# Patient Record
Sex: MALE | Race: WHITE | NOT HISPANIC OR LATINO | Employment: OTHER | URBAN - NONMETROPOLITAN AREA
[De-identification: names, ages, dates, MRNs, and addresses within clinical notes are randomized per-mention and may not be internally consistent; named-entity substitution may affect disease eponyms.]

---

## 2023-02-02 PROBLEM — E78.5 HYPERLIPEMIA: Status: ACTIVE | Noted: 2023-02-02

## 2023-02-02 PROBLEM — R42 DIZZINESS: Status: ACTIVE | Noted: 2023-02-02

## 2023-02-02 PROBLEM — E87.6 HYPOKALEMIA: Status: ACTIVE | Noted: 2023-02-02

## 2023-02-02 PROBLEM — R06.09 DYSPNEA ON EXERTION: Status: ACTIVE | Noted: 2023-02-02

## 2023-02-02 PROBLEM — M17.11 ARTHRITIS OF KNEE, RIGHT: Status: ACTIVE | Noted: 2023-02-02

## 2023-02-02 PROBLEM — M25.562 BILATERAL KNEE PAIN: Status: ACTIVE | Noted: 2023-02-02

## 2023-02-02 PROBLEM — Z95.810 CARDIAC DEFIBRILLATOR IN PLACE: Status: ACTIVE | Noted: 2023-02-02

## 2023-02-02 PROBLEM — R97.20 ELEVATED PSA: Status: ACTIVE | Noted: 2023-02-02

## 2023-02-02 PROBLEM — R91.8 GROUND GLASS OPACITY PRESENT ON IMAGING OF LUNG: Status: ACTIVE | Noted: 2023-02-02

## 2023-02-02 PROBLEM — T82.867A CORONARY STENT THROMBOSIS: Status: ACTIVE | Noted: 2023-02-02

## 2023-02-02 PROBLEM — G89.29 CHRONIC BACK PAIN: Status: ACTIVE | Noted: 2023-02-02

## 2023-02-02 PROBLEM — M54.9 CHRONIC BACK PAIN: Status: ACTIVE | Noted: 2023-02-02

## 2023-02-02 PROBLEM — D64.9 NORMOCHROMIC NORMOCYTIC ANEMIA: Status: ACTIVE | Noted: 2023-02-02

## 2023-02-02 PROBLEM — R78.81 BACTEREMIA DUE TO ESCHERICHIA COLI: Status: ACTIVE | Noted: 2023-02-02

## 2023-02-02 PROBLEM — K21.9 ESOPHAGEAL REFLUX: Status: ACTIVE | Noted: 2023-02-02

## 2023-02-02 PROBLEM — M25.50 ARTHRALGIA: Status: ACTIVE | Noted: 2023-02-02

## 2023-02-02 PROBLEM — I48.0 AF (PAROXYSMAL ATRIAL FIBRILLATION) (MULTI): Status: ACTIVE | Noted: 2023-02-02

## 2023-02-02 PROBLEM — M25.561 BILATERAL KNEE PAIN: Status: ACTIVE | Noted: 2023-02-02

## 2023-02-02 PROBLEM — M17.12 ARTHRITIS OF KNEE, LEFT: Status: ACTIVE | Noted: 2023-02-02

## 2023-02-02 PROBLEM — I73.1 THROMBOANGIITIS OBLITERANS (BUERGER'S DISEASE) (CMS-HCC): Status: ACTIVE | Noted: 2023-02-02

## 2023-02-02 PROBLEM — H61.23 IMPACTED CERUMEN OF BOTH EARS: Status: ACTIVE | Noted: 2023-02-02

## 2023-02-02 PROBLEM — I10 HYPERTENSION: Status: ACTIVE | Noted: 2023-02-02

## 2023-02-02 PROBLEM — R10.84 GENERALIZED ABDOMINAL PAIN: Status: ACTIVE | Noted: 2023-02-02

## 2023-02-02 PROBLEM — E11.51 DIABETES MELLITUS WITH PERIPHERAL VASCULAR DISEASE (MULTI): Status: ACTIVE | Noted: 2023-02-02

## 2023-02-02 PROBLEM — E11.9 DIABETES (MULTI): Status: ACTIVE | Noted: 2023-02-02

## 2023-02-02 PROBLEM — E55.9 VITAMIN D DEFICIENCY: Status: ACTIVE | Noted: 2023-02-02

## 2023-02-02 PROBLEM — N39.0 ACUTE UTI: Status: ACTIVE | Noted: 2023-02-02

## 2023-02-02 PROBLEM — M79.18 MYOFASCIAL PAIN SYNDROME: Status: ACTIVE | Noted: 2023-02-02

## 2023-02-02 PROBLEM — M76.30 IT BAND SYNDROME: Status: ACTIVE | Noted: 2023-02-02

## 2023-02-02 PROBLEM — M81.0 OSTEOPOROSIS: Status: ACTIVE | Noted: 2023-02-02

## 2023-02-02 PROBLEM — I95.1 ORTHOSTATIC HYPOTENSION: Status: ACTIVE | Noted: 2023-02-02

## 2023-02-02 PROBLEM — R25.2 MUSCLE CRAMPS: Status: ACTIVE | Noted: 2023-02-02

## 2023-02-02 PROBLEM — G25.81 RESTLESS LEGS SYNDROME: Status: ACTIVE | Noted: 2023-02-02

## 2023-02-02 PROBLEM — M47.816 LUMBAR SPONDYLOSIS: Status: ACTIVE | Noted: 2023-02-02

## 2023-02-02 PROBLEM — J44.9 COPD (CHRONIC OBSTRUCTIVE PULMONARY DISEASE) (MULTI): Status: ACTIVE | Noted: 2023-02-02

## 2023-02-02 PROBLEM — R93.2 ABNORMAL LIVER CT: Status: ACTIVE | Noted: 2023-02-02

## 2023-02-02 PROBLEM — M70.61 GREATER TROCHANTERIC BURSITIS OF BOTH HIPS: Status: ACTIVE | Noted: 2023-02-02

## 2023-02-02 PROBLEM — E87.1 HYPONATREMIA: Status: ACTIVE | Noted: 2023-02-02

## 2023-02-02 PROBLEM — M54.10 RADICULOPATHY: Status: ACTIVE | Noted: 2023-02-02

## 2023-02-02 PROBLEM — I26.99 PULMONARY EMBOLISM (MULTI): Status: ACTIVE | Noted: 2023-02-02

## 2023-02-02 PROBLEM — R94.31 ABNORMAL ELECTROCARDIOGRAM: Status: ACTIVE | Noted: 2023-02-02

## 2023-02-02 PROBLEM — F17.210 CIGARETTE NICOTINE DEPENDENCE: Status: ACTIVE | Noted: 2023-02-02

## 2023-02-02 PROBLEM — R53.83 FATIGUE: Status: ACTIVE | Noted: 2023-02-02

## 2023-02-02 PROBLEM — N18.30 DIABETES MELLITUS WITH STAGE 3 CHRONIC KIDNEY DISEASE (MULTI): Status: ACTIVE | Noted: 2023-02-02

## 2023-02-02 PROBLEM — R53.1 WEAKNESS: Status: ACTIVE | Noted: 2023-02-02

## 2023-02-02 PROBLEM — I42.9 CARDIOMYOPATHY (MULTI): Status: ACTIVE | Noted: 2023-02-02

## 2023-02-02 PROBLEM — Z98.61 S/P PTCA (PERCUTANEOUS TRANSLUMINAL CORONARY ANGIOPLASTY): Status: ACTIVE | Noted: 2023-02-02

## 2023-02-02 PROBLEM — R30.0 BURNING WITH URINATION: Status: ACTIVE | Noted: 2023-02-02

## 2023-02-02 PROBLEM — M25.512 PAIN IN JOINT OF LEFT SHOULDER: Status: ACTIVE | Noted: 2023-02-02

## 2023-02-02 PROBLEM — K57.32 DIVERTICULITIS OF COLON: Status: ACTIVE | Noted: 2023-02-02

## 2023-02-02 PROBLEM — H93.A3 SUBJECTIVE PULSATILE TINNITUS OF BOTH EARS: Status: ACTIVE | Noted: 2023-02-02

## 2023-02-02 PROBLEM — E11.22 DIABETES MELLITUS WITH STAGE 3 CHRONIC KIDNEY DISEASE (MULTI): Status: ACTIVE | Noted: 2023-02-02

## 2023-02-02 PROBLEM — G89.29 CHRONIC PAIN OF BOTH HIPS: Status: ACTIVE | Noted: 2023-02-02

## 2023-02-02 PROBLEM — I73.9 PVD (PERIPHERAL VASCULAR DISEASE) (CMS-HCC): Status: ACTIVE | Noted: 2023-02-02

## 2023-02-02 PROBLEM — G62.9 PERIPHERAL NEUROPATHY: Status: ACTIVE | Noted: 2023-02-02

## 2023-02-02 PROBLEM — B96.20 BACTEREMIA DUE TO ESCHERICHIA COLI: Status: ACTIVE | Noted: 2023-02-02

## 2023-02-02 PROBLEM — I25.10 CAD (CORONARY ARTERY DISEASE): Status: ACTIVE | Noted: 2023-02-02

## 2023-02-02 PROBLEM — M25.552 CHRONIC PAIN OF BOTH HIPS: Status: ACTIVE | Noted: 2023-02-02

## 2023-02-02 PROBLEM — R93.5 ABNORMAL COMPUTERIZED AXIAL TOMOGRAPHY OF ABDOMEN: Status: ACTIVE | Noted: 2023-02-02

## 2023-02-02 PROBLEM — M19.90 DJD (DEGENERATIVE JOINT DISEASE): Status: ACTIVE | Noted: 2023-02-02

## 2023-02-02 PROBLEM — G47.36 SLEEP RELATED HYPOVENTILATION IN CONDITIONS CLASSIFIED ELSEWHERE: Status: ACTIVE | Noted: 2023-02-02

## 2023-02-02 PROBLEM — F32.5 DEPRESSION, MAJOR, IN REMISSION (CMS-HCC): Status: ACTIVE | Noted: 2023-02-02

## 2023-02-02 PROBLEM — M25.551 CHRONIC PAIN OF BOTH HIPS: Status: ACTIVE | Noted: 2023-02-02

## 2023-02-02 PROBLEM — N40.0 BPH (BENIGN PROSTATIC HYPERPLASIA): Status: ACTIVE | Noted: 2023-02-02

## 2023-02-02 PROBLEM — K40.90 LEFT INGUINAL HERNIA: Status: ACTIVE | Noted: 2023-02-02

## 2023-02-02 PROBLEM — M70.62 GREATER TROCHANTERIC BURSITIS OF BOTH HIPS: Status: ACTIVE | Noted: 2023-02-02

## 2023-02-02 PROBLEM — I50.22 CHRONIC SYSTOLIC HEART FAILURE (MULTI): Status: ACTIVE | Noted: 2023-02-02

## 2023-02-02 RX ORDER — TAMSULOSIN HYDROCHLORIDE 0.4 MG/1
2 CAPSULE ORAL NIGHTLY
COMMUNITY
Start: 2021-08-19 | End: 2023-05-17 | Stop reason: SDUPTHER

## 2023-02-02 RX ORDER — ESCITALOPRAM OXALATE 10 MG/1
1 TABLET ORAL DAILY
COMMUNITY
Start: 2020-11-11 | End: 2023-05-22 | Stop reason: SDUPTHER

## 2023-02-02 RX ORDER — LEVALBUTEROL TARTRATE 45 UG/1
AEROSOL, METERED ORAL
COMMUNITY
Start: 2020-07-13 | End: 2023-12-05 | Stop reason: SDUPTHER

## 2023-02-02 RX ORDER — ROPINIROLE 1 MG/1
1 TABLET, FILM COATED ORAL NIGHTLY
COMMUNITY
Start: 2012-07-05 | End: 2023-05-09 | Stop reason: SDUPTHER

## 2023-02-02 RX ORDER — CHOLECALCIFEROL (VITAMIN D3) 125 MCG
1 CAPSULE ORAL DAILY
COMMUNITY
Start: 2022-06-27 | End: 2023-06-12 | Stop reason: SDUPTHER

## 2023-02-02 RX ORDER — FUROSEMIDE 40 MG/1
TABLET ORAL
COMMUNITY
End: 2023-09-22 | Stop reason: WASHOUT

## 2023-02-02 RX ORDER — POTASSIUM CHLORIDE 1500 MG/1
1 TABLET, EXTENDED RELEASE ORAL DAILY
COMMUNITY
Start: 2021-07-26

## 2023-02-02 RX ORDER — TIOTROPIUM BROMIDE AND OLODATEROL 3.124; 2.736 UG/1; UG/1
2 SPRAY, METERED RESPIRATORY (INHALATION) DAILY
COMMUNITY
Start: 2022-07-27 | End: 2023-12-08 | Stop reason: ALTCHOICE

## 2023-02-02 RX ORDER — ATORVASTATIN CALCIUM 80 MG/1
1 TABLET, FILM COATED ORAL DAILY
COMMUNITY
Start: 2016-07-13 | End: 2023-05-05 | Stop reason: SDUPTHER

## 2023-02-02 RX ORDER — AMIODARONE HYDROCHLORIDE 100 MG/1
2 TABLET ORAL DAILY
COMMUNITY
Start: 2021-08-15 | End: 2023-09-22 | Stop reason: ALTCHOICE

## 2023-02-02 RX ORDER — CALCIUM CARBONATE/VITAMIN D3 600MG-5MCG
1 TABLET ORAL DAILY
COMMUNITY
Start: 2016-01-19 | End: 2023-09-22 | Stop reason: WASHOUT

## 2023-02-02 RX ORDER — LANOLIN ALCOHOL/MO/W.PET/CERES
1 CREAM (GRAM) TOPICAL DAILY
COMMUNITY
Start: 2021-07-08 | End: 2023-05-22 | Stop reason: SDUPTHER

## 2023-03-15 ENCOUNTER — OFFICE VISIT (OUTPATIENT)
Dept: PRIMARY CARE | Facility: CLINIC | Age: 78
End: 2023-03-15
Payer: MEDICARE

## 2023-03-15 VITALS
SYSTOLIC BLOOD PRESSURE: 106 MMHG | DIASTOLIC BLOOD PRESSURE: 68 MMHG | HEART RATE: 66 BPM | WEIGHT: 143.7 LBS | BODY MASS INDEX: 20.57 KG/M2 | TEMPERATURE: 97.4 F | HEIGHT: 70 IN | OXYGEN SATURATION: 100 %

## 2023-03-15 DIAGNOSIS — E11.22 DIABETES MELLITUS WITH STAGE 3 CHRONIC KIDNEY DISEASE (MULTI): ICD-10-CM

## 2023-03-15 DIAGNOSIS — I26.99 PULMONARY EMBOLISM, UNSPECIFIED CHRONICITY, UNSPECIFIED PULMONARY EMBOLISM TYPE, UNSPECIFIED WHETHER ACUTE COR PULMONALE PRESENT (MULTI): ICD-10-CM

## 2023-03-15 DIAGNOSIS — Z00.00 ROUTINE GENERAL MEDICAL EXAMINATION AT HEALTH CARE FACILITY: ICD-10-CM

## 2023-03-15 DIAGNOSIS — E78.2 MIXED HYPERLIPIDEMIA: ICD-10-CM

## 2023-03-15 DIAGNOSIS — F17.210 CIGARETTE NICOTINE DEPENDENCE WITHOUT COMPLICATION: ICD-10-CM

## 2023-03-15 DIAGNOSIS — G25.81 RESTLESS LEGS SYNDROME: ICD-10-CM

## 2023-03-15 DIAGNOSIS — I73.9 PVD (PERIPHERAL VASCULAR DISEASE) (CMS-HCC): ICD-10-CM

## 2023-03-15 DIAGNOSIS — F32.5 DEPRESSION, MAJOR, IN REMISSION (CMS-HCC): ICD-10-CM

## 2023-03-15 DIAGNOSIS — J42 CHRONIC BRONCHITIS, UNSPECIFIED CHRONIC BRONCHITIS TYPE (MULTI): ICD-10-CM

## 2023-03-15 DIAGNOSIS — I42.9 CARDIOMYOPATHY, UNSPECIFIED TYPE (MULTI): ICD-10-CM

## 2023-03-15 DIAGNOSIS — I48.0 AF (PAROXYSMAL ATRIAL FIBRILLATION) (MULTI): ICD-10-CM

## 2023-03-15 DIAGNOSIS — N18.30 DIABETES MELLITUS WITH STAGE 3 CHRONIC KIDNEY DISEASE (MULTI): ICD-10-CM

## 2023-03-15 DIAGNOSIS — Z13.31 DEPRESSION SCREENING: ICD-10-CM

## 2023-03-15 DIAGNOSIS — E55.9 VITAMIN D DEFICIENCY: Primary | ICD-10-CM

## 2023-03-15 DIAGNOSIS — E11.51 DIABETES MELLITUS WITH PERIPHERAL VASCULAR DISEASE (MULTI): ICD-10-CM

## 2023-03-15 DIAGNOSIS — I50.22 CHRONIC SYSTOLIC HEART FAILURE (MULTI): ICD-10-CM

## 2023-03-15 PROCEDURE — 1170F FXNL STATUS ASSESSED: CPT | Performed by: NURSE PRACTITIONER

## 2023-03-15 PROCEDURE — 1159F MED LIST DOCD IN RCRD: CPT | Performed by: NURSE PRACTITIONER

## 2023-03-15 PROCEDURE — 4004F PT TOBACCO SCREEN RCVD TLK: CPT | Performed by: NURSE PRACTITIONER

## 2023-03-15 PROCEDURE — 99214 OFFICE O/P EST MOD 30 MIN: CPT | Performed by: NURSE PRACTITIONER

## 2023-03-15 PROCEDURE — 1160F RVW MEDS BY RX/DR IN RCRD: CPT | Performed by: NURSE PRACTITIONER

## 2023-03-15 PROCEDURE — 3074F SYST BP LT 130 MM HG: CPT | Performed by: NURSE PRACTITIONER

## 2023-03-15 PROCEDURE — 3078F DIAST BP <80 MM HG: CPT | Performed by: NURSE PRACTITIONER

## 2023-03-15 PROCEDURE — G0444 DEPRESSION SCREEN ANNUAL: HCPCS | Performed by: NURSE PRACTITIONER

## 2023-03-15 PROCEDURE — 99406 BEHAV CHNG SMOKING 3-10 MIN: CPT | Performed by: NURSE PRACTITIONER

## 2023-03-15 PROCEDURE — G0439 PPPS, SUBSEQ VISIT: HCPCS | Performed by: NURSE PRACTITIONER

## 2023-03-15 PROCEDURE — 1157F ADVNC CARE PLAN IN RCRD: CPT | Performed by: NURSE PRACTITIONER

## 2023-03-15 RX ORDER — ROPINIROLE 0.25 MG/1
0.25 TABLET, FILM COATED ORAL DAILY PRN
Status: DISCONTINUED | OUTPATIENT
Start: 2023-03-15 | End: 2023-03-20

## 2023-03-15 ASSESSMENT — ENCOUNTER SYMPTOMS
FATIGUE: 1
HEADACHES: 0
NERVOUS/ANXIOUS: 1
SORE THROAT: 0
PALPITATIONS: 0
DIZZINESS: 0
OCCASIONAL FEELINGS OF UNSTEADINESS: 1
NUMBNESS: 0
CHILLS: 0
DEPRESSION: 1
SHORTNESS OF BREATH: 1
BACK PAIN: 1
VOMITING: 0
EYES NEGATIVE: 1
CONSTIPATION: 0
ABDOMINAL PAIN: 0
DIARRHEA: 0
ENDOCRINE NEGATIVE: 1
UNEXPECTED WEIGHT CHANGE: 0
WHEEZING: 0
ALLERGIC/IMMUNOLOGIC NEGATIVE: 1
GASTROINTESTINAL NEGATIVE: 1
ACTIVITY CHANGE: 0
HEMATOLOGIC/LYMPHATIC NEGATIVE: 1
COUGH: 0
NAUSEA: 0
ARTHRALGIAS: 1
SPEECH DIFFICULTY: 0

## 2023-03-15 ASSESSMENT — ACTIVITIES OF DAILY LIVING (ADL)
DOING_HOUSEWORK: NEEDS ASSISTANCE
TAKING_MEDICATION: INDEPENDENT
DRESSING: INDEPENDENT
GROCERY_SHOPPING: INDEPENDENT
MANAGING_FINANCES: INDEPENDENT
BATHING: INDEPENDENT

## 2023-03-15 ASSESSMENT — PATIENT HEALTH QUESTIONNAIRE - PHQ9
SUM OF ALL RESPONSES TO PHQ9 QUESTIONS 1 AND 2: 0
1. LITTLE INTEREST OR PLEASURE IN DOING THINGS: NOT AT ALL
2. FEELING DOWN, DEPRESSED OR HOPELESS: NOT AT ALL

## 2023-03-15 NOTE — ASSESSMENT & PLAN NOTE
Controlled. Continue current medications.  -Low flat/cholesterol, low sodium, carbohydrate controlled diet  -Exercise as tolerated 150 minutes/week, gradually increase activity  -Maintain healthy weight  -Regular follow-up with ophthalmology and podiatry

## 2023-03-15 NOTE — PROGRESS NOTES
Subjective   Reason for Visit: Andrez Damon is an 78 y.o. male here for a Medicare Wellness visit.          Reviewed all medications by prescribing practitioner or clinical pharmacist (such as prescriptions, OTCs, herbal therapies and supplements) and documented in the medical record.    Orthostatic hypotension. Educated about getting up slowly, wearing compression stockings, he does not wear them. When BP drops he drinks a glass of water or OJ and lays down, feels better.  Hx PE, Taking Eliquis. Denies chest pain, breathing at baseline. States he is still smoking 1 pack/week. Strongly encouraged to quit smoking completely.  Chronic kidney disease, monitor  Urinary retention. Taking tamsulosin 0.4 mg 2 capsules at bedtime. Denies difficulty urinating. Follow up with urology.  COPD, follows with pulmonology. Using Stiolto and Yupelri. Using O2 2 L at bedtime. Feels his breathing is slowly worsening over time. Strongly encouraged to stop smoking completely.  Diabetes, controlled, A1c 5.6%.   Anemia, following with hematology, getting B12 every month, IV iron as needed  Complains of chronic back, leg pain. Uses cane. Recommend return to pain management. He declines. Declines PT. RLS, not controlled, taking ropinirole at night, will add 0.25 mg daily to help with symptoms.  Depression, controlled, monitor  PVD, thromboangiitis obliterans, strongly encouraged patient to stop smoking. Wear good supportive shoes and protect feet, inspect feet daily.  GERD, controlled, taking pantoprazole 40 mg daily.   CAD, anterior wall MI, cardiac stents, ejection fraction 20-25%, follows with cardiology. Did not tolerate Entresto. Taking Brilinta.  I spent 15 minutes obtaining and discussing depression screening using PHQ-2 questions with results documented in the chart. The screening indicated potential depression and PHQ-9 was obtained with treatment and referral plan discussed.   I spent more than 3 minutes (but less than 10  minutes) counseling patient about need for smoking/tobacco cessation and how I can support efforts when patient is ready to quit. Discussed nicotine replacement therapy, varenicline, bupropion, hypnosis, support groups, and acupuncture as potential options. Patient currently has no signs or symptoms of tobacco related disease.               Patient Self Assessment of Health Status  Patient Self Assessment: Fair    Nutrition and Exercise  Current Diet: Heart Healthy Diet, Diabetic Diet  Adequate Fluid Intake: Yes  Caffeine: Yes  Exercise Frequency: Infrequently    Functional Ability/Level of Safety  Cognitive Impairment Observed: No cognitive impairment observed  Cognitive Impairment Reported: No cognitive impairment reported by patient or family    Home Safety Risk Factors: None    Patient Care Team:  KENDRICK Parmar DNP as PCP - General  KENDRICK Parmar DNP as PCP - [employer] PCP     Review of Systems   Constitutional:  Positive for fatigue. Negative for activity change, chills and unexpected weight change.   HENT:  Positive for hearing loss and tinnitus. Negative for congestion, ear pain and sore throat.    Eyes: Negative.    Respiratory:  Positive for shortness of breath. Negative for cough and wheezing.    Cardiovascular:  Negative for chest pain, palpitations and leg swelling.        Cool toes   Gastrointestinal: Negative.  Negative for abdominal pain, constipation, diarrhea, nausea and vomiting.   Endocrine: Negative.    Genitourinary: Negative.         Nocturia   Musculoskeletal:  Positive for arthralgias, back pain and gait problem.   Skin: Negative.    Allergic/Immunologic: Negative.    Neurological:  Negative for dizziness, speech difficulty, numbness and headaches.   Hematological: Negative.    Psychiatric/Behavioral:  The patient is nervous/anxious.    All other systems reviewed and are negative.      Objective   Vitals:  /68   Pulse 66   Temp 36.3 °C  "(97.4 °F)   Ht 1.778 m (5' 10\")   Wt 65.2 kg (143 lb 11.2 oz)   SpO2 100%   BMI 20.62 kg/m²       Physical Exam  Vitals and nursing note reviewed.   Constitutional:       General: He is not in acute distress.     Appearance: Normal appearance. He is normal weight. He is not ill-appearing.   HENT:      Head: Normocephalic and atraumatic.      Right Ear: Tympanic membrane, ear canal and external ear normal.      Left Ear: Tympanic membrane, ear canal and external ear normal.      Nose: Nose normal.      Mouth/Throat:      Mouth: Mucous membranes are moist.      Pharynx: Oropharynx is clear.   Eyes:      Extraocular Movements: Extraocular movements intact.      Conjunctiva/sclera: Conjunctivae normal.      Pupils: Pupils are equal, round, and reactive to light.   Cardiovascular:      Rate and Rhythm: Normal rate and regular rhythm.      Pulses: Normal pulses.      Heart sounds: Normal heart sounds. No murmur heard.  Pulmonary:      Effort: Pulmonary effort is normal. No respiratory distress.      Breath sounds: Normal breath sounds. No wheezing.   Abdominal:      General: Bowel sounds are normal. There is no distension.      Palpations: Abdomen is soft.      Tenderness: There is no abdominal tenderness.   Musculoskeletal:         General: No tenderness. Normal range of motion.      Cervical back: Normal range of motion and neck supple.      Right lower leg: No edema.      Left lower leg: No edema.   Skin:     General: Skin is warm and dry.      Capillary Refill: Capillary refill takes less than 2 seconds.   Neurological:      General: No focal deficit present.      Mental Status: He is alert and oriented to person, place, and time.   Psychiatric:         Mood and Affect: Mood normal.         Behavior: Behavior normal.         Thought Content: Thought content normal.         Judgment: Judgment normal.         Assessment/Plan   Problem List Items Addressed This Visit          Nervous    Restless legs syndrome    " Relevant Medications    rOPINIRole (Requip) tablet 0.25 mg    Other Relevant Orders    Follow Up In Primary Care       Respiratory    COPD (chronic obstructive pulmonary disease) (CMS/HCC)    Current Assessment & Plan     Controlled. Continue current medications.  Follow with pulmonology            Circulatory    AF (paroxysmal atrial fibrillation) (CMS/HCC)    Current Assessment & Plan     Controlled. Continue current medications.  Follow with cardiology         Cardiomyopathy (CMS/HCC)    Current Assessment & Plan     Controlled. Continue current medications.  Follow with cardiology         Chronic systolic heart failure (CMS/HCC)    Current Assessment & Plan     Controlled. Continue current medications.  Follow with cardiology  -2 GM sodium diet  -Weigh yourself every morning before breakfast  -Call the office if you have a weight gain of 3 or more pounds in one day or 5 or more pounds in one week          Diabetes mellitus with peripheral vascular disease (CMS/HCC)    Current Assessment & Plan     Controlled. Continue current medications.  -Low flat/cholesterol, low sodium, carbohydrate controlled diet  -Exercise as tolerated 150 minutes/week, gradually increase activity  -Maintain healthy weight  -Regular follow-up with ophthalmology and podiatry           Relevant Orders    Albumin , Urine Random    CBC and Auto Differential    Comprehensive Metabolic Panel    Follow Up In Primary Care    Pulmonary embolism (CMS/HCC)    Current Assessment & Plan     Controlled. Continue current medications.         PVD (peripheral vascular disease) (CMS/HCC)    Current Assessment & Plan     Controlled. Continue current medications.  Strongly encouraged to stop smoking.              Endocrine/Metabolic    Diabetes mellitus with stage 3 chronic kidney disease (CMS/HCC)    Current Assessment & Plan     Controlled. Continue current medications.  -Low flat/cholesterol, low sodium, carbohydrate controlled diet  -Exercise as  tolerated 150 minutes/week, gradually increase activity  -Maintain healthy weight  -Regular follow-up with ophthalmology and podiatry         Relevant Orders    Albumin , Urine Random    CBC and Auto Differential    Comprehensive Metabolic Panel    Vitamin D deficiency - Primary    Relevant Orders    Vitamin D 1,25 Dihydroxy       Other    Cigarette nicotine dependence    Current Assessment & Plan     Strongly encouraged to stop smoking.           Depression, major, in remission (CMS/HCC)    Current Assessment & Plan     Controlled. Continue current medications.           Hyperlipemia    Relevant Orders    Lipid Panel    TSH with reflex to Free T4 if abnormal     Other Visit Diagnoses       Routine general medical examination at health care facility

## 2023-03-15 NOTE — ASSESSMENT & PLAN NOTE
Controlled. Continue current medications.  Follow with cardiology  -2 GM sodium diet  -Weigh yourself every morning before breakfast  -Call the office if you have a weight gain of 3 or more pounds in one day or 5 or more pounds in one week

## 2023-03-20 DIAGNOSIS — G25.81 RESTLESS LEGS SYNDROME: Primary | ICD-10-CM

## 2023-03-20 RX ORDER — ROPINIROLE 0.25 MG/1
TABLET, FILM COATED ORAL
Qty: 90 TABLET | Refills: 0 | Status: SHIPPED | OUTPATIENT
Start: 2023-03-20 | End: 2023-05-09 | Stop reason: ALTCHOICE

## 2023-05-05 DIAGNOSIS — E78.5 HYPERLIPIDEMIA, UNSPECIFIED HYPERLIPIDEMIA TYPE: ICD-10-CM

## 2023-05-05 RX ORDER — ATORVASTATIN CALCIUM 80 MG/1
80 TABLET, FILM COATED ORAL DAILY
Qty: 90 TABLET | Refills: 0 | Status: SHIPPED | OUTPATIENT
Start: 2023-05-05 | End: 2023-08-07

## 2023-05-09 DIAGNOSIS — G25.81 RESTLESS LEGS SYNDROME: ICD-10-CM

## 2023-05-09 RX ORDER — ROPINIROLE 1 MG/1
1 TABLET, FILM COATED ORAL NIGHTLY
Qty: 90 TABLET | Refills: 0 | Status: SHIPPED | OUTPATIENT
Start: 2023-05-09 | End: 2023-07-27

## 2023-05-17 DIAGNOSIS — N40.0 BENIGN PROSTATIC HYPERPLASIA, UNSPECIFIED WHETHER LOWER URINARY TRACT SYMPTOMS PRESENT: ICD-10-CM

## 2023-05-18 RX ORDER — TAMSULOSIN HYDROCHLORIDE 0.4 MG/1
0.8 CAPSULE ORAL NIGHTLY
Qty: 180 CAPSULE | Refills: 0 | Status: SHIPPED | OUTPATIENT
Start: 2023-05-18 | End: 2024-01-26 | Stop reason: SDUPTHER

## 2023-05-22 ENCOUNTER — LAB (OUTPATIENT)
Dept: LAB | Facility: LAB | Age: 78
End: 2023-05-22
Payer: MEDICARE

## 2023-05-22 DIAGNOSIS — G62.89 OTHER POLYNEUROPATHY: Primary | ICD-10-CM

## 2023-05-22 DIAGNOSIS — Z00.00 HEALTHCARE MAINTENANCE: ICD-10-CM

## 2023-05-22 DIAGNOSIS — F32.5 DEPRESSION, MAJOR, IN REMISSION (CMS-HCC): ICD-10-CM

## 2023-05-22 DIAGNOSIS — N18.30 DIABETES MELLITUS WITH STAGE 3 CHRONIC KIDNEY DISEASE (MULTI): ICD-10-CM

## 2023-05-22 DIAGNOSIS — E11.51 DIABETES MELLITUS WITH PERIPHERAL VASCULAR DISEASE (MULTI): ICD-10-CM

## 2023-05-22 DIAGNOSIS — E55.9 VITAMIN D DEFICIENCY: ICD-10-CM

## 2023-05-22 DIAGNOSIS — E11.22 DIABETES MELLITUS WITH STAGE 3 CHRONIC KIDNEY DISEASE (MULTI): ICD-10-CM

## 2023-05-22 DIAGNOSIS — E78.2 MIXED HYPERLIPIDEMIA: ICD-10-CM

## 2023-05-22 LAB
ALANINE AMINOTRANSFERASE (SGPT) (U/L) IN SER/PLAS: 35 U/L (ref 10–52)
ALBUMIN (G/DL) IN SER/PLAS: 3.7 G/DL (ref 3.4–5)
ALKALINE PHOSPHATASE (U/L) IN SER/PLAS: 61 U/L (ref 33–136)
ANION GAP IN SER/PLAS: 14 MMOL/L (ref 10–20)
ASPARTATE AMINOTRANSFERASE (SGOT) (U/L) IN SER/PLAS: 49 U/L (ref 9–39)
BASOPHILS (10*3/UL) IN BLOOD BY AUTOMATED COUNT: 0.06 X10E9/L (ref 0–0.1)
BASOPHILS/100 LEUKOCYTES IN BLOOD BY AUTOMATED COUNT: 1.1 % (ref 0–2)
BILIRUBIN TOTAL (MG/DL) IN SER/PLAS: 0.6 MG/DL (ref 0–1.2)
CALCIUM (MG/DL) IN SER/PLAS: 9.4 MG/DL (ref 8.6–10.3)
CARBON DIOXIDE, TOTAL (MMOL/L) IN SER/PLAS: 26 MMOL/L (ref 21–32)
CHLORIDE (MMOL/L) IN SER/PLAS: 105 MMOL/L (ref 98–107)
CHOLESTEROL (MG/DL) IN SER/PLAS: 148 MG/DL (ref 0–199)
CHOLESTEROL IN HDL (MG/DL) IN SER/PLAS: 68.1 MG/DL
CHOLESTEROL/HDL RATIO: 2.2
CREATININE (MG/DL) IN SER/PLAS: 1.74 MG/DL (ref 0.5–1.3)
EOSINOPHILS (10*3/UL) IN BLOOD BY AUTOMATED COUNT: 0.06 X10E9/L (ref 0–0.4)
EOSINOPHILS/100 LEUKOCYTES IN BLOOD BY AUTOMATED COUNT: 1.1 % (ref 0–6)
ERYTHROCYTE DISTRIBUTION WIDTH (RATIO) BY AUTOMATED COUNT: 14.5 % (ref 11.5–14.5)
ERYTHROCYTE MEAN CORPUSCULAR HEMOGLOBIN CONCENTRATION (G/DL) BY AUTOMATED: 31.4 G/DL (ref 32–36)
ERYTHROCYTE MEAN CORPUSCULAR VOLUME (FL) BY AUTOMATED COUNT: 97 FL (ref 80–100)
ERYTHROCYTES (10*6/UL) IN BLOOD BY AUTOMATED COUNT: 4.16 X10E12/L (ref 4.5–5.9)
FERRITIN (UG/LL) IN SER/PLAS: 38 UG/L (ref 20–300)
GFR MALE: 40 ML/MIN/1.73M2
GLUCOSE (MG/DL) IN SER/PLAS: 152 MG/DL (ref 74–99)
HEMATOCRIT (%) IN BLOOD BY AUTOMATED COUNT: 40.5 % (ref 41–52)
HEMOGLOBIN (G/DL) IN BLOOD: 12.7 G/DL (ref 13.5–17.5)
IMMATURE GRANULOCYTES/100 LEUKOCYTES IN BLOOD BY AUTOMATED COUNT: 0.4 % (ref 0–0.9)
IRON (UG/DL) IN SER/PLAS: 45 UG/DL (ref 35–150)
IRON BINDING CAPACITY (UG/DL) IN SER/PLAS: 398 UG/DL (ref 240–445)
IRON SATURATION (%) IN SER/PLAS: 11 % (ref 25–45)
LDL: 70 MG/DL (ref 0–99)
LEUKOCYTES (10*3/UL) IN BLOOD BY AUTOMATED COUNT: 5.6 X10E9/L (ref 4.4–11.3)
LYMPHOCYTES (10*3/UL) IN BLOOD BY AUTOMATED COUNT: 0.77 X10E9/L (ref 0.8–3)
LYMPHOCYTES/100 LEUKOCYTES IN BLOOD BY AUTOMATED COUNT: 13.8 % (ref 13–44)
MONOCYTES (10*3/UL) IN BLOOD BY AUTOMATED COUNT: 0.43 X10E9/L (ref 0.05–0.8)
MONOCYTES/100 LEUKOCYTES IN BLOOD BY AUTOMATED COUNT: 7.7 % (ref 2–10)
NEUTROPHILS (10*3/UL) IN BLOOD BY AUTOMATED COUNT: 4.26 X10E9/L (ref 1.6–5.5)
NEUTROPHILS/100 LEUKOCYTES IN BLOOD BY AUTOMATED COUNT: 75.9 % (ref 40–80)
PLATELETS (10*3/UL) IN BLOOD AUTOMATED COUNT: 208 X10E9/L (ref 150–450)
POTASSIUM (MMOL/L) IN SER/PLAS: 4.3 MMOL/L (ref 3.5–5.3)
PROTEIN TOTAL: 5.7 G/DL (ref 6.4–8.2)
SODIUM (MMOL/L) IN SER/PLAS: 141 MMOL/L (ref 136–145)
THYROTROPIN (MIU/L) IN SER/PLAS BY DETECTION LIMIT <= 0.05 MIU/L: 1.84 MIU/L (ref 0.44–3.98)
TRIGLYCERIDE (MG/DL) IN SER/PLAS: 48 MG/DL (ref 0–149)
UREA NITROGEN (MG/DL) IN SER/PLAS: 32 MG/DL (ref 6–23)
VLDL: 10 MG/DL (ref 0–40)

## 2023-05-22 PROCEDURE — 80053 COMPREHEN METABOLIC PANEL: CPT

## 2023-05-22 PROCEDURE — 84443 ASSAY THYROID STIM HORMONE: CPT

## 2023-05-22 PROCEDURE — 36415 COLL VENOUS BLD VENIPUNCTURE: CPT

## 2023-05-22 PROCEDURE — 80061 LIPID PANEL: CPT

## 2023-05-22 PROCEDURE — 82652 VIT D 1 25-DIHYDROXY: CPT

## 2023-05-22 PROCEDURE — 85025 COMPLETE CBC W/AUTO DIFF WBC: CPT

## 2023-05-22 RX ORDER — ESCITALOPRAM OXALATE 10 MG/1
10 TABLET ORAL DAILY
Qty: 90 TABLET | Refills: 0 | Status: SHIPPED | OUTPATIENT
Start: 2023-05-22 | End: 2023-08-21 | Stop reason: SDUPTHER

## 2023-05-22 RX ORDER — LANOLIN ALCOHOL/MO/W.PET/CERES
50 CREAM (GRAM) TOPICAL DAILY
Qty: 90 TABLET | Refills: 0 | Status: SHIPPED | OUTPATIENT
Start: 2023-05-22 | End: 2023-08-18

## 2023-05-24 ENCOUNTER — OFFICE VISIT (OUTPATIENT)
Dept: PRIMARY CARE | Facility: CLINIC | Age: 78
End: 2023-05-24
Payer: MEDICARE

## 2023-05-24 VITALS
BODY MASS INDEX: 20.59 KG/M2 | SYSTOLIC BLOOD PRESSURE: 120 MMHG | HEART RATE: 70 BPM | DIASTOLIC BLOOD PRESSURE: 72 MMHG | TEMPERATURE: 97.5 F | OXYGEN SATURATION: 92 % | WEIGHT: 143.5 LBS

## 2023-05-24 DIAGNOSIS — M79.605 ACUTE PAIN OF LEFT LOWER EXTREMITY: ICD-10-CM

## 2023-05-24 DIAGNOSIS — I48.0 AF (PAROXYSMAL ATRIAL FIBRILLATION) (MULTI): ICD-10-CM

## 2023-05-24 DIAGNOSIS — I50.22 CHRONIC SYSTOLIC HEART FAILURE (MULTI): ICD-10-CM

## 2023-05-24 DIAGNOSIS — M25.552 PAIN OF LEFT HIP: Primary | ICD-10-CM

## 2023-05-24 DIAGNOSIS — I10 PRIMARY HYPERTENSION: ICD-10-CM

## 2023-05-24 PROCEDURE — 4004F PT TOBACCO SCREEN RCVD TLK: CPT | Performed by: NURSE PRACTITIONER

## 2023-05-24 PROCEDURE — 1160F RVW MEDS BY RX/DR IN RCRD: CPT | Performed by: NURSE PRACTITIONER

## 2023-05-24 PROCEDURE — 99214 OFFICE O/P EST MOD 30 MIN: CPT | Performed by: NURSE PRACTITIONER

## 2023-05-24 PROCEDURE — 1159F MED LIST DOCD IN RCRD: CPT | Performed by: NURSE PRACTITIONER

## 2023-05-24 PROCEDURE — 3078F DIAST BP <80 MM HG: CPT | Performed by: NURSE PRACTITIONER

## 2023-05-24 PROCEDURE — 3074F SYST BP LT 130 MM HG: CPT | Performed by: NURSE PRACTITIONER

## 2023-05-24 PROCEDURE — 1157F ADVNC CARE PLAN IN RCRD: CPT | Performed by: NURSE PRACTITIONER

## 2023-05-24 NOTE — PROGRESS NOTES
Subjective   Patient ID: Andrez Damon is a 78 y.o. male who presents for Hip Pain (Left through leg and pelvis, since Mother's Day).    Patient stumbled on step 1-1/2 weeks ago, felt he jambed L leg into pelvis. Hx prior hip fracture. Xrays negative for fracture. Continue rest, elevation. Tylenol 650-1000 mg every 8 hours as needed for pain. Call the office if symptoms worsen or do not improve.  Orthostatic hypotension. Educated about getting up slowly, wearing compression stockings, he does not wear them. When BP drops he drinks a glass of water or OJ and lays down, feels better.  Hx PE, Taking Eliquis. Denies chest pain, breathing at baseline. States he is still smoking 1 pack/week. Strongly encouraged to quit smoking completely.  Chronic kidney disease, monitor  Urinary retention. Taking tamsulosin 0.4 mg 2 capsules at bedtime. Denies difficulty urinating. Follow up with urology.  COPD, follows with pulmonology. Using Stiolto and Yupelri. Using O2 2 L at bedtime. Feels his breathing is slowly worsening over time. Strongly encouraged to stop smoking completely.  Diabetes, controlled, A1c 5.6%.   Anemia, following with hematology, getting B12 every month, IV iron as needed  Complains of chronic back, leg pain. Uses cane. Recommend return to pain management. He declines. Declines PT. RLS, not controlled, taking ropinirole at night, will add 0.25 mg daily to help with symptoms.  Depression, controlled, monitor  PVD, thromboangiitis obliterans, strongly encouraged patient to stop smoking. Wear good supportive shoes and protect feet, inspect feet daily.  GERD, controlled, taking pantoprazole 40 mg daily.   CAD, anterior wall MI, cardiac stents, ejection fraction 20-25%, follows with cardiology. Did not tolerate Entresto. Taking Brilinta.         Review of Systems   Constitutional:  Positive for fatigue. Negative for activity change, chills and unexpected weight change.   HENT:  Positive for hearing loss and  tinnitus. Negative for congestion, ear pain and sore throat.    Eyes: Negative.    Respiratory:  Positive for shortness of breath. Negative for cough and wheezing.    Cardiovascular:  Negative for chest pain, palpitations and leg swelling.        Cool toes   Gastrointestinal: Negative.  Negative for abdominal pain, constipation, diarrhea, nausea and vomiting.   Endocrine: Negative.    Genitourinary: Negative.         Nocturia   Musculoskeletal:  Positive for arthralgias, back pain and gait problem.   Skin: Negative.    Allergic/Immunologic: Negative.    Neurological:  Negative for dizziness, speech difficulty, numbness and headaches.   Hematological: Negative.    Psychiatric/Behavioral:  The patient is nervous/anxious.    All other systems reviewed and are negative.      Objective   /72   Pulse 70   Temp 36.4 °C (97.5 °F)   Wt 65.1 kg (143 lb 8 oz)   SpO2 92%   BMI 20.59 kg/m²     Physical Exam  Vitals and nursing note reviewed.   Constitutional:       General: He is not in acute distress.     Appearance: Normal appearance. He is normal weight. He is not ill-appearing.   HENT:      Head: Normocephalic and atraumatic.      Right Ear: Tympanic membrane, ear canal and external ear normal.      Left Ear: Tympanic membrane, ear canal and external ear normal.      Nose: Nose normal.      Mouth/Throat:      Mouth: Mucous membranes are moist.      Pharynx: Oropharynx is clear.   Eyes:      Extraocular Movements: Extraocular movements intact.      Conjunctiva/sclera: Conjunctivae normal.      Pupils: Pupils are equal, round, and reactive to light.   Cardiovascular:      Rate and Rhythm: Normal rate and regular rhythm.      Pulses: Normal pulses.      Heart sounds: Normal heart sounds. No murmur heard.  Pulmonary:      Effort: Pulmonary effort is normal. No respiratory distress.      Breath sounds: Normal breath sounds. No wheezing.   Abdominal:      General: Bowel sounds are normal. There is no distension.       Palpations: Abdomen is soft.      Tenderness: There is no abdominal tenderness.   Musculoskeletal:         General: No tenderness. Normal range of motion.      Cervical back: Normal range of motion and neck supple.      Right lower leg: No edema.      Left lower leg: No edema.   Skin:     General: Skin is warm and dry.      Capillary Refill: Capillary refill takes less than 2 seconds.   Neurological:      General: No focal deficit present.      Mental Status: He is alert and oriented to person, place, and time.   Psychiatric:         Mood and Affect: Mood normal.         Behavior: Behavior normal.         Thought Content: Thought content normal.         Judgment: Judgment normal.         Assessment/Plan   Problem List Items Addressed This Visit          Circulatory    AF (paroxysmal atrial fibrillation) (CMS/HCC)     Controlled  Continue Eliquis for stroke prevention  Follow-up with cardiology         Chronic systolic heart failure (CMS/HCC)     Euvolemic  Continue furosemide  -2 GM sodium diet  -Weigh yourself every morning before breakfast  -Call the office if you have a weight gain of 3 or more pounds in one day or 5 or more pounds in one week  Follow-up with cardiology         Hypertension     Controlled. Continue current medications.  -Low fat/cholesterol, low sodium, low carbohydrate diet  -Exercise as tolerated 150 minutes/week, increase activity slowly  -Maintain healthy weight  -Strongly encouraged to stop smoking          Other Visit Diagnoses       Pain of left hip    -  Primary    Acute pain of left lower extremity        Relevant Orders    XR femur left 2+ views (Completed)

## 2023-05-25 LAB — VITAMIN D 1,25-DIHYDROXY: 43.2 PG/ML (ref 19.9–79.3)

## 2023-05-26 PROBLEM — N39.0 ACUTE UTI: Status: RESOLVED | Noted: 2023-02-02 | Resolved: 2023-05-26

## 2023-05-26 ASSESSMENT — ENCOUNTER SYMPTOMS
ACTIVITY CHANGE: 0
DIZZINESS: 0
EYES NEGATIVE: 1
SORE THROAT: 0
NAUSEA: 0
PALPITATIONS: 0
CONSTIPATION: 0
NUMBNESS: 0
ENDOCRINE NEGATIVE: 1
UNEXPECTED WEIGHT CHANGE: 0
HEMATOLOGIC/LYMPHATIC NEGATIVE: 1
ARTHRALGIAS: 1
ABDOMINAL PAIN: 0
DIARRHEA: 0
SPEECH DIFFICULTY: 0
NERVOUS/ANXIOUS: 1
GASTROINTESTINAL NEGATIVE: 1
SHORTNESS OF BREATH: 1
VOMITING: 0
HEADACHES: 0
WHEEZING: 0
CHILLS: 0
FATIGUE: 1
BACK PAIN: 1
COUGH: 0
ALLERGIC/IMMUNOLOGIC NEGATIVE: 1

## 2023-05-26 NOTE — ASSESSMENT & PLAN NOTE
Controlled. Continue current medications.  -Low fat/cholesterol, low sodium, low carbohydrate diet  -Exercise as tolerated 150 minutes/week, increase activity slowly  -Maintain healthy weight  -Strongly encouraged to stop smoking

## 2023-05-26 NOTE — ASSESSMENT & PLAN NOTE
Euvolemic  Continue furosemide  -2 GM sodium diet  -Weigh yourself every morning before breakfast  -Call the office if you have a weight gain of 3 or more pounds in one day or 5 or more pounds in one week  Follow-up with cardiology

## 2023-06-12 DIAGNOSIS — E55.9 VITAMIN D DEFICIENCY: ICD-10-CM

## 2023-06-12 RX ORDER — CHOLECALCIFEROL (VITAMIN D3) 125 MCG
125 CAPSULE ORAL DAILY
Qty: 90 CAPSULE | Refills: 0 | Status: SHIPPED | OUTPATIENT
Start: 2023-06-12 | End: 2023-08-21 | Stop reason: SDUPTHER

## 2023-06-15 ENCOUNTER — APPOINTMENT (OUTPATIENT)
Dept: PRIMARY CARE | Facility: CLINIC | Age: 78
End: 2023-06-15
Payer: MEDICARE

## 2023-06-21 ENCOUNTER — OFFICE VISIT (OUTPATIENT)
Dept: PRIMARY CARE | Facility: CLINIC | Age: 78
End: 2023-06-21
Payer: MEDICARE

## 2023-06-21 VITALS
BODY MASS INDEX: 20.45 KG/M2 | SYSTOLIC BLOOD PRESSURE: 88 MMHG | TEMPERATURE: 98 F | OXYGEN SATURATION: 95 % | DIASTOLIC BLOOD PRESSURE: 56 MMHG | WEIGHT: 142.5 LBS | HEART RATE: 87 BPM

## 2023-06-21 DIAGNOSIS — I42.9 CARDIOMYOPATHY, UNSPECIFIED TYPE (MULTI): ICD-10-CM

## 2023-06-21 DIAGNOSIS — I95.1 ORTHOSTATIC HYPOTENSION: ICD-10-CM

## 2023-06-21 DIAGNOSIS — E46 PROTEIN-CALORIE MALNUTRITION, UNSPECIFIED SEVERITY (MULTI): ICD-10-CM

## 2023-06-21 DIAGNOSIS — J42 CHRONIC BRONCHITIS, UNSPECIFIED CHRONIC BRONCHITIS TYPE (MULTI): ICD-10-CM

## 2023-06-21 DIAGNOSIS — I50.22 CHRONIC SYSTOLIC HEART FAILURE (MULTI): ICD-10-CM

## 2023-06-21 DIAGNOSIS — I48.0 AF (PAROXYSMAL ATRIAL FIBRILLATION) (MULTI): ICD-10-CM

## 2023-06-21 DIAGNOSIS — I73.1 THROMBOANGIITIS OBLITERANS (BUERGER'S DISEASE) (CMS-HCC): ICD-10-CM

## 2023-06-21 DIAGNOSIS — Z95.810 CARDIAC DEFIBRILLATOR IN PLACE: ICD-10-CM

## 2023-06-21 DIAGNOSIS — E11.22 DIABETES MELLITUS WITH STAGE 3 CHRONIC KIDNEY DISEASE (MULTI): ICD-10-CM

## 2023-06-21 DIAGNOSIS — N18.30 DIABETES MELLITUS WITH STAGE 3 CHRONIC KIDNEY DISEASE (MULTI): ICD-10-CM

## 2023-06-21 DIAGNOSIS — E78.2 MIXED HYPERLIPIDEMIA: ICD-10-CM

## 2023-06-21 DIAGNOSIS — I26.99 PULMONARY EMBOLISM, UNSPECIFIED CHRONICITY, UNSPECIFIED PULMONARY EMBOLISM TYPE, UNSPECIFIED WHETHER ACUTE COR PULMONALE PRESENT (MULTI): ICD-10-CM

## 2023-06-21 DIAGNOSIS — F32.5 DEPRESSION, MAJOR, IN REMISSION (CMS-HCC): ICD-10-CM

## 2023-06-21 DIAGNOSIS — I73.9 PVD (PERIPHERAL VASCULAR DISEASE) (CMS-HCC): ICD-10-CM

## 2023-06-21 DIAGNOSIS — E11.51 DIABETES MELLITUS WITH PERIPHERAL VASCULAR DISEASE (MULTI): Primary | ICD-10-CM

## 2023-06-21 DIAGNOSIS — G25.81 RESTLESS LEGS SYNDROME: ICD-10-CM

## 2023-06-21 LAB
POC FINGERSTICK BLOOD GLUCOSE: 172 MG/DL (ref 70–100)
POC HEMOGLOBIN A1C: 5.7 % (ref 4.2–6.5)

## 2023-06-21 PROCEDURE — 1159F MED LIST DOCD IN RCRD: CPT | Performed by: NURSE PRACTITIONER

## 2023-06-21 PROCEDURE — 3074F SYST BP LT 130 MM HG: CPT | Performed by: NURSE PRACTITIONER

## 2023-06-21 PROCEDURE — 1160F RVW MEDS BY RX/DR IN RCRD: CPT | Performed by: NURSE PRACTITIONER

## 2023-06-21 PROCEDURE — 99214 OFFICE O/P EST MOD 30 MIN: CPT | Performed by: NURSE PRACTITIONER

## 2023-06-21 PROCEDURE — 83036 HEMOGLOBIN GLYCOSYLATED A1C: CPT | Performed by: NURSE PRACTITIONER

## 2023-06-21 PROCEDURE — 1157F ADVNC CARE PLAN IN RCRD: CPT | Performed by: NURSE PRACTITIONER

## 2023-06-21 PROCEDURE — 3078F DIAST BP <80 MM HG: CPT | Performed by: NURSE PRACTITIONER

## 2023-06-21 PROCEDURE — 4004F PT TOBACCO SCREEN RCVD TLK: CPT | Performed by: NURSE PRACTITIONER

## 2023-06-21 PROCEDURE — 82962 GLUCOSE BLOOD TEST: CPT | Performed by: NURSE PRACTITIONER

## 2023-06-21 NOTE — PROGRESS NOTES
Subjective   Patient ID: Andrez Damon is a 78 y.o. male who presents for Hyperlipidemia, Results (X-rays, BW), Fatigue, and Diabetes (Sugars dropping).    Orthostatic hypotension. Educated about getting up slowly, wearing compression stockings, he does not wear them. When BP drops he drinks a glass of water or OJ and lays down, feels better.  Hx PE, Taking Eliquis. Denies chest pain, breathing at baseline. States he is still smoking 1 pack/week. Strongly encouraged to quit smoking completely.  Chronic kidney disease, monitor  Urinary retention. Taking tamsulosin 0.4 mg 2 capsules at bedtime. Denies difficulty urinating. Follow up with urology.  COPD, follows with pulmonology. Using Stiolto and Yupelri. Using O2 2 L at bedtime. Feels his breathing is slowly worsening over time. Strongly encouraged to stop smoking completely.  Diabetes, controlled, A1c 5.7%. Diet controlled.  Anemia, following with hematology, getting B12 every month, IV iron as needed  Complains of chronic back, leg pain. Uses cane. Recommend return to pain management. He declines. Declines PT. RLS, controlled, taking ropinirole.  Depression, controlled, monitor  PVD, thromboangiitis obliterans, strongly encouraged patient to stop smoking. Wear good supportive shoes and protect feet, inspect feet daily.  GERD, controlled, taking pantoprazole 40 mg daily.   CAD, anterior wall MI, cardiac stents, ejection fraction 30-35%, follows with cardiology. Did not tolerate Entresto. Taking Brilinta.  Hyperlipidemia, controlled, taking atorvastatin 80 mg daily    Office Visit on 06/21/2023  POC Fingerstick Blood Glucose                 Date: 06/21/2023  Value: 172 (A)     Ref range: 70 - 100 mg/dl     Status: Final  POC HEMOGLOBIN A1c                            Date: 06/21/2023  Value: 5.7         Ref range: 4.2 - 6.5 %        Status: Final  ------------  Lab on 05/22/2023  WBC                                           Date: 05/22/2023  Value: 5.6         Ref  range: 4.4 - 11.3 x10E9*  Status: Final  RBC                                           Date: 05/22/2023  Value: 4.16 (L)    Ref range: 4.50 - 5.90 x10E*  Status: Final  Hemoglobin                                    Date: 05/22/2023  Value: 12.7 (L)    Ref range: 13.5 - 17.5 g/dL   Status: Final  Hematocrit                                    Date: 05/22/2023  Value: 40.5 (L)    Ref range: 41.0 - 52.0 %      Status: Final  MCV                                           Date: 05/22/2023  Value: 97          Ref range: 80 - 100 fL        Status: Final  MCHC                                          Date: 05/22/2023  Value: 31.4 (L)    Ref range: 32.0 - 36.0 g/dL   Status: Final  Platelets                                     Date: 05/22/2023  Value: 208         Ref range: 150 - 450 x10E9/L  Status: Final  RDW                                           Date: 05/22/2023  Value: 14.5        Ref range: 11.5 - 14.5 %      Status: Final  Neutrophils %                                 Date: 05/22/2023  Value: 75.9        Ref range: 40.0 - 80.0 %      Status: Final  Immature Granulocytes %, Automated            Date: 05/22/2023  Value: 0.4         Ref range: 0.0 - 0.9 %        Status: Final                Comment:  Immature Granulocyte Count (IG) includes promyelocytes,    myelocytes and metamyelocytes but does not include bands.   Percent differential counts (%) should be interpreted in the   context of the absolute cell counts (cells/L).  Lymphocytes %                                 Date: 05/22/2023  Value: 13.8        Ref range: 13.0 - 44.0 %      Status: Final  Monocytes %                                   Date: 05/22/2023  Value: 7.7         Ref range: 2.0 - 10.0 %       Status: Final  Eosinophils %                                 Date: 05/22/2023  Value: 1.1         Ref range: 0.0 - 6.0 %        Status: Final  Basophils %                                   Date: 05/22/2023  Value: 1.1         Ref range: 0.0 - 2.0 %         Status: Final  Neutrophils Absolute                          Date: 05/22/2023  Value: 4.26        Ref range: 1.60 - 5.50 x10E*  Status: Final  Lymphocytes Absolute                          Date: 05/22/2023  Value: 0.77 (L)    Ref range: 0.80 - 3.00 x10E*  Status: Final  Monocytes Absolute                            Date: 05/22/2023  Value: 0.43        Ref range: 0.05 - 0.80 x10E*  Status: Final  Eosinophils Absolute                          Date: 05/22/2023  Value: 0.06        Ref range: 0.00 - 0.40 x10E*  Status: Final  Basophils Absolute                            Date: 05/22/2023  Value: 0.06        Ref range: 0.00 - 0.10 x10E*  Status: Final  Glucose                                       Date: 05/22/2023  Value: 152 (H)     Ref range: 74 - 99 mg/dL      Status: Final  Sodium                                        Date: 05/22/2023  Value: 141         Ref range: 136 - 145 mmol/L   Status: Final  Potassium                                     Date: 05/22/2023  Value: 4.3         Ref range: 3.5 - 5.3 mmol/L   Status: Final  Chloride                                      Date: 05/22/2023  Value: 105         Ref range: 98 - 107 mmol/L    Status: Final  Bicarbonate                                   Date: 05/22/2023  Value: 26          Ref range: 21 - 32 mmol/L     Status: Final  Anion Gap                                     Date: 05/22/2023  Value: 14          Ref range: 10 - 20 mmol/L     Status: Final  Urea Nitrogen                                 Date: 05/22/2023  Value: 32 (H)      Ref range: 6 - 23 mg/dL       Status: Final  Creatinine                                    Date: 05/22/2023  Value: 1.74 (H)    Ref range: 0.50 - 1.30 mg/dL  Status: Final  GFR MALE                                      Date: 05/22/2023  Value: 40 (A)      Ref range: >90 mL/min/1.73m2  Status: Final                Comment:  CALCULATIONS OF ESTIMATED GFR ARE PERFORMED   USING THE 2021 CKD-EPI STUDY REFIT EQUATION   WITHOUT THE RACE VARIABLE  FOR THE IDMS-TRACEABLE   CREATININE METHODS.    https://jasn.asnjournals.org/content/early/2021/09/22/ASN.5782673456  Calcium                                       Date: 05/22/2023  Value: 9.4         Ref range: 8.6 - 10.3 mg/dL   Status: Final  Albumin                                       Date: 05/22/2023  Value: 3.7         Ref range: 3.4 - 5.0 g/dL     Status: Final  Alkaline Phosphatase                          Date: 05/22/2023  Value: 61          Ref range: 33 - 136 U/L       Status: Final  Total Protein                                 Date: 05/22/2023  Value: 5.7 (L)     Ref range: 6.4 - 8.2 g/dL     Status: Final  AST                                           Date: 05/22/2023  Value: 49 (H)      Ref range: 9 - 39 U/L         Status: Final  Total Bilirubin                               Date: 05/22/2023  Value: 0.6         Ref range: 0.0 - 1.2 mg/dL    Status: Final  ALT (SGPT)                                    Date: 05/22/2023  Value: 35          Ref range: 10 - 52 U/L        Status: Final                Comment:  Patients treated with Sulfasalazine may generate    falsely decreased results for ALT.  Cholesterol                                   Date: 05/22/2023  Value: 148         Ref range: 0 - 199 mg/dL      Status: Final                Comment: .      AGE      DESIRABLE   BORDERLINE HIGH   HIGH     0-19 Y     0 - 169       170 - 199     >/= 200    20-24 Y     0 - 189       190 - 224     >/= 225         >24 Y     0 - 199       200 - 239     >/= 240   **All ranges are based on fasting samples. Specific   therapeutic targets will vary based on patient-specific   cardiac risk.  .   Pediatric guidelines reference:Pediatrics 2011, 128(S5).   Adult guidelines reference: NCEP ATPIII Guidelines,     SUMAYA 2001, 258:2486-97  .   Venipuncture immediately after or during the    administration of Metamizole may lead to falsely   low results. Testing should be performed immediately   prior to Metamizole dosing.  HDL                                            Date: 05/22/2023  Value: 68.1        Ref range: mg/dL              Status: Final                Comment: .      AGE      VERY LOW   LOW     NORMAL    HIGH       0-19 Y       < 35   < 40     40-45     ----    20-24 Y       ----   < 40       >45     ----      >24 Y       ----   < 40     40-60      >60  .  Cholesterol/HDL Ratio                         Date: 05/22/2023  Value: 2.2           Status: Final                Comment: REF VALUES  DESIRABLE  < 3.4  HIGH RISK  > 5.0  LDL                                           Date: 05/22/2023  Value: 70          Ref range: 0 - 99 mg/dL       Status: Final                Comment: .                           NEAR      BORD      AGE      DESIRABLE  OPTIMAL    HIGH     HIGH     VERY HIGH     0-19 Y     0 - 109     ---    110-129   >/= 130     ----    20-24 Y     0 - 119     ---    120-159   >/= 160     ----      >24 Y     0 -  99   100-129  130-159   160-189     >/=190  .  VLDL                                          Date: 05/22/2023  Value: 10          Ref range: 0 - 40 mg/dL       Status: Final  Triglycerides                                 Date: 05/22/2023  Value: 48          Ref range: 0 - 149 mg/dL      Status: Final                Comment: .      AGE      DESIRABLE   BORDERLINE HIGH   HIGH     VERY HIGH   0 D-90 D    19 - 174         ----         ----        ----  91 D- 9 Y     0 -  74        75 -  99     >/= 100      ----    10-19 Y     0 -  89        90 - 129     >/= 130      ----    20-24 Y     0 - 114       115 - 149     >/= 150      ----         >24 Y     0 - 149       150 - 199    200- 499    >/= 500  .   Venipuncture immediately after or during the    administration of Metamizole may lead to falsely   low results. Testing should be performed immediately   prior to Metamizole dosing.  TSH                                           Date: 05/22/2023  Value: 1.84        Ref range: 0.44 - 3.98 mIU/L  Status: Final                 Comment:  TSH testing is performed using different testing    methodology at [facility] than at other    [facility]. Direct result comparisons should    only be made within the same method.  Vit D, 1,25-Dihydroxy                         Date: 05/22/2023  Value: 43.2        Ref range: 19.9 - 79.3 pg/mL  Status: Final                Comment: INTERPRETIVE INFORMATION: Vitamin D, 1,25-Dihydroxy  This test is primarily indicated during patient evaluation for   hypercalcemia and renal failure. A normal result does not rule out   Vitamin D deficiency. The recommended test for diagnosing Vitamin   D deficiency is Vitamin D 25-hydroxy.  Performed By: [facility]  [address] [place] [id number]  : Brett Cid MD, PhD  ------------  Orders Only on 05/22/2023  Iron                                          Date: 05/22/2023  Value: 45          Ref range: 35 - 150 ug/dL     Status: Final  TIBC                                          Date: 05/22/2023  Value: 398         Ref range: 240 - 445 ug/dL    Status: Final  Iron Saturation                               Date: 05/22/2023  Value: 11 (L)      Ref range: 25 - 45 %          Status: Final  ------------  Orders Only on 05/22/2023  Ferritin                                      Date: 05/22/2023  Value: 38          Ref range: 20 - 300 ug/L      Status: Final  -----------         Review of Systems   Constitutional:  Positive for fatigue. Negative for activity change, chills and unexpected weight change.   HENT:  Positive for hearing loss and tinnitus. Negative for congestion, ear pain and sore throat.    Eyes: Negative.    Respiratory:  Positive for shortness of breath. Negative for cough and wheezing.    Cardiovascular:  Negative for chest pain, palpitations and leg swelling.        Cool toes   Gastrointestinal: Negative.  Negative for abdominal pain, constipation, diarrhea, nausea and vomiting.   Endocrine: Negative.     Genitourinary: Negative.         Nocturia   Musculoskeletal:  Positive for arthralgias, back pain and gait problem.   Skin: Negative.    Allergic/Immunologic: Negative.    Neurological:  Negative for dizziness, speech difficulty, numbness and headaches.   Hematological: Negative.    Psychiatric/Behavioral:  The patient is nervous/anxious.    All other systems reviewed and are negative.      Objective   BP 88/56   Pulse 87   Temp 36.7 °C (98 °F)   Wt 64.6 kg (142 lb 8 oz)   SpO2 95%   BMI 20.45 kg/m²     Physical Exam  Vitals and nursing note reviewed.   Constitutional:       General: He is not in acute distress.     Appearance: Normal appearance. He is normal weight. He is not ill-appearing.   HENT:      Head: Normocephalic and atraumatic.      Right Ear: Tympanic membrane, ear canal and external ear normal.      Left Ear: Tympanic membrane, ear canal and external ear normal.      Nose: Nose normal.      Mouth/Throat:      Mouth: Mucous membranes are moist.      Pharynx: Oropharynx is clear.   Eyes:      Extraocular Movements: Extraocular movements intact.      Conjunctiva/sclera: Conjunctivae normal.      Pupils: Pupils are equal, round, and reactive to light.   Cardiovascular:      Rate and Rhythm: Normal rate and regular rhythm.      Pulses: Normal pulses.      Heart sounds: Normal heart sounds. No murmur heard.  Pulmonary:      Effort: Pulmonary effort is normal. No respiratory distress.      Breath sounds: Normal breath sounds. No wheezing.   Abdominal:      General: Bowel sounds are normal. There is no distension.      Palpations: Abdomen is soft.      Tenderness: There is no abdominal tenderness.   Musculoskeletal:         General: No tenderness. Normal range of motion.      Cervical back: Normal range of motion and neck supple.      Right lower leg: No edema.      Left lower leg: No edema.   Skin:     General: Skin is warm and dry.      Capillary Refill: Capillary refill takes less than 2 seconds.  "  Neurological:      General: No focal deficit present.      Mental Status: He is alert and oriented to person, place, and time.   Psychiatric:         Mood and Affect: Mood normal.         Behavior: Behavior normal.         Thought Content: Thought content normal.         Judgment: Judgment normal.         Assessment/Plan     # Hypertension, controlled  -continue current furosemide  -follow with cardiology  -stop smoking  # Diabetes, diet controlled, A1c 5.7%  -Eat frequent small meals, do not go long periods without eating  -check blood sugar daily  -Low fat/cholesterol, low sodium, carbohydrate controlled diet  -Maintain healthy weight  -regular follow up with ophthalmology and podiatry  # CAD  -continue to follow with cardiology, current meds  -stop smoking completely  # Dyslipidemia  -continue atorvastatin 80 mg daily  -Low fat/cholesterol, low sodium diet  -Exercise as tolerated 150 minutes/week, increase activity slowly  -Maintain BMI 20-25  # Chronic back and hip pain  -recommend return to see pain management, he declines  # Bilateral knee pain  -Tylenol 650-1000 mg every 8 hours as needed for pain  # Anemia  -follow with hematology  # CHF, cardiomyopathy, euvolemic  -Follow-up with cardiology  -continued furosemide  -Low sodium diet  -Weigh yourself every morning before breakfast  -Call the office if you have a weight gain of 3 or more pounds in one day or 5 or more pounds in one week  # CKD  -monitor  # Depression, improved  # Nicotine dependency  -Strongly encouraged to stop smoking  # PVD, thromboangiitis obliterans  -protect feet from injury  -inspect feet daily  -Strongly encouraged to stop smoking  # Orthostatic hypotension  -get up and change positions slowly  -compression stockings  # GERD  -Pantoprazole 40 mg daily  -Tums or Pepcid as needed in the evening  -Avoid alcohol, caffeine, and other foods that tend to bother your stomach  -Elevate head of bed 4-6\" on blocks  -Avoid eating 2 hours prior to " bed  -Do not wear constricting clothing around your middle  # RLS  -Continue ropinirole  # COPD, controlled  -continue Stiolto, Yupelri  -Xopenex as needed  -Follow with pulmonology  -Strongly encouraged to stop smoking     Follow-up in 3 months and as needed

## 2023-06-23 PROBLEM — B96.20 BACTEREMIA DUE TO ESCHERICHIA COLI: Status: RESOLVED | Noted: 2023-02-02 | Resolved: 2023-06-23

## 2023-06-23 PROBLEM — R78.81 BACTEREMIA DUE TO ESCHERICHIA COLI: Status: RESOLVED | Noted: 2023-02-02 | Resolved: 2023-06-23

## 2023-06-23 PROBLEM — H61.23 IMPACTED CERUMEN OF BOTH EARS: Status: RESOLVED | Noted: 2023-02-02 | Resolved: 2023-06-23

## 2023-06-23 PROBLEM — R30.0 BURNING WITH URINATION: Status: RESOLVED | Noted: 2023-02-02 | Resolved: 2023-06-23

## 2023-06-23 ASSESSMENT — ENCOUNTER SYMPTOMS
DIARRHEA: 0
HEMATOLOGIC/LYMPHATIC NEGATIVE: 1
NERVOUS/ANXIOUS: 1
DIZZINESS: 0
EYES NEGATIVE: 1
CONSTIPATION: 0
SPEECH DIFFICULTY: 0
PALPITATIONS: 0
SORE THROAT: 0
GASTROINTESTINAL NEGATIVE: 1
VOMITING: 0
CHILLS: 0
ENDOCRINE NEGATIVE: 1
ACTIVITY CHANGE: 0
SHORTNESS OF BREATH: 1
ARTHRALGIAS: 1
ABDOMINAL PAIN: 0
BACK PAIN: 1
UNEXPECTED WEIGHT CHANGE: 0
COUGH: 0
HEADACHES: 0
ALLERGIC/IMMUNOLOGIC NEGATIVE: 1
NUMBNESS: 0
WHEEZING: 0
NAUSEA: 0
FATIGUE: 1

## 2023-07-17 ENCOUNTER — DOCUMENTATION (OUTPATIENT)
Dept: PRIMARY CARE | Facility: CLINIC | Age: 78
End: 2023-07-17
Payer: MEDICARE

## 2023-07-17 NOTE — PROGRESS NOTES
Discharge Facility: [facility]  Discharge Diagnosis:  CHF / Urinary retention due to benign prostatic hyperplasia  Admission Date: 7-  Discharge Date: 7-    PCP Appointment Date: 7-  Specialist Appointment Date:   Urology follow up  Hospital Encounter and Summary: Linked   2 day follow up appointment - no required outreach

## 2023-07-18 ENCOUNTER — OFFICE VISIT (OUTPATIENT)
Dept: PRIMARY CARE | Facility: CLINIC | Age: 78
End: 2023-07-18
Payer: MEDICARE

## 2023-07-18 VITALS
TEMPERATURE: 96.9 F | DIASTOLIC BLOOD PRESSURE: 64 MMHG | BODY MASS INDEX: 19.43 KG/M2 | HEART RATE: 67 BPM | OXYGEN SATURATION: 100 % | SYSTOLIC BLOOD PRESSURE: 100 MMHG | WEIGHT: 135.4 LBS

## 2023-07-18 DIAGNOSIS — I50.22 CHRONIC SYSTOLIC HEART FAILURE (MULTI): ICD-10-CM

## 2023-07-18 DIAGNOSIS — R33.9 URINARY RETENTION: Primary | ICD-10-CM

## 2023-07-18 DIAGNOSIS — Z97.8 FOLEY CATHETER IN PLACE: ICD-10-CM

## 2023-07-18 PROCEDURE — 3078F DIAST BP <80 MM HG: CPT | Performed by: NURSE PRACTITIONER

## 2023-07-18 PROCEDURE — 1157F ADVNC CARE PLAN IN RCRD: CPT | Performed by: NURSE PRACTITIONER

## 2023-07-18 PROCEDURE — 1159F MED LIST DOCD IN RCRD: CPT | Performed by: NURSE PRACTITIONER

## 2023-07-18 PROCEDURE — 4004F PT TOBACCO SCREEN RCVD TLK: CPT | Performed by: NURSE PRACTITIONER

## 2023-07-18 PROCEDURE — 99496 TRANSJ CARE MGMT HIGH F2F 7D: CPT | Performed by: NURSE PRACTITIONER

## 2023-07-18 PROCEDURE — 3074F SYST BP LT 130 MM HG: CPT | Performed by: NURSE PRACTITIONER

## 2023-07-18 PROCEDURE — 1160F RVW MEDS BY RX/DR IN RCRD: CPT | Performed by: NURSE PRACTITIONER

## 2023-07-20 ENCOUNTER — LAB (OUTPATIENT)
Dept: LAB | Facility: LAB | Age: 78
End: 2023-07-20
Payer: MEDICARE

## 2023-07-20 ENCOUNTER — OFFICE VISIT (OUTPATIENT)
Dept: PRIMARY CARE | Facility: CLINIC | Age: 78
End: 2023-07-20
Payer: MEDICARE

## 2023-07-20 VITALS
SYSTOLIC BLOOD PRESSURE: 98 MMHG | HEART RATE: 73 BPM | OXYGEN SATURATION: 96 % | WEIGHT: 135 LBS | BODY MASS INDEX: 19.37 KG/M2 | TEMPERATURE: 98 F | DIASTOLIC BLOOD PRESSURE: 54 MMHG

## 2023-07-20 DIAGNOSIS — R39.9 URINARY SYMPTOM OR SIGN: ICD-10-CM

## 2023-07-20 DIAGNOSIS — E11.51 DIABETES MELLITUS WITH PERIPHERAL VASCULAR DISEASE (MULTI): ICD-10-CM

## 2023-07-20 DIAGNOSIS — N30.00 ACUTE CYSTITIS WITHOUT HEMATURIA: ICD-10-CM

## 2023-07-20 DIAGNOSIS — N18.30 DIABETES MELLITUS WITH STAGE 3 CHRONIC KIDNEY DISEASE (MULTI): ICD-10-CM

## 2023-07-20 DIAGNOSIS — E11.22 DIABETES MELLITUS WITH STAGE 3 CHRONIC KIDNEY DISEASE (MULTI): ICD-10-CM

## 2023-07-20 DIAGNOSIS — R39.9 URINARY SYMPTOM OR SIGN: Primary | ICD-10-CM

## 2023-07-20 DIAGNOSIS — R33.9 URINARY RETENTION: ICD-10-CM

## 2023-07-20 DIAGNOSIS — Z97.8 FOLEY CATHETER IN PLACE: ICD-10-CM

## 2023-07-20 LAB
APPEARANCE, URINE: ABNORMAL
BACTERIA, URINE: ABNORMAL /HPF
BILIRUBIN, URINE: NEGATIVE
BLOOD, URINE: ABNORMAL
COLOR, URINE: ABNORMAL
GLUCOSE, URINE: NEGATIVE MG/DL
HYALINE CASTS, URINE: ABNORMAL /LPF
KETONES, URINE: NEGATIVE MG/DL
LEUKOCYTE ESTERASE, URINE: NEGATIVE
MUCUS, URINE: ABNORMAL /LPF
NITRITE, URINE: POSITIVE
PH, URINE: 5 (ref 5–8)
PROTEIN, URINE: ABNORMAL MG/DL
RBC, URINE: >182 /HPF (ref 0–5)
SPECIFIC GRAVITY, URINE: 1.02 (ref 1–1.03)
UROBILINOGEN, URINE: 4 MG/DL (ref 0–1.9)
WBC, URINE: 124 /HPF (ref 0–5)

## 2023-07-20 PROCEDURE — 4004F PT TOBACCO SCREEN RCVD TLK: CPT | Performed by: NURSE PRACTITIONER

## 2023-07-20 PROCEDURE — 3074F SYST BP LT 130 MM HG: CPT | Performed by: NURSE PRACTITIONER

## 2023-07-20 PROCEDURE — 99214 OFFICE O/P EST MOD 30 MIN: CPT | Performed by: NURSE PRACTITIONER

## 2023-07-20 PROCEDURE — 87077 CULTURE AEROBIC IDENTIFY: CPT

## 2023-07-20 PROCEDURE — 82570 ASSAY OF URINE CREATININE: CPT

## 2023-07-20 PROCEDURE — 82043 UR ALBUMIN QUANTITATIVE: CPT

## 2023-07-20 PROCEDURE — 3078F DIAST BP <80 MM HG: CPT | Performed by: NURSE PRACTITIONER

## 2023-07-20 PROCEDURE — 87086 URINE CULTURE/COLONY COUNT: CPT

## 2023-07-20 PROCEDURE — 1160F RVW MEDS BY RX/DR IN RCRD: CPT | Performed by: NURSE PRACTITIONER

## 2023-07-20 PROCEDURE — 1157F ADVNC CARE PLAN IN RCRD: CPT | Performed by: NURSE PRACTITIONER

## 2023-07-20 PROCEDURE — 1159F MED LIST DOCD IN RCRD: CPT | Performed by: NURSE PRACTITIONER

## 2023-07-20 PROCEDURE — 81001 URINALYSIS AUTO W/SCOPE: CPT

## 2023-07-20 PROCEDURE — 87186 SC STD MICRODIL/AGAR DIL: CPT

## 2023-07-20 RX ORDER — NITROFURANTOIN 25; 75 MG/1; MG/1
100 CAPSULE ORAL 2 TIMES DAILY
Qty: 10 CAPSULE | Refills: 0 | Status: SHIPPED | OUTPATIENT
Start: 2023-07-20 | End: 2023-07-25

## 2023-07-20 NOTE — PROGRESS NOTES
Subjective   Patient ID: Andrez Damon is a 78 y.o. male who presents for Urinary Problem (White sticky discharge on outside of catheter, burning).    C/o 1 day purulent drainage from around catheter, burning sensation. Patient has urology follow-up scheduled for beginning of August. He is trying to get in sooner. Kee is in place for urinary retention. UA positive for UTI. Start Macrobid 100 mg twice a day for 5 days. Instructed to call the office if symptoms worsen or do not improve.    Lab on 07/20/2023  ALBUMIN (MG/L) IN URINE                       Date: 07/20/2023  Value: 812.8       Ref range: Not Established *  Status: Final  Albumin/Creatine Ratio                        Date: 07/20/2023  Value: 464.5 (H)   Ref range: 0.0 - 30.0 ug/mg*  Status: Final  Creatinine, Urine                             Date: 07/20/2023  Value: 175.0       Ref range: 20.0 - 370.0 mg/*  Status: Final  Color, Urine                                  Date: 07/20/2023  Value: JOHNNY       Ref range: STRAW,YELLOW       Status: Final  Appearance, Urine                             Date: 07/20/2023  Value: HAZY        Ref range: CLEAR              Status: Final  Specific Gravity, Urine                       Date: 07/20/2023  Value: 1.019       Ref range: 1.005 - 1.035      Status: Final  pH, Urine                                     Date: 07/20/2023  Value: 5.0         Ref range: 5.0 - 8.0          Status: Final  Protein, Urine                                Date: 07/20/2023  Value: 100 (2+) (A) Ref range: NEGATIVE mg/dL     Status: Final  Glucose, Urine                                Date: 07/20/2023  Value: NEGATIVE    Ref range: NEGATIVE mg/dL     Status: Final  Blood, Urine                                  Date: 07/20/2023  Value: LARGE (3+) (A) Ref range: NEGATIVE           Status: Final  Ketones, Urine                                Date: 07/20/2023  Value: NEGATIVE    Ref range: NEGATIVE mg/dL     Status: Final  Bilirubin, Urine                               Date: 07/20/2023  Value: NEGATIVE    Ref range: NEGATIVE           Status: Final  Urobilinogen, Urine                           Date: 07/20/2023  Value: 4.0 (H)     Ref range: 0.0 - 1.9 mg/dL    Status: Final                Comment: SOME PIGMENTS AND MEDICATIONS MAY CAUSE A  FALSE POSITIVE UROBILINOGEN  Nitrite, Urine                                Date: 07/20/2023  Value: POSITIVE (A) Ref range: NEGATIVE           Status: Final  Leukocyte Esterase, Urine                     Date: 07/20/2023  Value: NEGATIVE    Ref range: NEGATIVE           Status: Final  WBC, Urine                                    Date: 07/20/2023  Value: 124 (A)     Ref range: 0 - 5 /HPF         Status: Final  RBC, Urine                                    Date: 07/20/2023  Value: >182 (A)    Ref range: 0 - 5 /HPF         Status: Final  Bacteria, Urine                               Date: 07/20/2023  Value: 1+ (A)      Ref range: /HPF               Status: Final  Mucus, Urine                                  Date: 07/20/2023  Value: FEW         Ref range: /LPF               Status: Final  Hyaline Casts, Urine                          Date: 07/20/2023  Value: 1+ (A)      Ref range: /LPF               Status: Final  Urine Culture                                 Date: 07/20/2023  Value: Klebsiella pneumoniae                       Status: Final                Comment: >100,000 CFU/ML  Urine Culture                                 Date: 07/20/2023  Value: Coagulase negative staphylococcus                       Status: Final                Comment: >100,000 CFU/ML  ------------           Review of Systems   Constitutional:  Positive for fatigue. Negative for activity change, chills and unexpected weight change.   HENT:  Positive for hearing loss and tinnitus. Negative for congestion, ear pain and sore throat.    Eyes: Negative.    Respiratory:  Positive for shortness of breath. Negative for cough and wheezing.    Cardiovascular:   Negative for chest pain, palpitations and leg swelling.        Cool toes   Gastrointestinal: Negative.  Negative for abdominal pain, constipation, diarrhea, nausea and vomiting.   Endocrine: Negative.    Genitourinary: Negative.         Retention, Kee in place  Burning around catheter  Purulent drainage from around catheter   Musculoskeletal:  Positive for arthralgias, back pain and gait problem.   Skin: Negative.    Allergic/Immunologic: Negative.    Neurological:  Negative for dizziness, speech difficulty, numbness and headaches.   Hematological: Negative.    Psychiatric/Behavioral:  The patient is nervous/anxious.    All other systems reviewed and are negative.      Objective   BP 98/54   Pulse 73   Temp 36.7 °C (98 °F)   Wt 61.2 kg (135 lb)   SpO2 96%   BMI 19.37 kg/m²     Physical Exam  Vitals and nursing note reviewed.   Constitutional:       General: He is not in acute distress.     Appearance: Normal appearance. He is normal weight. He is not ill-appearing.   HENT:      Head: Normocephalic and atraumatic.      Right Ear: Tympanic membrane, ear canal and external ear normal.      Left Ear: Tympanic membrane, ear canal and external ear normal.      Nose: Nose normal.      Mouth/Throat:      Mouth: Mucous membranes are moist.      Pharynx: Oropharynx is clear.   Eyes:      Extraocular Movements: Extraocular movements intact.      Conjunctiva/sclera: Conjunctivae normal.      Pupils: Pupils are equal, round, and reactive to light.   Cardiovascular:      Rate and Rhythm: Normal rate and regular rhythm.      Pulses: Normal pulses.      Heart sounds: Normal heart sounds. No murmur heard.  Pulmonary:      Effort: Pulmonary effort is normal. No respiratory distress.      Breath sounds: Normal breath sounds. No wheezing.   Abdominal:      General: Bowel sounds are normal. There is no distension.      Palpations: Abdomen is soft.      Tenderness: There is no abdominal tenderness.   Genitourinary:     Comments:  Kee in place  Musculoskeletal:         General: No tenderness. Normal range of motion.      Cervical back: Normal range of motion and neck supple.      Right lower leg: No edema.      Left lower leg: No edema.   Skin:     General: Skin is warm and dry.      Capillary Refill: Capillary refill takes less than 2 seconds.   Neurological:      General: No focal deficit present.      Mental Status: He is alert and oriented to person, place, and time.   Psychiatric:         Mood and Affect: Mood normal.         Behavior: Behavior normal.         Thought Content: Thought content normal.         Judgment: Judgment normal.         Assessment/Plan     #UTI  -start Macrobid 100 mg twice a day for 5 days  -Follow-up culture for further recommendations  -Follow-up with urology  -Call the office if symptoms worsen or do not improve  #Urinary retention  -Kee catheter in place  -Continue Flomax 0.8 mg daily  -Follow-up with urology    Follow-up as scheduled in August and as needed

## 2023-07-21 LAB
ALBUMIN (MG/L) IN URINE: 812.8 MG/L
ALBUMIN/CREATININE (UG/MG) IN URINE: 464.5 UG/MG CRT (ref 0–30)
CREATININE (MG/DL) IN URINE: 175 MG/DL (ref 20–370)

## 2023-07-22 PROBLEM — Z97.8 FOLEY CATHETER IN PLACE: Status: ACTIVE | Noted: 2023-07-22

## 2023-07-22 PROBLEM — R33.9 URINARY RETENTION: Status: ACTIVE | Noted: 2023-07-22

## 2023-07-22 ASSESSMENT — ENCOUNTER SYMPTOMS
NUMBNESS: 0
DIZZINESS: 0
HEMATOLOGIC/LYMPHATIC NEGATIVE: 1
VOMITING: 0
SPEECH DIFFICULTY: 0
WHEEZING: 0
EYES NEGATIVE: 1
SHORTNESS OF BREATH: 1
ALLERGIC/IMMUNOLOGIC NEGATIVE: 1
UNEXPECTED WEIGHT CHANGE: 0
DIARRHEA: 0
BACK PAIN: 1
ARTHRALGIAS: 1
CONSTIPATION: 0
ACTIVITY CHANGE: 0
GASTROINTESTINAL NEGATIVE: 1
PALPITATIONS: 0
CHILLS: 0
ABDOMINAL PAIN: 0
NERVOUS/ANXIOUS: 1
ENDOCRINE NEGATIVE: 1
SORE THROAT: 0
FATIGUE: 1
COUGH: 0
NAUSEA: 0
HEADACHES: 0

## 2023-07-23 LAB
URINE CULTURE: ABNORMAL
URINE CULTURE: ABNORMAL

## 2023-07-23 ASSESSMENT — ENCOUNTER SYMPTOMS
DIZZINESS: 0
EYES NEGATIVE: 1
HEMATOLOGIC/LYMPHATIC NEGATIVE: 1
ENDOCRINE NEGATIVE: 1
PALPITATIONS: 0
ABDOMINAL PAIN: 0
CONSTIPATION: 0
NUMBNESS: 0
DIARRHEA: 0
GASTROINTESTINAL NEGATIVE: 1
NERVOUS/ANXIOUS: 1
ACTIVITY CHANGE: 0
FATIGUE: 1
SORE THROAT: 0
ALLERGIC/IMMUNOLOGIC NEGATIVE: 1
ARTHRALGIAS: 1
SHORTNESS OF BREATH: 1
COUGH: 0
CHILLS: 0
SPEECH DIFFICULTY: 0
BACK PAIN: 1
WHEEZING: 0
HEADACHES: 0
UNEXPECTED WEIGHT CHANGE: 0
VOMITING: 0
NAUSEA: 0

## 2023-07-25 ENCOUNTER — PATIENT OUTREACH (OUTPATIENT)
Dept: PRIMARY CARE | Facility: CLINIC | Age: 78
End: 2023-07-25
Payer: MEDICARE

## 2023-07-25 NOTE — PROGRESS NOTES
Call regarding appt. with PCP on  7- after hospitalization.  At time of outreach call the patient feels as if their condition has improved. He states he has finished his ATB and is feeling much better. Urine is clear, denies fever, chills, hematuria or painful urination.   They  deny any questions or concerns regarding  the recovery period  or follow up appointment,  Agreeable to continued outreach. They have my direct phone # and were encouraged to please reach out should they need any assistance prior to my next outreach.   Urology fu 8-9-2023 in [place]

## 2023-07-27 DIAGNOSIS — G25.81 RESTLESS LEGS SYNDROME: ICD-10-CM

## 2023-07-27 RX ORDER — ROPINIROLE 1 MG/1
1 TABLET, FILM COATED ORAL NIGHTLY
Qty: 90 TABLET | Refills: 0 | Status: SHIPPED | OUTPATIENT
Start: 2023-07-27 | End: 2023-12-04

## 2023-08-05 DIAGNOSIS — E78.5 HYPERLIPIDEMIA, UNSPECIFIED HYPERLIPIDEMIA TYPE: ICD-10-CM

## 2023-08-07 RX ORDER — ATORVASTATIN CALCIUM 80 MG/1
80 TABLET, FILM COATED ORAL DAILY
Qty: 90 TABLET | Refills: 0 | Status: SHIPPED | OUTPATIENT
Start: 2023-08-07 | End: 2023-11-06

## 2023-08-18 DIAGNOSIS — G62.89 OTHER POLYNEUROPATHY: ICD-10-CM

## 2023-08-18 RX ORDER — LANOLIN ALCOHOL/MO/W.PET/CERES
50 CREAM (GRAM) TOPICAL DAILY
Qty: 90 TABLET | Refills: 0 | Status: ON HOLD | OUTPATIENT
Start: 2023-08-18 | End: 2023-11-15

## 2023-08-21 ENCOUNTER — OFFICE VISIT (OUTPATIENT)
Dept: PRIMARY CARE | Facility: CLINIC | Age: 78
End: 2023-08-21
Payer: MEDICARE

## 2023-08-21 ENCOUNTER — LAB (OUTPATIENT)
Dept: LAB | Facility: LAB | Age: 78
End: 2023-08-21
Payer: MEDICARE

## 2023-08-21 VITALS
BODY MASS INDEX: 19.08 KG/M2 | SYSTOLIC BLOOD PRESSURE: 120 MMHG | OXYGEN SATURATION: 99 % | TEMPERATURE: 96.7 F | WEIGHT: 133 LBS | DIASTOLIC BLOOD PRESSURE: 78 MMHG | HEART RATE: 68 BPM

## 2023-08-21 DIAGNOSIS — I50.22 CHRONIC SYSTOLIC HEART FAILURE (MULTI): Primary | ICD-10-CM

## 2023-08-21 DIAGNOSIS — R39.9 URINARY SYMPTOM OR SIGN: ICD-10-CM

## 2023-08-21 DIAGNOSIS — F32.5 DEPRESSION, MAJOR, IN REMISSION (CMS-HCC): ICD-10-CM

## 2023-08-21 DIAGNOSIS — E11.22 DIABETES MELLITUS WITH STAGE 3 CHRONIC KIDNEY DISEASE (MULTI): ICD-10-CM

## 2023-08-21 DIAGNOSIS — R33.9 URINARY RETENTION: ICD-10-CM

## 2023-08-21 DIAGNOSIS — E55.9 VITAMIN D DEFICIENCY: ICD-10-CM

## 2023-08-21 DIAGNOSIS — I25.10 CORONARY ARTERY DISEASE INVOLVING NATIVE CORONARY ARTERY OF NATIVE HEART, UNSPECIFIED WHETHER ANGINA PRESENT: ICD-10-CM

## 2023-08-21 DIAGNOSIS — I48.0 AF (PAROXYSMAL ATRIAL FIBRILLATION) (MULTI): ICD-10-CM

## 2023-08-21 DIAGNOSIS — N18.30 DIABETES MELLITUS WITH STAGE 3 CHRONIC KIDNEY DISEASE (MULTI): ICD-10-CM

## 2023-08-21 DIAGNOSIS — J42 CHRONIC BRONCHITIS, UNSPECIFIED CHRONIC BRONCHITIS TYPE (MULTI): ICD-10-CM

## 2023-08-21 DIAGNOSIS — I42.9 CARDIOMYOPATHY, UNSPECIFIED TYPE (MULTI): ICD-10-CM

## 2023-08-21 LAB
ALBUMIN (G/DL) IN SER/PLAS: 3.2 G/DL (ref 3.4–5)
ANION GAP IN SER/PLAS: 13 MMOL/L (ref 10–20)
APPEARANCE, URINE: ABNORMAL
BACTERIA, URINE: ABNORMAL /HPF
BILIRUBIN, URINE: NEGATIVE
BLOOD, URINE: ABNORMAL
CALCIUM (MG/DL) IN SER/PLAS: 9 MG/DL (ref 8.6–10.3)
CARBON DIOXIDE, TOTAL (MMOL/L) IN SER/PLAS: 27 MMOL/L (ref 21–32)
CHLORIDE (MMOL/L) IN SER/PLAS: 103 MMOL/L (ref 98–107)
COLOR, URINE: ABNORMAL
CREATININE (MG/DL) IN SER/PLAS: 1.64 MG/DL (ref 0.5–1.3)
GFR MALE: 42 ML/MIN/1.73M2
GLUCOSE (MG/DL) IN SER/PLAS: 106 MG/DL (ref 74–99)
GLUCOSE, URINE: NEGATIVE MG/DL
KETONES, URINE: NEGATIVE MG/DL
LEUKOCYTE ESTERASE, URINE: ABNORMAL
NITRITE, URINE: NEGATIVE
PH, URINE: 5 (ref 5–8)
PHOSPHATE (MG/DL) IN SER/PLAS: 2.8 MG/DL (ref 2.5–4.9)
POTASSIUM (MMOL/L) IN SER/PLAS: 3.9 MMOL/L (ref 3.5–5.3)
PROTEIN, URINE: ABNORMAL MG/DL
RBC, URINE: ABNORMAL /HPF (ref 0–5)
SODIUM (MMOL/L) IN SER/PLAS: 139 MMOL/L (ref 136–145)
SPECIFIC GRAVITY, URINE: 1.01 (ref 1–1.03)
UREA NITROGEN (MG/DL) IN SER/PLAS: 35 MG/DL (ref 6–23)
UROBILINOGEN, URINE: <2 MG/DL (ref 0–1.9)
WBC, URINE: >100 /HPF (ref 0–5)

## 2023-08-21 PROCEDURE — 87077 CULTURE AEROBIC IDENTIFY: CPT

## 2023-08-21 PROCEDURE — 1159F MED LIST DOCD IN RCRD: CPT | Performed by: NURSE PRACTITIONER

## 2023-08-21 PROCEDURE — 99214 OFFICE O/P EST MOD 30 MIN: CPT | Performed by: NURSE PRACTITIONER

## 2023-08-21 PROCEDURE — 87186 SC STD MICRODIL/AGAR DIL: CPT

## 2023-08-21 PROCEDURE — 1157F ADVNC CARE PLAN IN RCRD: CPT | Performed by: NURSE PRACTITIONER

## 2023-08-21 PROCEDURE — 80069 RENAL FUNCTION PANEL: CPT

## 2023-08-21 PROCEDURE — 36415 COLL VENOUS BLD VENIPUNCTURE: CPT

## 2023-08-21 PROCEDURE — 3074F SYST BP LT 130 MM HG: CPT | Performed by: NURSE PRACTITIONER

## 2023-08-21 PROCEDURE — 3078F DIAST BP <80 MM HG: CPT | Performed by: NURSE PRACTITIONER

## 2023-08-21 PROCEDURE — 1160F RVW MEDS BY RX/DR IN RCRD: CPT | Performed by: NURSE PRACTITIONER

## 2023-08-21 PROCEDURE — 87086 URINE CULTURE/COLONY COUNT: CPT

## 2023-08-21 PROCEDURE — 4004F PT TOBACCO SCREEN RCVD TLK: CPT | Performed by: NURSE PRACTITIONER

## 2023-08-21 PROCEDURE — 81001 URINALYSIS AUTO W/SCOPE: CPT

## 2023-08-21 RX ORDER — ESCITALOPRAM OXALATE 10 MG/1
10 TABLET ORAL DAILY
Qty: 90 TABLET | Refills: 0 | Status: SHIPPED | OUTPATIENT
Start: 2023-08-21 | End: 2023-10-20

## 2023-08-21 RX ORDER — CHOLECALCIFEROL (VITAMIN D3) 125 MCG
125 CAPSULE ORAL DAILY
Qty: 90 CAPSULE | Refills: 0 | Status: SHIPPED | OUTPATIENT
Start: 2023-08-21 | End: 2023-11-17

## 2023-08-21 ASSESSMENT — ENCOUNTER SYMPTOMS
WHEEZING: 0
UNEXPECTED WEIGHT CHANGE: 0
HEADACHES: 0
CONSTIPATION: 0
DIARRHEA: 0
VOMITING: 0
SPEECH DIFFICULTY: 0
NERVOUS/ANXIOUS: 1
ALLERGIC/IMMUNOLOGIC NEGATIVE: 1
PALPITATIONS: 0
GASTROINTESTINAL NEGATIVE: 1
HEMATOLOGIC/LYMPHATIC NEGATIVE: 1
ARTHRALGIAS: 1
NUMBNESS: 0
FATIGUE: 1
ABDOMINAL PAIN: 0
EYES NEGATIVE: 1
FREQUENCY: 1
BACK PAIN: 1
SORE THROAT: 0
SHORTNESS OF BREATH: 1
CHILLS: 0
NAUSEA: 0
DIZZINESS: 0
COUGH: 0
ACTIVITY CHANGE: 0
ENDOCRINE NEGATIVE: 1

## 2023-08-21 NOTE — PROGRESS NOTES
Subjective   Patient ID: Andrez Damon is a 78 y.o. male who presents for Fatigue, UTI (Recheck, still having frequency at night and burning on urination), and Med Refill.    BPH, Taking tamsulosin 0.4 mg 2 capsules at bedtime. Denies difficulty urinating. Kee out now. Follow up with urology.  Orthostatic hypotension. Educated about getting up slowly, wearing compression stockings, he does not wear them. When BP drops he drinks a glass of water or OJ and lays down, feels better.  Hx PE, Taking Eliquis. Denies chest pain, breathing at baseline. States he is still smoking 1 pack/week. Strongly encouraged to quit smoking completely.  Chronic kidney disease, monitor, renal function today  COPD, follows with pulmonology. Using Stiolto and Yupelri. Using O2 2 L at bedtime. Feels his breathing is slowly worsening over time. Strongly encouraged to stop smoking completely.  Diabetes, controlled, A1c 5.7%. Diet controlled.  Anemia, following with hematology, getting B12 every month, IV iron as needed  Complains of chronic back, leg pain. Uses cane. Recommend return to pain management. He declines. Declines PT. RLS, controlled, taking ropinirole. Uses OTC Biofreeze for pain.  Depression, controlled, monitor. Has a lot of family stress.  PVD, thromboangiitis obliterans, strongly encouraged patient to stop smoking. Wear good supportive shoes and protect feet, inspect feet daily.  GERD, controlled, taking pantoprazole 40 mg daily.   CAD, anterior wall MI, cardiac stents, ejection fraction 30-35%, follows with cardiology. Did not tolerate Entresto. Taking Brilinta.  Hyperlipidemia, controlled, taking atorvastatin 80 mg daily    Preventive:  CRC screen:  PSA: 11/2018--1.05  CT cardiac scoring:  Stress test: 12/2019  LDCT lung cancer screening: CT chest 7/2023  Ophthalmology:  Podiatry:  Urine albumin: 7/2023         Review of Systems   Constitutional:  Positive for fatigue. Negative for activity change, chills and unexpected  weight change.   HENT:  Positive for hearing loss and tinnitus. Negative for congestion, ear pain and sore throat.    Eyes: Negative.    Respiratory:  Positive for shortness of breath. Negative for cough and wheezing.    Cardiovascular:  Negative for chest pain, palpitations and leg swelling.        Cool toes   Gastrointestinal: Negative.  Negative for abdominal pain, constipation, diarrhea, nausea and vomiting.   Endocrine: Negative.    Genitourinary:  Positive for frequency and urgency.   Musculoskeletal:  Positive for arthralgias, back pain and gait problem.   Skin: Negative.    Allergic/Immunologic: Negative.    Neurological:  Negative for dizziness, speech difficulty, numbness and headaches.   Hematological: Negative.    Psychiatric/Behavioral:  The patient is nervous/anxious.    All other systems reviewed and are negative.      Objective   /78   Pulse 68   Temp 35.9 °C (96.7 °F)   Wt 60.3 kg (133 lb)   SpO2 99%   BMI 19.08 kg/m²     Physical Exam  Vitals and nursing note reviewed.   Constitutional:       General: He is not in acute distress.     Appearance: Normal appearance. He is underweight. He is ill-appearing.   HENT:      Head: Normocephalic and atraumatic.      Right Ear: Tympanic membrane, ear canal and external ear normal.      Left Ear: Tympanic membrane, ear canal and external ear normal.      Nose: Nose normal.      Mouth/Throat:      Mouth: Mucous membranes are moist.      Pharynx: Oropharynx is clear.   Eyes:      Extraocular Movements: Extraocular movements intact.      Conjunctiva/sclera: Conjunctivae normal.      Pupils: Pupils are equal, round, and reactive to light.   Cardiovascular:      Rate and Rhythm: Normal rate and regular rhythm.      Pulses: Normal pulses.      Heart sounds: Normal heart sounds. No murmur heard.  Pulmonary:      Effort: Pulmonary effort is normal. No respiratory distress.      Breath sounds: Normal breath sounds. No wheezing.   Abdominal:      General:  Bowel sounds are normal. There is no distension.      Palpations: Abdomen is soft.      Tenderness: There is no abdominal tenderness.   Musculoskeletal:         General: No tenderness. Normal range of motion.      Cervical back: Normal range of motion and neck supple.      Right lower leg: No edema.      Left lower leg: No edema.      Comments: Uses cane   Skin:     General: Skin is warm and dry.      Capillary Refill: Capillary refill takes less than 2 seconds.   Neurological:      General: No focal deficit present.      Mental Status: He is alert and oriented to person, place, and time.   Psychiatric:         Mood and Affect: Mood normal.         Behavior: Behavior normal.         Thought Content: Thought content normal.         Judgment: Judgment normal.         Assessment/Plan     #BPH  -Follow with urology  -Continue Flomax 0.8 mg daily  #Hypertension, controlled  -continue current carvedilol, spironolactone, furosemide and losartan  -follow with cardiology  -stop smoking  #Diabetes, diet controlled, A1c 5.7%  -Eat frequent small meals, do not go long periods without eating  -check blood sugar daily  -Low fat/cholesterol, low sodium, carbohydrate controlled diet  -Maintain healthy weight  -regular follow up with ophthalmology and podiatry  #CAD  -continue to follow with cardiology, current meds  -stop smoking completely  #Dyslipidemia  -continue atorvastatin 80 mg daily  -Low fat/cholesterol, low sodium diet  -Exercise as tolerated 150 minutes/week, increase activity slowly  -Maintain BMI 20-25  #Chronic back and hip pain  -recommend return to see pain management, he declines  -Tylenol 650-1000 mg every 8 hours as needed for pain  #Bilateral knee pain  -Tylenol 650-1000 mg every 8 hours as needed for pain  -OTC topical pain reliever  #Anemia  -follow with hematology  #CHF, cardiomyopathy  -Follow-up with cardiology  -continued furosemide 20 mg daily, spironolactone 25 mg daily, carvedilol 6.25 mg daily  -Low  "sodium diet  -Weigh yourself every morning before breakfast  -Call the office if you have a weight gain of 3 or more pounds in one day or 5 or more pounds in one week  #CKD  -monitor  #Depression, improved  #Nicotine dependency  -Strongly encouraged to stop smoking  #PVD, thromboangiitis obliterans  -protect feet from injury  -inspect feet daily  -Strongly encouraged to stop smoking  #Orthostatic hypotension  -get up and change positions slowly  -compression stockings  #GERD  -Pantoprazole 40 mg daily  -Tums or Pepcid as needed in the evening  -Avoid alcohol, caffeine, and other foods that tend to bother your stomach  -Elevate head of bed 4-6\" on blocks  -Avoid eating 2 hours prior to bed  -Do not wear constricting clothing around your middle  #RLS  -Continue ropinirole 1 mg at bedtime  #COPD, controlled  -continue Stiolto, Yupelri  -Xopenex as needed  -Follow with pulmonology  -Strongly encouraged to stop smoking     Follow-up 3 months and as needed  "

## 2023-08-22 DIAGNOSIS — N30.00 ACUTE CYSTITIS WITHOUT HEMATURIA: Primary | ICD-10-CM

## 2023-08-22 RX ORDER — SULFAMETHOXAZOLE AND TRIMETHOPRIM 800; 160 MG/1; MG/1
1 TABLET ORAL 2 TIMES DAILY
Qty: 10 TABLET | Refills: 0 | Status: SHIPPED | OUTPATIENT
Start: 2023-08-22 | End: 2023-08-27

## 2023-08-24 LAB — URINE CULTURE: ABNORMAL

## 2023-09-12 ENCOUNTER — PATIENT OUTREACH (OUTPATIENT)
Dept: PRIMARY CARE | Facility: CLINIC | Age: 78
End: 2023-09-12
Payer: MEDICARE

## 2023-09-12 NOTE — PROGRESS NOTES
Call placed regarding one month post discharge follow up call.  At time of outreach call the patient feels as if their condition has improved.  They  deny any questions or concerns regarding  the recovery period  or follow up appointment,  They are  agreeable to continued outreach by this TCM provider.

## 2023-09-20 ENCOUNTER — PATIENT OUTREACH (OUTPATIENT)
Dept: PRIMARY CARE | Facility: CLINIC | Age: 78
End: 2023-09-20
Payer: MEDICARE

## 2023-09-20 RX ORDER — BUMETANIDE 1 MG/1
1 TABLET ORAL DAILY
COMMUNITY
End: 2023-10-16 | Stop reason: SDUPTHER

## 2023-09-20 RX ORDER — METOPROLOL SUCCINATE 25 MG/1
12.5 TABLET, EXTENDED RELEASE ORAL DAILY
COMMUNITY
End: 2024-01-04 | Stop reason: SDUPTHER

## 2023-09-20 NOTE — PROGRESS NOTES
Discharge Facility: [facility]  Discharge Diagnosis: Urinary retention due to benign prostatic hyperplasia  A new diagnosis of Severe malnutrition related to chronic disease   Admission Date: 9-  Discharge Date: 9-    PCP Appointment Date: 9-  Specialist Appointment Date:   [id number] with urology due to likely chronic caruso  Cardiology -to establish care.  Hospital Encounter and Summary: Linked  See discharge assessment below for further details  Engagement  Call Start Time: 1133 (9/20/2023 11:30 AM)    Medications  Medications reviewed with patient/caregiver?: Yes (9/20/2023 11:30 AM)  Is the patient having any side effects they believe may be caused by any medication additions or changes?: No (9/20/2023 11:30 AM)  Does the patient have all medications ordered at discharge?: Yes (9/20/2023 11:30 AM)  Prescription Comments: Please take your home medications as instructed prior to hospitalization.   - 3 changes to your home medications have been made during your hospital stay. Stop Lasix, you will take Bumex 1 mg once daily in its place. Start metoprolol succinate 12.5 mg once daily     Patient states he is independent with his medication administration. He states he just needs to get back to his normal medication schedule.  I offered support of pharmacy but he declined at this time. (9/20/2023 11:30 AM)  Is the patient taking all medications as directed (includes completed medication regime)?: Yes (9/20/2023 11:30 AM)  Care Management Interventions: Provided patient education (9/20/2023 11:30 AM)  Medication Comments: No issues reported in regards to accessibility affordability adherence  Denies any  questions or concerns about their medications once they are home. Verbalizes an understanding regarding how to take their medications and which medications they should be taking.  Denies the need for any medication refills at this time. (9/20/2023 11:30 AM)    Appointments  Does the patient  "have a primary care provider?: Yes (2023 11:30 AM)  Care Management Interventions: Verified appointment date/time/provider (2023 11:30 AM)  Care Management Interventions: Educated on importance of keeping appointment; Advised to schedule with specialist (Urology Dr. Piedra  / Cardiology fu) (2023 11:30 AM)    Self Management  What is the home health agency?: NA (2023 11:30 AM)  Has home health visited the patient within 72 hours of discharge?: Not applicable (2023 11:30 AM)  What Durable Medical Equipment (DME) was ordered?: NA  has 02 at home  Uses 2l NC  states he has all necessary supplied (2023 11:30 AM)    Patient Teaching  Does the patient have access to their discharge instructions?: No (patient is outside.  Reviewed instructions with patient He verbalizes understanding) (2023 11:30 AM)  What is the patient's perception of their health status since discharge?: Improving (2023 11:30 AM)  Is the patient/caregiver able to teach back the hierarchy of who to call/visit for symptoms/problems? PCP, Specialist, Home Health nurse, Urgent Care, ED, 911: Yes (You need to call your doctor,  return to the closest ED if you develop any new  or worsening symptoms:  concerning symptoms  call 911 for emergency situations  Call your doctor for questions / concerrns.) (2023 11:30 AM)    Wrap Up  Wrap Up Additional Comments: Spoke w/ pt  Pt identified by name and   He is outside  states he is feeling well.  Caruso  intact to leg bag  clear urine. States he is familiar with caruso from previous experience  denies any questions. Urine \"pretty clear and draining\"  States his swelling is better than it has been in a long time. Intermittent cough phlegm decreasing  denies sob/ chest pain/ fevers or chills. Reviewed discharge instructions, medications, and follow up.  Patient denies any further discharge questions/needs at this time.  Confirms they will attend the scheduled follow up " appointment  and will schedule follow up with his urologist and cardiologist. Emphasized the importance of this follow up.  Discussed referral to nutrition due to his severe malnutrition he declines at this time. Offered support of pharmacy outreach and CCM through [facility] to assess for supportive needs. Patient declines at this time. Will continue to address on follow up. (9/20/2023 11:30 AM)  Call End Time: 1153 (9/20/2023 11:30 AM)

## 2023-09-22 ENCOUNTER — LAB (OUTPATIENT)
Dept: LAB | Facility: LAB | Age: 78
End: 2023-09-22
Payer: MEDICARE

## 2023-09-22 ENCOUNTER — OFFICE VISIT (OUTPATIENT)
Dept: PRIMARY CARE | Facility: CLINIC | Age: 78
End: 2023-09-22
Payer: MEDICARE

## 2023-09-22 VITALS
SYSTOLIC BLOOD PRESSURE: 96 MMHG | HEART RATE: 61 BPM | TEMPERATURE: 96 F | WEIGHT: 133.5 LBS | OXYGEN SATURATION: 99 % | DIASTOLIC BLOOD PRESSURE: 54 MMHG | BODY MASS INDEX: 19.07 KG/M2

## 2023-09-22 DIAGNOSIS — I48.0 AF (PAROXYSMAL ATRIAL FIBRILLATION) (MULTI): ICD-10-CM

## 2023-09-22 DIAGNOSIS — I50.22 CHRONIC SYSTOLIC HEART FAILURE (MULTI): ICD-10-CM

## 2023-09-22 DIAGNOSIS — I42.9 CARDIOMYOPATHY, UNSPECIFIED TYPE (MULTI): ICD-10-CM

## 2023-09-22 DIAGNOSIS — I50.22 CHRONIC SYSTOLIC HEART FAILURE (MULTI): Primary | ICD-10-CM

## 2023-09-22 DIAGNOSIS — R33.8 BENIGN PROSTATIC HYPERPLASIA WITH URINARY RETENTION: ICD-10-CM

## 2023-09-22 DIAGNOSIS — Z97.8 FOLEY CATHETER IN PLACE: ICD-10-CM

## 2023-09-22 DIAGNOSIS — Z09 HOSPITAL DISCHARGE FOLLOW-UP: ICD-10-CM

## 2023-09-22 DIAGNOSIS — N40.1 BENIGN PROSTATIC HYPERPLASIA WITH URINARY RETENTION: ICD-10-CM

## 2023-09-22 PROBLEM — N39.0 UTI (URINARY TRACT INFECTION): Status: RESOLVED | Noted: 2023-09-22 | Resolved: 2023-09-22

## 2023-09-22 PROBLEM — A41.9 SEPSIS (MULTI): Status: RESOLVED | Noted: 2023-09-22 | Resolved: 2023-09-22

## 2023-09-22 PROBLEM — A41.9 SEPSIS (MULTI): Status: ACTIVE | Noted: 2023-09-22

## 2023-09-22 PROBLEM — J96.20 ACUTE ON CHRONIC RESPIRATORY FAILURE (MULTI): Status: ACTIVE | Noted: 2023-09-22

## 2023-09-22 PROBLEM — I50.9 ACC/AHA STAGE C CONGESTIVE HEART FAILURE (MULTI): Status: ACTIVE | Noted: 2023-09-22

## 2023-09-22 PROBLEM — I48.91 ATRIAL FIBRILLATION WITH RVR (MULTI): Status: ACTIVE | Noted: 2023-09-22

## 2023-09-22 PROBLEM — N39.0 UTI (URINARY TRACT INFECTION): Status: ACTIVE | Noted: 2023-09-22

## 2023-09-22 LAB
ANION GAP IN SER/PLAS: 13 MMOL/L (ref 10–20)
CALCIUM (MG/DL) IN SER/PLAS: 8.9 MG/DL (ref 8.6–10.3)
CARBON DIOXIDE, TOTAL (MMOL/L) IN SER/PLAS: 32 MMOL/L (ref 21–32)
CHLORIDE (MMOL/L) IN SER/PLAS: 100 MMOL/L (ref 98–107)
CREATININE (MG/DL) IN SER/PLAS: 1.79 MG/DL (ref 0.5–1.3)
GFR MALE: 38 ML/MIN/1.73M2
GLUCOSE (MG/DL) IN SER/PLAS: 135 MG/DL (ref 74–99)
POTASSIUM (MMOL/L) IN SER/PLAS: 4 MMOL/L (ref 3.5–5.3)
SODIUM (MMOL/L) IN SER/PLAS: 141 MMOL/L (ref 136–145)
UREA NITROGEN (MG/DL) IN SER/PLAS: 38 MG/DL (ref 6–23)

## 2023-09-22 PROCEDURE — 3078F DIAST BP <80 MM HG: CPT | Performed by: NURSE PRACTITIONER

## 2023-09-22 PROCEDURE — 80048 BASIC METABOLIC PNL TOTAL CA: CPT

## 2023-09-22 PROCEDURE — 1159F MED LIST DOCD IN RCRD: CPT | Performed by: NURSE PRACTITIONER

## 2023-09-22 PROCEDURE — 36415 COLL VENOUS BLD VENIPUNCTURE: CPT

## 2023-09-22 PROCEDURE — 1160F RVW MEDS BY RX/DR IN RCRD: CPT | Performed by: NURSE PRACTITIONER

## 2023-09-22 PROCEDURE — 99496 TRANSJ CARE MGMT HIGH F2F 7D: CPT | Performed by: NURSE PRACTITIONER

## 2023-09-22 PROCEDURE — 3074F SYST BP LT 130 MM HG: CPT | Performed by: NURSE PRACTITIONER

## 2023-09-22 PROCEDURE — 4004F PT TOBACCO SCREEN RCVD TLK: CPT | Performed by: NURSE PRACTITIONER

## 2023-09-22 RX ORDER — FINASTERIDE 5 MG/1
1 TABLET, FILM COATED ORAL DAILY
COMMUNITY
Start: 2023-09-19 | End: 2024-01-04 | Stop reason: SDUPTHER

## 2023-09-22 RX ORDER — AMIODARONE HYDROCHLORIDE 200 MG/1
200 TABLET ORAL DAILY
COMMUNITY
Start: 2023-09-19

## 2023-09-22 ASSESSMENT — ENCOUNTER SYMPTOMS
ARTHRALGIAS: 1
EYES NEGATIVE: 1
HEADACHES: 0
PALPITATIONS: 0
NERVOUS/ANXIOUS: 1
COUGH: 0
CHILLS: 0
WHEEZING: 0
FREQUENCY: 1
CONSTIPATION: 0
SORE THROAT: 0
NAUSEA: 0
DIZZINESS: 0
SPEECH DIFFICULTY: 0
VOMITING: 0
UNEXPECTED WEIGHT CHANGE: 0
FATIGUE: 1
ALLERGIC/IMMUNOLOGIC NEGATIVE: 1
BACK PAIN: 1
HEMATOLOGIC/LYMPHATIC NEGATIVE: 1
GASTROINTESTINAL NEGATIVE: 1
SHORTNESS OF BREATH: 1
ABDOMINAL PAIN: 0
DIARRHEA: 0
ENDOCRINE NEGATIVE: 1
NUMBNESS: 0
ACTIVITY CHANGE: 0

## 2023-09-22 NOTE — PROGRESS NOTES
"Patient: Andrez Damon  : 1945  PCP: Karley Sullivan, APRN-CNP  MRN: [id number]  Program: No linked episodes     Andrez Damon is a 78 y.o. male presenting today for follow-up after being discharged from the hospital 3 days ago. The main problem requiring admission was urinary retention due to BPH. The discharge summary and/or Transitional Care Management documentation was reviewed. Medication reconciliation was performed as indicated via the \"Onur as Reviewed\" timestamp.     Andrez Damon was contacted by Transitional Care Management services two days after his discharge. This encounter and supporting documentation was reviewed.    The complexity of medical decision making for this patient's transitional care is high.    Hospitalized [facility]  -  for urinary retention due to BPH, CHF.  Patient treated with antibiotics which were stopped after 7 days.  Patient discharged with Kee catheter to follow-up with Dr. Piedra urology.  CHF, patient was switched from furosemide to Bumex.  Atrial fib with RVR, amiodarone was increased to 200 mg daily and he was started on metoprolol.  Hypokalemia, taking KCl 20 mEq daily, needs potassium check today.  Patient is feeling better, his cough has improved.  Upcoming appointment with cardiology 10/16.    Physical Exam  Vitals and nursing note reviewed.   Constitutional:       General: He is not in acute distress.     Appearance: Normal appearance. He is underweight. He is ill-appearing.   HENT:      Head: Normocephalic and atraumatic.      Right Ear: Tympanic membrane, ear canal and external ear normal.      Left Ear: Tympanic membrane, ear canal and external ear normal.      Nose: Nose normal.      Mouth/Throat:      Mouth: Mucous membranes are moist.      Pharynx: Oropharynx is clear.   Eyes:      Extraocular Movements: Extraocular movements intact.      Conjunctiva/sclera: Conjunctivae normal.      Pupils: Pupils are equal, round, and reactive to " light.   Cardiovascular:      Rate and Rhythm: Normal rate and regular rhythm.      Pulses: Normal pulses.      Heart sounds: Normal heart sounds. No murmur heard.  Pulmonary:      Effort: Pulmonary effort is normal. No respiratory distress.      Breath sounds: Normal breath sounds. No wheezing.   Abdominal:      General: Bowel sounds are normal. There is no distension.      Palpations: Abdomen is soft.      Tenderness: There is no abdominal tenderness.   Musculoskeletal:         General: No tenderness. Normal range of motion.      Cervical back: Normal range of motion and neck supple.      Right lower leg: No edema.      Left lower leg: No edema.      Comments: Uses cane   Skin:     General: Skin is warm and dry.      Capillary Refill: Capillary refill takes less than 2 seconds.   Neurological:      General: No focal deficit present.      Mental Status: He is alert and oriented to person, place, and time.   Psychiatric:         Mood and Affect: Mood normal.         Behavior: Behavior normal.         Thought Content: Thought content normal.         Judgment: Judgment normal.       Assessment/Plan     #BPH  -Follow with urology as scheduled  -Continue Flomax 0.8 mg daily and finasteride 5 mg daily  #Hypertension, controlled  -continue metoprolol and Bumex  -follow with cardiology  -stop smoking  #Diabetes, diet controlled, A1c 5.7%  -Eat frequent small meals, do not go long periods without eating  -check blood sugar daily  -Low fat/cholesterol, low sodium, carbohydrate controlled diet  -Maintain healthy weight  -regular follow up with ophthalmology and podiatry  #CAD  -continue to follow with cardiology, current meds  -stop smoking completely  #Dyslipidemia  -continue atorvastatin 80 mg daily  -Low fat/cholesterol, low sodium diet  -Exercise as tolerated 150 minutes/week, increase activity slowly  -Maintain BMI 20-25  #Chronic back and hip pain  -recommend return to see pain management, he declines  -Tylenol 650-1000  "mg every 8 hours as needed for pain  #Bilateral knee pain  -Tylenol 650-1000 mg every 8 hours as needed for pain  -OTC topical pain reliever  #Anemia  -follow with hematology  #CHF, cardiomyopathy, euvolemic  -Follow-up with cardiology  -continued metoprolol 12.5 mg daily, Bumex 1 mg daily  -Low sodium diet  -Weigh yourself every morning before breakfast  -Call the office if you have a weight gain of 3 or more pounds in one day or 5 or more pounds in one week  #CKD  -monitor  #Depression, improved  #Nicotine dependency  -Strongly encouraged to stop smoking  #PVD, thromboangiitis obliterans  -protect feet from injury  -inspect feet daily  -Strongly encouraged to stop smoking  #Orthostatic hypotension  -get up and change positions slowly  -compression stockings  #GERD  -Pantoprazole 40 mg daily  -Tums or Pepcid as needed in the evening  -Avoid alcohol, caffeine, and other foods that tend to bother your stomach  -Elevate head of bed 4-6\" on blocks  -Avoid eating 2 hours prior to bed  -Do not wear constricting clothing around your middle  #RLS  -Continue ropinirole 1 mg at bedtime  #COPD, controlled  -continue Stiolto, Yupelri  -Xopenex as needed  -Follow with pulmonology  -Strongly encouraged to stop smoking  #Hypokalemia  -Taking KCl 20 mEq daily  -Check potassium today     Follow-up 3 months and as needed    Review of Systems   Constitutional:  Positive for fatigue. Negative for activity change, chills and unexpected weight change.   HENT:  Positive for hearing loss and tinnitus. Negative for congestion, ear pain and sore throat.    Eyes: Negative.    Respiratory:  Positive for shortness of breath. Negative for cough and wheezing.    Cardiovascular:  Negative for chest pain, palpitations and leg swelling.        Cool toes   Gastrointestinal: Negative.  Negative for abdominal pain, constipation, diarrhea, nausea and vomiting.   Endocrine: Negative.    Genitourinary:  Positive for frequency and urgency. "   Musculoskeletal:  Positive for arthralgias, back pain and gait problem.   Skin: Negative.    Allergic/Immunologic: Negative.    Neurological:  Negative for dizziness, speech difficulty, numbness and headaches.   Hematological: Negative.    Psychiatric/Behavioral:  The patient is nervous/anxious.    All other systems reviewed and are negative.      Family History   Problem Relation Name Age of Onset    Other (cardiac disorder) Mother      Other (malignant neoplasm) Mother      Other (cardiac disorder) Father         Engagement  Call Start Time: 1133 (9/20/2023 11:30 AM)    Medications  Medications reviewed with patient/caregiver?: Yes (9/20/2023 11:30 AM)  Is the patient having any side effects they believe may be caused by any medication additions or changes?: No (9/20/2023 11:30 AM)  Does the patient have all medications ordered at discharge?: Yes (9/20/2023 11:30 AM)  Prescription Comments: Please take your home medications as instructed prior to hospitalization.   - 3 changes to your home medications have been made during your hospital stay. Stop Lasix, you will take Bumex 1 mg once daily in its place. Start metoprolol succinate 12.5 mg once daily     Patient states he is independent with his medication administration. He states he just needs to get back to his normal medication schedule.  I offered support of pharmacy but he declined at this time. (9/20/2023 11:30 AM)  Is the patient taking all medications as directed (includes completed medication regime)?: Yes (9/20/2023 11:30 AM)  Care Management Interventions: Provided patient education (9/20/2023 11:30 AM)  Medication Comments: No issues reported in regards to accessibility affordability adherence  Denies any  questions or concerns about their medications once they are home. Verbalizes an understanding regarding how to take their medications and which medications they should be taking.  Denies the need for any medication refills at this time. (9/20/2023  "11:30 AM)    Appointments  Does the patient have a primary care provider?: Yes (2023 11:30 AM)  Care Management Interventions: Verified appointment date/time/provider (2023 11:30 AM)  Care Management Interventions: Educated on importance of keeping appointment; Advised to schedule with specialist (Urology Dr. Piedra  / Cardiology fu) (2023 11:30 AM)    Self Management  What is the home health agency?: NA (2023 11:30 AM)  Has home health visited the patient within 72 hours of discharge?: Not applicable (2023 11:30 AM)  What Durable Medical Equipment (DME) was ordered?: NA  has 02 at home  Uses 2l NC  states he has all necessary supplied (2023 11:30 AM)    Patient Teaching  Does the patient have access to their discharge instructions?: No (patient is outside.  Reviewed instructions with patient He verbalizes understanding) (2023 11:30 AM)  What is the patient's perception of their health status since discharge?: Improving (2023 11:30 AM)  Is the patient/caregiver able to teach back the hierarchy of who to call/visit for symptoms/problems? PCP, Specialist, Home Health nurse, Urgent Care, ED, 911: Yes (You need to call your doctor,  return to the closest ED if you develop any new  or worsening symptoms:  concerning symptoms  call 911 for emergency situations  Call your doctor for questions / concerrns.) (2023 11:30 AM)    Wrap Up  Wrap Up Additional Comments: Spoke w/ pt  Pt identified by name and   He is outside  states he is feeling well.  Caruso  intact to leg bag  clear urine. States he is familiar with crauso from previous experience  denies any questions. Urine \"pretty clear and draining\"  States his swelling is better than it has been in a long time. Intermittent cough phlegm decreasing  denies sob/ chest pain/ fevers or chills. Reviewed discharge instructions, medications, and follow up.  Patient denies any further discharge questions/needs at this time.  Confirms " they will attend the scheduled follow up appointment  and will schedule follow up with his urologist and cardiologist. Emphasized the importance of this follow up.  Discussed referral to nutrition due to his severe malnutrition he declines at this time. Offered support of pharmacy outreach and CCM through [facility] to assess for supportive needs. Patient declines at this time. Will continue to address on follow up. (9/20/2023 11:30 AM)  Call End Time: 1153 (9/20/2023 11:30 AM)        No follow-ups on file.

## 2023-09-28 DIAGNOSIS — E53.8 B12 DEFICIENCY: Primary | ICD-10-CM

## 2023-09-28 RX ORDER — METHYLPREDNISOLONE SODIUM SUCCINATE 40 MG/ML
40 INJECTION INTRAMUSCULAR; INTRAVENOUS AS NEEDED
Status: CANCELLED | OUTPATIENT
Start: 2023-10-19

## 2023-09-28 RX ORDER — DIPHENHYDRAMINE HYDROCHLORIDE 50 MG/ML
50 INJECTION INTRAMUSCULAR; INTRAVENOUS AS NEEDED
Status: CANCELLED | OUTPATIENT
Start: 2023-10-19

## 2023-09-28 RX ORDER — EPINEPHRINE 0.3 MG/.3ML
0.3 INJECTION SUBCUTANEOUS EVERY 5 MIN PRN
Status: CANCELLED | OUTPATIENT
Start: 2023-10-19

## 2023-09-28 RX ORDER — FAMOTIDINE 10 MG/ML
20 INJECTION INTRAVENOUS ONCE AS NEEDED
Status: CANCELLED | OUTPATIENT
Start: 2023-10-19

## 2023-09-28 RX ORDER — ALBUTEROL SULFATE 0.83 MG/ML
3 SOLUTION RESPIRATORY (INHALATION) AS NEEDED
Status: CANCELLED | OUTPATIENT
Start: 2023-10-19

## 2023-09-28 RX ORDER — CYANOCOBALAMIN 1000 UG/ML
1000 INJECTION, SOLUTION INTRAMUSCULAR; SUBCUTANEOUS ONCE
Status: CANCELLED | OUTPATIENT
Start: 2023-10-19

## 2023-10-02 ENCOUNTER — HOSPITAL ENCOUNTER (OUTPATIENT)
Dept: CARDIOLOGY | Facility: CLINIC | Age: 78
Discharge: HOME | End: 2023-10-02
Payer: MEDICARE

## 2023-10-02 DIAGNOSIS — I49.5 SSS (SICK SINUS SYNDROME) (MULTI): ICD-10-CM

## 2023-10-02 DIAGNOSIS — I47.29 PAROXYSMAL VT (MULTI): ICD-10-CM

## 2023-10-02 DIAGNOSIS — Z95.810 PRESENCE OF AUTOMATIC (IMPLANTABLE) CARDIAC DEFIBRILLATOR: ICD-10-CM

## 2023-10-02 DIAGNOSIS — I49.01 VENTRICULAR FIBRILLATION (MULTI): ICD-10-CM

## 2023-10-02 PROCEDURE — 93295 DEV INTERROG REMOTE 1/2/MLT: CPT | Performed by: INTERNAL MEDICINE

## 2023-10-02 PROCEDURE — 93296 REM INTERROG EVL PM/IDS: CPT

## 2023-10-06 ENCOUNTER — PATIENT OUTREACH (OUTPATIENT)
Dept: PRIMARY CARE | Facility: CLINIC | Age: 78
End: 2023-10-06
Payer: MEDICARE

## 2023-10-14 ENCOUNTER — HOSPITAL ENCOUNTER (EMERGENCY)
Facility: HOSPITAL | Age: 78
Discharge: HOME | End: 2023-10-15
Attending: EMERGENCY MEDICINE
Payer: MEDICARE

## 2023-10-14 DIAGNOSIS — T83.511A URINARY TRACT INFECTION ASSOCIATED WITH INDWELLING URETHRAL CATHETER, INITIAL ENCOUNTER (CMS-HCC): Primary | ICD-10-CM

## 2023-10-14 DIAGNOSIS — R33.9 URINARY RETENTION: ICD-10-CM

## 2023-10-14 DIAGNOSIS — N39.0 URINARY TRACT INFECTION ASSOCIATED WITH INDWELLING URETHRAL CATHETER, INITIAL ENCOUNTER (CMS-HCC): Primary | ICD-10-CM

## 2023-10-14 DIAGNOSIS — R31.9 HEMATURIA, UNSPECIFIED TYPE: ICD-10-CM

## 2023-10-14 PROCEDURE — 99283 EMERGENCY DEPT VISIT LOW MDM: CPT | Mod: 25

## 2023-10-14 PROCEDURE — 51702 INSERT TEMP BLADDER CATH: CPT

## 2023-10-14 RX ORDER — LIDOCAINE HYDROCHLORIDE 20 MG/ML
1 JELLY TOPICAL ONCE
Status: COMPLETED | OUTPATIENT
Start: 2023-10-14 | End: 2023-10-15

## 2023-10-14 RX ORDER — LIDOCAINE HYDROCHLORIDE 20 MG/ML
JELLY TOPICAL
Status: COMPLETED
Start: 2023-10-14 | End: 2023-10-15

## 2023-10-14 ASSESSMENT — COLUMBIA-SUICIDE SEVERITY RATING SCALE - C-SSRS
6. HAVE YOU EVER DONE ANYTHING, STARTED TO DO ANYTHING, OR PREPARED TO DO ANYTHING TO END YOUR LIFE?: NO
2. HAVE YOU ACTUALLY HAD ANY THOUGHTS OF KILLING YOURSELF?: NO
1. IN THE PAST MONTH, HAVE YOU WISHED YOU WERE DEAD OR WISHED YOU COULD GO TO SLEEP AND NOT WAKE UP?: NO
6. HAVE YOU EVER DONE ANYTHING, STARTED TO DO ANYTHING, OR PREPARED TO DO ANYTHING TO END YOUR LIFE?: NO

## 2023-10-14 ASSESSMENT — PAIN - FUNCTIONAL ASSESSMENT: PAIN_FUNCTIONAL_ASSESSMENT: 0-10

## 2023-10-14 ASSESSMENT — PAIN SCALES - GENERAL: PAINLEVEL_OUTOF10: 10 - WORST POSSIBLE PAIN

## 2023-10-14 ASSESSMENT — PAIN DESCRIPTION - LOCATION: LOCATION: PENIS

## 2023-10-15 VITALS
RESPIRATION RATE: 16 BRPM | DIASTOLIC BLOOD PRESSURE: 70 MMHG | OXYGEN SATURATION: 97 % | TEMPERATURE: 98.2 F | WEIGHT: 144.18 LBS | HEIGHT: 70 IN | BODY MASS INDEX: 20.64 KG/M2 | HEART RATE: 82 BPM | SYSTOLIC BLOOD PRESSURE: 129 MMHG

## 2023-10-15 LAB
APPEARANCE UR: ABNORMAL
BACTERIA #/AREA URNS AUTO: ABNORMAL /HPF
BILIRUB UR STRIP.AUTO-MCNC: NEGATIVE MG/DL
COLOR UR: ABNORMAL
GLUCOSE UR STRIP.AUTO-MCNC: NEGATIVE MG/DL
HOLD SPECIMEN: NORMAL
KETONES UR STRIP.AUTO-MCNC: NEGATIVE MG/DL
LEUKOCYTE ESTERASE UR QL STRIP.AUTO: ABNORMAL
NITRITE UR QL STRIP.AUTO: NEGATIVE
PH UR STRIP.AUTO: 6 [PH]
PROT UR STRIP.AUTO-MCNC: ABNORMAL MG/DL
RBC # UR STRIP.AUTO: ABNORMAL /UL
RBC #/AREA URNS AUTO: >20 /HPF
SP GR UR STRIP.AUTO: 1.02
SQUAMOUS #/AREA URNS AUTO: ABNORMAL /HPF
UROBILINOGEN UR STRIP.AUTO-MCNC: ABNORMAL MG/DL
WBC #/AREA URNS AUTO: ABNORMAL /HPF

## 2023-10-15 PROCEDURE — 87086 URINE CULTURE/COLONY COUNT: CPT | Mod: CMCLAB,CONLAB | Performed by: EMERGENCY MEDICINE

## 2023-10-15 PROCEDURE — 81001 URINALYSIS AUTO W/SCOPE: CPT | Performed by: EMERGENCY MEDICINE

## 2023-10-15 PROCEDURE — 2500000001 HC RX 250 WO HCPCS SELF ADMINISTERED DRUGS (ALT 637 FOR MEDICARE OP)

## 2023-10-15 RX ORDER — CEPHALEXIN 500 MG/1
500 CAPSULE ORAL 4 TIMES DAILY
Qty: 40 CAPSULE | Refills: 0 | Status: SHIPPED | OUTPATIENT
Start: 2023-10-15 | End: 2023-10-26 | Stop reason: HOSPADM

## 2023-10-15 RX ORDER — CEPHALEXIN 250 MG/1
CAPSULE ORAL
Status: COMPLETED
Start: 2023-10-15 | End: 2023-10-15

## 2023-10-15 RX ORDER — CEPHALEXIN 250 MG/1
500 CAPSULE ORAL ONCE
Status: COMPLETED | OUTPATIENT
Start: 2023-10-15 | End: 2023-10-15

## 2023-10-15 RX ADMIN — LIDOCAINE HYDROCHLORIDE 1 APPLICATION: 20 JELLY TOPICAL at 00:04

## 2023-10-15 RX ADMIN — CEPHALEXIN 500 MG: 250 CAPSULE ORAL at 02:10

## 2023-10-15 ASSESSMENT — PAIN SCALES - GENERAL: PAINLEVEL_OUTOF10: 0 - NO PAIN

## 2023-10-15 NOTE — ED PROVIDER NOTES
HPI   Chief Complaint   Patient presents with    Blood in Urine     To ED from home for caruso complications. Pt reports he had caruso placed 9/12. Pt complains of severe pain to penis and bladder and pink tinged urine. No fever.         History provided by:  Significant other and patient   used: No        This patient presents to the emergency department ambulatory via private vehicle for evaluation of Caruso catheter complications.  Patient states that he has had a Caruso catheter for several months.  It was most recently changed on September 12.  Patient has not had any interval change, because he has not been able to get into his urologist until later this month.    Patient states that his been functioning normally until today when he began to have pain in his penis and bladder and noted pink-tinged urine.  He has not had any fever.  No nausea or vomiting.  No back or flank pain denies trauma.                  No data recorded                Patient History   Past Medical History:   Diagnosis Date    Body mass index (BMI) 20.0-20.9, adult 09/21/2021    Body mass index (BMI) of 20.0 to 20.9 in adult    Body mass index (BMI) 21.0-21.9, adult 04/14/2021    Body mass index (BMI) of 21.0 to 21.9 in adult    Major depressive disorder, recurrent, mild (CMS/[facility]) 11/11/2020    Mild episode of recurrent major depressive disorder    Major depressive disorder, recurrent, unspecified (CMS/[facility]) 01/13/2021    Recurrent major depressive disorder, remission status unspecified    Other conditions influencing health status     Swelling Of Hands    Other specified anxiety disorders 05/19/2020    Depression with anxiety    Personal history of nicotine dependence 07/12/2021    History of nicotine dependence    Personal history of other diseases of the circulatory system     History of cardiac disorder    Personal history of other diseases of the musculoskeletal system and connective tissue     History of arthritis     Personal history of other diseases of urinary system 09/21/2021    History of chronic kidney disease    Personal history of other endocrine, nutritional and metabolic disease 08/29/2019    History of hyperglycemia    Pneumonia, unspecified organism 12/09/2019    Atypical pneumonia    Thoracic aortic aneurysm, without rupture, unspecified (CMS/HCC) 05/29/2019    Thoracic aortic aneurysm without rupture     Past Surgical History:   Procedure Laterality Date    BACK SURGERY  01/24/2014    Back Surgery    CORONARY ANGIOPLASTY WITH STENT PLACEMENT  01/24/2014    Cath Stent Placement    CT ABDOMEN PELVIS ANGIOGRAM W AND/OR WO IV CONTRAST  6/6/2019    CT ABDOMEN PELVIS ANGIOGRAM W AND/OR WO IV CONTRAST 6/6/2019 [facility]    CT HEAD ANGIO W AND WO IV CONTRAST  10/15/2020    CT HEAD ANGIO W AND WO IV CONTRAST 10/15/2020 [facility]    HERNIA REPAIR  04/24/2014    Hernia Repair    OTHER SURGICAL HISTORY  04/17/2019    Abdominal surgery    OTHER SURGICAL HISTORY  01/24/2014    Defibrillation    TOTAL HIP ARTHROPLASTY  01/24/2014    Hip Replacement     Family History   Problem Relation Name Age of Onset    Other (cardiac disorder) Mother      Other (malignant neoplasm) Mother      Other (cardiac disorder) Father       Social History     Tobacco Use    Smoking status: Every Day     Packs/day: 0.25     Years: 68.00     Additional pack years: 0.00     Total pack years: 17.00     Types: Cigarettes    Smokeless tobacco: Never   Substance Use Topics    Alcohol use: Never    Drug use: Never       Physical Exam   ED Triage Vitals [10/14/23 2323]   Temp Heart Rate Resp BP   36.8 °C (98.2 °F) 88 16 132/74      SpO2 Temp Source Heart Rate Source Patient Position   94 % Oral -- --      BP Location FiO2 (%)     -- --       Physical Exam  Vitals reviewed.   Constitutional:       Appearance: Normal appearance.   HENT:      Head: Normocephalic and atraumatic.   Cardiovascular:      Rate and Rhythm: Normal rate and regular  rhythm.      Pulses: Normal pulses.      Heart sounds: Normal heart sounds.   Pulmonary:      Effort: Pulmonary effort is normal.      Breath sounds: Normal breath sounds.   Abdominal:      General: Abdomen is flat.   Genitourinary:     Penis: Normal.       Comments:   Kee catheter in place, mild redness lateral side of glans without erosion or open wound.  No urethral leakage or bleeding.  Pinkish urine in catheter tubing  Skin:     General: Skin is warm and dry.      Capillary Refill: Capillary refill takes less than 2 seconds.   Neurological:      General: No focal deficit present.      Mental Status: He is alert and oriented to person, place, and time.   Psychiatric:         Mood and Affect: Mood normal.         Behavior: Behavior normal.           Labs Reviewed   URINALYSIS WITH REFLEX MICROSCOPIC AND CULTURE - Abnormal       Result Value    Color, Urine Red (*)     Appearance, Urine Cloudy (*)     Specific Gravity, Urine 1.025      pH, Urine 6.0      Protein, Urine 100 (2+) (*)     Glucose, Urine NEGATIVE      Blood, Urine 1.0 (3+) (*)     Ketones, Urine NEGATIVE      Bilirubin, Urine NEGATIVE      Urobilinogen, Urine NORM      Nitrite, Urine NEGATIVE      Leukocyte Esterase, Urine 500 Lillian/µL (*)    URINALYSIS MICROSCOPIC ONLY - Abnormal    WBC, Urine 21-50 (*)     RBC, Urine >20 (*)     Squamous Epithelial Cells, Urine NONE      Bacteria, Urine 1+ (*)    URINE CULTURE   URINALYSIS WITH REFLEX MICROSCOPIC AND CULTURE    Narrative:     The following orders were created for panel order Urinalysis with Reflex Microscopic and Culture.  Procedure                               Abnormality         Status                     ---------                               -----------         ------                     Urinalysis with Reflex M...[[id number]]  Abnormal            Final result               Extra Urine Gray Tube[[id number]]                            Final result                 Please view results for these  tests on the individual orders.   EXTRA URINE GRAY TUBE    Extra Tube Hold for add-ons.         ED Medication Administration from 10/14/2023 2314 to 10/15/2023 0201         Date/Time Order Dose Route Action Action by     10/15/2023 0004 EDT lidocaine (Uro-Jet) 2 % gel 1 Application 1 Application urethral Given DAILY Mayer          0058 -- initial caruso not allowing drainage due to small clot in tube  0201  -- mcu better after cath replaced, results reviewed    ED Course & MDM   Diagnoses as of 10/15/23 0208   Urinary tract infection associated with indwelling urethral catheter, initial encounter (CMS/[facility])   Hematuria, unspecified type   Urinary retention       Medical Decision Making   patient presents to the emergency department with the above history and physical.  No signs of sepsis, dehydration, acute abdomen, pyelonephritis.  Patient does have new pain after prolonged placement of chronic urinary catheter without change.  Catheter was initially changed over the same size catheter, but occluded rapidly and leakage occurred around the catheter.  This was changed by nursing staff to a larger catheter and were getting good drainage and good symptom relief.  Urinalysis does show some pyuria and hematuria suspicious for infection; therefore, oral Keflex prescribed with first dose given in the emergency department.  Culture obtained.    Patient is advised to keep his appointment with his urologist to evaluate for need for continued urinary catheterization versus other urinary drainage options.  Patient advised to avoid traction or trauma on the catheter and to use protective barrier cream on the penis to prevent redness and excoriation from moisture.    Results of exam and any testing were discussed with patient/family. To the best of my ability, I answered all questions. At this time, there is no indication for admission/transfer or further diagnostic testing. Patient understands to return for any new or worsening  symptoms, or failure to improve as anticipated. The importance of follow-up was stressed.    Procedure  Procedures     Christie Hu MD  10/15/23 0832

## 2023-10-15 NOTE — DISCHARGE INSTRUCTIONS
Drink Plenty of fluids.    Culture has been obtained today. This may take up to 3 days to be finalized. We can call you, if a change in treatment is needed.    Return to emergency department for abdominal pain, vomiting, no urine output through the catheter.    Keep your appointment with your urologist.

## 2023-10-16 ENCOUNTER — OFFICE VISIT (OUTPATIENT)
Dept: CARDIOLOGY | Facility: CLINIC | Age: 78
End: 2023-10-16
Payer: MEDICARE

## 2023-10-16 VITALS
DIASTOLIC BLOOD PRESSURE: 65 MMHG | OXYGEN SATURATION: 100 % | HEART RATE: 73 BPM | SYSTOLIC BLOOD PRESSURE: 107 MMHG | BODY MASS INDEX: 19.9 KG/M2 | WEIGHT: 138.67 LBS

## 2023-10-16 DIAGNOSIS — Z95.810 CARDIAC DEFIBRILLATOR IN PLACE: ICD-10-CM

## 2023-10-16 DIAGNOSIS — I50.22 CHRONIC SYSTOLIC HEART FAILURE (MULTI): ICD-10-CM

## 2023-10-16 DIAGNOSIS — Z98.61 S/P PTCA (PERCUTANEOUS TRANSLUMINAL CORONARY ANGIOPLASTY): ICD-10-CM

## 2023-10-16 DIAGNOSIS — I48.0 AF (PAROXYSMAL ATRIAL FIBRILLATION) (MULTI): Primary | ICD-10-CM

## 2023-10-16 DIAGNOSIS — I25.10 CORONARY ARTERY DISEASE INVOLVING NATIVE CORONARY ARTERY OF NATIVE HEART WITHOUT ANGINA PECTORIS: ICD-10-CM

## 2023-10-16 DIAGNOSIS — I10 PRIMARY HYPERTENSION: ICD-10-CM

## 2023-10-16 DIAGNOSIS — I25.5 ISCHEMIC CARDIOMYOPATHY: ICD-10-CM

## 2023-10-16 DIAGNOSIS — E78.2 MIXED HYPERLIPIDEMIA: ICD-10-CM

## 2023-10-16 PROCEDURE — 1159F MED LIST DOCD IN RCRD: CPT | Performed by: NURSE PRACTITIONER

## 2023-10-16 PROCEDURE — 1126F AMNT PAIN NOTED NONE PRSNT: CPT | Performed by: NURSE PRACTITIONER

## 2023-10-16 PROCEDURE — 3078F DIAST BP <80 MM HG: CPT | Performed by: NURSE PRACTITIONER

## 2023-10-16 PROCEDURE — 3074F SYST BP LT 130 MM HG: CPT | Performed by: NURSE PRACTITIONER

## 2023-10-16 PROCEDURE — 99214 OFFICE O/P EST MOD 30 MIN: CPT | Performed by: NURSE PRACTITIONER

## 2023-10-16 PROCEDURE — 4004F PT TOBACCO SCREEN RCVD TLK: CPT | Performed by: NURSE PRACTITIONER

## 2023-10-16 PROCEDURE — 93010 ELECTROCARDIOGRAM REPORT: CPT | Performed by: INTERNAL MEDICINE

## 2023-10-16 PROCEDURE — 1160F RVW MEDS BY RX/DR IN RCRD: CPT | Performed by: NURSE PRACTITIONER

## 2023-10-16 RX ORDER — BUMETANIDE 0.5 MG/1
0.5 TABLET ORAL DAILY
Qty: 30 TABLET | Refills: 3 | Status: SHIPPED | OUTPATIENT
Start: 2023-10-16 | End: 2023-11-08

## 2023-10-16 NOTE — PROGRESS NOTES
Primary Care Physician: Karley Sullivan, APRN-CNP, DNP  Primary Cardiologist:       Date of Visit: 10/16/2023 11:20 AM EDT  Location of visit:  [facility]   Type of Visit: Follow up             Chief Complaint   Patient presents with    Hospital Follow-up     Pt reports being very dizzy, lightheaded from sitting to standing, bending over        HPI / Summary:   Andrez Damon is a 78 y.o. male formerly known to Dr. Rogers with CAD. AWMI due to delayed stent thrombosis s/p redo PCI with GEORGIE (11/29/2014) S/P PCI to LAD with BMS 10/2010 (after delayed stent thrombosis from remote PCI) Moderate LV systolic dysfunction. S/p ICD implantation ([facility] in [place] in 2012). Smoking. COPD. HTN. DLP. CKD. DJD. Latest EF 20-25%. AF with inappropriate ICD chock, reverted to NSR on amiodarone, hx pulmonary embolism, on long term OAC with apixaban,   returns for routine follow up       Hospitalized 9/15/2023 with UTI, complicated by AF with RVR and ADHF   C/o dizziness which is somewhat worse than baseline.  He has a chronic indwelling caruso catheter until he can follow up with urology     His ICD is approaching HAILEY   EKG today shows     12 system review is negative except as noted above        Medical History:   Past Medical History:   Diagnosis Date    Body mass index (BMI) 20.0-20.9, adult 09/21/2021    Body mass index (BMI) of 20.0 to 20.9 in adult    Body mass index (BMI) 21.0-21.9, adult 04/14/2021    Body mass index (BMI) of 21.0 to 21.9 in adult    Major depressive disorder, recurrent, mild (CMS/HCC) 11/11/2020    Mild episode of recurrent major depressive disorder    Major depressive disorder, recurrent, unspecified (CMS/HCC) 01/13/2021    Recurrent major depressive disorder, remission status unspecified    Other conditions influencing health status     Swelling Of Hands    Other specified anxiety disorders 05/19/2020    Depression with anxiety    Personal history of nicotine dependence 07/12/2021    History of  nicotine dependence    Personal history of other diseases of the circulatory system     History of cardiac disorder    Personal history of other diseases of the musculoskeletal system and connective tissue     History of arthritis    Personal history of other diseases of urinary system 09/21/2021    History of chronic kidney disease    Personal history of other endocrine, nutritional and metabolic disease 08/29/2019    History of hyperglycemia    Pneumonia, unspecified organism 12/09/2019    Atypical pneumonia    Thoracic aortic aneurysm, without rupture, unspecified (CMS/[facility]) 05/29/2019    Thoracic aortic aneurysm without rupture       Social History:   Tobacco Use: High Risk (10/16/2023)    Patient History     Smoking Tobacco Use: Every Day     Smokeless Tobacco Use: Never     Passive Exposure: Not on file         MEDICATIONS:   Current Outpatient Medications   Medication Instructions    amiodarone (PACERONE) 200 mg, oral, Daily    apixaban (Eliquis) 2.5 mg tablet 1 tablet, oral, 2 times daily    atorvastatin (LIPITOR) 80 mg, oral, Daily    bumetanide (BUMEX) 1 mg, oral, Daily    cephalexin (KEFLEX) 500 mg, oral, 4 times daily    cholecalciferol (VITAMIN D-3) 125 mcg, oral, Daily    escitalopram (LEXAPRO) 10 mg, oral, Daily    finasteride (Proscar) 5 mg tablet 1 tablet, oral, Daily    levalbuterol (Xopenex) 45 mcg/actuation inhaler INHALE 1 TO 2 PUFFS EVERY 4 TO 6 HOURS AS NEEDED AND AS DIRECTED.    metoprolol succinate XL (TOPROL-XL) 12.5 mg, oral, Daily, Do not crush or chew.    potassium chloride CR (K-Tab) 20 mEq ER tablet 1 tablet, oral, Daily    pyridoxine (VITAMIN B-6) 50 mg, oral, Daily    rOPINIRole (REQUIP) 1 mg, oral, Nightly    tamsulosin (FLOMAX) 0.8 mg, oral, Nightly    ticagrelor (Brilinta) 90 mg tablet 1 tablet, oral, 2 times daily, As directed    tiotropium-olodateroL (Stiolto Respimat) 2.5-2.5 mcg/actuation mist inhaler 2 puffs, inhalation, Daily         IMAGING REVIEWED:   Echocardiogram:    Echocardiogram     Narrative  [facility], [address] [address]  Tel [phone] and Fax [phone]    TRANSTHORACIC ECHOCARDIOGRAM REPORT      Patient Name:     ALEJANDRA FRANCIS    Reading Physician:  [id number] Flaco Briggs MD  Study Date:       9/18/2023          Referring           CHAMP VERA  Physician:  MRN/PID:          [id number]           PCP:                Karley Sullivan CNP  Accession/Order#: [id number]          Department          [facility]  Location:  YOB: 1945          Fellow:  Gender:           M                  Nurse:  Admit Date:       9/14/2023          Sonographer:        Karyn Morales RVT,  RDMS, RDCS  Admission Status: Inpatient -        Additional Staff:  Routine  Height:           177.80 cm          CC Report to:       ICU [facility]  Weight:           62.14 kg           Study Type:         Echocardiogram  BSA:              1.78 m2  Blood Pressure: 102 /58 mmHg    Diagnosis/ICD: I48.91-Unspecified atrial fibrillation  Indication:    A Fib  Procedure/CPT: Echo Complete w Full [id number]    Patient History:  Smoker:            Current.  Diabetes:          Yes  Pertinent History: A-Fib, CHF, Cardiomyopathy, Chest Pain, COPD, Sepsis,  Hyperlipidemia, CAD and HTN. Ischemic Dilated Cardiomyopathy,  Elevated Troponin, Abnormal EKG, Bacteremia, Arrhythmia,  Tachycardia, HFrEF, STEMI, Old MI, Angioplasty, Left Heart  Cath.    Study Detail: The following Echo studies were performed: 2D, M-Mode, Doppler and  color flow. Technically challenging study due to poor acoustic  windows. The patient was awake.      PHYSICIAN INTERPRETATION:  Left Ventricle: The left ventricular systolic function is moderately decreased, with an estimated ejection fraction of 35-40%. There is global hypokinesis of the left ventricle with minor regional variations. The left ventricular cavity size is moderately dilated. There is moderate to severe left  ventricular hypertrophy. Spectral Doppler shows an impaired relaxation pattern of left ventricular diastolic filling.  Left Atrium: The left atrium is moderately dilated.  Right Ventricle: The right ventricle is normal in size. There is normal right ventricular global systolic function.  Right Atrium: The right atrium is normal in size.  Aortic Valve: The aortic valve was not well visualized. There is mild aortic valve cusp calcification. There is mild aortic valve regurgitation. The peak instantaneous gradient of the aortic valve is 11.9 mmHg.  Mitral Valve: The mitral valve is normal in structure. There is moderate mitral valve regurgitation.  Tricuspid Valve: The tricuspid valve is structurally normal. There is mild tricuspid regurgitation.  Pulmonic Valve: The pulmonic valve is not well visualized. There is physiologic pulmonic valve regurgitation.  Pericardium: There is no pericardial effusion noted.  Aorta: The aortic root was not well visualized.      CONCLUSIONS:  1. Left ventricular systolic function is moderately decreased with a 35-40% estimated ejection fraction.  2. Left ventricular cavity size is moderately dilated.  3. There is global hypokinesis of the left ventricle with minor regional variations.  4. Spectral Doppler shows an impaired relaxation pattern of left ventricular diastolic filling.  5. There is moderate to severe left ventricular hypertrophy.  6. The left atrium is moderately dilated.  7. Moderate mitral valve regurgitation.  8. Mild aortic valve regurgitation.      [id number] Flacoangelica Briggs MD  Electronically signed on 9/18/2023 at 4:44:00 PM           Stress Testing:   NM CARDIAC STRESS REST (MYOCARDIAL PERFUSION MIBI) 12/02/2019    Narrative  MRN: [id number]  Patient Name: ALEJANDRA FRANCIS    STUDY:  CARDIAC STRESS/REST INJECTION; CARDIAC STRESS/REST (MYOCARDIAL  PERFUSION/MIBI); 12/2/2019 12:56 pm    INDICATION:  chest pain.    COMPARISON:  MUGA study 02/15/2011, SPECT MPI  09/27/2010    ACCESSION NUMBER(S):  [id number]; [id number]    ORDERING CLINICIAN:  ALBERTINA CROSS    TECHNIQUE:  DIVISION OF NUCLEAR MEDICINE  PHARMACOLOGIC STRESS MYOCARDIAL PERFUSION SCAN, ONE DAY PROTOCOL    The patient received an intravenous dose of 10.0 mCi of Tc-99m  Myoview and resting emission tomographic (SPECT) images of the  myocardium were acquired. The patient then received an intravenous  infusion of 0.4mg regadenoson (Lexiscan)  followed by an additional  dose of 31.2 mCi of Tc-99m Myoview. Stress phase SPECT images of the  myocardium were then acquired. These included ECG-gated images to  assess and quantify ventricular function.    FINDINGS:  Both stress and rest studies demonstrate a large territory of severe  perfusion defect involving the distal anterior wall through the apex,  and extending to the inferior apex as well as to the adjoining  septum, without evidence of reversibility at rest. There is otherwise  grossly normal perfusion preserved throughout the remainder of the  left ventricle on rest and stress images.    There is severe LV dilation with an end-diastolic volume calculated  at 288 mL.    Gated images demonstrate distal anterior and apical dyskinesis with  diffuse LV hypokinesis and an LV EF estimated at 23%.    Impression  1. Large territory of completed transmural myocardial infarction  involving the distal anterior wall and apex, extending to the  inferior apex and adjoining septum, without evidence of associated  ischemia. This finding is new when compared to the prior 2010 study.  2. Otherwise grossly normal perfusion throughout the remainder of the  left ventricle without evidence of additional territory of ischemia  or infarction.  3. Severe LV dilation which is new when compared to the prior 2010  study.  4. Distal anterior dyskinesis superimposed upon severe global LV  hypokinesis with an LV EF estimated at 24%. This is compared to a  mildly dilated left  ventricle with an LVEF of 45% on the 2011 MUGA  and a normal left ventricle with an LVEF of 63% on the 2010 study.      I personally reviewed the images/study and I agree with the findings  as stated. This study was interpreted at [facility], [place], [place].    Cardiac Catheterization: No results found for this or any previous visit from the past 1825 days.    Cardiac Scoring: No results found for this or any previous visit from the past 1825 days.    AAA : No results found for this or any previous visit from the past 1825 days.    OTHER: No results found for this or any previous visit from the past 1825 days.          LABS:  CBC with Differential:    Lab Results   Component Value Date    WBC CANCELED 09/20/2023    RBC CANCELED 09/20/2023    HGB CANCELED 09/20/2023    HCT CANCELED 09/20/2023    PLT CANCELED 09/20/2023    MCV CANCELED 09/20/2023    MCHC CANCELED 09/20/2023    RDW CANCELED 09/20/2023    NRBC CANCELED 09/20/2023    LYMPHOPCT 15.9 09/17/2023    MONOPCT 10.7 09/17/2023    EOSPCT 2.2 09/17/2023    BASOPCT 0.7 09/17/2023    MONOSABS 0.93 (H) 09/17/2023    LYMPHSABS 1.38 09/17/2023    EOSABS 0.19 09/17/2023    BASOSABS 0.06 09/17/2023     CMP:    Lab Results   Component Value Date     09/22/2023    K 4.0 09/22/2023     09/22/2023    CO2 32 09/22/2023    BUN 38 (H) 09/22/2023    CREATININE 1.79 (H) 09/22/2023    GLUCOSE 135 (H) 09/22/2023    PROT 5.9 (L) 09/17/2023    CALCIUM 8.9 09/22/2023    BILITOT 0.6 09/17/2023    ALKPHOS 82 09/17/2023    AST 28 09/17/2023    ALT 22 09/17/2023     BMP:    Lab Results   Component Value Date     09/22/2023    K 4.0 09/22/2023     09/22/2023    CO2 32 09/22/2023    BUN 38 (H) 09/22/2023    CREATININE 1.79 (H) 09/22/2023    CALCIUM 8.9 09/22/2023    GLUCOSE 135 (H) 09/22/2023     Magnesium:  Lab Results   Component Value Date    MG CANCELED 09/20/2023     Troponin:    Lab Results   Component Value Date    SANGITA  52 (HH) 09/17/2023    TROPHS 33 (H) 09/16/2023    TROPHS 59 (H) 09/15/2023     BNP:   Lab Results   Component Value Date     (H) 09/18/2023         Lipid Panel:  Lab Results   Component Value Date    HDL 79.1 07/13/2023    CHHDL 2.3 07/13/2023    VLDL 8 07/13/2023    TRIG 38 07/13/2023        Lab work and imaging results independently reviewed by me       ECG dated 10/16/2023  independently reviewed   NSR 65        Constitutional:       Appearance: Not in distress. Frail.   Eyes:      Conjunctiva/sclera: Conjunctivae normal.   HENT:    Mouth/Throat:      Dentition: Dental caries present.   Neck:      Vascular: JVD normal.   Pulmonary:      Breath sounds: Normal breath sounds.   Chest:      Comments: ICD site healed   Cardiovascular:      Normal rate. Regular rhythm. Normal S1. Normal S2.       Murmurs: There is no murmur.      No gallop.  No click. No rub.   Pulses:     Intact distal pulses.   Edema:     Peripheral edema absent.   Abdominal:      General: Abdomen is flat. Bowel sounds are normal.      Palpations: Abdomen is soft.   Musculoskeletal:      Cervical back: Neck supple. Skin:     General: Skin is warm and dry.   Genitourinary:     Comments: +indwelling catheter   Neurological:      Mental Status: Alert and oriented to person, place and time.                     KENDRICK Chinchilla       Orders:  No orders of the defined types were placed in this encounter.        Followup Appts:  Future Appointments   Date Time Provider Department Center   10/16/2023 11:20 AM KENDRICK Chinchilla [id number] [facility]   10/18/2023 11:00 AM KENDRICK Parmar DNP [id number] [facility]   10/19/2023  9:00 AM Shannon Meza MD [id number] [facility]   10/25/2023  2:00 PM [facility] CARDIAC DEVICE CLINIC [email or web address] GEN [facility]   10/27/2023 10:20 AM Stone Piedra MD [id number] [facility]   11/16/2023  9:30 AM INF 03 GENEVA [id number] [facility]   11/21/2023 10:30 AM KENDRICK Parmar DNP [id number] [facility]    11/30/2023  8:30 AM Karley Sullivan APRN-CNP, ALESSANDRO [id number] [facility]   12/11/2023 10:30 AM Karley Sullivan APRMONAECNP, ALESSANDRO [id number] [facility]   12/11/2023  1:40 PM Cindy Meraz APRN-CNP [id number] [facility]   2/20/2024  2:30 PM Tenisha Hurst APRN-CNP [id number] [facility]

## 2023-10-16 NOTE — ASSESSMENT & PLAN NOTE
ECG today is NSR after hospitalized with AF RVR in the setting of UTI   Continue OAC with apxiaban (also with hx of PE)   Amiodarone 200 mg daily   Toprol XL 12.5 mg daily

## 2023-10-17 ENCOUNTER — HOSPITAL ENCOUNTER (OUTPATIENT)
Dept: CARDIOLOGY | Facility: HOSPITAL | Age: 78
Discharge: HOME | End: 2023-10-17
Payer: MEDICARE

## 2023-10-17 PROCEDURE — 93005 ELECTROCARDIOGRAM TRACING: CPT

## 2023-10-18 ENCOUNTER — PATIENT OUTREACH (OUTPATIENT)
Dept: PRIMARY CARE | Facility: CLINIC | Age: 78
End: 2023-10-18

## 2023-10-18 ENCOUNTER — OFFICE VISIT (OUTPATIENT)
Dept: PRIMARY CARE | Facility: CLINIC | Age: 78
End: 2023-10-18
Payer: MEDICARE

## 2023-10-18 VITALS
TEMPERATURE: 97.5 F | HEART RATE: 56 BPM | WEIGHT: 144 LBS | BODY MASS INDEX: 20.66 KG/M2 | SYSTOLIC BLOOD PRESSURE: 112 MMHG | OXYGEN SATURATION: 94 % | DIASTOLIC BLOOD PRESSURE: 68 MMHG

## 2023-10-18 DIAGNOSIS — R25.2 MUSCLE CRAMPS: ICD-10-CM

## 2023-10-18 DIAGNOSIS — N18.30 DIABETES MELLITUS WITH STAGE 3 CHRONIC KIDNEY DISEASE (MULTI): ICD-10-CM

## 2023-10-18 DIAGNOSIS — E11.22 DIABETES MELLITUS WITH STAGE 3 CHRONIC KIDNEY DISEASE (MULTI): ICD-10-CM

## 2023-10-18 DIAGNOSIS — E87.6 HYPOKALEMIA: Primary | ICD-10-CM

## 2023-10-18 DIAGNOSIS — Z23 FLU VACCINE NEED: ICD-10-CM

## 2023-10-18 LAB
BACTERIA UR CULT: ABNORMAL
POC FINGERSTICK BLOOD GLUCOSE: 134 MG/DL (ref 70–100)

## 2023-10-18 PROCEDURE — 4004F PT TOBACCO SCREEN RCVD TLK: CPT | Performed by: NURSE PRACTITIONER

## 2023-10-18 PROCEDURE — 3074F SYST BP LT 130 MM HG: CPT | Performed by: NURSE PRACTITIONER

## 2023-10-18 PROCEDURE — 1159F MED LIST DOCD IN RCRD: CPT | Performed by: NURSE PRACTITIONER

## 2023-10-18 PROCEDURE — 90662 IIV NO PRSV INCREASED AG IM: CPT | Performed by: NURSE PRACTITIONER

## 2023-10-18 PROCEDURE — 1125F AMNT PAIN NOTED PAIN PRSNT: CPT | Performed by: NURSE PRACTITIONER

## 2023-10-18 PROCEDURE — 1160F RVW MEDS BY RX/DR IN RCRD: CPT | Performed by: NURSE PRACTITIONER

## 2023-10-18 PROCEDURE — G0008 ADMIN INFLUENZA VIRUS VAC: HCPCS | Performed by: NURSE PRACTITIONER

## 2023-10-18 PROCEDURE — 82962 GLUCOSE BLOOD TEST: CPT | Performed by: NURSE PRACTITIONER

## 2023-10-18 PROCEDURE — 99214 OFFICE O/P EST MOD 30 MIN: CPT | Performed by: NURSE PRACTITIONER

## 2023-10-18 PROCEDURE — 3078F DIAST BP <80 MM HG: CPT | Performed by: NURSE PRACTITIONER

## 2023-10-18 NOTE — PROGRESS NOTES
"Call placed regarding one month post discharge follow up call.  At time of outreach call the patient is\" not feeling so great. \"  He reports a recent ER visit for hematuria/ UTI  He is now on an ATB and taking as ordered.  Catheter changed in ER has Urology fu around 10-.    Expresses frustration over his \"multiple health conditions and constant fatigue\"  He has a fu with PCP today @ 10:45. Confirms he will attend.     They  deny any questions or concerns regarding  the recovery period  or follow up appointment,  They are  agreeable to continued outreach by this TCM provider.    They have my direct phone # and were encouraged to please reach out should they need any assistance prior to my next outreach.   "

## 2023-10-18 NOTE — PROGRESS NOTES
"Subjective   Patient ID: Andrez Damon is a 78 y.o. male who presents for Follow-up (10/14/23 for uti, hematuria, changed catheter in er but still very painful), Blood Sugar Problem (Was as low as 54, feels \"out of it\" when this happens ), has not had b12 inj at dr huff's office recently, and Constipation.    ER follow up  Went to [facility] ED 10/14 for blood in urine, drainage around catheter.  Found to have UTI.  The catheter was changed.  Started on Keflex, patient still has a few days.  Following with urology. Kee in place.  Hypoglycemia, reviewed patient's diet.  Advised not to go long periods of time without eating.  Monitor blood sugar and bring record to next visit.  Constipation, recommend increase fiber in diet.           Review of Systems   Constitutional:  Positive for fatigue. Negative for activity change, chills and unexpected weight change.   HENT:  Positive for hearing loss and tinnitus. Negative for congestion, ear pain and sore throat.    Eyes: Negative.    Respiratory:  Positive for shortness of breath. Negative for cough and wheezing.    Cardiovascular:  Negative for chest pain, palpitations and leg swelling.        Cool toes   Gastrointestinal: Negative.  Negative for abdominal pain, constipation, diarrhea, nausea and vomiting.   Endocrine: Negative.    Genitourinary:         Kee in place   Musculoskeletal:  Positive for arthralgias, back pain and gait problem.   Skin: Negative.    Allergic/Immunologic: Negative.    Neurological:  Negative for dizziness, speech difficulty, numbness and headaches.   Hematological: Negative.    Psychiatric/Behavioral:  The patient is nervous/anxious.    All other systems reviewed and are negative.      Objective   /68   Pulse 56   Temp 36.4 °C (97.5 °F)   Wt 65.3 kg (144 lb) Comment: unable to get on scale  SpO2 94%   BMI 20.66 kg/m²     Physical Exam  Vitals and nursing note reviewed.   Constitutional:       General: He is not in acute " distress.     Appearance: Normal appearance. He is underweight. He is ill-appearing.   HENT:      Head: Normocephalic and atraumatic.      Right Ear: Tympanic membrane, ear canal and external ear normal.      Left Ear: Tympanic membrane, ear canal and external ear normal.      Nose: Nose normal.      Mouth/Throat:      Mouth: Mucous membranes are moist.      Pharynx: Oropharynx is clear.   Eyes:      Extraocular Movements: Extraocular movements intact.      Conjunctiva/sclera: Conjunctivae normal.      Pupils: Pupils are equal, round, and reactive to light.   Cardiovascular:      Rate and Rhythm: Normal rate and regular rhythm.      Pulses: Normal pulses.      Heart sounds: Normal heart sounds. No murmur heard.  Pulmonary:      Effort: Pulmonary effort is normal. No respiratory distress.      Breath sounds: Normal breath sounds. No wheezing.   Abdominal:      General: Bowel sounds are normal. There is no distension.      Palpations: Abdomen is soft.      Tenderness: There is no abdominal tenderness.   Genitourinary:     Comments: Kee in place  Musculoskeletal:         General: No tenderness. Normal range of motion.      Cervical back: Normal range of motion and neck supple.      Right lower leg: No edema.      Left lower leg: No edema.      Comments: Uses cane   Skin:     General: Skin is warm and dry.      Capillary Refill: Capillary refill takes less than 2 seconds.   Neurological:      General: No focal deficit present.      Mental Status: He is alert and oriented to person, place, and time.   Psychiatric:         Mood and Affect: Mood normal.         Behavior: Behavior normal.         Thought Content: Thought content normal.         Judgment: Judgment normal.         Assessment/Plan     #BPH  -Follow with urology  -Kee in place  #Hypertension, controlled  -continue current carvedilol, spironolactone, furosemide and losartan  -follow with cardiology  -stop smoking  #Diabetes, diet controlled, A1c 5.7%  -Eat  "frequent small meals, do not go long periods without eating  -check blood sugar daily  -Low fat/cholesterol, low sodium, carbohydrate controlled diet  -Maintain healthy weight  -regular follow up with ophthalmology and podiatry  #CAD  -continue to follow with cardiology, current meds  -stop smoking completely  #Dyslipidemia  -continue atorvastatin 80 mg daily  -Low fat/cholesterol, low sodium diet  -Exercise as tolerated 150 minutes/week, increase activity slowly  -Maintain BMI 20-25  #Chronic back and hip pain  -recommend return to see pain management, he declines  -Tylenol 650-1000 mg every 8 hours as needed for pain  #Bilateral knee pain  -Tylenol 650-1000 mg every 8 hours as needed for pain  -OTC topical pain reliever  #Anemia  -follow with hematology  #CHF, cardiomyopathy  -Follow-up with cardiology  -continued furosemide 20 mg daily, spironolactone 25 mg daily, carvedilol 6.25 mg daily  -Low sodium diet  -Weigh yourself every morning before breakfast  -Call the office if you have a weight gain of 3 or more pounds in one day or 5 or more pounds in one week  #CKD  -monitor  #Depression, improved  #Nicotine dependency  -Strongly encouraged to stop smoking  #PVD, thromboangiitis obliterans  -protect feet from injury  -inspect feet daily  -Strongly encouraged to stop smoking  #Orthostatic hypotension  -get up and change positions slowly  -compression stockings  #GERD  -Pantoprazole 40 mg daily  -Tums or Pepcid as needed in the evening  -Avoid alcohol, caffeine, and other foods that tend to bother your stomach  -Elevate head of bed 4-6\" on blocks  -Avoid eating 2 hours prior to bed  -Do not wear constricting clothing around your middle  #RLS  -Continue ropinirole 1 mg at bedtime  #COPD, controlled  -continue Stiolto, Yupelri  -Xopenex as needed  -Follow with pulmonology  -Strongly encouraged to stop smoking  #Constipation  -Increase fiber in diet     Follow-up: December and as needed  "

## 2023-10-19 ENCOUNTER — LAB (OUTPATIENT)
Dept: LAB | Facility: LAB | Age: 78
End: 2023-10-19
Payer: MEDICARE

## 2023-10-19 ENCOUNTER — TELEPHONE (OUTPATIENT)
Dept: PHARMACY | Facility: HOSPITAL | Age: 78
End: 2023-10-19

## 2023-10-19 ENCOUNTER — TREATMENT (OUTPATIENT)
Dept: HEMATOLOGY/ONCOLOGY | Facility: HOSPITAL | Age: 78
End: 2023-10-19
Payer: MEDICARE

## 2023-10-19 ENCOUNTER — TELEPHONE (OUTPATIENT)
Dept: HEMATOLOGY/ONCOLOGY | Facility: HOSPITAL | Age: 78
End: 2023-10-19

## 2023-10-19 VITALS
DIASTOLIC BLOOD PRESSURE: 71 MMHG | BODY MASS INDEX: 20.17 KG/M2 | OXYGEN SATURATION: 99 % | WEIGHT: 140.87 LBS | SYSTOLIC BLOOD PRESSURE: 121 MMHG | HEART RATE: 69 BPM | TEMPERATURE: 97.5 F | RESPIRATION RATE: 20 BRPM | HEIGHT: 70 IN

## 2023-10-19 DIAGNOSIS — E61.1 IRON DEFICIENCY: Primary | ICD-10-CM

## 2023-10-19 DIAGNOSIS — E61.1 IRON DEFICIENCY: ICD-10-CM

## 2023-10-19 DIAGNOSIS — R97.20 ELEVATED PSA: ICD-10-CM

## 2023-10-19 DIAGNOSIS — I48.0 AF (PAROXYSMAL ATRIAL FIBRILLATION) (MULTI): ICD-10-CM

## 2023-10-19 DIAGNOSIS — E53.1 VITAMIN B6 DEFICIENCY: ICD-10-CM

## 2023-10-19 DIAGNOSIS — I26.99 PULMONARY EMBOLISM, UNSPECIFIED CHRONICITY, UNSPECIFIED PULMONARY EMBOLISM TYPE, UNSPECIFIED WHETHER ACUTE COR PULMONALE PRESENT (MULTI): ICD-10-CM

## 2023-10-19 DIAGNOSIS — E53.8 B12 DEFICIENCY: ICD-10-CM

## 2023-10-19 DIAGNOSIS — D72.829 LEUKOCYTOSIS, UNSPECIFIED TYPE: ICD-10-CM

## 2023-10-19 DIAGNOSIS — I10 HYPERTENSION, UNSPECIFIED TYPE: ICD-10-CM

## 2023-10-19 DIAGNOSIS — F32.5 DEPRESSION, MAJOR, IN REMISSION (CMS-HCC): ICD-10-CM

## 2023-10-19 DIAGNOSIS — E87.6 HYPOKALEMIA: ICD-10-CM

## 2023-10-19 DIAGNOSIS — T82.867A THROMBOSIS DUE TO CARDIAC PROSTHETIC DEVICES, IMPLANTS AND GRAFTS, INITIAL ENCOUNTER: ICD-10-CM

## 2023-10-19 DIAGNOSIS — E53.8 B12 DEFICIENCY: Primary | ICD-10-CM

## 2023-10-19 DIAGNOSIS — Z72.0 TOBACCO ABUSE: ICD-10-CM

## 2023-10-19 DIAGNOSIS — R25.2 MUSCLE CRAMPS: ICD-10-CM

## 2023-10-19 LAB
ANION GAP SERPL CALC-SCNC: 13 MMOL/L (ref 10–20)
BASOPHILS # BLD AUTO: 0.06 X10*3/UL (ref 0–0.1)
BASOPHILS NFR BLD AUTO: 1 %
BUN SERPL-MCNC: 43 MG/DL (ref 6–23)
CALCIUM SERPL-MCNC: 9.2 MG/DL (ref 8.6–10.3)
CHLORIDE SERPL-SCNC: 105 MMOL/L (ref 98–107)
CO2 SERPL-SCNC: 28 MMOL/L (ref 21–32)
CREAT SERPL-MCNC: 1.81 MG/DL (ref 0.5–1.3)
EOSINOPHIL # BLD AUTO: 0.19 X10*3/UL (ref 0–0.4)
EOSINOPHIL NFR BLD AUTO: 3.2 %
ERYTHROCYTE [DISTWIDTH] IN BLOOD BY AUTOMATED COUNT: 16.5 % (ref 11.5–14.5)
FERRITIN SERPL-MCNC: 43 NG/ML (ref 20–300)
GFR SERPL CREATININE-BSD FRML MDRD: 38 ML/MIN/1.73M*2
GLUCOSE SERPL-MCNC: 192 MG/DL (ref 74–99)
HCT VFR BLD AUTO: 37.9 % (ref 41–52)
HGB BLD-MCNC: 11.7 G/DL (ref 13.5–17.5)
IMM GRANULOCYTES # BLD AUTO: 0.02 X10*3/UL (ref 0–0.5)
IMM GRANULOCYTES NFR BLD AUTO: 0.3 % (ref 0–0.9)
IRON SATN MFR SERPL: 15 % (ref 25–45)
IRON SERPL-MCNC: 60 UG/DL (ref 35–150)
LYMPHOCYTES # BLD AUTO: 0.94 X10*3/UL (ref 0.8–3)
LYMPHOCYTES NFR BLD AUTO: 15.8 %
MAGNESIUM SERPL-MCNC: 2.2 MG/DL (ref 1.6–2.4)
MCH RBC QN AUTO: 30.1 PG (ref 26–34)
MCHC RBC AUTO-ENTMCNC: 30.9 G/DL (ref 32–36)
MCV RBC AUTO: 97 FL (ref 80–100)
MONOCYTES # BLD AUTO: 0.51 X10*3/UL (ref 0.05–0.8)
MONOCYTES NFR BLD AUTO: 8.6 %
NEUTROPHILS # BLD AUTO: 4.24 X10*3/UL (ref 1.6–5.5)
NEUTROPHILS NFR BLD AUTO: 71.1 %
NRBC BLD-RTO: 0 /100 WBCS (ref 0–0)
PLATELET # BLD AUTO: 191 X10*3/UL (ref 150–450)
PMV BLD AUTO: 10.8 FL (ref 7.5–11.5)
POTASSIUM SERPL-SCNC: 4.4 MMOL/L (ref 3.5–5.3)
RBC # BLD AUTO: 3.89 X10*6/UL (ref 4.5–5.9)
SODIUM SERPL-SCNC: 142 MMOL/L (ref 136–145)
TIBC SERPL-MCNC: 390 UG/DL (ref 240–445)
UIBC SERPL-MCNC: 330 UG/DL (ref 110–370)
VIT B12 SERPL-MCNC: 398 PG/ML (ref 211–911)
WBC # BLD AUTO: 6 X10*3/UL (ref 4.4–11.3)

## 2023-10-19 PROCEDURE — 36415 COLL VENOUS BLD VENIPUNCTURE: CPT

## 2023-10-19 PROCEDURE — 83550 IRON BINDING TEST: CPT

## 2023-10-19 PROCEDURE — 83735 ASSAY OF MAGNESIUM: CPT

## 2023-10-19 PROCEDURE — 85025 COMPLETE CBC W/AUTO DIFF WBC: CPT

## 2023-10-19 PROCEDURE — 82607 VITAMIN B-12: CPT

## 2023-10-19 PROCEDURE — 83540 ASSAY OF IRON: CPT

## 2023-10-19 PROCEDURE — 82728 ASSAY OF FERRITIN: CPT

## 2023-10-19 PROCEDURE — 84207 ASSAY OF VITAMIN B-6: CPT

## 2023-10-19 PROCEDURE — 80048 BASIC METABOLIC PNL TOTAL CA: CPT

## 2023-10-19 RX ORDER — DIPHENHYDRAMINE HYDROCHLORIDE 50 MG/ML
50 INJECTION INTRAMUSCULAR; INTRAVENOUS AS NEEDED
Status: CANCELLED | OUTPATIENT
Start: 2023-10-26

## 2023-10-19 RX ORDER — ALBUTEROL SULFATE 0.83 MG/ML
3 SOLUTION RESPIRATORY (INHALATION) AS NEEDED
Status: CANCELLED | OUTPATIENT
Start: 2023-10-26

## 2023-10-19 RX ORDER — EPINEPHRINE 0.3 MG/.3ML
0.3 INJECTION SUBCUTANEOUS EVERY 5 MIN PRN
Status: CANCELLED | OUTPATIENT
Start: 2023-10-26

## 2023-10-19 RX ORDER — FAMOTIDINE 10 MG/ML
20 INJECTION INTRAVENOUS ONCE AS NEEDED
Status: CANCELLED | OUTPATIENT
Start: 2023-10-26

## 2023-10-19 ASSESSMENT — PAIN SCALES - GENERAL: PAINLEVEL: 10-WORST PAIN EVER

## 2023-10-19 NOTE — TELEPHONE ENCOUNTER
----- Message from Sarika Stahl RN sent at 10/19/2023  1:23 PM EDT -----  Please schedule. Thanks.  ----- Message -----  From: Shannon Meza MD  Sent: 10/19/2023  12:31 PM EDT  To: Sarika Stahl RN    Venofer weekly x3 ordered to start next week.  Please schedule.  Labs not that bad so he can just have them done when he comes back in 3 months.

## 2023-10-19 NOTE — PROGRESS NOTES
EDPD Note: Rapid Result Review    Reviewed Mr. Andrez Damon 's chart regarding a contaminated urine culture/result that was taken during their recent emergency room visit. The patient was not told about these results prior to leaving the emergency department. Therefore, patient was contacted and given proper education. Patient was discharged on Keflex 500mg four times daily for 10 days. Patient reports some improvement in symptoms with the antibiotic, and has a follow-up appointment with urology next week. Patient informed to follow-up for repeat culture with PCP or urology if symptoms do not continue to improve or worsen.     Order: [id number] - Reflex for Order [id number]  Collected 10/15/2023 00:05    Status: Final result     Specimen Information: Indwelling (Kee) Catheter; Urine     Urine Culture Multiple organisms present, probable contamination. Repeat culture if clinically indicated. Abnormal         No further follow up needed from EDPD Team.     If there are any other questions for the ED Post-Discharge Follow Up Team, please contact [phone]. Fax: [phone].    Lainey Chowdhury, PharmD  PGY1 Community Resident  [facility]

## 2023-10-19 NOTE — PROGRESS NOTES
Interval History:    Patient is a 78-year-old white man with a history of anemia secondary to iron and   B12 deficiency as well as mild renal dysfunction, also possible malabsorption given gastric resection for bleeding ulcers in 1965 , treated with IV iron (Venofer 300 mg x 3 doses 8/2017-9/2017, Feraheme 510 mg x 2 doses 5/2019, Venofer x5 doses at 200 mg each in  December 2021 due to insurance preference, Venofer again 3/2022 &9/2022 & 11/2022, Venofer x3 doses June 2023) as well as B12 injections subcutaneous monthly starting 8/2017,  B12 injections monthly most recently 8/7/23 then interrupted due to hospitalizations although patient said he did not get a shot on 8/7/2023 so I asked my office to check on that, also B6 deficiency treated with oral B6 , previously seen by Dr. Mcelroy and Dr. Cabral, seen by me for the first time on 6/1/2021.  Patient did not get any prescriptions from Dr. Mcelroy.  He did not have rheumatoid arthritis in contrary  to other notes.  He does have chronic fatigue not really improved with the IV iron, ankle edema, intermittent abdominal pain treated with an over-the-counter gas medication with improvement (no dysphagia), insomnia, back and leg pain, shortness of breath,  cough, numbness and tingling in his legs, all stable for several years.    7/12/2023-7/15/2023 patient was admitted with urinary retention secondary to BPH with Kee placed, chest pain with imaging negative for PE, underwent diuresis, recommended urology outpatient.  8/9/2023 he saw Dr. Piedra with urology who discontinued the Kee, changed alfuzosin to tamsulosin.  8/21/2023 Klebsiella UTI.  8/29/2023 patient saw Tenisha Hurst for COPD, on 2 L of oxygen with sleep, recommended pulmonary rehab.  9/14/2023-9/19/2023 patient was admitted with dysuria after recent Kee discontinuation recommending outpatient urology, elevated troponin and atrial fibrillation with RVR and CHF possibly with cardiac medications adjusted  (increased amiodarone, added a beta-blocker, change Lasix to Bumex), renal insufficiency, UTI, echocardiogram with LVEF 35-40% with hypokinesis and diastolic dysfunction and moderate-severe LVH and LA dilation and moderate MVR with cardiac medications adjusted, elevated D-dimer with CT chest no PE but cardiomegaly and moderate effusions with COPD recommending pulmonary as an outpatient.  10/14/2023 patient was in the ER with Kee in place with penile pain and pink-tinged urine, catheter change, was given antibiotics for UTI, follow-up with urology 10/27/2023.  10/16/2023 there appears to be a note from cardiology but nothing really of substance.  10/18/2023 patient reported seeing PCP and the note is still pending, says that he told her about muscle cramps with labs pending, ongoing discomfort from the Kee since September 2023, did tell her about having episodes of low glucose and said that he was told to eat before going to bed.        He lives with his wife, dog, and cat.  He can do light chores, uses a cane to get around.  He is by himself today, previously accompanied by his wife.    Patient smokes and is not interested in quitting.  Patient has type 2 diabetes , glucose 100s lately usually but sometimes goes down to 40s still.   Patient has depression controlled on his medications .        8/2021 he was admitted with acute PE (his 1st clot)  treated with  Eliquis with long-term anticoagulation recommended because of new paroxysmal atrial fibrillation.  Patient saw cardiology 10/27/21 noting atrial fibrillation reverted to normal sinus  rhythm with amiodarone, frail, no aggressive procedures planned; also a history of CAD and cardiomyopathy.  Updated cardiac issues as above December 2021 I referred him back to Dr. Rasmussen regarding iron deficiency, but they decided on no endoscopy 1/21/22, use a PPI as needed, unable to stop anticoagulation  due to high risk of a cardiac event.  He has been using nocturnal  oxygen since 8/2021.     He confirmed  that he is DNR [employer] arrest 8/25/22.     He overall wishes to take a conservative management to his health given his comorbidities and frailty.        Patient received a  flu vaccination 10/2023.   2/20/23 he again declined Covid vaccination.  2/20/2023 I recommended ongoing vaccine management and COVID management through PCP .  No other hospitalizations, major illness, or procedures since his last visit on 5/22/2023.  I have reviewed the available laboratory  data, radiographic data, medications, and notes, and have summarized them in this note on 10/19/2023.     Epic transition occurred 10/2/2023 from prior EMR system.  I did not go back in the epic system prior to this point unless patient brought up an issue.  I did review the EMR data in epic from 10/2/2023 going forward, but relied on our old EMR system for data prior to the transition.    No fevers, weight loss now, unusual  headaches, sore throat, acute vision changes, chest pain now, nausea or vomiting, abnormal bowel movements  now,  blood in stool, hematuria now,  pain or neurologic symptoms except as above, rashes or lumps, abnormal bruising or bleeding (prior mild self-limited epistaxis resolved) , thyroid disorder.    Hematology-oncology history (reviewed and updated 10/19/23)   -2009: Normal WBC, hemoglobin 13.2, normal MCV and platelets.  -2010: WBC 8.8-18.6, hemoglobin 9.8-13.2, normal MCV, platelets 158-452  -2011: WBC 4.6-4.9, hemoglobin 10.2-11.0, normal MCV, normal platelets.  B12 was 284.  Iron 8-25%.  Folic acid 9.9.  -3/2011 patient was seen by Dr. Hilario Jauregui noting anemia with hemoglobin 10.2, iron 8%, recently started on B12 injections from PCP, colonoscopy in 1984 with an anal fissure, recommended further endoscopy.  -4/2011 EGD and colonoscopy by Dr. Jauregui showed mild nonerosive antritis, normal colonoscopy; gastric mucosa with mild chronic inflammation with mucosal congestion,   negative H. pylori.  -5/2011 patient was seen by Dr. Cabral regarding anemia, noted he donated 4-5 units of blood [id number], status post partial gastrectomy due to peptic ulcer disease in 1965; noted iron deficiency likely due to blood donation, history of GI bleeding, multiple  surgeries, malabsorption related to partial gastrectomy as well as use of H2 blockers and PPI; oral iron was started.  -2013: Normal WBC, hemoglobin 12.9-13.7, MCV 89, platelet 161-134  -2014: WBC normal except once up to 11.6, hemoglobin 8.7-12s,  MCV 83-91, platelets 105-200s  -2015: WBC 7.1-11.4, hemoglobin 9.9-10.8, MCV 80-83, platelets 200s  -2016: Normal WBC, hemoglobin 11.3-11.8, MCV 81-84, platelet 200s  -2017 he was seen by Dr. Mcelroy with previous Dr. Cabral note 2011, noted diarrhea after taking oral iron.  -9/2017 patient was seen by Dr. Rasmussen noting resolved anemia, heme-negative stool, normal colonoscopy in 2011, no indication for colonoscopy but  considering 2021.  -2017: WBC 6.3-7.0, hemoglobin 10.7-14.5, MCV 79-95, platelet 181-264.  B12 was 213 subsequently elevated.  Methylmalonic acid 478.  Iron 21-29%.  Ferritin 37 subsequently 292.  Folic acid 11.8.  Erythropoietin 9.8.  -2018: WBC 7.6-16.9, hemoglobin 11.6-15.1, MCV 91-94, platelets 95-300s. B12 686 at the lowest.  Testosterone 882.  Negative stool occult.  Normal LDH.  Iron 22-28%.  Ferritin 322-103.  Creatinine 1.1-3.04.  -2019: WBC 6.4-7.5, hemoglobin 14.5-15.0, MCV 93-98, platelets 142-173.  B12 580.  Normal PT.  Iron 24-40%.  Ferritin .  Creatinine 1.4-1.2.  Normal calcium.  LFTs normal or low.  -2020: WBC 7.1-9.0, hemoglobin 15.0-15.5, MCV 96-97, platelet 151-176.  Normal vitamin D.  B12 688-greater than 2000.  Normal TSH.  Magnesium 2.2.  Iron 36-42%.  Ferritin 416-361.  Creatinine 1.3, normal calcium.     -I initially saw the patient on 6/1/2021, previously seen by Dr. Mcelroy and Dr. Cabral, with history of anemia secondary to iron and B12 deficiency as  well as mild renal dysfunction, also possible malabsorption given gastric resection for bleeding ulcers,  treated with IV iron (Venofer 300 mg x 3 doses 8/2017-9/2017, Feraheme 510 mg x 2 doses 5/2019) as well as B12 injections subcutaneous monthly starting 8/2017.  Patient did not know his history  of anemia but did eat red meat, denied any bleeding or pica, had never taken any vitamins except with Dr. Mcelroy or Dr. Cabral, last donated blood in the 1990s, never had a transfusion, last endoscopy 2011, did not take a PPI.  -6/2021 MRI of the liver was ordered but this was unable to be performed given his pacemaker.   -6/2021: WBC 6.3-15, hemoglobin 12.4-15.4, MCV 92-97, platelet , elevated neutrophils.  E. coli bacteremia and UTI.  Normal PT.  Potassium down to 3.2.  Creatinine 1.3-2.1.  B6 was low at  13.3-supplement started.  Normal B1 at 114.  Copper normal 94.  Normal zinc 72.  Iron 22% with ferritin 72.  Folic acid 9.3.  B12 571.  Normal TSH and vitamin D.   -7/2021: WBC 6.5, hemoglobin 12.7, MCV 93, platelet 182.  Creatinine 1.4-1.6.   -8/2021: WBC 5.5-13.9, hemoglobin 11.1-15.2, MCV , platelet 159-201.  PT normal subsequently elevated.  Elevated PTT 54.  Total protein and albumin low otherwise normal LFTs.  Potassium down to 3.4.  Creatinine 1.3-1.9.  Elevated troponin and BNP.   D-dimer elevated 2516.   -8/3/21 CT chest showed no lymphadenopathy, stable nonspecific peripheral opacity right upper lung, small effusions and atelectasis, acute right lower lobe segmental pulmonary emboli, pulmonary emphysematous changes   -8/7/21 lower extremity ultrasound no DVT   -8/2021 echocardiogram LVEF 30-35%, diastolic dysfunction, moderate MVR, moderate TR, mild-moderate AVR, global hypokinesis  -8/2021 renal ultrasound showed suspected enlarged prostate, no hydronephrosis   -8/2021 CT abdomen and pelvis showed stable mild bile duct dilation compared to 2019, stable liver cyst, punctate calcified granulomas,  renal cysts, possible acute diverticulitis with possible microperforation  -10/1/21: WBC 6.9, hemoglobin 12.7, MCV 97, platelet 184.  Normal sodium, potassium 3.6, creatinine 1.5, normal calcium.  -I saw the patient 6/1/21 and then 12/2/21 with multiple hospitalizations in the interim. 6/2021 he was admitted with chest pain, elevated troponins managed conservatively, bacteremia, UTI.  June-July 2020 when he was admitted with tachycardia, UTI, CHF.   7/12/21 he was seen by pulmonary in follow-up for COPD.  8/2021 he was admitted with acute PE treated with IV heparin and transitioned to Eliquis (discontinued aspirin, continued Brilinta) with long-term anticoagulation recommended because of new paroxysmal  atrial fibrillation for which he was started on amiodarone, inappropriate ICD shock, noted hypoxia and started on oxygen, urine retention requiring a Kee, hematuria felt by urology to be secondary to traumatic Kee with tamsulosin increased and cystoscopy  recommended, multifocal pneumonia treated with steroids and antibiotics as well as BiPAP, diarrhea with diverticulitis and possible perforation but surgery recommended no surgical intervention.   -12/2/2021: WBC 7.0, hemoglobin 12.6, MCV 91, platelet 219, normal differential number.  Iron 11%, ferritin 26.  Elevated B12.  Folate 10.  B6 was normal at 102.  Negative hepatitis panel.   -12/2021 patient insurance requested venofer 200 mg x 5 doses given that month.  -12/30/2021: WBC 6.0, hemoglobin 11.9, MCV 93, platelet 201.  Iron 27% with ferritin 373.  -December 2021 I referred him back to Dr. Rasmussen regarding iron deficiency, but they decided on no endoscopy 1/21/22, use a PPI as needed, unable to stop anticoagulation due to high risk of a cardiac  event.   -3/3/2022: WBC 6.4, hemoglobin 13.7, MCV 93, platelet 200, ANC 5.03.  Iron 17% with ferritin 73.  March 2022 he received Venofer 200 mg x 5 doses .   -5/26/2022: WBC 7.7, hemoglobin 13.3, MCV 96, platelets  202, elevated neutrophils.  Iron 23% with ferritin 172.  B6 normal 140   -6/23/2022: Normal sodium, potassium 4.6, creatinine 1.98, normal calcium.  Normal LFTs, lipid panel, and TSH.  Vitamin D was 26.   -8/25/2022: WBC 4.9, hemoglobin 11.7, MCV 95, platelet 180, normal differential number.  Iron 13% with ferritin 38 status post Venofer 300 mg x 3 doses .   -10/25/2022: WBC 5.2, hemoglobin 13.3, MCV 97,  platelet 177.  Iron 17% with ferritin 147.  Venofer 300 mg x 3 doses given.  -11/28/2022: WBC 5.6, hemoglobin 12.3, MCV 98, platelet 176.  Iron 24% with ferritin 408, elevated B12, B6 normal at 81.   -2/20/2023: WBC 6.6, hemoglobin 13.1, MCV 98, platelet 176.  Iron 22% with ferritin normal 157.  -5/22/2023: WBC 5.6, hemoglobin 12.7, MCV 97, platelet 208.  Iron 11% with ferritin 38 with Venofer x3 doses given in June 2023.  Creatinine 1.7.  -July 2023: WBC 6.5-7.6, hemoglobin 11.8-13.6, normal MCV and platelets.  D-dimer elevated.  Creatinine 1.5-1.9.  Normal LFTs except total protein down to 6.1.  Elevated BNP.  Urine culture Klebsiella and coag negative staph.  -September 2023: WBC 8.4-11.6, hemoglobin 11.7 down to 9.4, normal MCV, platelet 148-227.  D-dimer elevated.  Potassium down to 3.2.  Creatinine 1.4-2.0.  Normal TSH.  Elevated BNP.  Total protein and albumin low.  -Issues in May-October 2023 as per HPI.     Past medical history  -Anemia with iron and B12 deficiency as above, also anemia of chronic kidney disease , B6 deficiency as above   - June-July 2021 he was admitted with tachycardia, UTI, CHF.   -6/2021 he was admitted with chest pain, elevated troponins managed conservatively, bacteremia, UTI.  June-July 2021 was admitted with tachycardia, UTI, CHF.  7/12/21 he was seen by pulmonary in follow-up for COPD.  8/2021 he was admitted with acute PE  treated with IV heparin and transitioned to Eliquis (discontinued aspirin, continued Brilinta) with long-term anticoagulation recommended because of new  paroxysmal atrial fibrillation for which he was started on amiodarone, inappropriate ICD shock, noted  hypoxia and started on oxygen, urine retention requiring a Kee, hematuria felt by urology to be secondary to traumatic Kee with tamsulosin increased and cystoscopy recommended (8/25/2022 clarified with patient that he declined cystoscopy), multifocal  pneumonia treated with steroids and antibiotics as well as BiPAP, diarrhea with diverticulitis and possible perforation but surgery recommended no surgical intervention. His hematuria resolved-no longer has a Kee since 2021.   -7/2019 MRI liver showed simple cysts, hepatic iron overload and minimal splenic iron overload, kidney cyst, pancreas cyst  -Chronic kidney disease  -Rheumatoid arthritis mentioned in the  chart but patient denied on 6/1/2021  -CAD, history of myocardial infarction, status post cardiac stents.  MI due to the late stent thrombosis status post redo PCI with GEORGIE 11/2014, status  post PCI to LAD with BMS 10/2010 after the late stent thrombosis from remote PCI in 2003.  V. fib arrest in 2010 related to ST elevation MI status post CPR, lidocaine, amiodarone, and cardioversion.  7/5/22-Kt decreased amiodarone from 200 to 100 mg, Lasix decreased from 40 to 20 mg, Eliquis decreased from 5 down to 2.5 mg, with dose adjustment due to dizziness and hypotension and Eliquis because of creatinine and weight.   -COPD  -CHF.  12/2019 LVEF 20-25%, multiple segmental abnormalities, mild AVR, left ventricle severely dilated, multiple  wall motion abnormalities.  -Diabetes II  -Hypertension,  orthostatic hypotension with dizziness-evaluation by neurology 4/2019 felt secondary to autonomic dysfunction from diabetes as well  as B12 deficiency  -Pneumonia.  12/2019 patient was seen by  for abnormal CT chest with groundglass opacities, elected for observation with deferring bronchoscopy.  11/2019 chest CT showed right upper lobe groundglass  opacity increasing  in density concerning for low-grade adenocarcinoma recommending follow-up, severe emphysema, mild aneurysmal dilation ascending aorta 4.1 cm.  -Dyslipidemia  -GERD.  Status post gastric resection for bleeding ulcers in 1965  -Depression   -BPH, elevated PSA (1.0 in 2015, 4.5 in July 2016, 1.4 in August 2016, 1.11 in October 2016, 0.92 in 2017, 1.06 in 2018).  -Tobacco abuse  -Colitis by CT 1/2018  -E. coli bacteremia with pyelonephritis, hypotension, sepsis, acute kidney injury 1/2018-2/2018  -Hyponatremia  -Hypokalemia  -Hypomagnesemia  -SI joint dysfunction status post injections  2018, arthritis with chronic pain, bursitis, IT band syndrome, lumbar spondylosis, chronic back pain with work-related injury, hip fracture status post surgery 2014  -Status post pacemaker/AICD 2012  -Restless leg syndrome  -TIA  -Peripheral  vascular disease, thromboangiitis obliterans  -Osteoporosis  -Peripheral neuropathy and radiculopathy  -Vitamin D deficiency  -Aortic aneurysm  -Anal fissure in 1970s  -Issues May-October 2023 as per HPI     Past surgical history  Bilateral hip  replacement, cardiac stents, back surgeries, AICD/pacemaker, hemorrhoidectomy, sinus surgery, appendectomy, hernia repair  2/2014, cataracts, tonsillectomy, gastric resection for bleeding ulcers 1965, vasectomy     Social history  Smokes 1.5 packs/week, maximum 3 packs/day, started  at age 10.  No alcohol or drugs.     Family history  Sister with breast and bone, not  lung cancer.  Mother and 2 other sisters with breast cancer.  Another sister  with thyroid cancer.  Brother with prostate cancer.     Allergies   Allergen Reactions    Meloxicam Shortness of breath    Celecoxib Unknown      Current Outpatient Medications on File Prior to Visit   Medication Sig Dispense Refill    amiodarone (Pacerone) 200 mg tablet Take 1 tablet (200 mg) by mouth once daily.      apixaban (Eliquis) 2.5 mg tablet Take 1 tablet (2.5 mg) by mouth 2 times a day.       atorvastatin (Lipitor) 80 mg tablet TAKE 1 TABLET BY MOUTH ONCE DAILY. 90 tablet 0    bumetanide (Bumex) 0.5 mg tablet Take 1 tablet (0.5 mg) by mouth once daily. 30 tablet 3    cephalexin (Keflex) 500 mg capsule Take 1 capsule (500 mg) by mouth 4 times a day for 10 days. 40 capsule 0    cholecalciferol (Vitamin D-3) 125 MCG (5000 UT) capsule Take 1 capsule (125 mcg) by mouth once daily. 90 capsule 0    escitalopram (Lexapro) 10 mg tablet Take 1 tablet (10 mg) by mouth once daily. 90 tablet 0    finasteride (Proscar) 5 mg tablet Take 1 tablet (5 mg) by mouth once daily.      levalbuterol (Xopenex) 45 mcg/actuation inhaler INHALE 1 TO 2 PUFFS EVERY 4 TO 6 HOURS AS NEEDED AND AS DIRECTED.      metoprolol succinate XL (Toprol-XL) 25 mg 24 hr tablet Take 0.5 tablets (12.5 mg) by mouth once daily. Do not crush or chew.      potassium chloride CR (K-Tab) 20 mEq ER tablet Take 1 tablet (20 mEq) by mouth once daily.      pyridoxine (Vitamin B-6) 50 mg tablet TAKE 1 TABLET BY MOUTH EVERY DAY 90 tablet 0    rOPINIRole (Requip) 1 mg tablet TAKE 1 TABLET (1 MG) BY MOUTH ONCE DAILY AT BEDTIME. 90 tablet 0    tamsulosin (Flomax) 0.4 mg 24 hr capsule Take 2 capsules (0.8 mg) by mouth once daily at bedtime. 180 capsule 0    ticagrelor (Brilinta) 90 mg tablet Take 1 tablet (90 mg) by mouth 2 times a day. As directed      tiotropium-olodateroL (Stiolto Respimat) 2.5-2.5 mcg/actuation mist inhaler Inhale 2 Inhalations once daily.      [DISCONTINUED] bumetanide (Bumex) 1 mg tablet Take 1 tablet (1 mg) by mouth once daily.       No current facility-administered medications on file prior to visit.      Vitals:    10/19/23 0858   BP: 121/71   Pulse: 69   Resp: 20   Temp: 36.4 °C (97.5 °F)   SpO2: 99%         Physical Exam:      Constitutional: Performance status 1-2.  63.9 kg.  -General: Chronically ill and elderly white man, frail- exam overall stable. No acute distress.  -Lymphatics: No cervical or supraclavicular  lymphadenopathy.  -Eyes: Wears glasses.  Anicteric.  -HEENT: Poor dentition.  -Cardiovascular: Regular rate and rhythm, actually.    -Respiratory: Clear to auscultation bilaterally. Breathing is nonlabored.  -Abdomen: Soft, nontender, somewhat firm making examination difficult-stable  -Back: No costovertebral angle tenderness. No spine tenderness.  -Extremities: Trace-1+ leg edema-overall stable.  -Neurologic: Awake, alert, and oriented.  Uses a cane to get around. Speech fluent with normal content.   -Skin: Solar purpura-stable.    -Psychiatric: Appropriate, cooperative, and pleasant.         Assessment and Plan:   Assessment:    #History of anemia secondary to  iron and B12 deficiency as well as mild renal dysfunction, possible malabsorption given gastric resection for bleeding ulcers, treated with IV iron (Venofer  2017, Feraheme 5/2019, Venofer 12/2021 and through 2022) as well  as B12 injections subcutaneous monthly starting 8/2017, also B6 deficiency treated with oral B6 starting 6/2021 .  He could also have episodes of anemia secondary to hospitalizations and surgeries.   2018 normal LDH, testosterone, stool occult.  2017 erythropoietin only 9.8.  He had diarrhea with oral  iron.  Ferritin increased after IV iron.  2011 EGD and colonoscopy with no bleeding.  MCV at the lower limit of normal at times consistent with iron deficiency in the past.  Despite numerous hospitalizations June-August 2021, his CBC did fairly well with  intermittent leukocytosis likely due to infections and stress response, hemoglobin lowest 11.1, mild macrocytosis, mild thrombocytopenia.    6/2021 normal B12, folate, iron studies, B1, copper, zinc.  December 2021 I referred him back to Dr. Rasmussen regarding iron deficiency, but they decided no endoscopy 1/21/22, use a PPI as needed, unable to stop anticoagulation due to high risk of a cardiac event.     CBC and iron studies improved after IV iron with labs close to normal February 2023.   May 2023 hemoglobin good at 12.7 but ferritin 38 with iron 11% so Venofer was given again.  July and September he had an abnormal CBC with mild intermittent leukocytosis, hemoglobin down to 9.4 in September, platelet count down to 148 in September, but these were in the setting of hospitalizations July and September 2023.  12/2021 NEGATIVE HEPATITIS PANEL.     #July 2023 admitted with urinary retention secondary to BPH with Kee placed, chest pain with no PE, saw urology with Kee removed 8/9, admitted again September 2023 for multiple issues including cardiac issues and UTI and Kee placed again with outpatient follow-up with urology pending but ongoing Kee catheter discomfort as well as muscle cramps, July and September 2023 creatinine up to 2.0, potassium down to 3.2.  Patient was seen by PCP 10/18/2023 with labs pending.     # 8/2021 he was admitted with acute PE treated with IV heparin and transitioned to Eliquis (discontinued aspirin, continued Brilinta) with long-term anticoagulation recommended because of new  paroxysmal atrial fibrillation.  8/2021 was his first thrombotic event, most likely secondary to recent hospitalizations, atrial fibrillation, chronic illness.  No signs or symptoms to suggest malignancy.  His dose of Eliquis was decreased by cardiology  7/2022 due to his creatinine and weight, reasonable as he was more than a year out from his pulmonary embolism and due to his frailty.     #Elevated PT and PTT 8/2021 likely due to anticoagulation     #History of intermittent leukocytosis and thrombocytopenia likely due to hospitalizations and acute illness.   This can be evaluated further if this recurs or  persists when he is not hospitalized.  Leukocytosis can also be secondary to tobacco abuse.     #7/2019 MRI liver showed simple cysts, hepatic iron overload and minimal splenic iron overload, kidney cyst, pancreas cyst.  6/2021 I ordered an MRI of the liver, but given his pacemaker this was   unable to be performed-he declined liver imaging in [place].     # Chronic kidney disease     #Hypertension with blood pressure actually low in May-July 2022 with medication decreased by cardiology.  Cardiac issues most recently hospitalized September 2023 as above.     # Pneumonia.  12/2019 patient was seen by  for abnormal CT chest with groundglass opacities, elected for observation with deferring bronchoscopy.  11/2019 chest CT showed right upper lobe groundglass opacity increasing in density concerning  for low-grade adenocarcinoma recommending follow-up, severe emphysema, mild aneurysmal dilation ascending aorta 4.1 cm.  Subsequent hospitalizations as above.  Pulmonary 7/2022 ordered a CT but  patient said this was not approved by insurance for 1/2023.  CT chest 9/2023 with no PE, cardiomegaly, moderate effusions, COPD changes.     # BPH, elevated PSA (1.0 in 2015, 4.5 in July 2016, 1.4 in August 2016, 1.11 in October 2016, 0.92 in 2017, 1.06 in 2018).  Urine issues and urology evaluation July-September 2023 as above.     #Tobacco abuse, unwilling to quit     #Family history of various malignancies     #Other chronic medical problems including CAD status post stents, COPD, CHF, diabetes type 2, orthostatic hypotension,  dyslipidemia, GERD, depression, chronic pain, restless leg syndrome, status post pacemaker/AICD, TIA, peripheral vascular disease, osteoporosis, peripheral neuropathy and radiculopathy, vitamin D deficiency, aortic aneurysm    #Chronic fatigue likely not related to anemia as this persisted even with normal hemoglobin    -I advised follow-up with his other providers regarding his multitude of symptoms as well as hypoglycemic episodes.    -Continue B6 with follow-up level.  -I again reminded him to follow-up with urology (he currently has a Kee and is on antibiotics), pulmonary, cardiology, PCP regarding his multiple medical issues and imaging.  -Patient is going to continue on  Eliquis indefinitely given his paroxysmal atrial fibrillation, also for his pulmonary embolism.  I do  not think he needs a hypercoagulable evaluation at this time.   -Today CBC with differential, iron studies, B6, B12.   -Elevated D-dimer follow-up has been deferred as this has been persistently elevated.  -I discussed the MRI of his liver with him again 10/19/2023 and he declined given his comorbidities and did not want to travel to [place] to have this done.  Referral to hepatology was discussed again 10/19/23 but he declined.  - Other evaluation for abnormal CBC will be considered depending on his trend keeping in mind his wishes for a conservative approach.  -IV iron can be given as needed, caution with too much iron given his MRI in the past showing possible iron overload.   Venofer 300 mg  x 3 doses can be given as needed- insurance prefers. Side effects were outlined in my note from 6/2021 -patient agrees to proceed as needed.  -Continue B12 injections monthly indefinitely , subcutaneously.  -I recommended tobacco cessation and lung cancer screening through pulmonary again 10/19/23, advised him to discuss his CT from 9/2023 with his pulmonologist.  -Genetics referral was discussed and patient declined again 10/19/2023.  -Follow-up with Dr. Rasmussen is as needed, depending on his iron requirements.  - Labs are incorporated in my note which is to be sent to PCP. I have recommended routine health care maintenance and screening through PCP. The patient was reminded to followup with the PCP  for other medical problems and ongoing care. The patient was asked to return to clinic to see me, or sooner as needed for new or concerning symptoms, in 3 months given complicated medical issues.  I did advise him that he needs to tell the lab that there are 2 doctors ordering labs on him.    10/19/2023 I explained my upcoming departure from [facility], explained that I was told that [facility] is working on  my replacement, notified patient to make sure appointments/orders are scheduled and kept as directed.  I wished the patient the best of luck.

## 2023-10-19 NOTE — PROGRESS NOTES
"Sent to RN June: \"Lacho weekly x3 ordered to start next week.  Please schedule.  Labs not that bad so he can just have them done when he comes back in 3 months.\"  "

## 2023-10-19 NOTE — TELEPHONE ENCOUNTER
Called patient to schedule Venofer infusions x3, ordered by Dr. SURESH Meza. Email sent to pre-[facility] for approval in [place]. Scheduled patient for 10/26, 11/2 and 11/9. Patient verbalized and agreed to appointment.

## 2023-10-20 DIAGNOSIS — I51.9 CARDIOPATHY: Primary | ICD-10-CM

## 2023-10-20 DIAGNOSIS — I50.22 CHRONIC SYSTOLIC CONGESTIVE HEART FAILURE (MULTI): ICD-10-CM

## 2023-10-20 RX ORDER — ESCITALOPRAM OXALATE 10 MG/1
10 TABLET ORAL DAILY
Qty: 90 TABLET | Refills: 0 | Status: SHIPPED | OUTPATIENT
Start: 2023-10-20 | End: 2023-12-11

## 2023-10-20 RX ORDER — TICAGRELOR 90 MG/1
90 TABLET ORAL 2 TIMES DAILY
Qty: 60 TABLET | Refills: 5 | Status: SHIPPED | OUTPATIENT
Start: 2023-10-20 | End: 2024-05-10 | Stop reason: HOSPADM

## 2023-10-22 LAB — PYRIDOXAL PHOS SERPL-SCNC: 103.6 NMOL/L (ref 20–125)

## 2023-10-22 ASSESSMENT — ENCOUNTER SYMPTOMS
BACK PAIN: 1
SHORTNESS OF BREATH: 1
FATIGUE: 1
EYES NEGATIVE: 1
SPEECH DIFFICULTY: 0
GASTROINTESTINAL NEGATIVE: 1
DIARRHEA: 0
NAUSEA: 0
CHILLS: 0
SORE THROAT: 0
ARTHRALGIAS: 1
CONSTIPATION: 0
VOMITING: 0
NERVOUS/ANXIOUS: 1
HEMATOLOGIC/LYMPHATIC NEGATIVE: 1
HEADACHES: 0
ACTIVITY CHANGE: 0
COUGH: 0
ENDOCRINE NEGATIVE: 1
ALLERGIC/IMMUNOLOGIC NEGATIVE: 1
ABDOMINAL PAIN: 0
NUMBNESS: 0
WHEEZING: 0
DIZZINESS: 0
UNEXPECTED WEIGHT CHANGE: 0
PALPITATIONS: 0

## 2023-10-23 ENCOUNTER — DOCUMENTATION (OUTPATIENT)
Dept: HEMATOLOGY/ONCOLOGY | Facility: HOSPITAL | Age: 78
End: 2023-10-23
Payer: MEDICARE

## 2023-10-23 ENCOUNTER — HOSPITAL ENCOUNTER (OUTPATIENT)
Facility: HOSPITAL | Age: 78
Setting detail: OBSERVATION
Discharge: HOME | End: 2023-10-26
Attending: EMERGENCY MEDICINE | Admitting: STUDENT IN AN ORGANIZED HEALTH CARE EDUCATION/TRAINING PROGRAM
Payer: MEDICARE

## 2023-10-23 ENCOUNTER — APPOINTMENT (OUTPATIENT)
Dept: RADIOLOGY | Facility: HOSPITAL | Age: 78
End: 2023-10-23
Payer: MEDICARE

## 2023-10-23 DIAGNOSIS — R33.9 URINARY RETENTION: ICD-10-CM

## 2023-10-23 DIAGNOSIS — A41.9 SEPSIS WITHOUT ACUTE ORGAN DYSFUNCTION, DUE TO UNSPECIFIED ORGANISM (MULTI): Primary | ICD-10-CM

## 2023-10-23 PROBLEM — Z91.89: Status: ACTIVE | Noted: 2023-10-23

## 2023-10-23 LAB
ALBUMIN SERPL BCP-MCNC: 4 G/DL (ref 3.4–5)
ALP SERPL-CCNC: 66 U/L (ref 33–136)
ALT SERPL W P-5'-P-CCNC: 24 U/L (ref 10–52)
ANION GAP SERPL CALC-SCNC: 11 MMOL/L (ref 10–20)
APPEARANCE UR: ABNORMAL
AST SERPL W P-5'-P-CCNC: 25 U/L (ref 9–39)
ATRIAL RATE: 65 BPM
BACTERIA #/AREA URNS AUTO: ABNORMAL /HPF
BASOPHILS # BLD AUTO: 0.05 X10*3/UL (ref 0–0.1)
BASOPHILS NFR BLD AUTO: 0.3 %
BILIRUB SERPL-MCNC: 0.9 MG/DL (ref 0–1.2)
BILIRUB UR STRIP.AUTO-MCNC: NEGATIVE MG/DL
BUN SERPL-MCNC: 35 MG/DL (ref 6–23)
CALCIUM SERPL-MCNC: 9.6 MG/DL (ref 8.6–10.3)
CHLORIDE SERPL-SCNC: 104 MMOL/L (ref 98–107)
CO2 SERPL-SCNC: 26 MMOL/L (ref 21–32)
COLOR UR: ABNORMAL
CREAT SERPL-MCNC: 1.66 MG/DL (ref 0.5–1.3)
EOSINOPHIL # BLD AUTO: 0.04 X10*3/UL (ref 0–0.4)
EOSINOPHIL NFR BLD AUTO: 0.2 %
ERYTHROCYTE [DISTWIDTH] IN BLOOD BY AUTOMATED COUNT: 16.5 % (ref 11.5–14.5)
GFR SERPL CREATININE-BSD FRML MDRD: 42 ML/MIN/1.73M*2
GLUCOSE BLD MANUAL STRIP-MCNC: 121 MG/DL (ref 74–99)
GLUCOSE SERPL-MCNC: 136 MG/DL (ref 74–99)
GLUCOSE UR STRIP.AUTO-MCNC: NEGATIVE MG/DL
HCT VFR BLD AUTO: 37.4 % (ref 41–52)
HGB BLD-MCNC: 11.6 G/DL (ref 13.5–17.5)
HOLD SPECIMEN: 293
HOLD SPECIMEN: NORMAL
HOLD SPECIMEN: NORMAL
IMM GRANULOCYTES # BLD AUTO: 0.08 X10*3/UL (ref 0–0.5)
IMM GRANULOCYTES NFR BLD AUTO: 0.5 % (ref 0–0.9)
KETONES UR STRIP.AUTO-MCNC: NEGATIVE MG/DL
LACTATE SERPL-SCNC: 1.6 MMOL/L (ref 0.4–2)
LEUKOCYTE ESTERASE UR QL STRIP.AUTO: ABNORMAL
LIPASE SERPL-CCNC: 11 U/L (ref 9–82)
LYMPHOCYTES # BLD AUTO: 0.61 X10*3/UL (ref 0.8–3)
LYMPHOCYTES NFR BLD AUTO: 3.8 %
MAGNESIUM SERPL-MCNC: 2.09 MG/DL (ref 1.6–2.4)
MCH RBC QN AUTO: 29.8 PG (ref 26–34)
MCHC RBC AUTO-ENTMCNC: 31 G/DL (ref 32–36)
MCV RBC AUTO: 96 FL (ref 80–100)
MONOCYTES # BLD AUTO: 1.12 X10*3/UL (ref 0.05–0.8)
MONOCYTES NFR BLD AUTO: 6.9 %
NEUTROPHILS # BLD AUTO: 14.32 X10*3/UL (ref 1.6–5.5)
NEUTROPHILS NFR BLD AUTO: 88.3 %
NITRITE UR QL STRIP.AUTO: POSITIVE
NRBC BLD-RTO: 0 /100 WBCS (ref 0–0)
P AXIS: -16 DEGREES
P OFFSET: 194 MS
P ONSET: 152 MS
PH UR STRIP.AUTO: 6 [PH]
PLATELET # BLD AUTO: 205 X10*3/UL (ref 150–450)
PMV BLD AUTO: 10.3 FL (ref 7.5–11.5)
POTASSIUM SERPL-SCNC: 4.1 MMOL/L (ref 3.5–5.3)
PR INTERVAL: 132 MS
PROT SERPL-MCNC: 6.6 G/DL (ref 6.4–8.2)
PROT UR STRIP.AUTO-MCNC: ABNORMAL MG/DL
Q ONSET: 218 MS
QRS COUNT: 10 BEATS
QRS DURATION: 122 MS
QT INTERVAL: 426 MS
QTC CALCULATION(BAZETT): 443 MS
QTC FREDERICIA: 437 MS
R AXIS: -75 DEGREES
RBC # BLD AUTO: 3.89 X10*6/UL (ref 4.5–5.9)
RBC # UR STRIP.AUTO: ABNORMAL /UL
RBC #/AREA URNS AUTO: >20 /HPF
SODIUM SERPL-SCNC: 137 MMOL/L (ref 136–145)
SP GR UR STRIP.AUTO: 1.02
SQUAMOUS #/AREA URNS AUTO: ABNORMAL /HPF
T AXIS: 101 DEGREES
T OFFSET: 431 MS
UROBILINOGEN UR STRIP.AUTO-MCNC: <2 MG/DL
VENTRICULAR RATE: 65 BPM
WBC # BLD AUTO: 16.2 X10*3/UL (ref 4.4–11.3)
WBC #/AREA URNS AUTO: >50 /HPF

## 2023-10-23 PROCEDURE — 87186 SC STD MICRODIL/AGAR DIL: CPT | Mod: 91 | Performed by: EMERGENCY MEDICINE

## 2023-10-23 PROCEDURE — 2500000001 HC RX 250 WO HCPCS SELF ADMINISTERED DRUGS (ALT 637 FOR MEDICARE OP): Performed by: STUDENT IN AN ORGANIZED HEALTH CARE EDUCATION/TRAINING PROGRAM

## 2023-10-23 PROCEDURE — 87186 SC STD MICRODIL/AGAR DIL: CPT | Mod: CONLAB | Performed by: EMERGENCY MEDICINE

## 2023-10-23 PROCEDURE — 83605 ASSAY OF LACTIC ACID: CPT | Performed by: EMERGENCY MEDICINE

## 2023-10-23 PROCEDURE — 2500000004 HC RX 250 GENERAL PHARMACY W/ HCPCS (ALT 636 FOR OP/ED)

## 2023-10-23 PROCEDURE — 96367 TX/PROPH/DG ADDL SEQ IV INF: CPT

## 2023-10-23 PROCEDURE — 36415 COLL VENOUS BLD VENIPUNCTURE: CPT | Performed by: EMERGENCY MEDICINE

## 2023-10-23 PROCEDURE — 99285 EMERGENCY DEPT VISIT HI MDM: CPT | Performed by: EMERGENCY MEDICINE

## 2023-10-23 PROCEDURE — 83690 ASSAY OF LIPASE: CPT | Performed by: EMERGENCY MEDICINE

## 2023-10-23 PROCEDURE — 2500000004 HC RX 250 GENERAL PHARMACY W/ HCPCS (ALT 636 FOR OP/ED): Performed by: STUDENT IN AN ORGANIZED HEALTH CARE EDUCATION/TRAINING PROGRAM

## 2023-10-23 PROCEDURE — 2500000001 HC RX 250 WO HCPCS SELF ADMINISTERED DRUGS (ALT 637 FOR MEDICARE OP)

## 2023-10-23 PROCEDURE — 84075 ASSAY ALKALINE PHOSPHATASE: CPT | Performed by: EMERGENCY MEDICINE

## 2023-10-23 PROCEDURE — 74176 CT ABD & PELVIS W/O CONTRAST: CPT | Performed by: RADIOLOGY

## 2023-10-23 PROCEDURE — 2500000002 HC RX 250 W HCPCS SELF ADMINISTERED DRUGS (ALT 637 FOR MEDICARE OP, ALT 636 FOR OP/ED): Mod: MUE | Performed by: STUDENT IN AN ORGANIZED HEALTH CARE EDUCATION/TRAINING PROGRAM

## 2023-10-23 PROCEDURE — 85025 COMPLETE CBC W/AUTO DIFF WBC: CPT | Performed by: EMERGENCY MEDICINE

## 2023-10-23 PROCEDURE — 51703 INSERT BLADDER CATH COMPLEX: CPT | Performed by: EMERGENCY MEDICINE

## 2023-10-23 PROCEDURE — 96375 TX/PRO/DX INJ NEW DRUG ADDON: CPT | Mod: 59

## 2023-10-23 PROCEDURE — G0378 HOSPITAL OBSERVATION PER HR: HCPCS

## 2023-10-23 PROCEDURE — 82947 ASSAY GLUCOSE BLOOD QUANT: CPT

## 2023-10-23 PROCEDURE — 87086 URINE CULTURE/COLONY COUNT: CPT | Mod: CONLAB | Performed by: EMERGENCY MEDICINE

## 2023-10-23 PROCEDURE — 96376 TX/PRO/DX INJ SAME DRUG ADON: CPT

## 2023-10-23 PROCEDURE — 82247 BILIRUBIN TOTAL: CPT | Performed by: EMERGENCY MEDICINE

## 2023-10-23 PROCEDURE — 2500000004 HC RX 250 GENERAL PHARMACY W/ HCPCS (ALT 636 FOR OP/ED): Performed by: EMERGENCY MEDICINE

## 2023-10-23 PROCEDURE — 96365 THER/PROPH/DIAG IV INF INIT: CPT | Mod: 59

## 2023-10-23 PROCEDURE — 81001 URINALYSIS AUTO W/SCOPE: CPT | Performed by: EMERGENCY MEDICINE

## 2023-10-23 PROCEDURE — 83735 ASSAY OF MAGNESIUM: CPT | Performed by: EMERGENCY MEDICINE

## 2023-10-23 PROCEDURE — 74176 CT ABD & PELVIS W/O CONTRAST: CPT | Mod: ME

## 2023-10-23 RX ORDER — TAMSULOSIN HYDROCHLORIDE 0.4 MG/1
0.8 CAPSULE ORAL NIGHTLY
Status: DISCONTINUED | OUTPATIENT
Start: 2023-10-23 | End: 2023-10-26 | Stop reason: HOSPADM

## 2023-10-23 RX ORDER — FINASTERIDE 5 MG/1
5 TABLET, FILM COATED ORAL DAILY
Status: DISCONTINUED | OUTPATIENT
Start: 2023-10-23 | End: 2023-10-26 | Stop reason: HOSPADM

## 2023-10-23 RX ORDER — SODIUM CHLORIDE 9 MG/ML
75 INJECTION, SOLUTION INTRAVENOUS CONTINUOUS
Status: DISCONTINUED | OUTPATIENT
Start: 2023-10-23 | End: 2023-10-23

## 2023-10-23 RX ORDER — ACETAMINOPHEN 325 MG/1
650 TABLET ORAL EVERY 4 HOURS PRN
Status: DISCONTINUED | OUTPATIENT
Start: 2023-10-23 | End: 2023-10-26 | Stop reason: HOSPADM

## 2023-10-23 RX ORDER — ROPINIROLE 1 MG/1
1 TABLET, FILM COATED ORAL NIGHTLY
Status: DISCONTINUED | OUTPATIENT
Start: 2023-10-23 | End: 2023-10-26 | Stop reason: HOSPADM

## 2023-10-23 RX ORDER — AMIODARONE HYDROCHLORIDE 200 MG/1
200 TABLET ORAL DAILY
Status: DISCONTINUED | OUTPATIENT
Start: 2023-10-23 | End: 2023-10-26 | Stop reason: HOSPADM

## 2023-10-23 RX ORDER — ONDANSETRON 4 MG/1
4 TABLET, ORALLY DISINTEGRATING ORAL EVERY 8 HOURS PRN
Status: DISCONTINUED | OUTPATIENT
Start: 2023-10-23 | End: 2023-10-26 | Stop reason: HOSPADM

## 2023-10-23 RX ORDER — VANCOMYCIN HYDROCHLORIDE 1 G/200ML
1000 INJECTION, SOLUTION INTRAVENOUS ONCE
Status: COMPLETED | OUTPATIENT
Start: 2023-10-23 | End: 2023-10-23

## 2023-10-23 RX ORDER — ONDANSETRON HYDROCHLORIDE 2 MG/ML
4 INJECTION, SOLUTION INTRAVENOUS ONCE
Status: COMPLETED | OUTPATIENT
Start: 2023-10-23 | End: 2023-10-23

## 2023-10-23 RX ORDER — METOPROLOL SUCCINATE 25 MG/1
12.5 TABLET, EXTENDED RELEASE ORAL DAILY
Status: DISCONTINUED | OUTPATIENT
Start: 2023-10-23 | End: 2023-10-26 | Stop reason: HOSPADM

## 2023-10-23 RX ORDER — ONDANSETRON HYDROCHLORIDE 2 MG/ML
4 INJECTION, SOLUTION INTRAVENOUS EVERY 8 HOURS PRN
Status: DISCONTINUED | OUTPATIENT
Start: 2023-10-23 | End: 2023-10-26 | Stop reason: HOSPADM

## 2023-10-23 RX ORDER — ESCITALOPRAM OXALATE 10 MG/1
10 TABLET ORAL DAILY
Status: DISCONTINUED | OUTPATIENT
Start: 2023-10-23 | End: 2023-10-26 | Stop reason: HOSPADM

## 2023-10-23 RX ORDER — LEVALBUTEROL TARTRATE 45 UG/1
2 AEROSOL, METERED ORAL EVERY 4 HOURS PRN
Status: DISCONTINUED | OUTPATIENT
Start: 2023-10-23 | End: 2023-10-24

## 2023-10-23 RX ORDER — FORMOTEROL FUMARATE DIHYDRATE 20 UG/2ML
20 SOLUTION RESPIRATORY (INHALATION)
Status: DISCONTINUED | OUTPATIENT
Start: 2023-10-23 | End: 2023-10-25

## 2023-10-23 RX ORDER — ATORVASTATIN CALCIUM 40 MG/1
80 TABLET, FILM COATED ORAL DAILY
Status: DISCONTINUED | OUTPATIENT
Start: 2023-10-23 | End: 2023-10-25

## 2023-10-23 RX ORDER — ACETAMINOPHEN 160 MG/5ML
650 SOLUTION ORAL EVERY 4 HOURS PRN
Status: DISCONTINUED | OUTPATIENT
Start: 2023-10-23 | End: 2023-10-26 | Stop reason: HOSPADM

## 2023-10-23 RX ORDER — SODIUM CHLORIDE 9 MG/ML
INJECTION, SOLUTION INTRAVENOUS
Status: COMPLETED
Start: 2023-10-23 | End: 2023-10-23

## 2023-10-23 RX ORDER — SODIUM CHLORIDE 9 MG/ML
75 INJECTION, SOLUTION INTRAVENOUS CONTINUOUS
Status: DISCONTINUED | OUTPATIENT
Start: 2023-10-23 | End: 2023-10-26 | Stop reason: HOSPADM

## 2023-10-23 RX ORDER — CEFTRIAXONE 1 G/50ML
1 INJECTION, SOLUTION INTRAVENOUS EVERY 12 HOURS
Status: DISCONTINUED | OUTPATIENT
Start: 2023-10-23 | End: 2023-10-26 | Stop reason: HOSPADM

## 2023-10-23 RX ORDER — BUMETANIDE 1 MG/1
0.5 TABLET ORAL DAILY
Status: DISCONTINUED | OUTPATIENT
Start: 2023-10-23 | End: 2023-10-26 | Stop reason: HOSPADM

## 2023-10-23 RX ORDER — TALC
3 POWDER (GRAM) TOPICAL DAILY
Status: DISCONTINUED | OUTPATIENT
Start: 2023-10-23 | End: 2023-10-26 | Stop reason: HOSPADM

## 2023-10-23 RX ORDER — LIDOCAINE HYDROCHLORIDE 20 MG/ML
JELLY TOPICAL
Status: COMPLETED
Start: 2023-10-23 | End: 2023-10-23

## 2023-10-23 RX ORDER — LANOLIN ALCOHOL/MO/W.PET/CERES
50 CREAM (GRAM) TOPICAL DAILY
Status: DISCONTINUED | OUTPATIENT
Start: 2023-10-23 | End: 2023-10-26 | Stop reason: HOSPADM

## 2023-10-23 RX ORDER — ACETAMINOPHEN 650 MG/1
650 SUPPOSITORY RECTAL EVERY 4 HOURS PRN
Status: DISCONTINUED | OUTPATIENT
Start: 2023-10-23 | End: 2023-10-26 | Stop reason: HOSPADM

## 2023-10-23 RX ORDER — CHOLECALCIFEROL (VITAMIN D3) 25 MCG
5000 TABLET ORAL DAILY
Status: DISCONTINUED | OUTPATIENT
Start: 2023-10-23 | End: 2023-10-26 | Stop reason: HOSPADM

## 2023-10-23 RX ORDER — POLYETHYLENE GLYCOL 3350 17 G/17G
17 POWDER, FOR SOLUTION ORAL DAILY
Status: DISCONTINUED | OUTPATIENT
Start: 2023-10-23 | End: 2023-10-26 | Stop reason: HOSPADM

## 2023-10-23 RX ADMIN — SODIUM CHLORIDE 75 ML/HR: 9 INJECTION, SOLUTION INTRAVENOUS at 13:12

## 2023-10-23 RX ADMIN — SODIUM CHLORIDE 75 ML/HR: 9 INJECTION, SOLUTION INTRAVENOUS at 21:15

## 2023-10-23 RX ADMIN — HYDROMORPHONE HYDROCHLORIDE 0.4 MG: 1 INJECTION, SOLUTION INTRAMUSCULAR; INTRAVENOUS; SUBCUTANEOUS at 20:53

## 2023-10-23 RX ADMIN — APIXABAN 2.5 MG: 2.5 TABLET, FILM COATED ORAL at 20:54

## 2023-10-23 RX ADMIN — CEFTRIAXONE 1 G: 1 INJECTION, SOLUTION INTRAVENOUS at 16:18

## 2023-10-23 RX ADMIN — ATORVASTATIN CALCIUM 80 MG: 40 TABLET, FILM COATED ORAL at 21:26

## 2023-10-23 RX ADMIN — BUMETANIDE 0.5 MG: 1 TABLET ORAL at 21:27

## 2023-10-23 RX ADMIN — LIDOCAINE HYDROCHLORIDE: 20 JELLY TOPICAL at 16:52

## 2023-10-23 RX ADMIN — FINASTERIDE 5 MG: 5 TABLET, FILM COATED ORAL at 20:54

## 2023-10-23 RX ADMIN — VANCOMYCIN HYDROCHLORIDE 1000 MG: 1 INJECTION, SOLUTION INTRAVENOUS at 16:18

## 2023-10-23 RX ADMIN — TAMSULOSIN HYDROCHLORIDE 0.8 MG: 0.4 CAPSULE ORAL at 20:54

## 2023-10-23 RX ADMIN — AMIODARONE HYDROCHLORIDE 200 MG: 200 TABLET ORAL at 21:07

## 2023-10-23 RX ADMIN — ROPINIROLE HYDROCHLORIDE 1 MG: 1 TABLET, FILM COATED ORAL at 20:54

## 2023-10-23 RX ADMIN — TICAGRELOR 90 MG: 90 TABLET ORAL at 20:54

## 2023-10-23 RX ADMIN — HYDROMORPHONE HYDROCHLORIDE 0.4 MG: 1 INJECTION, SOLUTION INTRAMUSCULAR; INTRAVENOUS; SUBCUTANEOUS at 13:12

## 2023-10-23 RX ADMIN — ONDANSETRON 4 MG: 2 INJECTION INTRAMUSCULAR; INTRAVENOUS at 13:14

## 2023-10-23 SDOH — SOCIAL STABILITY: SOCIAL INSECURITY: DO YOU FEEL UNSAFE GOING BACK TO THE PLACE WHERE YOU ARE LIVING?: NO

## 2023-10-23 SDOH — SOCIAL STABILITY: SOCIAL INSECURITY: ARE YOU OR HAVE YOU BEEN THREATENED OR ABUSED PHYSICALLY, EMOTIONALLY, OR SEXUALLY BY ANYONE?: NO

## 2023-10-23 SDOH — ECONOMIC STABILITY: INCOME INSECURITY: IN THE LAST 12 MONTHS, WAS THERE A TIME WHEN YOU WERE NOT ABLE TO PAY THE MORTGAGE OR RENT ON TIME?: NO

## 2023-10-23 SDOH — SOCIAL STABILITY: SOCIAL INSECURITY: HAS ANYONE EVER THREATENED TO HURT YOUR FAMILY OR YOUR PETS?: NO

## 2023-10-23 SDOH — ECONOMIC STABILITY: TRANSPORTATION INSECURITY
IN THE PAST 12 MONTHS, HAS THE LACK OF TRANSPORTATION KEPT YOU FROM MEDICAL APPOINTMENTS OR FROM GETTING MEDICATIONS?: NO

## 2023-10-23 SDOH — ECONOMIC STABILITY: INCOME INSECURITY: IN THE PAST 12 MONTHS, HAS THE ELECTRIC, GAS, OIL, OR WATER COMPANY THREATENED TO SHUT OFF SERVICE IN YOUR HOME?: NO

## 2023-10-23 SDOH — ECONOMIC STABILITY: HOUSING INSECURITY
IN THE LAST 12 MONTHS, WAS THERE A TIME WHEN YOU DID NOT HAVE A STEADY PLACE TO SLEEP OR SLEPT IN A SHELTER (INCLUDING NOW)?: NO

## 2023-10-23 SDOH — SOCIAL STABILITY: SOCIAL INSECURITY: DO YOU FEEL ANYONE HAS EXPLOITED OR TAKEN ADVANTAGE OF YOU FINANCIALLY OR OF YOUR PERSONAL PROPERTY?: NO

## 2023-10-23 SDOH — ECONOMIC STABILITY: HOUSING INSECURITY: IN THE LAST 12 MONTHS, HOW MANY PLACES HAVE YOU LIVED?: 1

## 2023-10-23 SDOH — SOCIAL STABILITY: SOCIAL INSECURITY: ARE THERE ANY APPARENT SIGNS OF INJURIES/BEHAVIORS THAT COULD BE RELATED TO ABUSE/NEGLECT?: NO

## 2023-10-23 SDOH — ECONOMIC STABILITY: INCOME INSECURITY: HOW HARD IS IT FOR YOU TO PAY FOR THE VERY BASICS LIKE FOOD, HOUSING, MEDICAL CARE, AND HEATING?: NOT HARD AT ALL

## 2023-10-23 SDOH — ECONOMIC STABILITY: TRANSPORTATION INSECURITY
IN THE PAST 12 MONTHS, HAS LACK OF TRANSPORTATION KEPT YOU FROM MEETINGS, WORK, OR FROM GETTING THINGS NEEDED FOR DAILY LIVING?: NO

## 2023-10-23 SDOH — SOCIAL STABILITY: SOCIAL INSECURITY: ABUSE: ADULT

## 2023-10-23 SDOH — SOCIAL STABILITY: SOCIAL INSECURITY: HAVE YOU HAD THOUGHTS OF HARMING ANYONE ELSE?: NO

## 2023-10-23 SDOH — SOCIAL STABILITY: SOCIAL INSECURITY: DOES ANYONE TRY TO KEEP YOU FROM HAVING/CONTACTING OTHER FRIENDS OR DOING THINGS OUTSIDE YOUR HOME?: NO

## 2023-10-23 ASSESSMENT — COGNITIVE AND FUNCTIONAL STATUS - GENERAL
HELP NEEDED FOR BATHING: A LITTLE
MOBILITY SCORE: 23
CLIMB 3 TO 5 STEPS WITH RAILING: A LITTLE
PATIENT BASELINE BEDBOUND: NO
DAILY ACTIVITIY SCORE: 23

## 2023-10-23 ASSESSMENT — PATIENT HEALTH QUESTIONNAIRE - PHQ9
SUM OF ALL RESPONSES TO PHQ9 QUESTIONS 1 & 2: 0
1. LITTLE INTEREST OR PLEASURE IN DOING THINGS: NOT AT ALL
2. FEELING DOWN, DEPRESSED OR HOPELESS: NOT AT ALL

## 2023-10-23 ASSESSMENT — ACTIVITIES OF DAILY LIVING (ADL)
ASSISTIVE_DEVICE: CANE;EYEGLASSES;WALKER
HEARING - RIGHT EAR: FUNCTIONAL
DRESSING YOURSELF: INDEPENDENT
JUDGMENT_ADEQUATE_SAFELY_COMPLETE_DAILY_ACTIVITIES: YES
WALKS IN HOME: INDEPENDENT
HEARING - LEFT EAR: FUNCTIONAL
LACK_OF_TRANSPORTATION: NO
PATIENT'S MEMORY ADEQUATE TO SAFELY COMPLETE DAILY ACTIVITIES?: YES
BATHING: NEEDS ASSISTANCE
FEEDING YOURSELF: INDEPENDENT
ADEQUATE_TO_COMPLETE_ADL: YES
TOILETING: INDEPENDENT
GROOMING: INDEPENDENT

## 2023-10-23 ASSESSMENT — PAIN SCALES - GENERAL
PAINLEVEL_OUTOF10: 10 - WORST POSSIBLE PAIN
PAINLEVEL_OUTOF10: 3
PAINLEVEL_OUTOF10: 2
PAINLEVEL_OUTOF10: 8

## 2023-10-23 ASSESSMENT — PAIN - FUNCTIONAL ASSESSMENT
PAIN_FUNCTIONAL_ASSESSMENT: 0-10
PAIN_FUNCTIONAL_ASSESSMENT: 0-10

## 2023-10-23 ASSESSMENT — COLUMBIA-SUICIDE SEVERITY RATING SCALE - C-SSRS
1. IN THE PAST MONTH, HAVE YOU WISHED YOU WERE DEAD OR WISHED YOU COULD GO TO SLEEP AND NOT WAKE UP?: NO
6. HAVE YOU EVER DONE ANYTHING, STARTED TO DO ANYTHING, OR PREPARED TO DO ANYTHING TO END YOUR LIFE?: NO
6. HAVE YOU EVER DONE ANYTHING, STARTED TO DO ANYTHING, OR PREPARED TO DO ANYTHING TO END YOUR LIFE?: NO
2. HAVE YOU ACTUALLY HAD ANY THOUGHTS OF KILLING YOURSELF?: NO
1. IN THE PAST MONTH, HAVE YOU WISHED YOU WERE DEAD OR WISHED YOU COULD GO TO SLEEP AND NOT WAKE UP?: NO
2. HAVE YOU ACTUALLY HAD ANY THOUGHTS OF KILLING YOURSELF?: NO

## 2023-10-23 ASSESSMENT — LIFESTYLE VARIABLES
HOW OFTEN DO YOU HAVE 6 OR MORE DRINKS ON ONE OCCASION: NEVER
HOW MANY STANDARD DRINKS CONTAINING ALCOHOL DO YOU HAVE ON A TYPICAL DAY: PATIENT DOES NOT DRINK
SKIP TO QUESTIONS 9-10: 1
AUDIT-C TOTAL SCORE: 0
HOW OFTEN DO YOU HAVE A DRINK CONTAINING ALCOHOL: NEVER
AUDIT-C TOTAL SCORE: 0

## 2023-10-23 ASSESSMENT — PAIN DESCRIPTION - FREQUENCY: FREQUENCY: CONSTANT/CONTINUOUS

## 2023-10-23 ASSESSMENT — PAIN DESCRIPTION - DESCRIPTORS: DESCRIPTORS: SHARP;SHOOTING

## 2023-10-23 ASSESSMENT — PAIN DESCRIPTION - LOCATION: LOCATION: PENIS

## 2023-10-23 NOTE — ED PROCEDURE NOTE
Procedure  Bladder Catheterization    Performed by: Ajay Payne MD  Authorized by: Ajay Payne MD    Consent:     Consent obtained:  Verbal    Consent given by:  Patient    Risks discussed:  False passage, infection and urethral injury    Alternatives discussed:  No treatment  Universal protocol:     Procedure explained and questions answered to patient or proxy's satisfaction: yes      Immediately prior to procedure, a time out was called: yes      Patient identity confirmed:  Verbally with patient  Pre-procedure details:     Procedure purpose:  Therapeutic    Preparation: Patient was prepped and draped in usual sterile fashion    Anesthesia:     Anesthesia method: urojet.  Procedure details:     Provider performed due to:  Complicated insertion    Catheter insertion:  Indwelling    Catheter type:  Coude    Catheter size:  20 Fr    Bladder irrigation: no      Number of attempts:  1    Urine characteristics:  Bloody  Post-procedure details:     Procedure completion:  Tolerated               Ajay Payne MD  10/23/23 [id number]

## 2023-10-23 NOTE — PROGRESS NOTES
B12 borderline but he had missed at least 1 B12 injection.  Resumed B12 injections and his labs can be followed up next visit.

## 2023-10-23 NOTE — ED PROVIDER NOTES
Department of Emergency Medicine   ED  Provider Note  Admit Date/RoomTime: 10/23/2023 11:59 AM  ED Room: [facility]  Emergency Department  [facility]                History of Present Illness:   Andrez Damon is a 78 y.o. male presenting to the ED for female discomfort and left lower quadrant pain, beginning Saturday after catheter was replaced..  The complaint has been persistent, moderate in severity, and worsened by nothing.  Patient has a history of prostatic hypertrophy and has had some urinary retention and bladder infection problems.  His catheter was replaced on Saturday here in the ED with some difficulty.  He has had penile pain and some left lower quadrant abdominal pain since.  He denies any fever chills.  He denies any nausea vomiting or diarrhea.      Review of Systems:   Pertinent positives and review of systems as noted above.  Remaining 10 review of systems is negative or noncontributory to today's episode of care.  Review of Systems       --------------------------------------------- PAST HISTORY ---------------------------------------------  Past Medical History:  has a past medical history of Body mass index (BMI) 20.0-20.9, adult (09/21/2021), Body mass index (BMI) 21.0-21.9, adult (04/14/2021), Major depressive disorder, recurrent, mild (CMS/HCC) (11/11/2020), Major depressive disorder, recurrent, unspecified (CMS/HCC) (01/13/2021), Other conditions influencing health status, Other specified anxiety disorders (05/19/2020), Personal history of nicotine dependence (07/12/2021), Personal history of other diseases of the circulatory system, Personal history of other diseases of the musculoskeletal system and connective tissue, Personal history of other diseases of urinary system (09/21/2021), Personal history of other endocrine, nutritional and metabolic disease (08/29/2019), Pneumonia, unspecified organism (12/09/2019), and Thoracic aortic aneurysm, without rupture, unspecified  (CMS/[facility]) (05/29/2019).    Past Surgical History:  has a past surgical history that includes CT angio abdomen pelvis w and or wo IV IV contrast (6/6/2019); CT angio head w and wo IV contrast (10/15/2020); Other surgical history (04/17/2019); Hernia repair (04/24/2014); Back surgery (01/24/2014); Total hip arthroplasty (01/24/2014); Coronary angioplasty with stent (01/24/2014); and Other surgical history (01/24/2014).    Social History:  reports that he has been smoking cigarettes. He has a 17.00 pack-year smoking history. He has never used smokeless tobacco. He reports that he does not drink alcohol and does not use drugs.    Family History: family history includes cardiac disorder in his father and mother; malignant neoplasm in his mother. Unless otherwise noted, family history is non contributory    Patient's Medications   New Prescriptions    No medications on file   Previous Medications    AMIODARONE (PACERONE) 200 MG TABLET    Take 1 tablet (200 mg) by mouth once daily.    APIXABAN (ELIQUIS) 2.5 MG TABLET    Take 1 tablet (2.5 mg) by mouth 2 times a day.    ATORVASTATIN (LIPITOR) 80 MG TABLET    TAKE 1 TABLET BY MOUTH ONCE DAILY.    BRILINTA 90 MG TABLET    TAKE 1 TABLET BY MOUTH TWICE A DAY AS DIRECTED    BUMETANIDE (BUMEX) 0.5 MG TABLET    Take 1 tablet (0.5 mg) by mouth once daily.    CEPHALEXIN (KEFLEX) 500 MG CAPSULE    Take 1 capsule (500 mg) by mouth 4 times a day for 10 days.    CHOLECALCIFEROL (VITAMIN D-3) 125 MCG (5000 UT) CAPSULE    Take 1 capsule (125 mcg) by mouth once daily.    ESCITALOPRAM (LEXAPRO) 10 MG TABLET    TAKE 1 TABLET BY MOUTH EVERY DAY    FINASTERIDE (PROSCAR) 5 MG TABLET    Take 1 tablet (5 mg) by mouth once daily.    LEVALBUTEROL (XOPENEX) 45 MCG/ACTUATION INHALER    INHALE 1 TO 2 PUFFS EVERY 4 TO 6 HOURS AS NEEDED AND AS DIRECTED.    METOPROLOL SUCCINATE XL (TOPROL-XL) 25 MG 24 HR TABLET    Take 0.5 tablets (12.5 mg) by mouth once daily. Do not crush or chew.    POTASSIUM CHLORIDE  CR (K-TAB) 20 MEQ ER TABLET    Take 1 tablet (20 mEq) by mouth once daily.    PYRIDOXINE (VITAMIN B-6) 50 MG TABLET    TAKE 1 TABLET BY MOUTH EVERY DAY    ROPINIROLE (REQUIP) 1 MG TABLET    TAKE 1 TABLET (1 MG) BY MOUTH ONCE DAILY AT BEDTIME.    TAMSULOSIN (FLOMAX) 0.4 MG 24 HR CAPSULE    Take 2 capsules (0.8 mg) by mouth once daily at bedtime.    TIOTROPIUM-OLODATEROL (STIOLTO RESPIMAT) 2.5-2.5 MCG/ACTUATION MIST INHALER    Inhale 2 Inhalations once daily.   Modified Medications    No medications on file   Discontinued Medications    No medications on file      The patient’s home medications have been reviewed.    Allergies: Meloxicam and Celecoxib    -------------------------------------------------- RESULTS -------------------------------------------------  All laboratory and radiology results have been personally reviewed by myself   LABS:  Labs Reviewed   COMPREHENSIVE METABOLIC PANEL - Abnormal       Result Value    Glucose 136 (*)     Sodium 137      Potassium 4.1      Chloride 104      Bicarbonate 26      Anion Gap 11      Urea Nitrogen 35 (*)     Creatinine 1.66 (*)     eGFR 42 (*)     Calcium 9.6      Albumin 4.0      Alkaline Phosphatase 66      Total Protein 6.6      AST 25      Bilirubin, Total 0.9      ALT 24     CBC WITH AUTO DIFFERENTIAL - Abnormal    WBC 16.2 (*)     nRBC 0.0      RBC 3.89 (*)     Hemoglobin 11.6 (*)     Hematocrit 37.4 (*)     MCV 96      MCH 29.8      MCHC 31.0 (*)     RDW 16.5 (*)     Platelets 205      MPV 10.3      Neutrophils % 88.3      Immature Granulocytes %, Automated 0.5      Lymphocytes % 3.8      Monocytes % 6.9      Eosinophils % 0.2      Basophils % 0.3      Neutrophils Absolute 14.32 (*)     Immature Granulocytes Absolute, Automated 0.08      Lymphocytes Absolute 0.61 (*)     Monocytes Absolute 1.12 (*)     Eosinophils Absolute 0.04      Basophils Absolute 0.05     URINALYSIS WITH REFLEX MICROSCOPIC AND CULTURE - Abnormal    Color, Urine Yuliet (*)     Appearance,  Urine Hazy (*)     Specific Gravity, Urine 1.018      pH, Urine 6.0      Protein, Urine 100 (2+) (*)     Glucose, Urine NEGATIVE      Blood, Urine LARGE (3+) (*)     Ketones, Urine NEGATIVE      Bilirubin, Urine NEGATIVE      Urobilinogen, Urine <2.0      Nitrite, Urine POSITIVE (*)     Leukocyte Esterase, Urine MODERATE (2+) (*)    MICROSCOPIC ONLY, URINE - Abnormal    WBC, Urine >50 (*)     RBC, Urine >20 (*)     Squamous Epithelial Cells, Urine 1-9 (SPARSE)      Bacteria, Urine 4+ (*)    LACTATE - Normal    Lactate 1.6      Narrative:     Venipuncture immediately after or during the administration of Metamizole may lead to falsely low results. Testing should be performed immediately  prior to Metamizole dosing.   LIPASE - Normal    Lipase 11      Narrative:     Venipuncture immediately after or during the administration of Metamizole may lead to falsely low results. Testing should be performed immediately prior to Metamizole dosing.   MAGNESIUM - Normal    Magnesium 2.09     URINE CULTURE   URINALYSIS WITH REFLEX MICROSCOPIC AND CULTURE    Narrative:     The following orders were created for panel order Urinalysis with Reflex Microscopic and Culture.  Procedure                               Abnormality         Status                     ---------                               -----------         ------                     Urinalysis with Reflex M...[[id number]]  Abnormal            Final result               Extra Urine Gray Tube[[id number]]                            Final result                 Please view results for these tests on the individual orders.   EXTRA URINE GRAY TUBE    Extra Tube 293           RADIOLOGY:  Interpreted by Radiologist.  CT abdomen pelvis wo IV contrast   Final Result   1. Postoperative changes, as above.   2. No hydronephrosis, hydroureter or renal/ureteral calculus.   3. Hypodensities in the kidneys bilaterally, most consistent with   cysts.        MACRO:   None        Signed by: Donato  "Abelardo 10/23/2023 1:09 PM   Dictation workstation:   [id number]            ------------------------- NURSING NOTES AND VITALS REVIEWED ---------------------------   The nursing notes within the ED encounter and vital signs as below have been reviewed.   /66 (BP Location: Right arm, Patient Position: Sitting)   Pulse 78   Temp 36.3 °C (97.4 °F) (Skin)   Resp 18   Ht 1.778 m (5' 10\")   Wt 65.3 kg (144 lb)   SpO2 97%   BMI 20.66 kg/m²   Oxygen Saturation Interpretation: Normal      ---------------------------------------------------PHYSICAL EXAM--------------------------------------  Physical Exam   Constitutional/General: Alert and oriented x3, well appearing, non toxic in NAD  Head: Normocephalic and atraumatic  Eyes: PERRL, EOMI, conjunctiva normal, sclera non icteric  Mouth: Oropharynx clear, handling secretions, no trismus, no asymmetry of the posterior oropharynx or uvular edema  Neck: Supple, full ROM, non tender to palpation in the midline, no stridor, no crepitus, no meningeal signs  Respiratory: Lungs clear to auscultation bilaterally, no wheezes, rales, or rhonchi. Not in respiratory distress  Cardiovascular:  Regular rate. Regular rhythm. No murmurs, gallops, or rubs. 2+ distal pulses  Chest: No chest wall tenderness  GI:  Abdomen Soft mild suprapubic tenderness, Non distended.  +BS. No organomegaly, no palpable masses,  No rebound, guarding, or rigidity.   Musculoskeletal: Moves all extremities x 4. Warm and well perfused, no clubbing, cyanosis, or edema. Capillary refill <3 seconds  Integument: skin warm and dry. No rashes.   Lymphatic: no lymphadenopathy noted  Neurologic: GCS 15, no focal deficits, symmetric strength 5/5 in the upper and lower extremities bilaterally  Psychiatric: Normal Affect  Genitourinary: Testicles are nontender, circumcised male catheter in place no bleeding around the catheter.  No obvious injury or trauma.  No " hematuria.  Procedures    ------------------------------ ED COURSE/MEDICAL DECISION MAKING----------------------    Medical Decision Making:   Patient is septic and will require admission for further care and treatment.  Diagnoses as of 10/25/23 1922   Sepsis without acute organ dysfunction, due to unspecified organism (CMS/[facility])      Counseling:   The emergency provider has spoken with the patient and discussed today’s results, in addition to providing specific details for the plan of care and counseling regarding the diagnosis and prognosis.  Questions are answered at this time and they are agreeable with the plan.      --------------------------------- IMPRESSION AND DISPOSITION ---------------------------------        IMPRESSION  1. Sepsis without acute organ dysfunction, due to unspecified organism (CMS/[facility])        DISPOSITION  Disposition: Admit to telemetry  Patient condition is fair      Billing Provider Critical Care Time: 0 minutes     Ajay Payne MD  10/25/23 1923

## 2023-10-23 NOTE — ED TRIAGE NOTES
"Pt ambulatory to ED c/o pain in penis 10/10 since last night.  Pt states he had a caruso catheter replaced here 9 days ago, and states that it was a difficult insertion.  Pt further states that his current pain began last night. Pt states his catheter is draining properly but that last night his urine looked \"dark.\"  "

## 2023-10-24 DIAGNOSIS — I50.22 CHRONIC SYSTOLIC CONGESTIVE HEART FAILURE (MULTI): ICD-10-CM

## 2023-10-24 DIAGNOSIS — I51.9 CARDIOPATHY: Primary | ICD-10-CM

## 2023-10-24 DIAGNOSIS — Z95.810 CARDIAC DEFIBRILLATOR IN PLACE: ICD-10-CM

## 2023-10-24 LAB
ALBUMIN SERPL BCP-MCNC: 3 G/DL (ref 3.4–5)
ANION GAP SERPL CALC-SCNC: 7 MMOL/L (ref 10–20)
BUN SERPL-MCNC: 30 MG/DL (ref 6–23)
CALCIUM SERPL-MCNC: 8.4 MG/DL (ref 8.6–10.3)
CHLORIDE SERPL-SCNC: 109 MMOL/L (ref 98–107)
CO2 SERPL-SCNC: 26 MMOL/L (ref 21–32)
CREAT SERPL-MCNC: 1.58 MG/DL (ref 0.5–1.3)
ERYTHROCYTE [DISTWIDTH] IN BLOOD BY AUTOMATED COUNT: 16.7 % (ref 11.5–14.5)
GFR SERPL CREATININE-BSD FRML MDRD: 44 ML/MIN/1.73M*2
GLUCOSE SERPL-MCNC: 104 MG/DL (ref 74–99)
HCT VFR BLD AUTO: 29 % (ref 41–52)
HGB BLD-MCNC: 9.1 G/DL (ref 13.5–17.5)
MAGNESIUM SERPL-MCNC: 2.08 MG/DL (ref 1.6–2.4)
MCH RBC QN AUTO: 30.1 PG (ref 26–34)
MCHC RBC AUTO-ENTMCNC: 31.4 G/DL (ref 32–36)
MCV RBC AUTO: 96 FL (ref 80–100)
NRBC BLD-RTO: 0 /100 WBCS (ref 0–0)
PHOSPHATE SERPL-MCNC: 3.2 MG/DL (ref 2.5–4.9)
PLATELET # BLD AUTO: 156 X10*3/UL (ref 150–450)
PMV BLD AUTO: 10.2 FL (ref 7.5–11.5)
POTASSIUM SERPL-SCNC: 4.1 MMOL/L (ref 3.5–5.3)
RBC # BLD AUTO: 3.02 X10*6/UL (ref 4.5–5.9)
SODIUM SERPL-SCNC: 138 MMOL/L (ref 136–145)
WBC # BLD AUTO: 9.8 X10*3/UL (ref 4.4–11.3)

## 2023-10-24 PROCEDURE — 2500000002 HC RX 250 W HCPCS SELF ADMINISTERED DRUGS (ALT 637 FOR MEDICARE OP, ALT 636 FOR OP/ED): Performed by: STUDENT IN AN ORGANIZED HEALTH CARE EDUCATION/TRAINING PROGRAM

## 2023-10-24 PROCEDURE — 96376 TX/PRO/DX INJ SAME DRUG ADON: CPT

## 2023-10-24 PROCEDURE — G0378 HOSPITAL OBSERVATION PER HR: HCPCS

## 2023-10-24 PROCEDURE — 83735 ASSAY OF MAGNESIUM: CPT | Performed by: STUDENT IN AN ORGANIZED HEALTH CARE EDUCATION/TRAINING PROGRAM

## 2023-10-24 PROCEDURE — 2500000004 HC RX 250 GENERAL PHARMACY W/ HCPCS (ALT 636 FOR OP/ED): Performed by: STUDENT IN AN ORGANIZED HEALTH CARE EDUCATION/TRAINING PROGRAM

## 2023-10-24 PROCEDURE — 94640 AIRWAY INHALATION TREATMENT: CPT

## 2023-10-24 PROCEDURE — 2500000001 HC RX 250 WO HCPCS SELF ADMINISTERED DRUGS (ALT 637 FOR MEDICARE OP): Performed by: STUDENT IN AN ORGANIZED HEALTH CARE EDUCATION/TRAINING PROGRAM

## 2023-10-24 PROCEDURE — 36415 COLL VENOUS BLD VENIPUNCTURE: CPT | Performed by: STUDENT IN AN ORGANIZED HEALTH CARE EDUCATION/TRAINING PROGRAM

## 2023-10-24 PROCEDURE — 80069 RENAL FUNCTION PANEL: CPT | Performed by: STUDENT IN AN ORGANIZED HEALTH CARE EDUCATION/TRAINING PROGRAM

## 2023-10-24 PROCEDURE — 99222 1ST HOSP IP/OBS MODERATE 55: CPT | Performed by: INTERNAL MEDICINE

## 2023-10-24 PROCEDURE — 94664 DEMO&/EVAL PT USE INHALER: CPT

## 2023-10-24 PROCEDURE — 85027 COMPLETE CBC AUTOMATED: CPT | Performed by: STUDENT IN AN ORGANIZED HEALTH CARE EDUCATION/TRAINING PROGRAM

## 2023-10-24 RX ADMIN — APIXABAN 2.5 MG: 2.5 TABLET, FILM COATED ORAL at 20:11

## 2023-10-24 RX ADMIN — CEFTRIAXONE 1 G: 1 INJECTION, SOLUTION INTRAVENOUS at 14:45

## 2023-10-24 RX ADMIN — HYDROMORPHONE HYDROCHLORIDE 0.4 MG: 1 INJECTION, SOLUTION INTRAMUSCULAR; INTRAVENOUS; SUBCUTANEOUS at 18:17

## 2023-10-24 RX ADMIN — CEFTRIAXONE 1 G: 1 INJECTION, SOLUTION INTRAVENOUS at 02:55

## 2023-10-24 RX ADMIN — ATORVASTATIN CALCIUM 80 MG: 40 TABLET, FILM COATED ORAL at 20:12

## 2023-10-24 RX ADMIN — HYDROMORPHONE HYDROCHLORIDE 0.4 MG: 1 INJECTION, SOLUTION INTRAMUSCULAR; INTRAVENOUS; SUBCUTANEOUS at 05:25

## 2023-10-24 RX ADMIN — TICAGRELOR 90 MG: 90 TABLET ORAL at 20:11

## 2023-10-24 RX ADMIN — HYDROMORPHONE HYDROCHLORIDE 0.4 MG: 1 INJECTION, SOLUTION INTRAMUSCULAR; INTRAVENOUS; SUBCUTANEOUS at 09:00

## 2023-10-24 RX ADMIN — FORMOTEROL FUMARATE DIHYDRATE 20 MCG: 20 SOLUTION RESPIRATORY (INHALATION) at 09:31

## 2023-10-24 RX ADMIN — METOPROLOL SUCCINATE 12.5 MG: 25 TABLET, EXTENDED RELEASE ORAL at 09:01

## 2023-10-24 RX ADMIN — FINASTERIDE 5 MG: 5 TABLET, FILM COATED ORAL at 09:02

## 2023-10-24 RX ADMIN — APIXABAN 2.5 MG: 2.5 TABLET, FILM COATED ORAL at 09:03

## 2023-10-24 RX ADMIN — TICAGRELOR 90 MG: 90 TABLET ORAL at 09:02

## 2023-10-24 RX ADMIN — FORMOTEROL FUMARATE DIHYDRATE 20 MCG: 20 SOLUTION RESPIRATORY (INHALATION) at 20:38

## 2023-10-24 RX ADMIN — ROPINIROLE HYDROCHLORIDE 1 MG: 1 TABLET, FILM COATED ORAL at 20:11

## 2023-10-24 RX ADMIN — POLYETHYLENE GLYCOL 3350 17 G: 17 POWDER, FOR SOLUTION ORAL at 09:02

## 2023-10-24 RX ADMIN — Medication 5000 UNITS: at 09:02

## 2023-10-24 RX ADMIN — ESCITALOPRAM OXALATE 10 MG: 10 TABLET, FILM COATED ORAL at 09:03

## 2023-10-24 RX ADMIN — PYRIDOXINE HCL TAB 50 MG 50 MG: 50 TAB at 11:56

## 2023-10-24 RX ADMIN — AMIODARONE HYDROCHLORIDE 200 MG: 200 TABLET ORAL at 09:02

## 2023-10-24 RX ADMIN — HYDROMORPHONE HYDROCHLORIDE 0.4 MG: 1 INJECTION, SOLUTION INTRAMUSCULAR; INTRAVENOUS; SUBCUTANEOUS at 01:25

## 2023-10-24 RX ADMIN — TIOTROPIUM BROMIDE INHALATION SPRAY 2 PUFF: 3.12 SPRAY, METERED RESPIRATORY (INHALATION) at 09:32

## 2023-10-24 RX ADMIN — TAMSULOSIN HYDROCHLORIDE 0.8 MG: 0.4 CAPSULE ORAL at 20:12

## 2023-10-24 ASSESSMENT — PAIN SCALES - GENERAL
PAINLEVEL_OUTOF10: 3
PAINLEVEL_OUTOF10: 5 - MODERATE PAIN
PAINLEVEL_OUTOF10: 3
PAINLEVEL_OUTOF10: 10 - WORST POSSIBLE PAIN
PAINLEVEL_OUTOF10: 5 - MODERATE PAIN
PAINLEVEL_OUTOF10: 8
PAINLEVEL_OUTOF10: 0 - NO PAIN

## 2023-10-24 ASSESSMENT — COGNITIVE AND FUNCTIONAL STATUS - GENERAL
HELP NEEDED FOR BATHING: A LITTLE
CLIMB 3 TO 5 STEPS WITH RAILING: A LITTLE
DAILY ACTIVITIY SCORE: 23
MOBILITY SCORE: 23

## 2023-10-24 ASSESSMENT — PAIN - FUNCTIONAL ASSESSMENT
PAIN_FUNCTIONAL_ASSESSMENT: 0-10

## 2023-10-24 ASSESSMENT — PAIN DESCRIPTION - DESCRIPTORS
DESCRIPTORS: SHARP

## 2023-10-24 NOTE — CARE PLAN
The patient's goals for the shift include      The clinical goals for the shift include to have improved pain      Problem: Fall/Injury  Goal: Not fall by end of shift  Outcome: Progressing  Goal: Be free from injury by end of the shift  Outcome: Progressing  Goal: Verbalize understanding of personal risk factors for fall in the hospital  Outcome: Progressing  Goal: Verbalize understanding of risk factor reduction measures to prevent injury from fall in the home  Outcome: Progressing  Goal: Use assistive devices by end of the shift  Outcome: Progressing  Goal: Pace activities to prevent fatigue by end of the shift  Outcome: Progressing     Problem: Diabetes  Goal: Achieve decreasing blood glucose levels by end of shift  Outcome: Progressing  Goal: Increase stability of blood glucose readings by end of shift  Outcome: Progressing  Goal: Decrease in ketones present in urine by end of shift  Outcome: Progressing  Goal: Maintain electrolyte levels within acceptable range throughout shift  Outcome: Progressing  Goal: Maintain glucose levels >70mg/dl to <250mg/dl throughout shift  Outcome: Progressing  Goal: No changes in neurological exam by end of shift  Outcome: Progressing  Goal: Learn about and adhere to nutrition recommendations by end of shift  Outcome: Progressing  Goal: Vital signs within normal range for age by end of shift  Outcome: Progressing  Goal: Increase self care and/or family involovement by end of shift  Outcome: Progressing  Goal: Receive DSME education by end of shift  Outcome: Progressing

## 2023-10-24 NOTE — NURSING NOTE
Patient medicated overnight with IV dilaudid for c/o pain in his penis and rectum. He states he believes he has a anal fissure due to recent severe constipation. Pain severe 10/10 at times with a sudden onset, then improves. He was placed on 2L NC this am, sats dropping after dilaudid admin. He states he normally wears 2L NC at HS at home. Tolerating IV antibiotics without issue.

## 2023-10-24 NOTE — H&P
History of Present Illness  Andrez Damon is a 78 y.o. male  with PMHx significant as below who presented to  10/23/23  due to penile pain along with david colored urine in the caruso bag that appeared to be blood. His caruso had been changed about 10 days ago as a routine change. He had been having some intermittent pain since then. On Sunday, that pain in his penis became significantly worse and also was hurting in his rectal area. Expressed that he has some abdominal pain as well that is low in his abdomen. Admits to having chills but denies any fever. He has an upcoming urology appointment on Friday as this caruso catheter has been chronic since September of 2023 2/2 the inability to empty his bladder. Also has an appointment for iron infusion on 10/26/23.     12 Point ROS negative unless noted in above HPI    ED Course:  Lab work-up:  -urinalysis  +nitrite, + leuks, +blood, +protein, 4+ bacteria  -WBC 16.2>9.8  Imaging:  -CT abdomen    Intervention:  -ABX therapy of vancomycin dosing 1 GM, Rocephin initiated.   -IVF initiated  -caruso catheter replaced with coude catheter     Past Medical History  Past Medical History:   Diagnosis Date    Arthritis     Body mass index (BMI) 20.0-20.9, adult 09/21/2021    Body mass index (BMI) of 20.0 to 20.9 in adult    Body mass index (BMI) 21.0-21.9, adult 04/14/2021    Body mass index (BMI) of 21.0 to 21.9 in adult    CHF (congestive heart failure) (CMS/Formerly [facility])     COPD (chronic obstructive pulmonary disease) (CMS/[facility]ly [facility])     Coronary artery disease     Diabetes mellitus (CMS/[facility]ly [facility])     Hypertension     Major depressive disorder, recurrent, mild (CMS/[facility]) 11/11/2020    Mild episode of recurrent major depressive disorder    Major depressive disorder, recurrent, unspecified (CMS/[facility]ly [facility]) 01/13/2021    Recurrent major depressive disorder, remission status unspecified    Other conditions influencing health status     Swelling Of Hands    Other specified anxiety disorders 05/19/2020     Depression with anxiety    Oxygen desaturation during sleep     wears 2L at HS    Personal history of nicotine dependence 07/12/2021    History of nicotine dependence    Personal history of other diseases of the circulatory system     History of cardiac disorder    Personal history of other diseases of the musculoskeletal system and connective tissue     History of arthritis    Personal history of other diseases of urinary system 09/21/2021    History of chronic kidney disease    Personal history of other endocrine, nutritional and metabolic disease 08/29/2019    History of hyperglycemia    Pneumonia, unspecified organism 12/09/2019    Atypical pneumonia    Thoracic aortic aneurysm, without rupture, unspecified (CMS/[facility]) 05/29/2019    Thoracic aortic aneurysm without rupture       Surgical History  Past Surgical History:   Procedure Laterality Date    BACK SURGERY  01/24/2014    Back Surgery    CORONARY ANGIOPLASTY WITH STENT PLACEMENT  01/24/2014    Cath Stent Placement    CT ABDOMEN PELVIS ANGIOGRAM W AND/OR WO IV CONTRAST  6/6/2019    CT ABDOMEN PELVIS ANGIOGRAM W AND/OR WO IV CONTRAST 6/6/2019 [facility]    CT HEAD ANGIO W AND WO IV CONTRAST  10/15/2020    CT HEAD ANGIO W AND WO IV CONTRAST 10/15/2020 [facility]    HERNIA REPAIR  04/24/2014    Hernia Repair    OTHER SURGICAL HISTORY  04/17/2019    Abdominal surgery    OTHER SURGICAL HISTORY  01/24/2014    Defibrillation    TOTAL HIP ARTHROPLASTY  01/24/2014    Hip Replacement        Social History  He reports that he has been smoking cigarettes. He has a 17.00 pack-year smoking history. He has never used smokeless tobacco. He reports that he does not drink alcohol and does not use drugs.    Allergies  Meloxicam and Celecoxib     Physical Exam  Constitutional:       Appearance: Normal appearance.   HENT:      Head: Normocephalic.      Nose: Nose normal.      Mouth/Throat:      Mouth: Mucous membranes are moist.   Eyes:      Extraocular  "Movements: Extraocular movements intact.      Pupils: Pupils are equal, round, and reactive to light.   Cardiovascular:      Rate and Rhythm: Normal rate and regular rhythm.   Pulmonary:      Effort: Pulmonary effort is normal.      Comments: Diminished bilateral bases right greater than left.   Abdominal:      General: Bowel sounds are normal.      Palpations: Abdomen is soft.   Genitourinary:     Penis: Normal.    Musculoskeletal:         General: Normal range of motion.      Cervical back: Normal range of motion and neck supple.   Skin:     General: Skin is warm and dry.      Coloration: Skin is pale.   Neurological:      General: No focal deficit present.      Mental Status: He is alert and oriented to person, place, and time.   Psychiatric:         Mood and Affect: Mood normal.         Behavior: Behavior normal.          Last Recorded Vitals  Blood pressure (!) 92/47, pulse 61, temperature 36.6 °C (97.8 °F), temperature source Temporal, resp. rate 18, height 1.778 m (5' 10\"), weight 62 kg (136 lb 11 oz), SpO2 96 %.    Relevant Results  Scheduled medications  amiodarone, 200 mg, oral, Daily  apixaban, 2.5 mg, oral, BID  atorvastatin, 80 mg, oral, Daily  bumetanide, 0.5 mg, oral, Daily  cefTRIAXone, 1 g, intravenous, q12h  cholecalciferol, 5,000 Units, oral, Daily  escitalopram, 10 mg, oral, Daily  finasteride, 5 mg, oral, Daily  tiotropium, 2 Inhalation, inhalation, Daily   And  formoterol, 20 mcg, nebulization, q12h  melatonin, 3 mg, oral, Daily  metoprolol succinate XL, 12.5 mg, oral, Daily  polyethylene glycol, 17 g, oral, Daily  pyridoxine, 50 mg, oral, Daily  rOPINIRole, 1 mg, oral, Nightly  tamsulosin, 0.8 mg, oral, Nightly  ticagrelor, 90 mg, oral, BID      Continuous medications  sodium chloride 0.9%, 75 mL/hr, Last Rate: 75 mL/hr (10/24/23 1004)      PRN medications  PRN medications: acetaminophen **OR** acetaminophen **OR** acetaminophen, HYDROmorphone, ondansetron ODT **OR** ondansetron, oxygen "     Results for orders placed or performed during the hospital encounter of 10/23/23 (from the past 24 hour(s))   Lactate   Result Value Ref Range    Lactate 1.6 0.4 - 2.0 mmol/L   Lipase   Result Value Ref Range    Lipase 11 9 - 82 U/L   Magnesium   Result Value Ref Range    Magnesium 2.09 1.60 - 2.40 mg/dL   Comprehensive Metabolic Panel   Result Value Ref Range    Glucose 136 (H) 74 - 99 mg/dL    Sodium 137 136 - 145 mmol/L    Potassium 4.1 3.5 - 5.3 mmol/L    Chloride 104 98 - 107 mmol/L    Bicarbonate 26 21 - 32 mmol/L    Anion Gap 11 10 - 20 mmol/L    Urea Nitrogen 35 (H) 6 - 23 mg/dL    Creatinine 1.66 (H) 0.50 - 1.30 mg/dL    eGFR 42 (L) >60 mL/min/1.73m*2    Calcium 9.6 8.6 - 10.3 mg/dL    Albumin 4.0 3.4 - 5.0 g/dL    Alkaline Phosphatase 66 33 - 136 U/L    Total Protein 6.6 6.4 - 8.2 g/dL    AST 25 9 - 39 U/L    Bilirubin, Total 0.9 0.0 - 1.2 mg/dL    ALT 24 10 - 52 U/L   CBC and Auto Differential   Result Value Ref Range    WBC 16.2 (H) 4.4 - 11.3 x10*3/uL    nRBC 0.0 0.0 - 0.0 /100 WBCs    RBC 3.89 (L) 4.50 - 5.90 x10*6/uL    Hemoglobin 11.6 (L) 13.5 - 17.5 g/dL    Hematocrit 37.4 (L) 41.0 - 52.0 %    MCV 96 80 - 100 fL    MCH 29.8 26.0 - 34.0 pg    MCHC 31.0 (L) 32.0 - 36.0 g/dL    RDW 16.5 (H) 11.5 - 14.5 %    Platelets 205 150 - 450 x10*3/uL    MPV 10.3 7.5 - 11.5 fL    Neutrophils % 88.3 40.0 - 80.0 %    Immature Granulocytes %, Automated 0.5 0.0 - 0.9 %    Lymphocytes % 3.8 13.0 - 44.0 %    Monocytes % 6.9 2.0 - 10.0 %    Eosinophils % 0.2 0.0 - 6.0 %    Basophils % 0.3 0.0 - 2.0 %    Neutrophils Absolute 14.32 (H) 1.60 - 5.50 x10*3/uL    Immature Granulocytes Absolute, Automated 0.08 0.00 - 0.50 x10*3/uL    Lymphocytes Absolute 0.61 (L) 0.80 - 3.00 x10*3/uL    Monocytes Absolute 1.12 (H) 0.05 - 0.80 x10*3/uL    Eosinophils Absolute 0.04 0.00 - 0.40 x10*3/uL    Basophils Absolute 0.05 0.00 - 0.10 x10*3/uL   Light Blue Top   Result Value Ref Range    Extra Tube Hold for add-ons.    Red Top   Result  Value Ref Range    Extra Tube Hold for add-ons.    Urinalysis with Reflex Microscopic and Culture   Result Value Ref Range    Color, Urine Yuliet (N) Straw, Yellow    Appearance, Urine Hazy (N) Clear    Specific Gravity, Urine 1.018 1.005 - 1.035    pH, Urine 6.0 5.0, 5.5, 6.0, 6.5, 7.0, 7.5, 8.0    Protein, Urine 100 (2+) (N) NEGATIVE mg/dL    Glucose, Urine NEGATIVE NEGATIVE mg/dL    Blood, Urine LARGE (3+) (A) NEGATIVE    Ketones, Urine NEGATIVE NEGATIVE mg/dL    Bilirubin, Urine NEGATIVE NEGATIVE    Urobilinogen, Urine <2.0 <2.0 mg/dL    Nitrite, Urine POSITIVE (A) NEGATIVE    Leukocyte Esterase, Urine MODERATE (2+) (A) NEGATIVE   Microscopic Only, Urine   Result Value Ref Range    WBC, Urine >50 (A) 1-5, NONE /HPF    RBC, Urine >20 (A) NONE, 1-2, 3-5 /HPF    Squamous Epithelial Cells, Urine 1-9 (SPARSE) Reference range not established. /HPF    Bacteria, Urine 4+ (A) NONE SEEN /HPF   Extra Urine Gray Tube   Result Value Ref Range    Extra Tube 293    POCT GLUCOSE   Result Value Ref Range    POCT Glucose 121 (H) 74 - 99 mg/dL   CBC   Result Value Ref Range    WBC 9.8 4.4 - 11.3 x10*3/uL    nRBC 0.0 0.0 - 0.0 /100 WBCs    RBC 3.02 (L) 4.50 - 5.90 x10*6/uL    Hemoglobin 9.1 (L) 13.5 - 17.5 g/dL    Hematocrit 29.0 (L) 41.0 - 52.0 %    MCV 96 80 - 100 fL    MCH 30.1 26.0 - 34.0 pg    MCHC 31.4 (L) 32.0 - 36.0 g/dL    RDW 16.7 (H) 11.5 - 14.5 %    Platelets 156 150 - 450 x10*3/uL    MPV 10.2 7.5 - 11.5 fL   Renal Function Panel   Result Value Ref Range    Glucose 104 (H) 74 - 99 mg/dL    Sodium 138 136 - 145 mmol/L    Potassium 4.1 3.5 - 5.3 mmol/L    Chloride 109 (H) 98 - 107 mmol/L    Bicarbonate 26 21 - 32 mmol/L    Anion Gap 7 (L) 10 - 20 mmol/L    Urea Nitrogen 30 (H) 6 - 23 mg/dL    Creatinine 1.58 (H) 0.50 - 1.30 mg/dL    eGFR 44 (L) >60 mL/min/1.73m*2    Calcium 8.4 (L) 8.6 - 10.3 mg/dL    Phosphorus 3.2 2.5 - 4.9 mg/dL    Albumin 3.0 (L) 3.4 - 5.0 g/dL   Magnesium   Result Value Ref Range    Magnesium 2.08  1.60 - 2.40 mg/dL        Imaging  CT abdomen pelvis wo IV contrast    Result Date: 10/23/2023  Interpreted By:  Donato Zhou, STUDY: CT ABDOMEN PELVIS WO IV CONTRAST; 10/23/2023 12:59 pm   INDICATION: Signs/Symptoms:lower abdominal pain r/o ureteral obstruction.   COMPARISON: CT of the abdomen and pelvis dated 08/10/2021   ACCESSION NUMBER(S): [id number]   ORDERING CLINICIAN: IRMA HECTOR   TECHNIQUE: Contiguous axial images were obtained at 3mm slice thickness through the abdomen and pelvis without intravenous contrast administration. Coronal and sagittal reconstructions at 3 mm slice thickness were performed.   FINDINGS: The study is somewhat limited by the lack of intravenous contrast.   LOWER CHEST: Evaluation of the visualized lung bases demonstrate trace right-sided pleural fluid, significantly decreased from the prior study. The heart is within normal limits for size.   ABDOMEN:   LIVER: There are small rounded hypodensities identified within the posterior right lobe of the liver, measuring at up to 1.7 cm in diameter. These are incompletely evaluated without contrast.   BILE DUCTS: No definite intra or extrahepatic biliary dilatation is identified.   GALLBLADDER: The gallbladder is nondilated. No definite calcified gallstones are seen.   PANCREAS: The pancreas is within normal limits for appearance, without evidence of focal masses.   SPLEEN: The spleen is within normal limits for size. No focal splenic mass is seen.   ADRENAL GLANDS: No definite adrenal nodules or masses are seen bilaterally.   KIDNEYS AND URETERS: There is no hydronephrosis, hydroureter or renal/ ureteral calculus identified bilaterally. Rounded hypodensities are seen from the kidneys bilaterally, greater on the right than on the left, similar to the prior study, most consistent with cysts. No definite new or enlarging renal mass is seen, though evaluation is severely limited by the lack of intravenous contrast..   PELVIS:  Evaluation through the lower pelvis is limited by streak artifact from the patient's hip arthroplasty hardware.   BLADDER: The bladder is decompressed, with a caruso catheter is situ.   REPRODUCTIVE ORGANS: The prostate cannot be evaluated.   BOWEL: The colon and small bowel are within normal limits for course, caliber and appearance, without evidence of wall thickening or obstruction. The appendix is not clearly visualized. No CT evidence of acute diverticulitis or appendicitis is seen.   VESSELS: Moderate to severe atherosclerotic calcifications are seen throughout the infrarenal abdominal aorta and iliac arteries. The abdominal aorta is within normal limits for course, caliber and appearance, without evidence of aneurysm.   PERITONEUM/RETROPERITONEUM/LYMPH NODES: There is no free intraperitoneal air or free fluid identified. No gross mesenteric or retroperitoneal lymphadenopathy is identified.   BONE AND SOFT TISSUE: The patient is status post bilateral total hip arthroplasty. Postoperative changes are seen in the mid to lower lumbar spine. There is no evidence of acute fracture identified. No evidence of abdominal wall mass or hernia is identified.       1. Postoperative changes, as above. 2. No hydronephrosis, hydroureter or renal/ureteral calculus. 3. Hypodensities in the kidneys bilaterally, most consistent with cysts.   MACRO: None   Signed by: Donato Zhou 10/23/2023 1:09 PM Dictation workstation:   [id number]       Assessment/Plan    #Sepsis 2/2 UTI    ~BPH with lower urinary tract symptoms  -urinalysis  +nitrite, + leuks, +blood, +protein, 4+ bacteria  -WBC 16.2>9.8  -ABX therapy of vancomycin dosing 1 GM, Rocephin initiated.   -IVF initiated  -caruso catheter replaced with coude catheter  -transition to oral antibiotics as appropriate before discharge.     ~will have third dosing of rocephin approx 1530 today.     ~monitor for urine culture  ~planned urology consult appointment  10/27/23  #ELIZABETH  ~creatinine 1.81>1.66  ~improved  ~continue IVF to optimize renal function  ~monitor for symptoms of fluid overload 2/2 CHF and on chronic diuretics.   #Chronic respiratory failure with hypoxia   ~2/2 CHF and COPD   ~wears oxygen chronic at HS and intermittent during the day at baseline.    ~continues to smoke cigarettes of 5-6 daily   ~continue oxygen as needed and at HS.    ~monitor for symptoms of fluid overload 2/2 CHF and on chronic diuretics.    ~continue home dosing of inhalers.   NON-HOSPITAL ISSUES  #Arhtritis  #CAD  #HTN  #DM  #Depression/anxiety    F: Replenish PRN  E: replenish PRN  N: Regular diet  ABX: Rocephin  GI: NA  DVT: Apixaban    Disposition: Admitted to medical floor for sepsis UTI with ELIZABETH that is resolving. Plan to transition to oral antibiotics and anticipated DC 10/25/23.        KENDRICK Sotelo  Internal Medicine         Patient was seen in collaboration with the MIRANDA, Kristie MARKS.  I was present for the pertinent components of the history, physical, and medical decision making and independently examined the patient.  We reviewed the medication reconciliation list and ancillary studies, including lab, imaging, and microbiology, as well as discussed the differential diagnoses; which includes, but is not limited to Principal Problem:    Sepsis (CMS/HCC)  Active Problems:    Sepsis without acute organ dysfunction (CMS/HCC)    At risk of catheter-associated urinary tract infection  .      I agree with her plan as documented in the electronic medical record.    Shyam Peck MD

## 2023-10-24 NOTE — CARE PLAN
The patient's goals for the shift include      The clinical goals for the shift include Pt will report decrease pain through 1900    Over the shift, the patient did report a decrease in pain. Patient had an uneventful shift, tolerated IV ATB. Call light in reach.

## 2023-10-25 LAB
ALBUMIN SERPL BCP-MCNC: 2.9 G/DL (ref 3.4–5)
ANION GAP SERPL CALC-SCNC: <7 MMOL/L (ref 10–20)
BUN SERPL-MCNC: 32 MG/DL (ref 6–23)
CALCIUM SERPL-MCNC: 8.6 MG/DL (ref 8.6–10.3)
CHLORIDE SERPL-SCNC: 111 MMOL/L (ref 98–107)
CO2 SERPL-SCNC: 26 MMOL/L (ref 21–32)
CREAT SERPL-MCNC: 1.49 MG/DL (ref 0.5–1.3)
ERYTHROCYTE [DISTWIDTH] IN BLOOD BY AUTOMATED COUNT: 16.3 % (ref 11.5–14.5)
GFR SERPL CREATININE-BSD FRML MDRD: 48 ML/MIN/1.73M*2
GLUCOSE SERPL-MCNC: 108 MG/DL (ref 74–99)
HCT VFR BLD AUTO: 29.3 % (ref 41–52)
HGB BLD-MCNC: 9.2 G/DL (ref 13.5–17.5)
MAGNESIUM SERPL-MCNC: 2.14 MG/DL (ref 1.6–2.4)
MCH RBC QN AUTO: 30.2 PG (ref 26–34)
MCHC RBC AUTO-ENTMCNC: 31.4 G/DL (ref 32–36)
MCV RBC AUTO: 96 FL (ref 80–100)
NRBC BLD-RTO: 0 /100 WBCS (ref 0–0)
PHOSPHATE SERPL-MCNC: 3.4 MG/DL (ref 2.5–4.9)
PLATELET # BLD AUTO: 160 X10*3/UL (ref 150–450)
PMV BLD AUTO: 10.5 FL (ref 7.5–11.5)
POTASSIUM SERPL-SCNC: 4.1 MMOL/L (ref 3.5–5.3)
RBC # BLD AUTO: 3.05 X10*6/UL (ref 4.5–5.9)
SODIUM SERPL-SCNC: 139 MMOL/L (ref 136–145)
WBC # BLD AUTO: 6.5 X10*3/UL (ref 4.4–11.3)

## 2023-10-25 PROCEDURE — 2500000001 HC RX 250 WO HCPCS SELF ADMINISTERED DRUGS (ALT 637 FOR MEDICARE OP): Performed by: INTERNAL MEDICINE

## 2023-10-25 PROCEDURE — 83735 ASSAY OF MAGNESIUM: CPT | Performed by: STUDENT IN AN ORGANIZED HEALTH CARE EDUCATION/TRAINING PROGRAM

## 2023-10-25 PROCEDURE — 85027 COMPLETE CBC AUTOMATED: CPT | Performed by: STUDENT IN AN ORGANIZED HEALTH CARE EDUCATION/TRAINING PROGRAM

## 2023-10-25 PROCEDURE — 94640 AIRWAY INHALATION TREATMENT: CPT

## 2023-10-25 PROCEDURE — 96376 TX/PRO/DX INJ SAME DRUG ADON: CPT

## 2023-10-25 PROCEDURE — 2500000001 HC RX 250 WO HCPCS SELF ADMINISTERED DRUGS (ALT 637 FOR MEDICARE OP): Performed by: STUDENT IN AN ORGANIZED HEALTH CARE EDUCATION/TRAINING PROGRAM

## 2023-10-25 PROCEDURE — 99232 SBSQ HOSP IP/OBS MODERATE 35: CPT | Performed by: INTERNAL MEDICINE

## 2023-10-25 PROCEDURE — 2500000002 HC RX 250 W HCPCS SELF ADMINISTERED DRUGS (ALT 637 FOR MEDICARE OP, ALT 636 FOR OP/ED): Mod: MUE | Performed by: STUDENT IN AN ORGANIZED HEALTH CARE EDUCATION/TRAINING PROGRAM

## 2023-10-25 PROCEDURE — 2500000004 HC RX 250 GENERAL PHARMACY W/ HCPCS (ALT 636 FOR OP/ED): Performed by: STUDENT IN AN ORGANIZED HEALTH CARE EDUCATION/TRAINING PROGRAM

## 2023-10-25 PROCEDURE — 36415 COLL VENOUS BLD VENIPUNCTURE: CPT | Performed by: STUDENT IN AN ORGANIZED HEALTH CARE EDUCATION/TRAINING PROGRAM

## 2023-10-25 PROCEDURE — 80069 RENAL FUNCTION PANEL: CPT | Performed by: STUDENT IN AN ORGANIZED HEALTH CARE EDUCATION/TRAINING PROGRAM

## 2023-10-25 PROCEDURE — G0378 HOSPITAL OBSERVATION PER HR: HCPCS

## 2023-10-25 PROCEDURE — 94760 N-INVAS EAR/PLS OXIMETRY 1: CPT

## 2023-10-25 RX ORDER — ATORVASTATIN CALCIUM 40 MG/1
80 TABLET, FILM COATED ORAL NIGHTLY
Status: DISCONTINUED | OUTPATIENT
Start: 2023-10-25 | End: 2023-10-26 | Stop reason: HOSPADM

## 2023-10-25 RX ORDER — FORMOTEROL FUMARATE DIHYDRATE 20 UG/2ML
20 SOLUTION RESPIRATORY (INHALATION)
Status: DISCONTINUED | OUTPATIENT
Start: 2023-10-26 | End: 2023-10-26 | Stop reason: HOSPADM

## 2023-10-25 RX ADMIN — FINASTERIDE 5 MG: 5 TABLET, FILM COATED ORAL at 10:01

## 2023-10-25 RX ADMIN — HYDROMORPHONE HYDROCHLORIDE 0.4 MG: 1 INJECTION, SOLUTION INTRAMUSCULAR; INTRAVENOUS; SUBCUTANEOUS at 17:43

## 2023-10-25 RX ADMIN — FORMOTEROL FUMARATE DIHYDRATE 20 MCG: 20 SOLUTION RESPIRATORY (INHALATION) at 20:42

## 2023-10-25 RX ADMIN — TAMSULOSIN HYDROCHLORIDE 0.8 MG: 0.4 CAPSULE ORAL at 20:27

## 2023-10-25 RX ADMIN — ESCITALOPRAM OXALATE 10 MG: 10 TABLET, FILM COATED ORAL at 10:00

## 2023-10-25 RX ADMIN — POLYETHYLENE GLYCOL 3350 17 G: 17 POWDER, FOR SOLUTION ORAL at 10:05

## 2023-10-25 RX ADMIN — ROPINIROLE HYDROCHLORIDE 1 MG: 1 TABLET, FILM COATED ORAL at 20:27

## 2023-10-25 RX ADMIN — AMIODARONE HYDROCHLORIDE 200 MG: 200 TABLET ORAL at 10:01

## 2023-10-25 RX ADMIN — ATORVASTATIN CALCIUM 80 MG: 40 TABLET, FILM COATED ORAL at 20:27

## 2023-10-25 RX ADMIN — MELATONIN TAB 3 MG 3 MG: 3 TAB at 20:27

## 2023-10-25 RX ADMIN — BUMETANIDE 0.5 MG: 1 TABLET ORAL at 10:00

## 2023-10-25 RX ADMIN — TIOTROPIUM BROMIDE INHALATION SPRAY 2 PUFF: 3.12 SPRAY, METERED RESPIRATORY (INHALATION) at 10:17

## 2023-10-25 RX ADMIN — Medication 2 L/MIN: at 10:21

## 2023-10-25 RX ADMIN — FORMOTEROL FUMARATE DIHYDRATE 20 MCG: 20 SOLUTION RESPIRATORY (INHALATION) at 10:20

## 2023-10-25 RX ADMIN — CEFTRIAXONE 1 G: 1 INJECTION, SOLUTION INTRAVENOUS at 15:43

## 2023-10-25 RX ADMIN — CEFTRIAXONE 1 G: 1 INJECTION, SOLUTION INTRAVENOUS at 04:21

## 2023-10-25 RX ADMIN — HYDROMORPHONE HYDROCHLORIDE 0.4 MG: 1 INJECTION, SOLUTION INTRAMUSCULAR; INTRAVENOUS; SUBCUTANEOUS at 04:40

## 2023-10-25 RX ADMIN — PYRIDOXINE HCL TAB 50 MG 50 MG: 50 TAB at 10:01

## 2023-10-25 RX ADMIN — TICAGRELOR 90 MG: 90 TABLET ORAL at 20:27

## 2023-10-25 RX ADMIN — APIXABAN 2.5 MG: 2.5 TABLET, FILM COATED ORAL at 20:27

## 2023-10-25 RX ADMIN — Medication 5000 UNITS: at 10:00

## 2023-10-25 RX ADMIN — TICAGRELOR 90 MG: 90 TABLET ORAL at 10:01

## 2023-10-25 RX ADMIN — APIXABAN 2.5 MG: 2.5 TABLET, FILM COATED ORAL at 10:01

## 2023-10-25 ASSESSMENT — PAIN SCALES - GENERAL
PAINLEVEL_OUTOF10: 8
PAINLEVEL_OUTOF10: 9
PAINLEVEL_OUTOF10: 0 - NO PAIN
PAINLEVEL_OUTOF10: 3

## 2023-10-25 ASSESSMENT — COGNITIVE AND FUNCTIONAL STATUS - GENERAL
CLIMB 3 TO 5 STEPS WITH RAILING: A LITTLE
STANDING UP FROM CHAIR USING ARMS: A LITTLE
MOBILITY SCORE: 20
DAILY ACTIVITIY SCORE: 23
CLIMB 3 TO 5 STEPS WITH RAILING: A LOT
MOBILITY SCORE: 22
HELP NEEDED FOR BATHING: A LITTLE
DAILY ACTIVITIY SCORE: 23
WALKING IN HOSPITAL ROOM: A LITTLE
HELP NEEDED FOR BATHING: A LITTLE
WALKING IN HOSPITAL ROOM: A LITTLE

## 2023-10-25 ASSESSMENT — ACTIVITIES OF DAILY LIVING (ADL): LACK_OF_TRANSPORTATION: NO

## 2023-10-25 NOTE — NURSING NOTE
"Patient stated he was having severe bladder discomfort. There was some urine noted on the bed enrique. Catheter appeared to be working with urine in the tube. Bladder scanned for 100ml. Kee flushed with 180ml sterile water. 230ml of true urine returned after flushing. No clots noted in the tubing during or after flushing. Patient stated the \"\" Stated he was going to start pyridium today. Dr. Hensley was updated on what the patient stated and that I did not have an order. MD stated he did not wish to place the order. Patient was updated and he stated he was fine with that decision.   "

## 2023-10-25 NOTE — CARE PLAN
The patient's goals for the shift include  to have improved pain control    The clinical goals for the shift include Pt will report decrease pain through 1900    Patient continues to have   Problem: Fall/Injury  Goal: Not fall by end of shift  Outcome: Progressing  Goal: Be free from injury by end of the shift  Outcome: Progressing  Goal: Verbalize understanding of personal risk factors for fall in the hospital  Outcome: Progressing  Goal: Verbalize understanding of risk factor reduction measures to prevent injury from fall in the home  Outcome: Progressing  Goal: Use assistive devices by end of the shift  Outcome: Progressing  Goal: Pace activities to prevent fatigue by end of the shift  Outcome: Progressing     Problem: Diabetes  Goal: Achieve decreasing blood glucose levels by end of shift  Outcome: Progressing  Goal: Increase stability of blood glucose readings by end of shift  Outcome: Progressing  Goal: Decrease in ketones present in urine by end of shift  Outcome: Progressing  Goal: Maintain electrolyte levels within acceptable range throughout shift  Outcome: Progressing  Goal: Maintain glucose levels >70mg/dl to <250mg/dl throughout shift  Outcome: Progressing  Goal: No changes in neurological exam by end of shift  Outcome: Progressing  Goal: Learn about and adhere to nutrition recommendations by end of shift  Outcome: Progressing  Goal: Vital signs within normal range for age by end of shift  Outcome: Progressing  Goal: Increase self care and/or family involovement by end of shift  Outcome: Progressing  Goal: Receive DSME education by end of shift  Outcome: Progressing   pain, but is less frequent than it had been the previous day.

## 2023-10-25 NOTE — CARE PLAN
Patient had uneventful day, c/o pain that was managed with pain medication, vs stable, tolerated meals well, worked with PT/OT throughout shift, caruso draining without difficulty, tolerated iv abts throughout shift, call bell remained within reach, bed alarm on

## 2023-10-25 NOTE — PROGRESS NOTES
10/25/23 1537   Discharge Planning   Living Arrangements Spouse/significant other   Support Systems Spouse/significant other   Assistance Needed chronic catheter, independent- drives   Type of Residence Private residence   Patient expects to be discharged to: home   Does the patient need discharge transport arranged? No   Financial Resource Strain   How hard is it for you to pay for the very basics like food, housing, medical care, and heating? Not hard   Housing Stability   In the last 12 months, was there a time when you were not able to pay the mortgage or rent on time? N   In the last 12 months, how many places have you lived? 1   In the last 12 months, was there a time when you did not have a steady place to sleep or slept in a shelter (including now)? N   Transportation Needs   In the past 12 months, has lack of transportation kept you from medical appointments or from getting medications? no   In the past 12 months, has lack of transportation kept you from meetings, work, or from getting things needed for daily living? No     Pt with chronic Kee since Sept is admitted for Sepsis. Catheter was changed in the ED. Pt is scheduled to see his Urologist on 10/27. He gets iron infusion tomorrow at 11am and he is hoping  to be discharged before his appointment. [facility]. HE uses supplemental o2 HS at home. Pt feels he has everything he needs to manage his health at home. He has no DME and no home services at this time. OFELIA Mccloud

## 2023-10-25 NOTE — PROGRESS NOTES
Andrez Damon is a 78 y.o. male  presenting with Sepsis (CMS/HCC). Continues on Rocephin for UTI.       Subjective   Continues with abdominal pain that is intermittent.        Objective   Physical Exam  Constitutional:       Appearance: Normal appearance.   HENT:      Head: Normocephalic.      Nose: Nose normal.      Mouth/Throat:      Mouth: Mucous membranes are moist.   Eyes:      Extraocular Movements: Extraocular movements intact.      Pupils: Pupils are equal, round, and reactive to light.   Cardiovascular:      Rate and Rhythm: Normal rate and regular rhythm.   Pulmonary:      Effort: Pulmonary effort is normal.      Comments: Diminished bilateral bases right greater than left.   Abdominal:      General: Bowel sounds are normal.      Palpations: Abdomen is soft.   Genitourinary:     Penis: Normal.    Musculoskeletal:         General: Normal range of motion.      Cervical back: Normal range of motion and neck supple.   Skin:     General: Skin is warm and dry.      Coloration: Skin is pale.   Neurological:      General: No focal deficit present.      Mental Status: He is alert and oriented to person, place, and time.   Psychiatric:         Mood and Affect: Mood normal.         Behavior: Behavior normal.   Last Recorded Vitals  /50 (BP Location: Left arm, Patient Position: Lying)   Pulse 68   Temp 36.2 °C (97.1 °F) (Temporal)   Resp 18   Wt 62 kg (136 lb 11 oz)   SpO2 92%   Intake/Output last 3 Shifts:    Intake/Output Summary (Last 24 hours) at 10/25/2023 1247  Last data filed at 10/25/2023 1246  Gross per 24 hour   Intake 1610 ml   Output 1455 ml   Net 155 ml       Admission Weight  Weight: 65.3 kg (144 lb) (10/23/23 1205)    Daily Weight  10/23/23 : 62 kg (136 lb 11 oz)  Scheduled medications  amiodarone, 200 mg, oral, Daily  apixaban, 2.5 mg, oral, BID  atorvastatin, 80 mg, oral, Nightly  bumetanide, 0.5 mg, oral, Daily  cefTRIAXone, 1 g, intravenous, q12h  cholecalciferol, 5,000 Units, oral,  Daily  escitalopram, 10 mg, oral, Daily  finasteride, 5 mg, oral, Daily  tiotropium, 2 Inhalation, inhalation, Daily   And  formoterol, 20 mcg, nebulization, q12h  melatonin, 3 mg, oral, Daily  metoprolol succinate XL, 12.5 mg, oral, Daily  polyethylene glycol, 17 g, oral, Daily  pyridoxine, 50 mg, oral, Daily  rOPINIRole, 1 mg, oral, Nightly  tamsulosin, 0.8 mg, oral, Nightly  ticagrelor, 90 mg, oral, BID      Continuous medications  sodium chloride 0.9%, 75 mL/hr, Last Rate: Stopped (10/24/23 2000)      PRN medications  PRN medications: acetaminophen **OR** acetaminophen **OR** acetaminophen, HYDROmorphone, ondansetron ODT **OR** ondansetron, oxygen  Results for orders placed or performed during the hospital encounter of 10/23/23 (from the past 96 hour(s))   Lactate   Result Value Ref Range    Lactate 1.6 0.4 - 2.0 mmol/L   Lipase   Result Value Ref Range    Lipase 11 9 - 82 U/L   Magnesium   Result Value Ref Range    Magnesium 2.09 1.60 - 2.40 mg/dL   Comprehensive Metabolic Panel   Result Value Ref Range    Glucose 136 (H) 74 - 99 mg/dL    Sodium 137 136 - 145 mmol/L    Potassium 4.1 3.5 - 5.3 mmol/L    Chloride 104 98 - 107 mmol/L    Bicarbonate 26 21 - 32 mmol/L    Anion Gap 11 10 - 20 mmol/L    Urea Nitrogen 35 (H) 6 - 23 mg/dL    Creatinine 1.66 (H) 0.50 - 1.30 mg/dL    eGFR 42 (L) >60 mL/min/1.73m*2    Calcium 9.6 8.6 - 10.3 mg/dL    Albumin 4.0 3.4 - 5.0 g/dL    Alkaline Phosphatase 66 33 - 136 U/L    Total Protein 6.6 6.4 - 8.2 g/dL    AST 25 9 - 39 U/L    Bilirubin, Total 0.9 0.0 - 1.2 mg/dL    ALT 24 10 - 52 U/L   CBC and Auto Differential   Result Value Ref Range    WBC 16.2 (H) 4.4 - 11.3 x10*3/uL    nRBC 0.0 0.0 - 0.0 /100 WBCs    RBC 3.89 (L) 4.50 - 5.90 x10*6/uL    Hemoglobin 11.6 (L) 13.5 - 17.5 g/dL    Hematocrit 37.4 (L) 41.0 - 52.0 %    MCV 96 80 - 100 fL    MCH 29.8 26.0 - 34.0 pg    MCHC 31.0 (L) 32.0 - 36.0 g/dL    RDW 16.5 (H) 11.5 - 14.5 %    Platelets 205 150 - 450 x10*3/uL    MPV 10.3  7.5 - 11.5 fL    Neutrophils % 88.3 40.0 - 80.0 %    Immature Granulocytes %, Automated 0.5 0.0 - 0.9 %    Lymphocytes % 3.8 13.0 - 44.0 %    Monocytes % 6.9 2.0 - 10.0 %    Eosinophils % 0.2 0.0 - 6.0 %    Basophils % 0.3 0.0 - 2.0 %    Neutrophils Absolute 14.32 (H) 1.60 - 5.50 x10*3/uL    Immature Granulocytes Absolute, Automated 0.08 0.00 - 0.50 x10*3/uL    Lymphocytes Absolute 0.61 (L) 0.80 - 3.00 x10*3/uL    Monocytes Absolute 1.12 (H) 0.05 - 0.80 x10*3/uL    Eosinophils Absolute 0.04 0.00 - 0.40 x10*3/uL    Basophils Absolute 0.05 0.00 - 0.10 x10*3/uL   Light Blue Top   Result Value Ref Range    Extra Tube Hold for add-ons.    Red Top   Result Value Ref Range    Extra Tube Hold for add-ons.    Urinalysis with Reflex Microscopic and Culture   Result Value Ref Range    Color, Urine Yuliet (N) Straw, Yellow    Appearance, Urine Hazy (N) Clear    Specific Gravity, Urine 1.018 1.005 - 1.035    pH, Urine 6.0 5.0, 5.5, 6.0, 6.5, 7.0, 7.5, 8.0    Protein, Urine 100 (2+) (N) NEGATIVE mg/dL    Glucose, Urine NEGATIVE NEGATIVE mg/dL    Blood, Urine LARGE (3+) (A) NEGATIVE    Ketones, Urine NEGATIVE NEGATIVE mg/dL    Bilirubin, Urine NEGATIVE NEGATIVE    Urobilinogen, Urine <2.0 <2.0 mg/dL    Nitrite, Urine POSITIVE (A) NEGATIVE    Leukocyte Esterase, Urine MODERATE (2+) (A) NEGATIVE   Microscopic Only, Urine   Result Value Ref Range    WBC, Urine >50 (A) 1-5, NONE /HPF    RBC, Urine >20 (A) NONE, 1-2, 3-5 /HPF    Squamous Epithelial Cells, Urine 1-9 (SPARSE) Reference range not established. /HPF    Bacteria, Urine 4+ (A) NONE SEEN /HPF   Urine Culture    Specimen: Indwelling (Kee) Catheter; Urine   Result Value Ref Range    Urine Culture >100,000 Gram Negative Bacilli (A)    Extra Urine Gray Tube   Result Value Ref Range    Extra Tube 293    POCT GLUCOSE   Result Value Ref Range    POCT Glucose 121 (H) 74 - 99 mg/dL   CBC   Result Value Ref Range    WBC 9.8 4.4 - 11.3 x10*3/uL    nRBC 0.0 0.0 - 0.0 /100 WBCs    RBC  3.02 (L) 4.50 - 5.90 x10*6/uL    Hemoglobin 9.1 (L) 13.5 - 17.5 g/dL    Hematocrit 29.0 (L) 41.0 - 52.0 %    MCV 96 80 - 100 fL    MCH 30.1 26.0 - 34.0 pg    MCHC 31.4 (L) 32.0 - 36.0 g/dL    RDW 16.7 (H) 11.5 - 14.5 %    Platelets 156 150 - 450 x10*3/uL    MPV 10.2 7.5 - 11.5 fL   Renal Function Panel   Result Value Ref Range    Glucose 104 (H) 74 - 99 mg/dL    Sodium 138 136 - 145 mmol/L    Potassium 4.1 3.5 - 5.3 mmol/L    Chloride 109 (H) 98 - 107 mmol/L    Bicarbonate 26 21 - 32 mmol/L    Anion Gap 7 (L) 10 - 20 mmol/L    Urea Nitrogen 30 (H) 6 - 23 mg/dL    Creatinine 1.58 (H) 0.50 - 1.30 mg/dL    eGFR 44 (L) >60 mL/min/1.73m*2    Calcium 8.4 (L) 8.6 - 10.3 mg/dL    Phosphorus 3.2 2.5 - 4.9 mg/dL    Albumin 3.0 (L) 3.4 - 5.0 g/dL   Magnesium   Result Value Ref Range    Magnesium 2.08 1.60 - 2.40 mg/dL   CBC   Result Value Ref Range    WBC 6.5 4.4 - 11.3 x10*3/uL    nRBC 0.0 0.0 - 0.0 /100 WBCs    RBC 3.05 (L) 4.50 - 5.90 x10*6/uL    Hemoglobin 9.2 (L) 13.5 - 17.5 g/dL    Hematocrit 29.3 (L) 41.0 - 52.0 %    MCV 96 80 - 100 fL    MCH 30.2 26.0 - 34.0 pg    MCHC 31.4 (L) 32.0 - 36.0 g/dL    RDW 16.3 (H) 11.5 - 14.5 %    Platelets 160 150 - 450 x10*3/uL    MPV 10.5 7.5 - 11.5 fL   Renal Function Panel   Result Value Ref Range    Glucose 108 (H) 74 - 99 mg/dL    Sodium 139 136 - 145 mmol/L    Potassium 4.1 3.5 - 5.3 mmol/L    Chloride 111 (H) 98 - 107 mmol/L    Bicarbonate 26 21 - 32 mmol/L    Anion Gap <7 (L) 10 - 20 mmol/L    Urea Nitrogen 32 (H) 6 - 23 mg/dL    Creatinine 1.49 (H) 0.50 - 1.30 mg/dL    eGFR 48 (L) >60 mL/min/1.73m*2    Calcium 8.6 8.6 - 10.3 mg/dL    Phosphorus 3.4 2.5 - 4.9 mg/dL    Albumin 2.9 (L) 3.4 - 5.0 g/dL   Magnesium   Result Value Ref Range    Magnesium 2.14 1.60 - 2.40 mg/dL      Image ResultsAssessment/Plan     #Sepsis 2/2 UTI    ~BPH with lower urinary tract symptoms  -urinalysis  +nitrite, + leuks, +blood, +protein, 4+ bacteria  -WBC 16.2>9.8  -ABX therapy of vancomycin dosing 1  GM, Rocephin initiated.   -IVF initiated  -caruso catheter replaced with coude catheter  -transition to oral antibiotics as appropriate before discharge.     ~will have third dosing of rocephin approx 1530 today.     ~monitor for urine culture  ~planned urology consult appointment 10/27/23  #ELIZABETH  ~creatinine 1.81>1.66  ~improved  ~continue IVF to optimize renal function  ~monitor for symptoms of fluid overload 2/2 CHF and on chronic diuretics.   #Chronic respiratory failure with hypoxia   ~2/2 CHF and COPD   ~wears oxygen chronic at HS and intermittent during the day at baseline.    ~continues to smoke cigarettes of 5-6 daily   ~continue oxygen as needed and at HS.    ~monitor for symptoms of fluid overload 2/2 CHF and on chronic diuretics.    ~continue home dosing of inhalers.   #Anemia  ~routine infusions at [facility].  ~plan infusion before discharge that is scheduled for 10/26/23.  NON-HOSPITAL ISSUES  #Arhtritis  #CAD  #HTN  #DM  #Depression/anxiety     F: Replenish PRN  E: replenish PRN  N: Regular diet  ABX: Rocephin  GI: NA  DVT: Apixaban     Disposition: Admitted to medical floor for sepsis UTI with ELIZABETH that is resolving. Plan to transition to oral antibiotics and anticipated DC 10/26/23.    KENDRICK Sotelo    Patient was seen in collaboration with the MIRANDA, Kristie MARKS.  I was present for the pertinent components of the history, physical, and medical decision making and independently examined the patient.  We reviewed the medication reconciliation list and ancillary studies, including lab, imaging, and microbiology, as well as discussed the differential diagnoses; which includes, but is not limited to Principal Problem:    Sepsis (CMS/HCC)  Active Problems:    Sepsis without acute organ dysfunction (CMS/HCC)    At risk of catheter-associated urinary tract infection  .      I agree with her plan as documented in the electronic medical record.    Shyam Peck MD

## 2023-10-26 ENCOUNTER — APPOINTMENT (OUTPATIENT)
Dept: HEMATOLOGY/ONCOLOGY | Facility: HOSPITAL | Age: 78
End: 2023-10-26
Payer: MEDICARE

## 2023-10-26 VITALS
TEMPERATURE: 98.4 F | OXYGEN SATURATION: 96 % | DIASTOLIC BLOOD PRESSURE: 59 MMHG | HEIGHT: 70 IN | HEART RATE: 62 BPM | RESPIRATION RATE: 16 BRPM | SYSTOLIC BLOOD PRESSURE: 105 MMHG | WEIGHT: 136.69 LBS | BODY MASS INDEX: 19.57 KG/M2

## 2023-10-26 LAB
ALBUMIN SERPL BCP-MCNC: 2.9 G/DL (ref 3.4–5)
ANION GAP SERPL CALC-SCNC: 8 MMOL/L (ref 10–20)
BUN SERPL-MCNC: 28 MG/DL (ref 6–23)
CALCIUM SERPL-MCNC: 8.8 MG/DL (ref 8.6–10.3)
CHLORIDE SERPL-SCNC: 109 MMOL/L (ref 98–107)
CO2 SERPL-SCNC: 27 MMOL/L (ref 21–32)
CREAT SERPL-MCNC: 1.41 MG/DL (ref 0.5–1.3)
ERYTHROCYTE [DISTWIDTH] IN BLOOD BY AUTOMATED COUNT: 16.1 % (ref 11.5–14.5)
GFR SERPL CREATININE-BSD FRML MDRD: 51 ML/MIN/1.73M*2
GLUCOSE SERPL-MCNC: 95 MG/DL (ref 74–99)
HCT VFR BLD AUTO: 30.8 % (ref 41–52)
HGB BLD-MCNC: 9.8 G/DL (ref 13.5–17.5)
MAGNESIUM SERPL-MCNC: 2.09 MG/DL (ref 1.6–2.4)
MCH RBC QN AUTO: 30.2 PG (ref 26–34)
MCHC RBC AUTO-ENTMCNC: 31.8 G/DL (ref 32–36)
MCV RBC AUTO: 95 FL (ref 80–100)
NRBC BLD-RTO: 0 /100 WBCS (ref 0–0)
PHOSPHATE SERPL-MCNC: 3.6 MG/DL (ref 2.5–4.9)
PLATELET # BLD AUTO: 182 X10*3/UL (ref 150–450)
PMV BLD AUTO: 10.3 FL (ref 7.5–11.5)
POTASSIUM SERPL-SCNC: 3.9 MMOL/L (ref 3.5–5.3)
RBC # BLD AUTO: 3.24 X10*6/UL (ref 4.5–5.9)
SODIUM SERPL-SCNC: 140 MMOL/L (ref 136–145)
WBC # BLD AUTO: 7.2 X10*3/UL (ref 4.4–11.3)

## 2023-10-26 PROCEDURE — 2500000004 HC RX 250 GENERAL PHARMACY W/ HCPCS (ALT 636 FOR OP/ED): Performed by: STUDENT IN AN ORGANIZED HEALTH CARE EDUCATION/TRAINING PROGRAM

## 2023-10-26 PROCEDURE — 36415 COLL VENOUS BLD VENIPUNCTURE: CPT | Performed by: STUDENT IN AN ORGANIZED HEALTH CARE EDUCATION/TRAINING PROGRAM

## 2023-10-26 PROCEDURE — 94640 AIRWAY INHALATION TREATMENT: CPT

## 2023-10-26 PROCEDURE — 99238 HOSP IP/OBS DSCHRG MGMT 30/<: CPT | Performed by: INTERNAL MEDICINE

## 2023-10-26 PROCEDURE — 2500000002 HC RX 250 W HCPCS SELF ADMINISTERED DRUGS (ALT 637 FOR MEDICARE OP, ALT 636 FOR OP/ED): Performed by: STUDENT IN AN ORGANIZED HEALTH CARE EDUCATION/TRAINING PROGRAM

## 2023-10-26 PROCEDURE — 82435 ASSAY OF BLOOD CHLORIDE: CPT | Performed by: STUDENT IN AN ORGANIZED HEALTH CARE EDUCATION/TRAINING PROGRAM

## 2023-10-26 PROCEDURE — 2500000001 HC RX 250 WO HCPCS SELF ADMINISTERED DRUGS (ALT 637 FOR MEDICARE OP): Performed by: STUDENT IN AN ORGANIZED HEALTH CARE EDUCATION/TRAINING PROGRAM

## 2023-10-26 PROCEDURE — G0378 HOSPITAL OBSERVATION PER HR: HCPCS

## 2023-10-26 PROCEDURE — 83735 ASSAY OF MAGNESIUM: CPT | Performed by: STUDENT IN AN ORGANIZED HEALTH CARE EDUCATION/TRAINING PROGRAM

## 2023-10-26 PROCEDURE — 96376 TX/PRO/DX INJ SAME DRUG ADON: CPT

## 2023-10-26 PROCEDURE — 82565 ASSAY OF CREATININE: CPT | Performed by: STUDENT IN AN ORGANIZED HEALTH CARE EDUCATION/TRAINING PROGRAM

## 2023-10-26 PROCEDURE — 85027 COMPLETE CBC AUTOMATED: CPT | Performed by: STUDENT IN AN ORGANIZED HEALTH CARE EDUCATION/TRAINING PROGRAM

## 2023-10-26 RX ORDER — CEFUROXIME AXETIL 250 MG/1
500 TABLET ORAL 2 TIMES DAILY
Status: DISCONTINUED | OUTPATIENT
Start: 2023-10-26 | End: 2023-10-31

## 2023-10-26 RX ADMIN — ESCITALOPRAM OXALATE 10 MG: 10 TABLET, FILM COATED ORAL at 08:48

## 2023-10-26 RX ADMIN — FORMOTEROL FUMARATE DIHYDRATE 20 MCG: 20 SOLUTION RESPIRATORY (INHALATION) at 08:11

## 2023-10-26 RX ADMIN — Medication 2 L/MIN: at 08:09

## 2023-10-26 RX ADMIN — CEFTRIAXONE 1 G: 1 INJECTION, SOLUTION INTRAVENOUS at 03:30

## 2023-10-26 RX ADMIN — TICAGRELOR 90 MG: 90 TABLET ORAL at 08:48

## 2023-10-26 RX ADMIN — POLYETHYLENE GLYCOL 3350 17 G: 17 POWDER, FOR SOLUTION ORAL at 08:48

## 2023-10-26 RX ADMIN — AMIODARONE HYDROCHLORIDE 200 MG: 200 TABLET ORAL at 08:48

## 2023-10-26 RX ADMIN — PYRIDOXINE HCL TAB 50 MG 50 MG: 50 TAB at 08:48

## 2023-10-26 RX ADMIN — HYDROMORPHONE HYDROCHLORIDE 0.4 MG: 1 INJECTION, SOLUTION INTRAMUSCULAR; INTRAVENOUS; SUBCUTANEOUS at 01:50

## 2023-10-26 RX ADMIN — HYDROMORPHONE HYDROCHLORIDE 0.4 MG: 1 INJECTION, SOLUTION INTRAMUSCULAR; INTRAVENOUS; SUBCUTANEOUS at 08:47

## 2023-10-26 RX ADMIN — APIXABAN 2.5 MG: 2.5 TABLET, FILM COATED ORAL at 08:48

## 2023-10-26 RX ADMIN — FINASTERIDE 5 MG: 5 TABLET, FILM COATED ORAL at 08:47

## 2023-10-26 RX ADMIN — BUMETANIDE 0.5 MG: 1 TABLET ORAL at 08:48

## 2023-10-26 ASSESSMENT — PAIN SCALES - GENERAL
PAINLEVEL_OUTOF10: 2
PAINLEVEL_OUTOF10: 9
PAINLEVEL_OUTOF10: 7

## 2023-10-26 NOTE — DISCHARGE SUMMARY
Discharge Diagnosis  Sepsis (CMS/[facility])    Issues Requiring Follow-Up  Iron infusion  Urology appointment for urinary retention      Test Results Pending At Discharge  Pending Labs       Order Current Status    Urine Culture Preliminary result            Hospital Course   Andrez Damon is a 78 y.o. male  with PMHx significant as below who presented to  10/23/23  due to penile pain along with david colored urine in the caruso bag that appeared to be blood. His caruso had been changed about 10 days ago as a routine change, as he has had a couple of admits for urinary retention despite tamulosin. He had been having some intermittent pain since then. On Sunday, that pain in his penis became significantly worse and also was hurting in his rectal area. Expressed that he has some abdominal pain as well that is low in his abdomen. Admits to having chills but denies any fever. He has an upcoming urology appointment on Friday as this caruso catheter has been chronic since September of 2023 2/2 the inability to empty his bladder. Also has an appointment for iron infusion on 10/26/23.      In the ED, he was found to have an abnormal UA, which has grown 100,000u of gram neg bacilli.  Previous cultures have grown pan sensitive E.coli.      Patient improved on Ceftriaxone, and Caruso was exchanged with difficulty.  He will be switched to Cefuroxime for homegoing to cover gram Positive and negative.  At this point, he does not require hospitalization, but will need outpatient follow up with Urology for his persistent urinary obstruction, and it is important he does not miss it.  If surgical intervention is required, he will need to be off of his anticoagulant for minimum one week.       Pertinent Physical Exam At Time of Discharge  Physical Exam  Constitutional:       Appearance: Normal appearance.   HENT:      Head: Normocephalic.   Eyes:      Extraocular Movements: Extraocular movements intact.      Pupils: Pupils are equal, round,  and reactive to light.   Cardiovascular:      Rate and Rhythm: Normal rate and regular rhythm.      Heart sounds: No murmur heard.     No friction rub. No gallop.   Pulmonary:      Effort: Pulmonary effort is normal. No respiratory distress.      Breath sounds: Normal breath sounds. No wheezing.   Abdominal:      General: Abdomen is flat.      Palpations: Abdomen is soft.      Comments: Much improved since admission   Genitourinary:     Penis: Normal.       Testes: Normal.   Musculoskeletal:         General: Normal range of motion.      Cervical back: Normal range of motion and neck supple.   Skin:     General: Skin is warm and dry.      Capillary Refill: Capillary refill takes less than 2 seconds.   Neurological:      General: No focal deficit present.      Mental Status: He is alert and oriented to person, place, and time. Mental status is at baseline.   Psychiatric:         Mood and Affect: Mood normal.         Behavior: Behavior normal.         Thought Content: Thought content normal.         Judgment: Judgment normal.         Home Medications     Medication List      CONTINUE taking these medications     amiodarone 200 mg tablet; Commonly known as: Pacerone   apixaban 2.5 mg tablet; Commonly known as: Eliquis   atorvastatin 80 mg tablet; Commonly known as: Lipitor; TAKE 1 TABLET BY   MOUTH ONCE DAILY.   Brilinta 90 mg tablet; Generic drug: ticagrelor; TAKE 1 TABLET BY MOUTH   TWICE A DAY AS DIRECTED   bumetanide 0.5 mg tablet; Commonly known as: Bumex; Take 1 tablet (0.5   mg) by mouth once daily.   cholecalciferol 125 MCG (5000 UT) capsule; Commonly known as: Vitamin   D-3; Take 1 capsule (125 mcg) by mouth once daily.   escitalopram 10 mg tablet; Commonly known as: Lexapro; TAKE 1 TABLET BY   MOUTH EVERY DAY   finasteride 5 mg tablet; Commonly known as: Proscar   levalbuterol 45 mcg/actuation inhaler; Commonly known as: Xopenex   metoprolol succinate XL 25 mg 24 hr tablet; Commonly known as: Toprol-XL    potassium chloride CR 20 mEq ER tablet; Commonly known as: Klor-Con M20   pyridoxine 50 mg tablet; Commonly known as: Vitamin B-6; TAKE 1 TABLET   BY MOUTH EVERY DAY   rOPINIRole 1 mg tablet; Commonly known as: Requip; TAKE 1 TABLET (1 MG)   BY MOUTH ONCE DAILY AT BEDTIME.   Stiolto Respimat 2.5-2.5 mcg/actuation mist inhaler; Generic drug:   tiotropium-olodateroL   tamsulosin 0.4 mg 24 hr capsule; Commonly known as: Flomax; Take 2   capsules (0.8 mg) by mouth once daily at bedtime.     STOP taking these medications     cephalexin 500 mg capsule; Commonly known as: Keflex       Outpatient Follow-Up  Future Appointments   Date Time Provider Department Center   10/27/2023 10:20 AM Stone Piedra MD [id number] [facility]   11/2/2023 11:00 AM INF 02 CONNEAUT [facility]   11/9/2023 11:00 AM INF 02 [facility]   11/16/2023 11:00 AM INF 01 [facility]   12/11/2023 10:30 AM DEANNE ParmarCNP, DNP [id number] [facility]   12/14/2023 11:00 AM INF 01 [facility]   1/15/2024 11:00 AM DEANNE MontagueCNP [id number] [facility]   1/15/2024 12:00 PM INF 01 [facility]   2/20/2024  2:30 PM DEANNE AshtonCNP [id number] [facility]   4/15/2024 10:20 AM DEANNE ChinchillaCNP [id number] [facility]       Shyam Peck MD

## 2023-10-26 NOTE — CARE PLAN
The patient's goals for the shift include      The clinical goals for the shift include patient will have improved pain control this shift.    Patient had pain in genital area this shift that decreased with medication. Patient had no complaints of n/v, dizziness, chest pain, sob. Patient urinating with caruso.

## 2023-10-26 NOTE — PROGRESS NOTES
10/26/23 1304   Discharge Planning   Living Arrangements Spouse/significant other   Support Systems Spouse/significant other   Type of Residence Private residence   Home or Post Acute Services None   Patient expects to be discharged to: Home   Does the patient need discharge transport arranged? No     Patient medically stable for discharge, Patient denies any home going needs, [facility] 23.

## 2023-10-27 ENCOUNTER — OFFICE VISIT (OUTPATIENT)
Dept: UROLOGY | Facility: CLINIC | Age: 78
End: 2023-10-27
Payer: MEDICARE

## 2023-10-27 DIAGNOSIS — N30.01 ACUTE CYSTITIS WITH HEMATURIA: ICD-10-CM

## 2023-10-27 DIAGNOSIS — R33.9 URINARY RETENTION: Primary | ICD-10-CM

## 2023-10-27 LAB — BACTERIA UR CULT: ABNORMAL

## 2023-10-27 PROCEDURE — 99214 OFFICE O/P EST MOD 30 MIN: CPT | Performed by: STUDENT IN AN ORGANIZED HEALTH CARE EDUCATION/TRAINING PROGRAM

## 2023-10-27 PROCEDURE — 1125F AMNT PAIN NOTED PAIN PRSNT: CPT | Performed by: STUDENT IN AN ORGANIZED HEALTH CARE EDUCATION/TRAINING PROGRAM

## 2023-10-27 PROCEDURE — 1159F MED LIST DOCD IN RCRD: CPT | Performed by: STUDENT IN AN ORGANIZED HEALTH CARE EDUCATION/TRAINING PROGRAM

## 2023-10-27 PROCEDURE — 1160F RVW MEDS BY RX/DR IN RCRD: CPT | Performed by: STUDENT IN AN ORGANIZED HEALTH CARE EDUCATION/TRAINING PROGRAM

## 2023-10-27 PROCEDURE — 4004F PT TOBACCO SCREEN RCVD TLK: CPT | Performed by: STUDENT IN AN ORGANIZED HEALTH CARE EDUCATION/TRAINING PROGRAM

## 2023-10-27 NOTE — PROGRESS NOTES
Subjective   Patient ID: Andrez Damon is a 78 y.o. male.    HPI  Andrez Damon is a 78 y.o. male, hx of urinary retention, on Brilinta, he presented to  10/23/23  due to UTI, he had penile pain along with david colored urine in the caruso bag that appeared to be blood.     His caruso had been changed about 10 days prior as a routine change, as he had a couple of admits for urinary retention despite tamulosin. On 10/22/23, he had rectal and penile pain, so he presented to the hospital due to this.     He was treated with ceftriaxone in hospital, Caruso exchanged with difficulty. He was switched to cefuroxime. He was discharged yesterday. On no antibiotics currently.     Review of Systems   All other systems reviewed and are negative.      Objective   Physical Exam  Vitals reviewed.     : Caruso catheter in place.     Assessment/Plan   Andrez Damon is a 78 y.o. male, hx of urinary retention, on Brilinta, he presented to  10/23/23  due to UTI, presented penile pain along with david colored urine in the caruso bag that appeared to be blood. Treated with ceftriaxone and cefuroxime and discharged yesterday. Caruso catheter changed while in hospital.     He currently has Caruso catheter in place. We will refer to home care for catheter change.     Pending urinary culture results, will reach out when available.     We discussed his health status, this puts him at a higher risk for more invasive surgical procedures.     Plan:   Caruso change in 3 more weeks.   Call next week to advise for antibiotic and duration (will give suppressive course for a month)  Needs to arrange Caruso change at home with insurance    Diagnoses and all orders for this visit:  Urinary retention  Acute cystitis with hematuria

## 2023-10-30 ENCOUNTER — PATIENT OUTREACH (OUTPATIENT)
Dept: PRIMARY CARE | Facility: CLINIC | Age: 78
End: 2023-10-30
Payer: MEDICARE

## 2023-10-30 DIAGNOSIS — N39.0 URINARY TRACT INFECTION WITHOUT HEMATURIA, SITE UNSPECIFIED: Primary | ICD-10-CM

## 2023-10-30 NOTE — PROGRESS NOTES
Discharge Facility: [facility] 3  Discharge Diagnosis: Sepsis  Admission Date: 10-  Discharge Date:  10-    PCP Appointment Date:  Outreach to office to schedule     Specialist Appointment Date:   Urology 10-    Infusions [facility]- July  Infusion   -Thursday Nov 2, 2023 11:00 AM  -Thursday Nov 9, 2023 11:00 AM  -Thursday Nov 16, 2023 11:00 AM    PULMONOLOGY EPV with Tenisha Hrust, APRN-AYO Tuesday Feb 20, 2024 2:30 P    Hospital Encounter and Summary: Linked   See discharge assessment below for further details  Engagement  Call Start Time: 1045 (10/30/2023 10:56 AM)    Medications  Medications reviewed with patient/caregiver?: Yes (10/30/2023 10:56 AM)  Is the patient having any side effects they believe may be caused by any medication additions or changes?: No (10/30/2023 10:56 AM)  Does the patient have all medications ordered at discharge?: No (10/30/2023 10:56 AM)  What is keeping the patient from filling the prescriptions?: Doesn't understand reason for taking (10/30/2023 10:56 AM)  Care Management Interventions: Advised patient to call provider (outreach to Urology request they follow up with patient today for plan of care confirmation) (10/30/2023 10:56 AM)  Prescription Comments: Reviewed scripts  -STOP taking: cephalexin 500 mg capsule (Keflex)    cefuroxime (Ceftin) tablet 500 mg Next due Thursday October 26 Expected: 2 times a day    patient states he has not taken anything as he did not receive a script.  He states he was told to follow up with Urology today regarding treatment plan. I encouraged him to call and in addition i message Dr. Helton to inform him and request the office outreach to ensure the patient understands the plan for medication. (10/30/2023 10:56 AM)  Is the patient taking all medications as directed (includes completed medication regime)?: No (10/30/2023 10:56 AM)  What is preventing the patient from taking all medications as  "directed?: Other (Comment) (did not  script.  confusion over ATB's) (10/30/2023 10:56 AM)  Care Management Interventions: Provided patient education; Notified provider (10/30/2023 10:56 AM)  Medication Comments: Denies any additional needs regarding his medications. (10/30/2023 10:56 AM)    Appointments  Does the patient have a primary care provider?: Yes (10/30/2023 10:56 AM)  Care Management Interventions: -- (Outreach to office to schedule follow up) (10/30/2023 10:56 AM)  Has the patient kept scheduled appointments due by today?: Yes (10/30/2023 10:56 AM)  Care Management Interventions: Advised to schedule with specialist (Urology follow up/  monthly catheter changes) (10/30/2023 10:56 AM)  Follow Up Tasks: -- (Referral to [facility]CO Case Management - patient agreeable) (10/30/2023 10:56 AM)    Self Management  What is the home health agency?: NA (10/30/2023 10:56 AM)  Has home health visited the patient within 72 hours of discharge?: Not applicable (10/30/2023 10:56 AM)  What Durable Medical Equipment (DME) was ordered?: catheter supplies- patient states he has.  Has had chronic caruso for short period (10/30/2023 10:56 AM)    Patient Teaching  Does the patient have access to their discharge instructions?: No (10/30/2023 10:56 AM)  Care Management Interventions: Reviewed instructions with patient (10/30/2023 10:56 AM)  What is the patient's perception of their health status since discharge?: Improving (10/30/2023 10:56 AM)  Is the patient/caregiver able to teach back the hierarchy of who to call/visit for symptoms/problems? PCP, Specialist, Home Health nurse, Urgent Care, ED, 911: Yes (10/30/2023 10:56 AM)  Patient/Caregiver Education Comments: Caruso draining.  Denies hematuria.  Denies fevers or chills.  \"feels cold at time but relates to his chronic anemia\"  More comfortable since catheter change (10/30/2023 10:56 AM)    Wrap Up  Wrap Up Additional Comments: Spoke w/ pt  Pt identified by name and  Reviewed " discharge instructions, medications, and follow up.  Patient states he has not been taking ATB since discharge. Outreach to Urology who confirms they will outreach patient today to follow up. Patient agreeable to referral to ACO CCM. ( multiple chronic health conditions/ coordination of services/ additional support.  Referral placed (10/30/2023 10:56 AM)  Call End Time: 1114 (10/30/2023 10:56 AM)

## 2023-10-31 ENCOUNTER — OFFICE VISIT (OUTPATIENT)
Dept: PRIMARY CARE | Facility: CLINIC | Age: 78
End: 2023-10-31
Payer: MEDICARE

## 2023-10-31 VITALS
SYSTOLIC BLOOD PRESSURE: 100 MMHG | OXYGEN SATURATION: 100 % | HEART RATE: 69 BPM | DIASTOLIC BLOOD PRESSURE: 62 MMHG | WEIGHT: 139.3 LBS | BODY MASS INDEX: 19.99 KG/M2 | TEMPERATURE: 97 F

## 2023-10-31 DIAGNOSIS — I10 HYPERTENSION, UNSPECIFIED TYPE: ICD-10-CM

## 2023-10-31 DIAGNOSIS — T83.511A URINARY TRACT INFECTION ASSOCIATED WITH INDWELLING URETHRAL CATHETER, INITIAL ENCOUNTER (CMS-HCC): ICD-10-CM

## 2023-10-31 DIAGNOSIS — N39.0 URINARY TRACT INFECTION ASSOCIATED WITH INDWELLING URETHRAL CATHETER, INITIAL ENCOUNTER (CMS-HCC): ICD-10-CM

## 2023-10-31 DIAGNOSIS — E11.22 DIABETES MELLITUS WITH STAGE 3 CHRONIC KIDNEY DISEASE (MULTI): ICD-10-CM

## 2023-10-31 DIAGNOSIS — Z91.89 AT RISK OF CATHETER-ASSOCIATED URINARY TRACT INFECTION: ICD-10-CM

## 2023-10-31 DIAGNOSIS — I48.0 AF (PAROXYSMAL ATRIAL FIBRILLATION) (MULTI): Primary | ICD-10-CM

## 2023-10-31 DIAGNOSIS — N18.30 DIABETES MELLITUS WITH STAGE 3 CHRONIC KIDNEY DISEASE (MULTI): ICD-10-CM

## 2023-10-31 DIAGNOSIS — Z97.8 FOLEY CATHETER IN PLACE: ICD-10-CM

## 2023-10-31 PROCEDURE — 1125F AMNT PAIN NOTED PAIN PRSNT: CPT | Performed by: NURSE PRACTITIONER

## 2023-10-31 PROCEDURE — 3074F SYST BP LT 130 MM HG: CPT | Performed by: NURSE PRACTITIONER

## 2023-10-31 PROCEDURE — 99496 TRANSJ CARE MGMT HIGH F2F 7D: CPT | Performed by: NURSE PRACTITIONER

## 2023-10-31 PROCEDURE — 1160F RVW MEDS BY RX/DR IN RCRD: CPT | Performed by: NURSE PRACTITIONER

## 2023-10-31 PROCEDURE — 4004F PT TOBACCO SCREEN RCVD TLK: CPT | Performed by: NURSE PRACTITIONER

## 2023-10-31 PROCEDURE — 3078F DIAST BP <80 MM HG: CPT | Performed by: NURSE PRACTITIONER

## 2023-10-31 PROCEDURE — 1159F MED LIST DOCD IN RCRD: CPT | Performed by: NURSE PRACTITIONER

## 2023-10-31 RX ORDER — CEPHALEXIN 500 MG/1
500 CAPSULE ORAL 4 TIMES DAILY
Qty: 40 CAPSULE | Refills: 0 | Status: ON HOLD | COMMUNITY
Start: 2023-10-31 | End: 2023-11-13 | Stop reason: ALTCHOICE

## 2023-10-31 RX ORDER — CEFUROXIME AXETIL 500 MG/1
500 TABLET ORAL 2 TIMES DAILY
COMMUNITY
End: 2023-10-31 | Stop reason: WASHOUT

## 2023-10-31 ASSESSMENT — ENCOUNTER SYMPTOMS
ARTHRALGIAS: 1
SORE THROAT: 0
NAUSEA: 0
NUMBNESS: 0
HEMATOLOGIC/LYMPHATIC NEGATIVE: 1
COUGH: 0
BACK PAIN: 1
ACTIVITY CHANGE: 0
ABDOMINAL PAIN: 0
ENDOCRINE NEGATIVE: 1
WHEEZING: 0
PALPITATIONS: 0
SPEECH DIFFICULTY: 0
NERVOUS/ANXIOUS: 1
CHILLS: 0
UNEXPECTED WEIGHT CHANGE: 0
DIZZINESS: 0
HEADACHES: 0
EYES NEGATIVE: 1
FATIGUE: 1
ALLERGIC/IMMUNOLOGIC NEGATIVE: 1
CONSTIPATION: 0
VOMITING: 0
SHORTNESS OF BREATH: 1
DIARRHEA: 0
GASTROINTESTINAL NEGATIVE: 1

## 2023-10-31 NOTE — PROGRESS NOTES
"Patient: Andrez Damon  : 1945  PCP: Karley Sullivan, APRN-CNP, [facility]  MRN: [id number]  Program: No linked episodes     Andrez Damon is a 78 y.o. male presenting today for follow-up after being discharged from the hospital 5 days ago. The main problem requiring admission was sepsis. The discharge summary and/or Transitional Care Management documentation was reviewed. Medication reconciliation was performed as indicated via the \"Onur as Reviewed\" timestamp.     Andrez Damon was contacted by Transitional Care Management services two days after his discharge. This encounter and supporting documentation was reviewed.    The complexity of medical decision making for this patient's transitional care is high.    Hospitalized [facility] 10/23 - 10/26 for sepsis, UTI.  To the emergency room after noting hematuria in his Kee bag and increasing pain in his rectal area, chills.  In the emergency room UA was positive for UTI.  He was admitted treated with antibiotics.  Kee catheter was exchanged.  Taking Keflex at this time.  Patient saw Dr. Piedra on 10/27.  He was referred to home health for regular every 3-week catheter changes.  Dr. Piedra plans to prescribe suppressive therapy.  Patient feels weak, feels balance worse since hospitalization.  Offered referral to physical therapy, patient denies.    Physical Exam  Vitals and nursing note reviewed.   Constitutional:       General: He is not in acute distress.     Appearance: Normal appearance. He is underweight. He is ill-appearing.   HENT:      Head: Normocephalic and atraumatic.      Right Ear: Tympanic membrane, ear canal and external ear normal.      Left Ear: Tympanic membrane, ear canal and external ear normal.      Nose: Nose normal.      Mouth/Throat:      Mouth: Mucous membranes are moist.      Pharynx: Oropharynx is clear.   Eyes:      Extraocular Movements: Extraocular movements intact.      Conjunctiva/sclera: Conjunctivae normal.      " Pupils: Pupils are equal, round, and reactive to light.   Cardiovascular:      Rate and Rhythm: Normal rate and regular rhythm.      Pulses: Normal pulses.      Heart sounds: Normal heart sounds. No murmur heard.  Pulmonary:      Effort: Pulmonary effort is normal. No respiratory distress.      Breath sounds: Normal breath sounds. No wheezing.   Abdominal:      General: Bowel sounds are normal. There is no distension.      Palpations: Abdomen is soft.      Tenderness: There is no abdominal tenderness.   Genitourinary:     Comments: Kee in place  Musculoskeletal:         General: No tenderness. Normal range of motion.      Cervical back: Normal range of motion and neck supple.      Right lower leg: No edema.      Left lower leg: No edema.      Comments: Uses cane   Skin:     General: Skin is warm and dry.      Capillary Refill: Capillary refill takes less than 2 seconds.   Neurological:      General: No focal deficit present.      Mental Status: He is alert and oriented to person, place, and time.   Psychiatric:         Mood and Affect: Mood normal.         Behavior: Behavior normal.         Thought Content: Thought content normal.         Judgment: Judgment normal.       Assessment/Plan     #BPH  -Follow with urology  -Kee in place  -Finish Keflex for UTI  #Hypertension, controlled  -continue current carvedilol, spironolactone, furosemide and losartan  -follow with cardiology  -stop smoking  #Diabetes, diet controlled, A1c 5.7%  -Eat frequent small meals, do not go long periods without eating  -check blood sugar daily  -Low fat/cholesterol, low sodium, carbohydrate controlled diet  -Maintain healthy weight  -regular follow up with ophthalmology and podiatry  #CAD  -continue to follow with cardiology, current meds  -stop smoking completely  #Dyslipidemia  -continue atorvastatin 80 mg daily  -Low fat/cholesterol, low sodium diet  -Exercise as tolerated 150 minutes/week, increase activity slowly  -Maintain BMI  "20-25  #Chronic back and hip pain  -recommend return to see pain management, he declines  -Tylenol 650-1000 mg every 8 hours as needed for pain  #Bilateral knee pain  -Tylenol 650-1000 mg every 8 hours as needed for pain  -OTC topical pain reliever  #Anemia  -follow with hematology  #CHF, cardiomyopathy  -Follow-up with cardiology  -continued furosemide 20 mg daily, spironolactone 25 mg daily, carvedilol 6.25 mg daily  -Low sodium diet  -Weigh yourself every morning before breakfast  -Call the office if you have a weight gain of 3 or more pounds in one day or 5 or more pounds in one week  #CKD  -monitor  #Depression, improved  #Nicotine dependency  -Strongly encouraged to stop smoking  #PVD, thromboangiitis obliterans  -protect feet from injury  -inspect feet daily  -Strongly encouraged to stop smoking  #Orthostatic hypotension  -get up and change positions slowly  -compression stockings  #GERD  -Pantoprazole 40 mg daily  -Tums or Pepcid as needed in the evening  -Avoid alcohol, caffeine, and other foods that tend to bother your stomach  -Elevate head of bed 4-6\" on blocks  -Avoid eating 2 hours prior to bed  -Do not wear constricting clothing around your middle  #RLS  -Continue ropinirole 1 mg at bedtime  #COPD, controlled  -continue Stiolto, Yupelri  -Xopenex as needed  -Follow with pulmonology  -Strongly encouraged to stop smoking  #Constipation  -Increase fiber in diet     Follow-up: December and as needed    Review of Systems   Constitutional:  Positive for fatigue. Negative for activity change, chills and unexpected weight change.   HENT:  Positive for hearing loss and tinnitus. Negative for congestion, ear pain and sore throat.    Eyes: Negative.    Respiratory:  Positive for shortness of breath. Negative for cough and wheezing.    Cardiovascular:  Negative for chest pain, palpitations and leg swelling.        Cool toes   Gastrointestinal: Negative.  Negative for abdominal pain, constipation, diarrhea, nausea " and vomiting.   Endocrine: Negative.    Genitourinary:         Kee in place   Musculoskeletal:  Positive for arthralgias, back pain and gait problem.   Skin: Negative.    Allergic/Immunologic: Negative.    Neurological:  Negative for dizziness, speech difficulty, numbness and headaches.   Hematological: Negative.    Psychiatric/Behavioral:  The patient is nervous/anxious.    All other systems reviewed and are negative.      Family History   Problem Relation Name Age of Onset    Other (cardiac disorder) Mother      Other (malignant neoplasm) Mother      Other (cardiac disorder) Father         Engagement  Call Start Time: 1045 (10/30/2023 10:56 AM)    Medications  Medications reviewed with patient/caregiver?: Yes (10/30/2023 10:56 AM)  Is the patient having any side effects they believe may be caused by any medication additions or changes?: No (10/30/2023 10:56 AM)  Does the patient have all medications ordered at discharge?: No (10/30/2023 10:56 AM)  What is keeping the patient from filling the prescriptions?: Doesn't understand reason for taking (10/30/2023 10:56 AM)  Care Management Interventions: Advised patient to call provider (outreach to Urology request they follow up with patient today for plan of care confirmation) (10/30/2023 10:56 AM)  Prescription Comments: Reviewed scripts  -STOP taking: cephalexin 500 mg capsule (Keflex)    cefuroxime (Ceftin) tablet 500 mg Next due Thursday October 26 Expected: 2 times a day    patient states he has not taken anything as he did not receive a script.  He states he was told to follow up with Urology today regarding treatment plan. I encouraged him to call and in addition i message Dr. Helton to inform him and request the office outreach to ensure the patient understands the plan for medication. (10/30/2023 10:56 AM)  Is the patient taking all medications as directed (includes completed medication regime)?: No (10/30/2023 10:56 AM)  What is preventing the patient from  "taking all medications as directed?: Other (Comment) (did not  script.  confusion over ATB's) (10/30/2023 10:56 AM)  Care Management Interventions: Provided patient education; Notified provider (10/30/2023 10:56 AM)  Medication Comments: Denies any additional needs regarding his medications. (10/30/2023 10:56 AM)    Appointments  Does the patient have a primary care provider?: Yes (10/30/2023 10:56 AM)  Care Management Interventions: -- (Outreach to office to schedule follow up) (10/30/2023 10:56 AM)  Has the patient kept scheduled appointments due by today?: Yes (10/30/2023 10:56 AM)  Care Management Interventions: Advised to schedule with specialist (Urology follow up/  monthly catheter changes) (10/30/2023 10:56 AM)  Follow Up Tasks: -- (Referral to ACO Case Management - patient agreeable) (10/30/2023 10:56 AM)    Self Management  What is the home health agency?: NA (10/30/2023 10:56 AM)  Has home health visited the patient within 72 hours of discharge?: Not applicable (10/30/2023 10:56 AM)  What Durable Medical Equipment (DME) was ordered?: catheter supplies- patient states he has.  Has had chronic caruso for short period (10/30/2023 10:56 AM)    Patient Teaching  Does the patient have access to their discharge instructions?: No (10/30/2023 10:56 AM)  Care Management Interventions: Reviewed instructions with patient (10/30/2023 10:56 AM)  What is the patient's perception of their health status since discharge?: Improving (10/30/2023 10:56 AM)  Is the patient/caregiver able to teach back the hierarchy of who to call/visit for symptoms/problems? PCP, Specialist, Home Health nurse, Urgent Care, ED, 911: Yes (10/30/2023 10:56 AM)  Patient/Caregiver Education Comments: Caruso draining.  Denies hematuria.  Denies fevers or chills.  \"feels cold at time but relates to his chronic anemia\"  More comfortable since catheter change (10/30/2023 10:56 AM)    Wrap Up  Wrap Up Additional Comments: Spoke w/ pt  Pt identified " by name and  Reviewed discharge instructions, medications, and follow up.  Patient states he has not been taking ATB since discharge. Outreach to Urology who confirms they will outreach patient today to follow up. Patient agreeable to referral to ACO CCM. ( multiple chronic health conditions/ coordination of services/ additional support.  Referral placed (10/30/2023 10:56 AM)  Call End Time: 1114 (10/30/2023 10:56 AM)        No follow-ups on file.

## 2023-11-02 ENCOUNTER — INFUSION (OUTPATIENT)
Dept: HEMATOLOGY/ONCOLOGY | Facility: HOSPITAL | Age: 78
End: 2023-11-02
Payer: MEDICARE

## 2023-11-02 VITALS
TEMPERATURE: 98.2 F | SYSTOLIC BLOOD PRESSURE: 110 MMHG | WEIGHT: 139.55 LBS | OXYGEN SATURATION: 99 % | HEART RATE: 56 BPM | DIASTOLIC BLOOD PRESSURE: 50 MMHG | RESPIRATION RATE: 16 BRPM | BODY MASS INDEX: 20.02 KG/M2

## 2023-11-02 DIAGNOSIS — E61.1 IRON DEFICIENCY: ICD-10-CM

## 2023-11-02 DIAGNOSIS — E53.8 B12 DEFICIENCY: ICD-10-CM

## 2023-11-02 PROCEDURE — 2500000004 HC RX 250 GENERAL PHARMACY W/ HCPCS (ALT 636 FOR OP/ED): Performed by: INTERNAL MEDICINE

## 2023-11-02 PROCEDURE — 96365 THER/PROPH/DIAG IV INF INIT: CPT | Mod: INF

## 2023-11-02 PROCEDURE — 96372 THER/PROPH/DIAG INJ SC/IM: CPT

## 2023-11-02 RX ORDER — DIPHENHYDRAMINE HYDROCHLORIDE 50 MG/ML
50 INJECTION INTRAMUSCULAR; INTRAVENOUS AS NEEDED
Status: CANCELLED | OUTPATIENT
Start: 2023-11-06

## 2023-11-02 RX ORDER — FAMOTIDINE 10 MG/ML
20 INJECTION INTRAVENOUS ONCE AS NEEDED
Status: CANCELLED | OUTPATIENT
Start: 2023-11-16

## 2023-11-02 RX ORDER — FAMOTIDINE 10 MG/ML
20 INJECTION INTRAVENOUS ONCE AS NEEDED
Status: CANCELLED | OUTPATIENT
Start: 2023-11-06

## 2023-11-02 RX ORDER — EPINEPHRINE 0.3 MG/.3ML
0.3 INJECTION SUBCUTANEOUS EVERY 5 MIN PRN
Status: CANCELLED | OUTPATIENT
Start: 2023-11-16

## 2023-11-02 RX ORDER — DIPHENHYDRAMINE HYDROCHLORIDE 50 MG/ML
50 INJECTION INTRAMUSCULAR; INTRAVENOUS AS NEEDED
Status: CANCELLED | OUTPATIENT
Start: 2023-11-16

## 2023-11-02 RX ORDER — CYANOCOBALAMIN 1000 UG/ML
1000 INJECTION, SOLUTION INTRAMUSCULAR; SUBCUTANEOUS ONCE
Status: COMPLETED | OUTPATIENT
Start: 2023-11-02 | End: 2023-11-02

## 2023-11-02 RX ORDER — ALBUTEROL SULFATE 0.83 MG/ML
3 SOLUTION RESPIRATORY (INHALATION) AS NEEDED
Status: DISCONTINUED | OUTPATIENT
Start: 2023-11-02 | End: 2023-11-02 | Stop reason: HOSPADM

## 2023-11-02 RX ORDER — FAMOTIDINE 10 MG/ML
20 INJECTION INTRAVENOUS ONCE AS NEEDED
Status: DISCONTINUED | OUTPATIENT
Start: 2023-11-02 | End: 2023-11-02 | Stop reason: HOSPADM

## 2023-11-02 RX ORDER — DIPHENHYDRAMINE HYDROCHLORIDE 50 MG/ML
50 INJECTION INTRAMUSCULAR; INTRAVENOUS AS NEEDED
Status: DISCONTINUED | OUTPATIENT
Start: 2023-11-02 | End: 2023-11-02 | Stop reason: HOSPADM

## 2023-11-02 RX ORDER — EPINEPHRINE 0.3 MG/.3ML
0.3 INJECTION SUBCUTANEOUS EVERY 5 MIN PRN
Status: DISCONTINUED | OUTPATIENT
Start: 2023-11-02 | End: 2023-11-02 | Stop reason: HOSPADM

## 2023-11-02 RX ORDER — ALBUTEROL SULFATE 0.83 MG/ML
3 SOLUTION RESPIRATORY (INHALATION) AS NEEDED
Status: CANCELLED | OUTPATIENT
Start: 2023-11-06

## 2023-11-02 RX ORDER — EPINEPHRINE 0.3 MG/.3ML
0.3 INJECTION SUBCUTANEOUS EVERY 5 MIN PRN
Status: CANCELLED | OUTPATIENT
Start: 2023-11-06

## 2023-11-02 RX ORDER — ALBUTEROL SULFATE 0.83 MG/ML
3 SOLUTION RESPIRATORY (INHALATION) AS NEEDED
Status: CANCELLED | OUTPATIENT
Start: 2023-11-16

## 2023-11-02 RX ORDER — CYANOCOBALAMIN 1000 UG/ML
1000 INJECTION, SOLUTION INTRAMUSCULAR; SUBCUTANEOUS ONCE
Status: CANCELLED | OUTPATIENT
Start: 2023-11-16

## 2023-11-02 RX ADMIN — CYANOCOBALAMIN 1000 MCG: 1000 INJECTION, SOLUTION INTRAMUSCULAR at 11:27

## 2023-11-02 RX ADMIN — IRON SUCROSE 300 MG: 20 INJECTION, SOLUTION INTRAVENOUS at 11:47

## 2023-11-02 ASSESSMENT — PATIENT HEALTH QUESTIONNAIRE - PHQ9
2. FEELING DOWN, DEPRESSED OR HOPELESS: SEVERAL DAYS
10. IF YOU CHECKED OFF ANY PROBLEMS, HOW DIFFICULT HAVE THESE PROBLEMS MADE IT FOR YOU TO DO YOUR WORK, TAKE CARE OF THINGS AT HOME, OR GET ALONG WITH OTHER PEOPLE: NOT DIFFICULT AT ALL
SUM OF ALL RESPONSES TO PHQ9 QUESTIONS 1 AND 2: 2
1. LITTLE INTEREST OR PLEASURE IN DOING THINGS: SEVERAL DAYS

## 2023-11-02 ASSESSMENT — COLUMBIA-SUICIDE SEVERITY RATING SCALE - C-SSRS
1. IN THE PAST MONTH, HAVE YOU WISHED YOU WERE DEAD OR WISHED YOU COULD GO TO SLEEP AND NOT WAKE UP?: NO
2. HAVE YOU ACTUALLY HAD ANY THOUGHTS OF KILLING YOURSELF?: NO
6. HAVE YOU EVER DONE ANYTHING, STARTED TO DO ANYTHING, OR PREPARED TO DO ANYTHING TO END YOUR LIFE?: NO

## 2023-11-02 ASSESSMENT — PAIN SCALES - GENERAL: PAINLEVEL: 8

## 2023-11-03 RX ORDER — CIPROFLOXACIN 500 MG/1
500 TABLET ORAL 2 TIMES DAILY
Qty: 28 TABLET | Refills: 0 | Status: SHIPPED | OUTPATIENT
Start: 2023-11-03 | End: 2023-11-22 | Stop reason: WASHOUT

## 2023-11-04 DIAGNOSIS — E78.5 HYPERLIPIDEMIA, UNSPECIFIED HYPERLIPIDEMIA TYPE: ICD-10-CM

## 2023-11-06 ENCOUNTER — HOSPITAL ENCOUNTER (EMERGENCY)
Facility: HOSPITAL | Age: 78
Discharge: HOME | End: 2023-11-06
Attending: FAMILY MEDICINE
Payer: MEDICARE

## 2023-11-06 ENCOUNTER — HOSPITAL ENCOUNTER (OUTPATIENT)
Dept: CARDIOLOGY | Facility: CLINIC | Age: 78
Discharge: HOME | End: 2023-11-06
Payer: MEDICARE

## 2023-11-06 VITALS
SYSTOLIC BLOOD PRESSURE: 123 MMHG | HEART RATE: 63 BPM | BODY MASS INDEX: 19.9 KG/M2 | HEIGHT: 70 IN | TEMPERATURE: 97.5 F | OXYGEN SATURATION: 100 % | RESPIRATION RATE: 16 BRPM | WEIGHT: 139 LBS | DIASTOLIC BLOOD PRESSURE: 67 MMHG

## 2023-11-06 DIAGNOSIS — Z95.810 PRESENCE OF AUTOMATIC (IMPLANTABLE) CARDIAC DEFIBRILLATOR: ICD-10-CM

## 2023-11-06 DIAGNOSIS — N40.0 ENLARGED PROSTATE: ICD-10-CM

## 2023-11-06 DIAGNOSIS — T83.9XXD FOLEY CATHETER PROBLEM, SUBSEQUENT ENCOUNTER: Primary | ICD-10-CM

## 2023-11-06 DIAGNOSIS — Z46.6 ENCOUNTER FOR FOLEY CATHETER REPLACEMENT: ICD-10-CM

## 2023-11-06 DIAGNOSIS — I49.5 SICK SINUS SYNDROME (MULTI): ICD-10-CM

## 2023-11-06 DIAGNOSIS — J41.0 SIMPLE CHRONIC BRONCHITIS (MULTI): ICD-10-CM

## 2023-11-06 LAB
APPEARANCE UR: CLEAR
BILIRUB UR STRIP.AUTO-MCNC: NEGATIVE MG/DL
COLOR UR: ABNORMAL
GLUCOSE UR STRIP.AUTO-MCNC: NEGATIVE MG/DL
HOLD SPECIMEN: NORMAL
HYALINE CASTS #/AREA URNS AUTO: ABNORMAL /LPF
KETONES UR STRIP.AUTO-MCNC: NEGATIVE MG/DL
LEUKOCYTE ESTERASE UR QL STRIP.AUTO: ABNORMAL
MUCOUS THREADS #/AREA URNS AUTO: ABNORMAL /LPF
NITRITE UR QL STRIP.AUTO: POSITIVE
PH UR STRIP.AUTO: 5 [PH]
PROT UR STRIP.AUTO-MCNC: NEGATIVE MG/DL
RBC # UR STRIP.AUTO: ABNORMAL /UL
RBC #/AREA URNS AUTO: ABNORMAL /HPF
SP GR UR STRIP.AUTO: 1.01
UROBILINOGEN UR STRIP.AUTO-MCNC: <2 MG/DL
WBC #/AREA URNS AUTO: ABNORMAL /HPF
WBC CLUMPS #/AREA URNS AUTO: ABNORMAL /HPF

## 2023-11-06 PROCEDURE — 99285 EMERGENCY DEPT VISIT HI MDM: CPT | Mod: 25 | Performed by: FAMILY MEDICINE

## 2023-11-06 PROCEDURE — 99283 EMERGENCY DEPT VISIT LOW MDM: CPT | Mod: 25

## 2023-11-06 PROCEDURE — 81001 URINALYSIS AUTO W/SCOPE: CPT | Performed by: FAMILY MEDICINE

## 2023-11-06 PROCEDURE — 51702 INSERT TEMP BLADDER CATH: CPT

## 2023-11-06 PROCEDURE — 87086 URINE CULTURE/COLONY COUNT: CPT | Mod: CONLAB | Performed by: FAMILY MEDICINE

## 2023-11-06 RX ORDER — ATORVASTATIN CALCIUM 80 MG/1
80 TABLET, FILM COATED ORAL DAILY
Qty: 90 TABLET | Refills: 0 | Status: SHIPPED | OUTPATIENT
Start: 2023-11-06 | End: 2024-01-08

## 2023-11-06 ASSESSMENT — PAIN - FUNCTIONAL ASSESSMENT: PAIN_FUNCTIONAL_ASSESSMENT: 0-10

## 2023-11-06 ASSESSMENT — PAIN DESCRIPTION - LOCATION: LOCATION: PENIS

## 2023-11-06 ASSESSMENT — PAIN SCALES - GENERAL: PAINLEVEL_OUTOF10: 4

## 2023-11-06 ASSESSMENT — COLUMBIA-SUICIDE SEVERITY RATING SCALE - C-SSRS
2. HAVE YOU ACTUALLY HAD ANY THOUGHTS OF KILLING YOURSELF?: NO
1. IN THE PAST MONTH, HAVE YOU WISHED YOU WERE DEAD OR WISHED YOU COULD GO TO SLEEP AND NOT WAKE UP?: NO
6. HAVE YOU EVER DONE ANYTHING, STARTED TO DO ANYTHING, OR PREPARED TO DO ANYTHING TO END YOUR LIFE?: NO

## 2023-11-06 ASSESSMENT — PAIN DESCRIPTION - DESCRIPTORS: DESCRIPTORS: BURNING

## 2023-11-06 NOTE — DISCHARGE INSTRUCTIONS
You have denied any other symptoms except urinary retention urology of Kee catheter replaced which was replaced and you have good flow of urine urine does show some residual infection but you are taking Cipro which you need to continue.  Urine culture has been ordered.  She had denied any other acute complaints you do have multiple underlying chronic diseases including coronary artery disease cardiac problem COPD but you do not want to be evaluated for any of the symptoms as you do have chronic problem but do not have any acute complain about cardiopulmonary disorders.  However if he decided to complete evaluation done including EKG blood test chest x-ray or any other in diagnostic work-up other diagnostic work-up and treatment as needed if you develop any symptom any new symptoms or any worsening symptoms return to ER.  Follow-up with urologist, cardiologist and primary care physician

## 2023-11-06 NOTE — Clinical Note
You have denied any other symptoms except urinary retention urology of Kee catheter replaced which was replaced and you have good flow of urine urine does show some residual infection but you are taking Cipro which you need to continue.  Urine cult ure has been ordered.  She had denied any other acute complaints you do have multiple underlying chronic diseases including coronary artery disease cardiac problem COPD but you do not want to be evaluated for any of the symptoms as you do have chroni c problem but do not have any acute complain about cardiopulmonary disorders.  However if he decided to complete evaluation done including EKG blood test chest x-ray or any other in diagnostic work-up other diagnostic work-up and treatment as needed  if you develop any symptom any new symptoms or any worsening symptoms return to ER.  Follow-up with urologist, cardiologist and primary care physician

## 2023-11-06 NOTE — ED PROVIDER NOTES
HPI   Chief Complaint   Patient presents with    Lower Urinary Tract Symptoms       HPI  This is a 78-year-old male patient came to the emergency room with a complaint of blocked Kee catheter draining around the catheter.  He is being treated for UTI 2 he has history of enlarged prostate he is not a surgical candidate is being treated conservatively for enlarged prostate.  He also has history of COPD coronary artery disease he is on blood thinner.  He is strictly here because his Kee catheter is not draining and is leaking around it and wants a catheter replaced.  Denies any chest pain abdominal pain nausea vomiting or diarrhea chronic history of COPD related symptoms but denies any acute symptoms.  He does not want to be evaluated or treated for any other symptoms except Kee catheter replacement.  He denies any pain or swelling in the legs.  Denies any vomiting or diarrhea.  Denies any fever or chills.  He repeatedly denies any chest pain or chest tightness.  Denies blackout or pass or dizziness lightheadedness.    Patient taking Cipro for UTI.    Review of system: 10 review of system obtained, review of system as described in HPI otherwise negative.               No data recorded                Patient History   Past Medical History:   Diagnosis Date    Arthritis     Body mass index (BMI) 20.0-20.9, adult 09/21/2021    Body mass index (BMI) of 20.0 to 20.9 in adult    Body mass index (BMI) 21.0-21.9, adult 04/14/2021    Body mass index (BMI) of 21.0 to 21.9 in adult    CHF (congestive heart failure) (CMS/[facility])     COPD (chronic obstructive pulmonary disease) (CMS/[facility])     Coronary artery disease     Diabetes mellitus (CMS/[facility])     Hypertension     Major depressive disorder, recurrent, mild (CMS/[facility]) 11/11/2020    Mild episode of recurrent major depressive disorder    Major depressive disorder, recurrent, unspecified (CMS/[facility]) 01/13/2021    Recurrent major depressive disorder, remission status unspecified     Other conditions influencing health status     Swelling Of Hands    Other specified anxiety disorders 05/19/2020    Depression with anxiety    Oxygen desaturation during sleep     wears 2L at HS    Personal history of nicotine dependence 07/12/2021    History of nicotine dependence    Personal history of other diseases of the circulatory system     History of cardiac disorder    Personal history of other diseases of the musculoskeletal system and connective tissue     History of arthritis    Personal history of other diseases of urinary system 09/21/2021    History of chronic kidney disease    Personal history of other endocrine, nutritional and metabolic disease 08/29/2019    History of hyperglycemia    Pneumonia, unspecified organism 12/09/2019    Atypical pneumonia    Thoracic aortic aneurysm, without rupture, unspecified (CMS/[facility]) 05/29/2019    Thoracic aortic aneurysm without rupture     Past Surgical History:   Procedure Laterality Date    BACK SURGERY  01/24/2014    Back Surgery    CORONARY ANGIOPLASTY WITH STENT PLACEMENT  01/24/2014    Cath Stent Placement    CT ABDOMEN PELVIS ANGIOGRAM W AND/OR WO IV CONTRAST  6/6/2019    CT ABDOMEN PELVIS ANGIOGRAM W AND/OR WO IV CONTRAST 6/6/2019 [facility]    CT HEAD ANGIO W AND WO IV CONTRAST  10/15/2020    CT HEAD ANGIO W AND WO IV CONTRAST 10/15/2020 [facility]    HERNIA REPAIR  04/24/2014    Hernia Repair    OTHER SURGICAL HISTORY  04/17/2019    Abdominal surgery    OTHER SURGICAL HISTORY  01/24/2014    Defibrillation    TOTAL HIP ARTHROPLASTY  01/24/2014    Hip Replacement     Family History   Problem Relation Name Age of Onset    Other (cardiac disorder) Mother      Other (malignant neoplasm) Mother      Other (cardiac disorder) Father       Social History     Tobacco Use    Smoking status: Every Day     Packs/day: 0.25     Years: 68.00     Additional pack years: 0.00     Total pack years: 17.00     Types: Cigarettes    Smokeless tobacco: Never    Substance Use Topics    Alcohol use: Never    Drug use: Never       Physical Exam   ED Triage Vitals [11/06/23 1550]   Temp Heart Rate Resp BP   36.4 °C (97.5 °F) 64 18 124/57      SpO2 Temp src Heart Rate Source Patient Position   100 % -- -- --      BP Location FiO2 (%)     -- --       Physical Exam    ED Course & MDM   Diagnoses as of 11/06/23 1850   Kee catheter problem, subsequent encounter   Encounter for Kee catheter replacement   Enlarged prostate   Simple chronic bronchitis (CMS/HCC)   Patient with history of extensive past medical history coronary artery COPD and cardiopulmonary problem prostate enlargement, COPD and multiple other comorbidities as described in past medical history came to the ER due to Kee catheter problem which was clogged and not draining.  He denies any fever chills cough nausea vomiting diarrhea chest pain or abdominal pain.  He does have chronic COPD and wheezing and congestion but denies any acute symptoms or worsening symptoms.  He wanted to have Kee catheter replaced.  Did not want any other evaluation or work-up as he denies any other acute complaint except chronic underlying comorbidities and chronic problems.    I talked with the patient his neck was supple he has some expiratory wheezing but moving air well no hypoxemia or respiratory stress.  Neck was supple abdomen soft and nontender.  He had chronic Kee catheter which was replaced by the nursing staff with complaint resolution of symptoms suprapubic discomfort completely resolved with clearing urine output through the bag which is flowing smoothly.  Urinalysis shows some leukocytes but he already have a been taking Cipro for UTI.  Patient is feeling remarkably better no chest pain no shortness of breath or abdominal pain no vomiting or diarrhea after Kee catheter was replaced.  And even before Kee catheter placed patient denies any chest pain or short of breath abdominal pain.  No neurodeficit or  neurological problems.  After successful replacement of Kee catheter patient was subsequently discharged home in stable condition is advised if you develop any new symptoms worsening symptoms or any symptoms related to his chronic or medical comorbidity he should return to ER otherwise follow with his cardiologist, pulmonologist primary care physician and urologist.  Continue Cipro      You have denied any other symptoms except urinary retention urology of Kee catheter replaced which was replaced and you have good flow of urine urine does show some residual infection but you are taking Cipro which you need to continue.  Urine culture has been ordered.  She had denied any other acute complaints you do have multiple underlying chronic diseases including coronary artery disease cardiac problem COPD but you do not want to be evaluated for any of the symptoms as you do have chronic problem but do not have any acute complain about cardiopulmonary disorders.  However if he decided to complete evaluation done including EKG blood test chest x-ray or any other in diagnostic work-up other diagnostic work-up and treatment as needed if you develop any symptom any new symptoms or any worsening symptoms return to ER.  Follow-up with urologist, cardiologist and primary care physician    Medical Decision Making    You have denied any other symptoms except urinary retention urology of Kee catheter replaced which was replaced and you have good flow of urine urine does show some residual infection but you are taking Cipro which you need to continue.  Urine culture has been ordered.  She had denied any other acute complaints you do have multiple underlying chronic diseases including coronary artery disease cardiac problem COPD but you do not want to be evaluated for any of the symptoms as you do have chronic problem but do not have any acute complain about cardiopulmonary disorders.  However if he decided to complete evaluation  done including EKG blood test chest x-ray or any other in diagnostic work-up other diagnostic work-up and treatment as needed if you develop any symptom any new symptoms or any worsening symptoms return to ER.  Follow-up with urologist, cardiologist and primary care physician  Procedure  Procedures     Keo Kay MD  11/06/23 4710

## 2023-11-08 DIAGNOSIS — I50.22 CHRONIC SYSTOLIC HEART FAILURE (MULTI): ICD-10-CM

## 2023-11-08 LAB — BACTERIA UR CULT: NO GROWTH

## 2023-11-08 RX ORDER — BUMETANIDE 0.5 MG/1
0.5 TABLET ORAL DAILY
Qty: 90 TABLET | Refills: 2 | Status: SHIPPED | OUTPATIENT
Start: 2023-11-08 | End: 2024-01-01 | Stop reason: HOSPADM

## 2023-11-09 ENCOUNTER — HOSPITAL ENCOUNTER (OUTPATIENT)
Dept: CARDIOLOGY | Facility: CLINIC | Age: 78
Discharge: HOME | End: 2023-11-09
Payer: MEDICARE

## 2023-11-09 ENCOUNTER — INFUSION (OUTPATIENT)
Dept: HEMATOLOGY/ONCOLOGY | Facility: HOSPITAL | Age: 78
End: 2023-11-09
Payer: MEDICARE

## 2023-11-09 VITALS
TEMPERATURE: 96.8 F | HEART RATE: 60 BPM | OXYGEN SATURATION: 98 % | DIASTOLIC BLOOD PRESSURE: 55 MMHG | SYSTOLIC BLOOD PRESSURE: 105 MMHG | RESPIRATION RATE: 16 BRPM

## 2023-11-09 DIAGNOSIS — Z95.810 PRESENCE OF AUTOMATIC (IMPLANTABLE) CARDIAC DEFIBRILLATOR: ICD-10-CM

## 2023-11-09 DIAGNOSIS — Z45.02 IMPLANTABLE CARDIOVERTER-DEFIBRILLATOR (ICD) GENERATOR END OF LIFE: ICD-10-CM

## 2023-11-09 DIAGNOSIS — I49.5 SICK SINUS SYNDROME (MULTI): ICD-10-CM

## 2023-11-09 DIAGNOSIS — E61.1 IRON DEFICIENCY: ICD-10-CM

## 2023-11-09 DIAGNOSIS — I49.5 SICK SINUS SYNDROME (MULTI): Primary | ICD-10-CM

## 2023-11-09 PROCEDURE — 2500000004 HC RX 250 GENERAL PHARMACY W/ HCPCS (ALT 636 FOR OP/ED): Performed by: INTERNAL MEDICINE

## 2023-11-09 PROCEDURE — 96366 THER/PROPH/DIAG IV INF ADDON: CPT

## 2023-11-09 PROCEDURE — 96365 THER/PROPH/DIAG IV INF INIT: CPT | Mod: INF

## 2023-11-09 RX ORDER — DIPHENHYDRAMINE HYDROCHLORIDE 50 MG/ML
50 INJECTION INTRAMUSCULAR; INTRAVENOUS AS NEEDED
Status: CANCELLED | OUTPATIENT
Start: 2023-11-10

## 2023-11-09 RX ORDER — EPINEPHRINE 0.3 MG/.3ML
0.3 INJECTION SUBCUTANEOUS EVERY 5 MIN PRN
Status: CANCELLED | OUTPATIENT
Start: 2023-11-10

## 2023-11-09 RX ORDER — ALBUTEROL SULFATE 0.83 MG/ML
3 SOLUTION RESPIRATORY (INHALATION) AS NEEDED
Status: CANCELLED | OUTPATIENT
Start: 2023-11-10

## 2023-11-09 RX ORDER — FAMOTIDINE 10 MG/ML
20 INJECTION INTRAVENOUS ONCE AS NEEDED
Status: CANCELLED | OUTPATIENT
Start: 2023-11-10

## 2023-11-09 RX ADMIN — IRON SUCROSE 300 MG: 20 INJECTION, SOLUTION INTRAVENOUS at 11:16

## 2023-11-09 ASSESSMENT — PAIN SCALES - GENERAL: PAINLEVEL: 5

## 2023-11-12 ENCOUNTER — HOSPITAL ENCOUNTER (EMERGENCY)
Facility: HOSPITAL | Age: 78
Discharge: OTHER INSTITUTION | End: 2023-11-13
Attending: EMERGENCY MEDICINE
Payer: MEDICARE

## 2023-11-12 ENCOUNTER — APPOINTMENT (OUTPATIENT)
Dept: RADIOLOGY | Facility: HOSPITAL | Age: 78
End: 2023-11-12
Payer: MEDICARE

## 2023-11-12 DIAGNOSIS — R31.0 GROSS HEMATURIA: Primary | ICD-10-CM

## 2023-11-12 LAB
ALBUMIN SERPL BCP-MCNC: 3.3 G/DL (ref 3.4–5)
ALP SERPL-CCNC: 56 U/L (ref 33–136)
ALT SERPL W P-5'-P-CCNC: 33 U/L (ref 10–52)
ANION GAP SERPL CALC-SCNC: 9 MMOL/L (ref 10–20)
APPEARANCE UR: ABNORMAL
AST SERPL W P-5'-P-CCNC: 38 U/L (ref 9–39)
BACTERIA #/AREA URNS AUTO: ABNORMAL /HPF
BASOPHILS # BLD AUTO: 0.03 X10*3/UL (ref 0–0.1)
BASOPHILS NFR BLD AUTO: 0.6 %
BILIRUB SERPL-MCNC: 0.5 MG/DL (ref 0–1.2)
BILIRUB UR STRIP.AUTO-MCNC: NEGATIVE MG/DL
BUN SERPL-MCNC: 28 MG/DL (ref 6–23)
CALCIUM SERPL-MCNC: 8.7 MG/DL (ref 8.6–10.3)
CAOX CRY #/AREA UR COMP ASSIST: ABNORMAL /HPF
CARDIAC TROPONIN I PNL SERPL HS: 20 NG/L (ref 0–20)
CHLORIDE SERPL-SCNC: 105 MMOL/L (ref 98–107)
CO2 SERPL-SCNC: 28 MMOL/L (ref 21–32)
COLOR UR: YELLOW
CREAT SERPL-MCNC: 1.7 MG/DL (ref 0.5–1.3)
EOSINOPHIL # BLD AUTO: 0.19 X10*3/UL (ref 0–0.4)
EOSINOPHIL NFR BLD AUTO: 3.7 %
ERYTHROCYTE [DISTWIDTH] IN BLOOD BY AUTOMATED COUNT: 15.8 % (ref 11.5–14.5)
GFR SERPL CREATININE-BSD FRML MDRD: 41 ML/MIN/1.73M*2
GLUCOSE SERPL-MCNC: 278 MG/DL (ref 74–99)
GLUCOSE UR STRIP.AUTO-MCNC: ABNORMAL MG/DL
HCT VFR BLD AUTO: 36.3 % (ref 41–52)
HGB BLD-MCNC: 11.2 G/DL (ref 13.5–17.5)
HOLD SPECIMEN: NORMAL
HYALINE CASTS #/AREA URNS AUTO: ABNORMAL /LPF
IMM GRANULOCYTES # BLD AUTO: 0.02 X10*3/UL (ref 0–0.5)
IMM GRANULOCYTES NFR BLD AUTO: 0.4 % (ref 0–0.9)
INR PPP: 1.2 (ref 0.9–1.1)
KETONES UR STRIP.AUTO-MCNC: NEGATIVE MG/DL
LEUKOCYTE ESTERASE UR QL STRIP.AUTO: ABNORMAL
LYMPHOCYTES # BLD AUTO: 0.73 X10*3/UL (ref 0.8–3)
LYMPHOCYTES NFR BLD AUTO: 14.3 %
MCH RBC QN AUTO: 29.3 PG (ref 26–34)
MCHC RBC AUTO-ENTMCNC: 30.9 G/DL (ref 32–36)
MCV RBC AUTO: 95 FL (ref 80–100)
MONOCYTES # BLD AUTO: 0.42 X10*3/UL (ref 0.05–0.8)
MONOCYTES NFR BLD AUTO: 8.2 %
NEUTROPHILS # BLD AUTO: 3.72 X10*3/UL (ref 1.6–5.5)
NEUTROPHILS NFR BLD AUTO: 72.8 %
NITRITE UR QL STRIP.AUTO: NEGATIVE
NRBC BLD-RTO: ABNORMAL /100{WBCS}
PH UR STRIP.AUTO: 6 [PH]
PLATELET # BLD AUTO: 198 X10*3/UL (ref 150–450)
POTASSIUM SERPL-SCNC: 4 MMOL/L (ref 3.5–5.3)
PROT SERPL-MCNC: 5.8 G/DL (ref 6.4–8.2)
PROT UR STRIP.AUTO-MCNC: ABNORMAL MG/DL
PROTHROMBIN TIME: 13.2 SECONDS (ref 9.8–12.8)
RBC # BLD AUTO: 3.82 X10*6/UL (ref 4.5–5.9)
RBC # UR STRIP.AUTO: ABNORMAL /UL
RBC #/AREA URNS AUTO: >20 /HPF
RENAL EPI CELLS #/AREA UR COMP ASSIST: ABNORMAL /HPF
SODIUM SERPL-SCNC: 138 MMOL/L (ref 136–145)
SP GR UR STRIP.AUTO: 1.01
SQUAMOUS #/AREA URNS AUTO: ABNORMAL /HPF
UROBILINOGEN UR STRIP.AUTO-MCNC: <2 MG/DL
WBC # BLD AUTO: 5.1 X10*3/UL (ref 4.4–11.3)
WBC #/AREA URNS AUTO: ABNORMAL /HPF

## 2023-11-12 PROCEDURE — 81001 URINALYSIS AUTO W/SCOPE: CPT | Performed by: EMERGENCY MEDICINE

## 2023-11-12 PROCEDURE — 85610 PROTHROMBIN TIME: CPT | Performed by: EMERGENCY MEDICINE

## 2023-11-12 PROCEDURE — 96374 THER/PROPH/DIAG INJ IV PUSH: CPT

## 2023-11-12 PROCEDURE — 87086 URINE CULTURE/COLONY COUNT: CPT | Mod: CONLAB | Performed by: EMERGENCY MEDICINE

## 2023-11-12 PROCEDURE — 2500000004 HC RX 250 GENERAL PHARMACY W/ HCPCS (ALT 636 FOR OP/ED): Performed by: EMERGENCY MEDICINE

## 2023-11-12 PROCEDURE — 99285 EMERGENCY DEPT VISIT HI MDM: CPT | Mod: 25 | Performed by: EMERGENCY MEDICINE

## 2023-11-12 PROCEDURE — 96375 TX/PRO/DX INJ NEW DRUG ADDON: CPT

## 2023-11-12 PROCEDURE — 74176 CT ABD & PELVIS W/O CONTRAST: CPT

## 2023-11-12 PROCEDURE — 84484 ASSAY OF TROPONIN QUANT: CPT | Performed by: EMERGENCY MEDICINE

## 2023-11-12 PROCEDURE — 36415 COLL VENOUS BLD VENIPUNCTURE: CPT | Performed by: EMERGENCY MEDICINE

## 2023-11-12 PROCEDURE — 74176 CT ABD & PELVIS W/O CONTRAST: CPT | Mod: FOREIGN READ | Performed by: RADIOLOGY

## 2023-11-12 PROCEDURE — 2500000004 HC RX 250 GENERAL PHARMACY W/ HCPCS (ALT 636 FOR OP/ED)

## 2023-11-12 PROCEDURE — 85025 COMPLETE CBC W/AUTO DIFF WBC: CPT | Performed by: EMERGENCY MEDICINE

## 2023-11-12 PROCEDURE — 80053 COMPREHEN METABOLIC PANEL: CPT | Performed by: EMERGENCY MEDICINE

## 2023-11-12 RX ORDER — MORPHINE SULFATE 2 MG/ML
INJECTION, SOLUTION INTRAMUSCULAR; INTRAVENOUS
Status: COMPLETED
Start: 2023-11-12 | End: 2023-11-12

## 2023-11-12 RX ORDER — MORPHINE SULFATE 2 MG/ML
2 INJECTION, SOLUTION INTRAMUSCULAR; INTRAVENOUS ONCE
Status: COMPLETED | OUTPATIENT
Start: 2023-11-12 | End: 2023-11-12

## 2023-11-12 RX ORDER — ONDANSETRON HYDROCHLORIDE 2 MG/ML
INJECTION, SOLUTION INTRAVENOUS
Status: COMPLETED
Start: 2023-11-12 | End: 2023-11-12

## 2023-11-12 RX ORDER — ONDANSETRON HYDROCHLORIDE 2 MG/ML
4 INJECTION, SOLUTION INTRAVENOUS ONCE
Status: COMPLETED | OUTPATIENT
Start: 2023-11-12 | End: 2023-11-12

## 2023-11-12 RX ADMIN — MORPHINE SULFATE 2 MG: 2 INJECTION, SOLUTION INTRAMUSCULAR; INTRAVENOUS at 22:17

## 2023-11-12 RX ADMIN — MORPHINE SULFATE 2 MG: 2 INJECTION, SOLUTION INTRAMUSCULAR; INTRAVENOUS at 22:41

## 2023-11-12 RX ADMIN — SODIUM CHLORIDE 500 ML: 9 INJECTION, SOLUTION INTRAVENOUS at 21:46

## 2023-11-12 RX ADMIN — ONDANSETRON HYDROCHLORIDE 4 MG: 2 INJECTION, SOLUTION INTRAVENOUS at 22:17

## 2023-11-12 RX ADMIN — ONDANSETRON 4 MG: 2 INJECTION INTRAMUSCULAR; INTRAVENOUS at 22:17

## 2023-11-12 ASSESSMENT — PAIN - FUNCTIONAL ASSESSMENT
PAIN_FUNCTIONAL_ASSESSMENT: 0-10

## 2023-11-12 ASSESSMENT — PAIN SCALES - GENERAL
PAINLEVEL_OUTOF10: 0 - NO PAIN
PAINLEVEL_OUTOF10: 7
PAINLEVEL_OUTOF10: 7

## 2023-11-13 ENCOUNTER — HOSPITAL ENCOUNTER (INPATIENT)
Facility: HOSPITAL | Age: 78
LOS: 2 days | Discharge: HOME | DRG: 696 | End: 2023-11-15
Attending: INTERNAL MEDICINE | Admitting: INTERNAL MEDICINE
Payer: MEDICARE

## 2023-11-13 VITALS
OXYGEN SATURATION: 98 % | HEIGHT: 70 IN | RESPIRATION RATE: 18 BRPM | TEMPERATURE: 98.3 F | SYSTOLIC BLOOD PRESSURE: 114 MMHG | WEIGHT: 147.49 LBS | HEART RATE: 62 BPM | BODY MASS INDEX: 21.11 KG/M2 | DIASTOLIC BLOOD PRESSURE: 65 MMHG

## 2023-11-13 DIAGNOSIS — K59.00 CONSTIPATION, UNSPECIFIED CONSTIPATION TYPE: ICD-10-CM

## 2023-11-13 DIAGNOSIS — R31.0 GROSS HEMATURIA: Primary | ICD-10-CM

## 2023-11-13 DIAGNOSIS — Z45.02 IMPLANTABLE CARDIOVERTER-DEFIBRILLATOR (ICD) GENERATOR END OF LIFE: ICD-10-CM

## 2023-11-13 DIAGNOSIS — R53.1 WEAKNESS: ICD-10-CM

## 2023-11-13 DIAGNOSIS — J42 CHRONIC BRONCHITIS, UNSPECIFIED CHRONIC BRONCHITIS TYPE (MULTI): ICD-10-CM

## 2023-11-13 LAB
ALBUMIN SERPL BCP-MCNC: 3.2 G/DL (ref 3.4–5)
ANION GAP SERPL CALC-SCNC: 9 MMOL/L (ref 10–20)
APTT PPP: 32 SECONDS (ref 27–38)
BUN SERPL-MCNC: 27 MG/DL (ref 6–23)
CALCIUM SERPL-MCNC: 8.4 MG/DL (ref 8.6–10.3)
CHLORIDE SERPL-SCNC: 108 MMOL/L (ref 98–107)
CO2 SERPL-SCNC: 30 MMOL/L (ref 21–32)
CREAT SERPL-MCNC: 1.64 MG/DL (ref 0.5–1.3)
ERYTHROCYTE [DISTWIDTH] IN BLOOD BY AUTOMATED COUNT: 15.8 % (ref 11.5–14.5)
GFR SERPL CREATININE-BSD FRML MDRD: 43 ML/MIN/1.73M*2
GLUCOSE BLD MANUAL STRIP-MCNC: 131 MG/DL (ref 74–99)
GLUCOSE SERPL-MCNC: 93 MG/DL (ref 74–99)
HCT VFR BLD AUTO: 33.8 % (ref 41–52)
HGB BLD-MCNC: 10.5 G/DL (ref 13.5–17.5)
INR PPP: 1.1 (ref 0.9–1.1)
MAGNESIUM SERPL-MCNC: 2.11 MG/DL (ref 1.6–2.4)
MCH RBC QN AUTO: 29.3 PG (ref 26–34)
MCHC RBC AUTO-ENTMCNC: 31.1 G/DL (ref 32–36)
MCV RBC AUTO: 94 FL (ref 80–100)
NRBC BLD-RTO: 0 /100 WBCS (ref 0–0)
PHOSPHATE SERPL-MCNC: 3.5 MG/DL (ref 2.5–4.9)
PLATELET # BLD AUTO: 198 X10*3/UL (ref 150–450)
POTASSIUM SERPL-SCNC: 4.8 MMOL/L (ref 3.5–5.3)
PROTHROMBIN TIME: 12.4 SECONDS (ref 9.8–12.8)
RBC # BLD AUTO: 3.58 X10*6/UL (ref 4.5–5.9)
SODIUM SERPL-SCNC: 142 MMOL/L (ref 136–145)
WBC # BLD AUTO: 6.3 X10*3/UL (ref 4.4–11.3)

## 2023-11-13 PROCEDURE — 2500000004 HC RX 250 GENERAL PHARMACY W/ HCPCS (ALT 636 FOR OP/ED)

## 2023-11-13 PROCEDURE — 96361 HYDRATE IV INFUSION ADD-ON: CPT

## 2023-11-13 PROCEDURE — 84100 ASSAY OF PHOSPHORUS: CPT

## 2023-11-13 PROCEDURE — 2500000004 HC RX 250 GENERAL PHARMACY W/ HCPCS (ALT 636 FOR OP/ED): Performed by: EMERGENCY MEDICINE

## 2023-11-13 PROCEDURE — 85730 THROMBOPLASTIN TIME PARTIAL: CPT

## 2023-11-13 PROCEDURE — 94668 MNPJ CHEST WALL SBSQ: CPT

## 2023-11-13 PROCEDURE — 2500000005 HC RX 250 GENERAL PHARMACY W/O HCPCS: Performed by: EMERGENCY MEDICINE

## 2023-11-13 PROCEDURE — 36415 COLL VENOUS BLD VENIPUNCTURE: CPT

## 2023-11-13 PROCEDURE — 99222 1ST HOSP IP/OBS MODERATE 55: CPT | Performed by: NURSE PRACTITIONER

## 2023-11-13 PROCEDURE — 99223 1ST HOSP IP/OBS HIGH 75: CPT

## 2023-11-13 PROCEDURE — 2500000005 HC RX 250 GENERAL PHARMACY W/O HCPCS: Performed by: FAMILY MEDICINE

## 2023-11-13 PROCEDURE — 2500000001 HC RX 250 WO HCPCS SELF ADMINISTERED DRUGS (ALT 637 FOR MEDICARE OP)

## 2023-11-13 PROCEDURE — 83735 ASSAY OF MAGNESIUM: CPT

## 2023-11-13 PROCEDURE — 9420000001 HC RT PATIENT EDUCATION 5 MIN

## 2023-11-13 PROCEDURE — 94667 MNPJ CHEST WALL 1ST: CPT

## 2023-11-13 PROCEDURE — 96376 TX/PRO/DX INJ SAME DRUG ADON: CPT

## 2023-11-13 PROCEDURE — 82947 ASSAY GLUCOSE BLOOD QUANT: CPT

## 2023-11-13 PROCEDURE — 2500000002 HC RX 250 W HCPCS SELF ADMINISTERED DRUGS (ALT 637 FOR MEDICARE OP, ALT 636 FOR OP/ED)

## 2023-11-13 PROCEDURE — 80069 RENAL FUNCTION PANEL: CPT

## 2023-11-13 PROCEDURE — 94664 DEMO&/EVAL PT USE INHALER: CPT

## 2023-11-13 PROCEDURE — 85027 COMPLETE CBC AUTOMATED: CPT

## 2023-11-13 PROCEDURE — 1100000001 HC PRIVATE ROOM DAILY

## 2023-11-13 RX ORDER — AMIODARONE HYDROCHLORIDE 200 MG/1
200 TABLET ORAL DAILY
Status: DISCONTINUED | OUTPATIENT
Start: 2023-11-13 | End: 2023-11-15 | Stop reason: HOSPADM

## 2023-11-13 RX ORDER — POLYETHYLENE GLYCOL 3350 17 G/17G
17 POWDER, FOR SOLUTION ORAL DAILY
Status: DISCONTINUED | OUTPATIENT
Start: 2023-11-13 | End: 2023-11-15 | Stop reason: HOSPADM

## 2023-11-13 RX ORDER — ATORVASTATIN CALCIUM 80 MG/1
80 TABLET, FILM COATED ORAL DAILY
Status: DISCONTINUED | OUTPATIENT
Start: 2023-11-13 | End: 2023-11-15 | Stop reason: HOSPADM

## 2023-11-13 RX ORDER — IPRATROPIUM BROMIDE AND ALBUTEROL SULFATE 2.5; .5 MG/3ML; MG/3ML
3 SOLUTION RESPIRATORY (INHALATION)
Status: DISCONTINUED | OUTPATIENT
Start: 2023-11-13 | End: 2023-11-13

## 2023-11-13 RX ORDER — SODIUM CHLORIDE 9 MG/ML
100 INJECTION, SOLUTION INTRAVENOUS CONTINUOUS
Status: ACTIVE | OUTPATIENT
Start: 2023-11-13 | End: 2023-11-13

## 2023-11-13 RX ORDER — CIPROFLOXACIN 500 MG/1
500 TABLET ORAL 2 TIMES DAILY
Status: DISCONTINUED | OUTPATIENT
Start: 2023-11-13 | End: 2023-11-15 | Stop reason: HOSPADM

## 2023-11-13 RX ORDER — LANOLIN ALCOHOL/MO/W.PET/CERES
50 CREAM (GRAM) TOPICAL DAILY
Status: DISCONTINUED | OUTPATIENT
Start: 2023-11-13 | End: 2023-11-15 | Stop reason: HOSPADM

## 2023-11-13 RX ORDER — ROPINIROLE 1 MG/1
1 TABLET, FILM COATED ORAL NIGHTLY
Status: DISCONTINUED | OUTPATIENT
Start: 2023-11-13 | End: 2023-11-15 | Stop reason: HOSPADM

## 2023-11-13 RX ORDER — BUMETANIDE 1 MG/1
0.5 TABLET ORAL DAILY
Status: DISCONTINUED | OUTPATIENT
Start: 2023-11-13 | End: 2023-11-15 | Stop reason: HOSPADM

## 2023-11-13 RX ORDER — METOPROLOL SUCCINATE 25 MG/1
12.5 TABLET, EXTENDED RELEASE ORAL DAILY
Status: DISCONTINUED | OUTPATIENT
Start: 2023-11-13 | End: 2023-11-15 | Stop reason: HOSPADM

## 2023-11-13 RX ORDER — SODIUM CHLORIDE 9 MG/ML
125 INJECTION, SOLUTION INTRAVENOUS CONTINUOUS
Status: DISCONTINUED | OUTPATIENT
Start: 2023-11-13 | End: 2023-11-13 | Stop reason: HOSPADM

## 2023-11-13 RX ORDER — IPRATROPIUM BROMIDE AND ALBUTEROL SULFATE 2.5; .5 MG/3ML; MG/3ML
3 SOLUTION RESPIRATORY (INHALATION)
Status: DISCONTINUED | OUTPATIENT
Start: 2023-11-13 | End: 2023-11-15 | Stop reason: HOSPADM

## 2023-11-13 RX ORDER — MORPHINE SULFATE 2 MG/ML
2 INJECTION, SOLUTION INTRAMUSCULAR; INTRAVENOUS ONCE
Status: COMPLETED | OUTPATIENT
Start: 2023-11-13 | End: 2023-11-13

## 2023-11-13 RX ORDER — TAMSULOSIN HYDROCHLORIDE 0.4 MG/1
0.8 CAPSULE ORAL NIGHTLY
Status: DISCONTINUED | OUTPATIENT
Start: 2023-11-13 | End: 2023-11-15 | Stop reason: HOSPADM

## 2023-11-13 RX ORDER — ESCITALOPRAM OXALATE 10 MG/1
10 TABLET ORAL DAILY
Status: DISCONTINUED | OUTPATIENT
Start: 2023-11-13 | End: 2023-11-15 | Stop reason: HOSPADM

## 2023-11-13 RX ORDER — FORMOTEROL FUMARATE DIHYDRATE 20 UG/2ML
20 SOLUTION RESPIRATORY (INHALATION)
Status: DISCONTINUED | OUTPATIENT
Start: 2023-11-13 | End: 2023-11-15 | Stop reason: HOSPADM

## 2023-11-13 RX ORDER — POTASSIUM CHLORIDE 20 MEQ/1
20 TABLET, EXTENDED RELEASE ORAL DAILY
Status: DISCONTINUED | OUTPATIENT
Start: 2023-11-13 | End: 2023-11-15 | Stop reason: HOSPADM

## 2023-11-13 RX ORDER — MORPHINE SULFATE 2 MG/ML
INJECTION, SOLUTION INTRAMUSCULAR; INTRAVENOUS
Status: COMPLETED
Start: 2023-11-13 | End: 2023-11-13

## 2023-11-13 RX ORDER — FINASTERIDE 5 MG/1
5 TABLET, FILM COATED ORAL DAILY
Status: DISCONTINUED | OUTPATIENT
Start: 2023-11-13 | End: 2023-11-15 | Stop reason: HOSPADM

## 2023-11-13 RX ORDER — ACETAMINOPHEN 325 MG/1
975 TABLET ORAL EVERY 6 HOURS
Status: DISCONTINUED | OUTPATIENT
Start: 2023-11-13 | End: 2023-11-15 | Stop reason: HOSPADM

## 2023-11-13 RX ADMIN — APIXABAN 2.5 MG: 2.5 TABLET, FILM COATED ORAL at 21:29

## 2023-11-13 RX ADMIN — MORPHINE SULFATE 2 MG: 2 INJECTION, SOLUTION INTRAMUSCULAR; INTRAVENOUS at 00:09

## 2023-11-13 RX ADMIN — TAMSULOSIN HYDROCHLORIDE 0.8 MG: 0.4 CAPSULE ORAL at 21:28

## 2023-11-13 RX ADMIN — CIPROFLOXACIN 500 MG: 500 TABLET, FILM COATED ORAL at 08:25

## 2023-11-13 RX ADMIN — ATORVASTATIN CALCIUM 80 MG: 80 TABLET, FILM COATED ORAL at 08:22

## 2023-11-13 RX ADMIN — Medication 2 L/MIN: at 08:59

## 2023-11-13 RX ADMIN — SODIUM CHLORIDE 100 ML/HR: 9 INJECTION, SOLUTION INTRAVENOUS at 07:46

## 2023-11-13 RX ADMIN — METOPROLOL SUCCINATE 12.5 MG: 25 TABLET, EXTENDED RELEASE ORAL at 08:23

## 2023-11-13 RX ADMIN — BUMETANIDE 0.5 MG: 1 TABLET ORAL at 08:24

## 2023-11-13 RX ADMIN — PYRIDOXINE HCL TAB 50 MG 50 MG: 50 TAB at 08:25

## 2023-11-13 RX ADMIN — AMIODARONE HYDROCHLORIDE 200 MG: 200 TABLET ORAL at 08:22

## 2023-11-13 RX ADMIN — ACETAMINOPHEN 975 MG: 325 TABLET ORAL at 13:36

## 2023-11-13 RX ADMIN — ESCITALOPRAM OXALATE 10 MG: 10 TABLET ORAL at 08:22

## 2023-11-13 RX ADMIN — CIPROFLOXACIN 500 MG: 500 TABLET, FILM COATED ORAL at 21:29

## 2023-11-13 RX ADMIN — SODIUM CHLORIDE 125 ML/HR: 9 INJECTION, SOLUTION INTRAVENOUS at 00:05

## 2023-11-13 RX ADMIN — TIOTROPIUM BROMIDE INHALATION SPRAY 2 PUFF: 3.12 SPRAY, METERED RESPIRATORY (INHALATION) at 08:32

## 2023-11-13 RX ADMIN — FINASTERIDE 5 MG: 5 TABLET, FILM COATED ORAL at 08:24

## 2023-11-13 RX ADMIN — Medication: at 00:01

## 2023-11-13 RX ADMIN — ACETAMINOPHEN 975 MG: 325 TABLET ORAL at 18:55

## 2023-11-13 SDOH — SOCIAL STABILITY: SOCIAL INSECURITY: ABUSE: ADULT

## 2023-11-13 SDOH — SOCIAL STABILITY: SOCIAL INSECURITY: DO YOU FEEL UNSAFE GOING BACK TO THE PLACE WHERE YOU ARE LIVING?: NO

## 2023-11-13 SDOH — SOCIAL STABILITY: SOCIAL INSECURITY: ARE THERE ANY APPARENT SIGNS OF INJURIES/BEHAVIORS THAT COULD BE RELATED TO ABUSE/NEGLECT?: NO

## 2023-11-13 SDOH — SOCIAL STABILITY: SOCIAL INSECURITY: DOES ANYONE TRY TO KEEP YOU FROM HAVING/CONTACTING OTHER FRIENDS OR DOING THINGS OUTSIDE YOUR HOME?: NO

## 2023-11-13 SDOH — SOCIAL STABILITY: SOCIAL INSECURITY: HAVE YOU HAD THOUGHTS OF HARMING ANYONE ELSE?: NO

## 2023-11-13 SDOH — SOCIAL STABILITY: SOCIAL INSECURITY: WERE YOU ABLE TO COMPLETE ALL THE BEHAVIORAL HEALTH SCREENINGS?: YES

## 2023-11-13 SDOH — SOCIAL STABILITY: SOCIAL INSECURITY: ARE YOU OR HAVE YOU BEEN THREATENED OR ABUSED PHYSICALLY, EMOTIONALLY, OR SEXUALLY BY ANYONE?: NO

## 2023-11-13 SDOH — SOCIAL STABILITY: SOCIAL INSECURITY: HAS ANYONE EVER THREATENED TO HURT YOUR FAMILY OR YOUR PETS?: NO

## 2023-11-13 SDOH — SOCIAL STABILITY: SOCIAL INSECURITY: DO YOU FEEL ANYONE HAS EXPLOITED OR TAKEN ADVANTAGE OF YOU FINANCIALLY OR OF YOUR PERSONAL PROPERTY?: NO

## 2023-11-13 ASSESSMENT — COGNITIVE AND FUNCTIONAL STATUS - GENERAL
DAILY ACTIVITIY SCORE: 24
PATIENT BASELINE BEDBOUND: NO
DAILY ACTIVITIY SCORE: 21
CLIMB 3 TO 5 STEPS WITH RAILING: A LITTLE
MOVING TO AND FROM BED TO CHAIR: A LITTLE
DAILY ACTIVITIY SCORE: 24
MOBILITY SCORE: 23
CLIMB 3 TO 5 STEPS WITH RAILING: A LITTLE
DRESSING REGULAR LOWER BODY CLOTHING: A LITTLE
TOILETING: A LITTLE
HELP NEEDED FOR BATHING: A LITTLE
STANDING UP FROM CHAIR USING ARMS: A LITTLE
MOBILITY SCORE: 23
CLIMB 3 TO 5 STEPS WITH RAILING: A LITTLE

## 2023-11-13 ASSESSMENT — PATIENT HEALTH QUESTIONNAIRE - PHQ9
1. LITTLE INTEREST OR PLEASURE IN DOING THINGS: NOT AT ALL
SUM OF ALL RESPONSES TO PHQ9 QUESTIONS 1 & 2: 0
2. FEELING DOWN, DEPRESSED OR HOPELESS: NOT AT ALL

## 2023-11-13 ASSESSMENT — ACTIVITIES OF DAILY LIVING (ADL)
JUDGMENT_ADEQUATE_SAFELY_COMPLETE_DAILY_ACTIVITIES: YES
ADEQUATE_TO_COMPLETE_ADL: YES
WALKS IN HOME: INDEPENDENT
HEARING - LEFT EAR: FUNCTIONAL
TOILETING: INDEPENDENT
BATHING: INDEPENDENT
GROOMING: INDEPENDENT
LACK_OF_TRANSPORTATION: NO
HEARING - RIGHT EAR: FUNCTIONAL
DRESSING YOURSELF: INDEPENDENT
PATIENT'S MEMORY ADEQUATE TO SAFELY COMPLETE DAILY ACTIVITIES?: YES
FEEDING YOURSELF: INDEPENDENT

## 2023-11-13 ASSESSMENT — LIFESTYLE VARIABLES
SKIP TO QUESTIONS 9-10: 1
HOW OFTEN DO YOU HAVE 6 OR MORE DRINKS ON ONE OCCASION: NEVER
AUDIT-C TOTAL SCORE: 0
HOW OFTEN DO YOU HAVE A DRINK CONTAINING ALCOHOL: NEVER
HOW MANY STANDARD DRINKS CONTAINING ALCOHOL DO YOU HAVE ON A TYPICAL DAY: PATIENT DOES NOT DRINK
AUDIT-C TOTAL SCORE: 0

## 2023-11-13 ASSESSMENT — PAIN SCALES - GENERAL
PAINLEVEL_OUTOF10: 5 - MODERATE PAIN
PAINLEVEL_OUTOF10: 0 - NO PAIN
PAINLEVEL_OUTOF10: 5 - MODERATE PAIN
PAINLEVEL_OUTOF10: 7

## 2023-11-13 ASSESSMENT — PAIN - FUNCTIONAL ASSESSMENT: PAIN_FUNCTIONAL_ASSESSMENT: 0-10

## 2023-11-13 NOTE — CONSULTS
Reason For Consult  Hematuria     History Of Present Illness  Andrez Damon is a 78 y.o. male with a past medical history of PE on Eliquis, HTN, BPH, CHF, CKD, CAD s/p stent placement x3, pacemaker and debfribriator, COPD/emphysema (on home O2 current smoker who presented initially to [place] ED on account of gross hematuria.   The patient was his usual state of health until about 6 pm (11/12) today after having dinner when he started experiencing bleeding from the urinary catheter which he had changed about one week ago. He says that he was able to empty about 100 cc of urine, then suddenly after sometime, he saw another 250cc of gross hematuria in the bag.  He denies trauma, falls, abdominal injury, fever, chills, diaphoresis, changes in bowel/urinary habits.  He does endorse reduced urinary flow, urgency and use of blood thinners, Brilinta and Eliquis.  Currently his urine is clear yellow.  No hematuria.  He is on Proscar and Flomax.     Past Medical History  He has a past medical history of Arthritis, Body mass index (BMI) 20.0-20.9, adult (09/21/2021), Body mass index (BMI) 21.0-21.9, adult (04/14/2021), CHF (congestive heart failure) (CMS/[facility]), COPD (chronic obstructive pulmonary disease) (CMS/[facility]), Coronary artery disease, Diabetes mellitus (CMS/[facility]), Hypertension, Major depressive disorder, recurrent, mild (CMS/[facility]) (11/11/2020), Major depressive disorder, recurrent, unspecified (CMS/[facility]) (01/13/2021), Other conditions influencing health status, Other specified anxiety disorders (05/19/2020), Oxygen desaturation during sleep, Personal history of nicotine dependence (07/12/2021), Personal history of other diseases of the circulatory system, Personal history of other diseases of the musculoskeletal system and connective tissue, Personal history of other diseases of urinary system (09/21/2021), Personal history of other endocrine, nutritional and metabolic disease (08/29/2019), Pneumonia, unspecified  organism (12/09/2019), and Thoracic aortic aneurysm, without rupture, unspecified (CMS/HCC) (05/29/2019).    Surgical History  He has a past surgical history that includes CT angio abdomen pelvis w and or wo IV IV contrast (6/6/2019); CT angio head w and wo IV contrast (10/15/2020); Other surgical history (04/17/2019); Hernia repair (04/24/2014); Back surgery (01/24/2014); Total hip arthroplasty (01/24/2014); Coronary angioplasty with stent (01/24/2014); and Other surgical history (01/24/2014).     Social History  He reports that he has been smoking cigarettes. He has a 17.00 pack-year smoking history. He has never used smokeless tobacco. He reports that he does not drink alcohol and does not use drugs.    Family History  Family History   Problem Relation Name Age of Onset    Other (cardiac disorder) Mother      Other (malignant neoplasm) Mother      Other (cardiac disorder) Father          Allergies  Meloxicam and Celecoxib    Review of Systems  12 point ROS completed and no additional findings noted aside from what is listed in the HPI.     Physical Exam  Physical Exam  HENT:      Head: Normocephalic and atraumatic.   Eyes:      Extraocular Movements: Extraocular movements intact.      Conjunctiva/sclera: Conjunctivae normal.      Pupils: Pupils are equal, round, and reactive to light.   Cardiovascular:      Rate and Rhythm: Normal rate and regular rhythm.      Pulses: Normal pulses.   Pulmonary:      Effort: Pulmonary effort is normal.      Comments: On supplemental O2  Abdominal:      General: Bowel sounds are normal.      Palpations: Abdomen is soft.   Genitourinary:     Comments: Kee in place with clear yellow urine  Skin:     General: Skin is warm and dry.      Capillary Refill: Capillary refill takes less than 2 seconds.   Neurological:      General: No focal deficit present.      Mental Status: He is alert and oriented to person, place, and time.           Last Recorded Vitals  Blood pressure 116/67,  "pulse 65, temperature 36 °C (96.8 °F), temperature source Temporal, resp. rate 16, height 1.778 m (5' 10\"), weight 67.5 kg (148 lb 12.8 oz), SpO2 100 %.    Relevant Results      Scheduled medications  acetaminophen, 975 mg, oral, q6h  amiodarone, 200 mg, oral, Daily  apixaban, 2.5 mg, oral, BID  atorvastatin, 80 mg, oral, Daily  bumetanide, 0.5 mg, oral, Daily  ciprofloxacin, 500 mg, oral, BID  escitalopram, 10 mg, oral, Daily  finasteride, 5 mg, oral, Daily  tiotropium, 2 Inhalation, inhalation, Daily   And  formoterol, 20 mcg, nebulization, q12h  ipratropium-albuteroL, 3 mL, nebulization, TID  metoprolol succinate XL, 12.5 mg, oral, Daily  polyethylene glycol, 17 g, oral, Daily  potassium chloride CR, 20 mEq, oral, Daily  pyridoxine, 50 mg, oral, Daily  [Held by provider] rOPINIRole, 1 mg, oral, Nightly  tamsulosin, 0.8 mg, oral, Nightly  [Held by provider] ticagrelor, 90 mg, oral, BID      Continuous medications     PRN medications  PRN medications: oxygen  Results for orders placed or performed during the hospital encounter of 11/13/23 (from the past 24 hour(s))   Coagulation Screen   Result Value Ref Range    Protime 12.4 9.8 - 12.8 seconds    INR 1.1 0.9 - 1.1    aPTT 32 27 - 38 seconds   CBC   Result Value Ref Range    WBC 6.3 4.4 - 11.3 x10*3/uL    nRBC 0.0 0.0 - 0.0 /100 WBCs    RBC 3.58 (L) 4.50 - 5.90 x10*6/uL    Hemoglobin 10.5 (L) 13.5 - 17.5 g/dL    Hematocrit 33.8 (L) 41.0 - 52.0 %    MCV 94 80 - 100 fL    MCH 29.3 26.0 - 34.0 pg    MCHC 31.1 (L) 32.0 - 36.0 g/dL    RDW 15.8 (H) 11.5 - 14.5 %    Platelets 198 150 - 450 x10*3/uL   Renal Function Panel   Result Value Ref Range    Glucose 93 74 - 99 mg/dL    Sodium 142 136 - 145 mmol/L    Potassium 4.8 3.5 - 5.3 mmol/L    Chloride 108 (H) 98 - 107 mmol/L    Bicarbonate 30 21 - 32 mmol/L    Anion Gap 9 (L) 10 - 20 mmol/L    Urea Nitrogen 27 (H) 6 - 23 mg/dL    Creatinine 1.64 (H) 0.50 - 1.30 mg/dL    eGFR 43 (L) >60 mL/min/1.73m*2    Calcium 8.4 (L) 8.6 - " 10.3 mg/dL    Phosphorus 3.5 2.5 - 4.9 mg/dL    Albumin 3.2 (L) 3.4 - 5.0 g/dL   Magnesium   Result Value Ref Range    Magnesium 2.11 1.60 - 2.40 mg/dL        Assessment/Plan   78 year old male admitted for hematuria with history of BPH, not a surgical candidate.    - continue Proscar and Flomax  - trial removal of caruso  - post-void check  - if patient's fails caruso removal, replace caruso  - follow up with Dr. Piedra outpatient    Discussed with Dr. Tadeo Elizalde, APRN-CNP

## 2023-11-13 NOTE — CARE PLAN
The patient's goals for the shift include      The clinical goals for the shift include clear urine    Over the shift, the patient did not make progress toward the following goals. Barriers to progression include ***. Recommendations to address these barriers include ***.

## 2023-11-13 NOTE — ED PROVIDER NOTES
HPI   Chief Complaint   Patient presents with    catheter issue     Pt to ED with blood in urine with indwelling caruso placed last week here. Pt has prostates issues, has had caruso since July 2023. Vitals WNL on arrival on RA       Chief complaint I am bleeding through my Caruso catheter  History of present illness patient states that at 6 PM today he started bleeding bright red blood into his Caruso catheter which has been placed approximately 1 week ago.  He has had several Caruso catheters placed since August for urinary difficulties due to enlarged prostate.  Patient has not had any recent surgery on his prostate.  The patient states that he is on anticoagulants including Eliquis and Brilinta due to pulmonary emboli and heart disease.  The patient denies any traumatic dislodgment of the catheter he just noted blood pouring into the Caruso bag and states that he has now had 2 full Caruso bags full of blood with a few clots.  Patient denies any pain or difficulty voiding no abdominal pain no chest pain no shortness of breath.  Patient does have history of coronary disease stent placement x3 and a pacer defibrillator.  He states his pacemaker defibrillator has not fired recently.  He is on home oxygen as needed he does have COPD emphysema and continues to smoke.  I have advised him to stop smoking.  He is currently on Cipro 500 mg twice daily for the past 6 days.  Denies any fever or chills    Past medical history as described above   physical exam:    General: Vitals noted, no distress. Afebrile. Alert and oriented  x 4 .  Pupils equal and reactive bilaterally    EENT: TMs clear. Posterior oropharynx unremarkable. No meningismus. No LAD.     Cardiac: Regular, rate, rhythm, no murmurs rubs or gallops.  Pacer defibrillator device noted in the left upper chest    Pulmonary: Lungs clear bilaterally with good aeration. No adventitious breath sounds. No wheezes rales or rhonchi.     Abdomen: Soft, nonsurgical. Nontender. No  peritoneal signs. Normoactive bowel sounds. No pulsatile masses.  Patient has a Kee catheter inserted and the Kee bag is full of red blood    Extremities: No peripheral edema. Negative Homans bilaterally, no cords.    Skin: No rash. Intact.     Neuro: No focal neurologic deficits, NIH score of 0. Cranial nerves normal as tested from II through XII.                               V[place]can Coma Scale Score: 15                  Patient History   Past Medical History:   Diagnosis Date    Arthritis     Body mass index (BMI) 20.0-20.9, adult 09/21/2021    Body mass index (BMI) of 20.0 to 20.9 in adult    Body mass index (BMI) 21.0-21.9, adult 04/14/2021    Body mass index (BMI) of 21.0 to 21.9 in adult    CHF (congestive heart failure) (CMS/[facility])     COPD (chronic obstructive pulmonary disease) (CMS/[facility])     Coronary artery disease     Diabetes mellitus (CMS/[facility])     Hypertension     Major depressive disorder, recurrent, mild (CMS/[facility]) 11/11/2020    Mild episode of recurrent major depressive disorder    Major depressive disorder, recurrent, unspecified (CMS/[facility]) 01/13/2021    Recurrent major depressive disorder, remission status unspecified    Other conditions influencing health status     Swelling Of Hands    Other specified anxiety disorders 05/19/2020    Depression with anxiety    Oxygen desaturation during sleep     wears 2L at HS    Personal history of nicotine dependence 07/12/2021    History of nicotine dependence    Personal history of other diseases of the circulatory system     History of cardiac disorder    Personal history of other diseases of the musculoskeletal system and connective tissue     History of arthritis    Personal history of other diseases of urinary system 09/21/2021    History of chronic kidney disease    Personal history of other endocrine, nutritional and metabolic disease 08/29/2019    History of hyperglycemia    Pneumonia, unspecified organism 12/09/2019    Atypical pneumonia    Thoracic  aortic aneurysm, without rupture, unspecified (CMS/[facility]) 05/29/2019    Thoracic aortic aneurysm without rupture     Past Surgical History:   Procedure Laterality Date    BACK SURGERY  01/24/2014    Back Surgery    CORONARY ANGIOPLASTY WITH STENT PLACEMENT  01/24/2014    Cath Stent Placement    CT ABDOMEN PELVIS ANGIOGRAM W AND/OR WO IV CONTRAST  6/6/2019    CT ABDOMEN PELVIS ANGIOGRAM W AND/OR WO IV CONTRAST 6/6/2019 [facility]    CT HEAD ANGIO W AND WO IV CONTRAST  10/15/2020    CT HEAD ANGIO W AND WO IV CONTRAST 10/15/2020 [facility]    HERNIA REPAIR  04/24/2014    Hernia Repair    OTHER SURGICAL HISTORY  04/17/2019    Abdominal surgery    OTHER SURGICAL HISTORY  01/24/2014    Defibrillation    TOTAL HIP ARTHROPLASTY  01/24/2014    Hip Replacement     Family History   Problem Relation Name Age of Onset    Other (cardiac disorder) Mother      Other (malignant neoplasm) Mother      Other (cardiac disorder) Father       Social History     Tobacco Use    Smoking status: Every Day     Packs/day: 0.25     Years: 68.00     Additional pack years: 0.00     Total pack years: 17.00     Types: Cigarettes    Smokeless tobacco: Never   Substance Use Topics    Alcohol use: Never    Drug use: Never       Physical Exam   ED Triage Vitals [11/12/23 2042]   Temp Heart Rate Resp BP   36.8 °C (98.3 °F) 80 19 119/61      SpO2 Temp Source Heart Rate Source Patient Position   100 % Temporal -- --      BP Location FiO2 (%)     -- --       Physical Exam    ED Course & [facility]   ED Course as of 11/12/23 2300   Sun Nov 12, 2023 2208 The patient is complaining of some discomfort during his irrigation process we will provide morphine 2 mg IV and Zofran 4 mg IV [AG]   2208 INR is 1.2 White blood count 5100 white blood count 5100 hemoglobin 11.2 hematocrit 36.3 platelet count 198,000 [AG]   2233 EKG interpreted by me normal sinus rhythm nonspecific ST-T wave changes, rate 61, left axis deviation, no acute sign of STEMI.   Interventricular conduction delay [AG]   2234 I have reviewed the results of the urinalysis which reveals large amount of blood and trace amount of leukocyte esterase but negative nitrites.  Patient has glucose of 278 creatinine 1.7 BUN 28 [AG]   2242 I did send a communication to Dr. Piedra the patient's urologist at approximately 10:42 PM this patient is on Eliquis and Brilinta and I am concerned he is going to require long-term irrigation and might need to be transferred to a hospital with the urology is on staff [AG]   2245 Bed request made at 1045 pm for transfer to another facility for hospitalist and Urologist. Dr Piedra yet to respond [AG]   2257 Dr Piedra suggests send to [facility] [AG]      ED Course User Index  [AG] Earl Whyte MD       Medical Decision Making  We are currently irrigating this patient's bladder with a three-way Kee catheter.  I am concerned because this patient is on Brilinta and Eliquis and had significant amount of bleeding prior to our irrigation efforts.    Procedure  Procedures     Earl Whyte MD  11/12/23 2125       Earl Whyte MD  11/12/23 0326

## 2023-11-13 NOTE — H&P
Internal Medicine:  Initial History and Physical Note     Patient: Andrez Damon, Age: 78 y.o., SEX: male, MRN:[id number], ROOM:142/142-A, Code:DNR and No Intubation                                                                 Admitted On: 11/13/2023                                                                  Admitting Dx: Gross hematuria [R31.0]                                                           PCP: Karley Sullivan, APRN-CNP, DNP        Attending: mAador Medrano MD       Subjective:   No chief complaint on file.      HPI:   Andrez Damon is a 78 y.o. 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    The patient was his usual state of health until about 6 pm (11/12) today after having dinner when he started experiencing bleeding from the urinary catheter which he had changed about one week ago. He says that he was able to empty about 100 cc of urine, then suddenly after sometime, he saw another 250cc of gross hematuria in the bag.  He denies trauma, falls, abdominal injury, fever, chills,rigors, diaphoresis, changes in bowel/urinary habits. There is no nausea, vomiting, nocturia, HA, LOC, seizure like activities, numbness, tingling or loss of sensation in Lower Ext.     He however endorses reduced urinary flow, urgency and use of blood thinners, Brilanta and Eliquis.    In the ED in [place], upon arriaval, he complained of discomfort during irrigation of the caruso, Dr Piedra was consulted who advised the patient be transferred to [facility] for continued management.   Of note he was he was discharged 10/31 from [facility] after being treated for sepsis. At the time , he had a UTI, caruso changed and he was on keflex was later changed to ciprofloxacin for 14 days.     Of note the patient is due to change  battery for pacemaker on 12/15 and for routinely gets IV iron  and Vit B12 inj for chronic anemia.     ED course: [place] [facility]  Vitals: T 36, Pulse 65, RR 16, BzP 116/67mmHg, spo2 100 on NC 2L    Labs;   Cbc wbc HB 11.2, HCT 36, plt 198  Chem; BUN/Scr 28/1.7 base line scr 1.49, Alb 3.3,   Troponin 20  INR 1.2, protime 13.2    Imaging:   CTA: No hydronephrosis or obstructing ureteral stone.  Increased fecal matter in the colon, suggestive of constipation.    EKG NSR, Rate 61, no st segement changes    UA; trace LE, neg nitrites,   Urine culture: Pending    Intervention: Morphine 2mg IV, Zofran 4mg IV    ROS: 12 points review of system is negative except as stated in the HPI above.      Past Medical History:     has a past medical history of Arthritis, Body mass index (BMI) 20.0-20.9, adult (09/21/2021), Body mass index (BMI) 21.0-21.9, adult (04/14/2021), CHF (congestive heart failure) (CMS/Formerly [facility]), COPD (chronic obstructive pulmonary disease) (CMS/Formerly [facility]), Coronary artery disease, Diabetes mellitus (CMS/Formerly [facility]), Hypertension, Major depressive disorder, recurrent, mild (CMS/HCC) (11/11/2020), Major depressive disorder, recurrent, unspecified (CMS/HCC) (01/13/2021), Other conditions influencing health status, Other specified anxiety disorders (05/19/2020), Oxygen desaturation during sleep, Personal history of nicotine dependence (07/12/2021), Personal history of other diseases of the circulatory system, Personal history of other diseases of the musculoskeletal system and connective tissue, Personal history of other diseases of urinary system (09/21/2021), Personal history of other endocrine, nutritional and metabolic disease (08/29/2019), Pneumonia, unspecified organism (12/09/2019), and Thoracic aortic aneurysm, without rupture, unspecified (CMS/Formerly [facility]) (05/29/2019).    Allergies   Allergen Reactions    Meloxicam Shortness of breath    Celecoxib Unknown        Meds:   Medications Prior to Admission   Medication Sig Dispense  Refill Last Dose    amiodarone (Pacerone) 200 mg tablet Take 1 tablet (200 mg) by mouth once daily.       apixaban (Eliquis) 2.5 mg tablet Take 1 tablet (2.5 mg) by mouth 2 times a day.       atorvastatin (Lipitor) 80 mg tablet TAKE 1 TABLET BY MOUTH EVERY DAY 90 tablet 0     Brilinta 90 mg tablet TAKE 1 TABLET BY MOUTH TWICE A DAY AS DIRECTED 60 tablet 5     bumetanide (Bumex) 0.5 mg tablet TAKE 1 TABLET BY MOUTH ONCE DAILY. 90 tablet 2     cholecalciferol (Vitamin D-3) 125 MCG (5000 UT) capsule Take 1 capsule (125 mcg) by mouth once daily. 90 capsule 0     ciprofloxacin (Cipro) 500 mg tablet Take 1 tablet (500 mg) by mouth 2 times a day for 14 days. 28 tablet 0     escitalopram (Lexapro) 10 mg tablet TAKE 1 TABLET BY MOUTH EVERY DAY 90 tablet 0     finasteride (Proscar) 5 mg tablet Take 1 tablet (5 mg) by mouth once daily.       levalbuterol (Xopenex) 45 mcg/actuation inhaler INHALE 1 TO 2 PUFFS EVERY 4 TO 6 HOURS AS NEEDED AND AS DIRECTED.       metoprolol succinate XL (Toprol-XL) 25 mg 24 hr tablet Take 0.5 tablets (12.5 mg) by mouth once daily. Do not crush or chew.       potassium chloride CR (K-Tab) 20 mEq ER tablet Take 1 tablet (20 mEq) by mouth once daily.       pyridoxine (Vitamin B-6) 50 mg tablet TAKE 1 TABLET BY MOUTH EVERY DAY 90 tablet 0     rOPINIRole (Requip) 1 mg tablet TAKE 1 TABLET (1 MG) BY MOUTH ONCE DAILY AT BEDTIME. 90 tablet 0     tamsulosin (Flomax) 0.4 mg 24 hr capsule Take 2 capsules (0.8 mg) by mouth once daily at bedtime. 180 capsule 0     tiotropium-olodateroL (Stiolto Respimat) 2.5-2.5 mcg/actuation mist inhaler Inhale 2 Inhalations once daily.          Past surgical history:      Past Surgical History:   Procedure Laterality Date    BACK SURGERY  01/24/2014    Back Surgery    CORONARY ANGIOPLASTY WITH STENT PLACEMENT  01/24/2014    Cath Stent Placement    CT ABDOMEN PELVIS ANGIOGRAM W AND/OR WO IV CONTRAST  6/6/2019    CT ABDOMEN PELVIS ANGIOGRAM W AND/OR WO IV CONTRAST 6/6/2019 GEN  ANCILLARY LEGACY    CT HEAD ANGIO W AND WO IV CONTRAST  10/15/2020    CT HEAD ANGIO W AND WO IV CONTRAST 10/15/2020 [facility] ANCILLARY LEGAC[facility]    HERNIA REPAIR  04/24/2014    Hernia Repair    OTHER SURGICAL HISTORY  04/17/2019    Abdominal surgery    OTHER SURGICAL HISTORY  01/24/2014    Defibrillation    TOTAL HIP ARTHROPLASTY  01/24/2014    Hip Replacement        Social history:      Social History     Socioeconomic History    Marital status:      Spouse name: Not on file    Number of children: Not on file    Years of education: Not on file    Highest education level: Not on file   Occupational History    Not on file   Tobacco Use    Smoking status: Every Day     Packs/day: 0.25     Years: 68.00     Additional pack years: 0.00     Total pack years: 17.00     Types: Cigarettes    Smokeless tobacco: Never   Substance and Sexual Activity    Alcohol use: Never    Drug use: Never    Sexual activity: Defer   Other Topics Concern    Not on file   Social History Narrative    Not on file     Social Determinants of Health     Financial Resource Strain: Low Risk  (11/13/2023)    Overall Financial Resource Strain (CARDIA)     Difficulty of Paying Living Expenses: Not very hard   Food Insecurity: Not on file   Transportation Needs: No Transportation Needs (11/13/2023)    PRAPARE - Transportation     Lack of Transportation (Medical): No     Lack of Transportation (Non-Medical): No   Physical Activity: Not on file   Stress: Not on file   Social Connections: Not on file   Intimate Partner Violence: Not on file   Housing Stability: Low Risk  (11/13/2023)    Housing Stability Vital Sign     Unable to Pay for Housing in the Last Year: No     Number of Places Lived in the Last Year: 1     Unstable Housing in the Last Year: No        Current medications:      Current Facility-Administered Medications:     amiodarone (Pacerone) tablet 200 mg, 200 mg, oral, Daily, Roberto Carlson MD    [Held by provider] apixaban (Eliquis) tablet  "2.5 mg, 2.5 mg, oral, BID, Roberto Carlson MD    atorvastatin (Lipitor) tablet 80 mg, 80 mg, oral, Daily, Roberto Carlson MD    bumetanide (Bumex) tablet 0.5 mg, 0.5 mg, oral, Daily, Roberto Carlson MD    ciprofloxacin (Cipro) tablet 500 mg, 500 mg, oral, BID, Roberto Carlson MD    escitalopram (Lexapro) tablet 10 mg, 10 mg, oral, Daily, Roberto Carlson MD    finasteride (Proscar) tablet 5 mg, 5 mg, oral, Daily, Roberto Carlson MD    tiotropium (Spiriva Respimat) 2.5 mcg/actuation inhaler 2 puff, 2 Inhalation, inhalation, Daily **AND** formoterol (Perforomist) 20 mcg/2 mL nebulizer solution 20 mcg, 20 mcg, nebulization, q12h, Roberto Carlson MD    metoprolol succinate XL (Toprol-XL) 24 hr tablet 12.5 mg, 12.5 mg, oral, Daily, Roberto Carlson MD    polyethylene glycol (Glycolax, Miralax) packet 17 g, 17 g, oral, Daily, Roberto Carlson MD    potassium chloride CR (Klor-Con M20) ER tablet 20 mEq, 20 mEq, oral, Daily, Roberto Carlson MD    pyridoxine (Vitamin B-6) tablet 50 mg, 50 mg, oral, Daily, Roberto Carlson MD    [Held by provider] rOPINIRole (Requip) tablet 1 mg, 1 mg, oral, Nightly, Roberto Carlson MD    tamsulosin (Flomax) 24 hr capsule 0.8 mg, 0.8 mg, oral, Nightly, Roberto Carlson MD    [Held by provider] ticagrelor (Brilinta) tablet 90 mg, 90 mg, oral, BID, Roberto Carlson MD      Objective:    Vitals: Blood pressure 116/67, pulse 65, temperature 36 °C (96.8 °F), temperature source Temporal, resp. rate 16, height 1.778 m (5' 10\"), weight 67.5 kg (148 lb 12.8 oz), SpO2 100 %.   I/Os: No intake or output data in the 24 hours ending 11/13/23 5890    Physical Examination:  GE: lying comfortable in bed, in NAD  HEENT; AT/NC, no conjunctival pallor, anicteric sclera, moist mucous membrane  Neck; supple neck, no LAD/JVD  Chest; Good air entry b/l, no adventitious sounds heard, normal rise and fall of thoracis cavity during each respiratory cycle.  CVS; s1 and s2, no MGR, " RRR  Abd; soft, NT/ND, +BS, no organomegaly, No guarding/rebound tenderness,mild tenderness in the suprapubic region, distended bladder, valdes color fluid in urinary bag  Ext; trace pedal edema, no acrocyanosis  Neuro; Aox3, Cn 2-12 intact, sensation intact, Motor 5/5 globally  Psych; normal mood/affect.        Laboratory:    Laboratory finding:   Lab Results   Component Value Date    WBC 5.1 11/12/2023    WBC 7.2 10/26/2023    WBC 6.5 10/25/2023    HGB 11.2 (L) 11/12/2023    HGB 9.8 (L) 10/26/2023    HGB 9.2 (L) 10/25/2023    MCV 95 11/12/2023    MCV 95 10/26/2023    MCV 96 10/25/2023     11/12/2023     10/26/2023     10/25/2023    HCT 36.3 (L) 11/12/2023    HCT 30.8 (L) 10/26/2023    HCT 29.3 (L) 10/25/2023     Lab Results   Component Value Date     11/12/2023     10/26/2023     10/25/2023    K 4.0 11/12/2023    K 3.9 10/26/2023    K 4.1 10/25/2023    BUN 28 (H) 11/12/2023    BUN 28 (H) 10/26/2023    BUN 32 (H) 10/25/2023    CREATININE 1.70 (H) 11/12/2023    CREATININE 1.41 (H) 10/26/2023    CREATININE 1.49 (H) 10/25/2023     11/12/2023     (H) 10/26/2023     (H) 10/25/2023     Lab Results   Component Value Date    TROPHS 20 11/12/2023    TROPHS 52 (HH) 09/17/2023    TROPHS 33 (H) 09/16/2023     (H) 09/18/2023    BNP 1,967 (H) 09/17/2023     (H) 07/14/2023    DDIMERVTE 2,516 (A) 08/03/2021    DDIMERVTE 2,084 (A) 11/30/2019     Lab Results   Component Value Date    TSH 1.27 09/18/2023    TSH 1.84 05/22/2023    TSH 1.57 06/23/2022    MG 2.09 10/26/2023    MG 2.14 10/25/2023    MG 2.08 10/24/2023    PHOS 3.6 10/26/2023    PHOS 3.4 10/25/2023    PHOS 3.2 10/24/2023        Imaging:   CT abdomen pelvis wo IV contrast  Narrative: STUDY:  CT Abdomen and Pelvis without IV Contrast; 11/12/2023 at 9:47 PM.  INDICATION:  Hematuria.  COMPARISON:  CT AP 10/23/2023; XR pelvis 5/24/2023; XR abdomen 8/9/2021.  ACCESSION NUMBER(S):  [id number]  ORDERING  CLINICIAN:  GIANNI LEYVA  TECHNIQUE:  CT of the abdomen and pelvis was performed.  Contiguous axial images  were obtained at 3 mm slice thickness through the abdomen and pelvis.   Coronal and sagittal reconstructions at 3 mm slice thickness were  performed. No intravenous contrast was administered.    Automated mA/kV exposure control was utilized and patient examination  was performed in strict accordance with principles of ALARA.  FINDINGS:  Please note that the evaluation of vessels, lymph nodes and organs is  limited without intravenous contrast.      LOWER CHEST:  There is mild cardiomegaly.  No pericardial effusion.  Lung bases are  clear.     ABDOMEN:     LIVER:  Posterior right hepatic lobe cyst present.  No hepatomegaly.  Smooth  surface contour.  Normal attenuation.     BILE DUCTS:  No intrahepatic or extrahepatic biliary ductal dilatation.     GALLBLADDER:  Gallbladder is present.    STOMACH:  No abnormalities identified.     PANCREAS:  No masses or ductal dilatation.     SPLEEN:  No splenomegaly or focal splenic lesion.     ADRENAL GLANDS:  No thickening or nodules.     KIDNEYS AND URETERS:  Kidneys are normal in size and location.  Bilateral renal cysts seen.   There is scattered hyperdense lesion seen in the left kidney 5 or 6,  likely hyperdense or proteinaceous cyst, demonstrate stability.      PELVIS:     BLADDER:  No abnormalities identified.     REPRODUCTIVE ORGANS:  No abnormalities identified.     BOWEL:  There is increased fecal matter in the colon, suggestive of  constipation.  Appendix not visualized but no inflammation right lower  quadrant seen.      VESSELS:  No abnormalities identified.  There is atherosclerotic changes of the  aorta.  Abdominal aorta is normal in caliber.      PERITONEUM/RETROPERITONEUM/LYMPH NODES:  No free fluid.  No pneumoperitoneum.  No lymphadenopathy.     ABDOMINAL WALL:  No abnormalities identified.  SOFT TISSUES:   No abnormalities identified.     BONES:  No  "acute fracture or aggressive osseous lesion.  Bilateral hip  arthroplasties present.   Impression: 1. No hydronephrosis or obstructing ureteral stone.  2. Increased fecal matter in the colon, suggestive of constipation.  Signed by Osorio Rivera MD       Microbiology:   Susceptibility data from last 90 days.  Collected Specimen Info Organism Amoxicillin/Clavulanate Ampicillin Aztreonam Cefazolin Cefepime Ceftazidime Ceftriaxone Ciprofloxacin Gentamicin Levofloxacin Nitrofurantoin Piperacillin/Tazobactam Tobramycin   10/23/23 Urine from Indwelling (Caruso) Catheter Pseudomonas aeruginosa   S  S S  S S   S S   08/21/23 Urine from Clean Catch/Voided Klebsiella pneumoniae/variicola S R  S   S S S S S S      Collected Specimen Info Organism Trimethoprim/Sulfamethoxazole   10/23/23 Urine from Indwelling (Caruso) Catheter Pseudomonas aeruginosa    08/21/23 Urine from Clean Catch/Voided Klebsiella pneumoniae/variicola S     Lab Results   Component Value Date    URINECULTURE No growth 11/06/2023    BLOODCULT  09/16/2023     No Growth at 1 days~No Growth at 2 days~No Growth at 3 days~NO GROWTH at 4 days - FINAL REPORT                    No lab exists for component: \"AGALPCRNB\"     Assessment and Plan:    Problem list:   Principal Problem:    Gross hematuria      Assessment and Plan:   Andrez Damon is a 78 y.o. 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    Active Issues:   # Gross Hematuria  :: in the setting of BPH  - Chronic caruso use,   - Holding AC  - Continue Cipro 500mg BID x 5days to complete prescribed course  - Finasteride 5mg daily   - Tamsulosing 0.4mg daily   - Consult to urologist, Dr Piedra, appr recs  - Caruso in place, continue to monitor urine output.   - cbc, cmp, ptt, inr aptt    #Acute on chronic kidney " disease  :: dehydration, vs post renal   - BUN/Scr 28/1.7 base line scr 1.49- CKD III  - UA; trace LE, neg nitrites, protein 2+  - Avoid nephrotoxic medication  - IVF  - Repeat rfp    # Constipation:   - Miralax 17g daily    Chronic Issues:   #COPD: Spiriva, IS,  Acapella, duonebs q4hrs  #Hypertension:  Bumex 0.5mg daily, KCl  #Diabetes, diet controlled, A1c 5.7% not on medications.   #CAD/ aFib; Amiodarone 200 mg, metoprolol 12.5mg daily   #HLD: continue atorvastatin 80 mg daily  # Anemia; follow with heme onc, get vit b infusions.    # Depression: Escitalopram 10mg daily     Held medication; Ropinirole 1mg, Brilanta, Eliquis    F: NS   E: Replete as needed  N: cardiac diet  G: None  VTE: SCD    Code:   DNR and No Intubation    Disposition: 78 y.o.male admitted for groos hematuria, urology consulted will anticipate <48hrs eLOS.     Roberto Carlson MD   IM Resident

## 2023-11-13 NOTE — CARE PLAN
Andrez Damon is a 78 y.o. male with a past medical history of PE on Eliquis, HTN, BPH, CHF, CKD, CAD s/p stent placement x3, pacemaker and debfribriator, COPD/emphysema (on home O2 current smoker who presented initially to [place] ED on account of gross hematuria. He was admitted by my colleague this morning. The patient's urine is currently clear. Urology recommended removing the catheter for a void challenge. In the meantime, we are resuming the Xarelto and monitoring overnight for hematuria.

## 2023-11-14 LAB
ALBUMIN SERPL BCP-MCNC: 3 G/DL (ref 3.4–5)
ALP SERPL-CCNC: 51 U/L (ref 33–136)
ALT SERPL W P-5'-P-CCNC: 23 U/L (ref 10–52)
ANION GAP SERPL CALC-SCNC: 9 MMOL/L (ref 10–20)
AST SERPL W P-5'-P-CCNC: 25 U/L (ref 9–39)
BACTERIA UR CULT: NO GROWTH
BILIRUB SERPL-MCNC: 0.5 MG/DL (ref 0–1.2)
BUN SERPL-MCNC: 29 MG/DL (ref 6–23)
CALCIUM SERPL-MCNC: 8.1 MG/DL (ref 8.6–10.3)
CHLORIDE SERPL-SCNC: 108 MMOL/L (ref 98–107)
CO2 SERPL-SCNC: 27 MMOL/L (ref 21–32)
CREAT SERPL-MCNC: 1.72 MG/DL (ref 0.5–1.3)
ERYTHROCYTE [DISTWIDTH] IN BLOOD BY AUTOMATED COUNT: 15.8 % (ref 11.5–14.5)
EST. AVERAGE GLUCOSE BLD GHB EST-MCNC: 117 MG/DL
GFR SERPL CREATININE-BSD FRML MDRD: 40 ML/MIN/1.73M*2
GLUCOSE BLD MANUAL STRIP-MCNC: 101 MG/DL (ref 74–99)
GLUCOSE BLD MANUAL STRIP-MCNC: 126 MG/DL (ref 74–99)
GLUCOSE BLD MANUAL STRIP-MCNC: 63 MG/DL (ref 74–99)
GLUCOSE BLD MANUAL STRIP-MCNC: 97 MG/DL (ref 74–99)
GLUCOSE SERPL-MCNC: 83 MG/DL (ref 74–99)
HBA1C MFR BLD: 5.7 %
HCT VFR BLD AUTO: 31.2 % (ref 41–52)
HGB BLD-MCNC: 9.7 G/DL (ref 13.5–17.5)
MCH RBC QN AUTO: 29.5 PG (ref 26–34)
MCHC RBC AUTO-ENTMCNC: 31.1 G/DL (ref 32–36)
MCV RBC AUTO: 95 FL (ref 80–100)
NRBC BLD-RTO: 0 /100 WBCS (ref 0–0)
PLATELET # BLD AUTO: 176 X10*3/UL (ref 150–450)
POTASSIUM SERPL-SCNC: 4.4 MMOL/L (ref 3.5–5.3)
PROT SERPL-MCNC: 5 G/DL (ref 6.4–8.2)
RBC # BLD AUTO: 3.29 X10*6/UL (ref 4.5–5.9)
SODIUM SERPL-SCNC: 140 MMOL/L (ref 136–145)
WBC # BLD AUTO: 4.9 X10*3/UL (ref 4.4–11.3)

## 2023-11-14 PROCEDURE — 85027 COMPLETE CBC AUTOMATED: CPT

## 2023-11-14 PROCEDURE — 94760 N-INVAS EAR/PLS OXIMETRY 1: CPT

## 2023-11-14 PROCEDURE — 36415 COLL VENOUS BLD VENIPUNCTURE: CPT | Mod: GEALAB

## 2023-11-14 PROCEDURE — 2500000002 HC RX 250 W HCPCS SELF ADMINISTERED DRUGS (ALT 637 FOR MEDICARE OP, ALT 636 FOR OP/ED)

## 2023-11-14 PROCEDURE — 83036 HEMOGLOBIN GLYCOSYLATED A1C: CPT | Mod: GEALAB | Performed by: NURSE PRACTITIONER

## 2023-11-14 PROCEDURE — 99232 SBSQ HOSP IP/OBS MODERATE 35: CPT | Performed by: NURSE PRACTITIONER

## 2023-11-14 PROCEDURE — 80053 COMPREHEN METABOLIC PANEL: CPT

## 2023-11-14 PROCEDURE — 2500000002 HC RX 250 W HCPCS SELF ADMINISTERED DRUGS (ALT 637 FOR MEDICARE OP, ALT 636 FOR OP/ED): Performed by: INTERNAL MEDICINE

## 2023-11-14 PROCEDURE — 94640 AIRWAY INHALATION TREATMENT: CPT

## 2023-11-14 PROCEDURE — 2500000001 HC RX 250 WO HCPCS SELF ADMINISTERED DRUGS (ALT 637 FOR MEDICARE OP)

## 2023-11-14 PROCEDURE — 36415 COLL VENOUS BLD VENIPUNCTURE: CPT

## 2023-11-14 PROCEDURE — 2500000004 HC RX 250 GENERAL PHARMACY W/ HCPCS (ALT 636 FOR OP/ED)

## 2023-11-14 PROCEDURE — 1100000001 HC PRIVATE ROOM DAILY

## 2023-11-14 PROCEDURE — 82947 ASSAY GLUCOSE BLOOD QUANT: CPT

## 2023-11-14 RX ORDER — IPRATROPIUM BROMIDE AND ALBUTEROL SULFATE 2.5; .5 MG/3ML; MG/3ML
3 SOLUTION RESPIRATORY (INHALATION) EVERY 2 HOUR PRN
Status: DISCONTINUED | OUTPATIENT
Start: 2023-11-14 | End: 2023-11-15 | Stop reason: HOSPADM

## 2023-11-14 RX ADMIN — CIPROFLOXACIN 500 MG: 500 TABLET, FILM COATED ORAL at 21:46

## 2023-11-14 RX ADMIN — APIXABAN 2.5 MG: 2.5 TABLET, FILM COATED ORAL at 08:53

## 2023-11-14 RX ADMIN — AMIODARONE HYDROCHLORIDE 200 MG: 200 TABLET ORAL at 09:16

## 2023-11-14 RX ADMIN — FINASTERIDE 5 MG: 5 TABLET, FILM COATED ORAL at 08:53

## 2023-11-14 RX ADMIN — POTASSIUM CHLORIDE 20 MEQ: 1500 TABLET, EXTENDED RELEASE ORAL at 08:53

## 2023-11-14 RX ADMIN — ATORVASTATIN CALCIUM 80 MG: 80 TABLET, FILM COATED ORAL at 08:53

## 2023-11-14 RX ADMIN — FORMOTEROL FUMARATE 20 MCG: 20 SOLUTION RESPIRATORY (INHALATION) at 20:32

## 2023-11-14 RX ADMIN — IPRATROPIUM BROMIDE AND ALBUTEROL SULFATE 3 ML: 2.5; .5 SOLUTION RESPIRATORY (INHALATION) at 20:32

## 2023-11-14 RX ADMIN — TAMSULOSIN HYDROCHLORIDE 0.8 MG: 0.4 CAPSULE ORAL at 21:46

## 2023-11-14 RX ADMIN — ACETAMINOPHEN 975 MG: 325 TABLET ORAL at 12:26

## 2023-11-14 RX ADMIN — ROPINIROLE HYDROCHLORIDE 1 MG: 1 TABLET, FILM COATED ORAL at 21:46

## 2023-11-14 RX ADMIN — APIXABAN 2.5 MG: 2.5 TABLET, FILM COATED ORAL at 21:46

## 2023-11-14 RX ADMIN — PYRIDOXINE HCL TAB 50 MG 50 MG: 50 TAB at 08:53

## 2023-11-14 RX ADMIN — ESCITALOPRAM OXALATE 10 MG: 10 TABLET ORAL at 08:53

## 2023-11-14 RX ADMIN — ACETAMINOPHEN 975 MG: 325 TABLET ORAL at 06:18

## 2023-11-14 RX ADMIN — CIPROFLOXACIN 500 MG: 500 TABLET, FILM COATED ORAL at 08:53

## 2023-11-14 ASSESSMENT — COGNITIVE AND FUNCTIONAL STATUS - GENERAL
DAILY ACTIVITIY SCORE: 21
TOILETING: A LITTLE
DRESSING REGULAR LOWER BODY CLOTHING: A LITTLE
MOVING TO AND FROM BED TO CHAIR: A LITTLE
CLIMB 3 TO 5 STEPS WITH RAILING: A LITTLE
HELP NEEDED FOR BATHING: A LITTLE
MOBILITY SCORE: 21
STANDING UP FROM CHAIR USING ARMS: A LITTLE

## 2023-11-14 ASSESSMENT — PAIN - FUNCTIONAL ASSESSMENT
PAIN_FUNCTIONAL_ASSESSMENT: 0-10
PAIN_FUNCTIONAL_ASSESSMENT: 0-10

## 2023-11-14 ASSESSMENT — PAIN SCALES - GENERAL
PAINLEVEL_OUTOF10: 3
PAINLEVEL_OUTOF10: 10 - WORST POSSIBLE PAIN
PAINLEVEL_OUTOF10: 10 - WORST POSSIBLE PAIN

## 2023-11-14 ASSESSMENT — PAIN DESCRIPTION - LOCATION: LOCATION: LEG

## 2023-11-14 ASSESSMENT — PAIN DESCRIPTION - ORIENTATION: ORIENTATION: RIGHT;LEFT

## 2023-11-14 NOTE — PROGRESS NOTES
"Andrez Damon is a 78 y.o. male on day 1 of admission presenting with Gross hematuria.    Subjective   No acute overnight events.  Voiding without difficulty clear yellow urine.  Denies pain, planning for discharge home today.     Objective     Physical Exam  HENT:      Head: Normocephalic and atraumatic.   Eyes:      Extraocular Movements: Extraocular movements intact.      Conjunctiva/sclera: Conjunctivae normal.      Pupils: Pupils are equal, round, and reactive to light.   Cardiovascular:      Rate and Rhythm: Normal rate and regular rhythm.      Pulses: Normal pulses.   Pulmonary:      Breath sounds: Normal breath sounds.   Abdominal:      General: Bowel sounds are normal.      Palpations: Abdomen is soft.   Skin:     General: Skin is warm and dry.      Capillary Refill: Capillary refill takes less than 2 seconds.   Neurological:      General: No focal deficit present.      Mental Status: He is alert and oriented to person, place, and time.         Last Recorded Vitals  Blood pressure (!) 98/45, pulse 59, temperature 36.2 °C (97.2 °F), temperature source Temporal, resp. rate 16, height 1.778 m (5' 10\"), weight 64.9 kg (143 lb), SpO2 95 %.  Intake/Output last 3 Shifts:  I/O last 3 completed shifts:  In: 1003.3 (15.5 mL/kg) [P.O.:240; I.V.:763.3 (11.8 mL/kg)]  Out: 2000 (30.8 mL/kg) [Urine:2000 (0.9 mL/kg/hr)]  Weight: 64.9 kg     Relevant Results      Scheduled medications  acetaminophen, 975 mg, oral, q6h  amiodarone, 200 mg, oral, Daily  apixaban, 2.5 mg, oral, BID  atorvastatin, 80 mg, oral, Daily  bumetanide, 0.5 mg, oral, Daily  ciprofloxacin, 500 mg, oral, BID  escitalopram, 10 mg, oral, Daily  finasteride, 5 mg, oral, Daily  tiotropium, 2 Inhalation, inhalation, Daily   And  formoterol, 20 mcg, nebulization, q12h  ipratropium-albuteroL, 3 mL, nebulization, TID  metoprolol succinate XL, 12.5 mg, oral, Daily  polyethylene glycol, 17 g, oral, Daily  potassium chloride CR, 20 mEq, oral, Daily  pyridoxine, " 50 mg, oral, Daily  rOPINIRole, 1 mg, oral, Nightly  tamsulosin, 0.8 mg, oral, Nightly  [Held by provider] ticagrelor, 90 mg, oral, BID      Continuous medications     PRN medications  PRN medications: oxygen  Results for orders placed or performed during the hospital encounter of 11/13/23 (from the past 24 hour(s))   POCT GLUCOSE   Result Value Ref Range    POCT Glucose 131 (H) 74 - 99 mg/dL   CBC   Result Value Ref Range    WBC 4.9 4.4 - 11.3 x10*3/uL    nRBC 0.0 0.0 - 0.0 /100 WBCs    RBC 3.29 (L) 4.50 - 5.90 x10*6/uL    Hemoglobin 9.7 (L) 13.5 - 17.5 g/dL    Hematocrit 31.2 (L) 41.0 - 52.0 %    MCV 95 80 - 100 fL    MCH 29.5 26.0 - 34.0 pg    MCHC 31.1 (L) 32.0 - 36.0 g/dL    RDW 15.8 (H) 11.5 - 14.5 %    Platelets 176 150 - 450 x10*3/uL   Comprehensive metabolic panel   Result Value Ref Range    Glucose 83 74 - 99 mg/dL    Sodium 140 136 - 145 mmol/L    Potassium 4.4 3.5 - 5.3 mmol/L    Chloride 108 (H) 98 - 107 mmol/L    Bicarbonate 27 21 - 32 mmol/L    Anion Gap 9 (L) 10 - 20 mmol/L    Urea Nitrogen 29 (H) 6 - 23 mg/dL    Creatinine 1.72 (H) 0.50 - 1.30 mg/dL    eGFR 40 (L) >60 mL/min/1.73m*2    Calcium 8.1 (L) 8.6 - 10.3 mg/dL    Albumin 3.0 (L) 3.4 - 5.0 g/dL    Alkaline Phosphatase 51 33 - 136 U/L    Total Protein 5.0 (L) 6.4 - 8.2 g/dL    AST 25 9 - 39 U/L    Bilirubin, Total 0.5 0.0 - 1.2 mg/dL    ALT 23 10 - 52 U/L   POCT GLUCOSE   Result Value Ref Range    POCT Glucose 97 74 - 99 mg/dL       Assessment/Plan   Principal Problem:    Gross hematuria    78 year old male admitted for hematuria with history of BPH, not a surgical candidate.     - continue Proscar and Flomax  - voiding spontaneously, clear yellow urine total 1L in past 8 hours  - follow up with Dr. Piedra outpatient  - check post void bladder scan    I spent 30 minutes in the professional and overall care of this patient.    Rosalina Elizalde, APRMONAE-CNP

## 2023-11-14 NOTE — PROGRESS NOTES
Andrez Damon is a 78 y.o. male on day 1 of admission presenting with Gross hematuria.      Subjective   Seen and examined in his room this AM. Not feeling well over the past several minutes. Felt shakey with blurred vision and dizziness. He is starting to feel better. . He denies chest pain, breathing difficulties, abdominal pain, N/V/D/C, fever, or chills.         Objective     Last Recorded Vitals  BP 94/54 (BP Location: Right arm, Patient Position: Lying)   Pulse 58   Temp 36.2 °C (97.2 °F) (Temporal)   Resp 16   Wt 64.9 kg (143 lb)   SpO2 95%   Intake/Output last 3 Shifts:    Intake/Output Summary (Last 24 hours) at 11/14/2023 1510  Last data filed at 11/14/2023 1500  Gross per 24 hour   Intake 606.67 ml   Output 875 ml   Net -268.33 ml       Admission Weight  Weight: 67.5 kg (148 lb 12.8 oz) (11/13/23 0424)    Daily Weight  11/13/23 : 64.9 kg (143 lb)    Image Results    Physical Exam  Constitutional: A&O x 3; NAD; calm and cooperative  Eyes: EOM's intact  HEENT: Normocephalic, Atraumatic. Oral mucosa moist.   Neck: Supple. No JVD, lymphadenopathy.   Lungs: CTAB with fair air movement. Respirations even and unlabored on room air.   Heart: RRR - bradycardia.  Abdomen: Soft, lower quadrants tender to deep palpation; non-distended, +BS  MS/Extremities: PIEDRA equally x 4. No edema. Peripheral pulses intact bilaterally.   Neuro: A&O x3; no focal deficits; gross motor and sensation intact. CN II-XII intact.   Skin: Warm and dry. No rashes or lesions  Psych: Normal affect.      Relevant Results  Scheduled medications  acetaminophen, 975 mg, oral, q6h  amiodarone, 200 mg, oral, Daily  apixaban, 2.5 mg, oral, BID  atorvastatin, 80 mg, oral, Daily  bumetanide, 0.5 mg, oral, Daily  ciprofloxacin, 500 mg, oral, BID  escitalopram, 10 mg, oral, Daily  finasteride, 5 mg, oral, Daily  tiotropium, 2 Inhalation, inhalation, Daily   And  formoterol, 20 mcg, nebulization, q12h  ipratropium-albuteroL, 3 mL,  nebulization, TID  metoprolol succinate XL, 12.5 mg, oral, Daily  polyethylene glycol, 17 g, oral, Daily  potassium chloride CR, 20 mEq, oral, Daily  pyridoxine, 50 mg, oral, Daily  rOPINIRole, 1 mg, oral, Nightly  tamsulosin, 0.8 mg, oral, Nightly  [Held by provider] ticagrelor, 90 mg, oral, BID      Continuous medications     PRN medications  PRN medications: oxygen     Results for orders placed or performed during the hospital encounter of 11/13/23 (from the past 24 hour(s))   POCT GLUCOSE   Result Value Ref Range    POCT Glucose 131 (H) 74 - 99 mg/dL   CBC   Result Value Ref Range    WBC 4.9 4.4 - 11.3 x10*3/uL    nRBC 0.0 0.0 - 0.0 /100 WBCs    RBC 3.29 (L) 4.50 - 5.90 x10*6/uL    Hemoglobin 9.7 (L) 13.5 - 17.5 g/dL    Hematocrit 31.2 (L) 41.0 - 52.0 %    MCV 95 80 - 100 fL    MCH 29.5 26.0 - 34.0 pg    MCHC 31.1 (L) 32.0 - 36.0 g/dL    RDW 15.8 (H) 11.5 - 14.5 %    Platelets 176 150 - 450 x10*3/uL   Comprehensive metabolic panel   Result Value Ref Range    Glucose 83 74 - 99 mg/dL    Sodium 140 136 - 145 mmol/L    Potassium 4.4 3.5 - 5.3 mmol/L    Chloride 108 (H) 98 - 107 mmol/L    Bicarbonate 27 21 - 32 mmol/L    Anion Gap 9 (L) 10 - 20 mmol/L    Urea Nitrogen 29 (H) 6 - 23 mg/dL    Creatinine 1.72 (H) 0.50 - 1.30 mg/dL    eGFR 40 (L) >60 mL/min/1.73m*2    Calcium 8.1 (L) 8.6 - 10.3 mg/dL    Albumin 3.0 (L) 3.4 - 5.0 g/dL    Alkaline Phosphatase 51 33 - 136 U/L    Total Protein 5.0 (L) 6.4 - 8.2 g/dL    AST 25 9 - 39 U/L    Bilirubin, Total 0.5 0.0 - 1.2 mg/dL    ALT 23 10 - 52 U/L   POCT GLUCOSE   Result Value Ref Range    POCT Glucose 97 74 - 99 mg/dL   POCT GLUCOSE   Result Value Ref Range    POCT Glucose 126 (H) 74 - 99 mg/dL   POCT GLUCOSE   Result Value Ref Range    POCT Glucose 101 (H) 74 - 99 mg/dL         Assessment/Plan   78 y.o. 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     Gross Hematuria in the setting of BPH  -CT A/P: No hydronephrosis or obstructing ureteral stone  -Chronic caruso use  -Apixaban was held then resumed as hematuria improved.   -Will continue Cipro 500mg BID x 5days to complete prescribed course  -On Finasteride 5mg daily, Tamsulosing 0.4mg daily   -Consulted urologist, Dr Piedra, appr recs  -Caruso removed yesterday and has been voiding adequately  -On exam today had lower abdominal discomfort to palpation - PVR >250ml  -Urology notified - no new orders at this time.   -Urine culture (collected 11/12) negative for growth  -Hgb stable at 9.7  -CBC in AM.     Dizziness with Blurred Vision  -Episode this AM  -BG stable; SBP in 90's  -No focal neuro deficits  -Suspect this is related to lower BP   -Will monitor closely and consider cardiology consult if persists.     CV: HTN, CHF, CAD, PPM, A.Fib  -Home regimen resumed: Bumex, Metoprolol, Amiodarone, Atorvastatin  -Maintain hold parameters for Bumex, Metoprolol  -Monitor on telemetry  -On Apixaban (renal dose) for CVA prevention    Acute on Chronic Kidney Disease  -CKD likely secondary to DM, HTN  -Creatinine baseline over past 10-12 months 1.6-1.7  -Avoid nephrotoxic agents  -Creatinine stable at 1.72.   -BMP in AM    Diabetes Mellitus Type II with Nephropathy  -HgbA1c 5.7  -Diet Controlled    Chronic Hypoxic Respiratory Failure Secondary to COPD  -No objective/Subjective signs of acute exacerbation  -On Spiriva    Constipation  -On Laxatives and stool softeners  -BM in past 24 hours.     Anemia  -Likely an acute on chronic (CKD) in setting of gross hematuria  -Hgb stable at 9.7  -CBC in AM.     Depression  -Mood is stable on Lexapro    DVT Prophylaxis  -Apixaban (A.Fib)    Fluids/Electrolytes/Nutrition  -Laboratory data reviewed.   -Electrolytes stable and replaced as needed.   -No current nutritional issues.     Disposition  -Plan of care  discussed with attending, Dr. Del Toro.   -Home with spouse with HHC when medically stable.      Code:   DNR and No Intubation    Stacey Pompa, DEANNE-CNP

## 2023-11-15 ENCOUNTER — HOME HEALTH ADMISSION (OUTPATIENT)
Dept: HOME HEALTH SERVICES | Facility: HOME HEALTH | Age: 78
End: 2023-11-15
Payer: MEDICARE

## 2023-11-15 VITALS
TEMPERATURE: 97 F | WEIGHT: 143 LBS | BODY MASS INDEX: 20.47 KG/M2 | DIASTOLIC BLOOD PRESSURE: 65 MMHG | OXYGEN SATURATION: 92 % | HEIGHT: 70 IN | RESPIRATION RATE: 16 BRPM | SYSTOLIC BLOOD PRESSURE: 113 MMHG | HEART RATE: 81 BPM

## 2023-11-15 DIAGNOSIS — G62.89 OTHER POLYNEUROPATHY: ICD-10-CM

## 2023-11-15 PROBLEM — R31.0 GROSS HEMATURIA: Status: RESOLVED | Noted: 2023-11-13 | Resolved: 2023-11-15

## 2023-11-15 LAB
ALBUMIN SERPL BCP-MCNC: 3 G/DL (ref 3.4–5)
ANION GAP SERPL CALC-SCNC: 9 MMOL/L (ref 10–20)
BUN SERPL-MCNC: 27 MG/DL (ref 6–23)
CALCIUM SERPL-MCNC: 8 MG/DL (ref 8.6–10.3)
CHLORIDE SERPL-SCNC: 108 MMOL/L (ref 98–107)
CO2 SERPL-SCNC: 27 MMOL/L (ref 21–32)
CREAT SERPL-MCNC: 1.68 MG/DL (ref 0.5–1.3)
ERYTHROCYTE [DISTWIDTH] IN BLOOD BY AUTOMATED COUNT: 15.8 % (ref 11.5–14.5)
GFR SERPL CREATININE-BSD FRML MDRD: 41 ML/MIN/1.73M*2
GLUCOSE BLD MANUAL STRIP-MCNC: 98 MG/DL (ref 74–99)
GLUCOSE SERPL-MCNC: 86 MG/DL (ref 74–99)
HCT VFR BLD AUTO: 30.8 % (ref 41–52)
HGB BLD-MCNC: 9.4 G/DL (ref 13.5–17.5)
MCH RBC QN AUTO: 28.7 PG (ref 26–34)
MCHC RBC AUTO-ENTMCNC: 30.5 G/DL (ref 32–36)
MCV RBC AUTO: 94 FL (ref 80–100)
NRBC BLD-RTO: 0 /100 WBCS (ref 0–0)
PHOSPHATE SERPL-MCNC: 3.1 MG/DL (ref 2.5–4.9)
PLATELET # BLD AUTO: 164 X10*3/UL (ref 150–450)
POTASSIUM SERPL-SCNC: 4.2 MMOL/L (ref 3.5–5.3)
RBC # BLD AUTO: 3.27 X10*6/UL (ref 4.5–5.9)
SODIUM SERPL-SCNC: 140 MMOL/L (ref 136–145)
WBC # BLD AUTO: 5.6 X10*3/UL (ref 4.4–11.3)

## 2023-11-15 PROCEDURE — 2500000005 HC RX 250 GENERAL PHARMACY W/O HCPCS: Performed by: FAMILY MEDICINE

## 2023-11-15 PROCEDURE — 85027 COMPLETE CBC AUTOMATED: CPT | Performed by: NURSE PRACTITIONER

## 2023-11-15 PROCEDURE — 2500000001 HC RX 250 WO HCPCS SELF ADMINISTERED DRUGS (ALT 637 FOR MEDICARE OP)

## 2023-11-15 PROCEDURE — 2500000002 HC RX 250 W HCPCS SELF ADMINISTERED DRUGS (ALT 637 FOR MEDICARE OP, ALT 636 FOR OP/ED): Performed by: INTERNAL MEDICINE

## 2023-11-15 PROCEDURE — 36415 COLL VENOUS BLD VENIPUNCTURE: CPT | Performed by: NURSE PRACTITIONER

## 2023-11-15 PROCEDURE — 94668 MNPJ CHEST WALL SBSQ: CPT

## 2023-11-15 PROCEDURE — 94640 AIRWAY INHALATION TREATMENT: CPT

## 2023-11-15 PROCEDURE — 2500000004 HC RX 250 GENERAL PHARMACY W/ HCPCS (ALT 636 FOR OP/ED)

## 2023-11-15 PROCEDURE — 2500000004 HC RX 250 GENERAL PHARMACY W/ HCPCS (ALT 636 FOR OP/ED): Performed by: INTERNAL MEDICINE

## 2023-11-15 PROCEDURE — 80069 RENAL FUNCTION PANEL: CPT | Performed by: NURSE PRACTITIONER

## 2023-11-15 PROCEDURE — 99223 1ST HOSP IP/OBS HIGH 75: CPT | Performed by: NURSE PRACTITIONER

## 2023-11-15 PROCEDURE — 82947 ASSAY GLUCOSE BLOOD QUANT: CPT

## 2023-11-15 PROCEDURE — 2500000002 HC RX 250 W HCPCS SELF ADMINISTERED DRUGS (ALT 637 FOR MEDICARE OP, ALT 636 FOR OP/ED)

## 2023-11-15 PROCEDURE — 99239 HOSP IP/OBS DSCHRG MGMT >30: CPT | Performed by: NURSE PRACTITIONER

## 2023-11-15 RX ORDER — ACETAMINOPHEN 325 MG/1
975 TABLET ORAL EVERY 8 HOURS PRN
Start: 2023-11-15 | End: 2023-11-27 | Stop reason: HOSPADM

## 2023-11-15 RX ORDER — LANOLIN ALCOHOL/MO/W.PET/CERES
50 CREAM (GRAM) TOPICAL DAILY
Qty: 90 TABLET | Refills: 0 | Status: SHIPPED | OUTPATIENT
Start: 2023-11-15 | End: 2024-03-06

## 2023-11-15 RX ORDER — POLYETHYLENE GLYCOL 3350 17 G/17G
17 POWDER, FOR SOLUTION ORAL DAILY
Start: 2023-11-16 | End: 2024-01-22 | Stop reason: WASHOUT

## 2023-11-15 RX ADMIN — IPRATROPIUM BROMIDE AND ALBUTEROL SULFATE 3 ML: 2.5; .5 SOLUTION RESPIRATORY (INHALATION) at 09:10

## 2023-11-15 RX ADMIN — FORMOTEROL FUMARATE 20 MCG: 20 SOLUTION RESPIRATORY (INHALATION) at 09:20

## 2023-11-15 RX ADMIN — SODIUM CHLORIDE 500 ML: 9 INJECTION, SOLUTION INTRAVENOUS at 04:56

## 2023-11-15 RX ADMIN — ATORVASTATIN CALCIUM 80 MG: 80 TABLET, FILM COATED ORAL at 09:53

## 2023-11-15 RX ADMIN — PYRIDOXINE HCL TAB 50 MG 50 MG: 50 TAB at 09:54

## 2023-11-15 RX ADMIN — FINASTERIDE 5 MG: 5 TABLET, FILM COATED ORAL at 09:53

## 2023-11-15 RX ADMIN — ESCITALOPRAM OXALATE 10 MG: 10 TABLET ORAL at 09:53

## 2023-11-15 RX ADMIN — AMIODARONE HYDROCHLORIDE 200 MG: 200 TABLET ORAL at 09:53

## 2023-11-15 RX ADMIN — CIPROFLOXACIN 500 MG: 500 TABLET, FILM COATED ORAL at 09:54

## 2023-11-15 RX ADMIN — APIXABAN 2.5 MG: 2.5 TABLET, FILM COATED ORAL at 09:53

## 2023-11-15 RX ADMIN — Medication 2 L/MIN: at 09:21

## 2023-11-15 RX ADMIN — POTASSIUM CHLORIDE 20 MEQ: 1500 TABLET, EXTENDED RELEASE ORAL at 09:53

## 2023-11-15 ASSESSMENT — COGNITIVE AND FUNCTIONAL STATUS - GENERAL
MOBILITY SCORE: 21
STANDING UP FROM CHAIR USING ARMS: A LITTLE
DRESSING REGULAR LOWER BODY CLOTHING: A LITTLE
TOILETING: A LITTLE
HELP NEEDED FOR BATHING: A LITTLE
CLIMB 3 TO 5 STEPS WITH RAILING: A LITTLE
MOVING TO AND FROM BED TO CHAIR: A LITTLE
DAILY ACTIVITIY SCORE: 21

## 2023-11-15 ASSESSMENT — PAIN SCALES - GENERAL: PAINLEVEL_OUTOF10: 0 - NO PAIN

## 2023-11-15 NOTE — PROGRESS NOTES
11/15/23 1019   Discharge Planning   Living Arrangements Spouse/significant other   Support Systems Spouse/significant other;Family members   Assistance Needed patient is alert and oriented x3, ambulates with a cane and drives, oxygen at 2L at HS ONLY   Type of Residence Private residence   Home or Post Acute Services In home services   Type of Home Care Services Home OT;Home PT;Home nursing visits   Patient expects to be discharged to: [facility]   Does the patient need discharge transport arranged? No

## 2023-11-15 NOTE — NURSING NOTE
9305- Notified Dr. Medrano of low BP at 93/42.  0500- Mitchell ordered 500ml NS bolus over 2 hours. Bolus started, educated pt on necessity of IVFs for a low BP.

## 2023-11-15 NOTE — CONSULTS
Consults  History Of Present Illness:    Andrze Damon is a 78 y.o. male with h/o ICM (EF 35-40% 9/2023) s/p ICD ([employer]- pending change out for HAILEY 12/15/2023), MI 10/2021 s/p BMS to LAD, late stent thrombosis 11/2014 s/p GEORGIE to LAD, CKD (BL creat 1.4-1.8), hypertension, dyslipidemia, COPD on 2L at bedtime, PAF on apixaban, PE, and BPH admitted with hematuria.  Intermittently requires temporary caruso placement d/t obstructive uropathy from BPH.  Had caruso placed ~1mth ago, and developed gross hematuria 1-2 days prior to admit, so he came to the ER for evaluation.  Takes Brilinta for h/o CAD with late stent thrombosis in 2014 s/p GEORGIE to LAD as well as reduced dose apixaban for h/o paroxysmal a.fib. Brilinta and apixaban were held per the primary team upon admit.  Caruso has been removed, and patient is voiding with clear urine.  Patient denies symptoms of chest pain, SOB, dizziness.  BP since admit has been 95//60's which is his baseline.       Last Recorded Vitals:  Vitals:    11/15/23 0753 11/15/23 0921 11/15/23 0953 11/15/23 1000   BP: 110/61  113/65 113/65   BP Location:    Left arm   Patient Position:    Lying   Pulse: 60  70 81   Resp:    16   Temp:    36.1 °C (97 °F)   TempSrc:    Temporal   SpO2:  92%  92%   Weight:       Height:           Last Labs:  CBC - 11/15/2023:  6:43 AM  5.6 9.4 164    30.8      CMP - 11/15/2023:  6:43 AM  8.0 5.0 25 --- 0.5   3.1 3.0 23 51      PTT - 11/13/2023:  7:17 AM  1.1   12.4 32     Troponin I, High Sensitivity   Date/Time Value Ref Range Status   11/12/2023 09:31 PM 20 0 - 20 ng/L Final     Troponin I   Date/Time Value Ref Range Status   09/17/2023 04:05 AM 52 (HH) 0 - 20 ng/L Final     Comment:     .  Less than 99th percentile of normal range cutoff-  Female and children under 18 years old <14 ng/L; Male <21 ng/L: Negative  Repeat testing should be performed if clinically indicated.   .  Female and children under 18 years old 14-50 ng/L; Male 21-50  ng/L:  Consistent with possible cardiac damage and possible increased clinical   risk. Serial measurements may help to assess extent of myocardial damage.   .  >50 ng/L: Consistent with cardiac damage, increased clinical risk and  myocardial infarction. Serial measurements may help assess extent of   myocardial damage.   .   NOTE: Children less than 1 year old may have higher baseline troponin   levels and results should be interpreted in conjunction with the overall   clinical context.   .  NOTE: Troponin I testing is performed using a different   testing methodology at [facility] than at other   system hospitals. Direct result comparisons should only   be made within the same method.  TROP  RB TO DALLAS MUELLER, 09/17/2023 05:29     09/16/2023 10:15 AM 33 (H) 0 - 20 ng/L Final     Comment:     .  Less than 99th percentile of normal range cutoff-  Female and children under 18 years old <14 ng/L; Male <21 ng/L: Negative  Repeat testing should be performed if clinically indicated.   .  Female and children under 18 years old 14-50 ng/L; Male 21-50 ng/L:  Consistent with possible cardiac damage and possible increased clinical   risk. Serial measurements may help to assess extent of myocardial damage.   .  >50 ng/L: Consistent with cardiac damage, increased clinical risk and  myocardial infarction. Serial measurements may help assess extent of   myocardial damage.   .   NOTE: Children less than 1 year old may have higher baseline troponin   levels and results should be interpreted in conjunction with the overall   clinical context.   .  NOTE: Troponin I testing is performed using a different   testing methodology at [facility] than at other   [facility] hospitals. Direct result comparisons should only   be made within the same method.     09/15/2023 07:15 AM 59 (H) 0 - 20 ng/L Final     Comment:     .  Less than 99th percentile of normal range cutoff-  Female and children under 18 years old <14  ng/L; Male <21 ng/L: Negative  Repeat testing should be performed if clinically indicated.   .  Female and children under 18 years old 14-50 ng/L; Male 21-50 ng/L:  Consistent with possible cardiac damage and possible increased clinical   risk. Serial measurements may help to assess extent of myocardial damage.   .  >50 ng/L: Consistent with cardiac damage, increased clinical risk and  myocardial infarction. Serial measurements may help assess extent of   myocardial damage.   .   NOTE: Children less than 1 year old may have higher baseline troponin   levels and results should be interpreted in conjunction with the overall   clinical context.   .  NOTE: Troponin I testing is performed using a different   testing methodology at [facility] than at other   system hospitals. Direct result comparisons should only   be made within the same method.  This is a critical result.    Per Laboratory policy, critical results for this test   only qualify to the call list once per 24 hours.        BNP   Date/Time Value Ref Range Status   09/18/2023 07:50  (H) 0 - 99 pg/mL Final     Comment:     .  <100 pg/mL - Heart failure unlikely  100-299 pg/mL - Intermediate probability of acute heart  .               failure exacerbation. Correlate with clinical  .               context and patient history.    >=300 pg/mL - Heart Failure likely. Correlate with clinical  .               context and patient history.  BNP testing is performed using different testing   methodology at [facility] than at other   system hospitals. Direct result comparisons should   only be made within the same method.     09/17/2023 04:05 AM 1,967 (H) 0 - 99 pg/mL Final     Comment:     .  <100 pg/mL - Heart failure unlikely  100-299 pg/mL - Intermediate probability of acute heart  .               failure exacerbation. Correlate with clinical  .               context and patient history.    >=300 pg/mL - Heart Failure likely.  Correlate with clinical  .               context and patient history.  BNP testing is performed using different testing   methodology at [facility] than at other   [facility] hospitals. Direct result comparisons should   only be made within the same method.       POC HEMOGLOBIN A1c   Date/Time Value Ref Range Status   06/21/2023 01:29 PM 5.7 4.2 - 6.5 % Final     Hemoglobin A1C   Date/Time Value Ref Range Status   11/14/2023 06:49 AM 5.7 (H) see below % Final   06/01/2020 02:02 PM 6.8 % Final     Comment:          Diagnosis of Diabetes-Adults   Non-Diabetic: < or = 5.6%   Increased risk for developing diabetes: 5.7-6.4%   Diagnostic of diabetes: > or = 6.5%  .       Monitoring of Diabetes                Age (y)     Therapeutic Goal (%)   Adults:          >18           <7.0   Pediatrics:    13-18           <7.5                   7-12           <8.0                   0- 6            7.5-8.5   American Diabetes Association. Diabetes Care 33(S1), Jan 2010.     01/28/2018 05:10 AM 6.6 % Final     Comment:          Diagnosis of Diabetes-Adults   Non-Diabetic: < or = 5.6%   Increased risk for developing diabetes: 5.7-6.4%   Diagnostic of diabetes: > or = 6.5%  .       Monitoring of Diabetes                Age (y)     Therapeutic Goal (%)   Adults:          >18           <7.0   Pediatrics:    13-18           <7.5                   7-12           <8.0                   0- 6            7.5-8.5   American Diabetes Association. Diabetes Care 33(S1), Jan 2010.       VLDL   Date/Time Value Ref Range Status   07/13/2023 06:30 AM 8 0 - 40 mg/dL Final   05/22/2023 01:31 PM 10 0 - 40 mg/dL Final   06/23/2022 01:23 PM 9 0 - 40 mg/dL Final      Last I/O:  I/O last 3 completed shifts:  In: 1000 (15.4 mL/kg) [P.O.:500; IV Piggyback:500]  Out: 2275 (35.1 mL/kg) [Urine:2275 (1 mL/kg/hr)]  Weight: 64.9 kg     Past Cardiology Tests (Last 3 Years):  EKG:  ECG 12 Lead 10/23/2023    Echo:  No results found for this or any previous  "visit from the past 1095 days.    Ejection Fractions:  No results found for: \"EF\"  Cath:  No results found for this or any previous visit from the past 1095 days.    Stress Test:  No results found for this or any previous visit from the past 1095 days.    Cardiac Imaging:  No results found for this or any previous visit from the past 1095 days.      Past Medical History:  He has a past medical history of Arthritis, Body mass index (BMI) 20.0-20.9, adult (09/21/2021), Body mass index (BMI) 21.0-21.9, adult (04/14/2021), CHF (congestive heart failure) (CMS/[facility]), COPD (chronic obstructive pulmonary disease) (CMS/[facility]), Coronary artery disease, Diabetes mellitus (CMS/[facility]), Hypertension, Major depressive disorder, recurrent, mild (CMS/[facility]) (11/11/2020), Major depressive disorder, recurrent, unspecified (CMS/[facility]) (01/13/2021), Other conditions influencing health status, Other specified anxiety disorders (05/19/2020), Oxygen desaturation during sleep, Personal history of nicotine dependence (07/12/2021), Personal history of other diseases of the circulatory system, Personal history of other diseases of the musculoskeletal system and connective tissue, Personal history of other diseases of urinary system (09/21/2021), Personal history of other endocrine, nutritional and metabolic disease (08/29/2019), Pneumonia, unspecified organism (12/09/2019), and Thoracic aortic aneurysm, without rupture, unspecified (CMS/[facility]) (05/29/2019).    Past Surgical History:  He has a past surgical history that includes CT angio abdomen pelvis w and or wo IV IV contrast (6/6/2019); CT angio head w and wo IV contrast (10/15/2020); Other surgical history (04/17/2019); Hernia repair (04/24/2014); Back surgery (01/24/2014); Total hip arthroplasty (01/24/2014); Coronary angioplasty with stent (01/24/2014); and Other surgical history (01/24/2014).      Social History:  He reports that he has been smoking cigarettes. He has a 17.00 pack-year smoking " history. He has never used smokeless tobacco. He reports that he does not drink alcohol and does not use drugs.    Family History:  Family History   Problem Relation Name Age of Onset    Other (cardiac disorder) Mother      Other (malignant neoplasm) Mother      Other (cardiac disorder) Father          Allergies:  Meloxicam and Celecoxib    Inpatient Medications:  Scheduled medications   Medication Dose Route Frequency    acetaminophen  975 mg oral q6h    amiodarone  200 mg oral Daily    apixaban  2.5 mg oral BID    atorvastatin  80 mg oral Daily    bumetanide  0.5 mg oral Daily    ciprofloxacin  500 mg oral BID    escitalopram  10 mg oral Daily    finasteride  5 mg oral Daily    tiotropium  2 Inhalation inhalation Daily    And    formoterol  20 mcg nebulization q12h    ipratropium-albuteroL  3 mL nebulization TID    metoprolol succinate XL  12.5 mg oral Daily    polyethylene glycol  17 g oral Daily    potassium chloride CR  20 mEq oral Daily    pyridoxine  50 mg oral Daily    rOPINIRole  1 mg oral Nightly    tamsulosin  0.8 mg oral Nightly    [Held by provider] ticagrelor  90 mg oral BID     PRN medications   Medication    ipratropium-albuteroL    oxygen     Continuous Medications   Medication Dose Last Rate     Outpatient Medications:  Current Outpatient Medications   Medication Instructions    acetaminophen (TYLENOL) 975 mg, oral, Every 8 hours PRN    amiodarone (PACERONE) 200 mg, oral, Daily    apixaban (Eliquis) 2.5 mg tablet 1 tablet, oral, 2 times daily    atorvastatin (LIPITOR) 80 mg, oral, Daily    Brilinta 90 mg, oral, 2 times daily, AS DIRECTED    bumetanide (BUMEX) 0.5 mg, oral, Daily    cholecalciferol (VITAMIN D-3) 125 mcg, oral, Daily    ciprofloxacin (CIPRO) 500 mg, oral, 2 times daily    escitalopram (LEXAPRO) 10 mg, oral, Daily    finasteride (Proscar) 5 mg tablet 1 tablet, oral, Daily    levalbuterol (Xopenex) 45 mcg/actuation inhaler INHALE 1 TO 2 PUFFS EVERY 4 TO 6 HOURS AS NEEDED AND AS  DIRECTED.    metoprolol succinate XL (TOPROL-XL) 12.5 mg, oral, Daily, Do not crush or chew.    oxygen (O2) 2 L/min, inhalation, Continuous    [START ON 11/16/2023] polyethylene glycol (GLYCOLAX, MIRALAX) 17 g, oral, Daily    potassium chloride CR (K-Tab) 20 mEq ER tablet 1 tablet, oral, Daily    pyridoxine (VITAMIN B-6) 50 mg, oral, Daily    rOPINIRole (REQUIP) 1 mg, oral, Nightly    tamsulosin (FLOMAX) 0.8 mg, oral, Nightly    tiotropium-olodateroL (Stiolto Respimat) 2.5-2.5 mcg/actuation mist inhaler 2 puffs, inhalation, Daily       Physical Exam:  Constitutional: Pleasant, Awake/Alert/Oriented to person place and time. No distress  Head: Atraumatic, Normocephalic  Eyes: EOMI. CRUZ  Neck: Enlarged neck circumference, No JVD  Cardiovascular: Regular rate and rhythm, S1, S2. No extra heart sounds or murmurs  Respiratory: Clear to auscultation bilaterally. No wheezing, rales or rhonchi. Good chest wall expansion  Abdomen: Soft, Nontender, Obese. Bowel sounds appreciated  Musculoskeletal: ROM intact. Muscle strength grossly intact upper and lower extremities 5/5.   Neurological: CNII-XII intact. Sensation grossly intact  Extremities: Warm and dry. No acute rashes and lesions  Psychiatric: Appropriate mood and affect       Assessment/Plan   78 y.o. male with h/o ICM (EF 35-40% 9/2023) s/p ICD ([employer]- pending change out for HAILEY 12/15/2023), MI 10/2021 s/p BMS to LAD, late stent thrombosis 11/2014 s/p GEORGEI to LAD, CKD (BL creat 1.4-1.8), hypertension, dyslipidemia, COPD on 2L at bedtime, PAF on apixaban, PE, and BPH admitted with hematuria.    Plan:  -Recommend resuming Brilinta 90mg BID given h/o CAD s/p PCI with prior late stent thrombosis in 2014  -Resume Toprol Xl 12.5mg daily, Bumex 0.5mg daily-- BP at baseline  -Continue apixaban, amiodarone, atrovastatin  -Follow up with Cindy Meraz NP in [place] as routinely scheduled.   -Patient scheduled 12/15/2023 for ICD change out with Dr. Briggs.   -We will sign off. Please  call with questions.     Peripheral IV 11/12/23 18 G Left Antecubital (Active)   Site Assessment Clean;Dry;Intact 11/15/23 0800   Dressing Status Clean;Dry 11/15/23 0800   Number of days: 3       Code Status:  DNR and No Intubation    DEANNE Huntley-CNP

## 2023-11-15 NOTE — CARE PLAN
The patient's goals for the shift include  to get sleep    The clinical goals for the shift include pt will continue to have clear urine this shift.    Over the shift, the patient did not make progress toward the following goals. Barriers to progression include. Recommendations to address these barriers include.

## 2023-11-15 NOTE — DISCHARGE SUMMARY
Discharge Diagnosis  Gross hematuria    Issues Requiring Follow-Up  F/U PCP      Discharge Meds     Your medication list        START taking these medications        Instructions Last Dose Given Next Dose Due   acetaminophen 325 mg tablet  Commonly known as: Tylenol      Take 3 tablets (975 mg) by mouth every 8 hours if needed for mild pain (1 - 3).       oxygen gas therapy  Commonly known as: O2      Inhale 2 L/min continuously.       polyethylene glycol 17 gram packet  Commonly known as: Glycolax, Miralax  Start taking on: November 16, 2023      Take 17 g by mouth once daily. Do not start before November 16, 2023.              CONTINUE taking these medications        Instructions Last Dose Given Next Dose Due   amiodarone 200 mg tablet  Commonly known as: Pacerone           apixaban 2.5 mg tablet  Commonly known as: Eliquis           atorvastatin 80 mg tablet  Commonly known as: Lipitor      TAKE 1 TABLET BY MOUTH EVERY DAY       Brilinta 90 mg tablet  Generic drug: ticagrelor      TAKE 1 TABLET BY MOUTH TWICE A DAY AS DIRECTED       bumetanide 0.5 mg tablet  Commonly known as: Bumex      TAKE 1 TABLET BY MOUTH ONCE DAILY.       cholecalciferol 125 MCG (5000 UT) capsule  Commonly known as: Vitamin D-3      Take 1 capsule (125 mcg) by mouth once daily.       ciprofloxacin 500 mg tablet  Commonly known as: Cipro      Take 1 tablet (500 mg) by mouth 2 times a day for 14 days.       escitalopram 10 mg tablet  Commonly known as: Lexapro      TAKE 1 TABLET BY MOUTH EVERY DAY       finasteride 5 mg tablet  Commonly known as: Proscar           levalbuterol 45 mcg/actuation inhaler  Commonly known as: Xopenex           metoprolol succinate XL 25 mg 24 hr tablet  Commonly known as: Toprol-XL           potassium chloride CR 20 mEq ER tablet  Commonly known as: Klor-Con M20           pyridoxine 50 mg tablet  Commonly known as: Vitamin B-6      TAKE 1 TABLET BY MOUTH EVERY DAY       rOPINIRole 1 mg tablet  Commonly known as:  Requip      TAKE 1 TABLET (1 MG) BY MOUTH ONCE DAILY AT BEDTIME.       Stiolto Respimat 2.5-2.5 mcg/actuation mist inhaler  Generic drug: tiotropium-olodateroL           tamsulosin 0.4 mg 24 hr capsule  Commonly known as: Flomax      Take 2 capsules (0.8 mg) by mouth once daily at bedtime.                 Where to Get Your Medications        These medications were sent to [facility]/pharmacy #[id number] - [place], [place] - [address]  [address] [address]      Phone: [phone]   pyridoxine 50 mg tablet       Information about where to get these medications is not yet available    Ask your nurse or doctor about these medications  acetaminophen 325 mg tablet  oxygen gas therapy  polyethylene glycol 17 gram packet         Test Results Pending At Discharge  Pending Labs       No current pending labs.            Hospital Course   78 y.o. 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 CT A/P: No hydronephrosis or obstructing ureteral stone. Apixaban was held then resumed as hematuria improved. Will continue Cipro 500mg BID x 5days to complete prescribed course with last dose on 11/17/23. Consulted urologist, Dr Piedra, appr recs. Kee removed on 11/13/23 and has been voiding adequately with PVR. Urology notified - no new orders at this time. Recommend OP follow up. Urine culture (collected 11/12) negative for growth. Hgb stable at 9.4. Clinically improved. Did have an episode of blurred vision with headache and dizziness. Likely related to soft BP. Symptoms resolved w/o further intervention. No focal neuro deficits. Did consult cardiology who advised to resume Brilinta and continue with current regimen. Hemodynamically stable for  discharge to home.     Medical Management:       CV: HTN, HFrEF, CAD, ICM (LVEF 35-40%), PPM, A.Fib (Paroxysmal)  -Home regimen resumed: Bumex, Metoprolol, Amiodarone, Atorvastatin, Brilinta.   -Maintained hold parameters for Bumex, Metoprolol  -Monitor on telemetry  -On Apixaban (renal dose) for CVA prevention  -Planned PPM replacement on 12/15/23 at [facility].      Acute on Chronic Kidney Disease Stage III  -CKD likely secondary to DM, HTN  -Creatinine baseline over past 10-12 months 1.6-1.7  -Avoid nephrotoxic agents  -Creatinine stable at 1.68.      Diabetes Mellitus Type II with Nephropathy  -HgbA1c 5.7  -Diet Controlled     Chronic Hypoxic Respiratory Failure Secondary to COPD  -No objective/Subjective signs of acute exacerbation  -On Spiriva     Constipation  -On Laxatives and stool softeners  -BM in past 24 hours.      Anemia  -Likely an acute on chronic (CKD) in setting of gross hematuria  -Hgb stable at 9.4  -CBC in AM.      Depression  -Mood is stable on Lexapro     DVT Prophylaxis  -Apixaban (A.Fib)     Fluids/Electrolytes/Nutrition  -Laboratory data reviewed.   -Electrolytes stable and replaced as needed.   -No current nutritional issues.      Disposition  Plan of care discussed with attending, Dr. To and Cardiology APRN. Seen and examined in his room this AM. Awake and alert. No new complaints at this time. He expresses readiness for discharge to home. He denies chest pain, breathing difficulties, abdominal pain, N/V/D/C, fever, or chills. Medications reviewed and reconciled. Scripts prepared as needed. Greater than 35 minutes spent in coordination of care, including physical examination, chart review, medication reconciliation, collaboration with providers, staff, and family.     Pertinent Physical Exam At Time of Discharge  Physical Exam  Constitutional: A&O x 3; NAD; calm and cooperative  Eyes: EOM's intact  HEENT: Normocephalic, Atraumatic. Oral mucosa moist.   Neck: Supple. No JVD,  lymphadenopathy.   Lungs: CTAB with fair air movement. Respirations even and unlabored on 2 liters.   Heart: RRR - bradycardia. + murmur.   Abdomen: Soft, nontender; non-distended, +BS  MS/Extremities: PIEDRA equally x 4. No edema. Peripheral pulses intact bilaterally.   Neuro: A&O x3; no focal deficits; gross motor and sensation intact.    Skin: Warm and dry. No rashes or lesions  Psych: Normal affect.      Outpatient Follow-Up  Future Appointments   Date Time Provider Department Center   11/16/2023 11:00 AM INF 04 [facility] CON[facility]CC[facility]   11/30/2023 11:00 AM INF 01 [facility]   12/11/2023 10:30 AM KENDRICK Parmar, DNP [id number] [facility]   12/28/2023 11:00 AM INF 01 [facility]   1/15/2024 11:00 AM KENDRICK Montague [id number] [facility]   1/25/2024 11:00 AM INF 01 [facility]   2/20/2024  2:30 PM KENDRICK Ashton [id number] [facility]   4/15/2024 10:20 AM KENDRICK Chinchilla [id number] [facility]         KENDRICK Jean-Baptiste

## 2023-11-16 ENCOUNTER — APPOINTMENT (OUTPATIENT)
Dept: HEMATOLOGY/ONCOLOGY | Facility: HOSPITAL | Age: 78
End: 2023-11-16
Payer: MEDICARE

## 2023-11-16 ENCOUNTER — TELEPHONE (OUTPATIENT)
Dept: PRIMARY CARE | Facility: CLINIC | Age: 78
End: 2023-11-16

## 2023-11-16 ENCOUNTER — INFUSION (OUTPATIENT)
Dept: HEMATOLOGY/ONCOLOGY | Facility: HOSPITAL | Age: 78
End: 2023-11-16
Payer: MEDICARE

## 2023-11-16 ENCOUNTER — PATIENT OUTREACH (OUTPATIENT)
Dept: PRIMARY CARE | Facility: CLINIC | Age: 78
End: 2023-11-16
Payer: MEDICARE

## 2023-11-16 ENCOUNTER — PATIENT OUTREACH (OUTPATIENT)
Dept: PRIMARY CARE | Facility: CLINIC | Age: 78
End: 2023-11-16

## 2023-11-16 VITALS
DIASTOLIC BLOOD PRESSURE: 55 MMHG | WEIGHT: 150.35 LBS | OXYGEN SATURATION: 98 % | HEART RATE: 57 BPM | RESPIRATION RATE: 18 BRPM | BODY MASS INDEX: 21.57 KG/M2 | TEMPERATURE: 97.5 F | SYSTOLIC BLOOD PRESSURE: 113 MMHG

## 2023-11-16 DIAGNOSIS — E61.1 IRON DEFICIENCY: ICD-10-CM

## 2023-11-16 PROCEDURE — 96365 THER/PROPH/DIAG IV INF INIT: CPT | Mod: INF

## 2023-11-16 PROCEDURE — 96366 THER/PROPH/DIAG IV INF ADDON: CPT

## 2023-11-16 PROCEDURE — 2500000004 HC RX 250 GENERAL PHARMACY W/ HCPCS (ALT 636 FOR OP/ED): Performed by: INTERNAL MEDICINE

## 2023-11-16 RX ORDER — ALBUTEROL SULFATE 0.83 MG/ML
3 SOLUTION RESPIRATORY (INHALATION) AS NEEDED
Status: CANCELLED | OUTPATIENT
Start: 2023-11-17

## 2023-11-16 RX ORDER — DIPHENHYDRAMINE HYDROCHLORIDE 50 MG/ML
50 INJECTION INTRAMUSCULAR; INTRAVENOUS AS NEEDED
Status: CANCELLED | OUTPATIENT
Start: 2023-11-17

## 2023-11-16 RX ORDER — EPINEPHRINE 0.3 MG/.3ML
0.3 INJECTION SUBCUTANEOUS EVERY 5 MIN PRN
Status: CANCELLED | OUTPATIENT
Start: 2023-11-17

## 2023-11-16 RX ORDER — FAMOTIDINE 10 MG/ML
20 INJECTION INTRAVENOUS ONCE AS NEEDED
Status: CANCELLED | OUTPATIENT
Start: 2023-11-17

## 2023-11-16 RX ADMIN — IRON SUCROSE 300 MG: 20 INJECTION, SOLUTION INTRAVENOUS at 11:21

## 2023-11-16 ASSESSMENT — PAIN SCALES - GENERAL
PAINLEVEL_OUTOF10: 5
PAINLEVEL: 9
PAINLEVEL_OUTOF10: 6

## 2023-11-16 NOTE — PROGRESS NOTES
Discharge Facility: [facility]  Discharge Diagnosis: Gross Hematuria  Admission Date: 11-  Discharge Date: 11-    PCP Appointment Date: 11-     Home Care SOC 11-  SN/PT/OT/ Home Health Aide    Specialist Appointment Date:   Urology    Hematology / Oncology     Infusion Injections 11-   Pulmonology   Cardiology   ICD Implant 12-    Hospital Encounter and Summary: Linked   See discharge assessment below for further details    Engagement  Call Start Time: 1536 (11/16/2023  3:36 PM)    Medications  Medications reviewed with patient/caregiver?: Yes (11/16/2023  3:36 PM)  Is the patient having any side effects they believe may be caused by any medication additions or changes?: No (11/16/2023  3:36 PM)  Does the patient have all medications ordered at discharge?: Yes (11/16/2023  3:36 PM)  Care Management Interventions: Provided patient education (11/16/2023  3:36 PM)  Prescription Comments: States he has all medications.  Denies any issues with affordabiltiy  START taking:  acetaminophen (Tylenol)  oxygen (O2)  polyethylene glycol (Glycolax, Miralax)  Start taking on: November 16, 2023    Aware of how to take all other medications. (11/16/2023  3:36 PM)  Is the patient taking all medications as directed (includes completed medication regime)?: Yes (Has not been using 02 continuously  I did reach out to the discharge CCM who stated respiratory therapy stated he did not need it all the time.) (11/16/2023  3:36 PM)  Medication Comments: Denies any questions (11/16/2023  3:36 PM)    Appointments  Does the patient have a primary care provider?: Yes (11/16/2023  3:36 PM)  Care Management Interventions: Verified appointment date/time/provider (11/16/2023  3:36 PM)  Has the patient kept scheduled appointments due by today?: Yes (11/16/2023  3:36 PM)  Care Management Interventions: Advised to schedule with specialist (Emphasized importance of hospital follow up) (11/16/2023  3:36 PM)    Self  Management  What is the home health agency?:  Home Health  confirmed start of care  2023 (2023  3:36 PM)  Has home health visited the patient within 72 hours of discharge?: Call prior to 72 hours (2023  3:36 PM)  Home Health Interventions: Called home health agency (2023  3:36 PM)  What Durable Medical Equipment (DME) was ordered?: O2 supplies.. patient states he has all respiratory supplies.  does not have a small portable tank.  encouraged to call the supplier to request-  pcp office made aware. No other supplies needed at this time (2023  3:36 PM)  DME Interventions: Other (Comment) (out reach to [facility]  to see if order/ script was sent for continuous 02  i was told that respiratory said he did not need it.  outreach to discharging physician to clarify orders awaiting response) (2023  3:36 PM)    Patient Teaching  Does the patient have access to their discharge instructions?: Yes (2023  3:36 PM)  Care Management Interventions: Reviewed instructions with patient (2023  3:36 PM)  What is the patient's perception of their health status since discharge?: Same (You need to call your doctor,  return to the closest ED if you develop any new  or worsening symptoms:  concerning symptoms  call 911 for emergency situations  Call your doctor for questions / concerrns.) (2023  3:36 PM)  Is the patient/caregiver able to teach back the hierarchy of who to call/visit for symptoms/problems? PCP, Specialist, Home Health nurse, Urgent Care, ED, 911: Yes (You need to call your doctor, return to the closest ED if you develop any new or worsening symptoms: concerning symptoms call 911 for emergency situations Call your doctor for questions / concerrns.) (2023  3:36 PM)    Wrap Up  Wrap Up Additional Comments: Spoke w/ pt  Pt identified by name and   States he is doing ok  Reports urinating with steady stream. Aware to monitor and if has difficulties to report  "to the er.  Reviewed discharge instructions, medications, and follow up. Patient states he was not told to wear o2 continuously.  He states he wears it at night 2l nc  this morning however he woke up very short of breath and said his pulse ox was \"in the 70's\"  It took him awhile to catch his breath but was able to recover to around 93% in a short period  He has been out for his infusion today and states his pulse ox has remained in the 90's.  I did report this incident to PCP provider.  SOC for home care confirmed for 11-  Patient denies any further discharge questions/needs at this time.Emphasized the importance of hospital follow up.  Confirms they will attend the scheduled follow up appointment Aware of my availability for non emergent concerns (11/16/2023  3:36 PM)  Call End Time: 1645 (11/16/2023  3:36 PM)          "

## 2023-11-16 NOTE — PROGRESS NOTES
Discharge Facility: [facility]  Discharge Diagnosis: Gross Hematuria  Admission Date: 11-  Discharge Date: 11-    PCP Appointment Date:  -Follow up with  [facility] SN /PT /OT / Home Health Aide    Specialty Follow UP:  Follow up with Other follow-up:; F/U Dr. Piedra - Urology    [facility] Encounter and Summary: Linked   See discharge assessment below for further details

## 2023-11-16 NOTE — TELEPHONE ENCOUNTER
Transition of Care    [facility]  Discharge diagnosis: gross hematuria  Discharged to: home  Discharge date: 11/15/23  Initial Call date: 11/16/23  Spoke with patient/caregiver: patient                                                                     Do you need assistance  visits prior to your PCP visit: No  Home health care ordered: No  Have you been contacted by home care and have a start of care date: No  Are you taking medications as prescribed at discharge: Yes    Referral to [facility] Pharmacist: No  Patient advised to bring all medications to PCP follow-up appointment.  Patient advised to follow discharge instructions until provider follow-up.  TCM visit date: 11/21/23  TCM provider visit with: DEANNE Dumont-AYO-DNP

## 2023-11-17 ENCOUNTER — PATIENT OUTREACH (OUTPATIENT)
Dept: CARE COORDINATION | Facility: CLINIC | Age: 78
End: 2023-11-17
Payer: MEDICARE

## 2023-11-17 ENCOUNTER — HOME CARE VISIT (OUTPATIENT)
Dept: HOME HEALTH SERVICES | Facility: HOME HEALTH | Age: 78
End: 2023-11-17
Payer: MEDICARE

## 2023-11-17 DIAGNOSIS — E55.9 VITAMIN D DEFICIENCY: ICD-10-CM

## 2023-11-17 PROCEDURE — 169592 NO-PAY CLAIM PROCEDURE

## 2023-11-17 PROCEDURE — 0023 HH SOC

## 2023-11-17 PROCEDURE — G0299 HHS/HOSPICE OF RN EA 15 MIN: HCPCS | Mod: HHH

## 2023-11-17 PROCEDURE — 1090000001 HH PPS REVENUE CREDIT

## 2023-11-17 PROCEDURE — 1090000002 HH PPS REVENUE DEBIT

## 2023-11-17 RX ORDER — CHOLECALCIFEROL (VITAMIN D3) 125 MCG
125 CAPSULE ORAL DAILY
Qty: 90 CAPSULE | Refills: 0 | Status: SHIPPED | OUTPATIENT
Start: 2023-11-17 | End: 2024-01-08

## 2023-11-17 NOTE — PROGRESS NOTES
CM called patient for any Long term CM needs.  Patient verified identity.  Patient states He is home doing okay overall, continues to have a decrease of his O2 level in the morning after being on O2 during the night.  Patient informed CM that he does have a pulmonologist and he think he is in need of O2 during the day.  Patient states that his PCP is aware of the situation and told patient he needed to be re-evaluated for O2 needs.  This CM advised patient to call pulmonology today and leave a message with provider and make an appointment, Pt. Verbally agreed.  Patient currently had home care nurse in the home for assessment and which he needed to get back to.  CM gave patient Cms name and number, patient states he also been working with Sisi Daniel as well.  CM asked patient if it would be okay to make another follow up in a week which patient agreed.  Patient had no questions, concerns or needs on this call.      Erin Arias, DEBN, RN

## 2023-11-18 VITALS
SYSTOLIC BLOOD PRESSURE: 124 MMHG | DIASTOLIC BLOOD PRESSURE: 68 MMHG | OXYGEN SATURATION: 94 % | TEMPERATURE: 97.8 F | HEART RATE: 64 BPM | RESPIRATION RATE: 20 BRPM

## 2023-11-18 PROCEDURE — 1090000001 HH PPS REVENUE CREDIT

## 2023-11-18 PROCEDURE — 1090000002 HH PPS REVENUE DEBIT

## 2023-11-18 SDOH — HEALTH STABILITY: MENTAL HEALTH: SMOKING IN HOME: 1

## 2023-11-18 SDOH — ECONOMIC STABILITY: HOUSING INSECURITY: EVIDENCE OF SMOKING MATERIAL: 1

## 2023-11-18 ASSESSMENT — ENCOUNTER SYMPTOMS
SUBJECTIVE PAIN PROGRESSION: UNCHANGED
COUGH CHARACTERISTICS: PRODUCTIVE
PAIN LOCATION - PAIN FREQUENCY: INFREQUENT
APPETITE LEVEL: FAIR
SHORTNESS OF BREATH: 1
LOSS OF SENSATION IN FEET: 1
HIGHEST PAIN SEVERITY IN PAST 24 HOURS: 2/10
DEPRESSION: 0
MUSCLE WEAKNESS: 1
DYSPNEA ACTIVITY LEVEL: AT REST
PERSON REPORTING PAIN: PATIENT
PAIN SEVERITY GOAL: 0/10
PAIN LOCATION: BACK
PAIN: 1
HEMATURIA: 1
LIMITED RANGE OF MOTION: 1
OCCASIONAL FEELINGS OF UNSTEADINESS: 0
COUGH: 1
FATIGUES EASILY: 1
LOWEST PAIN SEVERITY IN PAST 24 HOURS: 0/10
CHANGE IN APPETITE: UNCHANGED
DYSPNEA ON EXERTION: 1
PAIN LOCATION - RELIEVING FACTORS: REST
PAIN LOCATION - PAIN SEVERITY: 2/10

## 2023-11-18 ASSESSMENT — ACTIVITIES OF DAILY LIVING (ADL)
AMBULATION ASSISTANCE: ONE PERSON
AMBULATION ASSISTANCE: 1
TOILETING EQUIPMENT USED: CANE OR WALKER
ENTERING_EXITING_HOME: NEEDS ASSISTANCE
PHYSICAL_TRANSFERS_DEVICES: CANE OR WALKER
OASIS_M1830: 03
TOILETING: ONE PERSON
CURRENT_FUNCTION: ONE PERSON
PHYSICAL TRANSFERS ASSESSED: 1
TOILETING: 1

## 2023-11-18 ASSESSMENT — LIFESTYLE VARIABLES: SMOKING_STATUS: 1

## 2023-11-19 PROCEDURE — 1090000001 HH PPS REVENUE CREDIT

## 2023-11-19 PROCEDURE — 1090000002 HH PPS REVENUE DEBIT

## 2023-11-20 ENCOUNTER — HOME CARE VISIT (OUTPATIENT)
Dept: HOME HEALTH SERVICES | Facility: HOME HEALTH | Age: 78
End: 2023-11-20
Payer: MEDICARE

## 2023-11-20 VITALS
RESPIRATION RATE: 18 BRPM | DIASTOLIC BLOOD PRESSURE: 59 MMHG | SYSTOLIC BLOOD PRESSURE: 119 MMHG | OXYGEN SATURATION: 99 % | TEMPERATURE: 98.1 F | HEART RATE: 65 BPM

## 2023-11-20 VITALS
SYSTOLIC BLOOD PRESSURE: 112 MMHG | OXYGEN SATURATION: 99 % | TEMPERATURE: 98.2 F | DIASTOLIC BLOOD PRESSURE: 42 MMHG | HEART RATE: 78 BPM | RESPIRATION RATE: 18 BRPM

## 2023-11-20 DIAGNOSIS — Z95.810 CARDIAC DEFIBRILLATOR IN PLACE: Primary | ICD-10-CM

## 2023-11-20 DIAGNOSIS — I42.9 CARDIOMYOPATHY, UNSPECIFIED TYPE (MULTI): ICD-10-CM

## 2023-11-20 PROCEDURE — 1090000001 HH PPS REVENUE CREDIT

## 2023-11-20 PROCEDURE — G0300 HHS/HOSPICE OF LPN EA 15 MIN: HCPCS | Mod: HHH

## 2023-11-20 PROCEDURE — G0151 HHCP-SERV OF PT,EA 15 MIN: HCPCS | Mod: HHH

## 2023-11-20 PROCEDURE — 1090000002 HH PPS REVENUE DEBIT

## 2023-11-20 SDOH — HEALTH STABILITY: MENTAL HEALTH: SMOKING IN HOME: 0

## 2023-11-20 SDOH — ECONOMIC STABILITY: HOUSING INSECURITY: EVIDENCE OF SMOKING MATERIAL: 1

## 2023-11-20 SDOH — HEALTH STABILITY: MENTAL HEALTH: SMOKING IN HOME: 1

## 2023-11-20 SDOH — ECONOMIC STABILITY: HOUSING INSECURITY: EVIDENCE OF SMOKING MATERIAL: 0

## 2023-11-20 ASSESSMENT — PAIN SCALES - PAIN ASSESSMENT IN ADVANCED DEMENTIA (PAINAD)
FACIALEXPRESSION: 0 - SMILING OR INEXPRESSIVE.
TOTALSCORE: 0
NEGVOCALIZATION: 0
CONSOLABILITY: 0 - NO NEED TO CONSOLE.
CONSOLABILITY: 0
FACIALEXPRESSION: 0
NEGVOCALIZATION: 0 - NONE.
BODYLANGUAGE: 0 - RELAXED.
BODYLANGUAGE: 0
BREATHING: 0

## 2023-11-20 ASSESSMENT — ENCOUNTER SYMPTOMS
HIGHEST PAIN SEVERITY IN PAST 24 HOURS: 10/10
DENIES PAIN: 1
SUBJECTIVE PAIN PROGRESSION: UNCHANGED
APPETITE LEVEL: GOOD
DYSPNEA ACTIVITY LEVEL: AFTER AMBULATING 10 - 20 FT
LOWEST PAIN SEVERITY IN PAST 24 HOURS: 10/10
PERSON REPORTING PAIN: PATIENT
PAIN SEVERITY GOAL: 0/10
PAIN: 1
OCCASIONAL FEELINGS OF UNSTEADINESS: 1
PAIN LOCATION: BACK
SHORTNESS OF BREATH: 1

## 2023-11-20 ASSESSMENT — ACTIVITIES OF DAILY LIVING (ADL)
AMBULATION ASSISTANCE ON FLAT SURFACES: 1
AMBULATION_DISTANCE/DURATION_TOLERATED: 50

## 2023-11-20 NOTE — SIGNIFICANT EVENT
Follow Up Phone Call    Outgoing phone call    Spoke to: Andrez Damon Relationship:self   Phone number: [phone]      Outcome: contacted patient/ family   No chief complaint on file.         Diagnosis:Not applicable  States he is feeling better. No further questions or concerns.

## 2023-11-21 ENCOUNTER — APPOINTMENT (OUTPATIENT)
Dept: PRIMARY CARE | Facility: CLINIC | Age: 78
End: 2023-11-21
Payer: MEDICARE

## 2023-11-21 ENCOUNTER — HOME CARE VISIT (OUTPATIENT)
Dept: HOME HEALTH SERVICES | Facility: HOME HEALTH | Age: 78
End: 2023-11-21
Payer: MEDICARE

## 2023-11-21 VITALS
TEMPERATURE: 98.8 F | DIASTOLIC BLOOD PRESSURE: 60 MMHG | SYSTOLIC BLOOD PRESSURE: 120 MMHG | OXYGEN SATURATION: 98 % | HEART RATE: 61 BPM | RESPIRATION RATE: 22 BRPM

## 2023-11-21 PROCEDURE — 1090000001 HH PPS REVENUE CREDIT

## 2023-11-21 PROCEDURE — G0152 HHCP-SERV OF OT,EA 15 MIN: HCPCS | Mod: HHH

## 2023-11-21 PROCEDURE — 1090000002 HH PPS REVENUE DEBIT

## 2023-11-21 SDOH — ECONOMIC STABILITY: HOUSING INSECURITY: EVIDENCE OF SMOKING MATERIAL: 1

## 2023-11-21 SDOH — HEALTH STABILITY: MENTAL HEALTH: SMOKING IN HOME: 1

## 2023-11-21 ASSESSMENT — ACTIVITIES OF DAILY LIVING (ADL)
BATHING_WITHIN_DEFINED_LIMITS: 1
GROOMING_WITHIN_DEFINED_LIMITS: 1
DRESSING_LB_CURRENT_FUNCTION: MINIMUM ASSIST

## 2023-11-21 ASSESSMENT — ENCOUNTER SYMPTOMS
PAIN LOCATION: BACK
PERSON REPORTING PAIN: PATIENT
PAIN LOCATION - PAIN SEVERITY: 10/10
HIGHEST PAIN SEVERITY IN PAST 24 HOURS: 10/10
SUBJECTIVE PAIN PROGRESSION: UNCHANGED
PAIN LOCATION - RELIEVING FACTORS: PAIN MEDS
PAIN: 1

## 2023-11-22 ENCOUNTER — OFFICE VISIT (OUTPATIENT)
Dept: PRIMARY CARE | Facility: CLINIC | Age: 78
End: 2023-11-22
Payer: MEDICARE

## 2023-11-22 ENCOUNTER — HOSPITAL ENCOUNTER (OUTPATIENT)
Dept: CARDIOLOGY | Facility: HOSPITAL | Age: 78
Discharge: HOME | End: 2023-11-22
Payer: MEDICARE

## 2023-11-22 VITALS
HEART RATE: 62 BPM | TEMPERATURE: 97.3 F | OXYGEN SATURATION: 95 % | SYSTOLIC BLOOD PRESSURE: 114 MMHG | WEIGHT: 151.7 LBS | BODY MASS INDEX: 21.77 KG/M2 | DIASTOLIC BLOOD PRESSURE: 64 MMHG

## 2023-11-22 DIAGNOSIS — N18.30 DIABETES MELLITUS WITH STAGE 3 CHRONIC KIDNEY DISEASE (MULTI): ICD-10-CM

## 2023-11-22 DIAGNOSIS — R31.0 GROSS HEMATURIA: Primary | ICD-10-CM

## 2023-11-22 DIAGNOSIS — E11.22 DIABETES MELLITUS WITH STAGE 3 CHRONIC KIDNEY DISEASE (MULTI): ICD-10-CM

## 2023-11-22 DIAGNOSIS — Z09 HOSPITAL DISCHARGE FOLLOW-UP: ICD-10-CM

## 2023-11-22 DIAGNOSIS — I51.9 CARDIOPATHY: ICD-10-CM

## 2023-11-22 DIAGNOSIS — I50.22 CHRONIC SYSTOLIC CONGESTIVE HEART FAILURE (MULTI): ICD-10-CM

## 2023-11-22 DIAGNOSIS — J42 CHRONIC BRONCHITIS, UNSPECIFIED CHRONIC BRONCHITIS TYPE (MULTI): ICD-10-CM

## 2023-11-22 DIAGNOSIS — I50.9 ACC/AHA STAGE C CONGESTIVE HEART FAILURE (MULTI): ICD-10-CM

## 2023-11-22 PROCEDURE — 1159F MED LIST DOCD IN RCRD: CPT | Performed by: NURSE PRACTITIONER

## 2023-11-22 PROCEDURE — 1111F DSCHRG MED/CURRENT MED MERGE: CPT | Performed by: NURSE PRACTITIONER

## 2023-11-22 PROCEDURE — 99214 OFFICE O/P EST MOD 30 MIN: CPT | Performed by: NURSE PRACTITIONER

## 2023-11-22 PROCEDURE — 1090000001 HH PPS REVENUE CREDIT

## 2023-11-22 PROCEDURE — 3078F DIAST BP <80 MM HG: CPT | Performed by: NURSE PRACTITIONER

## 2023-11-22 PROCEDURE — 1125F AMNT PAIN NOTED PAIN PRSNT: CPT | Performed by: NURSE PRACTITIONER

## 2023-11-22 PROCEDURE — 1090000002 HH PPS REVENUE DEBIT

## 2023-11-22 PROCEDURE — 3074F SYST BP LT 130 MM HG: CPT | Performed by: NURSE PRACTITIONER

## 2023-11-22 PROCEDURE — 4004F PT TOBACCO SCREEN RCVD TLK: CPT | Performed by: NURSE PRACTITIONER

## 2023-11-22 PROCEDURE — 1160F RVW MEDS BY RX/DR IN RCRD: CPT | Performed by: NURSE PRACTITIONER

## 2023-11-22 ASSESSMENT — ENCOUNTER SYMPTOMS
UNEXPECTED WEIGHT CHANGE: 0
GASTROINTESTINAL NEGATIVE: 1
SPEECH DIFFICULTY: 0
FREQUENCY: 1
COUGH: 0
SHORTNESS OF BREATH: 1
ABDOMINAL PAIN: 0
ENDOCRINE NEGATIVE: 1
NUMBNESS: 0
HEMATOLOGIC/LYMPHATIC NEGATIVE: 1
CONSTIPATION: 0
NAUSEA: 0
FATIGUE: 1
PALPITATIONS: 0
DIARRHEA: 0
ALLERGIC/IMMUNOLOGIC NEGATIVE: 1
HEADACHES: 0
BACK PAIN: 1
CHILLS: 0
WHEEZING: 0
SORE THROAT: 0
ACTIVITY CHANGE: 0
VOMITING: 0
ARTHRALGIAS: 1
DIZZINESS: 0
EYES NEGATIVE: 1

## 2023-11-22 NOTE — PROGRESS NOTES
"Patient: Andrez Damon  : 1945  PCP: Karley Sullivan, APRN-CNP, [facility]  MRN: [id number]  Program: No linked episodes     Andrez Damon is a 78 y.o. male presenting today for follow-up after being discharged from the hospital 7 days ago. The main problem requiring admission was gross hematuria. The discharge summary and/or Transitional Care Management documentation was reviewed. Medication reconciliation was performed as indicated via the \"Onur as Reviewed\" timestamp.     Andrez Damon was contacted by Transitional Care Management services two days after his discharge. This encounter and supporting documentation was reviewed.    The complexity of medical decision making for this patient's transitional care is high.    Patient admitted to [facility]  - 11/15 for gross hematuria.  Upon admission, patient had Kee for BPH, urinary retention.  Apixaban and Brilinta was held until hematuria resolved.  Kee was removed, patient has been voiding well.  Taking finasteride and tamsulosin.  Follows up with urology in January.  Patient scheduled for pacemaker change 12/15.  He has device check today.  Was using O2 continuous while in the hospital.  He feels he may be needing O2 more.  Plans to discuss with pulmonology.  Room air here in the office, SPO2 95%.    No visits with results within 1 Week(s) from this visit.   Latest known visit with results is:   Admission on 2023, Discharged on 11/15/2023   Component Date Value Ref Range Status    Protime 2023 12.4  9.8 - 12.8 seconds Final    INR 2023 1.1  0.9 - 1.1 Final    aPTT 2023 32  27 - 38 seconds Final    WBC 2023 6.3  4.4 - 11.3 x10*3/uL Final    nRBC 2023 0.0  0.0 - 0.0 /100 WBCs Final    RBC 2023 3.58 (L)  4.50 - 5.90 x10*6/uL Final    Hemoglobin 2023 10.5 (L)  13.5 - 17.5 g/dL Final    Hematocrit 2023 33.8 (L)  41.0 - 52.0 % Final    MCV 2023 94  80 - 100 fL Final    MCH 2023 29.3  26.0 - " 34.0 pg Final    MCHC 11/13/2023 31.1 (L)  32.0 - 36.0 g/dL Final    RDW 11/13/2023 15.8 (H)  11.5 - 14.5 % Final    Platelets 11/13/2023 198  150 - 450 x10*3/uL Final    Glucose 11/13/2023 93  74 - 99 mg/dL Final    Sodium 11/13/2023 142  136 - 145 mmol/L Final    Potassium 11/13/2023 4.8  3.5 - 5.3 mmol/L Final    Chloride 11/13/2023 108 (H)  98 - 107 mmol/L Final    Bicarbonate 11/13/2023 30  21 - 32 mmol/L Final    Anion Gap 11/13/2023 9 (L)  10 - 20 mmol/L Final    Urea Nitrogen 11/13/2023 27 (H)  6 - 23 mg/dL Final    Creatinine 11/13/2023 1.64 (H)  0.50 - 1.30 mg/dL Final    eGFR 11/13/2023 43 (L)  >60 mL/min/1.73m*2 Final    Calculations of estimated GFR are performed using the 2021 CKD-EPI Study Refit equation without the race variable for the IDMS-Traceable creatinine methods.  https://jasn.asnjournals.org/content/early/2021/09/22/ASN.2249576371    Calcium 11/13/2023 8.4 (L)  8.6 - 10.3 mg/dL Final    Phosphorus 11/13/2023 3.5  2.5 - 4.9 mg/dL Final    The performance characteristics of phosphorus testing in heparinized plasma have been validated by the individual  laboratory site where testing is performed. Testing on heparinized plasma is not approved by the FDA; however, such approval is not necessary.    Albumin 11/13/2023 3.2 (L)  3.4 - 5.0 g/dL Final    Magnesium 11/13/2023 2.11  1.60 - 2.40 mg/dL Final    WBC 11/14/2023 4.9  4.4 - 11.3 x10*3/uL Final    nRBC 11/14/2023 0.0  0.0 - 0.0 /100 WBCs Final    RBC 11/14/2023 3.29 (L)  4.50 - 5.90 x10*6/uL Final    Hemoglobin 11/14/2023 9.7 (L)  13.5 - 17.5 g/dL Final    Hematocrit 11/14/2023 31.2 (L)  41.0 - 52.0 % Final    MCV 11/14/2023 95  80 - 100 fL Final    MCH 11/14/2023 29.5  26.0 - 34.0 pg Final    MCHC 11/14/2023 31.1 (L)  32.0 - 36.0 g/dL Final    RDW 11/14/2023 15.8 (H)  11.5 - 14.5 % Final    Platelets 11/14/2023 176  150 - 450 x10*3/uL Final    Glucose 11/14/2023 83  74 - 99 mg/dL Final    Sodium 11/14/2023 140  136 - 145 mmol/L Final     Potassium 11/14/2023 4.4  3.5 - 5.3 mmol/L Final    Chloride 11/14/2023 108 (H)  98 - 107 mmol/L Final    Bicarbonate 11/14/2023 27  21 - 32 mmol/L Final    Anion Gap 11/14/2023 9 (L)  10 - 20 mmol/L Final    Urea Nitrogen 11/14/2023 29 (H)  6 - 23 mg/dL Final    Creatinine 11/14/2023 1.72 (H)  0.50 - 1.30 mg/dL Final    eGFR 11/14/2023 40 (L)  >60 mL/min/1.73m*2 Final    Calculations of estimated GFR are performed using the 2021 CKD-EPI Study Refit equation without the race variable for the IDMS-Traceable creatinine methods.  https://jasn.asnjournals.org/content/early/2021/09/22/ASN.2081048509    Calcium 11/14/2023 8.1 (L)  8.6 - 10.3 mg/dL Final    Albumin 11/14/2023 3.0 (L)  3.4 - 5.0 g/dL Final    Alkaline Phosphatase 11/14/2023 51  33 - 136 U/L Final    Total Protein 11/14/2023 5.0 (L)  6.4 - 8.2 g/dL Final    AST 11/14/2023 25  9 - 39 U/L Final    Bilirubin, Total 11/14/2023 0.5  0.0 - 1.2 mg/dL Final    ALT 11/14/2023 23  10 - 52 U/L Final    Patients treated with Sulfasalazine may generate falsely decreased results for ALT.    POCT Glucose 11/13/2023 131 (H)  74 - 99 mg/dL Final    POCT Glucose 11/14/2023 97  74 - 99 mg/dL Final    Hemoglobin A1C 11/14/2023 5.7 (H)  see below % Final    Estimated Average Glucose 11/14/2023 117  Not Established mg/dL Final    POCT Glucose 11/14/2023 101 (H)  74 - 99 mg/dL Final    POCT Glucose 11/14/2023 126 (H)  74 - 99 mg/dL Final    POCT Glucose 11/14/2023 63 (L)  74 - 99 mg/dL Final    WBC 11/15/2023 5.6  4.4 - 11.3 x10*3/uL Final    nRBC 11/15/2023 0.0  0.0 - 0.0 /100 WBCs Final    RBC 11/15/2023 3.27 (L)  4.50 - 5.90 x10*6/uL Final    Hemoglobin 11/15/2023 9.4 (L)  13.5 - 17.5 g/dL Final    Hematocrit 11/15/2023 30.8 (L)  41.0 - 52.0 % Final    MCV 11/15/2023 94  80 - 100 fL Final    MCH 11/15/2023 28.7  26.0 - 34.0 pg Final    MCHC 11/15/2023 30.5 (L)  32.0 - 36.0 g/dL Final    RDW 11/15/2023 15.8 (H)  11.5 - 14.5 % Final    Platelets 11/15/2023 164  150 - 450 x10*3/uL  Final    Glucose 11/15/2023 86  74 - 99 mg/dL Final    Sodium 11/15/2023 140  136 - 145 mmol/L Final    Potassium 11/15/2023 4.2  3.5 - 5.3 mmol/L Final    Chloride 11/15/2023 108 (H)  98 - 107 mmol/L Final    Bicarbonate 11/15/2023 27  21 - 32 mmol/L Final    Anion Gap 11/15/2023 9 (L)  10 - 20 mmol/L Final    Urea Nitrogen 11/15/2023 27 (H)  6 - 23 mg/dL Final    Creatinine 11/15/2023 1.68 (H)  0.50 - 1.30 mg/dL Final    eGFR 11/15/2023 41 (L)  >60 mL/min/1.73m*2 Final    Calculations of estimated GFR are performed using the 2021 CKD-EPI Study Refit equation without the race variable for the IDMS-Traceable creatinine methods.  https://jasn.asnjournals.org/content/early/2021/09/22/ASN.6831393522    Calcium 11/15/2023 8.0 (L)  8.6 - 10.3 mg/dL Final    Phosphorus 11/15/2023 3.1  2.5 - 4.9 mg/dL Final    The performance characteristics of phosphorus testing in heparinized plasma have been validated by the individual  laboratory site where testing is performed. Testing on heparinized plasma is not approved by the FDA; however, such approval is not necessary.    Albumin 11/15/2023 3.0 (L)  3.4 - 5.0 g/dL Final    POCT Glucose 11/15/2023 98  74 - 99 mg/dL Final     === 11/12/23 ===    CT ABDOMEN PELVIS WO IV CONTRAST    - Impression -  1. No hydronephrosis or obstructing ureteral stone.  2. Increased fecal matter in the colon, suggestive of constipation.  Signed by Osorio Rivera MD      Physical Exam  Vitals and nursing note reviewed.   Constitutional:       General: He is not in acute distress.     Appearance: Normal appearance. He is underweight. He is ill-appearing.   HENT:      Head: Normocephalic and atraumatic.      Right Ear: Tympanic membrane, ear canal and external ear normal.      Left Ear: Tympanic membrane, ear canal and external ear normal.      Nose: Nose normal.      Mouth/Throat:      Mouth: Mucous membranes are moist.      Pharynx: Oropharynx is clear.   Eyes:      Extraocular Movements: Extraocular  movements intact.      Conjunctiva/sclera: Conjunctivae normal.      Pupils: Pupils are equal, round, and reactive to light.   Cardiovascular:      Rate and Rhythm: Normal rate and regular rhythm.      Pulses: Normal pulses.      Heart sounds: Normal heart sounds. No murmur heard.  Pulmonary:      Effort: Pulmonary effort is normal. No respiratory distress.      Breath sounds: Normal breath sounds. No wheezing.   Abdominal:      General: Bowel sounds are normal. There is no distension.      Palpations: Abdomen is soft.      Tenderness: There is no abdominal tenderness.   Genitourinary:     Comments: Kee in place  Musculoskeletal:         General: No tenderness. Normal range of motion.      Cervical back: Normal range of motion and neck supple.      Right lower leg: No edema.      Left lower leg: No edema.      Comments: Using rollator   Skin:     General: Skin is warm and dry.      Capillary Refill: Capillary refill takes less than 2 seconds.   Neurological:      General: No focal deficit present.      Mental Status: He is alert and oriented to person, place, and time.   Psychiatric:         Mood and Affect: Mood normal.         Behavior: Behavior normal.         Thought Content: Thought content normal.         Judgment: Judgment normal.         Assessment/Plan     #BPH  -Follow with urology  -Continue tamsulosin and finasteride  #Hypertension, controlled  -continue current carvedilol, spironolactone, furosemide and losartan  -follow with cardiology  -stop smoking  #Diabetes, diet controlled, A1c 5.7%  -Eat frequent small meals, do not go long periods without eating  -check blood sugar daily  -Low fat/cholesterol, low sodium, carbohydrate controlled diet  -Maintain healthy weight  -regular follow up with ophthalmology and podiatry  #CAD  -continue to follow with cardiology, current meds  -stop smoking completely  #Dyslipidemia  -continue atorvastatin 80 mg daily  -Low fat/cholesterol, low sodium diet  -Exercise as  "tolerated 150 minutes/week, increase activity slowly  -Maintain BMI 20-25  #Chronic back and hip pain  -recommend return to see pain management, he declines  -Tylenol 650-1000 mg every 8 hours as needed for pain  #Bilateral knee pain  -Tylenol 650-1000 mg every 8 hours as needed for pain  -OTC topical pain reliever  #Anemia  -follow with hematology  #CHF, cardiomyopathy  -Follow-up with cardiology  -continued furosemide 20 mg daily, spironolactone 25 mg daily, carvedilol 6.25 mg daily  -Low sodium diet  -Weigh yourself every morning before breakfast  -Call the office if you have a weight gain of 3 or more pounds in one day or 5 or more pounds in one week  -Scheduled for device change 12/15  #CKD  -monitor  #Depression, improved  #Nicotine dependency  -Strongly encouraged to stop smoking  #PVD, thromboangiitis obliterans  -protect feet from injury  -inspect feet daily  -Strongly encouraged to stop smoking  #Orthostatic hypotension  -get up and change positions slowly  -compression stockings  #GERD  -Pantoprazole 40 mg daily  -Tums or Pepcid as needed in the evening  -Avoid alcohol, caffeine, and other foods that tend to bother your stomach  -Elevate head of bed 4-6\" on blocks  -Avoid eating 2 hours prior to bed  -Do not wear constricting clothing around your middle  #RLS  -Continue ropinirole 1 mg at bedtime  #COPD, controlled  -continue Stiolto, Yupelri  -Xopenex as needed  -Follow with pulmonology  -Strongly encouraged to stop smoking  #Constipation  -Increase fiber in diet    Follow-up in 2 months for Medicare physical and as needed    Review of Systems   Constitutional:  Positive for fatigue. Negative for activity change, chills and unexpected weight change.   HENT:  Positive for hearing loss and tinnitus. Negative for congestion, ear pain and sore throat.    Eyes: Negative.    Respiratory:  Positive for shortness of breath. Negative for cough and wheezing.    Cardiovascular:  Negative for chest pain, " palpitations and leg swelling.        Cool toes   Gastrointestinal: Negative.  Negative for abdominal pain, constipation, diarrhea, nausea and vomiting.   Endocrine: Negative.    Genitourinary:  Positive for frequency.   Musculoskeletal:  Positive for arthralgias, back pain and gait problem.   Skin: Negative.    Allergic/Immunologic: Negative.    Neurological:  Negative for dizziness, speech difficulty, numbness and headaches.   Hematological: Negative.    Psychiatric/Behavioral:          Anxiety, depression   All other systems reviewed and are negative.      Family History   Problem Relation Name Age of Onset    Other (cardiac disorder) Mother      Other (malignant neoplasm) Mother      Other (cardiac disorder) Father         Engagement  Call Start Time: 1536 (11/16/2023  3:36 PM)    Medications  Medications reviewed with patient/caregiver?: Yes (11/16/2023  3:36 PM)  Is the patient having any side effects they believe may be caused by any medication additions or changes?: No (11/16/2023  3:36 PM)  Does the patient have all medications ordered at discharge?: Yes (11/16/2023  3:36 PM)  Care Management Interventions: Provided patient education (11/16/2023  3:36 PM)  Prescription Comments: States he has all medications.  Denies any issues with affordabiltiy  START taking:  acetaminophen (Tylenol)  oxygen (O2)  polyethylene glycol (Glycolax, Miralax)  Start taking on: November 16, 2023    Aware of how to take all other medications. (11/16/2023  3:36 PM)  Is the patient taking all medications as directed (includes completed medication regime)?: Yes (Has not been using 02 continuously  I did reach out to the discharge CCM who stated respiratory therapy stated he did not need it all the time.) (11/16/2023  3:36 PM)  Medication Comments: Denies any questions (11/16/2023  3:36 PM)    Appointments  Does the patient have a primary care provider?: Yes (11/16/2023  3:36 PM)  Care Management Interventions: Verified appointment  date/time/provider (11/16/2023  3:36 PM)  Has the patient kept scheduled appointments due by today?: Yes (11/16/2023  3:36 PM)  Care Management Interventions: Advised to schedule with specialist (Emphasized importance of hospital follow up) (11/16/2023  3:36 PM)    Self Management  What is the home health agency?:  Home Health  confirmed start of care  11- (11/16/2023  3:36 PM)  Has home health visited the patient within 72 hours of discharge?: Call prior to 72 hours (11/16/2023  3:36 PM)  Home Health Interventions: Called home health agency (11/16/2023  3:36 PM)  What Durable Medical Equipment (DME) was ordered?: O2 supplies.. patient states he has all respiratory supplies.  does not have a small portable tank.  encouraged to call the supplier to request-  pcp office made aware. No other supplies needed at this time (11/16/2023  3:36 PM)  DME Interventions: Other (Comment) (out reach to [facility]  to see if order/ script was sent for continuous 02  i was told that respiratory said he did not need it.  outreach to discharging physician to clarify orders awaiting response) (11/16/2023  3:36 PM)    Patient Teaching  Does the patient have access to their discharge instructions?: Yes (11/16/2023  3:36 PM)  Care Management Interventions: Reviewed instructions with patient (11/16/2023  3:36 PM)  What is the patient's perception of their health status since discharge?: Same (You need to call your doctor,  return to the closest ED if you develop any new  or worsening symptoms:  concerning symptoms  call 911 for emergency situations  Call your doctor for questions / concerrns.) (11/16/2023  3:36 PM)  Is the patient/caregiver able to teach back the hierarchy of who to call/visit for symptoms/problems? PCP, Specialist, Home Health nurse, Urgent Care, ED, 911: Yes (You need to call your doctor, return to the closest ED if you develop any new or worsening symptoms: concerning symptoms call 911 for emergency  "situations Call your doctor for questions / concerrns.) (2023  3:36 PM)    Wrap Up  Wrap Up Additional Comments: Spoke w/ pt  Pt identified by name and   States he is doing ok  Reports urinating with steady stream. Aware to monitor and if has difficulties to report to the er.  Reviewed discharge instructions, medications, and follow up. Patient states he was not told to wear o2 continuously.  He states he wears it at night 2l nc  this morning however he woke up very short of breath and said his pulse ox was \"in the 70's\"  It took him awhile to catch his breath but was able to recover to around 93% in a short period  He has been out for his infusion today and states his pulse ox has remained in the 90's.  I did report this incident to PCP provider.  SOC for home care confirmed for 2023  Patient denies any further discharge questions/needs at this time.Emphasized the importance of hospital follow up.  Confirms they will attend the scheduled follow up appointment Aware of my availability for non emergent concerns (2023  3:36 PM)  Call End Time: 164 (2023  3:36 PM)        No follow-ups on file.  "

## 2023-11-23 PROCEDURE — 1090000001 HH PPS REVENUE CREDIT

## 2023-11-23 PROCEDURE — 1090000002 HH PPS REVENUE DEBIT

## 2023-11-24 ENCOUNTER — HOSPITAL ENCOUNTER (OUTPATIENT)
Dept: CARDIOLOGY | Facility: CLINIC | Age: 78
Discharge: HOME | End: 2023-11-24
Payer: MEDICARE

## 2023-11-24 DIAGNOSIS — I49.5 SSS (SICK SINUS SYNDROME) (MULTI): ICD-10-CM

## 2023-11-24 PROCEDURE — 1090000001 HH PPS REVENUE CREDIT

## 2023-11-24 PROCEDURE — 1090000002 HH PPS REVENUE DEBIT

## 2023-11-25 ENCOUNTER — HOSPITAL ENCOUNTER (INPATIENT)
Facility: HOSPITAL | Age: 78
LOS: 2 days | Discharge: HOME | DRG: 193 | End: 2023-11-27
Attending: EMERGENCY MEDICINE | Admitting: STUDENT IN AN ORGANIZED HEALTH CARE EDUCATION/TRAINING PROGRAM
Payer: MEDICARE

## 2023-11-25 ENCOUNTER — HOME CARE VISIT (OUTPATIENT)
Dept: HOME HEALTH SERVICES | Facility: HOME HEALTH | Age: 78
End: 2023-11-25
Payer: MEDICARE

## 2023-11-25 ENCOUNTER — APPOINTMENT (OUTPATIENT)
Dept: RADIOLOGY | Facility: HOSPITAL | Age: 78
DRG: 193 | End: 2023-11-25
Payer: MEDICARE

## 2023-11-25 DIAGNOSIS — J96.21 ACUTE ON CHRONIC RESPIRATORY FAILURE WITH HYPOXIA (MULTI): ICD-10-CM

## 2023-11-25 DIAGNOSIS — I50.23 ACUTE ON CHRONIC SYSTOLIC CONGESTIVE HEART FAILURE (MULTI): Primary | ICD-10-CM

## 2023-11-25 DIAGNOSIS — R79.89 TROPONIN LEVEL ELEVATED: ICD-10-CM

## 2023-11-25 LAB
ANION GAP SERPL CALC-SCNC: 11 MMOL/L (ref 10–20)
BASOPHILS # BLD AUTO: 0.04 X10*3/UL (ref 0–0.1)
BASOPHILS NFR BLD AUTO: 0.4 %
BNP SERPL-MCNC: 2801 PG/ML (ref 0–99)
BUN SERPL-MCNC: 30 MG/DL (ref 6–23)
CALCIUM SERPL-MCNC: 9.3 MG/DL (ref 8.6–10.3)
CARDIAC TROPONIN I PNL SERPL HS: 54 NG/L (ref 0–20)
CARDIAC TROPONIN I PNL SERPL HS: 62 NG/L (ref 0–20)
CHLORIDE SERPL-SCNC: 110 MMOL/L (ref 98–107)
CO2 SERPL-SCNC: 26 MMOL/L (ref 21–32)
CREAT SERPL-MCNC: 1.53 MG/DL (ref 0.5–1.3)
EOSINOPHIL # BLD AUTO: 0.13 X10*3/UL (ref 0–0.4)
EOSINOPHIL NFR BLD AUTO: 1.2 %
ERYTHROCYTE [DISTWIDTH] IN BLOOD BY AUTOMATED COUNT: 17.7 % (ref 11.5–14.5)
FLUAV RNA RESP QL NAA+PROBE: NOT DETECTED
FLUBV RNA RESP QL NAA+PROBE: NOT DETECTED
GFR SERPL CREATININE-BSD FRML MDRD: 46 ML/MIN/1.73M*2
GLUCOSE SERPL-MCNC: 146 MG/DL (ref 74–99)
HCT VFR BLD AUTO: 39.3 % (ref 41–52)
HGB BLD-MCNC: 12.2 G/DL (ref 13.5–17.5)
HOLD SPECIMEN: NORMAL
HOLD SPECIMEN: NORMAL
IMM GRANULOCYTES # BLD AUTO: 0.05 X10*3/UL (ref 0–0.5)
IMM GRANULOCYTES NFR BLD AUTO: 0.5 % (ref 0–0.9)
LACTATE SERPL-SCNC: 1.7 MMOL/L (ref 0.4–2)
LACTATE SERPL-SCNC: 2.1 MMOL/L (ref 0.4–2)
LYMPHOCYTES # BLD AUTO: 1.42 X10*3/UL (ref 0.8–3)
LYMPHOCYTES NFR BLD AUTO: 13.2 %
MAGNESIUM SERPL-MCNC: 2.05 MG/DL (ref 1.6–2.4)
MCH RBC QN AUTO: 29.9 PG (ref 26–34)
MCHC RBC AUTO-ENTMCNC: 31 G/DL (ref 32–36)
MCV RBC AUTO: 96 FL (ref 80–100)
MONOCYTES # BLD AUTO: 0.92 X10*3/UL (ref 0.05–0.8)
MONOCYTES NFR BLD AUTO: 8.6 %
NEUTROPHILS # BLD AUTO: 8.18 X10*3/UL (ref 1.6–5.5)
NEUTROPHILS NFR BLD AUTO: 76.1 %
NRBC BLD-RTO: 0 /100 WBCS (ref 0–0)
PLATELET # BLD AUTO: 208 X10*3/UL (ref 150–450)
POTASSIUM SERPL-SCNC: 3.4 MMOL/L (ref 3.5–5.3)
RBC # BLD AUTO: 4.08 X10*6/UL (ref 4.5–5.9)
RSV RNA RESP QL NAA+PROBE: NOT DETECTED
SARS-COV-2 RNA RESP QL NAA+PROBE: NOT DETECTED
SODIUM SERPL-SCNC: 144 MMOL/L (ref 136–145)
WBC # BLD AUTO: 10.7 X10*3/UL (ref 4.4–11.3)

## 2023-11-25 PROCEDURE — 71045 X-RAY EXAM CHEST 1 VIEW: CPT | Performed by: RADIOLOGY

## 2023-11-25 PROCEDURE — 94640 AIRWAY INHALATION TREATMENT: CPT | Mod: IPSPLIT

## 2023-11-25 PROCEDURE — 2500000005 HC RX 250 GENERAL PHARMACY W/O HCPCS: Mod: IPSPLIT | Performed by: STUDENT IN AN ORGANIZED HEALTH CARE EDUCATION/TRAINING PROGRAM

## 2023-11-25 PROCEDURE — 2500000001 HC RX 250 WO HCPCS SELF ADMINISTERED DRUGS (ALT 637 FOR MEDICARE OP): Mod: IPSPLIT | Performed by: STUDENT IN AN ORGANIZED HEALTH CARE EDUCATION/TRAINING PROGRAM

## 2023-11-25 PROCEDURE — 2500000005 HC RX 250 GENERAL PHARMACY W/O HCPCS: Performed by: EMERGENCY MEDICINE

## 2023-11-25 PROCEDURE — 36415 COLL VENOUS BLD VENIPUNCTURE: CPT | Performed by: EMERGENCY MEDICINE

## 2023-11-25 PROCEDURE — 94664 DEMO&/EVAL PT USE INHALER: CPT | Mod: IPSPLIT

## 2023-11-25 PROCEDURE — 99223 1ST HOSP IP/OBS HIGH 75: CPT | Performed by: INTERNAL MEDICINE

## 2023-11-25 PROCEDURE — 1090000002 HH PPS REVENUE DEBIT

## 2023-11-25 PROCEDURE — 83605 ASSAY OF LACTIC ACID: CPT | Performed by: EMERGENCY MEDICINE

## 2023-11-25 PROCEDURE — 2500000004 HC RX 250 GENERAL PHARMACY W/ HCPCS (ALT 636 FOR OP/ED): Mod: IPSPLIT | Performed by: STUDENT IN AN ORGANIZED HEALTH CARE EDUCATION/TRAINING PROGRAM

## 2023-11-25 PROCEDURE — 87637 SARSCOV2&INF A&B&RSV AMP PRB: CPT | Performed by: EMERGENCY MEDICINE

## 2023-11-25 PROCEDURE — 2500000002 HC RX 250 W HCPCS SELF ADMINISTERED DRUGS (ALT 637 FOR MEDICARE OP, ALT 636 FOR OP/ED): Performed by: EMERGENCY MEDICINE

## 2023-11-25 PROCEDURE — 2500000004 HC RX 250 GENERAL PHARMACY W/ HCPCS (ALT 636 FOR OP/ED): Mod: IPSPLIT | Performed by: NURSE PRACTITIONER

## 2023-11-25 PROCEDURE — 94664 DEMO&/EVAL PT USE INHALER: CPT

## 2023-11-25 PROCEDURE — 9420000001 HC RT PATIENT EDUCATION 5 MIN

## 2023-11-25 PROCEDURE — 80048 BASIC METABOLIC PNL TOTAL CA: CPT | Performed by: EMERGENCY MEDICINE

## 2023-11-25 PROCEDURE — 84145 PROCALCITONIN (PCT): CPT | Mod: CONLAB,IPSPLIT | Performed by: NURSE PRACTITIONER

## 2023-11-25 PROCEDURE — 1200000002 HC GENERAL ROOM WITH TELEMETRY DAILY: Mod: IPSPLIT

## 2023-11-25 PROCEDURE — 99285 EMERGENCY DEPT VISIT HI MDM: CPT | Performed by: EMERGENCY MEDICINE

## 2023-11-25 PROCEDURE — 94660 CPAP INITIATION&MGMT: CPT

## 2023-11-25 PROCEDURE — 1090000001 HH PPS REVENUE CREDIT

## 2023-11-25 PROCEDURE — 84145 PROCALCITONIN (PCT): CPT | Mod: CONLAB | Performed by: NURSE PRACTITIONER

## 2023-11-25 PROCEDURE — 94762 N-INVAS EAR/PLS OXIMTRY CONT: CPT | Mod: IPSPLIT

## 2023-11-25 PROCEDURE — 85025 COMPLETE CBC W/AUTO DIFF WBC: CPT | Performed by: EMERGENCY MEDICINE

## 2023-11-25 PROCEDURE — 2500000004 HC RX 250 GENERAL PHARMACY W/ HCPCS (ALT 636 FOR OP/ED)

## 2023-11-25 PROCEDURE — 93005 ELECTROCARDIOGRAM TRACING: CPT

## 2023-11-25 PROCEDURE — 98960 EDU&TRN PT SELF-MGMT NQHP 1: CPT

## 2023-11-25 PROCEDURE — 2500000002 HC RX 250 W HCPCS SELF ADMINISTERED DRUGS (ALT 637 FOR MEDICARE OP, ALT 636 FOR OP/ED): Mod: IPSPLIT | Performed by: STUDENT IN AN ORGANIZED HEALTH CARE EDUCATION/TRAINING PROGRAM

## 2023-11-25 PROCEDURE — 83880 ASSAY OF NATRIURETIC PEPTIDE: CPT | Performed by: EMERGENCY MEDICINE

## 2023-11-25 PROCEDURE — 96374 THER/PROPH/DIAG INJ IV PUSH: CPT | Mod: IPSPLIT

## 2023-11-25 PROCEDURE — 84484 ASSAY OF TROPONIN QUANT: CPT | Performed by: EMERGENCY MEDICINE

## 2023-11-25 PROCEDURE — 94640 AIRWAY INHALATION TREATMENT: CPT

## 2023-11-25 PROCEDURE — 94660 CPAP INITIATION&MGMT: CPT | Mod: IPSPLIT

## 2023-11-25 PROCEDURE — 83735 ASSAY OF MAGNESIUM: CPT | Performed by: EMERGENCY MEDICINE

## 2023-11-25 PROCEDURE — 71045 X-RAY EXAM CHEST 1 VIEW: CPT | Mod: FY

## 2023-11-25 RX ORDER — LANOLIN ALCOHOL/MO/W.PET/CERES
50 CREAM (GRAM) TOPICAL DAILY
Status: DISCONTINUED | OUTPATIENT
Start: 2023-11-25 | End: 2023-11-27 | Stop reason: HOSPADM

## 2023-11-25 RX ORDER — ATORVASTATIN CALCIUM 40 MG/1
80 TABLET, FILM COATED ORAL DAILY
Status: DISCONTINUED | OUTPATIENT
Start: 2023-11-25 | End: 2023-11-27 | Stop reason: HOSPADM

## 2023-11-25 RX ORDER — FINASTERIDE 5 MG/1
5 TABLET, FILM COATED ORAL DAILY
Status: DISCONTINUED | OUTPATIENT
Start: 2023-11-25 | End: 2023-11-27 | Stop reason: HOSPADM

## 2023-11-25 RX ORDER — POLYETHYLENE GLYCOL 3350 17 G/17G
17 POWDER, FOR SOLUTION ORAL DAILY
Status: DISCONTINUED | OUTPATIENT
Start: 2023-11-25 | End: 2023-11-27 | Stop reason: HOSPADM

## 2023-11-25 RX ORDER — BUMETANIDE 0.25 MG/ML
1 INJECTION INTRAMUSCULAR; INTRAVENOUS EVERY 12 HOURS
Status: DISCONTINUED | OUTPATIENT
Start: 2023-11-25 | End: 2023-11-25

## 2023-11-25 RX ORDER — BUMETANIDE 0.25 MG/ML
0.5 INJECTION INTRAMUSCULAR; INTRAVENOUS ONCE
Status: COMPLETED | OUTPATIENT
Start: 2023-11-25 | End: 2023-11-25

## 2023-11-25 RX ORDER — ONDANSETRON HYDROCHLORIDE 2 MG/ML
4 INJECTION, SOLUTION INTRAVENOUS EVERY 8 HOURS PRN
Status: DISCONTINUED | OUTPATIENT
Start: 2023-11-25 | End: 2023-11-27 | Stop reason: HOSPADM

## 2023-11-25 RX ORDER — CEFTRIAXONE 2 G/50ML
2 INJECTION, SOLUTION INTRAVENOUS EVERY 24 HOURS
Status: DISCONTINUED | OUTPATIENT
Start: 2023-11-25 | End: 2023-11-27 | Stop reason: HOSPADM

## 2023-11-25 RX ORDER — ACETAMINOPHEN 325 MG/1
TABLET ORAL
Status: COMPLETED
Start: 2023-11-25 | End: 2023-11-25

## 2023-11-25 RX ORDER — IPRATROPIUM BROMIDE AND ALBUTEROL SULFATE 2.5; .5 MG/3ML; MG/3ML
3 SOLUTION RESPIRATORY (INHALATION) 3 TIMES DAILY
Status: DISCONTINUED | OUTPATIENT
Start: 2023-11-25 | End: 2023-11-27 | Stop reason: HOSPADM

## 2023-11-25 RX ORDER — POLYETHYLENE GLYCOL 3350 17 G/17G
17 POWDER, FOR SOLUTION ORAL DAILY
Status: DISCONTINUED | OUTPATIENT
Start: 2023-11-25 | End: 2023-11-25

## 2023-11-25 RX ORDER — METOPROLOL SUCCINATE 25 MG/1
12.5 TABLET, EXTENDED RELEASE ORAL DAILY
Status: DISCONTINUED | OUTPATIENT
Start: 2023-11-25 | End: 2023-11-27 | Stop reason: HOSPADM

## 2023-11-25 RX ORDER — ACETAMINOPHEN 325 MG/1
650 TABLET ORAL ONCE
Status: COMPLETED | OUTPATIENT
Start: 2023-11-25 | End: 2023-11-25

## 2023-11-25 RX ORDER — FUROSEMIDE 10 MG/ML
20 INJECTION INTRAMUSCULAR; INTRAVENOUS 2 TIMES DAILY
Status: DISCONTINUED | OUTPATIENT
Start: 2023-11-25 | End: 2023-11-26

## 2023-11-25 RX ORDER — ACETAMINOPHEN 500 MG
5000 TABLET ORAL DAILY
Status: DISCONTINUED | OUTPATIENT
Start: 2023-11-25 | End: 2023-11-27 | Stop reason: HOSPADM

## 2023-11-25 RX ORDER — ALBUTEROL SULFATE 0.83 MG/ML
2.5 SOLUTION RESPIRATORY (INHALATION) ONCE
Status: COMPLETED | OUTPATIENT
Start: 2023-11-25 | End: 2023-11-25

## 2023-11-25 RX ORDER — BUMETANIDE 0.25 MG/ML
INJECTION INTRAMUSCULAR; INTRAVENOUS
Status: COMPLETED
Start: 2023-11-25 | End: 2023-11-25

## 2023-11-25 RX ORDER — TALC
3 POWDER (GRAM) TOPICAL DAILY
Status: DISCONTINUED | OUTPATIENT
Start: 2023-11-25 | End: 2023-11-27 | Stop reason: HOSPADM

## 2023-11-25 RX ORDER — ESCITALOPRAM OXALATE 10 MG/1
10 TABLET ORAL DAILY
Status: DISCONTINUED | OUTPATIENT
Start: 2023-11-25 | End: 2023-11-27 | Stop reason: HOSPADM

## 2023-11-25 RX ORDER — ACETAMINOPHEN 325 MG/1
650 TABLET ORAL EVERY 4 HOURS PRN
Status: DISCONTINUED | OUTPATIENT
Start: 2023-11-25 | End: 2023-11-27 | Stop reason: HOSPADM

## 2023-11-25 RX ORDER — POTASSIUM CHLORIDE 20 MEQ/1
40 TABLET, EXTENDED RELEASE ORAL ONCE
Status: COMPLETED | OUTPATIENT
Start: 2023-11-25 | End: 2023-11-25

## 2023-11-25 RX ORDER — CHOLECALCIFEROL (VITAMIN D3) 125 MCG
5000 CAPSULE ORAL DAILY
Status: DISCONTINUED | OUTPATIENT
Start: 2023-11-25 | End: 2023-11-25

## 2023-11-25 RX ORDER — ROPINIROLE 1 MG/1
1 TABLET, FILM COATED ORAL NIGHTLY
Status: DISCONTINUED | OUTPATIENT
Start: 2023-11-25 | End: 2023-11-27 | Stop reason: HOSPADM

## 2023-11-25 RX ORDER — TAMSULOSIN HYDROCHLORIDE 0.4 MG/1
0.8 CAPSULE ORAL NIGHTLY
Status: DISCONTINUED | OUTPATIENT
Start: 2023-11-25 | End: 2023-11-27 | Stop reason: HOSPADM

## 2023-11-25 RX ORDER — AMIODARONE HYDROCHLORIDE 200 MG/1
200 TABLET ORAL DAILY
Status: DISCONTINUED | OUTPATIENT
Start: 2023-11-25 | End: 2023-11-27 | Stop reason: HOSPADM

## 2023-11-25 RX ORDER — IPRATROPIUM BROMIDE AND ALBUTEROL SULFATE 2.5; .5 MG/3ML; MG/3ML
3 SOLUTION RESPIRATORY (INHALATION) EVERY 2 HOUR PRN
Status: DISCONTINUED | OUTPATIENT
Start: 2023-11-25 | End: 2023-11-27 | Stop reason: HOSPADM

## 2023-11-25 RX ADMIN — FUROSEMIDE 20 MG: 10 INJECTION, SOLUTION INTRAVENOUS at 14:41

## 2023-11-25 RX ADMIN — PYRIDOXINE HCL TAB 50 MG 50 MG: 50 TAB at 09:36

## 2023-11-25 RX ADMIN — ACETAMINOPHEN 650 MG: 325 TABLET ORAL at 01:51

## 2023-11-25 RX ADMIN — IPRATROPIUM BROMIDE AND ALBUTEROL SULFATE 3 ML: 2.5; .5 SOLUTION RESPIRATORY (INHALATION) at 20:04

## 2023-11-25 RX ADMIN — TAMSULOSIN HYDROCHLORIDE 0.8 MG: 0.4 CAPSULE ORAL at 21:44

## 2023-11-25 RX ADMIN — DOXYCYCLINE 100 MG: 100 INJECTION, POWDER, LYOPHILIZED, FOR SOLUTION INTRAVENOUS at 21:59

## 2023-11-25 RX ADMIN — BUMETANIDE 0.5 MG: 0.25 INJECTION INTRAMUSCULAR; INTRAVENOUS at 03:04

## 2023-11-25 RX ADMIN — CEFTRIAXONE 2 G: 2 INJECTION, SOLUTION INTRAVENOUS at 12:45

## 2023-11-25 RX ADMIN — ATORVASTATIN CALCIUM 80 MG: 40 TABLET, FILM COATED ORAL at 09:35

## 2023-11-25 RX ADMIN — METHYLPREDNISOLONE SODIUM SUCCINATE 40 MG: 40 INJECTION, POWDER, FOR SOLUTION INTRAMUSCULAR; INTRAVENOUS at 12:45

## 2023-11-25 RX ADMIN — APIXABAN 2.5 MG: 2.5 TABLET, FILM COATED ORAL at 09:35

## 2023-11-25 RX ADMIN — IPRATROPIUM BROMIDE AND ALBUTEROL SULFATE 3 ML: 2.5; .5 SOLUTION RESPIRATORY (INHALATION) at 14:47

## 2023-11-25 RX ADMIN — POTASSIUM CHLORIDE 40 MEQ: 1500 TABLET, EXTENDED RELEASE ORAL at 14:41

## 2023-11-25 RX ADMIN — AMIODARONE HYDROCHLORIDE 200 MG: 200 TABLET ORAL at 09:35

## 2023-11-25 RX ADMIN — Medication 50 L/MIN: at 01:34

## 2023-11-25 RX ADMIN — Medication: at 08:00

## 2023-11-25 RX ADMIN — ROPINIROLE HYDROCHLORIDE 1 MG: 1 TABLET, FILM COATED ORAL at 21:44

## 2023-11-25 RX ADMIN — FINASTERIDE 5 MG: 5 TABLET, FILM COATED ORAL at 09:35

## 2023-11-25 RX ADMIN — MELATONIN TAB 3 MG 3 MG: 3 TAB at 21:44

## 2023-11-25 RX ADMIN — METHYLPREDNISOLONE SODIUM SUCCINATE 40 MG: 40 INJECTION, POWDER, FOR SOLUTION INTRAMUSCULAR; INTRAVENOUS at 21:46

## 2023-11-25 RX ADMIN — ESCITALOPRAM OXALATE 10 MG: 10 TABLET, FILM COATED ORAL at 09:35

## 2023-11-25 RX ADMIN — ALBUTEROL SULFATE 2.5 MG: 2.5 SOLUTION RESPIRATORY (INHALATION) at 01:48

## 2023-11-25 RX ADMIN — DOXYCYCLINE 100 MG: 100 INJECTION, POWDER, LYOPHILIZED, FOR SOLUTION INTRAVENOUS at 13:15

## 2023-11-25 RX ADMIN — BUMETANIDE 1 MG: 0.25 INJECTION INTRAMUSCULAR; INTRAVENOUS at 09:00

## 2023-11-25 RX ADMIN — CHOLECALCIFEROL TAB 125 MCG (5000 UNIT) 5000 UNITS: 125 TAB at 09:35

## 2023-11-25 RX ADMIN — IPRATROPIUM BROMIDE AND ALBUTEROL SULFATE 3 ML: 2.5; .5 SOLUTION RESPIRATORY (INHALATION) at 22:05

## 2023-11-25 RX ADMIN — TICAGRELOR 90 MG: 90 TABLET ORAL at 21:45

## 2023-11-25 RX ADMIN — IPRATROPIUM BROMIDE AND ALBUTEROL SULFATE 3 ML: 2.5; .5 SOLUTION RESPIRATORY (INHALATION) at 09:12

## 2023-11-25 RX ADMIN — ACETAMINOPHEN 650 MG: 325 TABLET ORAL at 21:44

## 2023-11-25 RX ADMIN — APIXABAN 2.5 MG: 2.5 TABLET, FILM COATED ORAL at 21:46

## 2023-11-25 RX ADMIN — POLYETHYLENE GLYCOL 3350 17 G: 17 POWDER, FOR SOLUTION ORAL at 09:35

## 2023-11-25 RX ADMIN — METOPROLOL SUCCINATE 12.5 MG: 25 TABLET, EXTENDED RELEASE ORAL at 09:35

## 2023-11-25 RX ADMIN — TICAGRELOR 90 MG: 90 TABLET ORAL at 09:35

## 2023-11-25 SDOH — SOCIAL STABILITY: SOCIAL INSECURITY: ARE YOU OR HAVE YOU BEEN THREATENED OR ABUSED PHYSICALLY, EMOTIONALLY, OR SEXUALLY BY ANYONE?: NO

## 2023-11-25 SDOH — SOCIAL STABILITY: SOCIAL INSECURITY: ARE THERE ANY APPARENT SIGNS OF INJURIES/BEHAVIORS THAT COULD BE RELATED TO ABUSE/NEGLECT?: NO

## 2023-11-25 SDOH — SOCIAL STABILITY: SOCIAL INSECURITY: HAVE YOU HAD THOUGHTS OF HARMING ANYONE ELSE?: NO

## 2023-11-25 SDOH — SOCIAL STABILITY: SOCIAL INSECURITY: HAS ANYONE EVER THREATENED TO HURT YOUR FAMILY OR YOUR PETS?: NO

## 2023-11-25 SDOH — SOCIAL STABILITY: SOCIAL INSECURITY: DO YOU FEEL UNSAFE GOING BACK TO THE PLACE WHERE YOU ARE LIVING?: NO

## 2023-11-25 SDOH — SOCIAL STABILITY: SOCIAL INSECURITY: ABUSE: ADULT

## 2023-11-25 SDOH — SOCIAL STABILITY: SOCIAL INSECURITY: DO YOU FEEL ANYONE HAS EXPLOITED OR TAKEN ADVANTAGE OF YOU FINANCIALLY OR OF YOUR PERSONAL PROPERTY?: NO

## 2023-11-25 SDOH — SOCIAL STABILITY: SOCIAL INSECURITY: WERE YOU ABLE TO COMPLETE ALL THE BEHAVIORAL HEALTH SCREENINGS?: YES

## 2023-11-25 SDOH — SOCIAL STABILITY: SOCIAL INSECURITY: DOES ANYONE TRY TO KEEP YOU FROM HAVING/CONTACTING OTHER FRIENDS OR DOING THINGS OUTSIDE YOUR HOME?: NO

## 2023-11-25 ASSESSMENT — PATIENT HEALTH QUESTIONNAIRE - PHQ9
SUM OF ALL RESPONSES TO PHQ9 QUESTIONS 1 & 2: 0
2. FEELING DOWN, DEPRESSED OR HOPELESS: NOT AT ALL
1. LITTLE INTEREST OR PLEASURE IN DOING THINGS: NOT AT ALL

## 2023-11-25 ASSESSMENT — ACTIVITIES OF DAILY LIVING (ADL)
JUDGMENT_ADEQUATE_SAFELY_COMPLETE_DAILY_ACTIVITIES: YES
HEARING - RIGHT EAR: FUNCTIONAL
DRESSING YOURSELF: INDEPENDENT
GROOMING: INDEPENDENT
PATIENT'S MEMORY ADEQUATE TO SAFELY COMPLETE DAILY ACTIVITIES?: YES
ADEQUATE_TO_COMPLETE_ADL: YES
WALKS IN HOME: INDEPENDENT
HEARING - LEFT EAR: FUNCTIONAL
LACK_OF_TRANSPORTATION: NO
FEEDING YOURSELF: INDEPENDENT
TOILETING: INDEPENDENT
BATHING: INDEPENDENT

## 2023-11-25 ASSESSMENT — COGNITIVE AND FUNCTIONAL STATUS - GENERAL
PATIENT BASELINE BEDBOUND: NO
DAILY ACTIVITIY SCORE: 22
DAILY ACTIVITIY SCORE: 22
MOVING TO AND FROM BED TO CHAIR: A LITTLE
TOILETING: A LITTLE
MOBILITY SCORE: 19
STANDING UP FROM CHAIR USING ARMS: A LITTLE
MOBILITY SCORE: 19
CLIMB 3 TO 5 STEPS WITH RAILING: A LOT
WALKING IN HOSPITAL ROOM: A LITTLE
HELP NEEDED FOR BATHING: A LITTLE
CLIMB 3 TO 5 STEPS WITH RAILING: A LOT
MOVING TO AND FROM BED TO CHAIR: A LITTLE
HELP NEEDED FOR BATHING: A LITTLE
TOILETING: A LITTLE
STANDING UP FROM CHAIR USING ARMS: A LITTLE
WALKING IN HOSPITAL ROOM: A LITTLE

## 2023-11-25 ASSESSMENT — PAIN - FUNCTIONAL ASSESSMENT
PAIN_FUNCTIONAL_ASSESSMENT: 0-10

## 2023-11-25 ASSESSMENT — LIFESTYLE VARIABLES
SKIP TO QUESTIONS 9-10: 1
AUDIT-C TOTAL SCORE: 0
HOW OFTEN DO YOU HAVE 6 OR MORE DRINKS ON ONE OCCASION: NEVER
HOW OFTEN DO YOU HAVE A DRINK CONTAINING ALCOHOL: NEVER
AUDIT-C TOTAL SCORE: 0
HOW MANY STANDARD DRINKS CONTAINING ALCOHOL DO YOU HAVE ON A TYPICAL DAY: PATIENT DOES NOT DRINK

## 2023-11-25 ASSESSMENT — ENCOUNTER SYMPTOMS
PSYCHIATRIC NEGATIVE: 1
PALPITATIONS: 0
SHORTNESS OF BREATH: 1
GASTROINTESTINAL NEGATIVE: 1
MUSCULOSKELETAL NEGATIVE: 1
NEUROLOGICAL NEGATIVE: 1
EYES NEGATIVE: 1
COUGH: 1
CONSTITUTIONAL NEGATIVE: 1
WHEEZING: 1

## 2023-11-25 ASSESSMENT — COLUMBIA-SUICIDE SEVERITY RATING SCALE - C-SSRS
6. HAVE YOU EVER DONE ANYTHING, STARTED TO DO ANYTHING, OR PREPARED TO DO ANYTHING TO END YOUR LIFE?: NO
2. HAVE YOU ACTUALLY HAD ANY THOUGHTS OF KILLING YOURSELF?: NO
1. IN THE PAST MONTH, HAVE YOU WISHED YOU WERE DEAD OR WISHED YOU COULD GO TO SLEEP AND NOT WAKE UP?: NO
2. HAVE YOU ACTUALLY HAD ANY THOUGHTS OF KILLING YOURSELF?: NO
6. HAVE YOU EVER DONE ANYTHING, STARTED TO DO ANYTHING, OR PREPARED TO DO ANYTHING TO END YOUR LIFE?: NO
1. IN THE PAST MONTH, HAVE YOU WISHED YOU WERE DEAD OR WISHED YOU COULD GO TO SLEEP AND NOT WAKE UP?: NO

## 2023-11-25 ASSESSMENT — PAIN SCALES - GENERAL
PAINLEVEL_OUTOF10: 0 - NO PAIN
PAINLEVEL_OUTOF10: 3
PAINLEVEL_OUTOF10: 0 - NO PAIN

## 2023-11-25 ASSESSMENT — PAIN DESCRIPTION - ORIENTATION: ORIENTATION: LOWER

## 2023-11-25 ASSESSMENT — PAIN DESCRIPTION - PROGRESSION: CLINICAL_PROGRESSION: NOT CHANGED

## 2023-11-25 ASSESSMENT — PAIN DESCRIPTION - LOCATION: LOCATION: BACK

## 2023-11-25 NOTE — ED PROVIDER NOTES
HPI   Chief Complaint   Patient presents with    Shortness of Breath     Pt to ED for SOB x 2-3. Uses 2 liters via NC at home at baseline. 82 % RA         History provided by:  Patient, relative, EMS personnel and medical records   used: No         Patient presents to the emergency department via ambulance for evaluation of shortness of breath.  Patient states that he has been increasingly short of breath all day and his pulse ox was going down.  Got much worse when he tried to go to bed tonight so he called EMS.  Patient denies any chest pain or palpitations.  He has not had any fever.  He has not had any cough.  He denies any swelling of his feet or legs.  He has chronic back and leg pain but does not feel it is any different.  He denies any recent injury.  He has been compliant with his medications.  He has had good urine output.    Patient has a history of congestive heart failure with ejection fraction of 35% by his last echo in September.  He also has a history of COPD.  Patient was treated with Solu-Medrol and breathing treatment prehospital as EMS reports as pulse ox was 82% on his usual oxygen.    Patient denies abdominal pain, nausea, vomiting, diarrhea.  He no longer has a Kee catheter and has been voiding normally.  No further episodes of hematuria.               [place] Coma Scale Score: 15                  Patient History   Past Medical History:   Diagnosis Date    Arthritis     Body mass index (BMI) 20.0-20.9, adult 09/21/2021    Body mass index (BMI) of 20.0 to 20.9 in adult    Body mass index (BMI) 21.0-21.9, adult 04/14/2021    Body mass index (BMI) of 21.0 to 21.9 in adult    CHF (congestive heart failure) (CMS/[facility])     COPD (chronic obstructive pulmonary disease) (CMS/[facility])     Coronary artery disease     Diabetes mellitus (CMS/[facility])     Hypertension     Major depressive disorder, recurrent, mild (CMS/[facility]) 11/11/2020    Mild episode of recurrent major depressive disorder     Major depressive disorder, recurrent, unspecified (CMS/[facility]) 01/13/2021    Recurrent major depressive disorder, remission status unspecified    Other conditions influencing health status     Swelling Of Hands    Other specified anxiety disorders 05/19/2020    Depression with anxiety    Oxygen desaturation during sleep     wears 2L at HS    Personal history of nicotine dependence 07/12/2021    History of nicotine dependence    Personal history of other diseases of the circulatory system     History of cardiac disorder    Personal history of other diseases of the musculoskeletal system and connective tissue     History of arthritis    Personal history of other diseases of urinary system 09/21/2021    History of chronic kidney disease    Personal history of other endocrine, nutritional and metabolic disease 08/29/2019    History of hyperglycemia    Pneumonia, unspecified organism 12/09/2019    Atypical pneumonia    Thoracic aortic aneurysm, without rupture, unspecified (CMS/[facility]) 05/29/2019    Thoracic aortic aneurysm without rupture     Past Surgical History:   Procedure Laterality Date    BACK SURGERY  01/24/2014    Back Surgery    CORONARY ANGIOPLASTY WITH STENT PLACEMENT  01/24/2014    Cath Stent Placement    CT ABDOMEN PELVIS ANGIOGRAM W AND/OR WO IV CONTRAST  6/6/2019    CT ABDOMEN PELVIS ANGIOGRAM W AND/OR WO IV CONTRAST 6/6/2019 [facility]    CT HEAD ANGIO W AND WO IV CONTRAST  10/15/2020    CT HEAD ANGIO W AND WO IV CONTRAST 10/15/2020 [facility]    HERNIA REPAIR  04/24/2014    Hernia Repair    OTHER SURGICAL HISTORY  04/17/2019    Abdominal surgery    OTHER SURGICAL HISTORY  01/24/2014    Defibrillation    TOTAL HIP ARTHROPLASTY  01/24/2014    Hip Replacement     Family History   Problem Relation Name Age of Onset    Other (cardiac disorder) Mother      Other (malignant neoplasm) Mother      Other (cardiac disorder) Father       Social History     Tobacco Use    Smoking status: Every Day      Packs/day: 0.25     Years: 68.00     Additional pack years: 0.00     Total pack years: 17.00     Types: Cigarettes    Smokeless tobacco: Never   Substance Use Topics    Alcohol use: Never    Drug use: Never       Physical Exam   ED Triage Vitals   Temp Heart Rate Resp BP   11/25/23 0114 11/25/23 0114 11/25/23 0114 11/25/23 0114   36.1 °C (97 °F) 89 (!) 32 152/89      SpO2 Temp Source Heart Rate Source Patient Position   11/25/23 0114 11/25/23 0114 11/25/23 0114 11/25/23 0133   (!) 85 % Temporal Monitor Standing      BP Location FiO2 (%)     11/25/23 0133 11/25/23 0134     Right arm 70 %       Physical Exam  Vitals reviewed.   Constitutional:       General: He is in acute distress.   HENT:      Head: Normocephalic.   Eyes:      Pupils: Pupils are equal, round, and reactive to light.   Cardiovascular:      Rate and Rhythm: Normal rate.   Pulmonary:      Effort: Accessory muscle usage present.      Breath sounds: Examination of the right-lower field reveals rales. Examination of the left-lower field reveals rales. Decreased breath sounds and rales present.   Abdominal:      General: Bowel sounds are normal.      Palpations: Abdomen is soft. There is no mass.      Tenderness: There is no abdominal tenderness.   Musculoskeletal:         General: Normal range of motion.      Cervical back: Normal range of motion.      Right lower leg: No edema.   Skin:     General: Skin is warm and dry.      Capillary Refill: Capillary refill takes less than 2 seconds.   Neurological:      General: No focal deficit present.      Mental Status: He is alert.   Psychiatric:         Mood and Affect: Mood normal.           Labs Reviewed   CBC WITH AUTO DIFFERENTIAL - Abnormal       Result Value    WBC 10.7      nRBC 0.0      RBC 4.08 (*)     Hemoglobin 12.2 (*)     Hematocrit 39.3 (*)     MCV 96      MCH 29.9      MCHC 31.0 (*)     RDW 17.7 (*)     Platelets 208      Neutrophils % 76.1      Immature Granulocytes %, Automated 0.5       Lymphocytes % 13.2      Monocytes % 8.6      Eosinophils % 1.2      Basophils % 0.4      Neutrophils Absolute 8.18 (*)     Immature Granulocytes Absolute, Automated 0.05      Lymphocytes Absolute 1.42      Monocytes Absolute 0.92 (*)     Eosinophils Absolute 0.13      Basophils Absolute 0.04     BASIC METABOLIC PANEL - Abnormal    Glucose 146 (*)     Sodium 144      Potassium 3.4 (*)     Chloride 110 (*)     Bicarbonate 26      Anion Gap 11      Urea Nitrogen 30 (*)     Creatinine 1.53 (*)     eGFR 46 (*)     Calcium 9.3     LACTATE - Abnormal    Lactate 2.1 (*)     Narrative:     Venipuncture immediately after or during the administration of Metamizole may lead to falsely low results. Testing should be performed immediately  prior to Metamizole dosing.   TROPONIN I, HIGH SENSITIVITY - Abnormal    Troponin I, High Sensitivity 62 (*)     Narrative:     Less than 99th percentile of normal range cutoff-  Female and children under 18 years old <14 ng/L; Male <21 ng/L: Negative  Repeat testing should be performed if clinically indicated.     Female and children under 18 years old 14-50 ng/L; Male 21-50 ng/L:  Consistent with possible cardiac damage and possible increased clinical   risk. Serial measurements may help to assess extent of myocardial damage.     >50 ng/L: Consistent with cardiac damage, increased clinical risk and  myocardial infarction. Serial measurements may help assess extent of   myocardial damage.      NOTE: Children less than 1 year old may have higher baseline troponin   levels and results should be interpreted in conjunction with the overall   clinical context.     NOTE: Troponin I testing is performed using a different   testing methodology at [facility] than at other   [facility]. Direct result comparisons should only   be made within the same method.   B-TYPE NATRIURETIC PEPTIDE - Abnormal    BNP 2,801 (*)     Narrative:        <100 pg/mL - Heart failure unlikely  100-299  pg/mL - Intermediate probability of acute heart                  failure exacerbation. Correlate with clinical                  context and patient history.    >=300 pg/mL - Heart Failure likely. Correlate with clinical                  context and patient history.    BNP testing is performed using different testing methodology at [facility] than at other [facility]. Direct result comparisons should only be made within the same method.      TROPONIN I, HIGH SENSITIVITY - Abnormal    Troponin I, High Sensitivity 54 (*)     Narrative:     Less than 99th percentile of normal range cutoff-  Female and children under 18 years old <14 ng/L; Male <21 ng/L: Negative  Repeat testing should be performed if clinically indicated.     Female and children under 18 years old 14-50 ng/L; Male 21-50 ng/L:  Consistent with possible cardiac damage and possible increased clinical   risk. Serial measurements may help to assess extent of myocardial damage.     >50 ng/L: Consistent with cardiac damage, increased clinical risk and  myocardial infarction. Serial measurements may help assess extent of   myocardial damage.      NOTE: Children less than 1 year old may have higher baseline troponin   levels and results should be interpreted in conjunction with the overall   clinical context.     NOTE: Troponin I testing is performed using a different   testing methodology at [facility] than at other   [facility]. Direct result comparisons should only   be made within the same method.   MAGNESIUM - Normal    Magnesium 2.05     SARS-COV-2 AND INFLUENZA A/B PCR - Normal    Flu A Result Not Detected      Flu B Result Not Detected      Coronavirus 2019, PCR Not Detected      Narrative:     This assay has received FDA Emergency Use Authorization (EUA) and  is only authorized for the duration of time that circumstances exist to justify the authorization of the emergency use of in vitro diagnostic tests for the  detection of SARS-CoV-2 virus and/or diagnosis of COVID-19 infection under section 564(b)(1) of the Act, 21 U.S.C. 360bbb-3(b)(1). Testing for SARS-CoV-2 is only recommended for patients who meet current clinical and/or epidemiological criteria as defined by federal, state, or local public health directives. This assay is an in vitro diagnostic nucleic acid amplification test for the qualitative detection of SARS-CoV-2, Influenza A, and Influenza B from nasopharyngeal specimens and has been validated for use at [facility]. Negative results do not preclude COVID-19 infections or Influenza A/B infections, and should not be used as the sole basis for diagnosis, treatment, or other management decisions. If Influenza A/B and RSV PCR results are negative, testing for Parainfluenza virus, Adenovirus and Metapneumovirus is routinely performed for [facility] pediatric oncology and intensive care inpatients, and is available on other patients by placing an add-on request.    RSV PCR - Normal    RSV PCR Not Detected      Narrative:     This assay is an FDA-cleared, in vitro diagnostic nucleic acid amplification test for the detection of RSV from nasopharyngeal specimens, and has been validated for use at [facility]. Negative results do not preclude RSV infections, and should not be used as the sole basis for diagnosis, treatment, or other management decisions. If Influenza A/B and RSV PCR results are negative, testing for Parainfluenza virus, Adenovirus and Metapneumovirus is routinely performed for pediatric oncology and intensive care inpatients at [facility], and is available on other patients by placing an add-on request.       LACTATE - Normal    Lactate 1.7      Narrative:     Venipuncture immediately after or during the administration of Metamizole may lead to falsely low results. Testing should be performed immediately  prior to Metamizole dosing.     XR chest 1 view   Final Result    1. Moderate diffuse interstitial prominence, increased from   09/18/2023, likely severe interstitial edema. Multifocal   infectious/inflammatory process could have a similar appearance.   2. Left-greater-than-right basilar airspace opacities. Pneumonia is   not excluded.   3. Small left-greater-than-right pleural effusions.   4. Findings of COPD/emphysema.             Signed by: Macario Syed 11/25/2023 2:21 AM   Dictation workstation:   [id number]        ED Medication Administration from 11/25/2023 0111 to 11/25/2023 0309         Date/Time Order Dose Route Action Action by     11/25/2023 0134 EST oxygen (O2) therapy 50 L/min inhalation Start Rollins, A     11/25/2023 0148 EST albuterol 2.5 mg /3 mL (0.083 %) nebulizer solution 2.5 mg 2.5 mg nebulization Given RollinsZENY     11/25/2023 0151 EST acetaminophen (Tylenol) tablet 650 mg 650 mg oral Given ALEX Pelletier     11/25/2023 0304 EST bumetanide (Bumex) injection 0.5 mg 0.5 mg intravenous Given ALEX Pelletier        EKG  0117 --twelve-lead EKG was obtained and read by me. This demonstrates sinus tachycardia with occasional premature ventricular complexes and a rate of 101 with left axis deviation and a nonspecific intraventricular conduction delay, but  no ischemia, no pericarditis. There was no change compared to most recent prior EKG. 11/12/23  0227 --twelve-lead EKG was obtained and read by me. This demonstrates sinus rhythm with a rate of 79, left axis deviation, intraventricular conduction delay, no ectopy, no ischemia, no pericarditis. There was no change compared to most recent prior EKG.    ED Course & MDM   Diagnoses as of 11/25/23 0345   Acute on chronic systolic congestive heart failure (CMS/HCC)   Troponin level elevated   Acute on chronic respiratory failure with hypoxia (CMS/HCC)       0215 -- feeling better since airvo. Waiting for results.  0309 -- results reviewed. Does not wish to be transferred if he can be managed here again.  Medical Decision  Making  Presents emergency department with acute respiratory distress and hypoxemia.  He has history of both CHF and COPD and has been treated with bronchodilators in route without significant improvement.  I over the hospital stay and diuresis.  1 hour repeat is 54.  I think this since have discussion with patient about various ways to support his oxygenation as his pulse ox is still only 82 on the nonrebreather mask.  Patient states that he is okay with using the masks or other breathing treatments, but he absolutely will not allow intubation.    Differential diagnosis includes not limited to CHF exacerbation, COPD exacerbation, arrhythmia, coronary ischemia, pneumonia.  Chest pain or physical exam findings to suggest VTE.  EKG does not show any ischemic changes.  Patient was placed on the air Vo high flow nasal cannula and actually was oxygenating quite well with that and significantly improved symptomatically.  Chest x-ray shows congestive heart failure plus minus infiltrate; however, as patient has had no productive cough, no fever and a normal white count I doubt this represents anything infectious.  Additionally patient's BNP is elevated over 2000 which supports the congestive failure.    Patient's initial troponin was elevated at 64.  On review of the patient's electronic medical records this is happened before when he was admitted here but it corrected over hospitalization and diuresis.  Patient's 1 hour troponin is down to 54 already.  I think this probably represents demand ischemia given the patient's poor cardiac output and hours of hypoxemia.  EKG did not show any ischemic changes.  Electrolytes are remarkable for mildly low potassium of three-point 4.,  Influenza, RSV are negative.    Patient will require hospitalization for ongoing respiratory support and diuresis.  He prefers to stay here if at all possible.    9916 -- secure message sent to Dr Burgess, internal medicine on call tonight, including  patient's past medical history, presenting signs and symptoms, current clinical condition and test results.0228-- he has graciously accepted the patient for admission.    Critical care time-36 minutes were spent in the critical care of this patient excluding any and all separately billable procedures including bedside time for frequent reassessment, obtaining additional history, discussing CODE STATUS, reviewing electronic medical record and documenting.    Patient leaves the emergency department in improved condition.    Procedure  Procedures    Diagnoses as of 11/25/23 0345   Acute on chronic systolic congestive heart failure (CMS/[facility])   Troponin level elevated   Acute on chronic respiratory failure with hypoxia (CMS/HCC)        Christie Hu MD  11/25/23 034

## 2023-11-25 NOTE — CARE PLAN
The patient's goals for the shift include  feel less short of breath    The clinical goals for the shift include Remain hemodynamically stable    Over the shift, the patient did not make progress toward the following goals. Barriers to progression include ***. Recommendations to address these barriers include ***.

## 2023-11-25 NOTE — H&P
History Of Present Illness  Andrez Damon is a 78 y.o. male presenting with a complaint of SOB. He was from home; he has been more SOB over the last day. He uses 2lpm of oxygen at home. He was 82% on RA. He has a hx of systolic heart failure with an EF of 35% and a hx of COPD. EMS treated him with duonebs with no improvement.    In the ED:  Lab: Glu 86; Na 140; K 4.2; BUN 27; Cr 1.68; WBC 5.6; Hb 9.4; Hcrt 30.8; ; BNP 2,801  Radiology: Moderate diffuse interstitial prominence, increased from  09/18/2023, likely severe interstitial edema. Multifocal infectious/ inflammatory process could have a similar appearance. Left-greater-than-right basilar airspace opacities.  Small left-greater-than-right pleural effusions. Findings of COPD/emphysema.  Medication: bumex; acetaminophen duoneb  Disposition: admitted to ICU     Past Medical History  He has a past medical history of Arthritis, Body mass index (BMI) 20.0-20.9, adult (09/21/2021), Body mass index (BMI) 21.0-21.9, adult (04/14/2021), CHF (congestive heart failure) (CMS/[facility]), COPD (chronic obstructive pulmonary disease) (CMS/HCC), Coronary artery disease, Diabetes mellitus (CMS/HCC), Hypertension, Major depressive disorder, recurrent, mild (CMS/HCC) (11/11/2020), Major depressive disorder, recurrent, unspecified (CMS/HCC) (01/13/2021), Other conditions influencing health status, Other specified anxiety disorders (05/19/2020), Oxygen desaturation during sleep, Personal history of nicotine dependence (07/12/2021), Personal history of other diseases of the circulatory system, Personal history of other diseases of the musculoskeletal system and connective tissue, Personal history of other diseases of urinary system (09/21/2021), Personal history of other endocrine, nutritional and metabolic disease (08/29/2019), Pneumonia, unspecified organism (12/09/2019), and Thoracic aortic aneurysm, without rupture, unspecified (CMS/HCC) (05/29/2019).    Surgical History  He  has a past surgical history that includes CT angio abdomen pelvis w and or wo IV IV contrast (6/6/2019); CT angio head w and wo IV contrast (10/15/2020); Other surgical history (04/17/2019); Hernia repair (04/24/2014); Back surgery (01/24/2014); Total hip arthroplasty (01/24/2014); Coronary angioplasty with stent (01/24/2014); and Other surgical history (01/24/2014).     Social History  He reports that he has been smoking cigarettes. He has a 17.00 pack-year smoking history. He has never used smokeless tobacco. He reports that he does not drink alcohol and does not use drugs.    Family History  Family History   Problem Relation Name Age of Onset    Other (cardiac disorder) Mother      Other (malignant neoplasm) Mother      Other (cardiac disorder) Father          Allergies  Meloxicam and Celecoxib    Review of Systems   Constitutional: Negative.    HENT:  Positive for congestion.    Eyes: Negative.    Respiratory:  Positive for cough, shortness of breath and wheezing.    Cardiovascular:  Negative for chest pain, palpitations and leg swelling.   Gastrointestinal: Negative.    Genitourinary: Negative.    Musculoskeletal: Negative.    Neurological: Negative.    Psychiatric/Behavioral: Negative.          Physical Exam  Vitals reviewed.   Constitutional:       Appearance: Normal appearance. He is normal weight.   HENT:      Head: Normocephalic and atraumatic.      Nose: Nose normal.      Mouth/Throat:      Mouth: Mucous membranes are moist.      Pharynx: Oropharynx is clear.   Eyes:      Conjunctiva/sclera: Conjunctivae normal.      Pupils: Pupils are equal, round, and reactive to light.   Cardiovascular:      Rate and Rhythm: Normal rate and regular rhythm.      Pulses: Normal pulses.      Heart sounds: Normal heart sounds.   Pulmonary:      Effort: Respiratory distress present.      Breath sounds: Wheezing, rhonchi and rales present.   Abdominal:      General: Abdomen is flat. Bowel sounds are normal.      Palpations:  Abdomen is soft.   Musculoskeletal:         General: Normal range of motion.      Cervical back: Normal range of motion and neck supple.   Skin:     General: Skin is warm and dry.   Neurological:      General: No focal deficit present.      Mental Status: He is alert and oriented to person, place, and time.   Psychiatric:         Mood and Affect: Mood normal.         Behavior: Behavior normal.          Last Recorded Vitals  /63 (BP Location: Right arm, Patient Position: Sitting)   Pulse 69   Temp 36.4 °C (97.6 °F) (Temporal)   Resp 20   Wt 62.5 kg (137 lb 12.6 oz)   SpO2 100%     Relevant Results    Scheduled medications  amiodarone, 200 mg, oral, Daily  apixaban, 2.5 mg, oral, BID  atorvastatin, 80 mg, oral, Daily  azithromycin, 500 mg, intravenous, q24h  cefTRIAXone, 2 g, intravenous, q24h  cholecalciferol, 5,000 Units, oral, Daily  escitalopram, 10 mg, oral, Daily  finasteride, 5 mg, oral, Daily  furosemide, 20 mg, intravenous, BID  ipratropium-albuteroL, 3 mL, nebulization, TID  melatonin, 3 mg, oral, Daily  methylPREDNISolone sodium succinate (PF), 40 mg, intravenous, q8h  metoprolol succinate XL, 12.5 mg, oral, Daily  oxygen, , inhalation, Continuous - 02/gases  polyethylene glycol, 17 g, oral, Daily  pyridoxine, 50 mg, oral, Daily  rOPINIRole, 1 mg, oral, Nightly  tamsulosin, 0.8 mg, oral, Nightly  ticagrelor, 90 mg, oral, BID      Continuous medications     PRN medications  PRN medications: acetaminophen, ipratropium-albuteroL, ondansetron    Results for orders placed or performed during the hospital encounter of 11/25/23 (from the past 24 hour(s))   CBC and Auto Differential   Result Value Ref Range    WBC 10.7 4.4 - 11.3 x10*3/uL    nRBC 0.0 0.0 - 0.0 /100 WBCs    RBC 4.08 (L) 4.50 - 5.90 x10*6/uL    Hemoglobin 12.2 (L) 13.5 - 17.5 g/dL    Hematocrit 39.3 (L) 41.0 - 52.0 %    MCV 96 80 - 100 fL    MCH 29.9 26.0 - 34.0 pg    MCHC 31.0 (L) 32.0 - 36.0 g/dL    RDW 17.7 (H) 11.5 - 14.5 %     Platelets 208 150 - 450 x10*3/uL    Neutrophils % 76.1 40.0 - 80.0 %    Immature Granulocytes %, Automated 0.5 0.0 - 0.9 %    Lymphocytes % 13.2 13.0 - 44.0 %    Monocytes % 8.6 2.0 - 10.0 %    Eosinophils % 1.2 0.0 - 6.0 %    Basophils % 0.4 0.0 - 2.0 %    Neutrophils Absolute 8.18 (H) 1.60 - 5.50 x10*3/uL    Immature Granulocytes Absolute, Automated 0.05 0.00 - 0.50 x10*3/uL    Lymphocytes Absolute 1.42 0.80 - 3.00 x10*3/uL    Monocytes Absolute 0.92 (H) 0.05 - 0.80 x10*3/uL    Eosinophils Absolute 0.13 0.00 - 0.40 x10*3/uL    Basophils Absolute 0.04 0.00 - 0.10 x10*3/uL   Basic metabolic panel   Result Value Ref Range    Glucose 146 (H) 74 - 99 mg/dL    Sodium 144 136 - 145 mmol/L    Potassium 3.4 (L) 3.5 - 5.3 mmol/L    Chloride 110 (H) 98 - 107 mmol/L    Bicarbonate 26 21 - 32 mmol/L    Anion Gap 11 10 - 20 mmol/L    Urea Nitrogen 30 (H) 6 - 23 mg/dL    Creatinine 1.53 (H) 0.50 - 1.30 mg/dL    eGFR 46 (L) >60 mL/min/1.73m*2    Calcium 9.3 8.6 - 10.3 mg/dL   Magnesium   Result Value Ref Range    Magnesium 2.05 1.60 - 2.40 mg/dL   Lactate   Result Value Ref Range    Lactate 2.1 (H) 0.4 - 2.0 mmol/L   Troponin I, High Sensitivity   Result Value Ref Range    Troponin I, High Sensitivity 62 (HH) 0 - 20 ng/L   B-Type Natriuretic Peptide   Result Value Ref Range    BNP 2,801 (H) 0 - 99 pg/mL   Sars-CoV-2 and Influenza A/B PCR   Result Value Ref Range    Flu A Result Not Detected Not Detected    Flu B Result Not Detected Not Detected    Coronavirus 2019, PCR Not Detected Not Detected   RSV PCR   Result Value Ref Range    RSV PCR Not Detected Not Detected   Red Top   Result Value Ref Range    Extra Tube Hold for add-ons.    Light Blue Top   Result Value Ref Range    Extra Tube Hold for add-ons.    Lactate   Result Value Ref Range    Lactate 1.7 0.4 - 2.0 mmol/L   Troponin I, High Sensitivity   Result Value Ref Range    Troponin I, High Sensitivity 54 (HH) 0 - 20 ng/L               Assessment/Plan   Principal Problem:     Acute on chronic systolic congestive heart failure (CMS/HCC)  Active Problems:    Troponin level elevated      Acute on Chronic Respiratory Failure with hypoxia 2/2 Community Acquired pneumonia  COPD exacerbation  - SpO2 84% on RA  - Supplemental oxygen to keep SpO2 > 92%  - currently on HFNC FiO2 50%  - Home oxygen 2lpm via NC continuously  - CXR: Moderate diffuse interstitial prominence, increased from   09/18/2023, likely severe interstitial edema. Multifocal infectious/ inflammatory process could have a similar appearance. Left-greater-than-right basilar airspace opacities. Pneumonia is   not excluded   - started ceftriaxone and Doxycycline; day 1  - started duoneb  - started IV solumedrol  - RT for bronchial hygiene  - blood cultures pending  - procalcitonin pending    Acute on Chronic Systolic Heart Failure  Non MI troponin elevation  Paroxysmal Atrial Fibrillation  HLD  Essential HTN  CAD  - serial troponin 62 > 54  - BNP 2,801  - continue amiodarone, apixaban, atorvastatin, metoprolol succinate, ticagrelor  - given Bumex in the ED  - started IV furosemide BID  - hold Bumex PO  - Last echocardiogram 9/18/23: moderate decreased LVSF with an EF of 35-40% with impaired relaxation pattern of LVDF moderate MR; Mild AR  - daily weights  - I&O  - monitor HR and BP    Hypokalemia  - K 3.4  - replaced with KCL  - monitor BMP    Acute on Chronic Renal Failure  - baseline creatine +/- 1.4  - creatine on admission 1.68; today creatine 1.53  - monitor renal function    Anemia chronic disease  - Hb 9.4 on admission; today Hb 12.2  - monitor H&H  - continue pyridoxine    BPH  - continue finasteride, tamsulosin    Depression  Anxiety  RLS  - continue escitalopram, Ropinirole    Vitamin D deficiency  - continue cholecalciferol    DVT ppx  - on apixaban    Code Status: Full  - Patient does have a living will    Disposition: Patient requires more than 2 inpatient days.    Total accumulated time spent face to face and not face  to face preparing to see the patient, obtaining and reviewing separately obtained history; performing a medically appropriate examination and/or evaluation; counseling and educating the patient, family; ordering medications, tests, or procedures; referring and communicating with other health care professionals; documenting clinical information in the patient's medical record; independently interpreting results and communicating the results to the patient, family; and care coordination was 45 minutes.            Kaylyn Prasad, APRN-CNP: I scribed for Dr. Gabriella Cordova

## 2023-11-26 LAB — PROCALCITONIN SERPL-MCNC: 0.08 NG/ML

## 2023-11-26 PROCEDURE — 2500000001 HC RX 250 WO HCPCS SELF ADMINISTERED DRUGS (ALT 637 FOR MEDICARE OP): Mod: IPSPLIT | Performed by: STUDENT IN AN ORGANIZED HEALTH CARE EDUCATION/TRAINING PROGRAM

## 2023-11-26 PROCEDURE — 2500000004 HC RX 250 GENERAL PHARMACY W/ HCPCS (ALT 636 FOR OP/ED): Mod: IPSPLIT | Performed by: STUDENT IN AN ORGANIZED HEALTH CARE EDUCATION/TRAINING PROGRAM

## 2023-11-26 PROCEDURE — 2500000004 HC RX 250 GENERAL PHARMACY W/ HCPCS (ALT 636 FOR OP/ED): Mod: IPSPLIT | Performed by: NURSE PRACTITIONER

## 2023-11-26 PROCEDURE — 1200000002 HC GENERAL ROOM WITH TELEMETRY DAILY: Mod: IPSPLIT

## 2023-11-26 PROCEDURE — G0426 INPT/ED TELECONSULT50: HCPCS | Performed by: PHYSICIAN ASSISTANT

## 2023-11-26 PROCEDURE — 2500000002 HC RX 250 W HCPCS SELF ADMINISTERED DRUGS (ALT 637 FOR MEDICARE OP, ALT 636 FOR OP/ED): Mod: IPSPLIT | Performed by: STUDENT IN AN ORGANIZED HEALTH CARE EDUCATION/TRAINING PROGRAM

## 2023-11-26 PROCEDURE — 2500000004 HC RX 250 GENERAL PHARMACY W/ HCPCS (ALT 636 FOR OP/ED): Mod: IPSPLIT

## 2023-11-26 PROCEDURE — 1090000001 HH PPS REVENUE CREDIT

## 2023-11-26 PROCEDURE — 2500000005 HC RX 250 GENERAL PHARMACY W/O HCPCS: Mod: IPSPLIT | Performed by: STUDENT IN AN ORGANIZED HEALTH CARE EDUCATION/TRAINING PROGRAM

## 2023-11-26 PROCEDURE — 1090000002 HH PPS REVENUE DEBIT

## 2023-11-26 PROCEDURE — 2500000004 HC RX 250 GENERAL PHARMACY W/ HCPCS (ALT 636 FOR OP/ED): Mod: IPSPLIT | Performed by: PHYSICIAN ASSISTANT

## 2023-11-26 PROCEDURE — 94762 N-INVAS EAR/PLS OXIMTRY CONT: CPT | Mod: IPSPLIT

## 2023-11-26 PROCEDURE — 99231 SBSQ HOSP IP/OBS SF/LOW 25: CPT | Performed by: INTERNAL MEDICINE

## 2023-11-26 PROCEDURE — 94640 AIRWAY INHALATION TREATMENT: CPT | Mod: IPSPLIT

## 2023-11-26 PROCEDURE — 94760 N-INVAS EAR/PLS OXIMETRY 1: CPT | Mod: IPSPLIT

## 2023-11-26 RX ORDER — SODIUM CHLORIDE 9 MG/ML
INJECTION, SOLUTION INTRAVENOUS
Status: COMPLETED
Start: 2023-11-26 | End: 2023-11-26

## 2023-11-26 RX ORDER — FUROSEMIDE 10 MG/ML
40 INJECTION INTRAMUSCULAR; INTRAVENOUS 2 TIMES DAILY
Status: DISCONTINUED | OUTPATIENT
Start: 2023-11-26 | End: 2023-11-27 | Stop reason: HOSPADM

## 2023-11-26 RX ORDER — BENZONATATE 100 MG/1
100 CAPSULE ORAL 3 TIMES DAILY PRN
Status: DISCONTINUED | OUTPATIENT
Start: 2023-11-26 | End: 2023-11-27 | Stop reason: HOSPADM

## 2023-11-26 RX ADMIN — FUROSEMIDE 20 MG: 10 INJECTION, SOLUTION INTRAVENOUS at 08:23

## 2023-11-26 RX ADMIN — TICAGRELOR 90 MG: 90 TABLET ORAL at 21:10

## 2023-11-26 RX ADMIN — ACETAMINOPHEN 650 MG: 325 TABLET ORAL at 22:31

## 2023-11-26 RX ADMIN — Medication: at 08:00

## 2023-11-26 RX ADMIN — ESCITALOPRAM OXALATE 10 MG: 10 TABLET, FILM COATED ORAL at 08:23

## 2023-11-26 RX ADMIN — FUROSEMIDE 40 MG: 10 INJECTION, SOLUTION INTRAVENOUS at 13:53

## 2023-11-26 RX ADMIN — TAMSULOSIN HYDROCHLORIDE 0.8 MG: 0.4 CAPSULE ORAL at 21:10

## 2023-11-26 RX ADMIN — CHOLECALCIFEROL TAB 125 MCG (5000 UNIT) 5000 UNITS: 125 TAB at 08:23

## 2023-11-26 RX ADMIN — APIXABAN 2.5 MG: 2.5 TABLET, FILM COATED ORAL at 08:23

## 2023-11-26 RX ADMIN — SODIUM CHLORIDE: 9 INJECTION, SOLUTION INTRAVENOUS at 11:45

## 2023-11-26 RX ADMIN — APIXABAN 2.5 MG: 2.5 TABLET, FILM COATED ORAL at 21:10

## 2023-11-26 RX ADMIN — AMIODARONE HYDROCHLORIDE 200 MG: 200 TABLET ORAL at 08:23

## 2023-11-26 RX ADMIN — PYRIDOXINE HCL TAB 50 MG 50 MG: 50 TAB at 08:23

## 2023-11-26 RX ADMIN — ROPINIROLE HYDROCHLORIDE 1 MG: 1 TABLET, FILM COATED ORAL at 21:11

## 2023-11-26 RX ADMIN — IPRATROPIUM BROMIDE AND ALBUTEROL SULFATE 3 ML: 2.5; .5 SOLUTION RESPIRATORY (INHALATION) at 21:00

## 2023-11-26 RX ADMIN — FINASTERIDE 5 MG: 5 TABLET, FILM COATED ORAL at 08:23

## 2023-11-26 RX ADMIN — MELATONIN TAB 3 MG 3 MG: 3 TAB at 21:10

## 2023-11-26 RX ADMIN — TICAGRELOR 90 MG: 90 TABLET ORAL at 08:23

## 2023-11-26 RX ADMIN — DOXYCYCLINE 100 MG: 100 INJECTION, POWDER, LYOPHILIZED, FOR SOLUTION INTRAVENOUS at 08:23

## 2023-11-26 RX ADMIN — ACETAMINOPHEN 650 MG: 325 TABLET ORAL at 11:41

## 2023-11-26 RX ADMIN — IPRATROPIUM BROMIDE AND ALBUTEROL SULFATE 3 ML: 2.5; .5 SOLUTION RESPIRATORY (INHALATION) at 08:43

## 2023-11-26 RX ADMIN — METHYLPREDNISOLONE SODIUM SUCCINATE 40 MG: 40 INJECTION, POWDER, FOR SOLUTION INTRAMUSCULAR; INTRAVENOUS at 04:24

## 2023-11-26 RX ADMIN — CEFTRIAXONE 2 G: 2 INJECTION, SOLUTION INTRAVENOUS at 11:29

## 2023-11-26 RX ADMIN — METOPROLOL SUCCINATE 12.5 MG: 25 TABLET, EXTENDED RELEASE ORAL at 08:23

## 2023-11-26 RX ADMIN — METHYLPREDNISOLONE SODIUM SUCCINATE 40 MG: 40 INJECTION, POWDER, FOR SOLUTION INTRAMUSCULAR; INTRAVENOUS at 11:29

## 2023-11-26 RX ADMIN — METHYLPREDNISOLONE SODIUM SUCCINATE 40 MG: 40 INJECTION, POWDER, FOR SOLUTION INTRAMUSCULAR; INTRAVENOUS at 21:10

## 2023-11-26 RX ADMIN — ATORVASTATIN CALCIUM 80 MG: 40 TABLET, FILM COATED ORAL at 08:23

## 2023-11-26 RX ADMIN — DOXYCYCLINE 100 MG: 100 INJECTION, POWDER, LYOPHILIZED, FOR SOLUTION INTRAVENOUS at 21:11

## 2023-11-26 RX ADMIN — BENZONATATE 100 MG: 100 CAPSULE ORAL at 09:13

## 2023-11-26 RX ADMIN — IPRATROPIUM BROMIDE AND ALBUTEROL SULFATE 3 ML: 2.5; .5 SOLUTION RESPIRATORY (INHALATION) at 15:15

## 2023-11-26 ASSESSMENT — PAIN SCALES - GENERAL
PAINLEVEL_OUTOF10: 0 - NO PAIN
PAINLEVEL_OUTOF10: 5 - MODERATE PAIN

## 2023-11-26 ASSESSMENT — PAIN - FUNCTIONAL ASSESSMENT: PAIN_FUNCTIONAL_ASSESSMENT: 0-10

## 2023-11-26 NOTE — NURSING NOTE
Pt had an uneventful shift, took meds without difficulty, and O2 reduced to 3L NC. Call light in reach.

## 2023-11-26 NOTE — CONSULTS
Inpatient consult to Cardiology  Consult performed by: Angeles Simons PA-C  Consult ordered by: KENDRICK Gentile      Consult completed via telehealth/virtual visit.     History Of Present Illness:    Andrez Damon is a 78 y.o. male presenting with shortness of breath, hypoxia on chronic O2. Reports that he hasn't felt much better since his last hospital stay. Reports attempts to decrease his oxygen that made him feel poorly. Reports chest congestion/tightness during episodes of shortness of breath and more productive cough. Appetite is preserved. Reports lower extremity edema.      Last Recorded Vitals:  Vitals:    11/26/23 0000 11/26/23 0200 11/26/23 0800 11/26/23 0843   BP: 96/51 115/60     BP Location: Left arm Left arm     Patient Position: Lying Lying     Pulse: 74 70     Resp: 18 20     Temp: 36.6 °C (97.9 °F) 36.6 °C (97.9 °F)     TempSrc: Temporal Temporal     SpO2: 96% 98% 100% 99%   Weight:       Height:           Last Labs:  CBC - 11/25/2023:  1:25 AM  10.7 12.2 208    39.3      CMP - 11/25/2023:  1:25 AM  9.3 5.0 25 --- 0.5   3.1 3.0 23 51      PTT - 11/13/2023:  7:17 AM  1.1   12.4 32     Troponin I, High Sensitivity   Date/Time Value Ref Range Status   11/25/2023 02:32 AM 54 (HH) 0 - 20 ng/L Final     Comment:     Previous result verified on 11/25/2023 0209 on specimen/case [id number] called with component [facility] for procedure Troponin I, High Sensitivity with value 62 ng/L.   11/25/2023 01:25 AM 62 (HH) 0 - 20 ng/L Final   11/12/2023 09:31 PM 20 0 - 20 ng/L Final     BNP   Date/Time Value Ref Range Status   11/25/2023 01:25 AM 2,801 (H) 0 - 99 pg/mL Final   09/18/2023 07:50  (H) 0 - 99 pg/mL Final     Comment:     .  <100 pg/mL - Heart failure unlikely  100-299 pg/mL - Intermediate probability of acute heart  .               failure exacerbation. Correlate with clinical  .               context and patient history.    >=300 pg/mL - Heart Failure likely. Correlate with  clinical  .               context and patient history.  BNP testing is performed using different testing   methodology at [facility] than at other   system hospitals. Direct result comparisons should   only be made within the same method.     09/17/2023 04:05 AM 1,967 (H) 0 - 99 pg/mL Final     Comment:     .  <100 pg/mL - Heart failure unlikely  100-299 pg/mL - Intermediate probability of acute heart  .               failure exacerbation. Correlate with clinical  .               context and patient history.    >=300 pg/mL - Heart Failure likely. Correlate with clinical  .               context and patient history.  BNP testing is performed using different testing   methodology at [facility] than at other   system hospitals. Direct result comparisons should   only be made within the same method.       POC HEMOGLOBIN A1c   Date/Time Value Ref Range Status   06/21/2023 01:29 PM 5.7 4.2 - 6.5 % Final     Hemoglobin A1C   Date/Time Value Ref Range Status   11/14/2023 06:49 AM 5.7 (H) see below % Final   06/01/2020 02:02 PM 6.8 % Final     Comment:          Diagnosis of Diabetes-Adults   Non-Diabetic: < or = 5.6%   Increased risk for developing diabetes: 5.7-6.4%   Diagnostic of diabetes: > or = 6.5%  .       Monitoring of Diabetes                Age (y)     Therapeutic Goal (%)   Adults:          >18           <7.0   Pediatrics:    13-18           <7.5                   7-12           <8.0                   0- 6            7.5-8.5   American Diabetes Association. Diabetes Care 33(S1), Jan 2010.     01/28/2018 05:10 AM 6.6 % Final     Comment:          Diagnosis of Diabetes-Adults   Non-Diabetic: < or = 5.6%   Increased risk for developing diabetes: 5.7-6.4%   Diagnostic of diabetes: > or = 6.5%  .       Monitoring of Diabetes                Age (y)     Therapeutic Goal (%)   Adults:          >18           <7.0   Pediatrics:    13-18           <7.5                   7-12           <8.0          "          0- 6            7.5-8.5   American Diabetes Association. Diabetes Care 33(S1), Jan 2010.       VLDL   Date/Time Value Ref Range Status   07/13/2023 06:30 AM 8 0 - 40 mg/dL Final   05/22/2023 01:31 PM 10 0 - 40 mg/dL Final   06/23/2022 01:23 PM 9 0 - 40 mg/dL Final      Last I/O:  I/O last 3 completed shifts:  In: 720 (11.5 mL/kg) [P.O.:720]  Out: 1400 (22.4 mL/kg) [Urine:1400 (0.6 mL/kg/hr)]  Weight: 62.5 kg     Past Cardiology Tests (Last 3 Years):  EKG:  ECG 12 Lead 11/16/2023      ECG 12 Lead 10/23/2023       Echo:  Transthoracic echocardiogram 9/18/2023:   CONCLUSIONS:  1. Left ventricular systolic function is moderately decreased with a 35-40% estimated ejection fraction.  2. Left ventricular cavity size is moderately dilated.  3. There is global hypokinesis of the left ventricle with minor regional variations.  4. Spectral Doppler shows an impaired relaxation pattern of left ventricular diastolic filling.  5. There is moderate to severe left ventricular hypertrophy.  6. The left atrium is moderately dilated.  7. Moderate mitral valve regurgitation.  8. Mild aortic valve regurgitation.    Ejection Fractions:  No results found for: \"EF\"  Cath:  No results found for this or any previous visit from the past 1095 days.    Stress Test:  No results found for this or any previous visit from the past 1095 days.    Cardiac Imaging:  No results found for this or any previous visit from the past 1095 days.    Device Report (11/23/23), no arrhythmia noted. Recommended replacement, is scheduled.     Past Medical History:  He has a past medical history of Arthritis, Body mass index (BMI) 20.0-20.9, adult (09/21/2021), Body mass index (BMI) 21.0-21.9, adult (04/14/2021), CHF (congestive heart failure) (CMS/[facility]), COPD (chronic obstructive pulmonary disease) (CMS/[facility]), Coronary artery disease, Diabetes mellitus (CMS/[facility]), Hypertension, Major depressive disorder, recurrent, mild (CMS/HCC) (11/11/2020), Major depressive " disorder, recurrent, unspecified (CMS/HCC) (01/13/2021), Other conditions influencing health status, Other specified anxiety disorders (05/19/2020), Oxygen desaturation during sleep, Personal history of nicotine dependence (07/12/2021), Personal history of other diseases of the circulatory system, Personal history of other diseases of the musculoskeletal system and connective tissue, Personal history of other diseases of urinary system (09/21/2021), Personal history of other endocrine, nutritional and metabolic disease (08/29/2019), Pneumonia, unspecified organism (12/09/2019), and Thoracic aortic aneurysm, without rupture, unspecified (CMS/HCC) (05/29/2019).    Past Surgical History:  He has a past surgical history that includes CT angio abdomen pelvis w and or wo IV IV contrast (6/6/2019); CT angio head w and wo IV contrast (10/15/2020); Other surgical history (04/17/2019); Hernia repair (04/24/2014); Back surgery (01/24/2014); Total hip arthroplasty (01/24/2014); Coronary angioplasty with stent (01/24/2014); and Other surgical history (01/24/2014).      Social History:  He reports that he has been smoking cigarettes. He has a 17.00 pack-year smoking history. He has never used smokeless tobacco. He reports that he does not drink alcohol and does not use drugs.    Family History:  Family History   Problem Relation Name Age of Onset    Other (cardiac disorder) Mother      Other (malignant neoplasm) Mother      Other (cardiac disorder) Father          Allergies:  Meloxicam and Celecoxib    Inpatient Medications:  Scheduled medications   Medication Dose Route Frequency    amiodarone  200 mg oral Daily    apixaban  2.5 mg oral BID    atorvastatin  80 mg oral Daily    cefTRIAXone  2 g intravenous q24h    cholecalciferol  5,000 Units oral Daily    doxycycline (Vibramycin) 100 mg in dextrose 5 % 100 mL IV  100 mg intravenous q12h    escitalopram  10 mg oral Daily    finasteride  5 mg oral Daily    furosemide  20 mg  intravenous BID    ipratropium-albuteroL  3 mL nebulization TID    melatonin  3 mg oral Daily    methylPREDNISolone sodium succinate (PF)  40 mg intravenous q8h    metoprolol succinate XL  12.5 mg oral Daily    oxygen   inhalation Continuous - 02/gases    polyethylene glycol  17 g oral Daily    pyridoxine  50 mg oral Daily    rOPINIRole  1 mg oral Nightly    tamsulosin  0.8 mg oral Nightly    ticagrelor  90 mg oral BID     PRN medications   Medication    acetaminophen    benzonatate    ipratropium-albuteroL    ondansetron     Continuous Medications   Medication Dose Last Rate     Outpatient Medications:  Current Outpatient Medications   Medication Instructions    acetaminophen (TYLENOL) 975 mg, oral, Every 8 hours PRN    amiodarone (PACERONE) 200 mg, oral, Daily    apixaban (Eliquis) 2.5 mg tablet 1 tablet, oral, 2 times daily    atorvastatin (LIPITOR) 80 mg, oral, Daily    Brilinta 90 mg, oral, 2 times daily, AS DIRECTED    bumetanide (BUMEX) 0.5 mg, oral, Daily    cholecalciferol (VITAMIN D-3) 125 mcg, oral, Daily    escitalopram (LEXAPRO) 10 mg, oral, Daily    finasteride (Proscar) 5 mg tablet 1 tablet, oral, Daily    levalbuterol (Xopenex) 45 mcg/actuation inhaler INHALE 1 TO 2 PUFFS EVERY 4 TO 6 HOURS AS NEEDED AND AS DIRECTED.    metoprolol succinate XL (TOPROL-XL) 12.5 mg, oral, Daily, Do not crush or chew.    oxygen (O2) 2 L/min, inhalation, Continuous    polyethylene glycol (GLYCOLAX, MIRALAX) 17 g, oral, Daily    potassium chloride CR (K-Tab) 20 mEq ER tablet 1 tablet, oral, Daily    pyridoxine (VITAMIN B-6) 50 mg, oral, Daily    rOPINIRole (REQUIP) 1 mg, oral, Nightly    tamsulosin (FLOMAX) 0.8 mg, oral, Nightly    tiotropium-olodateroL (Stiolto Respimat) 2.5-2.5 mcg/actuation mist inhaler 2 puffs, inhalation, Daily       Physical Exam:  Physical Exam  Constitutional:       Comments: Elderly male sitting up in the chair in NAD.    HENT:      Head: Normocephalic.      Nose: Nose normal.   Eyes:       Conjunctiva/sclera: Conjunctivae normal.   Pulmonary:      Comments: Conversational dyspnea.   Neurological:      General: No focal deficit present.      Mental Status: He is alert.   Psychiatric:         Mood and Affect: Mood normal.         Behavior: Behavior normal.       Exam via audio & visual telehealth.      Assessment/Plan   Andrez Damon is a 79 yo male with a PMH of ICM (EF 35-40%) s/p ICD placement, scheduled for change out 12/15/2023, CAD w/ MI s/p BMS to LAD, late stent thrombus s/p GEORGIE to LAD, HTN, HLD, PAF, PE, BPH, CKD (Baseline Cr 1.4-1.8), COPD w/ chronic 2L @ HS who presented with shortness of breath. CXR reviewed with increased interstitial prominence as compared to recent CXR, BNP >2000, troponin minimally elevated @ 62 and 54, lactate also elevated 2.1.     #Acute on chronic systolic heart failure (HFrEF 35-40%), ICM  #Acute on chronic respiratory failure 2/2 HF exacerbation vs PNA  #CAD w/ hx of MI, BMS and GEORGIE to LAD   #PAF  #CKD (1.4-1.8)  #HTN  #HLD    -c/w IV Lasix increase to 40mg BID  -Strict I&O, daily weight (Goal at least 1L negative today)   -Close monitoring of renal function/ electrolytes; replete aggressively to goal K 4/Phos 3/Mg2  -Monitor on tele  -Continue Amiodarone 200mg daily, Eliquis 2.5mg BID, Atorvastatin 80mg daily, Toprol 12.5mg daily   -Hold home Bumex  -Will follow    Peripheral IV 11/25/23 20 G Left Antecubital (Active)   Site Assessment Clean;Dry;Intact 11/26/23 0735   Dressing Status Clean;Dry 11/26/23 0735   Number of days: 1       Code Status:  Full Code    I spent 60 minutes in the professional and overall care of this patient.        Angeles Simons PA-C

## 2023-11-26 NOTE — CARE PLAN
The patient has had an uneventful shift. The patient's vss on [id number]. The patient has had no shortness of breath or episodes this shift. The patient tolerated IVABX and IV Steroids well throughout the shift, the patient is currently resting in bed comfortably without complaint or requests. The call bell is within reach. Will continue to monitor and will continue the plan of care as previously stated.

## 2023-11-26 NOTE — PROGRESS NOTES
Andrez Damon is a 78 y.o. male on day 1 of admission presenting with Acute on chronic systolic congestive heart failure (CMS/HCC).      Subjective   Seen in his room in no acute distress. Expressed shortness of breath with minimal exertion.        Objective   Scheduled medications  amiodarone, 200 mg, oral, Daily  apixaban, 2.5 mg, oral, BID  atorvastatin, 80 mg, oral, Daily  cefTRIAXone, 2 g, intravenous, q24h  cholecalciferol, 5,000 Units, oral, Daily  doxycycline (Vibramycin) 100 mg in dextrose 5 % 100 mL IV, 100 mg, intravenous, q12h  escitalopram, 10 mg, oral, Daily  finasteride, 5 mg, oral, Daily  furosemide, 20 mg, intravenous, BID  ipratropium-albuteroL, 3 mL, nebulization, TID  melatonin, 3 mg, oral, Daily  methylPREDNISolone sodium succinate (PF), 40 mg, intravenous, q8h  metoprolol succinate XL, 12.5 mg, oral, Daily  oxygen, , inhalation, Continuous - 02/gases  polyethylene glycol, 17 g, oral, Daily  pyridoxine, 50 mg, oral, Daily  rOPINIRole, 1 mg, oral, Nightly  tamsulosin, 0.8 mg, oral, Nightly  ticagrelor, 90 mg, oral, BID      Continuous medications     PRN medications  PRN medications: acetaminophen, benzonatate, ipratropium-albuteroL, ondansetron  Results for orders placed or performed during the hospital encounter of 11/25/23 (from the past 96 hour(s))   CBC and Auto Differential   Result Value Ref Range    WBC 10.7 4.4 - 11.3 x10*3/uL    nRBC 0.0 0.0 - 0.0 /100 WBCs    RBC 4.08 (L) 4.50 - 5.90 x10*6/uL    Hemoglobin 12.2 (L) 13.5 - 17.5 g/dL    Hematocrit 39.3 (L) 41.0 - 52.0 %    MCV 96 80 - 100 fL    MCH 29.9 26.0 - 34.0 pg    MCHC 31.0 (L) 32.0 - 36.0 g/dL    RDW 17.7 (H) 11.5 - 14.5 %    Platelets 208 150 - 450 x10*3/uL    Neutrophils % 76.1 40.0 - 80.0 %    Immature Granulocytes %, Automated 0.5 0.0 - 0.9 %    Lymphocytes % 13.2 13.0 - 44.0 %    Monocytes % 8.6 2.0 - 10.0 %    Eosinophils % 1.2 0.0 - 6.0 %    Basophils % 0.4 0.0 - 2.0 %    Neutrophils Absolute 8.18 (H) 1.60 - 5.50  x10*3/uL    Immature Granulocytes Absolute, Automated 0.05 0.00 - 0.50 x10*3/uL    Lymphocytes Absolute 1.42 0.80 - 3.00 x10*3/uL    Monocytes Absolute 0.92 (H) 0.05 - 0.80 x10*3/uL    Eosinophils Absolute 0.13 0.00 - 0.40 x10*3/uL    Basophils Absolute 0.04 0.00 - 0.10 x10*3/uL   Basic metabolic panel   Result Value Ref Range    Glucose 146 (H) 74 - 99 mg/dL    Sodium 144 136 - 145 mmol/L    Potassium 3.4 (L) 3.5 - 5.3 mmol/L    Chloride 110 (H) 98 - 107 mmol/L    Bicarbonate 26 21 - 32 mmol/L    Anion Gap 11 10 - 20 mmol/L    Urea Nitrogen 30 (H) 6 - 23 mg/dL    Creatinine 1.53 (H) 0.50 - 1.30 mg/dL    eGFR 46 (L) >60 mL/min/1.73m*2    Calcium 9.3 8.6 - 10.3 mg/dL   Magnesium   Result Value Ref Range    Magnesium 2.05 1.60 - 2.40 mg/dL   Lactate   Result Value Ref Range    Lactate 2.1 (H) 0.4 - 2.0 mmol/L   Troponin I, High Sensitivity   Result Value Ref Range    Troponin I, High Sensitivity 62 (HH) 0 - 20 ng/L   B-Type Natriuretic Peptide   Result Value Ref Range    BNP 2,801 (H) 0 - 99 pg/mL   Sars-CoV-2 and Influenza A/B PCR   Result Value Ref Range    Flu A Result Not Detected Not Detected    Flu B Result Not Detected Not Detected    Coronavirus 2019, PCR Not Detected Not Detected   RSV PCR   Result Value Ref Range    RSV PCR Not Detected Not Detected   Red Top   Result Value Ref Range    Extra Tube Hold for add-ons.    Light Blue Top   Result Value Ref Range    Extra Tube Hold for add-ons.    Lactate   Result Value Ref Range    Lactate 1.7 0.4 - 2.0 mmol/L   Troponin I, High Sensitivity   Result Value Ref Range    Troponin I, High Sensitivity 54 (HH) 0 - 20 ng/L      Last Recorded Vitals  /60 (BP Location: Left arm, Patient Position: Lying)   Pulse 70   Temp 36.6 °C (97.9 °F) (Temporal)   Resp 20   Wt 62.5 kg (137 lb 12.6 oz)   SpO2 99%   Intake/Output last 3 Shifts:    Intake/Output Summary (Last 24 hours) at 11/26/2023 0928  Last data filed at 11/26/2023 0801  Gross per 24 hour   Intake 720 ml    Output 1400 ml   Net -680 ml       Admission Weight  Weight: 68.6 kg (151 lb 3.8 oz) (11/25/23 0114)    Daily Weight  11/25/23 : 62.5 kg (137 lb 12.6 oz)    Image Results  XR chest 1 view  Narrative: Interpreted By:  Macario Syed,   STUDY:  XR CHEST 1 VIEW;  11/25/2023 2:04 am      INDICATION:  Signs/Symptoms:dyspnea.      COMPARISON:  09/18/2023      ACCESSION NUMBER(S):  [id number]      ORDERING CLINICIAN:  REE BILL      FINDINGS:  Left pacemaker/AICD.      Moderate to severe interstitial prominence, increased from  09/18/2023. Blunting of the left-greater-than-right costophrenic  sulci, likely small pleural effusions. Coarsened interstitial  markings and apical hyperlucency lucency, likely secondary to  COPD/emphysema. Stable borderline cardiomegaly.  Left-greater-than-right bibasilar airspace opacities. Surgical clips  projected over the upper abdomen. Multilevel spinal degenerative  change.      Impression: 1. Moderate diffuse interstitial prominence, increased from  09/18/2023, likely severe interstitial edema. Multifocal  infectious/inflammatory process could have a similar appearance.  2. Left-greater-than-right basilar airspace opacities. Pneumonia is  not excluded.  3. Small left-greater-than-right pleural effusions.  4. Findings of COPD/emphysema.          Signed by: Macario Syed 11/25/2023 2:21 AM  Dictation workstation:   [id number]      Physical Exam  Constitutional:       Appearance: Normal appearance.   HENT:      Head: Normocephalic.      Nose: Nose normal.      Mouth/Throat:      Mouth: Mucous membranes are moist.   Eyes:      Extraocular Movements: Extraocular movements intact.      Pupils: Pupils are equal, round, and reactive to light.   Cardiovascular:      Rate and Rhythm: Normal rate and regular rhythm.      Pulses: Normal pulses.   Pulmonary:      Comments: diminished  Abdominal:      General: Bowel sounds are normal.      Palpations: Abdomen is soft.    Musculoskeletal:      Cervical back: Normal range of motion.      Right lower leg: Edema (trace) present.      Left lower leg: Edema (trace) present.   Skin:     General: Skin is warm and dry.      Capillary Refill: Capillary refill takes less than 2 seconds.   Neurological:      Mental Status: He is alert.   Psychiatric:         Mood and Affect: Mood normal.         Behavior: Behavior normal.       Acute on Chronic Respiratory Failure with hypoxia 2/2 Community Acquired pneumonia  COPD exacerbation  - SpO2 84% on RA on admit  - Supplemental oxygen to keep SpO2 > 92%  - Home oxygen 2lpm via NC continuously ~ baseline  - CXR: Moderate diffuse interstitial prominence, increased from   09/18/2023, likely severe interstitial edema. Multifocal infectious/ inflammatory process could have a similar appearance. Left-greater-than-right basilar airspace opacities. Pneumonia is not excluded   -ceftriaxone and Doxycycline  - continue duoneb  - solumedrol~completed  - RT for bronchial hygiene  - blood cultures pending.      Acute on Chronic Systolic Heart Failure  Non MI troponin elevation  Paroxysmal Atrial Fibrillation  HLD  Essential HTN  CAD  - serial troponin 62 > 54  - BNP 2,801  - continue amiodarone, apixaban, atorvastatin, metoprolol succinate, ticagrelor  - given Bumex in the ED  -IV furosemide BID  - hold Bumex PO  - Last echocardiogram 9/18/23: moderate decreased LVSF with an EF of 35-40% with impaired relaxation pattern of LVDF moderate MR; Mild AR  - daily weights  - I&O  - monitor HR and BP     Hypokalemia  - K 3.4  - replaced with KCL  - monitor BMP     Acute on Chronic Renal Failure  - baseline creatine +/- 1.4  - creatine on admission 1.68; today creatine 1.53  - monitor renal function     Anemia chronic disease  - Hb 9.4 > 12.2  - monitor H&H  - continue pyridoxine     BPH  - continue finasteride, tamsulosin     Depression  Anxiety  RLS  - continue escitalopram, Ropinirole     Vitamin D deficiency  -  continue cholecalciferol     DVT ppx  - on apixaban    Kristie Morgan, APRN-CNP    Appreciate Cardiology recommendations:      -c/w IV Lasix increase to 40mg BID  -Strict I&O, daily weight (Goal at least 1L negative today)   -Close monitoring of renal function/ electrolytes; replete aggressively to goal K 4/Phos 3/Mg2  -Monitor on tele  -Continue Amiodarone 200mg daily, Eliquis 2.5mg BID, Atorvastatin 80mg daily, Toprol 12.5mg daily   -Hold home Bumex  -Will follow    Patient was seen in collaboration with the MIRANDA, Kristie Morgan APRN-CNP.  I was present for the pertinent components of the history, physical, and medical decision making and independently examined the patient.  We reviewed the medication reconciliation list and ancillary studies, including lab, imaging, and microbiology, as well as discussed the differential diagnoses.    I agree with her plan as documented in the electronic medical record.    Shyam Peck MD

## 2023-11-27 VITALS
OXYGEN SATURATION: 97 % | SYSTOLIC BLOOD PRESSURE: 108 MMHG | DIASTOLIC BLOOD PRESSURE: 62 MMHG | RESPIRATION RATE: 22 BRPM | BODY MASS INDEX: 19.88 KG/M2 | WEIGHT: 138.89 LBS | TEMPERATURE: 98.1 F | HEIGHT: 70 IN | HEART RATE: 69 BPM

## 2023-11-27 PROBLEM — R79.89 TROPONIN LEVEL ELEVATED: Status: RESOLVED | Noted: 2023-11-25 | Resolved: 2023-11-27

## 2023-11-27 LAB
ANION GAP SERPL CALC-SCNC: 11 MMOL/L (ref 10–20)
BUN SERPL-MCNC: 50 MG/DL (ref 6–23)
CALCIUM SERPL-MCNC: 8.3 MG/DL (ref 8.6–10.3)
CHLORIDE SERPL-SCNC: 105 MMOL/L (ref 98–107)
CO2 SERPL-SCNC: 27 MMOL/L (ref 21–32)
CREAT SERPL-MCNC: 1.77 MG/DL (ref 0.5–1.3)
ERYTHROCYTE [DISTWIDTH] IN BLOOD BY AUTOMATED COUNT: 17.3 % (ref 11.5–14.5)
GFR SERPL CREATININE-BSD FRML MDRD: 39 ML/MIN/1.73M*2
GLUCOSE SERPL-MCNC: 164 MG/DL (ref 74–99)
HCT VFR BLD AUTO: 29.7 % (ref 41–52)
HGB BLD-MCNC: 9.2 G/DL (ref 13.5–17.5)
HOLD SPECIMEN: NORMAL
MCH RBC QN AUTO: 30.1 PG (ref 26–34)
MCHC RBC AUTO-ENTMCNC: 31 G/DL (ref 32–36)
MCV RBC AUTO: 97 FL (ref 80–100)
NRBC BLD-RTO: 0 /100 WBCS (ref 0–0)
PLATELET # BLD AUTO: 143 X10*3/UL (ref 150–450)
POTASSIUM SERPL-SCNC: 3.7 MMOL/L (ref 3.5–5.3)
RBC # BLD AUTO: 3.06 X10*6/UL (ref 4.5–5.9)
SODIUM SERPL-SCNC: 139 MMOL/L (ref 136–145)
WBC # BLD AUTO: 9.7 X10*3/UL (ref 4.4–11.3)

## 2023-11-27 PROCEDURE — 2500000005 HC RX 250 GENERAL PHARMACY W/O HCPCS: Mod: IPSPLIT | Performed by: STUDENT IN AN ORGANIZED HEALTH CARE EDUCATION/TRAINING PROGRAM

## 2023-11-27 PROCEDURE — 36415 COLL VENOUS BLD VENIPUNCTURE: CPT | Mod: IPSPLIT | Performed by: STUDENT IN AN ORGANIZED HEALTH CARE EDUCATION/TRAINING PROGRAM

## 2023-11-27 PROCEDURE — 96374 THER/PROPH/DIAG INJ IV PUSH: CPT | Mod: IPSPLIT

## 2023-11-27 PROCEDURE — 82374 ASSAY BLOOD CARBON DIOXIDE: CPT | Mod: IPSPLIT | Performed by: NURSE PRACTITIONER

## 2023-11-27 PROCEDURE — 99232 SBSQ HOSP IP/OBS MODERATE 35: CPT | Performed by: INTERNAL MEDICINE

## 2023-11-27 PROCEDURE — 36415 COLL VENOUS BLD VENIPUNCTURE: CPT | Mod: IPSPLIT | Performed by: NURSE PRACTITIONER

## 2023-11-27 PROCEDURE — 2500000001 HC RX 250 WO HCPCS SELF ADMINISTERED DRUGS (ALT 637 FOR MEDICARE OP): Mod: IPSPLIT | Performed by: STUDENT IN AN ORGANIZED HEALTH CARE EDUCATION/TRAINING PROGRAM

## 2023-11-27 PROCEDURE — 1090000002 HH PPS REVENUE DEBIT

## 2023-11-27 PROCEDURE — 2500000002 HC RX 250 W HCPCS SELF ADMINISTERED DRUGS (ALT 637 FOR MEDICARE OP, ALT 636 FOR OP/ED): Mod: IPSPLIT | Performed by: STUDENT IN AN ORGANIZED HEALTH CARE EDUCATION/TRAINING PROGRAM

## 2023-11-27 PROCEDURE — 1090000001 HH PPS REVENUE CREDIT

## 2023-11-27 PROCEDURE — 2500000004 HC RX 250 GENERAL PHARMACY W/ HCPCS (ALT 636 FOR OP/ED): Mod: IPSPLIT | Performed by: STUDENT IN AN ORGANIZED HEALTH CARE EDUCATION/TRAINING PROGRAM

## 2023-11-27 PROCEDURE — 2500000004 HC RX 250 GENERAL PHARMACY W/ HCPCS (ALT 636 FOR OP/ED): Mod: IPSPLIT | Performed by: NURSE PRACTITIONER

## 2023-11-27 PROCEDURE — 94640 AIRWAY INHALATION TREATMENT: CPT | Mod: IPSPLIT

## 2023-11-27 PROCEDURE — 2500000004 HC RX 250 GENERAL PHARMACY W/ HCPCS (ALT 636 FOR OP/ED): Mod: IPSPLIT | Performed by: PHYSICIAN ASSISTANT

## 2023-11-27 PROCEDURE — 85027 COMPLETE CBC AUTOMATED: CPT | Mod: IPSPLIT | Performed by: NURSE PRACTITIONER

## 2023-11-27 RX ORDER — PREDNISONE 10 MG/1
TABLET ORAL
Qty: 30 TABLET | Refills: 0 | Status: SHIPPED | OUTPATIENT
Start: 2023-11-27 | End: 2023-12-08 | Stop reason: ALTCHOICE

## 2023-11-27 RX ORDER — BENZONATATE 100 MG/1
100 CAPSULE ORAL 3 TIMES DAILY PRN
Qty: 20 CAPSULE | Refills: 0 | Status: SHIPPED | OUTPATIENT
Start: 2023-11-27 | End: 2023-12-08 | Stop reason: ALTCHOICE

## 2023-11-27 RX ADMIN — Medication: at 10:00

## 2023-11-27 RX ADMIN — METOPROLOL SUCCINATE 12.5 MG: 25 TABLET, EXTENDED RELEASE ORAL at 09:18

## 2023-11-27 RX ADMIN — POLYETHYLENE GLYCOL 3350 17 G: 17 POWDER, FOR SOLUTION ORAL at 09:19

## 2023-11-27 RX ADMIN — TICAGRELOR 90 MG: 90 TABLET ORAL at 09:18

## 2023-11-27 RX ADMIN — METHYLPREDNISOLONE SODIUM SUCCINATE 40 MG: 40 INJECTION, POWDER, FOR SOLUTION INTRAMUSCULAR; INTRAVENOUS at 03:54

## 2023-11-27 RX ADMIN — IPRATROPIUM BROMIDE AND ALBUTEROL SULFATE 3 ML: 2.5; .5 SOLUTION RESPIRATORY (INHALATION) at 15:50

## 2023-11-27 RX ADMIN — ATORVASTATIN CALCIUM 80 MG: 40 TABLET, FILM COATED ORAL at 09:18

## 2023-11-27 RX ADMIN — Medication: at 08:00

## 2023-11-27 RX ADMIN — FINASTERIDE 5 MG: 5 TABLET, FILM COATED ORAL at 09:18

## 2023-11-27 RX ADMIN — CEFTRIAXONE 2 G: 2 INJECTION, SOLUTION INTRAVENOUS at 11:53

## 2023-11-27 RX ADMIN — ACETAMINOPHEN 650 MG: 325 TABLET ORAL at 09:18

## 2023-11-27 RX ADMIN — AMIODARONE HYDROCHLORIDE 200 MG: 200 TABLET ORAL at 09:17

## 2023-11-27 RX ADMIN — FUROSEMIDE 40 MG: 10 INJECTION, SOLUTION INTRAVENOUS at 14:27

## 2023-11-27 RX ADMIN — METHYLPREDNISOLONE SODIUM SUCCINATE 40 MG: 40 INJECTION, POWDER, FOR SOLUTION INTRAMUSCULAR; INTRAVENOUS at 11:53

## 2023-11-27 RX ADMIN — APIXABAN 2.5 MG: 2.5 TABLET, FILM COATED ORAL at 09:18

## 2023-11-27 RX ADMIN — IPRATROPIUM BROMIDE AND ALBUTEROL SULFATE 3 ML: 2.5; .5 SOLUTION RESPIRATORY (INHALATION) at 09:35

## 2023-11-27 RX ADMIN — CHOLECALCIFEROL TAB 125 MCG (5000 UNIT) 5000 UNITS: 125 TAB at 09:17

## 2023-11-27 RX ADMIN — FUROSEMIDE 40 MG: 10 INJECTION, SOLUTION INTRAVENOUS at 09:27

## 2023-11-27 RX ADMIN — PYRIDOXINE HCL TAB 50 MG 50 MG: 50 TAB at 09:28

## 2023-11-27 RX ADMIN — DOXYCYCLINE 100 MG: 100 INJECTION, POWDER, LYOPHILIZED, FOR SOLUTION INTRAVENOUS at 09:00

## 2023-11-27 RX ADMIN — ESCITALOPRAM OXALATE 10 MG: 10 TABLET, FILM COATED ORAL at 09:18

## 2023-11-27 ASSESSMENT — PAIN DESCRIPTION - LOCATION: LOCATION: HEAD

## 2023-11-27 ASSESSMENT — PAIN - FUNCTIONAL ASSESSMENT: PAIN_FUNCTIONAL_ASSESSMENT: 0-10

## 2023-11-27 ASSESSMENT — PAIN SCALES - GENERAL: PAINLEVEL_OUTOF10: 2

## 2023-11-27 NOTE — PROGRESS NOTES
Andrez Damon is a 78 y.o. male on day 2 of admission presenting with Acute on chronic systolic congestive heart failure (CMS/HCC).      Subjective   Appears SOB at rest this morning. He however expressed that his breathing feels normal for him.  Net fluid balance in net positive by 65ml.  Creatinine increasing.  Has an appointment with Dr. Briggs/Cindy Meraz this week.         Objective     Last Recorded Vitals  /61 (BP Location: Right arm, Patient Position: Lying)   Pulse 62   Temp 36.4 °C (97.6 °F) (Temporal)   Resp 20   Wt 63 kg (138 lb 14.2 oz)   SpO2 97%   Intake/Output last 3 Shifts:    Intake/Output Summary (Last 24 hours) at 11/27/2023 0931  Last data filed at 11/27/2023 0909  Gross per 24 hour   Intake 840 ml   Output 775 ml   Net 65 ml       Admission Weight  Weight: 68.6 kg (151 lb 3.8 oz) (11/25/23 0114)    Daily Weight  11/27/23 : 63 kg (138 lb 14.2 oz)    Image Results  XR chest 1 view  Narrative: Interpreted By:  Macario Syed,   STUDY:  XR CHEST 1 VIEW;  11/25/2023 2:04 am      INDICATION:  Signs/Symptoms:dyspnea.      COMPARISON:  09/18/2023      ACCESSION NUMBER(S):  [id number]      ORDERING CLINICIAN:  REE BILL      FINDINGS:  Left pacemaker/AICD.      Moderate to severe interstitial prominence, increased from  09/18/2023. Blunting of the left-greater-than-right costophrenic  sulci, likely small pleural effusions. Coarsened interstitial  markings and apical hyperlucency lucency, likely secondary to  COPD/emphysema. Stable borderline cardiomegaly.  Left-greater-than-right bibasilar airspace opacities. Surgical clips  projected over the upper abdomen. Multilevel spinal degenerative  change.      Impression: 1. Moderate diffuse interstitial prominence, increased from  09/18/2023, likely severe interstitial edema. Multifocal  infectious/inflammatory process could have a similar appearance.  2. Left-greater-than-right basilar airspace opacities. Pneumonia is  not  excluded.  3. Small left-greater-than-right pleural effusions.  4. Findings of COPD/emphysema.          Signed by: Macario Syed 11/25/2023 2:21 AM  Dictation workstation:   [id number]    Results for orders placed or performed during the hospital encounter of 11/25/23 (from the past 96 hour(s))   CBC and Auto Differential   Result Value Ref Range    WBC 10.7 4.4 - 11.3 x10*3/uL    nRBC 0.0 0.0 - 0.0 /100 WBCs    RBC 4.08 (L) 4.50 - 5.90 x10*6/uL    Hemoglobin 12.2 (L) 13.5 - 17.5 g/dL    Hematocrit 39.3 (L) 41.0 - 52.0 %    MCV 96 80 - 100 fL    MCH 29.9 26.0 - 34.0 pg    MCHC 31.0 (L) 32.0 - 36.0 g/dL    RDW 17.7 (H) 11.5 - 14.5 %    Platelets 208 150 - 450 x10*3/uL    Neutrophils % 76.1 40.0 - 80.0 %    Immature Granulocytes %, Automated 0.5 0.0 - 0.9 %    Lymphocytes % 13.2 13.0 - 44.0 %    Monocytes % 8.6 2.0 - 10.0 %    Eosinophils % 1.2 0.0 - 6.0 %    Basophils % 0.4 0.0 - 2.0 %    Neutrophils Absolute 8.18 (H) 1.60 - 5.50 x10*3/uL    Immature Granulocytes Absolute, Automated 0.05 0.00 - 0.50 x10*3/uL    Lymphocytes Absolute 1.42 0.80 - 3.00 x10*3/uL    Monocytes Absolute 0.92 (H) 0.05 - 0.80 x10*3/uL    Eosinophils Absolute 0.13 0.00 - 0.40 x10*3/uL    Basophils Absolute 0.04 0.00 - 0.10 x10*3/uL   Basic metabolic panel   Result Value Ref Range    Glucose 146 (H) 74 - 99 mg/dL    Sodium 144 136 - 145 mmol/L    Potassium 3.4 (L) 3.5 - 5.3 mmol/L    Chloride 110 (H) 98 - 107 mmol/L    Bicarbonate 26 21 - 32 mmol/L    Anion Gap 11 10 - 20 mmol/L    Urea Nitrogen 30 (H) 6 - 23 mg/dL    Creatinine 1.53 (H) 0.50 - 1.30 mg/dL    eGFR 46 (L) >60 mL/min/1.73m*2    Calcium 9.3 8.6 - 10.3 mg/dL   Magnesium   Result Value Ref Range    Magnesium 2.05 1.60 - 2.40 mg/dL   Lactate   Result Value Ref Range    Lactate 2.1 (H) 0.4 - 2.0 mmol/L   Troponin I, High Sensitivity   Result Value Ref Range    Troponin I, High Sensitivity 62 (HH) 0 - 20 ng/L   B-Type Natriuretic Peptide   Result Value Ref Range    BNP 2,801 (H)  0 - 99 pg/mL   Sars-CoV-2 and Influenza A/B PCR   Result Value Ref Range    Flu A Result Not Detected Not Detected    Flu B Result Not Detected Not Detected    Coronavirus 2019, PCR Not Detected Not Detected   RSV PCR   Result Value Ref Range    RSV PCR Not Detected Not Detected   Red Top   Result Value Ref Range    Extra Tube Hold for add-ons.    Light Blue Top   Result Value Ref Range    Extra Tube Hold for add-ons.    Lactate   Result Value Ref Range    Lactate 1.7 0.4 - 2.0 mmol/L   Troponin I, High Sensitivity   Result Value Ref Range    Troponin I, High Sensitivity 54 (HH) 0 - 20 ng/L   Procalcitonin   Result Value Ref Range    Procalcitonin 0.08 (H) <=0.07 ng/mL   CBC   Result Value Ref Range    WBC 9.7 4.4 - 11.3 x10*3/uL    nRBC 0.0 0.0 - 0.0 /100 WBCs    RBC 3.06 (L) 4.50 - 5.90 x10*6/uL    Hemoglobin 9.2 (L) 13.5 - 17.5 g/dL    Hematocrit 29.7 (L) 41.0 - 52.0 %    MCV 97 80 - 100 fL    MCH 30.1 26.0 - 34.0 pg    MCHC 31.0 (L) 32.0 - 36.0 g/dL    RDW 17.3 (H) 11.5 - 14.5 %    Platelets 143 (L) 150 - 450 x10*3/uL   Basic Metabolic Panel   Result Value Ref Range    Glucose 164 (H) 74 - 99 mg/dL    Sodium 139 136 - 145 mmol/L    Potassium 3.7 3.5 - 5.3 mmol/L    Chloride 105 98 - 107 mmol/L    Bicarbonate 27 21 - 32 mmol/L    Anion Gap 11 10 - 20 mmol/L    Urea Nitrogen 50 (H) 6 - 23 mg/dL    Creatinine 1.77 (H) 0.50 - 1.30 mg/dL    eGFR 39 (L) >60 mL/min/1.73m*2    Calcium 8.3 (L) 8.6 - 10.3 mg/dL   Lavender Top   Result Value Ref Range    Extra Tube Hold for add-ons.     Scheduled medications  amiodarone, 200 mg, oral, Daily  apixaban, 2.5 mg, oral, BID  atorvastatin, 80 mg, oral, Daily  cefTRIAXone, 2 g, intravenous, q24h  cholecalciferol, 5,000 Units, oral, Daily  doxycycline (Vibramycin) 100 mg in dextrose 5 % 100 mL IV, 100 mg, intravenous, q12h  escitalopram, 10 mg, oral, Daily  finasteride, 5 mg, oral, Daily  furosemide, 40 mg, intravenous, BID  ipratropium-albuteroL, 3 mL, nebulization,  TID  melatonin, 3 mg, oral, Daily  methylPREDNISolone sodium succinate (PF), 40 mg, intravenous, q8h  metoprolol succinate XL, 12.5 mg, oral, Daily  oxygen, , inhalation, Continuous - 02/gases  polyethylene glycol, 17 g, oral, Daily  pyridoxine, 50 mg, oral, Daily  rOPINIRole, 1 mg, oral, Nightly  tamsulosin, 0.8 mg, oral, Nightly  ticagrelor, 90 mg, oral, BID      Continuous medications     PRN medications  PRN medications: acetaminophen, benzonatate, ipratropium-albuteroL, ondansetron        Constitutional:       Appearance: Normal appearance.   HENT:      Head: Normocephalic.      Nose: Nose normal.      Mouth/Throat:      Mouth: Mucous membranes are moist.   Eyes:      Extraocular Movements: Extraocular movements intact.      Pupils: Pupils are equal, round, and reactive to light.   Cardiovascular:      Rate and Rhythm: Normal rate and regular rhythm.      Pulses: Normal pulses.   Pulmonary:      Comments: diminished. Fine rales bases. Congested cough.  Abdominal:      General: Bowel sounds are normal.      Palpations: Abdomen is soft.   Musculoskeletal:      Cervical back: Normal range of motion.      Right lower leg: Edema (trace) present.      Left lower leg: Edema (trace) present.   Skin:     General: Skin is warm and dry.      Capillary Refill: Capillary refill takes less than 2 seconds.   Neurological:      Mental Status: He is alert.   Psychiatric:         Mood and Affect: Mood normal.    talkative.      Behavior: Behavior normal.   Relevant Results    Assessment/Plan     Acute on Chronic Respiratory Failure with hypoxia 2/2 Community Acquired pneumonia  COPD exacerbation  - SpO2 84% on RA on admit  - Supplemental oxygen to keep SpO2 > 92%  - Home oxygen 2lpm via NC continuously ~ baseline  - CXR: Moderate diffuse interstitial prominence, increased from   09/18/2023, likely severe interstitial edema. Multifocal infectious/ inflammatory process could have a similar appearance. Left-greater-than-right  basilar airspace opacities. Pneumonia is not excluded   -ceftriaxone and Doxycycline  - continue duoneb  - solumedrol~completed  - RT for bronchial hygiene  - blood cultures pending.      Acute on Chronic Systolic Heart Failure  Non MI troponin elevation  Paroxysmal Atrial Fibrillation  HLD  Essential HTN  CAD  - serial troponin 62 > 54  - BNP 2,801 on admission  - continue amiodarone, apixaban, atorvastatin, metoprolol succinate, ticagrelor  - given Bumex in the ED  -IV furosemide BID  - hold Bumex PO  - Last echocardiogram 9/18/23: moderate decreased LVSF with an EF of 35-40% with impaired relaxation pattern of LVDF moderate MR; Mild AR  - daily weights  - I&O  - monitor HR and BP   -Strict I&O, daily weight  Hypokalemia  - K 3.4 on admission  - replaced with KCL  - monitor BMP     Acute on Chronic Renal Failure  - baseline creatine +/- 1.4  - creatine on admission 1.68; today creatine 1.53  -Close monitoring of renal function/ electrolytes; replete aggressively to goal K 4/Phos 3/Mg2   -Strict I&O, daily weight    Anemia chronic disease  - Hb 9.4 > 12.2  - monitor H&H  - continue pyridoxine     BPH  - continue finasteride, tamsulosin     Depression  Anxiety  RLS  - continue escitalopram, Ropinirole     Vitamin D deficiency  - continue cholecalciferol     DVT ppx  - on apixaban     KENDRICK Sotelo    Patient was seen in collaboration with the MIRANDAKristie.  I was present for the pertinent components of the history, physical, and medical decision making and independently examined the patient.  We reviewed the medication reconciliation list and ancillary studies, including lab, imaging, and microbiology, as well as discussed the differential diagnoses; which includes, but is not limited to Principal Problem:    Acute on chronic systolic congestive heart failure (CMS/HCC)  Active Problems:    Troponin level elevated  .      I agree with her plan as documented in the electronic medical  record.    Shyam Peck MD

## 2023-11-27 NOTE — PROGRESS NOTES
11/27/23 1326   Discharge Planning   Living Arrangements Alone   Support Systems Family members   Type of Residence Private residence   Home or Post Acute Services In home services   Type of Home Care Services Home PT;Home OT;Home nursing visits   Patient expects to be discharged to: Home with [facility] resume   Does the patient need discharge transport arranged? No     Spoke with patient regarding discharge planning, Patient states he is active with [facility] for PT/OT SN, patient wants to continue with [facility] when ready for discharge.

## 2023-11-27 NOTE — CONSULTS
Nutrition Diet Education:  Reason for Assessment: Dietitian discretion (CHF)    HPI: Pt presented with complaint of SOB. Has hx of systolic heart failure with EF of 35% and hx COPD.    PMHx: arthritis, CHF, COPD, CAD, DM, HTN    Nutrition History:  Food and Nutrient History: Pt visited in room after breakfast. Pt reports good intake and appetite. Pt lives at home with his spouse. Pt states he has been following a low-sodium low-sugar diet at home for 15 years. States he does not use salt, does not use canned foods, uses frozen vegetables, plans to eliminate luncheon meats from his diet. Denies the need for nutrition education. Provided with RDN contact info if he has questions after discharge.    Food Allergies/Intolerances:  None  Energy intake: Energy Intake: Good > 75 %  GI Symptoms: None     Oral Problems: none - pt edentulous, but denies need for menu modification    Anthropometrics:     Weight: 63 kg (138 lb 14.2 oz)  BMI (Calculated): 19.93             Objective/Subjective Weight History:   Wt Readings from Last 10 Encounters:   11/27/23 63 kg (138 lb 14.2 oz)   11/22/23 68.8 kg (151 lb 11.2 oz)   11/16/23 68.2 kg (150 lb 5.7 oz)   11/13/23 64.9 kg (143 lb)   11/12/23 66.9 kg (147 lb 7.8 oz)   11/06/23 63 kg (139 lb)   11/02/23 63.3 kg (139 lb 8.8 oz)   10/31/23 63.2 kg (139 lb 4.8 oz)   10/23/23 62 kg (136 lb 11 oz)   10/19/23 63.9 kg (140 lb 14 oz)   09/14/23  61.1 kg (134.7 lb)    Weight Change %:     Significant Weight Loss: No        Nutrition Focused Physical Exam Findings:  Deferred - pt eating 100% of meals    Objective Data:  Nutrition Significant Labs:  11/27/23:  glucose 164^  BUN 50^  Creat 1.77^  eGFR 39v    Nutrition Specific Mediations:  Amiodarone  Apixaban  Atorvastatin  Vit D3  Lasix  Methylprednisolone  Metoprolol  Zofran  Miralax  Vit B6  Flomax      I/O:     Intake/Output Summary (Last 24 hours) at 11/27/2023 1341  Last data filed at 11/27/2023 1300  Gross per 24 hour   Intake 1490 ml    Output 400 ml   Net 1090 ml       Dietary Orders (From admission, onward)       Start     Ordered    11/25/23 0755  Adult diet Cardiac; 70 gm fat; 2 - 3 grams Sodium; 2000 mL fluid  Diet effective now        Question Answer Comment   Diet type Cardiac    Fat restriction: 70 gm fat    Sodium restriction: 2 - 3 grams Sodium    Dietary fluid restriction / 24h: 2000 mL fluid        11/25/23 0754                     Nutrition Interventions and Recommendations:        Nutrition Prescription:  Individualized Nutrition Prescription Provided for : Continue diet as ordered        Nutrition Prescription Goals:   Food and/or Nutrient Delivery Interventions  Interventions: Meals and snacks  Meals and Snacks: Fat-modified diet, Fluid-modified diet, Mineral-modified diet  Goal: Continue diet as ordered    Coordination of Nutrition Care by a Nutrition Professional  Collaboration and Referral of Nutrition Care: Collaboration by nutrition professional with other providers  Goal: Pt discussed at IDT meeting    Nutrition Education:   Nutrition Counseling  Counseling Theoretical Approach:  (Pt denied need for review)    Monitoring/Evaluation:   Food/Nutrient Related History Monitoring  Additional Plans: Pt denied need for nutrition education; contact RDN if nutrition issue should emerge       Time Spent/Follow-up Reminder:   Follow Up  Time Spent (min): 45 minutes  Follow up: Provided inpatient RDN contact information  Last Date of Nutrition Visit: 11/27/23  Follow up Comment: Pt denied need for nutrition education

## 2023-11-27 NOTE — CARE PLAN
The patient's goals for the shift include      The clinical goals for the shift include Patient spo2 will remain above 90%    Patient in bed resting. Vitals WNL. No complaints of N/V, chest pain. Shortness of breath on exertion. Tolerating medications.

## 2023-11-28 ENCOUNTER — APPOINTMENT (OUTPATIENT)
Dept: CARDIOLOGY | Facility: CLINIC | Age: 78
End: 2023-11-28
Payer: MEDICARE

## 2023-11-28 ENCOUNTER — HOME CARE VISIT (OUTPATIENT)
Dept: HOME HEALTH SERVICES | Facility: HOME HEALTH | Age: 78
End: 2023-11-28
Payer: MEDICARE

## 2023-11-28 PROCEDURE — 1090000001 HH PPS REVENUE CREDIT

## 2023-11-28 PROCEDURE — 1090000002 HH PPS REVENUE DEBIT

## 2023-11-29 ENCOUNTER — HOME CARE VISIT (OUTPATIENT)
Dept: HOME HEALTH SERVICES | Facility: HOME HEALTH | Age: 78
End: 2023-11-29
Payer: MEDICARE

## 2023-11-29 PROCEDURE — G0299 HHS/HOSPICE OF RN EA 15 MIN: HCPCS | Mod: HHH

## 2023-11-29 PROCEDURE — 1090000001 HH PPS REVENUE CREDIT

## 2023-11-29 PROCEDURE — 1090000002 HH PPS REVENUE DEBIT

## 2023-11-30 ENCOUNTER — APPOINTMENT (OUTPATIENT)
Dept: PRIMARY CARE | Facility: CLINIC | Age: 78
End: 2023-11-30
Payer: MEDICARE

## 2023-11-30 ENCOUNTER — HOSPITAL ENCOUNTER (OUTPATIENT)
Dept: CARDIOLOGY | Facility: HOSPITAL | Age: 78
Discharge: HOME | End: 2023-11-30
Payer: MEDICARE

## 2023-11-30 ENCOUNTER — INFUSION (OUTPATIENT)
Dept: HEMATOLOGY/ONCOLOGY | Facility: HOSPITAL | Age: 78
End: 2023-11-30
Payer: MEDICARE

## 2023-11-30 VITALS
TEMPERATURE: 98.3 F | RESPIRATION RATE: 20 BRPM | BODY MASS INDEX: 20.95 KG/M2 | WEIGHT: 146 LBS | SYSTOLIC BLOOD PRESSURE: 124 MMHG | HEART RATE: 86 BPM | DIASTOLIC BLOOD PRESSURE: 68 MMHG | OXYGEN SATURATION: 98 %

## 2023-11-30 VITALS
SYSTOLIC BLOOD PRESSURE: 124 MMHG | HEART RATE: 75 BPM | OXYGEN SATURATION: 94 % | TEMPERATURE: 99 F | DIASTOLIC BLOOD PRESSURE: 49 MMHG | RESPIRATION RATE: 20 BRPM | WEIGHT: 149.25 LBS | BODY MASS INDEX: 21.42 KG/M2

## 2023-11-30 DIAGNOSIS — E53.8 B12 DEFICIENCY: ICD-10-CM

## 2023-11-30 PROCEDURE — 96372 THER/PROPH/DIAG INJ SC/IM: CPT

## 2023-11-30 PROCEDURE — 2500000004 HC RX 250 GENERAL PHARMACY W/ HCPCS (ALT 636 FOR OP/ED): Performed by: INTERNAL MEDICINE

## 2023-11-30 PROCEDURE — 1090000002 HH PPS REVENUE DEBIT

## 2023-11-30 PROCEDURE — 1090000001 HH PPS REVENUE CREDIT

## 2023-11-30 PROCEDURE — 93005 ELECTROCARDIOGRAM TRACING: CPT

## 2023-11-30 RX ORDER — FAMOTIDINE 10 MG/ML
20 INJECTION INTRAVENOUS ONCE AS NEEDED
Status: CANCELLED | OUTPATIENT
Start: 2023-12-28

## 2023-11-30 RX ORDER — DIPHENHYDRAMINE HYDROCHLORIDE 50 MG/ML
50 INJECTION INTRAMUSCULAR; INTRAVENOUS AS NEEDED
Status: CANCELLED | OUTPATIENT
Start: 2023-12-28

## 2023-11-30 RX ORDER — CYANOCOBALAMIN 1000 UG/ML
1000 INJECTION, SOLUTION INTRAMUSCULAR; SUBCUTANEOUS ONCE
Status: COMPLETED | OUTPATIENT
Start: 2023-11-30 | End: 2023-11-30

## 2023-11-30 RX ORDER — CYANOCOBALAMIN 1000 UG/ML
1000 INJECTION, SOLUTION INTRAMUSCULAR; SUBCUTANEOUS ONCE
Status: CANCELLED | OUTPATIENT
Start: 2023-12-28

## 2023-11-30 RX ORDER — EPINEPHRINE 0.3 MG/.3ML
0.3 INJECTION SUBCUTANEOUS EVERY 5 MIN PRN
Status: CANCELLED | OUTPATIENT
Start: 2023-12-28

## 2023-11-30 RX ORDER — ALBUTEROL SULFATE 0.83 MG/ML
3 SOLUTION RESPIRATORY (INHALATION) AS NEEDED
Status: CANCELLED | OUTPATIENT
Start: 2023-12-28

## 2023-11-30 RX ADMIN — CYANOCOBALAMIN 1000 MCG: 1000 INJECTION, SOLUTION INTRAMUSCULAR at 11:01

## 2023-11-30 SDOH — HEALTH STABILITY: MENTAL HEALTH: SMOKING IN HOME: 0

## 2023-11-30 SDOH — ECONOMIC STABILITY: HOUSING INSECURITY: EVIDENCE OF SMOKING MATERIAL: 0

## 2023-11-30 ASSESSMENT — ACTIVITIES OF DAILY LIVING (ADL)
TOILETING: ONE PERSON
CURRENT_FUNCTION: ONE PERSON
PHYSICAL TRANSFERS ASSESSED: 1
TOILETING: 1
AMBULATION ASSISTANCE: ONE PERSON
AMBULATION ASSISTANCE: 1

## 2023-11-30 ASSESSMENT — ENCOUNTER SYMPTOMS
HIGHEST PAIN SEVERITY IN PAST 24 HOURS: 0/10
LOSS OF SENSATION IN FEET: 1
OCCASIONAL FEELINGS OF UNSTEADINESS: 0
MUSCLE WEAKNESS: 1
SHORTNESS OF BREATH: 1
FATIGUES EASILY: 1
PAIN SEVERITY GOAL: 0/10
APPETITE LEVEL: GOOD
CHANGE IN APPETITE: UNCHANGED
PERSON REPORTING PAIN: PATIENT
LOWEST PAIN SEVERITY IN PAST 24 HOURS: 0/10
COUGH: 1
COUGH CHARACTERISTICS: NON-PRODUCTIVE
DEPRESSION: 0
DENIES PAIN: 1
DYSPNEA ACTIVITY LEVEL: AT REST

## 2023-11-30 ASSESSMENT — PAIN SCALES - GENERAL: PAINLEVEL: 8

## 2023-11-30 NOTE — HOME HEALTH
"We were unaware patient was hospitalized. Was not notified until intake was working on resumption of care orders. I was in patient's area, so I offered to see at this time. Other disciplines were not aware either, so I have informed them to cancel their visits, until the resumption of care visit has been completed.    The patient and wife report that the evening of Thanksgiving,  he was having severe shortness of breath, unable to get pulse oximetry reading above 85 percent while on 3L of oxygen. Had edema to his bilateral lower extremities.    Patient states the day prior, he called his cardiologist and reported a weight gain/increase of 5 pounds over night and allegedly, their office stated \"it's okay, continue to watch and let us know if anything else happens.\" At that time, he had gained an additional three pounds, being 8 pounds total in two nights.  His wife called 911 and the patient was admitted with congestive heart failure exacerbation.    They feel as though this could have been avoided if the doctor would have instructed him differently or handled things differently.      He said he was following our instruction to weigh self daily and report changes of 3 to 5 pound increase. He wishes he would have told dr. office, not, things are not okay, and tried to advocate for himself better, but he was trying to trust.    He is having no further urinary issues requiring catheter.    Will need additional CHF teaching. He states he is not eating sodium or adding sodium, but foods that were brought to him may have had sodium..    New to taking prednisone at this time, as well as tessalon pearls. Provided instruction.    Current weight is 146.    Lung sounds are clear to auscultation.  Heart sounds regular.    Agreeable to having both PT and OT in.  Will also have nursing visits.    Oxygen remains ordered continuously.    Patient also feels as though he became weaker in the hospital because they had him on \"bedrest\" " and PT and OT did not assess until day of discharge.     No change in diuretic.    He will follow up with physician next week.    Goes in for monthly vitamin  B injection tomorrow at [facility].    No ordered fluid restriction.    Using cane at home. Walker when he goes out or to hospital.    He sees pulmonologist next week also.    BM regular

## 2023-12-01 ENCOUNTER — HOME CARE VISIT (OUTPATIENT)
Dept: HOME HEALTH SERVICES | Facility: HOME HEALTH | Age: 78
End: 2023-12-01
Payer: MEDICARE

## 2023-12-01 VITALS
SYSTOLIC BLOOD PRESSURE: 115 MMHG | RESPIRATION RATE: 24 BRPM | TEMPERATURE: 98.3 F | OXYGEN SATURATION: 97 % | HEART RATE: 75 BPM | DIASTOLIC BLOOD PRESSURE: 40 MMHG

## 2023-12-01 PROCEDURE — 1090000001 HH PPS REVENUE CREDIT

## 2023-12-01 PROCEDURE — 1090000002 HH PPS REVENUE DEBIT

## 2023-12-01 PROCEDURE — G0152 HHCP-SERV OF OT,EA 15 MIN: HCPCS | Mod: HHH

## 2023-12-01 SDOH — HEALTH STABILITY: MENTAL HEALTH: SMOKING IN HOME: 1

## 2023-12-01 SDOH — ECONOMIC STABILITY: HOUSING INSECURITY: EVIDENCE OF SMOKING MATERIAL: 1

## 2023-12-01 ASSESSMENT — ACTIVITIES OF DAILY LIVING (ADL)
PHYSICAL_TRANSFERS_DEVICES: ROLLATOR
AMBULATION ASSISTANCE: SUPERVISION
PREPARING MEALS: NEEDS ASSISTANCE
HOUSEKEEPING ASSESSED: 1
DRESSING_UB_CURRENT_FUNCTION: MINIMUM ASSIST
PHYSICAL TRANSFERS ASSESSED: 1
LIGHT HOUSEKEEPING: NEEDS ASSISTANCE
OASIS_M1830: 03
DRESSING_LB_CURRENT_FUNCTION: MINIMUM ASSIST
CURRENT_FUNCTION: SUPERVISION
AMBULATION ASSISTANCE: 1

## 2023-12-01 ASSESSMENT — ENCOUNTER SYMPTOMS
LOWEST PAIN SEVERITY IN PAST 24 HOURS: 10/10
PAIN: 1
HIGHEST PAIN SEVERITY IN PAST 24 HOURS: 10/10
PAIN LOCATION - PAIN SEVERITY: 10/10
PERSON REPORTING PAIN: PATIENT
PAIN LOCATION: BACK
SUBJECTIVE PAIN PROGRESSION: UNCHANGED

## 2023-12-02 ENCOUNTER — HOME CARE VISIT (OUTPATIENT)
Dept: HOME HEALTH SERVICES | Facility: HOME HEALTH | Age: 78
End: 2023-12-02
Payer: MEDICARE

## 2023-12-02 VITALS
SYSTOLIC BLOOD PRESSURE: 118 MMHG | OXYGEN SATURATION: 97 % | TEMPERATURE: 97.2 F | RESPIRATION RATE: 20 BRPM | HEART RATE: 76 BPM | DIASTOLIC BLOOD PRESSURE: 60 MMHG

## 2023-12-02 VITALS
DIASTOLIC BLOOD PRESSURE: 68 MMHG | RESPIRATION RATE: 20 BRPM | SYSTOLIC BLOOD PRESSURE: 120 MMHG | OXYGEN SATURATION: 98 % | HEART RATE: 76 BPM

## 2023-12-02 DIAGNOSIS — G25.81 RESTLESS LEGS SYNDROME: ICD-10-CM

## 2023-12-02 PROCEDURE — 1090000001 HH PPS REVENUE CREDIT

## 2023-12-02 PROCEDURE — G0151 HHCP-SERV OF PT,EA 15 MIN: HCPCS | Mod: HHH

## 2023-12-02 PROCEDURE — G0300 HHS/HOSPICE OF LPN EA 15 MIN: HCPCS | Mod: HHH

## 2023-12-02 PROCEDURE — 1090000002 HH PPS REVENUE DEBIT

## 2023-12-02 SDOH — ECONOMIC STABILITY: HOUSING INSECURITY: EVIDENCE OF SMOKING MATERIAL: 0

## 2023-12-02 SDOH — HEALTH STABILITY: MENTAL HEALTH: SMOKING IN HOME: 1

## 2023-12-02 SDOH — ECONOMIC STABILITY: GENERAL

## 2023-12-02 SDOH — HEALTH STABILITY: PHYSICAL HEALTH: EXERCISE TYPE: WRITTEN HEP

## 2023-12-02 ASSESSMENT — ENCOUNTER SYMPTOMS
SUBJECTIVE PAIN PROGRESSION: RESOLVED
PAIN: 1
APPETITE LEVEL: GOOD
PERSON REPORTING PAIN: PATIENT
STOOL FREQUENCY: DAILY
PAIN LOCATION - PAIN QUALITY: ACHING, SHARP
BOWEL PATTERN NORMAL: 1
PAIN SEVERITY GOAL: 0/10
PAIN: 1
PAIN SEVERITY GOAL: 0/10
PERSON REPORTING PAIN: PATIENT
PAIN LOCATION - PAIN FREQUENCY: CONSTANT
PAIN LOCATION - RELIEVING FACTORS: REST
PAIN LOCATION - PAIN DURATION: CHRONIC
PAIN LOCATION - EXACERBATING FACTORS: ACTIVITY
HIGHEST PAIN SEVERITY IN PAST 24 HOURS: 9/10
LOWEST PAIN SEVERITY IN PAST 24 HOURS: 2/10
HIGHEST PAIN SEVERITY IN PAST 24 HOURS: 3/10
PAIN LOCATION: GENERALIZED
LOWEST PAIN SEVERITY IN PAST 24 HOURS: 5/10
PAIN LOCATION - PAIN SEVERITY: 6/10
LAST BOWEL MOVEMENT: 66810
PAIN LOCATION: BACK
CHANGE IN APPETITE: UNCHANGED

## 2023-12-02 ASSESSMENT — ACTIVITIES OF DAILY LIVING (ADL)
MONEY MANAGEMENT (EXPENSES/BILLS): NEEDS ASSISTANCE
AMBULATION ASSISTANCE ON FLAT SURFACES: 1

## 2023-12-02 ASSESSMENT — PAIN SCALES - PAIN ASSESSMENT IN ADVANCED DEMENTIA (PAINAD)
BREATHING: 0
NEGVOCALIZATION: 0
FACIALEXPRESSION: 0
BODYLANGUAGE: 0 - RELAXED.
BODYLANGUAGE: 0
FACIALEXPRESSION: 0 - SMILING OR INEXPRESSIVE.
NEGVOCALIZATION: 0 - NONE.

## 2023-12-02 NOTE — CASE COMMUNICATION
PT Eval completed. Patient is a 79 y/o male who was recently rehospitalized due to respiratory failure. Patient's LUKE completed by Pavithra page this week. Patient's medical history includes, COPD, CHF, DM2, lower back pain. Patient lives in home with spouse with 3 steps to enter and exit home. Currently ambulating with RW and SBA, completing sit to stand transfer with SBA, reports occasional physical assistance from wife with in an d out of bed transfer. Patient with significantly decreased activity tolerance, ambulating approximately 20 feet is max, SP02 maintaining oxshoss21-83% throughout therapy visits, patient fatigues very quickly. Decreased LE strength and balance, TUG 43 seconds with RW. HEP provided, patient declined written copy, seated LAQ, marching, heel and toe raises 1 x 5 reps each completed. Patient education on purse lip breathing and deep breathi ng exercises. Patient currently utilizing 3L of O2. PT frequency starting today, 1w1, 2w3, 1w1. Patient agreeable to PT POC.

## 2023-12-03 PROCEDURE — 1090000002 HH PPS REVENUE DEBIT

## 2023-12-03 PROCEDURE — 1090000001 HH PPS REVENUE CREDIT

## 2023-12-04 ENCOUNTER — OFFICE VISIT (OUTPATIENT)
Dept: CARDIOLOGY | Facility: CLINIC | Age: 78
End: 2023-12-04
Payer: MEDICARE

## 2023-12-04 ENCOUNTER — HOSPITAL ENCOUNTER (OUTPATIENT)
Dept: CARDIOLOGY | Facility: CLINIC | Age: 78
Discharge: HOME | End: 2023-12-04
Payer: MEDICARE

## 2023-12-04 VITALS
HEART RATE: 80 BPM | SYSTOLIC BLOOD PRESSURE: 135 MMHG | WEIGHT: 152.2 LBS | DIASTOLIC BLOOD PRESSURE: 73 MMHG | OXYGEN SATURATION: 93 % | BODY MASS INDEX: 21.84 KG/M2

## 2023-12-04 DIAGNOSIS — E78.2 MIXED HYPERLIPIDEMIA: ICD-10-CM

## 2023-12-04 DIAGNOSIS — I25.5 ISCHEMIC CARDIOMYOPATHY: ICD-10-CM

## 2023-12-04 DIAGNOSIS — I10 PRIMARY HYPERTENSION: ICD-10-CM

## 2023-12-04 DIAGNOSIS — I49.5 SSS (SICK SINUS SYNDROME) (MULTI): ICD-10-CM

## 2023-12-04 DIAGNOSIS — I49.5 SICK SINUS SYNDROME (MULTI): ICD-10-CM

## 2023-12-04 DIAGNOSIS — Z98.61 S/P PTCA (PERCUTANEOUS TRANSLUMINAL CORONARY ANGIOPLASTY): Primary | ICD-10-CM

## 2023-12-04 DIAGNOSIS — Z45.02 IMPLANTABLE CARDIOVERTER-DEFIBRILLATOR (ICD) GENERATOR END OF LIFE: ICD-10-CM

## 2023-12-04 DIAGNOSIS — I50.9 ACC/AHA STAGE C CONGESTIVE HEART FAILURE (MULTI): ICD-10-CM

## 2023-12-04 DIAGNOSIS — I95.1 ORTHOSTATIC HYPOTENSION: ICD-10-CM

## 2023-12-04 DIAGNOSIS — I73.9 PVD (PERIPHERAL VASCULAR DISEASE) (CMS-HCC): ICD-10-CM

## 2023-12-04 PROCEDURE — 1160F RVW MEDS BY RX/DR IN RCRD: CPT | Performed by: NURSE PRACTITIONER

## 2023-12-04 PROCEDURE — 4004F PT TOBACCO SCREEN RCVD TLK: CPT | Performed by: NURSE PRACTITIONER

## 2023-12-04 PROCEDURE — 1090000001 HH PPS REVENUE CREDIT

## 2023-12-04 PROCEDURE — 1125F AMNT PAIN NOTED PAIN PRSNT: CPT | Performed by: NURSE PRACTITIONER

## 2023-12-04 PROCEDURE — 99214 OFFICE O/P EST MOD 30 MIN: CPT | Performed by: NURSE PRACTITIONER

## 2023-12-04 PROCEDURE — 1111F DSCHRG MED/CURRENT MED MERGE: CPT | Performed by: NURSE PRACTITIONER

## 2023-12-04 PROCEDURE — 3075F SYST BP GE 130 - 139MM HG: CPT | Performed by: NURSE PRACTITIONER

## 2023-12-04 PROCEDURE — 1159F MED LIST DOCD IN RCRD: CPT | Performed by: NURSE PRACTITIONER

## 2023-12-04 PROCEDURE — 1090000002 HH PPS REVENUE DEBIT

## 2023-12-04 PROCEDURE — 3078F DIAST BP <80 MM HG: CPT | Performed by: NURSE PRACTITIONER

## 2023-12-04 PROCEDURE — G0180 MD CERTIFICATION HHA PATIENT: HCPCS | Performed by: NURSE PRACTITIONER

## 2023-12-04 RX ORDER — ROPINIROLE 1 MG/1
1 TABLET, FILM COATED ORAL NIGHTLY
Qty: 90 TABLET | Refills: 0 | Status: SHIPPED | OUTPATIENT
Start: 2023-12-04 | End: 2024-03-01

## 2023-12-04 NOTE — PROGRESS NOTES
Primary Care Physician: Karley Sullivan, APRN-CNP, DNP  Primary Cardiologist:       Date of Visit: 12/04/2023 11:35 AM EST  Location of visit:  [facility]   Type of Visit: Follow up             Chief Complaint   Patient presents with    Hospital Follow-up       HPI / Summary:   Andrez Damon is a 78 y.o. male  with    who returns for routine follow up       He has been hospitalized several times in the past several months, for cardiac and non-cardiac reasons   Overall, he feels ok now despite some fatigue and lower extremity edema.  He has an ICD generator changed planned for 12/15/23    12 system review is negative except as noted above  Accompanied by his spouse who contributed to the history       Medical History:   Past Medical History:   Diagnosis Date    Arthritis     Body mass index (BMI) 20.0-20.9, adult 09/21/2021    Body mass index (BMI) of 20.0 to 20.9 in adult    Body mass index (BMI) 21.0-21.9, adult 04/14/2021    Body mass index (BMI) of 21.0 to 21.9 in adult    CHF (congestive heart failure) (CMS/HCC)     COPD (chronic obstructive pulmonary disease) (CMS/HCC)     Coronary artery disease     Diabetes mellitus (CMS/HCC)     Hypertension     Major depressive disorder, recurrent, mild (CMS/HCC) 11/11/2020    Mild episode of recurrent major depressive disorder    Major depressive disorder, recurrent, unspecified (CMS/HCC) 01/13/2021    Recurrent major depressive disorder, remission status unspecified    Other conditions influencing health status     Swelling Of Hands    Other specified anxiety disorders 05/19/2020    Depression with anxiety    Oxygen desaturation during sleep     wears 2L at HS    Personal history of nicotine dependence 07/12/2021    History of nicotine dependence    Personal history of other diseases of the circulatory system     History of cardiac disorder    Personal history of other diseases of the musculoskeletal system and connective tissue     History of arthritis    Personal  history of other diseases of urinary system 09/21/2021    History of chronic kidney disease    Personal history of other endocrine, nutritional and metabolic disease 08/29/2019    History of hyperglycemia    Pneumonia, unspecified organism 12/09/2019    Atypical pneumonia    Thoracic aortic aneurysm, without rupture, unspecified (CMS/HCC) 05/29/2019    Thoracic aortic aneurysm without rupture       Social History:   Tobacco Use: High Risk (11/25/2023)    Patient History     Smoking Tobacco Use: Every Day     Smokeless Tobacco Use: Never     Passive Exposure: Not on file         MEDICATIONS:   Current Outpatient Medications   Medication Instructions    amiodarone (PACERONE) 200 mg, oral, Daily    apixaban (Eliquis) 2.5 mg tablet 1 tablet, oral, 2 times daily    atorvastatin (LIPITOR) 80 mg, oral, Daily    benzonatate (TESSALON) 100 mg, oral, 3 times daily PRN, Do not crush or chew.    Brilinta 90 mg, oral, 2 times daily, AS DIRECTED    bumetanide (BUMEX) 0.5 mg, oral, Daily    cholecalciferol (VITAMIN D-3) 125 mcg, oral, Daily    escitalopram (LEXAPRO) 10 mg, oral, Daily    finasteride (Proscar) 5 mg tablet 1 tablet, oral, Daily    levalbuterol (Xopenex) 45 mcg/actuation inhaler INHALE 1 TO 2 PUFFS EVERY 4 TO 6 HOURS AS NEEDED AND AS DIRECTED.    metoprolol succinate XL (TOPROL-XL) 12.5 mg, oral, Daily, Do not crush or chew.    oxygen (O2) 2 L/min, inhalation, Continuous    polyethylene glycol (GLYCOLAX, MIRALAX) 17 g, oral, Daily    potassium chloride CR (K-Tab) 20 mEq ER tablet 1 tablet, oral, Daily    predniSONE (Deltasone) 10 mg tablet Take 5 tablets (50 mg) by mouth once daily for 2 days, THEN 4 tablets (40 mg) once daily for 2 days, THEN 3 tablets (30 mg) once daily for 2 days, THEN 2 tablets (20 mg) once daily for 2 days, THEN 1 tablet (10 mg) once daily for 2 days.    pyridoxine (VITAMIN B-6) 50 mg, oral, Daily    rOPINIRole (REQUIP) 1 mg, oral, Nightly    tamsulosin (FLOMAX) 0.8 mg, oral, Nightly     tiotropium-olodateroL (Stiolto Respimat) 2.5-2.5 mcg/actuation mist inhaler 2 puffs, inhalation, Daily         IMAGING REVIEWED:   Echocardiogram:   Echocardiogram     [facility], [address] [place] [address]  Tel [phone] and Fax [phone]    TRANSTHORACIC ECHOCARDIOGRAM REPORT      Patient Name:     ALEJANDRA FRANCIS    Reading Physician:  [id number] Flaco Briggs MD  Study Date:       9/18/2023          Referring           CHAMP GAMEZ  Physician:  MRN/PID:          [id number]           PCP:                Karley Sullivan CNP  Accession/Order#: [id number]          Department          [facility]  Location:  YOB: 1945          Fellow:  Gender:           M                  Nurse:  Admit Date:       9/14/2023          Sonographer:        Karyn Morales RVT,  RDMS, RDCS  Admission Status: Inpatient -        Additional Staff:  Routine  Height:           177.80 cm          CC Report to:       ICU [facility]  Weight:           62.14 kg           Study Type:         Echocardiogram  BSA:              1.78 m2  Blood Pressure: 102 /58 mmHg    Diagnosis/ICD: I48.91-Unspecified atrial fibrillation  Indication:    A Fib  Procedure/CPT: Echo Complete w Full [id number]    Patient History:  Smoker:            Current.  Diabetes:          Yes  Pertinent History: A-Fib, CHF, Cardiomyopathy, Chest Pain, COPD, Sepsis,  Hyperlipidemia, CAD and HTN. Ischemic Dilated Cardiomyopathy,  Elevated Troponin, Abnormal EKG, Bacteremia, Arrhythmia,  Tachycardia, HFrEF, STEMI, Old MI, Angioplasty, Left Heart  Cath.    Study Detail: The following Echo studies were performed: 2D, M-Mode, Doppler and  color flow. Technically challenging study due to poor acoustic  windows. The patient was awake.      PHYSICIAN INTERPRETATION:  Left Ventricle: The left ventricular systolic function is moderately decreased, with an estimated ejection fraction of 35-40%. There is global hypokinesis of  the left ventricle with minor regional variations. The left ventricular cavity size is moderately dilated. There is moderate to severe left ventricular hypertrophy. Spectral Doppler shows an impaired relaxation pattern of left ventricular diastolic filling.  Left Atrium: The left atrium is moderately dilated.  Right Ventricle: The right ventricle is normal in size. There is normal right ventricular global systolic function.  Right Atrium: The right atrium is normal in size.  Aortic Valve: The aortic valve was not well visualized. There is mild aortic valve cusp calcification. There is mild aortic valve regurgitation. The peak instantaneous gradient of the aortic valve is 11.9 mmHg.  Mitral Valve: The mitral valve is normal in structure. There is moderate mitral valve regurgitation.  Tricuspid Valve: The tricuspid valve is structurally normal. There is mild tricuspid regurgitation.  Pulmonic Valve: The pulmonic valve is not well visualized. There is physiologic pulmonic valve regurgitation.  Pericardium: There is no pericardial effusion noted.  Aorta: The aortic root was not well visualized.      CONCLUSIONS:  1. Left ventricular systolic function is moderately decreased with a 35-40% estimated ejection fraction.  2. Left ventricular cavity size is moderately dilated.  3. There is global hypokinesis of the left ventricle with minor regional variations.  4. Spectral Doppler shows an impaired relaxation pattern of left ventricular diastolic filling.  5. There is moderate to severe left ventricular hypertrophy.  6. The left atrium is moderately dilated.  7. Moderate mitral valve regurgitation.  8. Mild aortic valve regurgitation.       [id number] Flaco Briggs MD  Electronically signed on 9/18/2023 at 4:44:00 PM      Stress Testing:   NM CARDIAC STRESS REST (MYOCARDIAL PERFUSION MIBI) 12/02/2019    Narrative  MRN: [id number]  Patient Name: ALEJANDRA FRANCIS    STUDY:  CARDIAC STRESS/REST INJECTION; CARDIAC STRESS/REST  (MYOCARDIAL  PERFUSION/MIBI); 12/2/2019 12:56 pm    INDICATION:  chest pain.    COMPARISON:  MUGA study 02/15/2011, SPECT MPI 09/27/2010    ACCESSION NUMBER(S):  [id number]; [id number]    ORDERING CLINICIAN:  ALBERTINA CROSS    TECHNIQUE:  DIVISION OF NUCLEAR MEDICINE  PHARMACOLOGIC STRESS MYOCARDIAL PERFUSION SCAN, ONE DAY PROTOCOL    The patient received an intravenous dose of 10.0 mCi of Tc-99m  Myoview and resting emission tomographic (SPECT) images of the  myocardium were acquired. The patient then received an intravenous  infusion of 0.4mg regadenoson (Lexiscan)  followed by an additional  dose of 31.2 mCi of Tc-99m Myoview. Stress phase SPECT images of the  myocardium were then acquired. These included ECG-gated images to  assess and quantify ventricular function.    FINDINGS:  Both stress and rest studies demonstrate a large territory of severe  perfusion defect involving the distal anterior wall through the apex,  and extending to the inferior apex as well as to the adjoining  septum, without evidence of reversibility at rest. There is otherwise  grossly normal perfusion preserved throughout the remainder of the  left ventricle on rest and stress images.    There is severe LV dilation with an end-diastolic volume calculated  at 288 mL.    Gated images demonstrate distal anterior and apical dyskinesis with  diffuse LV hypokinesis and an LV EF estimated at 23%.    Impression  1. Large territory of completed transmural myocardial infarction  involving the distal anterior wall and apex, extending to the  inferior apex and adjoining septum, without evidence of associated  ischemia. This finding is new when compared to the prior 2010 study.  2. Otherwise grossly normal perfusion throughout the remainder of the  left ventricle without evidence of additional territory of ischemia  or infarction.  3. Severe LV dilation which is new when compared to the prior 2010  study.  4. Distal anterior dyskinesis  superimposed upon severe global LV  hypokinesis with an LV EF estimated at 24%. This is compared to a  mildly dilated left ventricle with an LVEF of 45% on the 2011 MUGA  and a normal left ventricle with an LVEF of 63% on the 2010 study.    LABS:  CBC with Differential:    Lab Results   Component Value Date    WBC 9.7 11/27/2023    RBC 3.06 (L) 11/27/2023    HGB 9.2 (L) 11/27/2023    HCT 29.7 (L) 11/27/2023     (L) 11/27/2023    MCV 97 11/27/2023    MCH 30.1 11/27/2023    MCHC 31.0 (L) 11/27/2023    RDW 17.3 (H) 11/27/2023    NRBC 0.0 11/27/2023    LYMPHOPCT 13.2 11/25/2023    MONOPCT 8.6 11/25/2023    EOSPCT 1.2 11/25/2023    BASOPCT 0.4 11/25/2023    MONOSABS 0.92 (H) 11/25/2023    LYMPHSABS 1.42 11/25/2023    EOSABS 0.13 11/25/2023    BASOSABS 0.04 11/25/2023     CMP:    Lab Results   Component Value Date     11/27/2023    K 3.7 11/27/2023     11/27/2023    CO2 27 11/27/2023    BUN 50 (H) 11/27/2023    CREATININE 1.77 (H) 11/27/2023    GLUCOSE 164 (H) 11/27/2023    PROT 5.0 (L) 11/14/2023    CALCIUM 8.3 (L) 11/27/2023    BILITOT 0.5 11/14/2023    ALKPHOS 51 11/14/2023    AST 25 11/14/2023    ALT 23 11/14/2023     BMP:    Lab Results   Component Value Date     11/27/2023    K 3.7 11/27/2023     11/27/2023    CO2 27 11/27/2023    BUN 50 (H) 11/27/2023    CREATININE 1.77 (H) 11/27/2023    CALCIUM 8.3 (L) 11/27/2023    GLUCOSE 164 (H) 11/27/2023     Magnesium:  Lab Results   Component Value Date    MG 2.05 11/25/2023     Troponin:    Lab Results   Component Value Date    TROPHS 54 (HH) 11/25/2023    TROPHS 62 (HH) 11/25/2023    TROPHS 20 11/12/2023     BNP:   Lab Results   Component Value Date    BNP 2,801 (H) 11/25/2023         Lipid Panel:  Lab Results   Component Value Date    HDL 79.1 07/13/2023    CHHDL 2.3 07/13/2023    VLDL 8 07/13/2023    TRIG 38 07/13/2023        Lab work and imaging results independently reviewed by me     Visit Vitals  Smoking Status Every Day         ECG  dated 11/25/2023 independently reviewed   NSR. LBBB       Constitutional:       Appearance: Cachectic and frail.   Eyes:      Conjunctiva/sclera: Conjunctivae normal.   Neck:      Vascular: JVD normal.   Pulmonary:      Effort: Pulmonary effort is normal.      Breath sounds: Normal breath sounds.   Cardiovascular:      PMI at left midclavicular line. Normal rate. Regular rhythm. Normal S1. Normal S2.       Murmurs: There is no murmur.      No rub.   Pulses:     Intact distal pulses.   Edema:     Peripheral edema absent.   Abdominal:      General: Bowel sounds are normal.   Musculoskeletal:      Cervical back: Neck supple. Skin:     General: Skin is warm and dry.   Neurological:      Mental Status: Alert and oriented to person, place and time.           Problem List Items Addressed This Visit             ICD-10-CM    Cardiomyopathy (CMS/[facility]) I42.9    Hyperlipemia E78.5    Orthostatic hypotension I95.1    Hypertension I10    PVD (peripheral vascular disease) (CMS/[facility]) I73.9    S/P PTCA (percutaneous transluminal coronary angioplasty) - Primary Z98.61    ACC/AHA stage C congestive heart failure (CMS/[facility]) I50.9    Sick sinus syndrome (CMS/[facility]) I49.5    Implantable cardioverter-defibrillator (ICD) generator end of life Z45.02       We discussed palliative care / hospice options and he is not ready to commit -- He has code status limitations in place   He would like to proceed with ICD generator replacement as scheduled   BP at baseline  Encouraged compression stockings   NYHA class III         12/04/23 at 11:39 AM - KENDRICK Chinchilla      Orders:  No orders of the defined types were placed in this encounter.        Followup Appts:  Future Appointments   Date Time Provider Department Center   12/5/2023 11:30 AM Cristina Masters PTA [facility]   12/5/2023  1:30 PM KENDRICK Ashton [id number]   12/5/2023 To Be Determined GABBY Ortiz [facility]   12/6/2023 To Be Determined Eric Luna  Malena, LPN [facility]   12/7/2023 To Be Determined Sonya Marissa Priceur, [facility]   12/7/2023 To Be Determined Cristina Masters, PTA [facility]   12/11/2023 To Be Determined Sonya Marissa Peña, [facility]   12/12/2023 To Be Determined Silvia Oliver, PT [facility]   12/13/2023 To Be Determined Eric Guy, LPN [facility]   12/13/2023 To Be Determined Sonya Marissa Priceur, [facility]   12/14/2023 To Be Determined Silvia Oliver, PT [facility]   12/18/2023 To Be Determined Sonya Marissa Priceur, [facility]   12/18/2023 To Be Determined Cristina Masters, [facility]   12/20/2023 To Be Determined Sonya Marissa Peña, [facility]   12/20/2023 To Be Determined Cristina Masters, [facility]   12/20/2023 To Be Determined Pavithra Baeza, RN [facility]   12/28/2023 11:00 AM INF 01 CONNEAUT [facility]   1/15/2024 11:00 AM Rosa Luong APRN-CNP [id number] [facility]   1/25/2024 11:00 AM INF 01 CONNEAUT [facility]   1/26/2024 11:20 AM Stone Piedra MD [id number] [facility]   2/20/2024  2:30 PM Tenisha Hurst APRN-CNP [id number] [facility]   3/14/2024 10:00 AM Karley Sullivan APRN-CNP, DNP [id number] [facility]   4/15/2024 10:20 AM Cindy Meraz APRN-CNP [id number] [facility]

## 2023-12-05 ENCOUNTER — HOME CARE VISIT (OUTPATIENT)
Dept: HOME HEALTH SERVICES | Facility: HOME HEALTH | Age: 78
End: 2023-12-05
Payer: MEDICARE

## 2023-12-05 ENCOUNTER — LAB (OUTPATIENT)
Dept: LAB | Facility: LAB | Age: 78
End: 2023-12-05
Payer: MEDICARE

## 2023-12-05 ENCOUNTER — OFFICE VISIT (OUTPATIENT)
Dept: PULMONOLOGY | Facility: CLINIC | Age: 78
End: 2023-12-05
Payer: MEDICARE

## 2023-12-05 VITALS
OXYGEN SATURATION: 98 % | SYSTOLIC BLOOD PRESSURE: 111 MMHG | BODY MASS INDEX: 21.95 KG/M2 | DIASTOLIC BLOOD PRESSURE: 67 MMHG | WEIGHT: 153 LBS | HEART RATE: 88 BPM

## 2023-12-05 VITALS
OXYGEN SATURATION: 92 % | DIASTOLIC BLOOD PRESSURE: 58 MMHG | HEART RATE: 76 BPM | SYSTOLIC BLOOD PRESSURE: 112 MMHG | TEMPERATURE: 96.7 F | RESPIRATION RATE: 18 BRPM

## 2023-12-05 DIAGNOSIS — F17.210 CIGARETTE NICOTINE DEPENDENCE WITHOUT COMPLICATION: ICD-10-CM

## 2023-12-05 DIAGNOSIS — J41.8 MIXED SIMPLE AND MUCOPURULENT CHRONIC BRONCHITIS (MULTI): ICD-10-CM

## 2023-12-05 DIAGNOSIS — J41.8 MIXED SIMPLE AND MUCOPURULENT CHRONIC BRONCHITIS (MULTI): Primary | ICD-10-CM

## 2023-12-05 DIAGNOSIS — R91.8 GROUND GLASS OPACITY PRESENT ON IMAGING OF LUNG: ICD-10-CM

## 2023-12-05 DIAGNOSIS — J96.21 ACUTE ON CHRONIC RESPIRATORY FAILURE WITH HYPOXIA (MULTI): ICD-10-CM

## 2023-12-05 DIAGNOSIS — G47.36 SLEEP RELATED HYPOVENTILATION IN CONDITIONS CLASSIFIED ELSEWHERE: ICD-10-CM

## 2023-12-05 DIAGNOSIS — G47.30 SLEEP APNEA IN ADULT: ICD-10-CM

## 2023-12-05 DIAGNOSIS — I26.99 PULMONARY EMBOLISM, UNSPECIFIED CHRONICITY, UNSPECIFIED PULMONARY EMBOLISM TYPE, UNSPECIFIED WHETHER ACUTE COR PULMONALE PRESENT (MULTI): ICD-10-CM

## 2023-12-05 DIAGNOSIS — I48.0 AF (PAROXYSMAL ATRIAL FIBRILLATION) (MULTI): ICD-10-CM

## 2023-12-05 LAB
BASOPHILS # BLD AUTO: 0.02 X10*3/UL (ref 0–0.1)
BASOPHILS NFR BLD AUTO: 0.2 %
EOSINOPHIL # BLD AUTO: 0.01 X10*3/UL (ref 0–0.4)
EOSINOPHIL NFR BLD AUTO: 0.1 %
ERYTHROCYTE [DISTWIDTH] IN BLOOD BY AUTOMATED COUNT: 17.4 % (ref 11.5–14.5)
HCT VFR BLD AUTO: 37.6 % (ref 41–52)
HGB BLD-MCNC: 11.7 G/DL (ref 13.5–17.5)
IMM GRANULOCYTES # BLD AUTO: 0.16 X10*3/UL (ref 0–0.5)
IMM GRANULOCYTES NFR BLD AUTO: 1.7 % (ref 0–0.9)
LYMPHOCYTES # BLD AUTO: 0.24 X10*3/UL (ref 0.8–3)
LYMPHOCYTES NFR BLD AUTO: 2.6 %
MCH RBC QN AUTO: 30.6 PG (ref 26–34)
MCHC RBC AUTO-ENTMCNC: 31.1 G/DL (ref 32–36)
MCV RBC AUTO: 98 FL (ref 80–100)
MONOCYTES # BLD AUTO: 0.26 X10*3/UL (ref 0.05–0.8)
MONOCYTES NFR BLD AUTO: 2.8 %
NEUTROPHILS # BLD AUTO: 8.67 X10*3/UL (ref 1.6–5.5)
NEUTROPHILS NFR BLD AUTO: 92.6 %
NRBC BLD-RTO: 0 /100 WBCS (ref 0–0)
PLATELET # BLD AUTO: 186 X10*3/UL (ref 150–450)
RBC # BLD AUTO: 3.82 X10*6/UL (ref 4.5–5.9)
WBC # BLD AUTO: 9.4 X10*3/UL (ref 4.4–11.3)

## 2023-12-05 PROCEDURE — 1111F DSCHRG MED/CURRENT MED MERGE: CPT | Performed by: NURSE PRACTITIONER

## 2023-12-05 PROCEDURE — 1159F MED LIST DOCD IN RCRD: CPT | Performed by: NURSE PRACTITIONER

## 2023-12-05 PROCEDURE — 1090000002 HH PPS REVENUE DEBIT

## 2023-12-05 PROCEDURE — 1160F RVW MEDS BY RX/DR IN RCRD: CPT | Performed by: NURSE PRACTITIONER

## 2023-12-05 PROCEDURE — G0158 HHC OT ASSISTANT EA 15: HCPCS | Mod: HHH

## 2023-12-05 PROCEDURE — 1090000001 HH PPS REVENUE CREDIT

## 2023-12-05 PROCEDURE — 3078F DIAST BP <80 MM HG: CPT | Performed by: NURSE PRACTITIONER

## 2023-12-05 PROCEDURE — 1125F AMNT PAIN NOTED PAIN PRSNT: CPT | Performed by: NURSE PRACTITIONER

## 2023-12-05 PROCEDURE — 36415 COLL VENOUS BLD VENIPUNCTURE: CPT

## 2023-12-05 PROCEDURE — G0157 HHC PT ASSISTANT EA 15: HCPCS | Mod: HHH

## 2023-12-05 PROCEDURE — 85025 COMPLETE CBC W/AUTO DIFF WBC: CPT

## 2023-12-05 PROCEDURE — 99215 OFFICE O/P EST HI 40 MIN: CPT | Performed by: NURSE PRACTITIONER

## 2023-12-05 PROCEDURE — 3074F SYST BP LT 130 MM HG: CPT | Performed by: NURSE PRACTITIONER

## 2023-12-05 PROCEDURE — 4004F PT TOBACCO SCREEN RCVD TLK: CPT | Performed by: NURSE PRACTITIONER

## 2023-12-05 RX ORDER — LEVALBUTEROL TARTRATE 45 UG/1
1 AEROSOL, METERED ORAL EVERY 4 HOURS PRN
Qty: 15 G | Refills: 11 | Status: ON HOLD | OUTPATIENT
Start: 2023-12-05 | End: 2024-01-01 | Stop reason: SDUPTHER

## 2023-12-05 RX ORDER — FLUTICASONE FUROATE, UMECLIDINIUM BROMIDE AND VILANTEROL TRIFENATATE 200; 62.5; 25 UG/1; UG/1; UG/1
1 POWDER RESPIRATORY (INHALATION)
Qty: 60 EACH | Refills: 11 | Status: ON HOLD | OUTPATIENT
Start: 2023-12-05 | End: 2024-01-01 | Stop reason: SDUPTHER

## 2023-12-05 SDOH — ECONOMIC STABILITY: HOUSING INSECURITY: EVIDENCE OF SMOKING MATERIAL: 0

## 2023-12-05 SDOH — HEALTH STABILITY: MENTAL HEALTH: SMOKING IN HOME: 0

## 2023-12-05 ASSESSMENT — ENCOUNTER SYMPTOMS
LOWEST PAIN SEVERITY IN PAST 24 HOURS: 4/10
PAIN LOCATION: BACK
PERSON REPORTING PAIN: PATIENT
COUGH: 1
SUBJECTIVE PAIN PROGRESSION: WAXING AND WANING
PAIN SEVERITY GOAL: 0/10
PAIN LOCATION - PAIN SEVERITY: 5/10
PAIN: 1
HIGHEST PAIN SEVERITY IN PAST 24 HOURS: 10/10
FATIGUE: 1
PAIN LOCATION - PAIN FREQUENCY: CONSTANT

## 2023-12-05 ASSESSMENT — PAIN SCALES - PAIN ASSESSMENT IN ADVANCED DEMENTIA (PAINAD)
CONSOLABILITY: 0
TOTALSCORE: 0
BODYLANGUAGE: 0 - RELAXED.
FACIALEXPRESSION: 0 - SMILING OR INEXPRESSIVE.
NEGVOCALIZATION: 0 - NONE.
FACIALEXPRESSION: 0
CONSOLABILITY: 0 - NO NEED TO CONSOLE.
NEGVOCALIZATION: 0
BREATHING: 0
BODYLANGUAGE: 0

## 2023-12-05 NOTE — PROGRESS NOTES
Subjective   Patient ID: Andrez Damon is a 78 y.o. male who presents for No chief complaint on file..  HPI  At baseline, he has dyspnea on exertion, but none at rest. His symptoms started many years ago, but has mostly been stable.  He currently sits for most of the day, works inside the house, but does not carry loads and do strenuous exercise.). He has to stop for breath when walking at her own pace on level ground at about 15 minutes (mMRC 2).. His CAT score is 19 He also denies orthopnea, pnd, or jasiel. His weight has been mostly stable. He also denies chronic cough, wheezing, and clear, green, blood streaks, but no sputum. No night cough. No hemoptysis. No fever or shivering chills. He has no runny nose, or a tingling sensation in the back of his throat. He denies chest pain or heartburn. [place] score (ESS) is 8.    01/13/2020: Since his last visit his breathing is stable. He is using Stiltto and has improved his breathing. His PFT shows an FEV1 of 81% predicted. He has some shortness of breath in the evening when he is getting ready for bed. He does complain of continual postnasal drip he had sinus surgery in 2000 and has tried various nasal sprays and medications but it never subsides.     07/13/2020: Patient is here today for follow-up. He has been doing well breathing wise. Is compliant with his Spiriva. He cannot use the albuterol for rescue it causes dizziness and shaking and tachycardia. Patient does have a significant cardiac history. Breathing is at baseline. No hospitalizations for respiratory. He did have a fall at home in the bathroom on couple months ago and hit his head and did not call the squad. He tells me he thinks his blood sugar was low. I encouraged him if this ever happens again to please call the squad therefore he will need assess during that time. He continues to complain of postnasal drip but this has been going on from is 20 years. He continues to smoke 5 cigarettes a  day.    01/12/2021: He is here today for follow-up. He has been doing well he has not had any ER visits he has remained healthy since his last office visit. Shortness of breath is similar to his last visit. He gets short of breath when he bends over or walks any distances. He has not been using his Spiriva he tells me that he prefers the nebulized medications. I will give him some samples today of Yupelri and call in a prescription he can use this once a day in his nebulizer and he can still use his albuterol. He continues to smoke 5 cigarettes a day. He has remained at home no exposure to Covid that he is aware of.    07/12/2021: He is here today for follow up. He has been in the hospital twice in the last month for UTI. He tells me today he feels better. He uses is Yupelri but not every day, he uses it every other day because of the expense. He uses his albuterol once or twice a week. During his hospitalization he was started on oxygen with sleep. He states he does feel better he no longer coughs during the night and is sleeping better. He also has cut back his smoking to just a couple cigarettes a day.    01/24/2022: He is being seen today for follow-up. He is doing well. No complaints of abnormal shortness of breath or wheezing. The only time he starts to feel short of breath is if he is bending over to do something. We talked about doing some deep breathing exercises. He does not need refills on anything at this time but will let me know when he does. He has remained relatively healthy. He continues follow-ups with heme-onc and cardiology. He has cut back on his smoking to 1 pack a week. He is compliant with 2 L of oxygen with sleep. He tells me he checks his oxygen throughout the day and it stays above 95.    07/25/2022 he is here today for follow-up. Breathing wise he has been doing well. Although he cannot afford the yellow powdery so he needs something else. I will give him samples today of Stiolto we will  try to find something that is covered. He has Xopenex for rescue. He has a history of heart failure and A. fib. He does use oxygen with sleep although sometimes he wakes up in an snot on. He continues to smoke 1 pack of cigarettes a week    01/30/23 he is here today for follow-up. He is doing okay. His biggest complaint today is knee pain. He tells me that he is seeing somebody and he is not a candidate for surgery because of his heart condition. His EF is only 20 to 25%. He does get short of breath. He likes the Stiolto he has been using it every day he uses his Xopenex at least once a day. He continues to use 2 L of oxygen with sleep. He does check his oxygen throughout the day and it stays around 94 to 98%. He smokes about 3 cigarettes a day.    07/31/23 he is here today for follow-up. He was recently hospitalized for CHF. He tells me they took off 4 L of fluid. He then was a little too dehydrated and ended up back in the ER the next day where he received a 500 L fluid bolus. He also states that his catheter was infected. He does have an appointment with urology in 2 weeks. He does not have a follow-up appointment with cardiology I encouraged him to make 1 today. He does use oxygen with sleep and as needed throughout the day. He seems very weak today he said that his heart rate is sporadic and goes from 40 all the way up into the 1 teens. He continues on Stiolto. I would like him to have a new breathing test. 10 minutes spent preparing patient's chart. 20 minutes spent with patient answering questions and determining care. 10 minutes spent charting and ordering tests and medications    08/29/23 he is here today for follow-up. He had a new PFT which is similar to his previous PFT. His FEV1 V1 declined slightly from 76% to 81%. Patient gets very short of breath doing any activity. He is complaining today of some difficulty with movement in his right leg he has an appointment with his PCP. He is following up with  cardiology regularly now. He does use his oxygen with sleep. He is down to 1 pack of cigarettes per week. 10 minutes spent preparing patient's chart. 20 minutes spent with patient answering questions and determining care. 10 minutes spent charting and ordering tests and medications     12/5/23 he is here today for follow-up.  I am very concerned about him.  He is very fatigued and short of breath today although he is oxygenating well.  I did go back and look at his recent blood work he is low for his H&H and platelets and RBCs.  He did have an iron transfusion while hospitalized it did bring his levels up but they have decreased since then.  I offered patient the ER he did not wish to go to the ER at this time.  He was agreeable to doing a CBC and then be calling him which most likely will result in me telling him to go to the ER.  He is on 2 blood thinners and his platelets are low.  He tells me he saw cardiology yesterday.  He has an appointment to have his pacemaker replaced on December 15.     Previous pulmonary history:   He has no history of recurrent infections, or lung disease as a child. He was previously told he has COPD . He currently is on no supplemental oxygen. He has never been to pulmonary rehab. Does not recall having AECOPD requiring antibiotics or prednisone.     Inhalers/nebulized medications: Incruse, Albuterol      Hospitalization History:  He has not been hospitalized over the last year for breathing related problem.     Sleep history:  Denies snoring, apneas, feeling tired during the day or taking naps during the day. Admits to feeling tired during the day.  /Complains of snoring, ? Apneas. Feels tired during the day. Does not take frequent naps. Does not feel tired during the day or take frequent naps.  STOP-BANG score of      Comorbidities:   CAD  HTN  PVD  CHF  DM  Depression  GERD    SH:  smoking: current smoker  drinking: none  illicit drug use: none     Occupation: (Full questionnaire  on exposures obtained, discussed with the patient and scanned to EMR)  No known exposure to asbestos, silica or beryllium     Family History:  No family history of lung diseases or cancer     Imaging history: (I have personally reviewed the imaging below)   11/30/19: Chest CT - RUL ground glass opacity concern for adenocarcinoma    PFTs:   01/06/2020: // -> FEV1/FVC ratio .61 /FEV1 81% (no BD response)/FVC 95% /DLCO 42% /TLC/RV to TLC ratio   .34     6 MWTs: None on record     Lung biopsy: None on record     Echo:   2019 -> 20-25% EF    Labs:  : Hb 15 , Eos <250, Bicarb 30, Creat 1.32     Review of Systems   Constitutional:  Positive for fatigue.   HENT:  Positive for congestion.    Respiratory:  Positive for cough.    All other systems reviewed and are negative.      Objective   Physical Exam  Vitals and nursing note reviewed.   Constitutional:       Appearance: Normal appearance. He is normal weight.   HENT:      Head: Normocephalic.      Nose: Nose normal.   Eyes:      Pupils: Pupils are equal, round, and reactive to light.   Pulmonary:      Effort: Pulmonary effort is normal.      Breath sounds: Normal breath sounds.   Neurological:      General: No focal deficit present.      Mental Status: He is alert and oriented to person, place, and time. Mental status is at baseline.   Psychiatric:         Mood and Affect: Mood normal.         Behavior: Behavior normal.         Thought Content: Thought content normal.         Assessment/Plan     # COPD with emphysema:   - PFT shows an FEV1 81%, moderate COPD  -At baseline with no active sign of exacerbation (increased dyspnea, cough, sputum or change in sputum characteristic)  -Counseled on the role of diet and exercise  -Pulmonary rehab referral made.  -Vaccinations: Yearly influenza vaccines, Pneumoccocal (both PCV13 and PPSV23 for all patients 65 y.o or above)  -Does not need oxygen at rest. Oxygen need evaluation with walking and sleep (nighttime oximetry)   - Uses  Spiriva regularly    - Change albuterol to Xopenex   -He has not been using his Spiriva, he prefers nebulized medications I will send in Yupelri i also gave him some samples in today he can use this in his nebulizer once a day to replace the Spiriva  01/30/23 he is now using Stiolto with good success  07/31/23 he continuos on Stiolto. He has been more short of breath but also was just recently hospitalized for CHF. Patient also has a history of anemia. I would like a new PFT  08/29/23 he is using Stiolto and albuterol as needed. New FEV1 is 76%  12/5/23 He is using stiolto but he may get better coverage with Trelegy, he is willing to try it. He continues to use xopenex.     #Sleep-related hypoxia 2/2 congestive heart failure and COPD   -During his most recent hospitalization he was started on 2 L of oxygen with sleep   - He now uses 2 L of oxygen with sleep and notes that he feels improvement since starting oxygen  12/5/23 he continues to use 02 home health wants him checked for sleep apnea, I did put in an order given his heart history and his fatigue     #Pulmonary emboli   -Patient had a acute right lower lobe pulmonary emboli   -He is on Eliquis   -Needs a repeat chest CT for PE - resolved     # HF   - EF 35%   - Having an ICD replaced on 12/15/23   - His RBC, H&H and PLTS were all low, he is sob and very fatigued. I wanted him to go the ED, he was agreeable to repeat a CBC but may still need to go the ED. He is also on 2 blood thinners. I am very concerned       # Smoking cessation:  -Counseled for 3 minutes.  -Patient is willing to cut back    - he is still smoking but only a pack a week   -No stop date schedule  -will readdress with each visit   12/5/23 still smoking about 4-5 cigarettes day     Mr. Damon it was a pleasure seeing you in the office today. We discussed the following:      -Congratulations on cutting back on your smoking   - Please stop your Stiolto and try the Trelegy   - You can continue your  Xopenex as needed   - Please continue to see cardiology   - I am putting an order in for blood work, please do this or go straight to the ED, as you are not feeling well today     Follow up in 4-5 months or sooner if needed

## 2023-12-05 NOTE — PATIENT INSTRUCTIONS
Mr. Damon it was a pleasure seeing you in the office today. We discussed the following:      -Congratulations on cutting back on your smoking   - Please stop your Stiolto and try the Trelegy   - You can continue your Xopenex as needed   - Please continue to see cardiology   - I am putting an order in for blood work, please do this or go straight to the ED, as you are not feeling well today     Follow up in 4-5 months or sooner if needed

## 2023-12-06 ENCOUNTER — HOME CARE VISIT (OUTPATIENT)
Dept: HOME HEALTH SERVICES | Facility: HOME HEALTH | Age: 78
End: 2023-12-06
Payer: MEDICARE

## 2023-12-06 ENCOUNTER — APPOINTMENT (OUTPATIENT)
Dept: HEMATOLOGY/ONCOLOGY | Facility: HOSPITAL | Age: 78
End: 2023-12-06
Payer: MEDICARE

## 2023-12-06 VITALS
SYSTOLIC BLOOD PRESSURE: 114 MMHG | RESPIRATION RATE: 18 BRPM | HEART RATE: 70 BPM | OXYGEN SATURATION: 97 % | DIASTOLIC BLOOD PRESSURE: 78 MMHG | TEMPERATURE: 97.7 F

## 2023-12-06 VITALS
TEMPERATURE: 96.5 F | DIASTOLIC BLOOD PRESSURE: 58 MMHG | OXYGEN SATURATION: 92 % | RESPIRATION RATE: 18 BRPM | HEART RATE: 76 BPM | SYSTOLIC BLOOD PRESSURE: 112 MMHG

## 2023-12-06 DIAGNOSIS — N18.30 DIABETES MELLITUS WITH STAGE 3 CHRONIC KIDNEY DISEASE (MULTI): ICD-10-CM

## 2023-12-06 DIAGNOSIS — I50.9 ACC/AHA STAGE C CONGESTIVE HEART FAILURE (MULTI): ICD-10-CM

## 2023-12-06 DIAGNOSIS — J42 CHRONIC BRONCHITIS, UNSPECIFIED CHRONIC BRONCHITIS TYPE (MULTI): Primary | ICD-10-CM

## 2023-12-06 DIAGNOSIS — E11.22 DIABETES MELLITUS WITH STAGE 3 CHRONIC KIDNEY DISEASE (MULTI): ICD-10-CM

## 2023-12-06 PROCEDURE — 1090000002 HH PPS REVENUE DEBIT

## 2023-12-06 PROCEDURE — 1090000001 HH PPS REVENUE CREDIT

## 2023-12-06 PROCEDURE — G0300 HHS/HOSPICE OF LPN EA 15 MIN: HCPCS | Mod: HHH

## 2023-12-06 SDOH — ECONOMIC STABILITY: HOUSING INSECURITY: EVIDENCE OF SMOKING MATERIAL: 0

## 2023-12-06 SDOH — HEALTH STABILITY: MENTAL HEALTH: SMOKING IN HOME: 1

## 2023-12-06 SDOH — HEALTH STABILITY: MENTAL HEALTH: SMOKING IN HOME: 0

## 2023-12-06 ASSESSMENT — ENCOUNTER SYMPTOMS
PAIN SEVERITY GOAL: 0/10
APPETITE LEVEL: GOOD
PAIN LOCATION - PAIN QUALITY: ACHY
PAIN LOCATION: BACK
LOWEST PAIN SEVERITY IN PAST 24 HOURS: 4/10
PERSON REPORTING PAIN: PATIENT
PAIN SEVERITY GOAL: 0/10
PAIN LOCATION - PAIN SEVERITY: 5/10
PAIN: 1
PAIN LOCATION: BACK
LOWEST PAIN SEVERITY IN PAST 24 HOURS: 4/10
PAIN LOCATION - EXACERBATING FACTORS: MOVEMENT
LAST BOWEL MOVEMENT: 66813
HIGHEST PAIN SEVERITY IN PAST 24 HOURS: 5/10
PAIN LOCATION - PAIN SEVERITY: 5/10
MUSCLE WEAKNESS: 1
PAIN LOCATION - PAIN FREQUENCY: CONSTANT
PAIN: 1
SUBJECTIVE PAIN PROGRESSION: UNCHANGED
HIGHEST PAIN SEVERITY IN PAST 24 HOURS: 10/10

## 2023-12-07 ENCOUNTER — HOME CARE VISIT (OUTPATIENT)
Dept: HOME HEALTH SERVICES | Facility: HOME HEALTH | Age: 78
End: 2023-12-07
Payer: MEDICARE

## 2023-12-07 VITALS
SYSTOLIC BLOOD PRESSURE: 138 MMHG | HEART RATE: 68 BPM | DIASTOLIC BLOOD PRESSURE: 64 MMHG | TEMPERATURE: 97.7 F | RESPIRATION RATE: 18 BRPM

## 2023-12-07 PROCEDURE — G0158 HHC OT ASSISTANT EA 15: HCPCS | Mod: HHH

## 2023-12-07 PROCEDURE — 1090000002 HH PPS REVENUE DEBIT

## 2023-12-07 PROCEDURE — 1090000001 HH PPS REVENUE CREDIT

## 2023-12-07 SDOH — ECONOMIC STABILITY: HOUSING INSECURITY: HOME SAFETY: NO CONCERNS

## 2023-12-07 ASSESSMENT — ENCOUNTER SYMPTOMS
PAIN LOCATION - RELIEVING FACTORS: REST
PAIN LOCATION: BACK
SUBJECTIVE PAIN PROGRESSION: UNCHANGED
PAIN SEVERITY GOAL: 0/10
LOWEST PAIN SEVERITY IN PAST 24 HOURS: 5/10
PAIN LOCATION - PAIN SEVERITY: 7/10
PAIN LOCATION - EXACERBATING FACTORS: PROLONGED STANDING
PAIN: 1
HIGHEST PAIN SEVERITY IN PAST 24 HOURS: 9/10
PAIN LOCATION - PAIN QUALITY: SHARP
PAIN LOCATION - PAIN FREQUENCY: FREQUENT

## 2023-12-07 ASSESSMENT — ACTIVITIES OF DAILY LIVING (ADL): AMBULATION_DISTANCE/DURATION_TOLERATED: 80 FT

## 2023-12-08 ENCOUNTER — TELEMEDICINE (OUTPATIENT)
Dept: PHARMACY | Facility: HOSPITAL | Age: 78
End: 2023-12-08
Payer: MEDICARE

## 2023-12-08 ENCOUNTER — HOME CARE VISIT (OUTPATIENT)
Dept: HOME HEALTH SERVICES | Facility: HOME HEALTH | Age: 78
End: 2023-12-08
Payer: MEDICARE

## 2023-12-08 VITALS
TEMPERATURE: 97.3 F | SYSTOLIC BLOOD PRESSURE: 110 MMHG | DIASTOLIC BLOOD PRESSURE: 60 MMHG | RESPIRATION RATE: 20 BRPM | OXYGEN SATURATION: 94 % | HEART RATE: 64 BPM

## 2023-12-08 DIAGNOSIS — N18.30 DIABETES MELLITUS WITH STAGE 3 CHRONIC KIDNEY DISEASE (MULTI): ICD-10-CM

## 2023-12-08 DIAGNOSIS — E11.22 DIABETES MELLITUS WITH STAGE 3 CHRONIC KIDNEY DISEASE (MULTI): ICD-10-CM

## 2023-12-08 DIAGNOSIS — I50.9 ACC/AHA STAGE C CONGESTIVE HEART FAILURE (MULTI): ICD-10-CM

## 2023-12-08 DIAGNOSIS — J42 CHRONIC BRONCHITIS, UNSPECIFIED CHRONIC BRONCHITIS TYPE (MULTI): ICD-10-CM

## 2023-12-08 PROCEDURE — 1090000002 HH PPS REVENUE DEBIT

## 2023-12-08 PROCEDURE — 1090000001 HH PPS REVENUE CREDIT

## 2023-12-08 PROCEDURE — G0157 HHC PT ASSISTANT EA 15: HCPCS | Mod: HHH

## 2023-12-08 SDOH — HEALTH STABILITY: PHYSICAL HEALTH
EXERCISE COMMENTS: REVIEWED HEP, O2 SAFETY, FIRE SAFETY. PATIENT AND WIFE PUT UP SIGNAGE IN BACK DOOR WINDOW, MOVED O2 TUBING AWAY FROM ELECTRIC HEATER FAU FIREPLACE.

## 2023-12-08 SDOH — HEALTH STABILITY: PHYSICAL HEALTH: EXERCISE TYPE: BLE EXERCISES PER HEP

## 2023-12-08 ASSESSMENT — ENCOUNTER SYMPTOMS
PAIN LOCATION: BACK
PAIN LOCATION - PAIN SEVERITY: 10/10
PERSON REPORTING PAIN: PATIENT
PAIN SEVERITY GOAL: 0/10
PAIN: 1
SUBJECTIVE PAIN PROGRESSION: UNCHANGED
HIGHEST PAIN SEVERITY IN PAST 24 HOURS: 10/10

## 2023-12-08 ASSESSMENT — PAIN SCALES - PAIN ASSESSMENT IN ADVANCED DEMENTIA (PAINAD)
BREATHING: 0
NEGVOCALIZATION: 0
NEGVOCALIZATION: 0 - NONE.

## 2023-12-08 NOTE — ASSESSMENT & PLAN NOTE
Continue Trelegy and Xopenex.  Patient recently saw pulmonology who switched patient from Stiolto to Trelegy and he feels some improvement already, after being on for 3 days so far.  Follow up: PRN

## 2023-12-08 NOTE — ASSESSMENT & PLAN NOTE
Patient has Stage C HF with most recent EF = 35-40.  Continue all medications as recommended by PCP and cardiologist.  Follow up: PRN

## 2023-12-08 NOTE — PROGRESS NOTES
Pharmacy Post-Discharge Visit  Andrez Damon is a 78 y.o. male was referred to Clinical Pharmacy Team to complete a post-discharge medication optimization and monitoring visit.  The patient was referred for their No chief complaint on file..    Admission Date: 11/25/23  Discharge Date: 11/27/23    Referring Provider: DEANNE Parmar-CNP, DNP    Subjective   Allergies   Allergen Reactions    Meloxicam Shortness of breath    Celecoxib Unknown       [id number] [place], [place] - [address] [address]  Phone: [phone] Fax: [phone]    [id number] - [place], [place] - [address] [facility]   [address] [facility] [address]  Phone: [phone] Fax: [phone]      Social History     Social History Narrative    Not on file        Notable Medication changes following discharge:  Start: prednisone titration  Stop: n/a  Change: n/a    HPI  COPD ASSESSMENT    - Recently switched to Trelegy from Stiolto at pul visit 12/5  - So far so good with Trelegy, a little improvement  - Follows regularly with pulmonologist  - Trelegy covered completely by insurance; no cost issue    Rescue Inhaler Use:  -How many times per week do you use your rescue inhaler? Couple times a day currently; understands goal is <2x/day    Inhaler Technique:  -How do you use your rescue inhaler?  --no issues    -How do you use your maintenance inhaler?  --no issues    Secondary Prevention (vaccines):  -Influenza: Date [10/18/23]  -PCV13: Date [9/3/18]  -PPSV23: Date [10/23/13]    Sx Management:  -Increased cough? yes  -Increased sputum production? yes; has PND  --Has tried everything  -Increased SOB? yes    Exacerbation Hx:  -When was your last hospitalization for an exacerbation? Had exacerbation during recent hospitalization  -When was the last time you were treated with antibiotics and/or steroids? Just treated with steroids after recent hospitalization      CHF Assessment    - Sees cardiologist regularly  - Previously on ACE; stopped in 2021 due to kidney complications    Symptom/Staging:  -Most recent ejection fraction: 35-40  -NYHA Stage: n/a  -ABCD Stage: C  -Weight changes?: No  -CHANO? Swelling with prednisone; bumex helping  -SOB? No; not out of the ordinary from typical COPD sx    Guideline-Directed Medical Therapy:  -ARNI: No   -If no, then ACEi/ARB?: No  -Beta Blocker: Yes, describe: Metoprolol XL 25 mg daily  -MRA: No  -SGLT2i: No    Secondary Prevention:  -The ASCVD Risk score (Aneesh AVILES, et al., 2019) failed to calculate for the following reasons:    The patient has a prior MI or stroke diagnosis   -Aspirin 81mg? no  -Statin?: Yes, describe: atorvastatin 80 mg daily  -HTN?: No     Diabetes  - Diet managed    Review of Systems    Medication System Management:  Affordability/Accessibility: No issues; insurance covers everything he takes with very minimal copays  Adherence/Organization: no issues  Adverse Effects: no issues    Objective     There were no vitals taken for this visit.     LAB  Lab Results   Component Value Date    BILITOT 0.5 11/14/2023    CALCIUM 8.3 (L) 11/27/2023    CO2 27 11/27/2023     11/27/2023    CREATININE 1.77 (H) 11/27/2023    GLUCOSE 164 (H) 11/27/2023    ALKPHOS 51 11/14/2023    K 3.7 11/27/2023    PROT 5.0 (L) 11/14/2023     11/27/2023    AST 25 11/14/2023    ALT 23 11/14/2023    BUN 50 (H) 11/27/2023    ANIONGAP 11 11/27/2023    MG 2.05 11/25/2023    PHOS 3.1 11/15/2023     01/26/2018    ALBUMIN 3.0 (L) 11/15/2023    LIPASE 11 10/23/2023    GFRF CANCELED 09/20/2023    GFRMALE 38 (A) 09/22/2023     Lab Results   Component Value Date    TRIG 38 07/13/2023    CHOL 184 07/13/2023    HDL 79.1 07/13/2023     Lab Results   Component Value Date    HGBA1C 5.7 (H) 11/14/2023         Current Outpatient Medications on File Prior to Visit   Medication Sig Dispense Refill    amiodarone (Pacerone) 200 mg tablet Take 1 tablet  (200 mg) by mouth once daily.      apixaban (Eliquis) 2.5 mg tablet Take 1 tablet (2.5 mg) by mouth 2 times a day.      atorvastatin (Lipitor) 80 mg tablet TAKE 1 TABLET BY MOUTH EVERY DAY 90 tablet 0    Brilinta 90 mg tablet TAKE 1 TABLET BY MOUTH TWICE A DAY AS DIRECTED 60 tablet 5    bumetanide (Bumex) 0.5 mg tablet TAKE 1 TABLET BY MOUTH ONCE DAILY. 90 tablet 2    cholecalciferol (Vitamin D-3) 125 MCG (5000 UT) capsule TAKE 1 CAPSULE BY MOUTH ONCE DAILY 90 capsule 0    escitalopram (Lexapro) 10 mg tablet TAKE 1 TABLET BY MOUTH EVERY DAY 90 tablet 0    finasteride (Proscar) 5 mg tablet Take 1 tablet (5 mg) by mouth once daily.      fluticasone-umeclidin-vilanter (Trelegy Ellipta) 200-62.5-25 mcg blister with device Inhale 1 puff once daily with a meal. Inhale 1 puff daily, rinse mouth after use 60 each 11    levalbuterol (Xopenex) 45 mcg/actuation inhaler Inhale 1 puff every 4 hours if needed for wheezing or shortness of breath. INHALE 1 TO 2 PUFFS EVERY 4 TO 6 HOURS AS NEEDED AND AS DIRECTED. 15 g 11    metoprolol succinate XL (Toprol-XL) 25 mg 24 hr tablet Take 0.5 tablets (12.5 mg) by mouth once daily. Do not crush or chew.      oxygen (O2) gas therapy Inhale 2 L/min continuously.      polyethylene glycol (Glycolax, Miralax) 17 gram packet Take 17 g by mouth once daily. Do not start before November 16, 2023.      potassium chloride CR (K-Tab) 20 mEq ER tablet Take 1 tablet (20 mEq) by mouth once daily.      pyridoxine (Vitamin B-6) 50 mg tablet TAKE 1 TABLET BY MOUTH EVERY DAY 90 tablet 0    rOPINIRole (Requip) 1 mg tablet TAKE 1 TABLET BY MOUTH EVERY DAY AT BEDTIME 90 tablet 0    tamsulosin (Flomax) 0.4 mg 24 hr capsule Take 2 capsules (0.8 mg) by mouth once daily at bedtime. 180 capsule 0    [DISCONTINUED] benzonatate (Tessalon) 100 mg capsule Take 1 capsule (100 mg) by mouth 3 times a day as needed for cough. Do not crush or chew. 20 capsule 0    [DISCONTINUED] levalbuterol (Xopenex) 45 mcg/actuation  inhaler INHALE 1 TO 2 PUFFS EVERY 4 TO 6 HOURS AS NEEDED AND AS DIRECTED.      [DISCONTINUED] predniSONE (Deltasone) 10 mg tablet Take 5 tablets (50 mg) by mouth once daily for 2 days, THEN 4 tablets (40 mg) once daily for 2 days, THEN 3 tablets (30 mg) once daily for 2 days, THEN 2 tablets (20 mg) once daily for 2 days, THEN 1 tablet (10 mg) once daily for 2 days. 30 tablet 0    [DISCONTINUED] rOPINIRole (Requip) 1 mg tablet TAKE 1 TABLET (1 MG) BY MOUTH ONCE DAILY AT BEDTIME. 90 tablet 0    [DISCONTINUED] tiotropium-olodateroL (Stiolto Respimat) 2.5-2.5 mcg/actuation mist inhaler Inhale 2 Inhalations once daily.       No current facility-administered medications on file prior to visit.      HISTORICAL PHARMACOTHERAPY  -Lisinopril    DRUG INTERATIONS  - none    Assessment/Plan   Problem List Items Addressed This Visit       COPD (chronic obstructive pulmonary disease) (CMS/[facility])     Continue Trelegy and Xopenex.  Patient recently saw pulmonology who switched patient from Stiolto to Trelegy and he feels some improvement already, after being on for 3 days so far.  Follow up: PRN         Diabetes mellitus with stage 3 chronic kidney disease (CMS/[facility])     Continue controlling with diet and exercise.         ACC/AHA stage C congestive heart failure (CMS/[facility])     Patient has Stage C HF with most recent EF = 35-40.  Continue all medications as recommended by PCP and cardiologist.  Follow up: PRN          Continue all meds under the continuation of care with the referring provider and clinical pharmacy team.    Vero Cronin, PharmD     Verbal consent to manage patient's drug therapy was obtained from [the patient and/or an individual authorized to act on behalf of a patient]. They were informed they may decline to participate or withdraw from participation in pharmacy services at any time.

## 2023-12-09 PROCEDURE — 1090000002 HH PPS REVENUE DEBIT

## 2023-12-09 PROCEDURE — 1090000001 HH PPS REVENUE CREDIT

## 2023-12-10 DIAGNOSIS — F32.5 DEPRESSION, MAJOR, IN REMISSION (CMS-HCC): ICD-10-CM

## 2023-12-10 PROCEDURE — 1090000002 HH PPS REVENUE DEBIT

## 2023-12-10 PROCEDURE — 1090000001 HH PPS REVENUE CREDIT

## 2023-12-10 SDOH — HEALTH STABILITY: MENTAL HEALTH: SMOKING IN HOME: 0

## 2023-12-10 SDOH — ECONOMIC STABILITY: HOUSING INSECURITY: EVIDENCE OF SMOKING MATERIAL: 0

## 2023-12-11 ENCOUNTER — HOSPITAL ENCOUNTER (OUTPATIENT)
Dept: RESPIRATORY THERAPY | Facility: HOSPITAL | Age: 78
Discharge: HOME | End: 2023-12-11
Payer: MEDICARE

## 2023-12-11 ENCOUNTER — APPOINTMENT (OUTPATIENT)
Dept: PRIMARY CARE | Facility: CLINIC | Age: 78
End: 2023-12-11
Payer: MEDICARE

## 2023-12-11 DIAGNOSIS — J41.8 MIXED SIMPLE AND MUCOPURULENT CHRONIC BRONCHITIS (MULTI): ICD-10-CM

## 2023-12-11 PROCEDURE — 1090000002 HH PPS REVENUE DEBIT

## 2023-12-11 PROCEDURE — 1090000001 HH PPS REVENUE CREDIT

## 2023-12-11 PROCEDURE — 94618 PULMONARY STRESS TESTING: CPT | Performed by: PEDIATRICS

## 2023-12-11 PROCEDURE — 94618 PULMONARY STRESS TESTING: CPT

## 2023-12-11 RX ORDER — ESCITALOPRAM OXALATE 10 MG/1
10 TABLET ORAL DAILY
Qty: 90 TABLET | Refills: 0 | Status: SHIPPED | OUTPATIENT
Start: 2023-12-11 | End: 2024-01-04 | Stop reason: SDUPTHER

## 2023-12-12 ENCOUNTER — HOME CARE VISIT (OUTPATIENT)
Dept: HOME HEALTH SERVICES | Facility: HOME HEALTH | Age: 78
End: 2023-12-12
Payer: MEDICARE

## 2023-12-12 ENCOUNTER — APPOINTMENT (OUTPATIENT)
Dept: RADIOLOGY | Facility: HOSPITAL | Age: 78
End: 2023-12-12
Payer: MEDICARE

## 2023-12-12 ENCOUNTER — HOSPITAL ENCOUNTER (EMERGENCY)
Facility: HOSPITAL | Age: 78
Discharge: HOME | End: 2023-12-12
Attending: STUDENT IN AN ORGANIZED HEALTH CARE EDUCATION/TRAINING PROGRAM
Payer: MEDICARE

## 2023-12-12 ENCOUNTER — APPOINTMENT (OUTPATIENT)
Dept: CARDIOLOGY | Facility: HOSPITAL | Age: 78
End: 2023-12-12
Payer: MEDICARE

## 2023-12-12 VITALS
SYSTOLIC BLOOD PRESSURE: 138 MMHG | DIASTOLIC BLOOD PRESSURE: 62 MMHG | HEART RATE: 71 BPM | OXYGEN SATURATION: 97 % | RESPIRATION RATE: 22 BRPM | TEMPERATURE: 97.2 F

## 2023-12-12 VITALS
BODY MASS INDEX: 21.15 KG/M2 | SYSTOLIC BLOOD PRESSURE: 141 MMHG | RESPIRATION RATE: 16 BRPM | TEMPERATURE: 97.8 F | DIASTOLIC BLOOD PRESSURE: 84 MMHG | HEIGHT: 70 IN | WEIGHT: 147.71 LBS | HEART RATE: 66 BPM | OXYGEN SATURATION: 98 %

## 2023-12-12 DIAGNOSIS — R07.89 CHEST WALL PAIN: Primary | ICD-10-CM

## 2023-12-12 LAB
ALBUMIN SERPL BCP-MCNC: 3.5 G/DL (ref 3.4–5)
ALP SERPL-CCNC: 67 U/L (ref 33–136)
ALT SERPL W P-5'-P-CCNC: 29 U/L (ref 10–52)
ANION GAP SERPL CALC-SCNC: 9 MMOL/L (ref 10–20)
APPEARANCE UR: CLEAR
AST SERPL W P-5'-P-CCNC: 20 U/L (ref 9–39)
BASOPHILS # BLD AUTO: 0.03 X10*3/UL (ref 0–0.1)
BASOPHILS NFR BLD AUTO: 0.3 %
BILIRUB SERPL-MCNC: 0.7 MG/DL (ref 0–1.2)
BILIRUB UR STRIP.AUTO-MCNC: NEGATIVE MG/DL
BNP SERPL-MCNC: 1318 PG/ML (ref 0–99)
BUN SERPL-MCNC: 23 MG/DL (ref 6–23)
CALCIUM SERPL-MCNC: 9.1 MG/DL (ref 8.6–10.3)
CARDIAC TROPONIN I PNL SERPL HS: 40 NG/L (ref 0–20)
CARDIAC TROPONIN I PNL SERPL HS: 45 NG/L (ref 0–20)
CHLORIDE SERPL-SCNC: 106 MMOL/L (ref 98–107)
CO2 SERPL-SCNC: 31 MMOL/L (ref 21–32)
COLOR UR: YELLOW
CREAT SERPL-MCNC: 1.45 MG/DL (ref 0.5–1.3)
EOSINOPHIL # BLD AUTO: 0.1 X10*3/UL (ref 0–0.4)
EOSINOPHIL NFR BLD AUTO: 1 %
ERYTHROCYTE [DISTWIDTH] IN BLOOD BY AUTOMATED COUNT: 17.5 % (ref 11.5–14.5)
FLUAV RNA RESP QL NAA+PROBE: NOT DETECTED
FLUBV RNA RESP QL NAA+PROBE: NOT DETECTED
GFR SERPL CREATININE-BSD FRML MDRD: 49 ML/MIN/1.73M*2
GLUCOSE SERPL-MCNC: 128 MG/DL (ref 74–99)
GLUCOSE UR STRIP.AUTO-MCNC: ABNORMAL MG/DL
HCT VFR BLD AUTO: 38.4 % (ref 41–52)
HGB BLD-MCNC: 12.1 G/DL (ref 13.5–17.5)
HOLD SPECIMEN: NORMAL
HOLD SPECIMEN: NORMAL
IMM GRANULOCYTES # BLD AUTO: 0.05 X10*3/UL (ref 0–0.5)
IMM GRANULOCYTES NFR BLD AUTO: 0.5 % (ref 0–0.9)
INR PPP: 1.1 (ref 0.9–1.1)
KETONES UR STRIP.AUTO-MCNC: NEGATIVE MG/DL
LACTATE SERPL-SCNC: 1.6 MMOL/L (ref 0.4–2)
LEUKOCYTE ESTERASE UR QL STRIP.AUTO: NEGATIVE
LIPASE SERPL-CCNC: 8 U/L (ref 9–82)
LYMPHOCYTES # BLD AUTO: 0.68 X10*3/UL (ref 0.8–3)
LYMPHOCYTES NFR BLD AUTO: 6.9 %
MAGNESIUM SERPL-MCNC: 2.17 MG/DL (ref 1.6–2.4)
MCH RBC QN AUTO: 30.6 PG (ref 26–34)
MCHC RBC AUTO-ENTMCNC: 31.5 G/DL (ref 32–36)
MCV RBC AUTO: 97 FL (ref 80–100)
MONOCYTES # BLD AUTO: 0.58 X10*3/UL (ref 0.05–0.8)
MONOCYTES NFR BLD AUTO: 5.9 %
NEUTROPHILS # BLD AUTO: 8.43 X10*3/UL (ref 1.6–5.5)
NEUTROPHILS NFR BLD AUTO: 85.4 %
NITRITE UR QL STRIP.AUTO: NEGATIVE
NRBC BLD-RTO: 0 /100 WBCS (ref 0–0)
PH UR STRIP.AUTO: 6 [PH]
PHOSPHATE SERPL-MCNC: 2.4 MG/DL (ref 2.5–4.9)
PLATELET # BLD AUTO: 157 X10*3/UL (ref 150–450)
POTASSIUM SERPL-SCNC: 4.2 MMOL/L (ref 3.5–5.3)
PROT SERPL-MCNC: 5.9 G/DL (ref 6.4–8.2)
PROT UR STRIP.AUTO-MCNC: ABNORMAL MG/DL
PROTHROMBIN TIME: 12.1 SECONDS (ref 9.8–12.8)
RBC # BLD AUTO: 3.95 X10*6/UL (ref 4.5–5.9)
RBC # UR STRIP.AUTO: NEGATIVE /UL
RBC #/AREA URNS AUTO: NORMAL /HPF
SARS-COV-2 RNA RESP QL NAA+PROBE: NOT DETECTED
SODIUM SERPL-SCNC: 142 MMOL/L (ref 136–145)
SP GR UR STRIP.AUTO: 1.05
SQUAMOUS #/AREA URNS AUTO: NORMAL /HPF
UROBILINOGEN UR STRIP.AUTO-MCNC: 2 MG/DL
WBC # BLD AUTO: 9.9 X10*3/UL (ref 4.4–11.3)
WBC #/AREA URNS AUTO: NORMAL /HPF

## 2023-12-12 PROCEDURE — 2500000005 HC RX 250 GENERAL PHARMACY W/O HCPCS: Performed by: STUDENT IN AN ORGANIZED HEALTH CARE EDUCATION/TRAINING PROGRAM

## 2023-12-12 PROCEDURE — 71275 CT ANGIOGRAPHY CHEST: CPT

## 2023-12-12 PROCEDURE — 96374 THER/PROPH/DIAG INJ IV PUSH: CPT

## 2023-12-12 PROCEDURE — 1090000001 HH PPS REVENUE CREDIT

## 2023-12-12 PROCEDURE — 74177 CT ABD & PELVIS W/CONTRAST: CPT | Performed by: RADIOLOGY

## 2023-12-12 PROCEDURE — 83880 ASSAY OF NATRIURETIC PEPTIDE: CPT | Performed by: STUDENT IN AN ORGANIZED HEALTH CARE EDUCATION/TRAINING PROGRAM

## 2023-12-12 PROCEDURE — 71275 CT ANGIOGRAPHY CHEST: CPT | Performed by: RADIOLOGY

## 2023-12-12 PROCEDURE — 1090000002 HH PPS REVENUE DEBIT

## 2023-12-12 PROCEDURE — 83690 ASSAY OF LIPASE: CPT | Performed by: STUDENT IN AN ORGANIZED HEALTH CARE EDUCATION/TRAINING PROGRAM

## 2023-12-12 PROCEDURE — 83735 ASSAY OF MAGNESIUM: CPT | Performed by: STUDENT IN AN ORGANIZED HEALTH CARE EDUCATION/TRAINING PROGRAM

## 2023-12-12 PROCEDURE — 84484 ASSAY OF TROPONIN QUANT: CPT | Performed by: STUDENT IN AN ORGANIZED HEALTH CARE EDUCATION/TRAINING PROGRAM

## 2023-12-12 PROCEDURE — 2550000001 HC RX 255 CONTRASTS: Performed by: STUDENT IN AN ORGANIZED HEALTH CARE EDUCATION/TRAINING PROGRAM

## 2023-12-12 PROCEDURE — 87636 SARSCOV2 & INF A&B AMP PRB: CPT | Performed by: STUDENT IN AN ORGANIZED HEALTH CARE EDUCATION/TRAINING PROGRAM

## 2023-12-12 PROCEDURE — 36415 COLL VENOUS BLD VENIPUNCTURE: CPT | Performed by: STUDENT IN AN ORGANIZED HEALTH CARE EDUCATION/TRAINING PROGRAM

## 2023-12-12 PROCEDURE — 84075 ASSAY ALKALINE PHOSPHATASE: CPT | Performed by: STUDENT IN AN ORGANIZED HEALTH CARE EDUCATION/TRAINING PROGRAM

## 2023-12-12 PROCEDURE — 84100 ASSAY OF PHOSPHORUS: CPT | Performed by: STUDENT IN AN ORGANIZED HEALTH CARE EDUCATION/TRAINING PROGRAM

## 2023-12-12 PROCEDURE — 85610 PROTHROMBIN TIME: CPT | Performed by: STUDENT IN AN ORGANIZED HEALTH CARE EDUCATION/TRAINING PROGRAM

## 2023-12-12 PROCEDURE — 83605 ASSAY OF LACTIC ACID: CPT | Performed by: STUDENT IN AN ORGANIZED HEALTH CARE EDUCATION/TRAINING PROGRAM

## 2023-12-12 PROCEDURE — 99285 EMERGENCY DEPT VISIT HI MDM: CPT | Performed by: STUDENT IN AN ORGANIZED HEALTH CARE EDUCATION/TRAINING PROGRAM

## 2023-12-12 PROCEDURE — G0157 HHC PT ASSISTANT EA 15: HCPCS | Mod: HHH

## 2023-12-12 PROCEDURE — 2500000004 HC RX 250 GENERAL PHARMACY W/ HCPCS (ALT 636 FOR OP/ED): Performed by: STUDENT IN AN ORGANIZED HEALTH CARE EDUCATION/TRAINING PROGRAM

## 2023-12-12 PROCEDURE — 85025 COMPLETE CBC W/AUTO DIFF WBC: CPT | Performed by: STUDENT IN AN ORGANIZED HEALTH CARE EDUCATION/TRAINING PROGRAM

## 2023-12-12 PROCEDURE — 93005 ELECTROCARDIOGRAM TRACING: CPT

## 2023-12-12 PROCEDURE — 81003 URINALYSIS AUTO W/O SCOPE: CPT | Performed by: STUDENT IN AN ORGANIZED HEALTH CARE EDUCATION/TRAINING PROGRAM

## 2023-12-12 PROCEDURE — 74177 CT ABD & PELVIS W/CONTRAST: CPT

## 2023-12-12 RX ORDER — LIDOCAINE 560 MG/1
1 PATCH PERCUTANEOUS; TOPICAL; TRANSDERMAL DAILY
Status: DISCONTINUED | OUTPATIENT
Start: 2023-12-12 | End: 2023-12-12 | Stop reason: HOSPADM

## 2023-12-12 RX ORDER — LIDOCAINE 560 MG/1
1 PATCH PERCUTANEOUS; TOPICAL; TRANSDERMAL DAILY
Qty: 5 PATCH | Refills: 0 | Status: SHIPPED | OUTPATIENT
Start: 2023-12-12 | End: 2023-12-17

## 2023-12-12 RX ORDER — ACETAMINOPHEN 325 MG/1
650 TABLET ORAL ONCE
Status: COMPLETED | OUTPATIENT
Start: 2023-12-12 | End: 2023-12-12

## 2023-12-12 RX ADMIN — ACETAMINOPHEN 650 MG: 325 TABLET ORAL at 16:41

## 2023-12-12 RX ADMIN — HYDROMORPHONE HYDROCHLORIDE 0.5 MG: 1 INJECTION, SOLUTION INTRAMUSCULAR; INTRAVENOUS; SUBCUTANEOUS at 12:36

## 2023-12-12 RX ADMIN — LIDOCAINE 1 PATCH: 4 PATCH TOPICAL at 16:41

## 2023-12-12 RX ADMIN — IOHEXOL 75 ML: 350 INJECTION, SOLUTION INTRAVENOUS at 13:23

## 2023-12-12 SDOH — ECONOMIC STABILITY: HOUSING INSECURITY: EVIDENCE OF SMOKING MATERIAL: 0

## 2023-12-12 SDOH — HEALTH STABILITY: MENTAL HEALTH: SMOKING IN HOME: 0

## 2023-12-12 ASSESSMENT — COLUMBIA-SUICIDE SEVERITY RATING SCALE - C-SSRS
2. HAVE YOU ACTUALLY HAD ANY THOUGHTS OF KILLING YOURSELF?: NO
2. HAVE YOU ACTUALLY HAD ANY THOUGHTS OF KILLING YOURSELF?: NO
1. IN THE PAST MONTH, HAVE YOU WISHED YOU WERE DEAD OR WISHED YOU COULD GO TO SLEEP AND NOT WAKE UP?: NO
6. HAVE YOU EVER DONE ANYTHING, STARTED TO DO ANYTHING, OR PREPARED TO DO ANYTHING TO END YOUR LIFE?: NO
1. IN THE PAST MONTH, HAVE YOU WISHED YOU WERE DEAD OR WISHED YOU COULD GO TO SLEEP AND NOT WAKE UP?: NO

## 2023-12-12 ASSESSMENT — ENCOUNTER SYMPTOMS
PAIN LOCATION - PAIN FREQUENCY: INTERMITTENT
PAIN LOCATION - PAIN QUALITY: SHARP, RADIATING
PAIN LOCATION - PAIN SEVERITY: 10/10
SUBJECTIVE PAIN PROGRESSION: RAPIDLY WORSENING
PAIN LOCATION - PAIN DURATION: INTERMITTENT
LOWEST PAIN SEVERITY IN PAST 24 HOURS: 3/10
PAIN: 1
PAIN SEVERITY GOAL: 0/10
HIGHEST PAIN SEVERITY IN PAST 24 HOURS: 10/10
PAIN LOCATION: CHEST
PERSON REPORTING PAIN: DIRECT OBSERVATION

## 2023-12-12 ASSESSMENT — PAIN SCALES - PAIN ASSESSMENT IN ADVANCED DEMENTIA (PAINAD)
CONSOLABILITY: 2
NEGVOCALIZATION: 2
BODYLANGUAGE: 2
BODYLANGUAGE: 2 - RIGID. FISTS CLENCHED. KNEES PULLED UP. PULLING OR PUSHING AWAY. STRIKING OUT.
FACIALEXPRESSION: 2 - FACIAL GRIMACING.
NEGVOCALIZATION: 2 - REPEATED TROUBLE CALLING OUT. LOUD MOANING OR GROANING. CRYING.
CONSOLABILITY: 2 - UNABLE TO CONSOLE, DISTRACT OR REASSURE.
BREATHING: 2
TOTALSCORE: 10
FACIALEXPRESSION: 2

## 2023-12-12 ASSESSMENT — PAIN SCALES - GENERAL: PAINLEVEL_OUTOF10: 8

## 2023-12-12 ASSESSMENT — PAIN - FUNCTIONAL ASSESSMENT: PAIN_FUNCTIONAL_ASSESSMENT: 0-10

## 2023-12-13 PROCEDURE — 1090000002 HH PPS REVENUE DEBIT

## 2023-12-13 PROCEDURE — 1090000001 HH PPS REVENUE CREDIT

## 2023-12-14 ENCOUNTER — APPOINTMENT (OUTPATIENT)
Dept: HEMATOLOGY/ONCOLOGY | Facility: HOSPITAL | Age: 78
End: 2023-12-14
Payer: MEDICARE

## 2023-12-14 ENCOUNTER — HOME CARE VISIT (OUTPATIENT)
Dept: HOME HEALTH SERVICES | Facility: HOME HEALTH | Age: 78
End: 2023-12-14
Payer: MEDICARE

## 2023-12-14 ENCOUNTER — TELEPHONE (OUTPATIENT)
Dept: HEMATOLOGY/ONCOLOGY | Facility: HOSPITAL | Age: 78
End: 2023-12-14
Payer: MEDICARE

## 2023-12-14 ENCOUNTER — DOCUMENTATION (OUTPATIENT)
Dept: HEMATOLOGY/ONCOLOGY | Facility: HOSPITAL | Age: 78
End: 2023-12-14
Payer: MEDICARE

## 2023-12-14 VITALS
RESPIRATION RATE: 18 BRPM | DIASTOLIC BLOOD PRESSURE: 80 MMHG | HEART RATE: 78 BPM | TEMPERATURE: 97.4 F | OXYGEN SATURATION: 98 % | SYSTOLIC BLOOD PRESSURE: 132 MMHG

## 2023-12-14 VITALS — DIASTOLIC BLOOD PRESSURE: 68 MMHG | OXYGEN SATURATION: 98 % | HEART RATE: 72 BPM | SYSTOLIC BLOOD PRESSURE: 124 MMHG

## 2023-12-14 PROCEDURE — G0300 HHS/HOSPICE OF LPN EA 15 MIN: HCPCS | Mod: HHH

## 2023-12-14 PROCEDURE — 1090000001 HH PPS REVENUE CREDIT

## 2023-12-14 PROCEDURE — G0157 HHC PT ASSISTANT EA 15: HCPCS | Mod: HHH

## 2023-12-14 PROCEDURE — 1090000002 HH PPS REVENUE DEBIT

## 2023-12-14 PROCEDURE — G0158 HHC OT ASSISTANT EA 15: HCPCS | Mod: HHH

## 2023-12-14 SDOH — HEALTH STABILITY: MENTAL HEALTH: SMOKING IN HOME: 0

## 2023-12-14 SDOH — HEALTH STABILITY: MENTAL HEALTH: SMOKING IN HOME: 1

## 2023-12-14 SDOH — ECONOMIC STABILITY: HOUSING INSECURITY: EVIDENCE OF SMOKING MATERIAL: 0

## 2023-12-14 ASSESSMENT — ENCOUNTER SYMPTOMS
PAIN LOCATION: BACK
PERSON REPORTING PAIN: PATIENT
PAIN LOCATION - PAIN FREQUENCY: CONSTANT
PAIN SEVERITY GOAL: 0/10
PAIN LOCATION - EXACERBATING FACTORS: MOVEMENT
APPETITE LEVEL: FAIR
PAIN LOCATION - PAIN SEVERITY: 10/10
COUGH CHARACTERISTICS: NON-PRODUCTIVE
LAST BOWEL MOVEMENT: 66821
PAIN LOCATION: BACK
PAIN LOCATION - RELIEVING FACTORS: REST
PAIN SEVERITY GOAL: 0/10
HIGHEST PAIN SEVERITY IN PAST 24 HOURS: 10/10
COUGH CHARACTERISTICS: WEAK
MUSCLE WEAKNESS: 1
COUGH: 1
PAIN LOCATION - PAIN SEVERITY: 9/10
PAIN LOCATION: CHEST
LOWEST PAIN SEVERITY IN PAST 24 HOURS: 9/10
PAIN: 1
LOWEST PAIN SEVERITY IN PAST 24 HOURS: 2/10
PAIN LOCATION - PAIN SEVERITY: 9/10
COUGH CHARACTERISTICS: DRY
HIGHEST PAIN SEVERITY IN PAST 24 HOURS: 10/10

## 2023-12-14 NOTE — TELEPHONE ENCOUNTER
Reached out to patient regarding his upcoming appointment with KENDRICK Leyva. Let patient know that the providers clinic days are changing and we need to move his appointment. Rescheduled patient to see provider on January 19th,2024 @ 11:00 am. Patient verbalized and agreed to appointment change.

## 2023-12-14 NOTE — PROGRESS NOTES
"Message sent to RN June: \"Patient's discharge information from the ER keeps coming to me.  Please make sure the patient follows up with PCP for his ER issues.\"  "

## 2023-12-15 ENCOUNTER — HOSPITAL ENCOUNTER (OUTPATIENT)
Facility: HOSPITAL | Age: 78
Setting detail: OUTPATIENT SURGERY
Discharge: NURSING, REHAB OR LONG-TERM CARE | End: 2023-12-15
Attending: INTERNAL MEDICINE | Admitting: INTERNAL MEDICINE
Payer: MEDICARE

## 2023-12-15 ENCOUNTER — TELEPHONE (OUTPATIENT)
Dept: HEMATOLOGY/ONCOLOGY | Facility: HOSPITAL | Age: 78
End: 2023-12-15
Payer: MEDICARE

## 2023-12-15 ENCOUNTER — APPOINTMENT (OUTPATIENT)
Dept: CARDIOLOGY | Facility: HOSPITAL | Age: 78
End: 2023-12-15
Payer: MEDICARE

## 2023-12-15 VITALS
OXYGEN SATURATION: 99 % | DIASTOLIC BLOOD PRESSURE: 70 MMHG | RESPIRATION RATE: 16 BRPM | HEART RATE: 72 BPM | SYSTOLIC BLOOD PRESSURE: 130 MMHG

## 2023-12-15 VITALS
RESPIRATION RATE: 18 BRPM | TEMPERATURE: 96.8 F | OXYGEN SATURATION: 98 % | SYSTOLIC BLOOD PRESSURE: 124 MMHG | DIASTOLIC BLOOD PRESSURE: 68 MMHG | HEART RATE: 72 BPM

## 2023-12-15 DIAGNOSIS — I49.5 SICK SINUS SYNDROME (MULTI): ICD-10-CM

## 2023-12-15 DIAGNOSIS — I42.9 CARDIOMYOPATHY, UNSPECIFIED TYPE (MULTI): Primary | ICD-10-CM

## 2023-12-15 DIAGNOSIS — Z45.02 IMPLANTABLE CARDIOVERTER-DEFIBRILLATOR (ICD) GENERATOR END OF LIFE: ICD-10-CM

## 2023-12-15 PROCEDURE — 33262 RMVL& REPLC PULSE GEN 1 LEAD: CPT | Performed by: INTERNAL MEDICINE

## 2023-12-15 PROCEDURE — 99153 MOD SED SAME PHYS/QHP EA: CPT | Performed by: INTERNAL MEDICINE

## 2023-12-15 PROCEDURE — 99152 MOD SED SAME PHYS/QHP 5/>YRS: CPT | Performed by: INTERNAL MEDICINE

## 2023-12-15 PROCEDURE — C1722 AICD, SINGLE CHAMBER: HCPCS | Performed by: INTERNAL MEDICINE

## 2023-12-15 PROCEDURE — 2750000001 HC OR 275 NO HCPCS: Performed by: INTERNAL MEDICINE

## 2023-12-15 PROCEDURE — 1090000002 HH PPS REVENUE DEBIT

## 2023-12-15 PROCEDURE — 7100000010 HC PHASE TWO TIME - EACH INCREMENTAL 1 MINUTE: Performed by: INTERNAL MEDICINE

## 2023-12-15 PROCEDURE — 1090000001 HH PPS REVENUE CREDIT

## 2023-12-15 PROCEDURE — 2720000007 HC OR 272 NO HCPCS: Performed by: INTERNAL MEDICINE

## 2023-12-15 PROCEDURE — 2500000004 HC RX 250 GENERAL PHARMACY W/ HCPCS (ALT 636 FOR OP/ED): Performed by: INTERNAL MEDICINE

## 2023-12-15 PROCEDURE — 7100000009 HC PHASE TWO TIME - INITIAL BASE CHARGE: Performed by: INTERNAL MEDICINE

## 2023-12-15 DEVICE — IMPLANTABLE CARDIOVERTER DEFIBRILLATOR
Type: IMPLANTABLE DEVICE | Status: FUNCTIONAL
Brand: GALLANT™

## 2023-12-15 RX ORDER — VANCOMYCIN HYDROCHLORIDE 1 G/200ML
INJECTION, SOLUTION INTRAVENOUS CONTINUOUS PRN
Status: COMPLETED | OUTPATIENT
Start: 2023-12-15 | End: 2023-12-15

## 2023-12-15 RX ORDER — FENTANYL CITRATE 50 UG/ML
INJECTION, SOLUTION INTRAMUSCULAR; INTRAVENOUS AS NEEDED
Status: DISCONTINUED | OUTPATIENT
Start: 2023-12-15 | End: 2023-12-15 | Stop reason: HOSPADM

## 2023-12-15 RX ORDER — BUPIVACAINE HYDROCHLORIDE 5 MG/ML
INJECTION, SOLUTION EPIDURAL; INTRACAUDAL AS NEEDED
Status: DISCONTINUED | OUTPATIENT
Start: 2023-12-15 | End: 2023-12-15 | Stop reason: HOSPADM

## 2023-12-15 RX ORDER — MIDAZOLAM HYDROCHLORIDE 1 MG/ML
INJECTION INTRAMUSCULAR; INTRAVENOUS AS NEEDED
Status: DISCONTINUED | OUTPATIENT
Start: 2023-12-15 | End: 2023-12-15 | Stop reason: HOSPADM

## 2023-12-15 SDOH — ECONOMIC STABILITY: HOUSING INSECURITY: EVIDENCE OF SMOKING MATERIAL: 0

## 2023-12-15 SDOH — HEALTH STABILITY: MENTAL HEALTH: SMOKING IN HOME: 0

## 2023-12-15 ASSESSMENT — ENCOUNTER SYMPTOMS
NAUSEA: 1
PAIN LOCATION - PAIN SEVERITY: 10/10
PAIN LOCATION: CHEST
LOWEST PAIN SEVERITY IN PAST 24 HOURS: 2/10
HIGHEST PAIN SEVERITY IN PAST 24 HOURS: 10/10
PAIN SEVERITY GOAL: 0/10
SUBJECTIVE PAIN PROGRESSION: UNCHANGED
PERSON REPORTING PAIN: PATIENT
PAIN: 1

## 2023-12-15 ASSESSMENT — PAIN SCALES - PAIN ASSESSMENT IN ADVANCED DEMENTIA (PAINAD)
BREATHING: 2
NEGVOCALIZATION: 2 - REPEATED TROUBLE CALLING OUT. LOUD MOANING OR GROANING. CRYING.
CONSOLABILITY: 0
TOTALSCORE: 8
BODYLANGUAGE: 2
BODYLANGUAGE: 2 - RIGID. FISTS CLENCHED. KNEES PULLED UP. PULLING OR PUSHING AWAY. STRIKING OUT.
NEGVOCALIZATION: 2
CONSOLABILITY: 0 - NO NEED TO CONSOLE.
FACIALEXPRESSION: 2
FACIALEXPRESSION: 2 - FACIAL GRIMACING.

## 2023-12-15 NOTE — DISCHARGE INSTRUCTIONS
Discharge Instructions for your Cardiac Device   CARDIAC DEVICE CLINIC  [phone]              Incision:   1.  Keep your incision clean and dry for 1 week.  2. May shower 7 days after the procedure. Do not submerge the incision in a tub, pool, hot tub, or lake for 4 weeks.  3. Your incision should look better each day. If you notice unusual swelling, redness, drainage or fever greater than 100 degrees, please call the Device Clinic or your Doctor's office.  4. Avoid using deodorants, powders, creams, lotions, etc on your incision for 4 weeks.   5. There are no stitches to be removed.  If you received a “glue” closure this may appear purple-gray and does not get removed but wears away slowly on its own.  Steri-strips (small white bandages) may be removed in one week or they may fall off on their own earlier.  Pain:  1. It is normal for the area around the incision to be tender for a few weeks following surgery. Pain relievers such as Tylenol or Motrin (whichever you can take) are usually sufficient for pain relief. If the pain gets worse instead of better than please call the Device Clinic or your Doctor.  Activity:  1. Do no  anything weighing more than 15 pounds.   2. Avoid exercising with the arm on the side of your pacemaker.  So no golf, swimming, tennis, bowling etc for 4 weeks.      3. Driving: If you were driving prior to your procedure, you may resume driving in 1 week. If you experienced a loss of consciousness prior to your procedure, you should verify with your Doctor when you are able to resume driving.  ID CARD:  1. It is important to carry your device ID card with you at all times.   2. Inform doctors and health care providers that you have a pacemaker or defibrillator.  Electromagnetic Interference:  1. Microwave ovens are safe to use.  2. Cellular phones should be held to the opposite ear from your device. Do not carry your phone in your shirt pocket. Some i-phones that self-charge can  interfere with your device so be sure to keep it away from your pacemaker/defibrillator.   3. Read the Patient Booklet for more information. You may call either the Device Clinic [phone]  or the patient services of the device  with questions about specific electrical appliances and interference problems.    IT IS YOUR RESPONSIBILITY TO MAKE AND KEEP APPOINTMENTS.   Please refer to your Device clinic handout.

## 2023-12-15 NOTE — Clinical Note
Patient Clipped and Prepped: left chest. Prepped with ChloraPrep, a minimum of 3 minute dry time, longer if needed, no pooling noted, patient draped in sterile fashion and Betadine and draped in sterile fashion.

## 2023-12-15 NOTE — PROGRESS NOTES
Pharmacy Medication History Review    Andrez Damon is a 78 y.o. male admitted for No Principal Problem: There is no principal problem currently on the Problem List. Please update the Problem List and refresh.. Pharmacy reviewed the patient's [id number] medications and allergies for accuracy.    The list below reflects the updated PTA list.   Comments regarding how patient may be taking medications differently can be found in the Admit Orders Activity  Prior to Admission Medications   Prescriptions Last Dose Informant Patient Reported?   Brilinta 90 mg tablet 12/14/2023 Self No   Sig: TAKE 1 TABLET BY MOUTH TWICE A DAY AS DIRECTED   amiodarone (Pacerone) 200 mg tablet 12/14/2023 Self Yes   Sig: Take 1 tablet (200 mg) by mouth once daily.   apixaban (Eliquis) 2.5 mg tablet 12/11/2023 Self Yes   Sig: Take 1 tablet (2.5 mg) by mouth 2 times a day.   atorvastatin (Lipitor) 80 mg tablet 12/15/2023 Self No   Sig: TAKE 1 TABLET BY MOUTH EVERY DAY   bumetanide (Bumex) 0.5 mg tablet 12/14/2023 Self No   Sig: TAKE 1 TABLET BY MOUTH ONCE DAILY.   cholecalciferol (Vitamin D-3) 125 MCG (5000 UT) capsule 12/14/2023 Self No   Sig: TAKE 1 CAPSULE BY MOUTH ONCE DAILY   escitalopram (Lexapro) 10 mg tablet 12/14/2023 Self No   Sig: TAKE 1 TABLET BY MOUTH EVERY DAY   finasteride (Proscar) 5 mg tablet 12/14/2023 Self Yes   Sig: Take 1 tablet (5 mg) by mouth once daily.   fluticasone-umeclidin-vilanter (Trelegy Ellipta) 200-62.5-25 mcg blister with device 12/14/2023 Self No   Sig: Inhale 1 puff once daily with a meal. Inhale 1 puff daily, rinse mouth after use   levalbuterol (Xopenex) 45 mcg/actuation inhaler Unknown Self No   Sig: Inhale 1 puff every 4 hours if needed for wheezing or shortness of breath. INHALE 1 TO 2 PUFFS EVERY 4 TO 6 HOURS AS NEEDED AND AS DIRECTED.   lidocaine 4 % patch Unknown Self No   Sig: Place 1 patch over 12 hours on the skin once daily for 5 days. Remove & discard patch within 12 hours or as  "directed by MD.   metoprolol succinate XL (Toprol-XL) 25 mg 24 hr tablet 12/15/2023 Self Yes   Sig: Take 0.5 tablets (12.5 mg) by mouth once daily. Do not crush or chew.   oxygen (O2) gas therapy  Self No   Sig: Inhale 2 L/min continuously.   polyethylene glycol (Glycolax, Miralax) 17 gram packet Unknown Self No   Sig: Take 17 g by mouth once daily. Do not start before November 16, 2023.   potassium chloride CR (K-Tab) 20 mEq ER tablet 12/14/2023 Self Yes   Sig: Take 1 tablet (20 mEq) by mouth once daily.   pyridoxine (Vitamin B-6) 50 mg tablet 12/14/2023 Self No   Sig: TAKE 1 TABLET BY MOUTH EVERY DAY   rOPINIRole (Requip) 1 mg tablet 12/14/2023 Self No   Sig: TAKE 1 TABLET BY MOUTH EVERY DAY AT BEDTIME   tamsulosin (Flomax) 0.4 mg 24 hr capsule 12/14/2023 Self No   Sig: Take 2 capsules (0.8 mg) by mouth once daily at bedtime.      Facility-Administered Medications: None        The list below reflects the updated allergy list. Please review each documented allergy for additional clarification and justification.  Allergies  Reviewed by Cara Hernandez RN on 12/15/2023        Severity Reactions Comments    Meloxicam High Shortness of breath     Celecoxib Not Specified Unknown     Keflex [cephalexin] Not Specified Hives           M2B service not offered prior to surgery, please reassess prior to patient discharge if Meds to Beds is desired.   Local Rx: [facility] in [place].     Sources:   Pt interview. Has list.   Dispense Hx  OARRS - no hx   11/25/23 ICU Discharge Summary   11/13/23 Admission/discharge summary.     Additional Comments:  Bumetanide dose is 0.5 mg daily per past 2 discharges.       Nilton Solis, PharmD  Transitions of Care Pharmacist    Available on Epic Chat  If no response call [phone] or Vocera \"Med Rec\"   "

## 2023-12-15 NOTE — Clinical Note
The DEFIBRILLATOR, ICD, SINGLE CHAMBER, [place] VR - [id number] device was inserted. The leads were placed into the connector and visually verified to be in correct position. Injury current obtained.

## 2023-12-15 NOTE — TELEPHONE ENCOUNTER
Patient's discharge information from the ER keeps coming to me.  Please make sure the patient follows up with PCP for his ER issues. Per Dr. Meza staff message      Spoke with patient, currently in the hospital. Instructed to follow up with his PCP. TAI Sullivan CNP after discharge.

## 2023-12-16 PROCEDURE — 1090000002 HH PPS REVENUE DEBIT

## 2023-12-16 PROCEDURE — 1090000001 HH PPS REVENUE CREDIT

## 2023-12-17 PROCEDURE — 1090000002 HH PPS REVENUE DEBIT

## 2023-12-17 PROCEDURE — 169592 NO-PAY CLAIM PROCEDURE

## 2023-12-17 PROCEDURE — 1090000001 HH PPS REVENUE CREDIT

## 2023-12-18 PROCEDURE — 1090000002 HH PPS REVENUE DEBIT

## 2023-12-18 PROCEDURE — 1090000001 HH PPS REVENUE CREDIT

## 2023-12-18 NOTE — DISCHARGE SUMMARY
Discharge Diagnosis  Acute on chronic systolic congestive heart failure (CMS/[facility])    Issues Requiring Follow-Up  Cardiology    Test Results Pending At Discharge  Pending Labs       No current pending labs.            Hospital Course   Andrez Damon is a 78 y.o. male presenting with a complaint of SOB. He was from home; he has been more SOB over the last day. He uses 2lpm of oxygen at home. He was 82% on RA. He has a hx of systolic heart failure with an EF of 35% and a hx of COPD. EMS treated him with duonebs with no improvement.     Patient was admitted to medicine for further diagnosis and management.  Patient was placed on Ceftriaxone and doxycycline and diuresed.  By 11/27, patient was feeling good and wanted to be discharged to follow up with cardiology    Pertinent Physical Exam At Time of Discharge  Constitutional:       Appearance: Normal appearance. He is normal weight.   HENT:      Head: Normocephalic and atraumatic.      Nose: Nose normal.      Mouth/Throat:      Mouth: Mucous membranes are moist.      Pharynx: Oropharynx is clear.   Eyes:      Conjunctiva/sclera: Conjunctivae normal.      Pupils: Pupils are equal, round, and reactive to light.   Cardiovascular:      Rate and Rhythm: Normal rate and regular rhythm.      Pulses: Normal pulses.      Heart sounds: Normal heart sounds.   Pulmonary:      Effort: Respiratory distress present.      Breath sounds: Wheezing, rhonchi and rales present.   Abdominal:      General: Abdomen is flat. Bowel sounds are normal.      Palpations: Abdomen is soft.   Musculoskeletal:         General: Normal range of motion.      Cervical back: Normal range of motion and neck supple.   Skin:     General: Skin is warm and dry.   Neurological:      General: No focal deficit present.      Mental Status: He is alert and oriented to person, place, and time.   Psychiatric:         Mood and Affect: Mood normal.         Behavior: Behavior normal.     Home Medications     Medication  List      CONTINUE taking these medications     amiodarone 200 mg tablet; Commonly known as: Pacerone   apixaban 2.5 mg tablet; Commonly known as: Eliquis   atorvastatin 80 mg tablet; Commonly known as: Lipitor; TAKE 1 TABLET BY   MOUTH EVERY DAY   Brilinta 90 mg tablet; Generic drug: ticagrelor; TAKE 1 TABLET BY MOUTH   TWICE A DAY AS DIRECTED   bumetanide 0.5 mg tablet; Commonly known as: Bumex; TAKE 1 TABLET BY   MOUTH ONCE DAILY.   cholecalciferol 125 MCG (5000 UT) capsule; Commonly known as: Vitamin   D-3; TAKE 1 CAPSULE BY MOUTH ONCE DAILY   finasteride 5 mg tablet; Commonly known as: Proscar   metoprolol succinate XL 25 mg 24 hr tablet; Commonly known as: Toprol-XL   oxygen gas therapy; Commonly known as: O2; Inhale 2 L/min continuously.   polyethylene glycol 17 gram packet; Commonly known as: Glycolax,   Miralax; Take 17 g by mouth once daily. Do not start before November 16, 2023.   potassium chloride CR 20 mEq ER tablet; Commonly known as: Klor-Con M20   pyridoxine 50 mg tablet; Commonly known as: Vitamin B-6; TAKE 1 TABLET   BY MOUTH EVERY DAY   tamsulosin 0.4 mg 24 hr capsule; Commonly known as: Flomax; Take 2   capsules (0.8 mg) by mouth once daily at bedtime.     STOP taking these medications     acetaminophen 325 mg tablet; Commonly known as: Tylenol       Outpatient Follow-Up  Future Appointments   Date Time Provider Department Center   12/19/2023 To Be Determined Sonya Peña [facility]   12/19/2023 To Be Determined Eric Guy LPN [facility]   12/19/2023 10:00 AM Cristina Masters [facility]   12/20/2023 12:00 PM Cristina Masters, [facility]   12/21/2023 To Be Determined Sonya Peña [facility]   12/26/2023 To Be Determined Silvia Oliver, PT [facility]   12/26/2023 To Be Determined Sonya Peña [facility]   12/28/2023 11:00 AM INF 01 CONNEAUT CONSCCINF [facility]   12/29/2023 To Be Determined Ben Mosquera, OT [facility]    1/2/2024 To Be Determined Ben Mosquera, OT [facility]   1/3/2024  7:30 PM SLEEP LAB GEN WMAINA ROOM 1 [id number] [facility]   1/19/2024 11:00 AM Rosa Luong, APRN-CNP [id number] [facility]   1/25/2024 11:00 AM INF 01 CONNEAUT [facility]CCINF [facility]   1/26/2024 11:20 AM Stone Piedra MD [id number] [facility]   2/20/2024  2:30 PM Tenisha Hurst APRN-CNP [id number] [facility]   3/5/2024  2:00 PM Tenisha Hurst APRN-CNP [id number] [facility]   3/14/2024 10:00 AM Karley Sullivan APRN-CNP, ALESSANDRO [id number] [facility]   4/15/2024 10:20 AM Cindy Meraz APRN-CNP [id number] [facility]   6/4/2024 10:40 AM Cindy Meraz APRN-CNP [id number] [facility]   A/P    Acute on Chronic Respiratory Failure with hypoxia 2/2 Community Acquired pneumonia  COPD exacerbation  - SpO2 84% on RA at admit  - Supplemental oxygen to keep SpO2 > 92%  - Home oxygen 2lpm via NC continuously ~ baseline  - CXR: Moderate diffuse interstitial prominence, increased from   09/18/2023, likely severe interstitial edema. Multifocal infectious/ inflammatory process could have a similar appearance. Left-greater-than-right basilar airspace opacities. Pneumonia is not excluded   -ceftriaxone and Doxycycline  - continue duoneb  - solumedrol~completed  - RT for bronchial hygiene  - blood cultures pending.      Acute on Chronic Systolic Heart Failure  Non MI troponin elevation  Paroxysmal Atrial Fibrillation  HLD  Essential HTN  CAD  - serial troponin 62 > 54  - BNP 2,801 on admission which is chronic  - continue amiodarone, apixaban, atorvastatin, metoprolol succinate, ticagrelor  - given Bumex in the ED  -IV furosemide BID  - hold Bumex PO  - Last echocardiogram 9/18/23: moderate decreased LVSF with an EF of 35-40% with impaired relaxation pattern of LVDF moderate MR; Mild AR  - daily weights  - I&O  - monitor HR and BP   -Strict I&O, daily weight    Hypokalemia  - K 3.4 on admission  - replaced with KCL  - monitor BMP     Acute on Chronic Renal Failure  - baseline creatine +/- 1.4  - creatine on  admission 1.68; today creatine 1.53  -Close monitoring of renal function/ electrolytes; replete aggressively to goal K 4/Phos 3/Mg2   -Strict I&O, daily weight     Anemia chronic disease  - Hb 9.4 > 12.2  - monitor H&H  - continue pyridoxine     BPH  - continue finasteride, tamsulosin     Depression  Anxiety  RLS  - continue escitalopram, Ropinirole     Vitamin D deficiency  - continue cholecalciferol     DVT ppx  - on apixaban    Shyam Peck MD

## 2023-12-19 ENCOUNTER — APPOINTMENT (OUTPATIENT)
Dept: PRIMARY CARE | Facility: CLINIC | Age: 78
End: 2023-12-19
Payer: MEDICARE

## 2023-12-19 ENCOUNTER — HOME CARE VISIT (OUTPATIENT)
Dept: HOME HEALTH SERVICES | Facility: HOME HEALTH | Age: 78
End: 2023-12-19
Payer: MEDICARE

## 2023-12-19 VITALS
HEART RATE: 68 BPM | OXYGEN SATURATION: 96 % | TEMPERATURE: 97.4 F | RESPIRATION RATE: 20 BRPM | DIASTOLIC BLOOD PRESSURE: 70 MMHG | SYSTOLIC BLOOD PRESSURE: 122 MMHG

## 2023-12-19 VITALS
HEART RATE: 68 BPM | TEMPERATURE: 97 F | SYSTOLIC BLOOD PRESSURE: 122 MMHG | OXYGEN SATURATION: 98 % | RESPIRATION RATE: 18 BRPM | DIASTOLIC BLOOD PRESSURE: 70 MMHG

## 2023-12-19 PROCEDURE — 1090000002 HH PPS REVENUE DEBIT

## 2023-12-19 PROCEDURE — 1090000001 HH PPS REVENUE CREDIT

## 2023-12-19 PROCEDURE — 0023 HH SOC

## 2023-12-19 PROCEDURE — G0158 HHC OT ASSISTANT EA 15: HCPCS | Mod: HHH

## 2023-12-19 PROCEDURE — G0300 HHS/HOSPICE OF LPN EA 15 MIN: HCPCS | Mod: HHH

## 2023-12-19 SDOH — ECONOMIC STABILITY: HOUSING INSECURITY: EVIDENCE OF SMOKING MATERIAL: 0

## 2023-12-19 SDOH — HEALTH STABILITY: MENTAL HEALTH: SMOKING IN HOME: 0

## 2023-12-19 ASSESSMENT — ENCOUNTER SYMPTOMS
LOWEST PAIN SEVERITY IN PAST 24 HOURS: 7/10
HIGHEST PAIN SEVERITY IN PAST 24 HOURS: 8/10
PAIN SEVERITY GOAL: 0/10
PAIN SEVERITY GOAL: 0/10
RESPIRATORY PAIN: 1
PAIN LOCATION - RELIEVING FACTORS: REST
PAIN LOCATION - PAIN SEVERITY: 10/10
DYSPNEA ACTIVITY LEVEL: AFTER AMBULATING LESS THAN 10 FT
APPETITE LEVEL: FAIR
PAIN LOCATION - PAIN DURATION: 2 WKS
PAIN LOCATION - EXACERBATING FACTORS: MOBILITY
PAIN LOCATION - PAIN QUALITY: STABBING
PAIN: 1
HIGHEST PAIN SEVERITY IN PAST 24 HOURS: 10/10
PAIN LOCATION - PAIN FREQUENCY: FREQUENT
LOWEST PAIN SEVERITY IN PAST 24 HOURS: 7/10
PAIN LOCATION: CHEST
PAIN LOCATION - PAIN SEVERITY: 7/10
SHORTNESS OF BREATH: 1
DEPRESSION: 1
MUSCLE WEAKNESS: 1
LAST BOWEL MOVEMENT: 66826
PAIN LOCATION: BACK
SUBJECTIVE PAIN PROGRESSION: GRADUALLY IMPROVING

## 2023-12-20 ENCOUNTER — HOME CARE VISIT (OUTPATIENT)
Dept: HOME HEALTH SERVICES | Facility: HOME HEALTH | Age: 78
End: 2023-12-20
Payer: MEDICARE

## 2023-12-20 VITALS
SYSTOLIC BLOOD PRESSURE: 138 MMHG | HEART RATE: 72 BPM | OXYGEN SATURATION: 97 % | TEMPERATURE: 98.5 F | RESPIRATION RATE: 22 BRPM | DIASTOLIC BLOOD PRESSURE: 72 MMHG

## 2023-12-20 PROCEDURE — G0157 HHC PT ASSISTANT EA 15: HCPCS | Mod: HHH

## 2023-12-20 PROCEDURE — 1090000001 HH PPS REVENUE CREDIT

## 2023-12-20 PROCEDURE — 1090000002 HH PPS REVENUE DEBIT

## 2023-12-20 ASSESSMENT — ENCOUNTER SYMPTOMS: SUBJECTIVE PAIN PROGRESSION: GRADUALLY WORSENING

## 2023-12-20 ASSESSMENT — PAIN SCALES - PAIN ASSESSMENT IN ADVANCED DEMENTIA (PAINAD)
BODYLANGUAGE: 2 - RIGID. FISTS CLENCHED. KNEES PULLED UP. PULLING OR PUSHING AWAY. STRIKING OUT.
TOTALSCORE: 8
NEGVOCALIZATION: 2 - REPEATED TROUBLE CALLING OUT. LOUD MOANING OR GROANING. CRYING.
CONSOLABILITY: 0 - NO NEED TO CONSOLE.
BREATHING: 2
BODYLANGUAGE: 2
FACIALEXPRESSION: 2
FACIALEXPRESSION: 2 - FACIAL GRIMACING.
NEGVOCALIZATION: 2
CONSOLABILITY: 0

## 2023-12-21 ENCOUNTER — HOME CARE VISIT (OUTPATIENT)
Dept: HOME HEALTH SERVICES | Facility: HOME HEALTH | Age: 78
End: 2023-12-21
Payer: MEDICARE

## 2023-12-21 VITALS
HEART RATE: 68 BPM | RESPIRATION RATE: 18 BRPM | TEMPERATURE: 97.7 F | OXYGEN SATURATION: 96 % | DIASTOLIC BLOOD PRESSURE: 70 MMHG | SYSTOLIC BLOOD PRESSURE: 136 MMHG

## 2023-12-21 PROCEDURE — 1090000001 HH PPS REVENUE CREDIT

## 2023-12-21 PROCEDURE — 1090000002 HH PPS REVENUE DEBIT

## 2023-12-21 PROCEDURE — G0158 HHC OT ASSISTANT EA 15: HCPCS | Mod: HHH

## 2023-12-21 SDOH — ECONOMIC STABILITY: HOUSING INSECURITY: EVIDENCE OF SMOKING MATERIAL: 0

## 2023-12-21 SDOH — HEALTH STABILITY: MENTAL HEALTH: SMOKING IN HOME: 1

## 2023-12-21 ASSESSMENT — ENCOUNTER SYMPTOMS
PERSON REPORTING PAIN: PATIENT
HIGHEST PAIN SEVERITY IN PAST 24 HOURS: 10/10
PAIN LOCATION - PAIN SEVERITY: 0/10
SUBJECTIVE PAIN PROGRESSION: GRADUALLY IMPROVING
PAIN SEVERITY GOAL: 0/10
PAIN LOCATION - PAIN FREQUENCY: FREQUENT
PAIN LOCATION - PAIN DURATION: 2 WEEKS
PAIN LOCATION - PAIN QUALITY: SHARP
PAIN LOCATION - PAIN FREQUENCY: FREQUENT
PAIN LOCATION: BACK
PAIN LOCATION - PAIN DURATION: 2 WEEKS
PAIN LOCATION: CHEST
PAIN LOCATION - PAIN QUALITY: SHARP
PAIN: 1
LOWEST PAIN SEVERITY IN PAST 24 HOURS: 0/10
PAIN LOCATION - PAIN SEVERITY: 0/10

## 2023-12-22 ENCOUNTER — HOME CARE VISIT (OUTPATIENT)
Dept: HOME HEALTH SERVICES | Facility: HOME HEALTH | Age: 78
End: 2023-12-22
Payer: MEDICARE

## 2023-12-22 VITALS
HEART RATE: 72 BPM | TEMPERATURE: 98.5 F | DIASTOLIC BLOOD PRESSURE: 60 MMHG | SYSTOLIC BLOOD PRESSURE: 122 MMHG | RESPIRATION RATE: 20 BRPM | OXYGEN SATURATION: 97 %

## 2023-12-22 PROCEDURE — 1090000002 HH PPS REVENUE DEBIT

## 2023-12-22 PROCEDURE — 1090000001 HH PPS REVENUE CREDIT

## 2023-12-22 PROCEDURE — G0157 HHC PT ASSISTANT EA 15: HCPCS | Mod: HHH

## 2023-12-22 ASSESSMENT — PAIN SCALES - PAIN ASSESSMENT IN ADVANCED DEMENTIA (PAINAD)
TOTALSCORE: 0
NEGVOCALIZATION: 0 - NONE.
FACIALEXPRESSION: 0 - SMILING OR INEXPRESSIVE.
BODYLANGUAGE: 0 - RELAXED.
BREATHING: 0
BODYLANGUAGE: 0
CONSOLABILITY: 0 - NO NEED TO CONSOLE.
FACIALEXPRESSION: 0
CONSOLABILITY: 0
NEGVOCALIZATION: 0

## 2023-12-22 ASSESSMENT — ENCOUNTER SYMPTOMS
PERSON REPORTING PAIN: PATIENT
PAIN: 1
LOWEST PAIN SEVERITY IN PAST 24 HOURS: 5/10
PAIN SEVERITY GOAL: 0/10
HIGHEST PAIN SEVERITY IN PAST 24 HOURS: 10/10
HIGHEST PAIN SEVERITY IN PAST 24 HOURS: 10/10
PAIN: 1
PAIN LOCATION: BACK
PAIN SEVERITY GOAL: 0/10
SUBJECTIVE PAIN PROGRESSION: GRADUALLY IMPROVING
LOWEST PAIN SEVERITY IN PAST 24 HOURS: 6/10
PAIN LOCATION: RIGHT SHOULDER
PAIN LOCATION - PAIN SEVERITY: 8/10
PERSON REPORTING PAIN: PATIENT

## 2023-12-23 PROCEDURE — 1090000001 HH PPS REVENUE CREDIT

## 2023-12-23 PROCEDURE — 1090000002 HH PPS REVENUE DEBIT

## 2023-12-24 PROCEDURE — 1090000002 HH PPS REVENUE DEBIT

## 2023-12-24 PROCEDURE — 1090000001 HH PPS REVENUE CREDIT

## 2023-12-25 PROCEDURE — 1090000002 HH PPS REVENUE DEBIT

## 2023-12-25 PROCEDURE — 1090000001 HH PPS REVENUE CREDIT

## 2023-12-26 PROCEDURE — 1090000001 HH PPS REVENUE CREDIT

## 2023-12-26 PROCEDURE — 1090000002 HH PPS REVENUE DEBIT

## 2023-12-27 ENCOUNTER — HOME CARE VISIT (OUTPATIENT)
Dept: HOME HEALTH SERVICES | Facility: HOME HEALTH | Age: 78
End: 2023-12-27
Payer: MEDICARE

## 2023-12-27 VITALS
RESPIRATION RATE: 18 BRPM | DIASTOLIC BLOOD PRESSURE: 65 MMHG | SYSTOLIC BLOOD PRESSURE: 115 MMHG | HEART RATE: 60 BPM | TEMPERATURE: 98.1 F | OXYGEN SATURATION: 98 %

## 2023-12-27 PROCEDURE — G0151 HHCP-SERV OF PT,EA 15 MIN: HCPCS | Mod: HHH

## 2023-12-27 PROCEDURE — 1090000001 HH PPS REVENUE CREDIT

## 2023-12-27 PROCEDURE — 1090000002 HH PPS REVENUE DEBIT

## 2023-12-27 SDOH — ECONOMIC STABILITY: HOUSING INSECURITY: EVIDENCE OF SMOKING MATERIAL: 1

## 2023-12-27 SDOH — HEALTH STABILITY: PHYSICAL HEALTH: EXERCISE TYPE: HEP

## 2023-12-27 SDOH — HEALTH STABILITY: MENTAL HEALTH: SMOKING IN HOME: 1

## 2023-12-27 ASSESSMENT — ENCOUNTER SYMPTOMS
SUBJECTIVE PAIN PROGRESSION: UNCHANGED
LOWEST PAIN SEVERITY IN PAST 24 HOURS: 8/10
HIGHEST PAIN SEVERITY IN PAST 24 HOURS: 8/10
PERSON REPORTING PAIN: PATIENT
PAIN LOCATION: BACK
PAIN SEVERITY GOAL: 0/10
PAIN: 1
OCCASIONAL FEELINGS OF UNSTEADINESS: 1

## 2023-12-27 ASSESSMENT — ACTIVITIES OF DAILY LIVING (ADL): AMBULATION ASSISTANCE ON FLAT SURFACES: 1

## 2023-12-28 ENCOUNTER — HOSPITAL ENCOUNTER (OUTPATIENT)
Dept: CARDIOLOGY | Facility: CLINIC | Age: 78
Discharge: HOME | End: 2023-12-28
Payer: MEDICARE

## 2023-12-28 ENCOUNTER — INFUSION (OUTPATIENT)
Dept: HEMATOLOGY/ONCOLOGY | Facility: HOSPITAL | Age: 78
DRG: 291 | End: 2023-12-28
Payer: MEDICARE

## 2023-12-28 VITALS
HEART RATE: 75 BPM | SYSTOLIC BLOOD PRESSURE: 111 MMHG | TEMPERATURE: 98.6 F | DIASTOLIC BLOOD PRESSURE: 59 MMHG | OXYGEN SATURATION: 95 % | RESPIRATION RATE: 22 BRPM

## 2023-12-28 DIAGNOSIS — E53.8 B12 DEFICIENCY: ICD-10-CM

## 2023-12-28 DIAGNOSIS — I49.5 SSS (SICK SINUS SYNDROME) (MULTI): ICD-10-CM

## 2023-12-28 PROCEDURE — 2500000004 HC RX 250 GENERAL PHARMACY W/ HCPCS (ALT 636 FOR OP/ED): Performed by: INTERNAL MEDICINE

## 2023-12-28 PROCEDURE — 1090000001 HH PPS REVENUE CREDIT

## 2023-12-28 PROCEDURE — 96372 THER/PROPH/DIAG INJ SC/IM: CPT

## 2023-12-28 PROCEDURE — 1090000002 HH PPS REVENUE DEBIT

## 2023-12-28 RX ORDER — DIPHENHYDRAMINE HYDROCHLORIDE 50 MG/ML
50 INJECTION INTRAMUSCULAR; INTRAVENOUS AS NEEDED
Status: CANCELLED | OUTPATIENT
Start: 2024-01-25

## 2023-12-28 RX ORDER — CYANOCOBALAMIN 1000 UG/ML
1000 INJECTION, SOLUTION INTRAMUSCULAR; SUBCUTANEOUS ONCE
Status: CANCELLED | OUTPATIENT
Start: 2024-01-25

## 2023-12-28 RX ORDER — EPINEPHRINE 0.3 MG/.3ML
0.3 INJECTION SUBCUTANEOUS EVERY 5 MIN PRN
Status: CANCELLED | OUTPATIENT
Start: 2024-01-25

## 2023-12-28 RX ORDER — CYANOCOBALAMIN 1000 UG/ML
1000 INJECTION, SOLUTION INTRAMUSCULAR; SUBCUTANEOUS ONCE
Status: COMPLETED | OUTPATIENT
Start: 2023-12-28 | End: 2023-12-28

## 2023-12-28 RX ORDER — ALBUTEROL SULFATE 0.83 MG/ML
3 SOLUTION RESPIRATORY (INHALATION) AS NEEDED
Status: CANCELLED | OUTPATIENT
Start: 2024-01-25

## 2023-12-28 RX ORDER — FAMOTIDINE 10 MG/ML
20 INJECTION INTRAVENOUS ONCE AS NEEDED
Status: CANCELLED | OUTPATIENT
Start: 2024-01-25

## 2023-12-28 RX ADMIN — CYANOCOBALAMIN 1000 MCG: 1000 INJECTION, SOLUTION INTRAMUSCULAR at 11:09

## 2023-12-28 ASSESSMENT — PAIN SCALES - GENERAL: PAINLEVEL: 9

## 2023-12-28 NOTE — H&P
CAD. AWMI due to delayed stent thrombosis s/p redo PCI with GEORGIE (11/29/2014) S/P PCI to LAD with BMS 10/2010 (after delayed stent thrombosis from remote PCI) Moderate LV systolic dysfunction. S/p ICD implantation ([facility] in [place] in 2012). Smoking. COPD. HTN. DLP. CKD. DJD. Latest EF 20-25%. AF with inappropriate ICD chock, reverted to NSR on amiodarone, pulmonary embolism OAC with apixaban. ICD HAILEY - For replacement    Fatigue is at baseline. No lower extremity edema or dizziness. BP is normal   Continues to smoke and is not interested in quitting     12 system review is negative except as listed above       Active Problems  Problems    · Abdominal pain (789.00) (R10.9)   · Abnormal CT of the abdomen (793.6) (R93.5)   · Abnormal electrocardiogram (794.31) (R94.31)   · Added by Problem List Migration; 2013-6-27; Moved to [facility] Nov 23 2013  9:02PM   · Abnormal liver CT (793.3) (R93.2)   · Acute UTI (599.0) (N39.0)   · AF (paroxysmal atrial fibrillation) (427.31) (I48.0)   · Anemia (285.9) (D64.9)   · Arthralgia (719.40) (M25.50)   · Arthritis of knee, left (716.96) (M17.12)   · Arthritis of knee, right (716.96) (M17.11)   · Bacteremia due to Escherichia coli (790.7,041.49) (R78.81,B96.20)   · Bilateral knee pain (719.46) (M25.561,M25.562)   · Body mass index (BMI) of 20.0 to 20.9 in adult (V85.1) (Z68.20)   · BPH (benign prostatic hyperplasia) (600.00) (N40.0)   · Burning with urination (788.1) (R30.0)   · CAD (coronary artery disease) (414.00) (I25.10)   · Cardiac defibrillator in place (V45.02) (Z95.810)   · Cardiomyopathy (425.4) (I42.9)   · Added by Problem List Migration; 2013-6-27; Moved to Suppressed Nov 23 2013  9:02PM   · Chronic back pain (724.5,338.29) (M54.9,G89.29)   · Chronic left hip pain (719.45,338.29) (M25.552,G89.29)   · Chronic pain of both hips (719.45,338.29) (M25.551,M25.552,G89.29)   · Chronic systolic heart failure (428.22) (I50.22)   · Cigarette nicotine dependence (305.1)  (F17.210)   · COPD (chronic obstructive pulmonary disease) (496) (J44.9)   · Coronary stent thrombosis (996.72) (T82.867A)   · Depression screening (V79.0) (Z13.31)   · Depression, major, in remission (296.25) (F32.5)   · Diabetes (250.00) (E11.9)   · Diabetes mellitus with peripheral vascular disease (250.70,443.81) (E11.51)   · Diabetes mellitus with stage 3 chronic kidney disease (250.40,585.3) (E11.22,N18.30)   · Diverticulitis of colon (562.11) (K57.32)   · Dizziness (780.4) (R42)   · DJD (degenerative joint disease) (715.90) (M19.90)   · Dyspnea on exertion (786.09) (R06.09)   · Elevated PSA (790.93) (R97.20)   · Encounter for Medicare annual wellness exam (V70.0) (Z00.00)   · Esophageal reflux (530.81) (K21.9)   · Fatigue (780.79) (R53.83)   · Flu vaccine need (V04.81) (Z23)   · Generalized abdominal pain (789.07) (R10.84)   · Greater trochanteric bursitis of both hips (726.5) (M70.61,M70.62)   · Ground glass opacity present on imaging of lung (793.19) (R91.8)   · Hospital discharge follow-up (V67.59) (Z09)   · Hyperlipemia (272.4) (E78.5)   · Hypertension (401.9) (I10)   · Hypokalemia (276.8) (E87.6)   · Hyponatremia (276.1) (E87.1)   · Impacted cerumen of both ears (380.4) (H61.23)   · IT band syndrome (728.89) (M76.30)   · Left inguinal hernia (550.90) (K40.90)   · Lumbar spondylosis (721.3) (M47.816)   · Muscle cramps (729.82) (R25.2)   · Myofascial pain syndrome (729.1) (M79.18)   · Need for pneumococcal vaccine (V03.82) (Z23)   · Normochromic normocytic anemia (285.9) (D64.9)   · Orthostatic hypotension (458.0) (I95.1)   · Osteoporosis (733.00) (M81.0)   · Pain in joint of left shoulder (719.41) (M25.512)   · Peripheral neuropathy (356.9) (G62.9)   · Pulmonary embolism (415.19) (I26.99)   · PVD (peripheral vascular disease) (443.9) (I73.9)   · Radiculopathy (729.2) (M54.10)   · Restless legs syndrome (333.94) (G25.81)   · S/P PTCA (percutaneous transluminal coronary angioplasty) (V45.82) (Z98.61)   ·  Sleep related hypoventilation/hypoxemia in conditions classifiable elsewhere (327.26)  (G47.36)   · Subjective pulsatile tinnitus of both ears (388.31) (H93.A3)   · 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1) (Z68.21)   · Resolved Date: 17 Jun 2021   · History of Depression with anxiety (300.4) (F41.8)   · Resolved Date: 13 Jan 2021   · History of arthritis (V13.4) (Z87.39)   · History of cardiac disorder (V12.50) (Z86.79)   · History of chronic kidney disease (V13.09) (Z87.448)   · Resolved Date: 16 Nov 2021   · History of hyperglycemia (V12.29) (Z86.39)   · Resolved Date: 29 Aug 2019   · History of nicotine dependence (V15.82) (Z87.891)   · Resolved Date: 16 Nov 2021   · History of Mild episode of recurrent major depressive disorder (296.31) (F33.0)   · Resolved Date: 13 Jan 2021   · History of Recurrent major depressive disorder, remission status unspecified (296.30)  (F33.9)   · Resolved Date: 14 Apr 2021   · History of Swelling Of Hands (719.04)   · Resolved Date: 29 Aug 2019   · History of Thoracic aortic aneurysm without rupture (441.2) (I71.20)   · Resolved Date: 29 Aug 2019    Current Meds    Medication Name Instruction   Amiodarone HCl - 100 MG Oral Tablet TAKE 1 TABLET BY MOUTH EVERY DAY AS DIRECTED   Atorvastatin Calcium 80 MG Oral Tablet TAKE 1 TABLET BY MOUTH EVERY DAY   Brilinta 90 MG Oral  Tablet TAKE 1 TABLET BY MOUTH TWICE A DAY AS DIRECTED   Calcium 600+D 600-200 MG-UNIT TABS TAKE 1 TABLET DAILY.   [employer] D3 125 MCG (5000 UT) Oral Capsule TAKE 1 CAPSULE BY MOUTH EVERY DAY   [employer]S Ear Drops 6.5 % Otic Solution Instill 5-10 drops in each ear twice daily for up to 4 days   Disability Placard Exp: 5 years   Eliquis 2.5 MG Oral Tablet Take 1 tablet twice a day   Escitalopram Oxalate 10 MG Oral Tablet TAKE 1 TABLET BY MOUTH EVERY DAY   Furosemide 40 MG Oral Tablet take 1 tablet by mouth every other day ALTERNATING WITH 1/2 TABLET EVERY OTHER DAY   OptiChamber [place] USE AS DIRECTED WITH INHALER   Potassium Chloride ER 20 MEQ Oral Tablet Extended Release TAKE 1 TABLET BY MOUTH EVERY DAY   rOPINIRole HCl - 1 MG Oral Tablet TAKE 1 TABLET BY MOUTH EVERYDAY AT BEDTIME   Stiolto Respimat 2.5-2.5 MCG/ACT Inhalation Aerosol Solution inhale 2 puffs once a day, at the same time every day   Tamsulosin HCl - 0.4 MG Oral Capsule Take 2 capsules by mouth at bedtime   Vitamin B-6 50 MG Oral Tablet TAKE 1 TABLET BY MOUTH EVERY DAY   Vitamin D3 125 MCG (5000 UT) Oral Capsule TAKE 1 CAPSULE Daily   Xopenex HFA 45 MCG/ACT Inhalation Aerosol INHALE 1 TO 2 PUFFS EVERY 4 TO 6 HOURS AS NEEDED AND AS DIRECTED.     Allergies  Medication    · Meloxicam TABS  Shortness of breath;; Recorded By: Rosa Chaidez; 10/23/2013 9:50:40 AM   · CeleBREX CAPS  Recorded By: Catherine Vaughan; 11/19/2014 11:03:20 AM    Family History  Mother    · Family history of cardiac disorder (V17.49) (Z82.49)   · Family history of malignant neoplasm (V16.9) (Z80.9)  Father    · Family history of cardiac disorder (V17.49) (Z82.49)    Social History  Problems    · Cigarette nicotine dependence (305.1) (F17.210)   · Current every day smoker (305.1) (F17.200)   · No alcohol use   · No illicit drug use    Vitals  Vital Signs    Recorded: [id number]    Heart Rate 72   Systolic 130   Diastolic 70   Height 5 ft 11 in   Weight 147 lb 5 oz   BMI Calculated 21.19 kg/m2              Physical Exam    Constitutional: alert and in no acute distress. Frail.   Eyes: no erythema, swelling or discharge from the eye .   Ears, Nose, Mouth, and Throat:  Examination of the teeth revealed poor dentition.   Neck: neck is supple, symmetric, trachea midline, no masses  and no thyromegaly .   Pulmonary: no increased work of breathing or signs of respiratory distress  and lungs clear to auscultation.    Cardiovascular: carotid pulses 2+ bilaterally with no bruit , JVP was normal, no thrills ,  an S3 was heard, an S4 was heard, pedal pulses 2+ bilaterally  and no edema  .   Pacemaker/ICD Implant site has healed without signs of infection.   Abdomen: abdomen non-tender, no masses  and no hepatomegaly .   Skin: skin warm and dry, normal skin turgor .   Psychiatric judgment and insight is normal  and oriented to person, place and time .      Results/Data  Ferritin, Serum 22May2023 01:33PM Non Ambulatory, Provider   Ordering Provider: TOLU GRAY [id number]     Test Name Result Flag Reference   Ferritin, Serum 38 ug/L  20 - 300     Iron + TIBC, Serum [id number] 01:33PM Non Ambulatory, Provider   Ordering Provider: TOLU GRAY [id number]     Test Name Result Flag Reference   Iron, Serum 45 ug/dL  35 - 150   Total Iron Binding Capacity 398 ug/dL  240 - 445   % Saturation 11 % L 25 - 45     Complete Blood Count + Differential 22May2023 01:31PM Karley Sullivan     Test Name Result Flag Reference   White Blood Cell Count 5.6 x10E9/L  4.4 - 11.3   SOURCE:   Red Blood Cell Count 4.16 x10E12/L L See Below   Reference Range: 4.50 - 5.90   Hemoglobin 12.7 g/dL L See Below   Reference Range: 13.5 - 17.5   HCT 40.5 % L See Below   Reference Range: 41.0 - 52.0   MCV 97 fL  80 - 100   MCHC 31.4 g/dL L See Below   Reference Range: 32.0 - 36.0   Platelet Count 208 x10E9/L  150 - 450   RDW-CV 14.5 %  See Below   Reference Range: 11.5 - 14.5   Neutrophil % 75.9 %  See Below   Reference Range: 40.0 - 80.0   % Automated Immature  Gran 0.4 %  0.0 - 0.9   Immature Granulocyte Count (IG) includes promyelocytes,    myelocytes and metamyelocytes but does not include bands.   Percent differential counts (%) should be interpreted in the   context of the absolute cell counts (cells/L).   Lymphocyte % 13.8 %  See Below   Reference Range: 13.0 - 44.0   Monocyte % 7.7 %  2.0 - 10.0   Eosinophil % 1.1 %  0.0 - 6.0   Basophil % 1.1 %  0.0 - 2.0   Neutrophil Count 4.26 x10E9/L  See Below   Reference Range: 1.60 - 5.50   Lymphocyte Count 0.77 x10E9/L L See Below   Reference Range: 0.80 - 3.00   Monocyte Count 0.43 x10E9/L  See Below   Reference Range: 0.05 - 0.80   Eosinophil Count 0.06 x10E9/L  See Below   Reference Range: 0.00 - 0.40   Basophil Count 0.06 x10E9/L  See Below   Reference Range: 0.00 - 0.10     TSH WITH REFLEX TO FREE T4 IF ABNORMAL 22May2023 01:Karley Lindsey     Test Name Result Flag Reference   Thyroid Stimulating Hormone, Serum 1.84 mIU/L  See Below   SOURCE:   Reference Range: 0.44 - 3.98   TSH testing is performed using different testing    methodology at [facility] than at other    [facility]. Direct result comparisons should    only be made within the same method.     Comprehensive Metabolic Panel 22May2023 01:Karley Lindsey     Test Name Result Flag Reference   Glucose, Serum 152 mg/dL H 74 - 99   SOURCE:   Sodium, Serum 141 mmol/L  136 - 145   POTASSIUM 4.3 mmol/L  3.5 - 5.3   Chloride, Serum 105 mmol/L  98 - 107   Bicarbonate, Serum 26 mmol/L  21 - 32   Anion Gap, Serum 14 mmol/L  10 - 20   Blood Urea Nitrogen, Serum 32 mg/dL H 6 - 23   CREATININE 1.74 mg/dL H See Below   Reference Range: 0.50 - 1.30   Calcium, Serum 9.4 mg/dL  8.6 - 10.3   Albumin, Serum 3.7 g/dL  3.4 - 5.0   ALKALINE PHOSPHATASE 61 U/L  33 - 136   Protein, Total Serum 5.7 g/dL L 6.4 - 8.2   Bilirubin, Serum Total 0.6 mg/dL  0.0 - 1.2   ALT (SGPT), Serum 35 U/L  10 - 52   Patients treated with Sulfasalazine may generate     falsely decreased results for ALT.   GFR MALE 40 mL/min/1.73m2 A >90   CALCULATIONS OF ESTIMATED GFR ARE PERFORMED   USING THE 2021 CKD-EPI STUDY REFIT EQUATION   WITHOUT THE RACE VARIABLE FOR THE IDMS-TRACEABLE   CREATININE METHODS.    [email or web address]://jasn.asnjournals.org/content/early/[email or web address]/[email or web address]   AST 49 U/L H 9 - 39     Lipid Panel [id number] 01:31PM Karley Sullivan     Test Name Result Flag Reference   Cholesterol, Serum 148 mg/dL  0 - 199   SOURCE:   .      AGE      DESIRABLE   BORDERLINE HIGH   HIGH     0-19 Y     0 - 169       170 - 199     >/= 200    20-24 Y     0 - 189       190 - 224     >/= 225         >24 Y     0 - 199       200 - 239     >/= 240   **All ranges are based on fasting samples. Specific   therapeutic targets will vary based on patient-specific   cardiac risk.  .   Pediatric guidelines reference:Pediatrics 2011, 128(S5).   Adult guidelines reference: NCEP ATPIII Guidelines,     SUMAYA 2001, 258:2486-97  .   Venipuncture immediately after or during the    administration of Metamizole may lead to falsely   low results. Testing should be performed immediately   prior to Metamizole dosing.   HDL Cholesterol, Serum 68.1 mg/dL     .      AGE      VERY LOW   LOW     NORMAL    HIGH       0-19 Y       < 35   < 40     40-45     ----    20-24 Y       ----   < 40       >45     ----      >24 Y       ----   < 40     40-60      >60  .   Cholesterol/HDL Ratio 2.2     REF VALUES  DESIRABLE  < 3.4  HIGH RISK  > 5.0   LDL, Level 70 mg/dL  0 - 99   .                           NEAR      BORD      AGE      DESIRABLE  OPTIMAL    HIGH     HIGH     VERY HIGH     0-19 Y     0 - 109     ---    110-129   >/= 130     ----    20-24 Y     0 - 119     ---    120-159   >/= 160     ----      >24 Y     0 -  99   100-129  130-159   160-189     >/=190  .   VLDL, Serum 10 mg/dL  0 - 40   Triglycerides, Serum 48 mg/dL  0 - 149   .      AGE      DESIRABLE   BORDERLINE HIGH   HIGH     VERY HIGH   0 D-90 D    19 - 174          ----         ----        ----  91 D- 9 Y     0 -  74        75 -  99     >/= 100      ----    10-19 Y     0 -  89        90 - 129     >/= 130      ----    20-24 Y     0 - 114       115 - 149     >/= 150      ----         >24 Y     0 - 149       150 - 199    200- 499    >/= 500  .   Venipuncture immediately after or during the    administration of Metamizole may lead to falsely   low results. Testing should be performed immediately   prior to Metamizole dosing.     Procedure    Echocardiogram 12/02/2019  CONCLUSIONS:  1. The left ventricular systolic function is severely decreased with a 20-25%  estimated ejection fraction.  2. Multiple segmental abnormalities exist. See findings.  3. There is mild aortic valve regurgitation.  4. The left ventricular cavity size is severely dilated.  5. There are multiple wall motion abnormalities.    [facility] 11/29/2014  Conclusions:  * There is severe coronary artery disease as described:  * -LAD has 80% stenosis, intra stent, with image suggestive of thrombus.  * -LCX and RCA have only mild diffuse disease.  * PCI with implant of GEORGIE, Resolute 2.75 x 22mm in the mid LAD,  intra-stent. The procedure was guided with OCT that reveals minimal NIH  in the distal portion of the previous stent, severe PATRICIA proliferation in  the mid stent and proximal aneurysmatic region at the level of the DDg2  branch. Based on these findings we opt for a shorter stent and preserve  the ostium of the Dg2.  * The stent was optimized , guided by OCT, with 3.5 mm NC balloon at 26  MILENA.         Impressions    CAD. AWMI due to delayed stent thrombosis s/p redo PCI with GEORGIE (11/29/2014) S/P PCI to LAD with BMS 10/2010 (after delayed stent thrombosis from remote PCI) Moderate LV systolic dysfunction. S/p ICD implantation ([facility] in [place] in 2012). Smoking. COPD. HTN. DLP. CKD. DJD. Latest EF 20-25%. AF with inappropriate ICD chock, reverted to NSR on amiodarone, pulmonary embolism OAC with apixaban. ICD HAILEY -  For replacement

## 2023-12-29 ENCOUNTER — HOME CARE VISIT (OUTPATIENT)
Dept: HOME HEALTH SERVICES | Facility: HOME HEALTH | Age: 78
End: 2023-12-29
Payer: MEDICARE

## 2023-12-29 VITALS
SYSTOLIC BLOOD PRESSURE: 110 MMHG | OXYGEN SATURATION: 98 % | HEART RATE: 72 BPM | TEMPERATURE: 98.5 F | RESPIRATION RATE: 22 BRPM | DIASTOLIC BLOOD PRESSURE: 60 MMHG

## 2023-12-29 LAB
ATRIAL RATE: 61 BPM
P AXIS: 81 DEGREES
P OFFSET: 169 MS
P ONSET: 123 MS
PR INTERVAL: 146 MS
Q ONSET: 196 MS
QRS COUNT: 10 BEATS
QRS DURATION: 124 MS
QT INTERVAL: 478 MS
QTC CALCULATION(BAZETT): 481 MS
QTC FREDERICIA: 480 MS
R AXIS: -78 DEGREES
T AXIS: 92 DEGREES
T OFFSET: 435 MS
VENTRICULAR RATE: 61 BPM

## 2023-12-29 PROCEDURE — G0152 HHCP-SERV OF OT,EA 15 MIN: HCPCS | Mod: HHH

## 2023-12-29 PROCEDURE — 1090000002 HH PPS REVENUE DEBIT

## 2023-12-29 PROCEDURE — 1090000001 HH PPS REVENUE CREDIT

## 2023-12-29 ASSESSMENT — ENCOUNTER SYMPTOMS
PAIN LOCATION: GENERALIZED
LOWEST PAIN SEVERITY IN PAST 24 HOURS: 5/10
HIGHEST PAIN SEVERITY IN PAST 24 HOURS: 8/10
PAIN LOCATION - PAIN SEVERITY: 8/10
PAIN: 1
PERSON REPORTING PAIN: PATIENT
SUBJECTIVE PAIN PROGRESSION: UNCHANGED
PAIN SEVERITY GOAL: 0/10
PAIN LOCATION - RELIEVING FACTORS: REST, PAIN MEDS

## 2023-12-29 ASSESSMENT — ACTIVITIES OF DAILY LIVING (ADL): DRESSING_LB_CURRENT_FUNCTION: SUPERVISION

## 2023-12-30 ENCOUNTER — HOME CARE VISIT (OUTPATIENT)
Dept: HOME HEALTH SERVICES | Facility: HOME HEALTH | Age: 78
End: 2023-12-30
Payer: MEDICARE

## 2023-12-30 LAB
ATRIAL RATE: 101 BPM
ATRIAL RATE: 79 BPM
P AXIS: 49 DEGREES
P AXIS: 75 DEGREES
P OFFSET: 179 MS
P OFFSET: 202 MS
P ONSET: 115 MS
P ONSET: 136 MS
PR INTERVAL: 142 MS
PR INTERVAL: 146 MS
Q ONSET: 188 MS
Q ONSET: 207 MS
QRS COUNT: 13 BEATS
QRS COUNT: 17 BEATS
QRS DURATION: 140 MS
QRS DURATION: 144 MS
QT INTERVAL: 388 MS
QT INTERVAL: 454 MS
QTC CALCULATION(BAZETT): 503 MS
QTC CALCULATION(BAZETT): 520 MS
QTC FREDERICIA: 461 MS
QTC FREDERICIA: 497 MS
R AXIS: -58 DEGREES
R AXIS: -70 DEGREES
T AXIS: 87 DEGREES
T AXIS: 95 DEGREES
T OFFSET: 382 MS
T OFFSET: 434 MS
VENTRICULAR RATE: 101 BPM
VENTRICULAR RATE: 79 BPM

## 2023-12-30 PROCEDURE — 1090000001 HH PPS REVENUE CREDIT

## 2023-12-30 PROCEDURE — 1090000002 HH PPS REVENUE DEBIT

## 2023-12-31 ENCOUNTER — APPOINTMENT (OUTPATIENT)
Dept: CARDIOLOGY | Facility: HOSPITAL | Age: 78
DRG: 291 | End: 2023-12-31
Payer: MEDICARE

## 2023-12-31 ENCOUNTER — APPOINTMENT (OUTPATIENT)
Dept: RADIOLOGY | Facility: HOSPITAL | Age: 78
DRG: 291 | End: 2023-12-31
Payer: MEDICARE

## 2023-12-31 ENCOUNTER — HOSPITAL ENCOUNTER (INPATIENT)
Facility: HOSPITAL | Age: 78
LOS: 1 days | Discharge: HOME | DRG: 291 | End: 2024-01-01
Attending: EMERGENCY MEDICINE | Admitting: INTERNAL MEDICINE
Payer: MEDICARE

## 2023-12-31 DIAGNOSIS — J96.20 ACUTE AND CHR RESP FAILURE, UNSP W HYPOXIA OR HYPERCAPNIA (MULTI): Primary | ICD-10-CM

## 2023-12-31 DIAGNOSIS — I50.20 SYSTOLIC CONGESTIVE HEART FAILURE, UNSPECIFIED HF CHRONICITY (MULTI): ICD-10-CM

## 2023-12-31 DIAGNOSIS — R79.89 ELEVATED TROPONIN: ICD-10-CM

## 2023-12-31 DIAGNOSIS — J44.1 COPD EXACERBATION (MULTI): ICD-10-CM

## 2023-12-31 DIAGNOSIS — K57.32 DIVERTICULITIS OF COLON: ICD-10-CM

## 2023-12-31 DIAGNOSIS — I50.23 ACUTE ON CHRONIC SYSTOLIC CONGESTIVE HEART FAILURE (MULTI): ICD-10-CM

## 2023-12-31 DIAGNOSIS — J41.8 MIXED SIMPLE AND MUCOPURULENT CHRONIC BRONCHITIS (MULTI): ICD-10-CM

## 2023-12-31 PROBLEM — I50.9 HEART FAILURE (MULTI): Status: ACTIVE | Noted: 2023-12-31

## 2023-12-31 LAB
ALBUMIN SERPL BCP-MCNC: 4.2 G/DL (ref 3.4–5)
ALP SERPL-CCNC: 91 U/L (ref 33–136)
ALT SERPL W P-5'-P-CCNC: 23 U/L (ref 10–52)
ANION GAP BLDA CALCULATED.4IONS-SCNC: 11 MMO/L (ref 10–25)
ANION GAP BLDV CALCULATED.4IONS-SCNC: 10 MMOL/L (ref 10–25)
ANION GAP SERPL CALC-SCNC: 13 MMOL/L (ref 10–20)
ARTERIAL PATENCY WRIST A: POSITIVE
AST SERPL W P-5'-P-CCNC: 26 U/L (ref 9–39)
BASE EXCESS BLDA CALC-SCNC: -0.1 MMOL/L (ref -2–3)
BASE EXCESS BLDV CALC-SCNC: 2.8 MMOL/L (ref -2–3)
BASE EXCESS BLDV CALC-SCNC: 2.9 MMOL/L (ref -2–3)
BASOPHILS # BLD AUTO: 0.09 X10*3/UL (ref 0–0.1)
BASOPHILS NFR BLD AUTO: 0.7 %
BILIRUB SERPL-MCNC: 0.6 MG/DL (ref 0–1.2)
BNP SERPL-MCNC: 1724 PG/ML (ref 0–99)
BODY TEMPERATURE: ABNORMAL
BUN SERPL-MCNC: 26 MG/DL (ref 6–23)
CA-I BLDA-SCNC: 1.27 MMOL/L (ref 1.1–1.33)
CA-I BLDV-SCNC: 1.17 MMOL/L (ref 1.1–1.33)
CALCIUM SERPL-MCNC: 9.8 MG/DL (ref 8.6–10.3)
CARDIAC TROPONIN I PNL SERPL HS: 60 NG/L (ref 0–20)
CARDIAC TROPONIN I PNL SERPL HS: 63 NG/L (ref 0–20)
CHLORIDE BLDA-SCNC: 107 MMOL/L (ref 98–107)
CHLORIDE BLDV-SCNC: 108 MMOL/L (ref 98–107)
CHLORIDE SERPL-SCNC: 107 MMOL/L (ref 98–107)
CO2 SERPL-SCNC: 29 MMOL/L (ref 21–32)
CREAT SERPL-MCNC: 1.81 MG/DL (ref 0.5–1.3)
EOSINOPHIL # BLD AUTO: 0.04 X10*3/UL (ref 0–0.4)
EOSINOPHIL NFR BLD AUTO: 0.3 %
ERYTHROCYTE [DISTWIDTH] IN BLOOD BY AUTOMATED COUNT: 16.5 % (ref 11.5–14.5)
FLUAV RNA RESP QL NAA+PROBE: NOT DETECTED
FLUBV RNA RESP QL NAA+PROBE: NOT DETECTED
GFR SERPL CREATININE-BSD FRML MDRD: 38 ML/MIN/1.73M*2
GLUCOSE BLDA-MCNC: 207 MG/DL (ref 74–99)
GLUCOSE BLDV-MCNC: 174 MG/DL (ref 74–99)
GLUCOSE SERPL-MCNC: 192 MG/DL (ref 74–99)
HCO3 BLDA-SCNC: 25.9 MMOL/L (ref 22–26)
HCO3 BLDV-SCNC: 27.9 MMOL/L (ref 22–26)
HCO3 BLDV-SCNC: 30.1 MMOL/L (ref 22–26)
HCT VFR BLD AUTO: 40.3 % (ref 41–52)
HCT VFR BLD EST: 30 % (ref 41–52)
HCT VFR BLD EST: 36 % (ref 41–52)
HGB BLD-MCNC: 12.2 G/DL (ref 13.5–17.5)
HGB BLDA-MCNC: 11.9 G/DL (ref 13.5–17.5)
HGB BLDV-MCNC: 10.1 G/DL (ref 13.5–17.5)
HOLD SPECIMEN: NORMAL
HOLD SPECIMEN: NORMAL
IMM GRANULOCYTES # BLD AUTO: 0.1 X10*3/UL (ref 0–0.5)
IMM GRANULOCYTES NFR BLD AUTO: 0.8 % (ref 0–0.9)
INHALED O2 CONCENTRATION: 100 %
INHALED O2 CONCENTRATION: 40 %
INHALED O2 CONCENTRATION: 50 %
LACTATE BLDA-SCNC: 1.3 MMOL/L (ref 0.4–2)
LACTATE BLDV-SCNC: 1.5 MMOL/L (ref 0.4–2)
LACTATE SERPL-SCNC: 1.6 MMOL/L (ref 0.4–2)
LYMPHOCYTES # BLD AUTO: 0.54 X10*3/UL (ref 0.8–3)
LYMPHOCYTES NFR BLD AUTO: 4.4 %
MAGNESIUM SERPL-MCNC: 5.05 MG/DL (ref 1.6–2.4)
MCH RBC QN AUTO: 29.6 PG (ref 26–34)
MCHC RBC AUTO-ENTMCNC: 30.3 G/DL (ref 32–36)
MCV RBC AUTO: 98 FL (ref 80–100)
MONOCYTES # BLD AUTO: 0.59 X10*3/UL (ref 0.05–0.8)
MONOCYTES NFR BLD AUTO: 4.8 %
NEUTROPHILS # BLD AUTO: 10.88 X10*3/UL (ref 1.6–5.5)
NEUTROPHILS NFR BLD AUTO: 89 %
NRBC BLD-RTO: 0 /100 WBCS (ref 0–0)
OXYHGB MFR BLDA: 96.2 % (ref 94–98)
OXYHGB MFR BLDV: 36.8 % (ref 45–75)
OXYHGB MFR BLDV: 75.8 % (ref 45–75)
PCO2 BLDA: 47 MM HG (ref 38–42)
PCO2 BLDV: 44 MM HG (ref 41–51)
PCO2 BLDV: 57 MM HG (ref 41–51)
PH BLDA: 7.35 PH (ref 7.38–7.42)
PH BLDV: 7.33 PH (ref 7.33–7.43)
PH BLDV: 7.41 PH (ref 7.33–7.43)
PLATELET # BLD AUTO: 325 X10*3/UL (ref 150–450)
PO2 BLDA: 164 MM HG (ref 85–95)
PO2 BLDV: 29 MM HG (ref 35–45)
PO2 BLDV: 47 MM HG (ref 35–45)
POTASSIUM BLDA-SCNC: 4.8 MMOL/L (ref 3.5–5.3)
POTASSIUM BLDV-SCNC: 4.1 MMOL/L (ref 3.5–5.3)
POTASSIUM SERPL-SCNC: 4.8 MMOL/L (ref 3.5–5.3)
PROT SERPL-MCNC: 6.7 G/DL (ref 6.4–8.2)
RBC # BLD AUTO: 4.12 X10*6/UL (ref 4.5–5.9)
SAO2 % BLDA: 99 % (ref 94–100)
SAO2 % BLDV: 37 % (ref 45–75)
SAO2 % BLDV: 77 % (ref 45–75)
SARS-COV-2 RNA RESP QL NAA+PROBE: NOT DETECTED
SODIUM BLDA-SCNC: 139 MMOL/L (ref 136–145)
SODIUM BLDV-SCNC: 142 MMOL/L (ref 136–145)
SODIUM SERPL-SCNC: 144 MMOL/L (ref 136–145)
SPECIMEN DRAWN FROM PATIENT: ABNORMAL
TEST COMMENT: ABNORMAL
WBC # BLD AUTO: 12.2 X10*3/UL (ref 4.4–11.3)

## 2023-12-31 PROCEDURE — 36415 COLL VENOUS BLD VENIPUNCTURE: CPT | Performed by: EMERGENCY MEDICINE

## 2023-12-31 PROCEDURE — 96374 THER/PROPH/DIAG INJ IV PUSH: CPT | Mod: IPSPLIT

## 2023-12-31 PROCEDURE — 94762 N-INVAS EAR/PLS OXIMTRY CONT: CPT | Mod: IPSPLIT

## 2023-12-31 PROCEDURE — 96375 TX/PRO/DX INJ NEW DRUG ADDON: CPT | Mod: IPSPLIT

## 2023-12-31 PROCEDURE — 2020000001 HC ICU ROOM DAILY: Mod: IPSPLIT

## 2023-12-31 PROCEDURE — 2500000005 HC RX 250 GENERAL PHARMACY W/O HCPCS: Mod: IPSPLIT | Performed by: INTERNAL MEDICINE

## 2023-12-31 PROCEDURE — 93005 ELECTROCARDIOGRAM TRACING: CPT

## 2023-12-31 PROCEDURE — 84132 ASSAY OF SERUM POTASSIUM: CPT | Performed by: EMERGENCY MEDICINE

## 2023-12-31 PROCEDURE — 2500000002 HC RX 250 W HCPCS SELF ADMINISTERED DRUGS (ALT 637 FOR MEDICARE OP, ALT 636 FOR OP/ED): Mod: IPSPLIT | Performed by: NURSE PRACTITIONER

## 2023-12-31 PROCEDURE — 83735 ASSAY OF MAGNESIUM: CPT | Performed by: EMERGENCY MEDICINE

## 2023-12-31 PROCEDURE — 82805 BLOOD GASES W/O2 SATURATION: CPT | Performed by: EMERGENCY MEDICINE

## 2023-12-31 PROCEDURE — 99223 1ST HOSP IP/OBS HIGH 75: CPT | Performed by: NURSE PRACTITIONER

## 2023-12-31 PROCEDURE — 83605 ASSAY OF LACTIC ACID: CPT | Performed by: EMERGENCY MEDICINE

## 2023-12-31 PROCEDURE — 2500000002 HC RX 250 W HCPCS SELF ADMINISTERED DRUGS (ALT 637 FOR MEDICARE OP, ALT 636 FOR OP/ED): Mod: IPSPLIT | Performed by: INTERNAL MEDICINE

## 2023-12-31 PROCEDURE — 85025 COMPLETE CBC W/AUTO DIFF WBC: CPT | Performed by: EMERGENCY MEDICINE

## 2023-12-31 PROCEDURE — 87636 SARSCOV2 & INF A&B AMP PRB: CPT | Performed by: EMERGENCY MEDICINE

## 2023-12-31 PROCEDURE — 9420000001 HC RT PATIENT EDUCATION 5 MIN: Mod: IPSPLIT

## 2023-12-31 PROCEDURE — 9420000001 HC RT PATIENT EDUCATION 5 MIN

## 2023-12-31 PROCEDURE — 1090000002 HH PPS REVENUE DEBIT

## 2023-12-31 PROCEDURE — 2500000004 HC RX 250 GENERAL PHARMACY W/ HCPCS (ALT 636 FOR OP/ED)

## 2023-12-31 PROCEDURE — 84132 ASSAY OF SERUM POTASSIUM: CPT | Mod: IPSPLIT | Performed by: NURSE PRACTITIONER

## 2023-12-31 PROCEDURE — 2500000004 HC RX 250 GENERAL PHARMACY W/ HCPCS (ALT 636 FOR OP/ED): Performed by: EMERGENCY MEDICINE

## 2023-12-31 PROCEDURE — 2500000001 HC RX 250 WO HCPCS SELF ADMINISTERED DRUGS (ALT 637 FOR MEDICARE OP)

## 2023-12-31 PROCEDURE — 99291 CRITICAL CARE FIRST HOUR: CPT | Performed by: EMERGENCY MEDICINE

## 2023-12-31 PROCEDURE — 71045 X-RAY EXAM CHEST 1 VIEW: CPT | Mod: FOREIGN READ | Performed by: RADIOLOGY

## 2023-12-31 PROCEDURE — 84132 ASSAY OF SERUM POTASSIUM: CPT | Mod: 59 | Performed by: EMERGENCY MEDICINE

## 2023-12-31 PROCEDURE — 71045 X-RAY EXAM CHEST 1 VIEW: CPT

## 2023-12-31 PROCEDURE — 94640 AIRWAY INHALATION TREATMENT: CPT | Mod: IPSPLIT

## 2023-12-31 PROCEDURE — 94660 CPAP INITIATION&MGMT: CPT | Mod: IPSPLIT

## 2023-12-31 PROCEDURE — 83880 ASSAY OF NATRIURETIC PEPTIDE: CPT | Performed by: EMERGENCY MEDICINE

## 2023-12-31 PROCEDURE — 36600 WITHDRAWAL OF ARTERIAL BLOOD: CPT

## 2023-12-31 PROCEDURE — 1090000001 HH PPS REVENUE CREDIT

## 2023-12-31 PROCEDURE — 84484 ASSAY OF TROPONIN QUANT: CPT | Performed by: EMERGENCY MEDICINE

## 2023-12-31 PROCEDURE — 2500000004 HC RX 250 GENERAL PHARMACY W/ HCPCS (ALT 636 FOR OP/ED): Mod: IPSPLIT | Performed by: NURSE PRACTITIONER

## 2023-12-31 PROCEDURE — 94660 CPAP INITIATION&MGMT: CPT

## 2023-12-31 PROCEDURE — 94760 N-INVAS EAR/PLS OXIMETRY 1: CPT

## 2023-12-31 PROCEDURE — 2500000001 HC RX 250 WO HCPCS SELF ADMINISTERED DRUGS (ALT 637 FOR MEDICARE OP): Mod: IPSPLIT | Performed by: NURSE PRACTITIONER

## 2023-12-31 RX ORDER — FUROSEMIDE 10 MG/ML
40 INJECTION INTRAMUSCULAR; INTRAVENOUS ONCE
Status: COMPLETED | OUTPATIENT
Start: 2023-12-31 | End: 2023-12-31

## 2023-12-31 RX ORDER — ALBUTEROL SULFATE 1.25 MG/3ML
1.25 SOLUTION RESPIRATORY (INHALATION) EVERY 4 HOURS PRN
Status: DISCONTINUED | OUTPATIENT
Start: 2023-12-31 | End: 2023-12-31

## 2023-12-31 RX ORDER — FLUTICASONE FUROATE AND VILANTEROL 200; 25 UG/1; UG/1
1 POWDER RESPIRATORY (INHALATION)
Status: DISCONTINUED | OUTPATIENT
Start: 2023-12-31 | End: 2024-01-01 | Stop reason: HOSPADM

## 2023-12-31 RX ORDER — ESCITALOPRAM OXALATE 10 MG/1
10 TABLET ORAL DAILY
Status: DISCONTINUED | OUTPATIENT
Start: 2023-12-31 | End: 2024-01-01 | Stop reason: HOSPADM

## 2023-12-31 RX ORDER — AZITHROMYCIN 250 MG/1
500 TABLET, FILM COATED ORAL
Status: DISCONTINUED | OUTPATIENT
Start: 2023-12-31 | End: 2023-12-31

## 2023-12-31 RX ORDER — ONDANSETRON HYDROCHLORIDE 2 MG/ML
4 INJECTION, SOLUTION INTRAVENOUS EVERY 8 HOURS PRN
Status: DISCONTINUED | OUTPATIENT
Start: 2023-12-31 | End: 2024-01-01 | Stop reason: HOSPADM

## 2023-12-31 RX ORDER — TALC
3 POWDER (GRAM) TOPICAL DAILY
Status: DISCONTINUED | OUTPATIENT
Start: 2023-12-31 | End: 2024-01-01 | Stop reason: HOSPADM

## 2023-12-31 RX ORDER — FUROSEMIDE 10 MG/ML
INJECTION INTRAMUSCULAR; INTRAVENOUS
Status: COMPLETED
Start: 2023-12-31 | End: 2023-12-31

## 2023-12-31 RX ORDER — ROPINIROLE 1 MG/1
1 TABLET, FILM COATED ORAL NIGHTLY
Status: DISCONTINUED | OUTPATIENT
Start: 2023-12-31 | End: 2024-01-01 | Stop reason: HOSPADM

## 2023-12-31 RX ORDER — POTASSIUM CHLORIDE 20 MEQ/1
20 TABLET, EXTENDED RELEASE ORAL DAILY
Status: DISCONTINUED | OUTPATIENT
Start: 2023-12-31 | End: 2024-01-01 | Stop reason: HOSPADM

## 2023-12-31 RX ORDER — IPRATROPIUM BROMIDE AND ALBUTEROL SULFATE 2.5; .5 MG/3ML; MG/3ML
3 SOLUTION RESPIRATORY (INHALATION) EVERY 2 HOUR PRN
Status: DISCONTINUED | OUTPATIENT
Start: 2023-12-31 | End: 2024-01-01 | Stop reason: HOSPADM

## 2023-12-31 RX ORDER — ONDANSETRON 4 MG/1
4 TABLET, ORALLY DISINTEGRATING ORAL EVERY 8 HOURS PRN
Status: DISCONTINUED | OUTPATIENT
Start: 2023-12-31 | End: 2024-01-01 | Stop reason: HOSPADM

## 2023-12-31 RX ORDER — TAMSULOSIN HYDROCHLORIDE 0.4 MG/1
0.8 CAPSULE ORAL NIGHTLY
Status: DISCONTINUED | OUTPATIENT
Start: 2023-12-31 | End: 2024-01-01 | Stop reason: HOSPADM

## 2023-12-31 RX ORDER — ACETAMINOPHEN 160 MG/5ML
650 SOLUTION ORAL EVERY 4 HOURS PRN
Status: DISCONTINUED | OUTPATIENT
Start: 2023-12-31 | End: 2024-01-01 | Stop reason: HOSPADM

## 2023-12-31 RX ORDER — POLYETHYLENE GLYCOL 3350 17 G/17G
17 POWDER, FOR SOLUTION ORAL DAILY
Status: DISCONTINUED | OUTPATIENT
Start: 2023-12-31 | End: 2024-01-01 | Stop reason: HOSPADM

## 2023-12-31 RX ORDER — IPRATROPIUM BROMIDE AND ALBUTEROL SULFATE 2.5; .5 MG/3ML; MG/3ML
3 SOLUTION RESPIRATORY (INHALATION) 4 TIMES DAILY
Status: DISCONTINUED | OUTPATIENT
Start: 2023-12-31 | End: 2024-01-01 | Stop reason: HOSPADM

## 2023-12-31 RX ORDER — FUROSEMIDE 10 MG/ML
40 INJECTION INTRAMUSCULAR; INTRAVENOUS ONCE
Status: COMPLETED | OUTPATIENT
Start: 2024-01-01 | End: 2024-01-01

## 2023-12-31 RX ORDER — METOPROLOL SUCCINATE 25 MG/1
12.5 TABLET, EXTENDED RELEASE ORAL DAILY
Status: DISCONTINUED | OUTPATIENT
Start: 2023-12-31 | End: 2024-01-01 | Stop reason: HOSPADM

## 2023-12-31 RX ORDER — CHOLECALCIFEROL (VITAMIN D3) 25 MCG
2000 TABLET ORAL DAILY
Status: DISCONTINUED | OUTPATIENT
Start: 2023-12-31 | End: 2024-01-01 | Stop reason: HOSPADM

## 2023-12-31 RX ORDER — ACETAMINOPHEN 325 MG/1
650 TABLET ORAL EVERY 4 HOURS PRN
Status: DISCONTINUED | OUTPATIENT
Start: 2023-12-31 | End: 2024-01-01 | Stop reason: HOSPADM

## 2023-12-31 RX ORDER — BUMETANIDE 1 MG/1
0.5 TABLET ORAL DAILY
Status: DISCONTINUED | OUTPATIENT
Start: 2023-12-31 | End: 2024-01-01 | Stop reason: HOSPADM

## 2023-12-31 RX ORDER — LANOLIN ALCOHOL/MO/W.PET/CERES
50 CREAM (GRAM) TOPICAL DAILY
Status: DISCONTINUED | OUTPATIENT
Start: 2023-12-31 | End: 2024-01-01 | Stop reason: HOSPADM

## 2023-12-31 RX ORDER — ACETAMINOPHEN 650 MG/1
650 SUPPOSITORY RECTAL EVERY 4 HOURS PRN
Status: DISCONTINUED | OUTPATIENT
Start: 2023-12-31 | End: 2024-01-01 | Stop reason: HOSPADM

## 2023-12-31 RX ORDER — ATORVASTATIN CALCIUM 40 MG/1
80 TABLET, FILM COATED ORAL NIGHTLY
Status: DISCONTINUED | OUTPATIENT
Start: 2023-12-31 | End: 2024-01-01 | Stop reason: HOSPADM

## 2023-12-31 RX ORDER — AMIODARONE HYDROCHLORIDE 200 MG/1
200 TABLET ORAL DAILY
Status: DISCONTINUED | OUTPATIENT
Start: 2023-12-31 | End: 2024-01-01 | Stop reason: HOSPADM

## 2023-12-31 RX ORDER — IPRATROPIUM BROMIDE AND ALBUTEROL SULFATE 2.5; .5 MG/3ML; MG/3ML
3 SOLUTION RESPIRATORY (INHALATION)
Status: DISCONTINUED | OUTPATIENT
Start: 2023-12-31 | End: 2023-12-31

## 2023-12-31 RX ORDER — FINASTERIDE 5 MG/1
5 TABLET, FILM COATED ORAL DAILY
Status: DISCONTINUED | OUTPATIENT
Start: 2023-12-31 | End: 2024-01-01 | Stop reason: HOSPADM

## 2023-12-31 RX ADMIN — FUROSEMIDE 40 MG: 10 INJECTION INTRAMUSCULAR; INTRAVENOUS at 08:37

## 2023-12-31 RX ADMIN — NITROGLYCERIN 0.5 INCH: 20 OINTMENT TOPICAL at 08:37

## 2023-12-31 RX ADMIN — MELATONIN TAB 3 MG 3 MG: 3 TAB at 20:06

## 2023-12-31 RX ADMIN — APIXABAN 2.5 MG: 2.5 TABLET, FILM COATED ORAL at 15:54

## 2023-12-31 RX ADMIN — TICAGRELOR 90 MG: 90 TABLET ORAL at 15:55

## 2023-12-31 RX ADMIN — ATORVASTATIN CALCIUM 80 MG: 40 TABLET, FILM COATED ORAL at 20:06

## 2023-12-31 RX ADMIN — METHYLPREDNISOLONE SODIUM SUCCINATE 40 MG: 40 INJECTION, POWDER, FOR SOLUTION INTRAMUSCULAR; INTRAVENOUS at 16:11

## 2023-12-31 RX ADMIN — ACETAMINOPHEN 650 MG: 325 TABLET ORAL at 20:05

## 2023-12-31 RX ADMIN — IPRATROPIUM BROMIDE AND ALBUTEROL SULFATE 3 ML: .5; 3 SOLUTION RESPIRATORY (INHALATION) at 20:14

## 2023-12-31 RX ADMIN — Medication 6 L/MIN: at 16:15

## 2023-12-31 RX ADMIN — IPRATROPIUM BROMIDE AND ALBUTEROL SULFATE 3 ML: .5; 3 SOLUTION RESPIRATORY (INHALATION) at 16:20

## 2023-12-31 RX ADMIN — TAMSULOSIN HYDROCHLORIDE 0.8 MG: 0.4 CAPSULE ORAL at 20:06

## 2023-12-31 RX ADMIN — AMIODARONE HYDROCHLORIDE 200 MG: 200 TABLET ORAL at 15:55

## 2023-12-31 RX ADMIN — BUMETANIDE 0.5 MG: 1 TABLET ORAL at 15:55

## 2023-12-31 RX ADMIN — FINASTERIDE 5 MG: 5 TABLET, FILM COATED ORAL at 15:55

## 2023-12-31 RX ADMIN — FUROSEMIDE 40 MG: 10 INJECTION, SOLUTION INTRAVENOUS at 08:37

## 2023-12-31 RX ADMIN — METHYLPREDNISOLONE SODIUM SUCCINATE 125 MG: 125 INJECTION, POWDER, FOR SOLUTION INTRAMUSCULAR; INTRAVENOUS at 06:51

## 2023-12-31 RX ADMIN — ROPINIROLE HYDROCHLORIDE 1 MG: 1 TABLET, FILM COATED ORAL at 20:06

## 2023-12-31 RX ADMIN — METHYLPREDNISOLONE SODIUM SUCCINATE 40 MG: 40 INJECTION, POWDER, FOR SOLUTION INTRAMUSCULAR; INTRAVENOUS at 22:30

## 2023-12-31 SDOH — SOCIAL STABILITY: SOCIAL INSECURITY: DO YOU FEEL ANYONE HAS EXPLOITED OR TAKEN ADVANTAGE OF YOU FINANCIALLY OR OF YOUR PERSONAL PROPERTY?: NO

## 2023-12-31 SDOH — SOCIAL STABILITY: SOCIAL INSECURITY: ABUSE: ADULT

## 2023-12-31 SDOH — SOCIAL STABILITY: SOCIAL INSECURITY: WERE YOU ABLE TO COMPLETE ALL THE BEHAVIORAL HEALTH SCREENINGS?: YES

## 2023-12-31 SDOH — SOCIAL STABILITY: SOCIAL INSECURITY: HAVE YOU HAD THOUGHTS OF HARMING ANYONE ELSE?: NO

## 2023-12-31 SDOH — SOCIAL STABILITY: SOCIAL INSECURITY: HAS ANYONE EVER THREATENED TO HURT YOUR FAMILY OR YOUR PETS?: NO

## 2023-12-31 SDOH — SOCIAL STABILITY: SOCIAL INSECURITY: ARE YOU OR HAVE YOU BEEN THREATENED OR ABUSED PHYSICALLY, EMOTIONALLY, OR SEXUALLY BY ANYONE?: NO

## 2023-12-31 SDOH — SOCIAL STABILITY: SOCIAL INSECURITY: DOES ANYONE TRY TO KEEP YOU FROM HAVING/CONTACTING OTHER FRIENDS OR DOING THINGS OUTSIDE YOUR HOME?: NO

## 2023-12-31 SDOH — SOCIAL STABILITY: SOCIAL INSECURITY: DO YOU FEEL UNSAFE GOING BACK TO THE PLACE WHERE YOU ARE LIVING?: NO

## 2023-12-31 SDOH — SOCIAL STABILITY: SOCIAL INSECURITY: ARE THERE ANY APPARENT SIGNS OF INJURIES/BEHAVIORS THAT COULD BE RELATED TO ABUSE/NEGLECT?: NO

## 2023-12-31 ASSESSMENT — ACTIVITIES OF DAILY LIVING (ADL)
HEARING - RIGHT EAR: FUNCTIONAL
ASSISTIVE_DEVICE: WALKER;CANE
TOILETING: NEEDS ASSISTANCE
DRESSING YOURSELF: NEEDS ASSISTANCE
ADEQUATE_TO_COMPLETE_ADL: YES
GROOMING: NEEDS ASSISTANCE
PATIENT'S MEMORY ADEQUATE TO SAFELY COMPLETE DAILY ACTIVITIES?: YES
WALKS IN HOME: NEEDS ASSISTANCE
FEEDING YOURSELF: INDEPENDENT
JUDGMENT_ADEQUATE_SAFELY_COMPLETE_DAILY_ACTIVITIES: YES
BATHING: NEEDS ASSISTANCE
HEARING - LEFT EAR: FUNCTIONAL
LACK_OF_TRANSPORTATION: NO

## 2023-12-31 ASSESSMENT — COGNITIVE AND FUNCTIONAL STATUS - GENERAL
WALKING IN HOSPITAL ROOM: A LITTLE
TOILETING: A LITTLE
PERSONAL GROOMING: A LITTLE
TOILETING: A LITTLE
DRESSING REGULAR LOWER BODY CLOTHING: A LITTLE
MOBILITY SCORE: 20
WALKING IN HOSPITAL ROOM: A LITTLE
CLIMB 3 TO 5 STEPS WITH RAILING: A LITTLE
MOBILITY SCORE: 21
STANDING UP FROM CHAIR USING ARMS: A LITTLE
CLIMB 3 TO 5 STEPS WITH RAILING: A LITTLE
DRESSING REGULAR UPPER BODY CLOTHING: A LITTLE
HELP NEEDED FOR BATHING: A LITTLE
MOVING TO AND FROM BED TO CHAIR: A LITTLE
DAILY ACTIVITIY SCORE: 19
PERSONAL GROOMING: A LITTLE
HELP NEEDED FOR BATHING: A LITTLE
DRESSING REGULAR UPPER BODY CLOTHING: A LITTLE
DAILY ACTIVITIY SCORE: 19
STANDING UP FROM CHAIR USING ARMS: A LITTLE
PATIENT BASELINE BEDBOUND: NO
DRESSING REGULAR LOWER BODY CLOTHING: A LITTLE

## 2023-12-31 ASSESSMENT — PAIN SCALES - GENERAL
PAINLEVEL_OUTOF10: 6
PAINLEVEL_OUTOF10: 2
PAINLEVEL_OUTOF10: 3
PAINLEVEL_OUTOF10: 5 - MODERATE PAIN

## 2023-12-31 ASSESSMENT — PAIN - FUNCTIONAL ASSESSMENT
PAIN_FUNCTIONAL_ASSESSMENT: 0-10

## 2023-12-31 ASSESSMENT — LIFESTYLE VARIABLES
SKIP TO QUESTIONS 9-10: 1
HOW OFTEN DO YOU HAVE 6 OR MORE DRINKS ON ONE OCCASION: NEVER
HOW MANY STANDARD DRINKS CONTAINING ALCOHOL DO YOU HAVE ON A TYPICAL DAY: PATIENT DOES NOT DRINK
AUDIT-C TOTAL SCORE: 0
AUDIT-C TOTAL SCORE: 0
HOW OFTEN DO YOU HAVE A DRINK CONTAINING ALCOHOL: NEVER

## 2023-12-31 ASSESSMENT — PATIENT HEALTH QUESTIONNAIRE - PHQ9
1. LITTLE INTEREST OR PLEASURE IN DOING THINGS: NOT AT ALL
2. FEELING DOWN, DEPRESSED OR HOPELESS: NOT AT ALL
SUM OF ALL RESPONSES TO PHQ9 QUESTIONS 1 & 2: 0

## 2023-12-31 ASSESSMENT — PAIN DESCRIPTION - DESCRIPTORS: DESCRIPTORS: ACHING

## 2023-12-31 ASSESSMENT — PAIN DESCRIPTION - LOCATION: LOCATION: BACK

## 2023-12-31 NOTE — H&P
History of Present Illness  Andrez Damon is a 78 y.o. male  with PMHx significant for advanced COPD, systolic heart failure, and chronically wears oxygen of 3 liters at home who presented to [facility] ED 2/2 increased difficulty breathing one day ago.  Denies abdominal pain, nausea, vomiting. Denies ill contacts, recent sickness, changes in medication, recent travel, recent antibiotics, diet changes. Denies any other associated symptoms including CP, palpitations, fever, chills, HA, changes in vision/hearing, runny nose, sore throat. Work up in the ED significant for an elevated BNP of 1724 and was given furosemide IV. Respiratory acidosis with hypoxia and hypercapnia and placed on bipap. Given solumedrol 125 mg IV. Note magnesium of 5.05. Mild ELIZABETH with a creatinine of 1.81 but may represent his new baseline. Troponins elevated 2/2 respiratory compromise. He was subsequently transferred to the ICU for continued observation and treatment with steroids and diuresis. Will plan for mild diuresis to reduce potential worsening of his renal failure. VBG ordered to re-evaluate his respiratory acidosis.     12 Point ROS negative unless noted in above HPI     Past Medical History  Past Medical History:   Diagnosis Date    Arthritis     Body mass index (BMI) 20.0-20.9, adult 09/21/2021    Body mass index (BMI) of 20.0 to 20.9 in adult    Body mass index (BMI) 21.0-21.9, adult 04/14/2021    Body mass index (BMI) of 21.0 to 21.9 in adult    CHF (congestive heart failure) (CMS/[facility])     COPD (chronic obstructive pulmonary disease) (CMS/[facility])     Coronary artery disease     Diabetes mellitus (CMS/[facility])     Hypertension     Major depressive disorder, recurrent, mild (CMS/[facility]) 11/11/2020    Mild episode of recurrent major depressive disorder    Major depressive disorder, recurrent, unspecified (CMS/[facility]) 01/13/2021    Recurrent major depressive disorder, remission status unspecified    Other conditions influencing health status      Swelling Of Hands    Other specified anxiety disorders 05/19/2020    Depression with anxiety    Oxygen desaturation during sleep     wears 2L at HS    Personal history of nicotine dependence 07/12/2021    History of nicotine dependence    Personal history of other diseases of the circulatory system     History of cardiac disorder    Personal history of other diseases of the musculoskeletal system and connective tissue     History of arthritis    Personal history of other diseases of urinary system 09/21/2021    History of chronic kidney disease    Personal history of other endocrine, nutritional and metabolic disease 08/29/2019    History of hyperglycemia    Pneumonia, unspecified organism 12/09/2019    Atypical pneumonia    Thoracic aortic aneurysm, without rupture, unspecified (CMS/[facility]) 05/29/2019    Thoracic aortic aneurysm without rupture       Surgical History  Past Surgical History:   Procedure Laterality Date    BACK SURGERY  01/24/2014    Back Surgery    CARDIAC ELECTROPHYSIOLOGY PROCEDURE N/A 12/15/2023    Procedure: ICD - Single Generator Change Out;  Surgeon: Flaco Briggs MD;  Location: [place] [address] Cardiac Cath Lab;  Service: Electrophysiology;  Laterality: N/A;  [place]    CORONARY ANGIOPLASTY WITH STENT PLACEMENT  01/24/2014    Cath Stent Placement    CT ABDOMEN PELVIS ANGIOGRAM W AND/OR WO IV CONTRAST  06/06/2019    CT ABDOMEN PELVIS ANGIOGRAM W AND/OR WO IV CONTRAST 6/6/2019 [facility]    CT HEAD ANGIO W AND WO IV CONTRAST  10/15/2020    CT HEAD ANGIO W AND WO IV CONTRAST 10/15/2020 [facility]    HERNIA REPAIR  04/24/2014    Hernia Repair    OTHER SURGICAL HISTORY  04/17/2019    Abdominal surgery    OTHER SURGICAL HISTORY  01/24/2014    Defibrillation    PACEMAKER PLACEMENT      TOTAL HIP ARTHROPLASTY  01/24/2014    Hip Replacement        Social History  He reports that he has been smoking cigarettes. He has a 17.00 pack-year smoking history. He has never used smokeless  "tobacco. He reports that he does not drink alcohol and does not use drugs.    Allergies  Meloxicam, Celecoxib, and Keflex [cephalexin]     Physical Exam  Constitutional:       General: He is not in acute distress.     Appearance: He is ill-appearing. He is not toxic-appearing.   HENT:      Head: Normocephalic.      Nose: Nose normal.      Mouth/Throat:      Mouth: Mucous membranes are dry.   Eyes:      Extraocular Movements: Extraocular movements intact.      Pupils: Pupils are equal, round, and reactive to light.   Cardiovascular:      Rate and Rhythm: Tachycardia present.      Pulses: Normal pulses.   Pulmonary:      Breath sounds: Wheezing present.      Comments: Currently on bipap 16/8 50% rate 14    Abdominal:      General: Bowel sounds are normal. There is no distension.      Palpations: Abdomen is soft.      Tenderness: There is no abdominal tenderness. There is no guarding.   Musculoskeletal:         General: No swelling. Normal range of motion.      Cervical back: Normal range of motion.   Skin:     General: Skin is warm and dry.   Neurological:      General: No focal deficit present.      Mental Status: He is alert and oriented to person, place, and time.   Psychiatric:         Mood and Affect: Mood normal.         Behavior: Behavior normal.          Last Recorded Vitals  Blood pressure 107/61, pulse 67, temperature 36.7 °C (98 °F), temperature source Temporal, resp. rate 18, height 1.753 m (5' 9\"), weight 63 kg (138 lb 14.2 oz), SpO2 100 %.    Relevant Results  Scheduled medications  amiodarone, 200 mg, oral, Daily  apixaban, 2.5 mg, oral, BID  atorvastatin, 80 mg, oral, Nightly  bumetanide, 0.5 mg, oral, Daily  cholecalciferol, 2,000 Units, oral, Daily  escitalopram, 10 mg, oral, Daily  finasteride, 5 mg, oral, Daily  tiotropium, 2 Inhalation, inhalation, Daily   And  fluticasone furoate-vilanteroL, 1 puff, inhalation, Daily  ipratropium-albuteroL, 3 mL, nebulization, q6h  melatonin, 3 mg, oral, " Daily  methylPREDNISolone sodium succinate (PF), 40 mg, intravenous, q8h  metoprolol succinate XL, 12.5 mg, oral, Daily  oxygen, , inhalation, Continuous - 02/gases  polyethylene glycol, 17 g, oral, Daily  potassium chloride CR, 20 mEq, oral, Daily  pyridoxine, 50 mg, oral, Daily  rOPINIRole, 1 mg, oral, Nightly  tamsulosin, 0.8 mg, oral, Nightly  ticagrelor, 90 mg, oral, BID      Continuous medications     PRN medications  PRN medications: acetaminophen **OR** acetaminophen **OR** acetaminophen, ondansetron ODT **OR** ondansetron     Results for orders placed or performed during the hospital encounter of 12/31/23 (from the past 24 hour(s))   CBC and Auto Differential   Result Value Ref Range    WBC 12.2 (H) 4.4 - 11.3 x10*3/uL    nRBC 0.0 0.0 - 0.0 /100 WBCs    RBC 4.12 (L) 4.50 - 5.90 x10*6/uL    Hemoglobin 12.2 (L) 13.5 - 17.5 g/dL    Hematocrit 40.3 (L) 41.0 - 52.0 %    MCV 98 80 - 100 fL    MCH 29.6 26.0 - 34.0 pg    MCHC 30.3 (L) 32.0 - 36.0 g/dL    RDW 16.5 (H) 11.5 - 14.5 %    Platelets 325 150 - 450 x10*3/uL    Neutrophils % 89.0 40.0 - 80.0 %    Immature Granulocytes %, Automated 0.8 0.0 - 0.9 %    Lymphocytes % 4.4 13.0 - 44.0 %    Monocytes % 4.8 2.0 - 10.0 %    Eosinophils % 0.3 0.0 - 6.0 %    Basophils % 0.7 0.0 - 2.0 %    Neutrophils Absolute 10.88 (H) 1.60 - 5.50 x10*3/uL    Immature Granulocytes Absolute, Automated 0.10 0.00 - 0.50 x10*3/uL    Lymphocytes Absolute 0.54 (L) 0.80 - 3.00 x10*3/uL    Monocytes Absolute 0.59 0.05 - 0.80 x10*3/uL    Eosinophils Absolute 0.04 0.00 - 0.40 x10*3/uL    Basophils Absolute 0.09 0.00 - 0.10 x10*3/uL   Comprehensive metabolic panel   Result Value Ref Range    Glucose 192 (H) 74 - 99 mg/dL    Sodium 144 136 - 145 mmol/L    Potassium 4.8 3.5 - 5.3 mmol/L    Chloride 107 98 - 107 mmol/L    Bicarbonate 29 21 - 32 mmol/L    Anion Gap 13 10 - 20 mmol/L    Urea Nitrogen 26 (H) 6 - 23 mg/dL    Creatinine 1.81 (H) 0.50 - 1.30 mg/dL    eGFR 38 (L) >60 mL/min/1.73m*2     Calcium 9.8 8.6 - 10.3 mg/dL    Albumin 4.2 3.4 - 5.0 g/dL    Alkaline Phosphatase 91 33 - 136 U/L    Total Protein 6.7 6.4 - 8.2 g/dL    AST 26 9 - 39 U/L    Bilirubin, Total 0.6 0.0 - 1.2 mg/dL    ALT 23 10 - 52 U/L   Troponin I, High Sensitivity   Result Value Ref Range    Troponin I, High Sensitivity 60 (HH) 0 - 20 ng/L   Lactate   Result Value Ref Range    Lactate 1.6 0.4 - 2.0 mmol/L   B-Type Natriuretic Peptide   Result Value Ref Range    BNP 1,724 (H) 0 - 99 pg/mL   Light Blue Top   Result Value Ref Range    Extra Tube Hold for add-ons.    Red Top   Result Value Ref Range    Extra Tube Hold for add-ons.    SARS-CoV-2 RT PCR   Result Value Ref Range    Coronavirus 2019, PCR Not Detected Not Detected   Influenza A, and B PCR   Result Value Ref Range    Flu A Result Not Detected Not Detected    Flu B Result Not Detected Not Detected   Blood Gas Arterial Full Panel   Result Value Ref Range    POCT pH, Arterial 7.35 (L) 7.38 - 7.42 pH    POCT pCO2, Arterial 47 (H) 38 - 42 mm Hg    POCT pO2, Arterial 164 (H) 85 - 95 mm Hg    POCT SO2, Arterial 99 94 - 100 %    POCT Oxy Hemoglobin, Arterial 96.2 94.0 - 98.0 %    POCT Hematocrit Calculated, Arterial 36.0 (L) 41.0 - 52.0 %    POCT Sodium, Arterial 139 136 - 145 mmol/L    POCT Potassium, Arterial 4.8 3.5 - 5.3 mmol/L    POCT Chloride, Arterial 107 98 - 107 mmol/L    POCT Ionized Calcium, Arterial 1.27 1.10 - 1.33 mmol/L    POCT Glucose, Arterial 207 (H) 74 - 99 mg/dL    POCT Lactate, Arterial 1.3 0.4 - 2.0 mmol/L    POCT Base Excess, Arterial -0.1 -2.0 - 3.0 mmol/L    POCT HCO3 Calculated, Arterial 25.9 22.0 - 26.0 mmol/L    POCT Hemoglobin, Arterial 11.9 (L) 13.5 - 17.5 g/dL    POCT Anion Gap, Arterial 11 10 - 25 mmo/L    Patient Temperature      FiO2 100 %    Site of Arterial Puncture Radial Right     Cholo's Test Positive    Magnesium   Result Value Ref Range    Magnesium 5.05 (HH) 1.60 - 2.40 mg/dL   Troponin I, High Sensitivity   Result Value Ref Range     Troponin I, High Sensitivity 63 (HH) 0 - 20 ng/L   Blood Gas Venous   Result Value Ref Range    POCT pH, Venous 7.33 7.33 - 7.43 pH    POCT pCO2, Venous 57 (H) 41 - 51 mm Hg    POCT pO2, Venous 29 (L) 35 - 45 mm Hg    POCT SO2, Venous 37 (L) 45 - 75 %    POCT Oxy Hemoglobin, Venous 36.8 (L) 45.0 - 75.0 %    POCT Base Excess, Venous 2.8 -2.0 - 3.0 mmol/L    POCT HCO3 Calculated, Venous 30.1 (H) 22.0 - 26.0 mmol/L    Patient Temperature      FiO2 50 %    Test Comment CON GEM 1-S         Imaging  XR chest 1 view    Result Date: 12/31/2023  STUDY: Chest Radiograph;  12/31/2023 6:54 AM INDICATION: Dyspnea. COMPARISON: 11/25/2023, 9/18/2023, 9/17/2023XR Chest ACCESSION NUMBER(S): [id number] ORDERING CLINICIAN: REE BILL TECHNIQUE:  Frontal chest was obtained at 06:54 hours. Findings: Generalized interstitial pattern throughout both lungs is similar to the most recent prior study.  The finding may be that of chronic interstitial fibrosis.  It would be difficult to exclude a superimposed pneumonitis of either lung.  Minimal right effusion suspected.  Heart is normal size.  Defibrillator appears unchanged in position.  No pneumothorax.  Multiple wires overlie the chest.  No acute bony lesion.  There are surgical clips of the epigastric region.    Generalized interstitial pattern throughout both lungs is similar to the most recent prior study. The finding may be that of chronic interstitial fibrosis.  A superimposed infiltrate or mild edema would be difficult to exclude given the widespread bilateral abnormalities. Signed by Aj Wiley MD       Assessment/Plan  #Systolic HF exacerbation  ~BNP 1,724  ~diuresis with furosemide.   ~monitor renal function and electrolytes.     #COPD exacerbation  ~baseline oxygen needs of 2-3 liters nasal cannula at home.   ~solumedrol 40 TID  ~requiring bipap for 16/8 R14 FIO2 50%  ~VBG and if corrected will allow breaks from bipap as tolerated.     #CKD  ~creatinine 1.81 and  mildly elevated but may represent his new baseline  ~diuresis gently    Disposition: Admitted for heart failure and exacerbation of COPD. EST LOS > 2 midnights.     I spent 60 minutes in the professional and overall care of this patient.      Kristie Morgan, APRN-CNP  Internal Medicine

## 2023-12-31 NOTE — ED PROVIDER NOTES
[facility]  Department of Emergency Medicine   ED  Provider Note  12/31/2023  6:37 AM  AC07/AC07                  Andrez Damon was signed out by Dr. Hu at shift change pending admission    History of Present Illness:   Andrez Damon is a 78 y.o. male presenting to the ED for respiratory failure, beginning worsened last night.  The complaint has been persistent, severe in severity, and worsened by nothing.  Patient has history of coronary disease, congestive heart failure, COPD and cardiac arrhythmia.  He became quite short of breath last night.  His pulse ox is normal home O2 2 L was only 78% when EMS arrived.  He is not placed on BiPAP in the ED prior to arrival and is doing much better.  His BiPAP settings are 16/8, rate 14, FiO2 50%.  He has a chronic cough and some mild chest pain with coughing but no chest pain at rest.  He denies abdominal pain nausea or vomiting.      Review of Systems:   Pertinent positives and review of systems as noted above.  Remaining 10 review of systems is negative or noncontributory to today's episode of care.    Physical exam:    Vitals: Reviewed    Constitutional: Alert patient in no acute distress at this time  HEENT; Atraumatic, no ocular injection, normal hearing for patient, no rhinitis, no pharyngeal exudate or mass.  Pulmonary: Lung sounds are clear and equal, no cyanosis  Cardiovascular: heart tones are normal regular, normal capillary refill  Abdomen is soft positive bowel sounds nontender, without guarding or rebound  Integumentary: Skin without rash or jaundice  Neurological:  No focal deficit  Endocrine:  No thyromegaly or tremor  Lymphatic: No abnormal adenopathy  Musculoskeletal:  atraumatic  Psychiatric:  No hallucinations or delusions.    --------------------------------------------- PAST HISTORY ---------------------------------------------  Past Medical History:  has a past medical history of Arthritis, Body mass index (BMI) 20.0-20.9, adult  (09/21/2021), Body mass index (BMI) 21.0-21.9, adult (04/14/2021), CHF (congestive heart failure) (CMS/[facility]), COPD (chronic obstructive pulmonary disease) (CMS/[facility]), Coronary artery disease, Diabetes mellitus (CMS/[facility]), Hypertension, Major depressive disorder, recurrent, mild (CMS/[facility]) (11/11/2020), Major depressive disorder, recurrent, unspecified (CMS/[facility]) (01/13/2021), Other conditions influencing health status, Other specified anxiety disorders (05/19/2020), Oxygen desaturation during sleep, Personal history of nicotine dependence (07/12/2021), Personal history of other diseases of the circulatory system, Personal history of other diseases of the musculoskeletal system and connective tissue, Personal history of other diseases of urinary system (09/21/2021), Personal history of other endocrine, nutritional and metabolic disease (08/29/2019), Pneumonia, unspecified organism (12/09/2019), and Thoracic aortic aneurysm, without rupture, unspecified (CMS/[facility]) (05/29/2019).    Past Surgical History:  has a past surgical history that includes CT angio abdomen pelvis w and or wo IV IV contrast (06/06/2019); CT angio head w and wo IV contrast (10/15/2020); Other surgical history (04/17/2019); Hernia repair (04/24/2014); Back surgery (01/24/2014); Total hip arthroplasty (01/24/2014); Coronary angioplasty with stent (01/24/2014); Other surgical history (01/24/2014); pacemaker placement; and Cardiac electrophysiology procedure (N/A, 12/15/2023).    Social History:  reports that he has been smoking cigarettes. He has a 17.00 pack-year smoking history. He has never used smokeless tobacco. He reports that he does not drink alcohol and does not use drugs.    Family History: family history includes cardiac disorder in his father and mother; malignant neoplasm in his mother. Unless otherwise noted, family history is non contributory    The patient’s home medications have been reviewed.    Allergies: Meloxicam, Celecoxib, and Keflex  [cephalexin]    -------------------------------------------------- RESULTS -------------------------------------------------  All laboratory and radiology results have been personally reviewed by myself   LABS:  Results for orders placed or performed during the hospital encounter of 12/31/23   CBC and Auto Differential   Result Value Ref Range    WBC 12.2 (H) 4.4 - 11.3 x10*3/uL    nRBC 0.0 0.0 - 0.0 /100 WBCs    RBC 4.12 (L) 4.50 - 5.90 x10*6/uL    Hemoglobin 12.2 (L) 13.5 - 17.5 g/dL    Hematocrit 40.3 (L) 41.0 - 52.0 %    MCV 98 80 - 100 fL    MCH 29.6 26.0 - 34.0 pg    MCHC 30.3 (L) 32.0 - 36.0 g/dL    RDW 16.5 (H) 11.5 - 14.5 %    Platelets 325 150 - 450 x10*3/uL    Neutrophils % 89.0 40.0 - 80.0 %    Immature Granulocytes %, Automated 0.8 0.0 - 0.9 %    Lymphocytes % 4.4 13.0 - 44.0 %    Monocytes % 4.8 2.0 - 10.0 %    Eosinophils % 0.3 0.0 - 6.0 %    Basophils % 0.7 0.0 - 2.0 %    Neutrophils Absolute 10.88 (H) 1.60 - 5.50 x10*3/uL    Immature Granulocytes Absolute, Automated 0.10 0.00 - 0.50 x10*3/uL    Lymphocytes Absolute 0.54 (L) 0.80 - 3.00 x10*3/uL    Monocytes Absolute 0.59 0.05 - 0.80 x10*3/uL    Eosinophils Absolute 0.04 0.00 - 0.40 x10*3/uL    Basophils Absolute 0.09 0.00 - 0.10 x10*3/uL   Comprehensive metabolic panel   Result Value Ref Range    Glucose 192 (H) 74 - 99 mg/dL    Sodium 144 136 - 145 mmol/L    Potassium 4.8 3.5 - 5.3 mmol/L    Chloride 107 98 - 107 mmol/L    Bicarbonate 29 21 - 32 mmol/L    Anion Gap 13 10 - 20 mmol/L    Urea Nitrogen 26 (H) 6 - 23 mg/dL    Creatinine 1.81 (H) 0.50 - 1.30 mg/dL    eGFR 38 (L) >60 mL/min/1.73m*2    Calcium 9.8 8.6 - 10.3 mg/dL    Albumin 4.2 3.4 - 5.0 g/dL    Alkaline Phosphatase 91 33 - 136 U/L    Total Protein 6.7 6.4 - 8.2 g/dL    AST 26 9 - 39 U/L    Bilirubin, Total 0.6 0.0 - 1.2 mg/dL    ALT 23 10 - 52 U/L   Troponin I, High Sensitivity   Result Value Ref Range    Troponin I, High Sensitivity 60 (HH) 0 - 20 ng/L   Blood Gas Arterial Full Panel    Result Value Ref Range    POCT pH, Arterial 7.35 (L) 7.38 - 7.42 pH    POCT pCO2, Arterial 47 (H) 38 - 42 mm Hg    POCT pO2, Arterial 164 (H) 85 - 95 mm Hg    POCT SO2, Arterial 99 94 - 100 %    POCT Oxy Hemoglobin, Arterial 96.2 94.0 - 98.0 %    POCT Hematocrit Calculated, Arterial 36.0 (L) 41.0 - 52.0 %    POCT Sodium, Arterial 139 136 - 145 mmol/L    POCT Potassium, Arterial 4.8 3.5 - 5.3 mmol/L    POCT Chloride, Arterial 107 98 - 107 mmol/L    POCT Ionized Calcium, Arterial 1.27 1.10 - 1.33 mmol/L    POCT Glucose, Arterial 207 (H) 74 - 99 mg/dL    POCT Lactate, Arterial 1.3 0.4 - 2.0 mmol/L    POCT Base Excess, Arterial -0.1 -2.0 - 3.0 mmol/L    POCT HCO3 Calculated, Arterial 25.9 22.0 - 26.0 mmol/L    POCT Hemoglobin, Arterial 11.9 (L) 13.5 - 17.5 g/dL    POCT Anion Gap, Arterial 11 10 - 25 mmo/L    Patient Temperature      FiO2 100 %    Site of Arterial Puncture Radial Right     Cholo's Test Positive    Lactate   Result Value Ref Range    Lactate 1.6 0.4 - 2.0 mmol/L   B-Type Natriuretic Peptide   Result Value Ref Range    BNP 1,724 (H) 0 - 99 pg/mL   SARS-CoV-2 RT PCR   Result Value Ref Range    Coronavirus 2019, PCR Not Detected Not Detected   Influenza A, and B PCR   Result Value Ref Range    Flu A Result Not Detected Not Detected    Flu B Result Not Detected Not Detected   Light Blue Top   Result Value Ref Range    Extra Tube Hold for add-ons.    Red Top   Result Value Ref Range    Extra Tube Hold for add-ons.    Magnesium   Result Value Ref Range    Magnesium 5.05 (HH) 1.60 - 2.40 mg/dL   Troponin I, High Sensitivity   Result Value Ref Range    Troponin I, High Sensitivity 63 (HH) 0 - 20 ng/L   Blood Gas Venous   Result Value Ref Range    POCT pH, Venous 7.33 7.33 - 7.43 pH    POCT pCO2, Venous 57 (H) 41 - 51 mm Hg    POCT pO2, Venous 29 (L) 35 - 45 mm Hg    POCT SO2, Venous 37 (L) 45 - 75 %    POCT Oxy Hemoglobin, Venous 36.8 (L) 45.0 - 75.0 %    POCT Base Excess, Venous 2.8 -2.0 - 3.0 mmol/L    POCT  "HCO3 Calculated, Venous 30.1 (H) 22.0 - 26.0 mmol/L    Patient Temperature      FiO2 50 %    Test Comment CON GEM 1-S        RADIOLOGY:  Interpreted by Radiologist.  XR chest 1 view   Final Result   Generalized interstitial pattern throughout both lungs is similar to   the most recent prior study. The finding may be that of chronic   interstitial fibrosis.  A superimposed infiltrate or mild edema would   be difficult to exclude given the widespread bilateral abnormalities.   Signed by Aj Wiley MD            ------------------------- NURSING NOTES AND VITALS REVIEWED ---------------------------   The nursing notes within the ED encounter and vital signs as below have been reviewed.   /74   Pulse 73   Temp 36.2 °C (97.1 °F)   Resp 24   Ht 1.753 m (5' 9\")   Wt 67.6 kg (149 lb)   SpO2 99%   BMI 22.00 kg/m²   Oxygen Saturation Interpretation: Normal      ------------------------------ ED COURSE/MEDICAL DECISION MAKING----------------------    Medical Decision Making:   Patient is a COPD exacerbation with overlying CHF.  He did treat with Nitropaste and Lasix here in the ED.  His current blood pressure is 122/75 O2 sat 97% heart rate 81.  The patient is comfortable.  I discussed this case with Dr. Briggs the patient's cardiologist and Dr. Jo the hospitalist and arrange for ICU admission here at [place].  The patient is DNR Comfort Care arrest, he does not want intubation or CPR.    Counseling:   The emergency provider has spoken with the patient and spouse/SO and discussed today’s results, in addition to providing specific details for the plan of care and counseling regarding the diagnosis and prognosis.  Questions are answered at this time and they are agreeable with the plan.      --------------------------------- IMPRESSION AND DISPOSITION ---------------------------------    IMPRESSION  1. Acute and chr resp failure, unsp w hypoxia or hypercapnia (CMS/HCC)    2. Elevated troponin    3. COPD " exacerbation (CMS/[facility])    4. Systolic congestive heart failure, unspecified HF chronicity (CMS/[facility])        DISPOSITION  Disposition: Admit to CCU/ICU  Patient condition is critical     CRITICAL CARE TIME     CRITICAL CARE :  The high probability of clinically significant deterioration in the patient’s condition required my highest level of medical decision making and my highest level of preparedness to intervene emergently.   I provided minutes 30 of critical care, not including other billable services/procedures.    The patient was reevaluated/re-examined multiple times during the visit. Critical care time includes management at bedside, discussion with other providers and consultants, family counseling and answering questions, and documentation. Care involves decision making of high complexity to assess, manipulate, and support vital organ system failure and/or to prevent further life threatening deterioration of the patient's condition. Failure to initiate these interventions on an urgent basis would likely result in sudden, clinically significant or life threatening deterioration in the patient's condition of failure, CHF, elevated troponin, COPD.     Ajay Payne MD  12/31/23 [id number]

## 2023-12-31 NOTE — ED NOTES
Patient presents via [facility] due to SOB. Patient was having SOB for some time before squad arrived, per squad. Patient chronic 3L O2. Patient spO2 low 90s on 6L and duoneb given en route     Luc Noble RN  12/31/23 [id number]

## 2023-12-31 NOTE — ED PROVIDER NOTES
HPI   Chief Complaint   Patient presents with    Shortness of Breath         History provided by:  Medical records, EMS personnel and patient   used: No         This patient presents to the emergency department via ambulance for evaluation of shortness of breath.  Patient has a history of chronic respiratory failure related to COPD and congestive heart failure (35% on last echo).  He is chronically on oxygen at 3 L of nasal cannula during the day and is being evaluated for nocturnal BiPAP.  Patient states that he started getting short of breath last night he just got progressively worse.  EMS started a breathing treatment and transported him.    Patient did not have any chest pain.  He denies any fevers or chills.  He has an AICD which had a recent generator change, but denies any discharge of the AICD.  Patient has a history of chronic low back pain and previous back surgery and he says his back has been bothering him more for the past couple of weeks.  He denies any new injury.  No radiation of pain.  No abdominal pain, nausea, vomiting.  He does not believe he is run any fever.  Patient describes that shortness of breath is severe it is made worse with any attempted activity.  No peripheral edema.  Positive daily tobacco use.    History of previous MI with previous stenting most recently in 2014.               [place] Coma Scale Score: 15                  Patient History   Past Medical History:   Diagnosis Date    Arthritis     Body mass index (BMI) 20.0-20.9, adult 09/21/2021    Body mass index (BMI) of 20.0 to 20.9 in adult    Body mass index (BMI) 21.0-21.9, adult 04/14/2021    Body mass index (BMI) of 21.0 to 21.9 in adult    CHF (congestive heart failure) (CMS/[facility])     COPD (chronic obstructive pulmonary disease) (CMS/[facility])     Coronary artery disease     Diabetes mellitus (CMS/[facility])     Hypertension     Major depressive disorder, recurrent, mild (CMS/[facility]) 11/11/2020    Mild episode of  recurrent major depressive disorder    Major depressive disorder, recurrent, unspecified (CMS/[facility]) 01/13/2021    Recurrent major depressive disorder, remission status unspecified    Other conditions influencing health status     Swelling Of Hands    Other specified anxiety disorders 05/19/2020    Depression with anxiety    Oxygen desaturation during sleep     wears 2L at HS    Personal history of nicotine dependence 07/12/2021    History of nicotine dependence    Personal history of other diseases of the circulatory system     History of cardiac disorder    Personal history of other diseases of the musculoskeletal system and connective tissue     History of arthritis    Personal history of other diseases of urinary system 09/21/2021    History of chronic kidney disease    Personal history of other endocrine, nutritional and metabolic disease 08/29/2019    History of hyperglycemia    Pneumonia, unspecified organism 12/09/2019    Atypical pneumonia    Thoracic aortic aneurysm, without rupture, unspecified (CMS/[facility]) 05/29/2019    Thoracic aortic aneurysm without rupture     Past Surgical History:   Procedure Laterality Date    BACK SURGERY  01/24/2014    Back Surgery    CARDIAC ELECTROPHYSIOLOGY PROCEDURE N/A 12/15/2023    Procedure: ICD - Single Generator Change Out;  Surgeon: Flaco Briggs MD;  Location: [place] [address] Cardiac Cath Lab;  Service: Electrophysiology;  Laterality: N/A;  [place]    CORONARY ANGIOPLASTY WITH STENT PLACEMENT  01/24/2014    Cath Stent Placement    CT ABDOMEN PELVIS ANGIOGRAM W AND/OR WO IV CONTRAST  06/06/2019    CT ABDOMEN PELVIS ANGIOGRAM W AND/OR WO IV CONTRAST 6/6/2019 [facility]    CT HEAD ANGIO W AND WO IV CONTRAST  10/15/2020    CT HEAD ANGIO W AND WO IV CONTRAST 10/15/2020 [facility]    HERNIA REPAIR  04/24/2014    Hernia Repair    OTHER SURGICAL HISTORY  04/17/2019    Abdominal surgery    OTHER SURGICAL HISTORY  01/24/2014    Defibrillation    PACEMAKER  PLACEMENT      TOTAL HIP ARTHROPLASTY  01/24/2014    Hip Replacement     Family History   Problem Relation Name Age of Onset    Other (cardiac disorder) Mother      Other (malignant neoplasm) Mother      Other (cardiac disorder) Father       Social History     Tobacco Use    Smoking status: Every Day     Packs/day: 0.25     Years: 68.00     Additional pack years: 0.00     Total pack years: 17.00     Types: Cigarettes    Smokeless tobacco: Never   Substance Use Topics    Alcohol use: Never    Drug use: Never       Physical Exam   ED Triage Vitals [12/31/23 0638]   Temp Heart Rate Resp BP   36.2 °C (97.1 °F) 102 26 (!) 154/93      SpO2 Temp src Heart Rate Source Patient Position   (!) 88 % -- -- --      BP Location FiO2 (%)     -- 44 %       Physical Exam  Vitals reviewed.   Constitutional:       Comments: Thin, dyspneic, distal cyanosis   HENT:      Head: Normocephalic and atraumatic.   Eyes:      Pupils: Pupils are equal, round, and reactive to light.   Neck:      Vascular: No JVD.   Cardiovascular:      Rate and Rhythm: Regular rhythm. Tachycardia present.      Pulses: Normal pulses.   Pulmonary:      Effort: Tachypnea, accessory muscle usage and respiratory distress present.      Breath sounds: No stridor. Examination of the right-upper field reveals decreased breath sounds and wheezing. Examination of the left-upper field reveals decreased breath sounds and wheezing. Examination of the right-middle field reveals decreased breath sounds and wheezing. Examination of the left-middle field reveals decreased breath sounds and wheezing. Examination of the right-lower field reveals decreased breath sounds, wheezing and rhonchi. Examination of the left-lower field reveals decreased breath sounds, wheezing and rhonchi. Decreased breath sounds, wheezing and rhonchi present.   Chest:      Comments: Surgical sutures in place left upper chest and AICD insertion site.  Minimal bruising.  No redness, warmth, dehiscence,  bleeding  Abdominal:      General: Bowel sounds are normal.      Palpations: Abdomen is soft.   Musculoskeletal:      Cervical back: Normal range of motion.      Right lower leg: No edema.      Left lower leg: No edema.   Skin:     General: Skin is warm and dry.   Neurological:      General: No focal deficit present.      Mental Status: He is alert.   Psychiatric:         Mood and Affect: Mood normal.       EKG  0365 --twelve-lead EKG was obtained and read by me. This demonstrates sinus tachycardia with a rate of 109, nonspecific interventricular conduction delay, poor R wave progression, diffuse baseline artifact and nonspecific ST-T wave changes.  Compared to most recent prior EKG (12/12/23), specific ST-T wave changes now present, but uncertain as to how factual versus factitious given the baseline artifact from respiratory movement..  0641 --twelve-lead EKG was obtained and read by me. This demonstrates sinus tachycardia with a rate of 104, interventricular conduction delay, poor R wave progression, no ectopy, no ischemia, no pericarditis. Compared to most recent prior EKG, baseline artifact has resolved as patient's respiratory status has improved there is no no longer any suggestion of ST segment changes and tracing is unchanged from 12/12/23.  0743 --twelve-lead EKG was obtained and read by me. This demonstrates normal sinus rhythm with a rate of 86, left axis deviation, nonspecific interventricular conduction delay, poor R wave progression, but no ectopy, no ischemia, no pericarditis. Compared to most recent prior EKG, sinus rhythm has replaced sinus tachycardia.    Labs Reviewed   CBC WITH AUTO DIFFERENTIAL - Abnormal       Result Value    WBC 12.2 (*)     nRBC 0.0      RBC 4.12 (*)     Hemoglobin 12.2 (*)     Hematocrit 40.3 (*)     MCV 98      MCH 29.6      MCHC 30.3 (*)     RDW 16.5 (*)     Platelets 325      Neutrophils % 89.0      Immature Granulocytes %, Automated 0.8      Lymphocytes % 4.4       Monocytes % 4.8      Eosinophils % 0.3      Basophils % 0.7      Neutrophils Absolute 10.88 (*)     Immature Granulocytes Absolute, Automated 0.10      Lymphocytes Absolute 0.54 (*)     Monocytes Absolute 0.59      Eosinophils Absolute 0.04      Basophils Absolute 0.09     COMPREHENSIVE METABOLIC PANEL - Abnormal    Glucose 192 (*)     Sodium 144      Potassium 4.8      Chloride 107      Bicarbonate 29      Anion Gap 13      Urea Nitrogen 26 (*)     Creatinine 1.81 (*)     eGFR 38 (*)     Calcium 9.8      Albumin 4.2      Alkaline Phosphatase 91      Total Protein 6.7      AST 26      Bilirubin, Total 0.6      ALT 23     TROPONIN I, HIGH SENSITIVITY - Abnormal    Troponin I, High Sensitivity 60 (*)     Narrative:     Less than 99th percentile of normal range cutoff-  Female and children under 18 years old <14 ng/L; Male <21 ng/L: Negative  Repeat testing should be performed if clinically indicated.     Female and children under 18 years old 14-50 ng/L; Male 21-50 ng/L:  Consistent with possible cardiac damage and possible increased clinical   risk. Serial measurements may help to assess extent of myocardial damage.     >50 ng/L: Consistent with cardiac damage, increased clinical risk and  myocardial infarction. Serial measurements may help assess extent of   myocardial damage.      NOTE: Children less than 1 year old may have higher baseline troponin   levels and results should be interpreted in conjunction with the overall   clinical context.     NOTE: Troponin I testing is performed using a different   testing methodology at [facility] than at other   [facility]. Direct result comparisons should only   be made within the same method.   BLOOD GAS ARTERIAL FULL PANEL - Abnormal    POCT pH, Arterial 7.35 (*)     POCT pCO2, Arterial 47 (*)     POCT pO2, Arterial 164 (*)     POCT SO2, Arterial 99      POCT Oxy Hemoglobin, Arterial 96.2      POCT Hematocrit Calculated, Arterial 36.0 (*)     POCT  Sodium, Arterial 139      POCT Potassium, Arterial 4.8      POCT Chloride, Arterial 107      POCT Ionized Calcium, Arterial 1.27      POCT Glucose, Arterial 207 (*)     POCT Lactate, Arterial 1.3      POCT Base Excess, Arterial -0.1      POCT HCO3 Calculated, Arterial 25.9      POCT Hemoglobin, Arterial 11.9 (*)     POCT Anion Gap, Arterial 11      Patient Temperature        FiO2 100      Site of Arterial Puncture Radial Right      Cholo's Test Positive     B-TYPE NATRIURETIC PEPTIDE - Abnormal    BNP 1,724 (*)     Narrative:        <100 pg/mL - Heart failure unlikely  100-299 pg/mL - Intermediate probability of acute heart                  failure exacerbation. Correlate with clinical                  context and patient history.    >=300 pg/mL - Heart Failure likely. Correlate with clinical                  context and patient history.    BNP testing is performed using different testing methodology at [facility] than at [facility]. Direct result comparisons should only be made within the same method.      LACTATE - Normal    Lactate 1.6      Narrative:     Venipuncture immediately after or during the administration of Metamizole may lead to falsely low results. Testing should be performed immediately  prior to Metamizole dosing.   SARS-COV-2 PCR, SYMPTOMATIC - Normal    Coronavirus 2019, PCR Not Detected      Narrative:     This assay has received FDA Emergency Use Authorization (EUA) and is only authorized for the duration of time that circumstances exist to justify the authorization of the emergency use of in vitro diagnostic tests for the detection of SARS-CoV-2 virus and/or diagnosis of COVID-19 infection under section 564(b)(1) of the Act, 21 U.S.C. 360bbb-3(b)(1). This assay is an in vitro diagnostic nucleic acid amplification test for the qualitative detection of SARS-CoV-2 from nasopharyngeal specimens and has been validated for use at [facility]. Negative  results do not preclude COVID-19 infections and should not be used as the sole basis for diagnosis, treatment, or other management decisions.     INFLUENZA A AND B PCR - Normal    Flu A Result Not Detected      Flu B Result Not Detected      Narrative:     This assay is an in vitro diagnostic multiplex nucleic acid amplification test for the detection and discrimination of Influenza A & B from nasopharyngeal specimens, and has been validated for use at [facility]. Negative results do not preclude Influenza A/B infections, and should not be used as the sole basis for diagnosis, treatment, or other management decisions. If Influenza A/B and RSV PCR results are negative, testing for Parainfluenza virus, Adenovirus and Metapneumovirus is routinely performed for [facility] pediatric oncology and intensive care inpatients, and is available on other patients by placing an add-on request.   MAGNESIUM   TROPONIN I, HIGH SENSITIVITY   BLOOD GAS VENOUS     XR chest 1 view   Final Result   Generalized interstitial pattern throughout both lungs is similar to   the most recent prior study. The finding may be that of chronic   interstitial fibrosis.  A superimposed infiltrate or mild edema would   be difficult to exclude given the widespread bilateral abnormalities.   Signed by Aj Wiley MD          12/31/2023;  ED Course & MDM   ED Course as of 12/31/23 0759   [place] Dec 31, 2023   0710 Patient reevaluated [MN]   0729 Results reviewed [MN]      ED Course User Index  [MN] Christie Hu MD         Diagnoses as of 12/31/23 0759   Acute and chr resp failure, unsp w hypoxia or hypercapnia (CMS/HCC)   Elevated troponin       Medical Decision Making  Patient presents to the emergency department with acute respiratory failure and hypoxemia.  Patient is afebrile and diffusely wheezing.  He had been started on breathing treatment by EMS and magnesium was also initiated.  Patient did not have any chest pain but had  some nonspecific ST-T wave changes on initial presentation.  These resolved once the patient was put on the BiPAP.  Chest x-ray shows chronic changes plus minus some increased interstitial edema and a persistent pleural effusion.  Patient has clinical improvement with his work of breathing and oxygenation on the BiPAP.  Initial pH has corrected although hypercarbia persisted.  Troponin and BNP are both elevated.  Is unclear whether this was a primary cardiac event and troponin patient will require hospitalization for further evaluation and treatment.  He is signed out to Dr. Payne the [facility] physician to follow-up on test results and make final disposition.    Procedure  Critical Care    Performed by: Christie Hu MD  Authorized by: Christie Hu MD    Critical care provider statement:     Critical care time (minutes):  33    Critical care time was exclusive of:  Separately billable procedures and treating other patients    Critical care was necessary to treat or prevent imminent or life-threatening deterioration of the following conditions:  Respiratory failure    Critical care was time spent personally by me on the following activities:  Evaluation of patient's response to treatment, examination of patient, obtaining history from patient or surrogate, ordering and performing treatments and interventions, ordering and review of laboratory studies, ordering and review of radiographic studies, pulse oximetry, re-evaluation of patient's condition and review of old charts    I assumed direction of critical care for this patient from another provider in my specialty: no      Care discussed with comment:  Accepting provider at shift change    Diagnoses as of 12/31/23 0759   Acute and chr resp failure, unsp w hypoxia or hypercapnia (CMS/[facility])   Elevated troponin        Christie Hu MD  12/31/23 0800

## 2024-01-01 VITALS
HEART RATE: 87 BPM | DIASTOLIC BLOOD PRESSURE: 56 MMHG | RESPIRATION RATE: 22 BRPM | TEMPERATURE: 98.5 F | WEIGHT: 141.09 LBS | HEIGHT: 69 IN | BODY MASS INDEX: 20.9 KG/M2 | SYSTOLIC BLOOD PRESSURE: 98 MMHG | OXYGEN SATURATION: 97 %

## 2024-01-01 PROBLEM — J96.11 CHRONIC RESPIRATORY FAILURE WITH HYPOXIA (MULTI): Chronic | Status: ACTIVE | Noted: 2024-01-01

## 2024-01-01 PROBLEM — R79.89 ELEVATED TROPONIN: Status: RESOLVED | Noted: 2023-11-25 | Resolved: 2024-01-01

## 2024-01-01 PROBLEM — J96.20 ACUTE AND CHR RESP FAILURE, UNSP W HYPOXIA OR HYPERCAPNIA (MULTI): Status: RESOLVED | Noted: 2023-09-22 | Resolved: 2024-01-01

## 2024-01-01 PROBLEM — J44.1 COPD EXACERBATION (MULTI): Status: RESOLVED | Noted: 2023-02-02 | Resolved: 2024-01-01

## 2024-01-01 LAB
ALBUMIN SERPL BCP-MCNC: 3.1 G/DL (ref 3.4–5)
ANION GAP SERPL CALC-SCNC: 9 MMOL/L (ref 10–20)
BNP SERPL-MCNC: 1524 PG/ML (ref 0–99)
BUN SERPL-MCNC: 38 MG/DL (ref 6–23)
CALCIUM SERPL-MCNC: 8.6 MG/DL (ref 8.6–10.3)
CHLORIDE SERPL-SCNC: 106 MMOL/L (ref 98–107)
CO2 SERPL-SCNC: 28 MMOL/L (ref 21–32)
CREAT SERPL-MCNC: 1.78 MG/DL (ref 0.5–1.3)
ERYTHROCYTE [DISTWIDTH] IN BLOOD BY AUTOMATED COUNT: 16.3 % (ref 11.5–14.5)
GFR SERPL CREATININE-BSD FRML MDRD: 39 ML/MIN/1.73M*2
GLUCOSE SERPL-MCNC: 252 MG/DL (ref 74–99)
HCT VFR BLD AUTO: 29.7 % (ref 41–52)
HGB BLD-MCNC: 9.4 G/DL (ref 13.5–17.5)
MAGNESIUM SERPL-MCNC: 2.42 MG/DL (ref 1.6–2.4)
MCH RBC QN AUTO: 30 PG (ref 26–34)
MCHC RBC AUTO-ENTMCNC: 31.6 G/DL (ref 32–36)
MCV RBC AUTO: 95 FL (ref 80–100)
NRBC BLD-RTO: 0 /100 WBCS (ref 0–0)
PHOSPHATE SERPL-MCNC: 4 MG/DL (ref 2.5–4.9)
PLATELET # BLD AUTO: 232 X10*3/UL (ref 150–450)
POTASSIUM SERPL-SCNC: 3.7 MMOL/L (ref 3.5–5.3)
RBC # BLD AUTO: 3.13 X10*6/UL (ref 4.5–5.9)
SODIUM SERPL-SCNC: 139 MMOL/L (ref 136–145)
WBC # BLD AUTO: 9.4 X10*3/UL (ref 4.4–11.3)

## 2024-01-01 PROCEDURE — 1090000002 HH PPS REVENUE DEBIT

## 2024-01-01 PROCEDURE — 36415 COLL VENOUS BLD VENIPUNCTURE: CPT | Mod: IPSPLIT | Performed by: NURSE PRACTITIONER

## 2024-01-01 PROCEDURE — 2500000002 HC RX 250 W HCPCS SELF ADMINISTERED DRUGS (ALT 637 FOR MEDICARE OP, ALT 636 FOR OP/ED): Mod: IPSPLIT | Performed by: INTERNAL MEDICINE

## 2024-01-01 PROCEDURE — 83735 ASSAY OF MAGNESIUM: CPT | Mod: IPSPLIT | Performed by: NURSE PRACTITIONER

## 2024-01-01 PROCEDURE — 2500000005 HC RX 250 GENERAL PHARMACY W/O HCPCS: Mod: IPSPLIT | Performed by: EMERGENCY MEDICINE

## 2024-01-01 PROCEDURE — 85027 COMPLETE CBC AUTOMATED: CPT | Mod: IPSPLIT | Performed by: NURSE PRACTITIONER

## 2024-01-01 PROCEDURE — 2500000005 HC RX 250 GENERAL PHARMACY W/O HCPCS: Mod: IPSPLIT | Performed by: INTERNAL MEDICINE

## 2024-01-01 PROCEDURE — 2500000004 HC RX 250 GENERAL PHARMACY W/ HCPCS (ALT 636 FOR OP/ED): Mod: IPSPLIT | Performed by: NURSE PRACTITIONER

## 2024-01-01 PROCEDURE — 5A09357 ASSISTANCE WITH RESPIRATORY VENTILATION, LESS THAN 24 CONSECUTIVE HOURS, CONTINUOUS POSITIVE AIRWAY PRESSURE: ICD-10-PCS | Performed by: INTERNAL MEDICINE

## 2024-01-01 PROCEDURE — 83880 ASSAY OF NATRIURETIC PEPTIDE: CPT | Mod: IPSPLIT | Performed by: STUDENT IN AN ORGANIZED HEALTH CARE EDUCATION/TRAINING PROGRAM

## 2024-01-01 PROCEDURE — 2500000001 HC RX 250 WO HCPCS SELF ADMINISTERED DRUGS (ALT 637 FOR MEDICARE OP): Mod: IPSPLIT | Performed by: NURSE PRACTITIONER

## 2024-01-01 PROCEDURE — 1090000001 HH PPS REVENUE CREDIT

## 2024-01-01 PROCEDURE — 80069 RENAL FUNCTION PANEL: CPT | Mod: IPSPLIT | Performed by: NURSE PRACTITIONER

## 2024-01-01 PROCEDURE — 2500000002 HC RX 250 W HCPCS SELF ADMINISTERED DRUGS (ALT 637 FOR MEDICARE OP, ALT 636 FOR OP/ED): Mod: IPSPLIT | Performed by: NURSE PRACTITIONER

## 2024-01-01 PROCEDURE — 94640 AIRWAY INHALATION TREATMENT: CPT | Mod: IPSPLIT

## 2024-01-01 PROCEDURE — 99239 HOSP IP/OBS DSCHRG MGMT >30: CPT | Performed by: STUDENT IN AN ORGANIZED HEALTH CARE EDUCATION/TRAINING PROGRAM

## 2024-01-01 RX ORDER — AZITHROMYCIN 250 MG/1
250 TABLET, FILM COATED ORAL 3 TIMES WEEKLY
Qty: 12 TABLET | Refills: 2 | Status: SHIPPED | OUTPATIENT
Start: 2024-01-01 | End: 2024-03-20

## 2024-01-01 RX ORDER — LEVALBUTEROL TARTRATE 45 UG/1
1 AEROSOL, METERED ORAL EVERY 4 HOURS PRN
Qty: 15 G | Refills: 11 | Status: SHIPPED | OUTPATIENT
Start: 2024-01-01

## 2024-01-01 RX ORDER — BUMETANIDE 1 MG/1
1 TABLET ORAL DAILY
Qty: 30 TABLET | Refills: 2 | Status: SHIPPED | OUTPATIENT
Start: 2024-01-01 | End: 2024-01-01 | Stop reason: SDUPTHER

## 2024-01-01 RX ORDER — PREDNISONE 10 MG/1
TABLET ORAL
Qty: 42 TABLET | Refills: 0 | Status: SHIPPED | OUTPATIENT
Start: 2024-01-01 | End: 2024-01-13

## 2024-01-01 RX ORDER — FLUTICASONE FUROATE, UMECLIDINIUM BROMIDE AND VILANTEROL TRIFENATATE 200; 62.5; 25 UG/1; UG/1; UG/1
1 POWDER RESPIRATORY (INHALATION)
Qty: 60 EACH | Refills: 2 | Status: SHIPPED | OUTPATIENT
Start: 2024-01-01

## 2024-01-01 RX ORDER — BUMETANIDE 1 MG/1
2 TABLET ORAL DAILY
Qty: 60 TABLET | Refills: 1 | Status: SHIPPED | OUTPATIENT
Start: 2024-01-01 | End: 2024-02-24

## 2024-01-01 RX ADMIN — Medication 5 L/MIN: at 08:00

## 2024-01-01 RX ADMIN — IPRATROPIUM BROMIDE AND ALBUTEROL SULFATE 3 ML: .5; 3 SOLUTION RESPIRATORY (INHALATION) at 09:13

## 2024-01-01 RX ADMIN — FLUTICASONE FUROATE AND VILANTEROL TRIFENATATE 1 PUFF: 200; 25 POWDER RESPIRATORY (INHALATION) at 09:09

## 2024-01-01 RX ADMIN — PYRIDOXINE HCL TAB 50 MG 50 MG: 50 TAB at 09:40

## 2024-01-01 RX ADMIN — METHYLPREDNISOLONE SODIUM SUCCINATE 40 MG: 40 INJECTION, POWDER, FOR SOLUTION INTRAMUSCULAR; INTRAVENOUS at 06:30

## 2024-01-01 RX ADMIN — POTASSIUM CHLORIDE 20 MEQ: 1500 TABLET, EXTENDED RELEASE ORAL at 09:39

## 2024-01-01 RX ADMIN — APIXABAN 2.5 MG: 2.5 TABLET, FILM COATED ORAL at 05:13

## 2024-01-01 RX ADMIN — Medication 2000 UNITS: at 09:38

## 2024-01-01 RX ADMIN — TICAGRELOR 90 MG: 90 TABLET ORAL at 05:13

## 2024-01-01 RX ADMIN — FINASTERIDE 5 MG: 5 TABLET, FILM COATED ORAL at 09:41

## 2024-01-01 RX ADMIN — IPRATROPIUM BROMIDE AND ALBUTEROL SULFATE 3 ML: .5; 3 SOLUTION RESPIRATORY (INHALATION) at 13:21

## 2024-01-01 RX ADMIN — TIOTROPIUM BROMIDE INHALATION SPRAY 2 PUFF: 3.12 SPRAY, METERED RESPIRATORY (INHALATION) at 09:15

## 2024-01-01 RX ADMIN — FUROSEMIDE 40 MG: 10 INJECTION, SOLUTION INTRAVENOUS at 05:13

## 2024-01-01 RX ADMIN — ESCITALOPRAM OXALATE 10 MG: 10 TABLET, FILM COATED ORAL at 09:41

## 2024-01-01 RX ADMIN — METOPROLOL SUCCINATE 12.5 MG: 25 TABLET, EXTENDED RELEASE ORAL at 09:39

## 2024-01-01 RX ADMIN — AMIODARONE HYDROCHLORIDE 200 MG: 200 TABLET ORAL at 09:41

## 2024-01-01 RX ADMIN — BUMETANIDE 0.5 MG: 1 TABLET ORAL at 09:40

## 2024-01-01 ASSESSMENT — COGNITIVE AND FUNCTIONAL STATUS - GENERAL
WALKING IN HOSPITAL ROOM: A LITTLE
MOBILITY SCORE: 20
STANDING UP FROM CHAIR USING ARMS: A LITTLE
CLIMB 3 TO 5 STEPS WITH RAILING: A LITTLE
PERSONAL GROOMING: A LITTLE
TOILETING: A LITTLE
DRESSING REGULAR UPPER BODY CLOTHING: A LITTLE
DRESSING REGULAR LOWER BODY CLOTHING: A LITTLE
HELP NEEDED FOR BATHING: A LITTLE
DAILY ACTIVITIY SCORE: 19
MOVING TO AND FROM BED TO CHAIR: A LITTLE

## 2024-01-01 ASSESSMENT — PAIN - FUNCTIONAL ASSESSMENT
PAIN_FUNCTIONAL_ASSESSMENT: 0-10

## 2024-01-01 ASSESSMENT — PAIN SCALES - GENERAL
PAINLEVEL_OUTOF10: 0 - NO PAIN
PAINLEVEL_OUTOF10: 0 - NO PAIN
PAINLEVEL_OUTOF10: 3
PAINLEVEL_OUTOF10: 0 - NO PAIN
PAINLEVEL_OUTOF10: 0 - NO PAIN

## 2024-01-01 NOTE — DISCHARGE SUMMARY
Discharge Diagnosis  Heart failure (CMS/[facility])    Issues Requiring Follow-Up  Pulmonology and cardiology follow-up  Sleep study    Test Results Pending At Discharge  Pending Labs       No current pending labs.            Hospital Course  Andrez Damon is a 78 y.o. male  with PMHx significant for advanced COPD, systolic heart failure, and chronically wears oxygen of 3 liters at home who [facility] 2[facility]2 increased difficulty breathing one day ago.  Denies abdominal pain, nausea, vomiting. Denies ill contacts, recent sickness, changes in medication, recent travel, recent antibiotics, diet changes. Denies any other associated symptoms including CP, palpitations, fever, chills, HA, changes in vision/hearing, runny nose, sore throat. Work up in the ED significant for an elevated BNP of 1724 and was given furosemide IV. Respiratory acidosis with hypoxia and hypercapnia and placed on bipap. Given solumedrol 125 mg IV. Note magnesium of 5.05. Mild ELIZABETH with a creatinine of 1.81 but may represent his new baseline. Troponins elevated 2/2 respiratory compromise. He was subsequently transferred to the ICU for continued observation and treatment with steroids and diuresis     Patient tolerated weaning to his baseline supplemental oxygen requirement of 3L well. He was discharged home on 1/1/2024 with instructions to follow up with cardiology and pulmonology. He was started on long term azithromycin 250 mg 3x a week and Bumex increased to 2mg once daily. He will also need to complete prednisone course.    I spent a total of 40 minutes on the date of the discharge service which included preparing to see the patient, face-to-face patient care, completing clinical documentation, obtaining and/or reviewing separately obtained history, performing a medically appropriate examination, counseling and educating the patient/family/caregiver, ordering medications, tests, or procedures, independently interpreting results (not  separately reported), and communicating results to the patient/family/caregive     Pertinent Physical Exam At Time of Discharge  Physical Exam  Constitutional:       Appearance: Normal appearance.   HENT:      Head: Normocephalic and atraumatic.      Mouth/Throat:      Mouth: Mucous membranes are moist.      Dentition: Abnormal dentition. Gum lesions present.   Eyes:      Extraocular Movements: Extraocular movements intact.      Conjunctiva/sclera: Conjunctivae normal.   Cardiovascular:      Rate and Rhythm: Normal rate and regular rhythm.      Pulses: Normal pulses.      Heart sounds: Normal heart sounds.   Pulmonary:      Effort: Pulmonary effort is normal. No tachypnea, accessory muscle usage, prolonged expiration or respiratory distress.      Breath sounds: Decreased air movement present.      Comments: On 3L o2  Abdominal:      General: Bowel sounds are normal. There is no distension.      Palpations: Abdomen is soft.   Musculoskeletal:      Right lower leg: No edema.      Left lower leg: No edema.   Skin:     General: Skin is warm.   Neurological:      General: No focal deficit present.      Mental Status: He is alert and oriented to person, place, and time.   Psychiatric:         Mood and Affect: Mood normal.         Behavior: Behavior normal.         Thought Content: Thought content normal.         Judgment: Judgment normal.         Home Medications     Medication List      START taking these medications     azithromycin 250 mg tablet; Commonly known as: Zithromax; Take 1 tablet   (250 mg) by mouth 3 (three) times a week.   predniSONE 10 mg tablet; Commonly known as: Deltasone; Take 6 tablets   (60 mg) by mouth once daily for 2 days, THEN 5 tablets (50 mg) once daily   for 2 days, THEN 4 tablets (40 mg) once daily for 2 days, THEN 3 tablets   (30 mg) once daily for 2 days, THEN 2 tablets (20 mg) once daily for 2   days, THEN 1 tablet (10 mg) once daily for 2 days.; Start taking on:   January 1, 2024      CHANGE how you take these medications     bumetanide 1 mg tablet; Commonly known as: Bumex; Take 2 tablets (2 mg)   by mouth once daily.; What changed: medication strength, how much to take     CONTINUE taking these medications     amiodarone 200 mg tablet; Commonly known as: Pacerone   apixaban 2.5 mg tablet; Commonly known as: Eliquis   atorvastatin 80 mg tablet; Commonly known as: Lipitor; TAKE 1 TABLET BY   MOUTH EVERY DAY   Brilinta 90 mg tablet; Generic drug: ticagrelor; TAKE 1 TABLET BY MOUTH   TWICE A DAY AS DIRECTED   cholecalciferol 125 MCG (5000 UT) capsule; Commonly known as: Vitamin   D-3; TAKE 1 CAPSULE BY MOUTH ONCE DAILY   escitalopram 10 mg tablet; Commonly known as: Lexapro; TAKE 1 TABLET BY   MOUTH EVERY DAY   finasteride 5 mg tablet; Commonly known as: Proscar   levalbuterol 45 mcg/actuation inhaler; Commonly known as: Xopenex;   Inhale 1 puff every 4 hours if needed for wheezing or shortness of breath.   INHALE 1 TO 2 PUFFS EVERY 4 TO 6 HOURS AS NEEDED AND AS DIRECTED.   metoprolol succinate XL 25 mg 24 hr tablet; Commonly known as: Toprol-XL   oxygen gas therapy; Commonly known as: O2; Inhale 2 L/min continuously.   polyethylene glycol 17 gram packet; Commonly known as: Glycolax,   Miralax; Take 17 g by mouth once daily. Do not start before November 16, 2023.   potassium chloride CR 20 mEq ER tablet; Commonly known as: Klor-Con M20   pyridoxine 50 mg tablet; Commonly known as: Vitamin B-6; TAKE 1 TABLET   BY MOUTH EVERY DAY   rOPINIRole 1 mg tablet; Commonly known as: Requip; TAKE 1 TABLET BY   MOUTH EVERY DAY AT BEDTIME   tamsulosin 0.4 mg 24 hr capsule; Commonly known as: Flomax; Take 2   capsules (0.8 mg) by mouth once daily at bedtime.   Trelegy Ellipta 200-62.5-25 mcg blister with device; Generic drug:   fluticasone-umeclidin-vilanter; Inhale 1 puff once daily with breakfast.   Inhale 1 puff daily, rinse mouth after use       Outpatient Follow-Up  Future Appointments   Date Time  Provider Department [place]   1/3/2024  7:30 PM SLEEP LAB GEN WMAINA ROOM 1 [id number] [facility]   1/19/2024 11:00 AM KENDRICK Montague [id number] [facility]   1/25/2024 11:00 AM INF 01 CONNEAUT CONSCCINF [facility]   1/26/2024 11:20 AM Stone Piedra MD [id number] [facility]   2/20/2024  2:30 PM KENDRICK [place] [id number] [place]   3/5/2024  2:00 PM KENDRICK Ashton [id number] [facility]   3/14/2024 10:00 AM KENDRICK Parmar, ALESSANDRO [id number] [facility]   4/15/2024 10:20 AM KENDRICK Ch[place] [id number] [place]   6/4/2024 10:40 AM KENDRICK Ch[place]illa [id number] [place]       Willian Hensley DO

## 2024-01-01 NOTE — PROGRESS NOTES
Andrez Damon is a 78 y.o. male on day 1 of admission presenting with Heart failure (CMS/HCC).      Subjective   Andrez is seen in his room in no acute distress. His oxygen needs have improved. Will continue to wean oxygen as tolerated.        Objective   Physical Exam  Constitutional:       General: He is not in acute distress.     Appearance: He is ill-appearing but improved. He is not toxic-appearing.   HENT:      Head: Normocephalic.      Nose: Nose normal.      Mouth/Throat:      Mouth: Mucous membranes are dry.   Eyes:      Extraocular Movements: Extraocular movements intact.      Pupils: Pupils are equal, round, and reactive to light.   Cardiovascular:      Rate and Rhythm: Tachycardia present.      Pulses: Normal pulses.   Pulmonary:      Breath sounds: Wheezing present.      Comments: Currently on oxygen of five liters  Abdominal:      General: Bowel sounds are normal. There is no distension.      Palpations: Abdomen is soft.      Tenderness: There is no abdominal tenderness. There is no guarding.   Musculoskeletal:         General: No swelling. Normal range of motion.      Cervical back: Normal range of motion.   Skin:     General: Skin is warm and dry.   Neurological:      General: No focal deficit present.      Mental Status: He is alert and oriented to person, place, and time.   Psychiatric:         Mood and Affect: Mood normal.         Behavior: Behavior normal.   Last Recorded Vitals  BP 94/59 (BP Location: Right arm)   Pulse 76   Temp 36.9 °C (98.5 °F)   Resp 22   Wt 64 kg (141 lb 1.5 oz)   SpO2 97%   Intake/Output last 3 Shifts:    Intake/Output Summary (Last 24 hours) at 1/1/2024 1056  Last data filed at 1/1/2024 0909  Gross per 24 hour   Intake 840 ml   Output 1900 ml   Net -1060 ml       Admission Weight  Weight: 67.6 kg (149 lb) (12/31/23 0638)    Daily Weight  01/01/24 : 64 kg (141 lb 1.5 oz)    Image Results  XR chest 1 view  Narrative: STUDY:  Chest Radiograph;  12/31/2023 6:54  AM  INDICATION:  Dyspnea.  COMPARISON:  11/25/2023, 9/18/2023, 9/17/2023XR Chest  ACCESSION NUMBER(S):  [id number]  ORDERING CLINICIAN:  REE BILL  TECHNIQUE:  Frontal chest was obtained at 06:54 hours.  Findings:  Generalized interstitial pattern throughout both lungs is similar to  the most recent prior study.  The finding may be that of chronic  interstitial fibrosis.  It would be difficult to exclude a  superimposed pneumonitis of either lung.  Minimal right effusion  suspected.  Heart is normal size.  Defibrillator appears unchanged in  position.  No pneumothorax.  Multiple wires overlie the chest.  No  acute bony lesion.  There are surgical clips of the epigastric region.  Impression: Generalized interstitial pattern throughout both lungs is similar to  the most recent prior study. The finding may be that of chronic  interstitial fibrosis.  A superimposed infiltrate or mild edema would  be difficult to exclude given the widespread bilateral abnormalities.  Signed by Aj Wiley MD    Results for orders placed or performed during the hospital encounter of 12/31/23 (from the past 96 hour(s))   CBC and Auto Differential   Result Value Ref Range    WBC 12.2 (H) 4.4 - 11.3 x10*3/uL    nRBC 0.0 0.0 - 0.0 /100 WBCs    RBC 4.12 (L) 4.50 - 5.90 x10*6/uL    Hemoglobin 12.2 (L) 13.5 - 17.5 g/dL    Hematocrit 40.3 (L) 41.0 - 52.0 %    MCV 98 80 - 100 fL    MCH 29.6 26.0 - 34.0 pg    MCHC 30.3 (L) 32.0 - 36.0 g/dL    RDW 16.5 (H) 11.5 - 14.5 %    Platelets 325 150 - 450 x10*3/uL    Neutrophils % 89.0 40.0 - 80.0 %    Immature Granulocytes %, Automated 0.8 0.0 - 0.9 %    Lymphocytes % 4.4 13.0 - 44.0 %    Monocytes % 4.8 2.0 - 10.0 %    Eosinophils % 0.3 0.0 - 6.0 %    Basophils % 0.7 0.0 - 2.0 %    Neutrophils Absolute 10.88 (H) 1.60 - 5.50 x10*3/uL    Immature Granulocytes Absolute, Automated 0.10 0.00 - 0.50 x10*3/uL    Lymphocytes Absolute 0.54 (L) 0.80 - 3.00 x10*3/uL    Monocytes Absolute 0.59 0.05 -  0.80 x10*3/uL    Eosinophils Absolute 0.04 0.00 - 0.40 x10*3/uL    Basophils Absolute 0.09 0.00 - 0.10 x10*3/uL   Comprehensive metabolic panel   Result Value Ref Range    Glucose 192 (H) 74 - 99 mg/dL    Sodium 144 136 - 145 mmol/L    Potassium 4.8 3.5 - 5.3 mmol/L    Chloride 107 98 - 107 mmol/L    Bicarbonate 29 21 - 32 mmol/L    Anion Gap 13 10 - 20 mmol/L    Urea Nitrogen 26 (H) 6 - 23 mg/dL    Creatinine 1.81 (H) 0.50 - 1.30 mg/dL    eGFR 38 (L) >60 mL/min/1.73m*2    Calcium 9.8 8.6 - 10.3 mg/dL    Albumin 4.2 3.4 - 5.0 g/dL    Alkaline Phosphatase 91 33 - 136 U/L    Total Protein 6.7 6.4 - 8.2 g/dL    AST 26 9 - 39 U/L    Bilirubin, Total 0.6 0.0 - 1.2 mg/dL    ALT 23 10 - 52 U/L   Troponin I, High Sensitivity   Result Value Ref Range    Troponin I, High Sensitivity 60 (HH) 0 - 20 ng/L   Lactate   Result Value Ref Range    Lactate 1.6 0.4 - 2.0 mmol/L   B-Type Natriuretic Peptide   Result Value Ref Range    BNP 1,724 (H) 0 - 99 pg/mL   Light Blue Top   Result Value Ref Range    Extra Tube Hold for add-ons.    Red Top   Result Value Ref Range    Extra Tube Hold for add-ons.    SARS-CoV-2 RT PCR   Result Value Ref Range    Coronavirus 2019, PCR Not Detected Not Detected   Influenza A, and B PCR   Result Value Ref Range    Flu A Result Not Detected Not Detected    Flu B Result Not Detected Not Detected   Blood Gas Arterial Full Panel   Result Value Ref Range    POCT pH, Arterial 7.35 (L) 7.38 - 7.42 pH    POCT pCO2, Arterial 47 (H) 38 - 42 mm Hg    POCT pO2, Arterial 164 (H) 85 - 95 mm Hg    POCT SO2, Arterial 99 94 - 100 %    POCT Oxy Hemoglobin, Arterial 96.2 94.0 - 98.0 %    POCT Hematocrit Calculated, Arterial 36.0 (L) 41.0 - 52.0 %    POCT Sodium, Arterial 139 136 - 145 mmol/L    POCT Potassium, Arterial 4.8 3.5 - 5.3 mmol/L    POCT Chloride, Arterial 107 98 - 107 mmol/L    POCT Ionized Calcium, Arterial 1.27 1.10 - 1.33 mmol/L    POCT Glucose, Arterial 207 (H) 74 - 99 mg/dL    POCT Lactate, Arterial 1.3  0.4 - 2.0 mmol/L    POCT Base Excess, Arterial -0.1 -2.0 - 3.0 mmol/L    POCT HCO3 Calculated, Arterial 25.9 22.0 - 26.0 mmol/L    POCT Hemoglobin, Arterial 11.9 (L) 13.5 - 17.5 g/dL    POCT Anion Gap, Arterial 11 10 - 25 mmo/L    Patient Temperature      FiO2 100 %    Site of Arterial Puncture Radial Right     Cholo's Test Positive    Magnesium   Result Value Ref Range    Magnesium 5.05 (HH) 1.60 - 2.40 mg/dL   Troponin I, High Sensitivity   Result Value Ref Range    Troponin I, High Sensitivity 63 (HH) 0 - 20 ng/L   Blood Gas Venous   Result Value Ref Range    POCT pH, Venous 7.33 7.33 - 7.43 pH    POCT pCO2, Venous 57 (H) 41 - 51 mm Hg    POCT pO2, Venous 29 (L) 35 - 45 mm Hg    POCT SO2, Venous 37 (L) 45 - 75 %    POCT Oxy Hemoglobin, Venous 36.8 (L) 45.0 - 75.0 %    POCT Base Excess, Venous 2.8 -2.0 - 3.0 mmol/L    POCT HCO3 Calculated, Venous 30.1 (H) 22.0 - 26.0 mmol/L    Patient Temperature      FiO2 50 %    Test Comment CON GEM 1-S    Blood Gas Venous Full Panel   Result Value Ref Range    POCT pH, Venous 7.41 7.33 - 7.43 pH    POCT pCO2, Venous 44 41 - 51 mm Hg    POCT pO2, Venous 47 (H) 35 - 45 mm Hg    POCT SO2, Venous 77 (H) 45 - 75 %    POCT Oxy Hemoglobin, Venous 75.8 (H) 45.0 - 75.0 %    POCT Hematocrit Calculated, Venous 30.0 (L) 41.0 - 52.0 %    POCT Sodium, Venous 142 136 - 145 mmol/L    POCT Potassium, Venous 4.1 3.5 - 5.3 mmol/L    POCT Chloride, Venous 108 (H) 98 - 107 mmol/L    POCT Ionized Calicum, Venous 1.17 1.10 - 1.33 mmol/L    POCT Glucose, Venous 174 (H) 74 - 99 mg/dL    POCT Lactate, Venous 1.5 0.4 - 2.0 mmol/L    POCT Base Excess, Venous 2.9 -2.0 - 3.0 mmol/L    POCT HCO3 Calculated, Venous 27.9 (H) 22.0 - 26.0 mmol/L    POCT Hemoglobin, Venous 10.1 (L) 13.5 - 17.5 g/dL    POCT Anion Gap, Venous 10.0 10.0 - 25.0 mmol/L    Patient Temperature      FiO2 40 %   CBC   Result Value Ref Range    WBC 9.4 4.4 - 11.3 x10*3/uL    nRBC 0.0 0.0 - 0.0 /100 WBCs    RBC 3.13 (L) 4.50 - 5.90 x10*6/uL     Hemoglobin 9.4 (L) 13.5 - 17.5 g/dL    Hematocrit 29.7 (L) 41.0 - 52.0 %    MCV 95 80 - 100 fL    MCH 30.0 26.0 - 34.0 pg    MCHC 31.6 (L) 32.0 - 36.0 g/dL    RDW 16.3 (H) 11.5 - 14.5 %    Platelets 232 150 - 450 x10*3/uL   Renal Function Panel   Result Value Ref Range    Glucose 252 (H) 74 - 99 mg/dL    Sodium 139 136 - 145 mmol/L    Potassium 3.7 3.5 - 5.3 mmol/L    Chloride 106 98 - 107 mmol/L    Bicarbonate 28 21 - 32 mmol/L    Anion Gap 9 (L) 10 - 20 mmol/L    Urea Nitrogen 38 (H) 6 - 23 mg/dL    Creatinine 1.78 (H) 0.50 - 1.30 mg/dL    eGFR 39 (L) >60 mL/min/1.73m*2    Calcium 8.6 8.6 - 10.3 mg/dL    Phosphorus 4.0 2.5 - 4.9 mg/dL    Albumin 3.1 (L) 3.4 - 5.0 g/dL   Magnesium   Result Value Ref Range    Magnesium 2.42 (H) 1.60 - 2.40 mg/dL     Scheduled medications  amiodarone, 200 mg, oral, Daily  apixaban, 2.5 mg, oral, BID  atorvastatin, 80 mg, oral, Nightly  bumetanide, 0.5 mg, oral, Daily  cholecalciferol, 2,000 Units, oral, Daily  escitalopram, 10 mg, oral, Daily  finasteride, 5 mg, oral, Daily  tiotropium, 2 Inhalation, inhalation, Daily   And  fluticasone furoate-vilanteroL, 1 puff, inhalation, Daily  ipratropium-albuteroL, 3 mL, nebulization, 4x daily  melatonin, 3 mg, oral, Daily  methylPREDNISolone sodium succinate (PF), 40 mg, intravenous, q8h  metoprolol succinate XL, 12.5 mg, oral, Daily  oxygen, , inhalation, Continuous - 02/gases  oxygen, , inhalation, Continuous - 02/gases  polyethylene glycol, 17 g, oral, Daily  potassium chloride CR, 20 mEq, oral, Daily  pyridoxine, 50 mg, oral, Daily  rOPINIRole, 1 mg, oral, Nightly  tamsulosin, 0.8 mg, oral, Nightly  ticagrelor, 90 mg, oral, BID      Continuous medications     PRN medications  PRN medications: acetaminophen **OR** acetaminophen **OR** acetaminophen, ipratropium-albuteroL, ondansetron ODT **OR** ondansetron      Assessment/Plan     #Systolic HF exacerbation  ~BNP 1,724  ~diuresis with furosemide.   ~monitor renal function and  electrolytes.      #COPD exacerbation  ~baseline oxygen needs of 2-3 liters nasal cannula at home.   ~solumedrol 40 TID  ~requiring bipap for 16/8 R14 FIO2 50%~weaned to 5 liters nasal cannula  ~breaks from bipap as tolerated.      #CKD  ~creatinine 1.81 and mildly elevated but may represent his new baseline>today 1.78  ~diuresis gently     Disposition: Admitted for heart failure and exacerbation of COPD. Now at baseline 3L o2. Anticipate discharge today.    KENDRICK Sotelo    Patient was seen in collaboration with the MIRANDA, Kristie MARKS. I agree with her plan as documented in the electronic medical record.     Moderate level of MDM based on above medical issues and discussing plan      Willian Hensley, DO

## 2024-01-01 NOTE — CARE PLAN
The patient's goals for the shift include      The clinical goals for the shift include Patient will maintain o2 sats >92 on 6L NC through this shift    The patient had a quiet night. He has maintained his sats >95% through this shift. He wore BIPAP for 3 hours over night. He is currently on 5L NC. He gets very SOB with exertion but does maintain his sats. VSS, afebrile.

## 2024-01-01 NOTE — CONSULTS
"Nutrition Initial Assessment:  Reason for Assessment: Admission nursing screening (MST=2)    HPI: Pt admitted for heart failure    PMHx: COPD, systolic heart failure     Nutrition History:  Food and Nutrient History: Pt reports good intake and appetite, no difficulties chewing or swallowing. Pt lives at home with his wife, eats 3 meals per day, unless he sleeps in late then eats 2 meals, but states he consistently eats a bedtime snack. Pt states he avoids using salt or sugar at home. Denies need for nutrition education, does not wish to change hospital diet to low salt, states he is choosing low sodium foods and does not add salt to food. Pt states his big concern is weakness. He was getting home health prior to admission, states he will likely need it to resume upon discharge.  Food Allergies/Intolerances:  None  Energy intake: Energy Intake: Good > 75 %  GI Symptoms: None     Oral Problems: None; does have poor dentition    Anthropometrics:  Height: 175.3 cm (5' 9.02\")  Weight: 64 kg (141 lb 1.5 oz)  BMI (Calculated): 20.83  IBW/kg (Dietitian Calculated): 70 kg  Percent of IBW: 91 %       Objective/Subjective Weight History:   Wt Readings from Last 10 Encounters:   01/01/24 64 kg (141 lb 1.5 oz)   12/12/23 67 kg (147 lb 11.3 oz)   12/05/23 69.4 kg (153 lb)   12/04/23 69 kg (152 lb 3.2 oz)   11/30/23 67.7 kg (149 lb 4 oz)   11/29/23 66.2 kg (146 lb)   11/27/23 63 kg (138 lb 14.2 oz)   11/22/23 68.8 kg (151 lb 11.2 oz)   11/16/23 68.2 kg (150 lb 5.7 oz)   11/13/23 64.9 kg (143 lb)     Weight Change %:  Weight History / % Weight Change: 5 kg / 7.2% loss x1 month  Significant Weight Loss: Yes  Interpretation of Weight Loss: 5% in 1 month       Nutrition Focused Physical Exam Findings:      Subcutaneous Fat Loss:   Orbital Fat Pads: Severe (dark circles, hollowing and loose skin)   Buccal Fat Pads: Severe (hollow, sunken and narrow face)   Triceps: Mild-moderate (less than ample fat tissue)   Ribs: Defer   Muscle " Wasting:  Temporalis: Severe (hollowed scooping depression)  Pectoralis (Clavicular Region): Severe (protruding prominent clavicle)  Deltoid/Trapezius: Severe (squared shoulders, acromion process prominent)  Interosseous: Defer  Trapezius/Infraspinatus/Supraspinatus (Scapular Region): Defer  Edema:  Edema: +1 trace   Edema Location: BLE   Physical Findings:   Skin: Positive (dry)    Objective Data:  Nutrition Significant Labs:  01/01/24:  Glucose 252^ (likely steroid induced)  ^  Creat 1.78^  eGFR 39v  Mag 2.42^  BNP 1524^    Nutrition Specific Mediations:  Amiodarone  Eliquis  Lipitor  Bumex  Vit D3  Lexapro  Proscar  Solumedrol  Metoprolol  Klor0-Con  Vit B6    I/O:     Intake/Output Summary (Last 24 hours) at 1/1/2024 1359  Last data filed at 1/1/2024 1200  Gross per 24 hour   Intake 1320 ml   Output 1800 ml   Net -480 ml       Dietary Orders (From admission, onward)       Start     Ordered    12/31/23 1240  Adult diet Regular  Diet effective now        Question:  Diet type  Answer:  Regular    12/31/23 1239    12/31/23 1235  May Participate in Room Service With Assistance  Once        Question:  .  Answer:  Yes    12/31/23 1236                     Estimated Needs:   Total Energy Estimated Needs (kCal): 2000 kCal (4036-3431 Kcal/day)   Method for Estimating Needs: 30-35 Kcal/kg    Total Protein Estimated Needs (g): 80 g (77-83 gm/day)   Method for Estimating Needs: 1.2-1.3 gm/kg    Total Fluid Estimated Needs (mL): 2000 mL   Method for Estimating Needs: 1 mL/Kcal       Nutrition Diagnosis:  Malnutrition Diagnosis  Patient has Malnutrition Diagnosis: Yes  Diagnosis Status: New  Malnutrition Diagnosis: Moderate malnutrition related to chronic disease or condition  As Evidenced by: Estimated intake less then estimated needs causing unintentional wt loss of 5% in the past month with further wt loss masked by BLE edema; BMI low / undesirable for age; severe muscle wasting in multiple areas (temporal wasting,  protruding clavicle, squared shoulders); Significant subcutaneous fat wasting (buccal, orbital fat pads, mod loss to  triceps); pt complaints of weakness         Nutrition Interventions and Recommendations:        Nutrition Prescription:  Individualized Nutrition Prescription Provided for : Diet, fluids, ONS        Nutrition Prescription Goals:   Food and/or Nutrient Delivery Interventions  Interventions: Medical food supplement  Goal: Continue regular liberalized diet as ordered  Medical Food Supplement: Commercial food  Goal: Send magic cup BID (290 Kcals and 9 gm protein per 4 oz serving)    Nutrition Education:    N/A    Monitoring/Evaluation:   Food/Nutrient Related History Monitoring  Monitoring and Evaluation Plan: Energy intake  Energy Intake: Estimated energy intake  Criteria: Pt will consume >75% of meals  Additional Plans: Pt will consume >75% magic cup BID    Body Composition/Growth/Weight History  Monitoring and Evaluation Plan: Weight  Weight: Estimated dry weight  Criteria: Wt maintenance       Pt discharged prior to nutrition intervention. Will intervene if discharge is cancelled. RDN to remain available as needed.          Time Spent/Follow-up Reminder:   Follow Up  Time Spent (min): 60 minutes  Last Date of Nutrition Visit: 01/01/24  Nutrition Follow-Up Needed?: Dietitian to reassess per policy  Follow up Comment: high risk

## 2024-01-01 NOTE — ED PROVIDER NOTES
Chief Complaint: Rib pain  HPI: This is a 78-year-old male, presenting to the emergency department for right-sided rib pain which began a few days ago and increased in severity today.  Patient states that the pain is worse with movement and ambulation.  He denies any recent injury or trauma.  He denies any abdominal pain.  He denies any fevers, chills, shortness of breath.    Past Medical History:   Diagnosis Date    Arthritis     Body mass index (BMI) 20.0-20.9, adult 09/21/2021    Body mass index (BMI) of 20.0 to 20.9 in adult    Body mass index (BMI) 21.0-21.9, adult 04/14/2021    Body mass index (BMI) of 21.0 to 21.9 in adult    CHF (congestive heart failure) (CMS/[facility])     COPD (chronic obstructive pulmonary disease) (CMS/[facility])     Coronary artery disease     Diabetes mellitus (CMS/[facility])     Hypertension     Major depressive disorder, recurrent, mild (CMS/[facility]) 11/11/2020    Mild episode of recurrent major depressive disorder    Major depressive disorder, recurrent, unspecified (CMS/[facility]) 01/13/2021    Recurrent major depressive disorder, remission status unspecified    Other conditions influencing health status     Swelling Of Hands    Other specified anxiety disorders 05/19/2020    Depression with anxiety    Oxygen desaturation during sleep     wears 2L at HS    Personal history of nicotine dependence 07/12/2021    History of nicotine dependence    Personal history of other diseases of the circulatory system     History of cardiac disorder    Personal history of other diseases of the musculoskeletal system and connective tissue     History of arthritis    Personal history of other diseases of urinary system 09/21/2021    History of chronic kidney disease    Personal history of other endocrine, nutritional and metabolic disease 08/29/2019    History of hyperglycemia    Pneumonia, unspecified organism 12/09/2019    Atypical pneumonia    Thoracic aortic aneurysm, without rupture, unspecified (CMS/[facility]) 05/29/2019     Thoracic aortic aneurysm without rupture      Past Surgical History:   Procedure Laterality Date    BACK SURGERY  01/24/2014    Back Surgery    CARDIAC ELECTROPHYSIOLOGY PROCEDURE N/A 12/15/2023    Procedure: ICD - Single Generator Change Out;  Surgeon: Flaco Briggs MD;  Location: [place] [address] Cardiac Cath Lab;  Service: Electrophysiology;  Laterality: N/A;  [place]    CORONARY ANGIOPLASTY WITH STENT PLACEMENT  01/24/2014    Cath Stent Placement    CT ABDOMEN PELVIS ANGIOGRAM W AND/OR WO IV CONTRAST  06/06/2019    CT ABDOMEN PELVIS ANGIOGRAM W AND/OR WO IV CONTRAST 6/6/2019 [facility]    CT HEAD ANGIO W AND WO IV CONTRAST  10/15/2020    CT HEAD ANGIO W AND WO IV CONTRAST 10/15/2020 [facility]    HERNIA REPAIR  04/24/2014    Hernia Repair    OTHER SURGICAL HISTORY  04/17/2019    Abdominal surgery    OTHER SURGICAL HISTORY  01/24/2014    Defibrillation    PACEMAKER PLACEMENT      TOTAL HIP ARTHROPLASTY  01/24/2014    Hip Replacement       Physical Exam  Constitutional:       Appearance: Normal appearance.   HENT:      Head: Normocephalic and atraumatic.      Mouth/Throat:      Mouth: Mucous membranes are moist.   Eyes:      Extraocular Movements: Extraocular movements intact.   Cardiovascular:      Rate and Rhythm: Normal rate and regular rhythm.   Pulmonary:      Effort: Pulmonary effort is normal.   Chest:      Chest wall: Tenderness (Right-sided chest wall pain, no crepitus, no deformity) present.   Abdominal:      General: Abdomen is flat.      Palpations: Abdomen is soft.   Skin:     General: Skin is warm.   Neurological:      General: No focal deficit present.      Mental Status: He is alert.   Psychiatric:         Mood and Affect: Mood normal.            ED Course/MDM  Diagnoses as of 01/01/24 0320   Chest wall pain     EKG interpreted by myself (ED attending physician): Normal sinus rhythm, rate of 70, left axis deviation, normal intervals, left bundle branch block, nonspecific ST  segment, nonischemic EKG.    This is a 78 y.o. male presenting to the ED for evaluation of right-sided chest pain which began a few days ago, and increased in severity today.  Patient states the pain is worse with movement, and improved by nothing.  He denies any known injury or trauma.  On physical exam, the patient does appear uncomfortable however is in no acute distress, nontoxic-appearing.  There is right-sided chest wall tenderness to palpation which reproduces his pain.  No overlying ecchymosis, erythema, rash, step-off, deformity, crepitus. Lab work was obtained including CBC, CMP, BNP, lactate, phosphorus, mag, which was concerning for slightly elevated troponin, which downtrended as well as an elevated BNP.  I did review patient's previous BNP's, and today's is actually less than his most recent from a few weeks ago.  Patient is not clinically fluid overloaded.  I did also do a CT PE study as well as a CT abdomen pelvis which were overall nonconcerning for any acute findings.  Patient was given a dose of Dilaudid with minimal improvement in his pain.  I did discuss with him the option of admission for pain control, however the patient states that he has a appointment with his cardiologist that he cannot miss and would like to go home.  We trialed a Lidoderm patch and Tylenol with improvement in his pain.  He is now able to walk around with minimal discomfort and feels comfortable discharge home.  He was advised to return to the emergency department for any worsening symptoms and was ultimately discharged home in stable condition.    Final Impression  1.  Right-sided chest wall pain  Disposition/Plan: Discharge home  Condition at disposition: Stable.     Rosalina Peña DO  Emergency Medicine Physician     Rosalina Peña,   01/01/24 0331

## 2024-01-01 NOTE — CARE PLAN
Problem: Nutrition  Goal: Oral intake greater than 50%  Outcome: Met  Goal: Lab values WNL  Outcome: Met  Goal: Electrolytes WNL  Outcome: Met     Problem: Skin  Goal: Prevent/manage excess moisture  Outcome: Met  Goal: Promote/optimize nutrition  Outcome: Met  Goal: Promote skin healing  Outcome: Met     Problem: Pain - Adult  Goal: Verbalizes/displays adequate comfort level or baseline comfort level  Outcome: Met     Problem: Safety - Adult  Goal: Free from fall injury  Outcome: Met     Problem: Discharge Planning  Goal: Discharge to home or other facility with appropriate resources  Outcome: Met     Problem: Chronic Conditions and Co-morbidities  Goal: Patient's chronic conditions and co-morbidity symptoms are monitored and maintained or improved  Outcome: Met     Problem: Diabetes  Goal: Achieve decreasing blood glucose levels by end of shift  Outcome: Met  Goal: Increase stability of blood glucose readings by end of shift  Outcome: Met  Goal: Decrease in ketones present in urine by end of shift  Outcome: Met  Goal: Maintain electrolyte levels within acceptable range throughout shift  Outcome: Met  Goal: Maintain glucose levels >70mg/dl to <250mg/dl throughout shift  Outcome: Met  Goal: No changes in neurological exam by end of shift  Outcome: Met  Goal: Learn about and adhere to nutrition recommendations by end of shift  Outcome: Met  Goal: Vital signs within normal range for age by end of shift  Outcome: Met  Goal: Increase self care and/or family involovement by end of shift  Outcome: Met  Goal: Receive DSME education by end of shift  Outcome: Met     Problem: Heart Failure  Goal: Improved gas exchange this shift  Outcome: Met  Goal: Improved urinary output this shift  Outcome: Met  Goal: Reduction in peripheral edema within 24 hours  Outcome: Met  Goal: Report improvement of dyspnea/breathlessness this shift  Outcome: Met  Goal: Weight from fluid excess reduced over 2-3 days, then stabilize  Outcome:  Met  Goal: Increase self care and/or family involvement in 24 hours  Outcome: Met     Problem: Pain  Goal: Takes deep breaths with improved pain control throughout the shift  Outcome: Met  Goal: Turns in bed with improved pain control throughout the shift  Outcome: Met  Goal: Walks with improved pain control throughout the shift  Outcome: Met  Goal: Performs ADL's with improved pain control throughout shift  Outcome: Met  Goal: Participates in PT with improved pain control throughout the shift  Outcome: Met  Goal: Free from opioid side effects throughout the shift  Outcome: Met  Goal: Free from acute confusion related to pain meds throughout the shift  Outcome: Met     Problem: Fall/Injury  Goal: Not fall by end of shift  Outcome: Met  Goal: Be free from injury by end of the shift  Outcome: Met  Goal: Verbalize understanding of personal risk factors for fall in the hospital  Outcome: Met  Goal: Verbalize understanding of risk factor reduction measures to prevent injury from fall in the home  Outcome: Met  Goal: Use assistive devices by end of the shift  Outcome: Met  Goal: Pace activities to prevent fatigue by end of the shift  Outcome: Met   The patient's goals for the shift include      The clinical goals for the shift include pt sao2 will >94% for this shift    Over the shift, the patient did not make progress toward the following goals. Barriers to progression include . Recommendations to address these barriers include .

## 2024-01-02 ENCOUNTER — HOSPITAL ENCOUNTER (OUTPATIENT)
Dept: CARDIOLOGY | Facility: HOSPITAL | Age: 79
Discharge: HOME | End: 2024-01-02
Payer: MEDICARE

## 2024-01-02 PROCEDURE — 1090000001 HH PPS REVENUE CREDIT

## 2024-01-02 PROCEDURE — 1090000002 HH PPS REVENUE DEBIT

## 2024-01-02 PROCEDURE — 93005 ELECTROCARDIOGRAM TRACING: CPT

## 2024-01-03 ENCOUNTER — APPOINTMENT (OUTPATIENT)
Dept: HEMATOLOGY/ONCOLOGY | Facility: HOSPITAL | Age: 79
End: 2024-01-03
Payer: MEDICARE

## 2024-01-03 ENCOUNTER — CLINICAL SUPPORT (OUTPATIENT)
Dept: SLEEP MEDICINE | Facility: CLINIC | Age: 79
End: 2024-01-03
Payer: MEDICARE

## 2024-01-03 ENCOUNTER — PATIENT OUTREACH (OUTPATIENT)
Dept: PRIMARY CARE | Facility: CLINIC | Age: 79
End: 2024-01-03
Payer: MEDICARE

## 2024-01-03 VITALS
RESPIRATION RATE: 11 BRPM | OXYGEN SATURATION: 92 % | HEART RATE: 96 BPM | HEIGHT: 69 IN | DIASTOLIC BLOOD PRESSURE: 64 MMHG | WEIGHT: 141 LBS | BODY MASS INDEX: 20.88 KG/M2 | SYSTOLIC BLOOD PRESSURE: 105 MMHG

## 2024-01-03 DIAGNOSIS — G47.30 SLEEP APNEA IN ADULT: ICD-10-CM

## 2024-01-03 DIAGNOSIS — G47.33 OBSTRUCTIVE SLEEP APNEA (ADULT) (PEDIATRIC): ICD-10-CM

## 2024-01-03 PROCEDURE — 1090000002 HH PPS REVENUE DEBIT

## 2024-01-03 PROCEDURE — 95810 POLYSOM 6/> YRS 4/> PARAM: CPT | Performed by: PSYCHIATRY & NEUROLOGY

## 2024-01-03 PROCEDURE — 1090000001 HH PPS REVENUE CREDIT

## 2024-01-03 ASSESSMENT — ENCOUNTER SYMPTOMS
SPEECH DIFFICULTY: 0
BACK PAIN: 1
ACTIVITY CHANGE: 0
HEADACHES: 0
NUMBNESS: 0
GASTROINTESTINAL NEGATIVE: 1
PALPITATIONS: 0
WHEEZING: 0
FREQUENCY: 1
DIARRHEA: 0
ALLERGIC/IMMUNOLOGIC NEGATIVE: 1
ENDOCRINE NEGATIVE: 1
UNEXPECTED WEIGHT CHANGE: 0
NAUSEA: 0
FATIGUE: 1
EYES NEGATIVE: 1
CHILLS: 0
HEMATOLOGIC/LYMPHATIC NEGATIVE: 1
VOMITING: 0
SORE THROAT: 0
COUGH: 0
DIZZINESS: 0
ABDOMINAL PAIN: 0
ARTHRALGIAS: 1
SHORTNESS OF BREATH: 1
CONSTIPATION: 0

## 2024-01-03 ASSESSMENT — SLEEP AND FATIGUE QUESTIONNAIRES
SITTING AND RIDING IN A CAR OR BUS FOR ABOUT HALF AN HOUR: 0
SITTING IN A CLASSROOM AT SCHOOL DURING THE MORNING: 1
SITTING AND WATCHING TV OR A VIDEO: 3
SITTING AND READING: 2
ESS TOTAL SCORE: 9
LYING DOWN TO REST OR NAP IN THE AFTERNOON: 2
ESS-CHAD TOTAL SCORE: 9
SITTING AND TALKING TO SOMEONE: 0
SITTING AND EATING A MEAL: 0
SITTING QUITELY BY YOURSELF AFTER LUNCH: 1

## 2024-01-03 ASSESSMENT — PAIN SCALES - GENERAL: PAINLEVEL: 6

## 2024-01-03 NOTE — PROGRESS NOTES
"Patient: Andrez Damon  : 1945  PCP: Karley Sullivan, APRN-CNP, [facility]  MRN: [id number]  Program: No linked episodes     Andrez Damon is a 78 y.o. male presenting today for follow-up after being discharged from the hospital 3 days ago. The main problem requiring admission was chronic respiratory failure with hypoxia, systolic CHF, COPD exacerbation, acute on chronic respiratory failure, heart failure, elevated troponin. The discharge summary and/or Transitional Care Management documentation was reviewed. Medication reconciliation was performed as indicated via the \"Onur as Reviewed\" timestamp.     Andrez Damon was contacted by Transitional Care Management services two days after his discharge. This encounter and supporting documentation was reviewed.    The complexity of medical decision making for this patient's transitional care is high.    Hospitalized [facility]  -  for difficulty breathing.  BNP was 1724, he was found to be in CHF exacerbation and COPD exacerbation, ELIZABETH.  Treated with IV Lasix, BiPAP.  He was weaned to his baseline oxygen.  Discharged on azithromycin 3 times a week, prednisone taper, Bumex was increased to 2 mg daily.  He is scheduled to follow-up with cardiology and pulmonology.  Patient uses 3 L O2 continuous, he does not have a tank he can bring to the office.  He has upcoming appointment to discuss with pulmonology.  He did in center sleep study last night.    Physical Exam  Vitals and nursing note reviewed.   Constitutional:       General: He is not in acute distress.     Appearance: Normal appearance. He is underweight. He is ill-appearing.   HENT:      Head: Normocephalic and atraumatic.      Right Ear: Tympanic membrane, ear canal and external ear normal.      Left Ear: Tympanic membrane, ear canal and external ear normal.      Nose: Nose normal.      Mouth/Throat:      Mouth: Mucous membranes are moist.      Dentition: Abnormal dentition.      Pharynx: Oropharynx " is clear.   Eyes:      Extraocular Movements: Extraocular movements intact.      Conjunctiva/sclera: Conjunctivae normal.      Pupils: Pupils are equal, round, and reactive to light.   Cardiovascular:      Rate and Rhythm: Normal rate and regular rhythm.      Pulses: Normal pulses.      Heart sounds: Normal heart sounds. No murmur heard.  Pulmonary:      Effort: Pulmonary effort is normal. No respiratory distress.      Breath sounds: Normal breath sounds. Decreased air movement present. No wheezing.   Abdominal:      General: Bowel sounds are normal. There is no distension.      Palpations: Abdomen is soft.      Tenderness: There is no abdominal tenderness.   Musculoskeletal:         General: No tenderness. Normal range of motion.      Cervical back: Normal range of motion and neck supple.      Right lower leg: No edema.      Left lower leg: No edema.      Comments: Using rollator   Skin:     General: Skin is warm and dry.      Capillary Refill: Capillary refill takes less than 2 seconds.   Neurological:      General: No focal deficit present.      Mental Status: He is alert and oriented to person, place, and time.   Psychiatric:         Mood and Affect: Mood normal.         Behavior: Behavior normal.         Thought Content: Thought content normal.         Judgment: Judgment normal.         Assessment/Plan     #CHF exacerbation, cardiomyopathy  -Follow-up with cardiology  -continued Bumex 2 mg daily, metoprolol 12.5 mg daily  -Low sodium diet  -Weigh yourself every morning before breakfast  -Call the office if you have a weight gain of 3 or more pounds in one day or 5 or more pounds in one week  #COPD exacerbation, resolving  -Continue azithromycin 3 times a week and complete prednisone taper  -Continue O2 3 L/min  -continue Trelegy  -Xopenex as needed  -Had sleep study last night, follow-up with pulmonology  -Strongly encouraged to stop smoking  # CKD  -Monitor  # Nicotine dependence  -Strongly encouraged to stop  smoking    Follow-up as scheduled in March and as needed    Review of Systems   Constitutional:  Positive for fatigue. Negative for activity change, chills and unexpected weight change.   HENT:  Positive for hearing loss and tinnitus. Negative for congestion, ear pain and sore throat.    Eyes: Negative.    Respiratory:  Positive for shortness of breath. Negative for cough and wheezing.    Cardiovascular:  Negative for chest pain, palpitations and leg swelling.        Cool toes   Gastrointestinal: Negative.  Negative for abdominal pain, constipation, diarrhea, nausea and vomiting.   Endocrine: Negative.    Genitourinary:  Positive for frequency.   Musculoskeletal:  Positive for arthralgias, back pain and gait problem.   Skin: Negative.    Allergic/Immunologic: Negative.    Neurological:  Negative for dizziness, speech difficulty, numbness and headaches.   Hematological: Negative.    Psychiatric/Behavioral:          Anxiety, depression   All other systems reviewed and are negative.      Family History   Problem Relation Name Age of Onset    Other (cardiac disorder) Mother      Other (malignant neoplasm) Mother      Other (cardiac disorder) Father         Engagement  Call Start Time: 1258 (1/3/2024 12:58 PM)    Medications  Medications reviewed with patient/caregiver?: Yes (1/3/2024 12:58 PM)  Is the patient having any side effects they believe may be caused by any medication additions or changes?: No (1/3/2024 12:58 PM)  Does the patient have all medications ordered at discharge?: Yes (1/3/2024 12:58 PM)  Care Management Interventions: Provided patient education (1/3/2024 12:58 PM)  Prescription Comments: Reviewed discharge medications with patient. States he has all scripts.  Medication reconciliation completed in Epic Patient self manages their home medications utilizing a 7 day pill box without difficulty, and denies issues with affordability.  Outreach to discharge MD regarding Azithromcin order  he states patient  to take Monday/ Wednesday and Friday  Patient is aware. (1/3/2024 12:58 PM)  Is the patient taking all medications as directed (includes completed medication regime)?: Yes (1/3/2024 12:58 PM)  Care Management Interventions: Provided patient education (1/3/2024 12:58 PM)  Medication Comments: Patient denies any questions or concerns about their medications once they are home. Verbalize an understanding regarding how to take their medications and which medications they should be taking. No issues reported in regards to accessibility affordability adherence.He states he will reach out to pulmonary today to request a refill. Offered to support in this but this patient declined. (1/3/2024 12:58 PM)    Appointments  Does the patient have a primary care provider?: Yes (Confirmed PCP) (1/3/2024 12:58 PM)  Care Management Interventions: Educated patient on importance of making appointment; Verified appointment date/time/provider (1/3/2024 12:58 PM)  Has the patient kept scheduled appointments due by today?: Yes (1/3/2024 12:58 PM)  Care Management Interventions: Advised to schedule with specialist (Emphasized importance of hospital fu with specialties. Reviewed scheduled appointments) (1/3/2024 12:58 PM)  Follow Up Tasks: -- (request for home care order to PCP due to recent hospitalization- new orders needed) (1/3/2024 12:58 PM)    Self Management  What is the home health agency?:  Home Health - called and new orders needed due to length of stay. Outreach to PCP to request home care orders. (1/3/2024 12:58 PM)  Has home health visited the patient within 72 hours of discharge?: Not applicable (1/3/2024 12:58 PM)  What Durable Medical Equipment (DME) was ordered?: patient states he has all supplies at home. O2 sufficient amount.  Does not need assistance with DME (1/3/2024 12:58 PM)    Patient Teaching  Does the patient have access to their discharge instructions?: Yes (1/3/2024 12:58 PM)  Care Management Interventions:  Reviewed instructions with patient (Patient denies any questions related to the discharge instructions.) (1/3/2024 12:58 PM)  What is the patient's perception of their health status since discharge?: Improving (1/3/2024 12:58 PM)  Is the patient/caregiver able to teach back the hierarchy of who to call/visit for symptoms/problems? PCP, Specialist, Home Health nurse, Urgent Care, ED, 911: Yes (You need to call your doctor,  return to the closest ED if you develop any new  or worsening symptoms: concerning symptoms call 911 for emergency situations                        Call your doctor for questions / concerrns.) (1/3/2024 12:58 PM)  Patient/Caregiver Education Comments: Weight Monitoring  Through proper diet and moderate exercise, patients should maintain a weight determined by their physician. For heart  failure patients, daily weight monitoring is crucial. Your record should be shared with your health care provider at your  appointments. Call your doctor if you experience a weight gain of 2-3 pounds or more per day over a 2-day period or 5  pounds in 1 week.  Symptoms  Common heart failure symptoms include:  Chest pain  Fatigue and weakness  Rapid or irregular heartbeat  Shortness of breath when you exert yourself or when you lie down  Reduced ability to exercise  Persistent cough or wheezing with white or pink blood-tinged phlegm  Swelling in your abdomen, legs, ankles and feet  Difficulty concentrating or decreased alertness.  If any of these symptoms suddenly become worse or you develop a new sign or symptom, it may mean that existing  heart failure is getting worse or not responding to treatment. Contact your doctor promptly. (1/3/2024 12:58 PM)    Wrap Up  Wrap Up Additional Comments: Spoke w/ pt. Pt identified by name and . Denies chest pain/ sob  Utilizing 02 @ 3l NC  weight 136#  reports discharge weight 140# denies any issues urinating. Sleep study test tonight.                                                                                                                                                                                                                                                                      Reviewed discharge instructions, medications, and follow up. Patient denies any further discharge questions/needs at this time.Please take all medications as prescribed and keep all follow-up appointments. Emphasized the importance of hospital follow up.Follow up is needed after discharge to review the hospital recommendations,assess your response to your treatment and make further recommendations for your transition of care.                                                                                                                                                                                                                  Contact your primary care physician with any questions or concerns that arise.You may contact your outpatient specialists office for any questions regarding specifics relating to their recommendations. Confirms they will attend the scheduled follow up appointment Aware of my availability for non emergent concerns (1/3/2024 12:58 PM)  Call End Time: 1325 (1/3/2024 12:58 PM)        No follow-ups on file.

## 2024-01-03 NOTE — PROGRESS NOTES
Discharge Facility: [facility]  Discharge Diagnosis: Heart Failure  Admission Date: 12-  Discharge Date: 1-1-2024    PCP Appointment Date: 1-4-2024    Specialist Appointment Date:   -Sleep Lab 1-3-2024  -Heme Onc  1-  -Pulmonology 2-  (pt will call for sooner fu)  -Cardiology  1-  -Urology  1-    Hospital Encounter and Summary: Linked   See discharge assessment below for further details  Engagement  Call Start Time: 1258 (1/3/2024 12:58 PM)    Medications  Medications reviewed with patient/caregiver?: Yes (1/3/2024 12:58 PM)  Is the patient having any side effects they believe may be caused by any medication additions or changes?: No (1/3/2024 12:58 PM)  Does the patient have all medications ordered at discharge?: Yes (1/3/2024 12:58 PM)  Care Management Interventions: Provided patient education (1/3/2024 12:58 PM)  Prescription Comments: Reviewed discharge medications with patient. States he has all scripts.  Medication reconciliation completed in Epic Patient self manages their home medications utilizing a 7 day pill box without difficulty, and denies issues with affordability.  Outreach to discharge MD regarding Azithromcin order  he states patient to take Monday/ Wednesday and Friday  Patient is aware. (1/3/2024 12:58 PM)  Is the patient taking all medications as directed (includes completed medication regime)?: Yes (1/3/2024 12:58 PM)  Care Management Interventions: Provided patient education (1/3/2024 12:58 PM)  Medication Comments: Patient denies any questions or concerns about their medications once they are home. Verbalize an understanding regarding how to take their medications and which medications they should be taking. No issues reported in regards to accessibility affordability adherence.He states he will reach out to pulmonary today to request a refill. Offered to support in this but this patient declined. (1/3/2024 12:58 PM)    Appointments  Does the patient have a  primary care provider?: Yes (Confirmed PCP) (1/3/2024 12:58 PM)  Care Management Interventions: Educated patient on importance of making appointment; Verified appointment date/time/provider (1/3/2024 12:58 PM)  Has the patient kept scheduled appointments due by today?: Yes (1/3/2024 12:58 PM)  Care Management Interventions: Advised to schedule with specialist (Emphasized importance of hospital fu with specialties. Reviewed scheduled appointments) (1/3/2024 12:58 PM)  Follow Up Tasks: -- (request for home care order to PCP due to recent hospitalization- new orders needed) (1/3/2024 12:58 PM)    Self Management  What is the home health agency?:  Home Health - called and new orders needed due to length of stay. Outreach to PCP to request home care orders. (1/3/2024 12:58 PM)  Has home health visited the patient within 72 hours of discharge?: Not applicable (1/3/2024 12:58 PM)  What Durable Medical Equipment (DME) was ordered?: patient states he has all supplies at home. O2 sufficient amount.  Does not need assistance with DME (1/3/2024 12:58 PM)    Patient Teaching  Does the patient have access to their discharge instructions?: Yes (1/3/2024 12:58 PM)  Care Management Interventions: Reviewed instructions with patient (Patient denies any questions related to the discharge instructions.) (1/3/2024 12:58 PM)  What is the patient's perception of their health status since discharge?: Improving (1/3/2024 12:58 PM)  Is the patient/caregiver able to teach back the hierarchy of who to call/visit for symptoms/problems? PCP, Specialist, Home Health nurse, Urgent Care, ED, 911: Yes (You need to call your doctor,  return to the closest ED if you develop any new  or worsening symptoms: concerning symptoms call 911 for emergency situations                        Call your doctor for questions / concerrns.) (1/3/2024 12:58 PM)  Patient/Caregiver Education Comments: Weight Monitoring  Through proper diet and moderate exercise,  patients should maintain a weight determined by their physician. For heart  failure patients, daily weight monitoring is crucial. Your record should be shared with your health care provider at your  appointments. Call your doctor if you experience a weight gain of 2-3 pounds or more per day over a 2-day period or 5  pounds in 1 week.  Symptoms  Common heart failure symptoms include:  Chest pain  Fatigue and weakness  Rapid or irregular heartbeat  Shortness of breath when you exert yourself or when you lie down  Reduced ability to exercise  Persistent cough or wheezing with white or pink blood-tinged phlegm  Swelling in your abdomen, legs, ankles and feet  Difficulty concentrating or decreased alertness.  If any of these symptoms suddenly become worse or you develop a new sign or symptom, it may mean that existing  heart failure is getting worse or not responding to treatment. Contact your doctor promptly. (1/3/2024 12:58 PM)    Wrap Up  Wrap Up Additional Comments: Spoke w/ pt. Pt identified by name and . Denies chest pain/ sob  Utilizing 02 @ 3l NC  weight 136#  reports discharge weight 140# denies any issues urinating. Sleep study test tonight.                                                                                                                                                                                                                                                                     Reviewed discharge instructions, medications, and follow up. Patient denies any further discharge questions/needs at this time.Please take all medications as prescribed and keep all follow-up appointments. Emphasized the importance of hospital follow up.Follow up is needed after discharge to review the hospital recommendations,assess your response to your treatment and make further recommendations for your transition of care.                                                                                                                                                                                                                   Contact your primary care physician with any questions or concerns that arise.You may contact your outpatient specialists office for any questions regarding specifics relating to their recommendations. Confirms they will attend the scheduled follow up appointment Aware of my availability for non emergent concerns (1/3/2024 12:58 PM)  Call End Time: 1325 (1/3/2024 12:58 PM)

## 2024-01-04 ENCOUNTER — OFFICE VISIT (OUTPATIENT)
Dept: PRIMARY CARE | Facility: CLINIC | Age: 79
End: 2024-01-04
Payer: MEDICARE

## 2024-01-04 VITALS
DIASTOLIC BLOOD PRESSURE: 72 MMHG | TEMPERATURE: 97 F | WEIGHT: 135.3 LBS | HEART RATE: 77 BPM | SYSTOLIC BLOOD PRESSURE: 118 MMHG | BODY MASS INDEX: 19.98 KG/M2 | OXYGEN SATURATION: 96 %

## 2024-01-04 DIAGNOSIS — F32.5 DEPRESSION, MAJOR, IN REMISSION (CMS-HCC): ICD-10-CM

## 2024-01-04 DIAGNOSIS — I48.0 AF (PAROXYSMAL ATRIAL FIBRILLATION) (MULTI): ICD-10-CM

## 2024-01-04 DIAGNOSIS — Z09 HOSPITAL DISCHARGE FOLLOW-UP: ICD-10-CM

## 2024-01-04 DIAGNOSIS — I25.5 ISCHEMIC CARDIOMYOPATHY: ICD-10-CM

## 2024-01-04 DIAGNOSIS — I50.23 ACUTE ON CHRONIC SYSTOLIC CONGESTIVE HEART FAILURE (MULTI): Primary | ICD-10-CM

## 2024-01-04 DIAGNOSIS — N40.0 ENLARGED PROSTATE: ICD-10-CM

## 2024-01-04 DIAGNOSIS — I50.9 ACUTE ON CHRONIC HEART FAILURE, UNSPECIFIED HEART FAILURE TYPE (MULTI): ICD-10-CM

## 2024-01-04 DIAGNOSIS — J96.11 CHRONIC RESPIRATORY FAILURE WITH HYPOXIA (MULTI): Chronic | ICD-10-CM

## 2024-01-04 PROCEDURE — 1111F DSCHRG MED/CURRENT MED MERGE: CPT | Performed by: NURSE PRACTITIONER

## 2024-01-04 PROCEDURE — 1090000001 HH PPS REVENUE CREDIT

## 2024-01-04 PROCEDURE — 3074F SYST BP LT 130 MM HG: CPT | Performed by: NURSE PRACTITIONER

## 2024-01-04 PROCEDURE — 1125F AMNT PAIN NOTED PAIN PRSNT: CPT | Performed by: NURSE PRACTITIONER

## 2024-01-04 PROCEDURE — 99496 TRANSJ CARE MGMT HIGH F2F 7D: CPT | Performed by: NURSE PRACTITIONER

## 2024-01-04 PROCEDURE — 3078F DIAST BP <80 MM HG: CPT | Performed by: NURSE PRACTITIONER

## 2024-01-04 PROCEDURE — 1159F MED LIST DOCD IN RCRD: CPT | Performed by: NURSE PRACTITIONER

## 2024-01-04 PROCEDURE — 1090000002 HH PPS REVENUE DEBIT

## 2024-01-04 RX ORDER — ESCITALOPRAM OXALATE 10 MG/1
10 TABLET ORAL DAILY
Qty: 90 TABLET | Refills: 0 | Status: SHIPPED | OUTPATIENT
Start: 2024-01-04

## 2024-01-04 RX ORDER — METOPROLOL SUCCINATE 25 MG/1
12.5 TABLET, EXTENDED RELEASE ORAL DAILY
Qty: 45 TABLET | Refills: 0 | Status: SHIPPED | OUTPATIENT
Start: 2024-01-04 | End: 2024-04-01

## 2024-01-04 RX ORDER — FINASTERIDE 5 MG/1
5 TABLET, FILM COATED ORAL DAILY
Qty: 90 TABLET | Refills: 0 | Status: SHIPPED | OUTPATIENT
Start: 2024-01-04 | End: 2024-01-26 | Stop reason: SDUPTHER

## 2024-01-04 NOTE — PROGRESS NOTES
[facility] TECH NOTE:     Patient: Andrez Damon   MRN//AGE: [id number]  1945  78 y.o.   Technologist: Estephania Samaniego   Room: 105   Service Date: 1/3/2024        Sleep Testing Location: [facility]:     TECHNOLOGIST SLEEP STUDY PROCEDURE NOTE:   This sleep study is being conducted according to the policies and procedures outlined by the [employer] accreditation standards.  The sleep study procedure and processes involved during this appointment was explained to the patient/patient’s family, questions were answered. The patient/family verbalized understanding.      The patient is a 78 y.o. year old male scheduled for a Diagnostic PSG Split night. He arrived for his appointment.      The study that was ultimately completed was a Diagnostic PSG Split night, criteria was not met to split.      The full study Was completed.  Patient questionnaires completed?: yes     Consents signed? yes    Initial Fall Risk Screening:     Andrez has not fallen in the last 6 months.  Andrez does not have a fear of falling. He does need assistance with walking. He does need assistance walking in his home. He does need assistance in an unfamiliar setting. The patient is using an assistive device.     Brief Study observations: 78 year old male presents for Diagnostic split night PSG, criteria was not met to split.  Patient uses a walker to assist.  Patient is on 2L O2 at home, tech started him on 2L  O2 when he arrived to the lab.  Patient started the study on room air, oxygen was added at 00:17 due to a desat under 88% for more than 5 minutes.  Patient used the restroom one time throughout the night.  REM was observed during the study.      Deviation to order/protocol and reason: None     If PAP, which was preferred mask/pressure/mode: NA     Other:None    After the procedure, the patient/family was informed to ensure followup with ordering clinician for testing results.

## 2024-01-04 NOTE — CASE COMMUNICATION
Last nursing visit 12/6.  Patient refused /declined visits for the weeks after that last visit.  PT and OT in after 12/6.    Nursing discharging as patient declines further nursing visits.

## 2024-01-05 DIAGNOSIS — E78.5 HYPERLIPIDEMIA, UNSPECIFIED HYPERLIPIDEMIA TYPE: ICD-10-CM

## 2024-01-05 DIAGNOSIS — E55.9 VITAMIN D DEFICIENCY: ICD-10-CM

## 2024-01-05 PROCEDURE — 1090000001 HH PPS REVENUE CREDIT

## 2024-01-05 PROCEDURE — 1090000002 HH PPS REVENUE DEBIT

## 2024-01-06 PROCEDURE — 1090000002 HH PPS REVENUE DEBIT

## 2024-01-06 PROCEDURE — 1090000001 HH PPS REVENUE CREDIT

## 2024-01-07 PROCEDURE — 1090000001 HH PPS REVENUE CREDIT

## 2024-01-07 PROCEDURE — 1090000002 HH PPS REVENUE DEBIT

## 2024-01-08 ENCOUNTER — HOME CARE VISIT (OUTPATIENT)
Dept: HOME HEALTH SERVICES | Facility: HOME HEALTH | Age: 79
End: 2024-01-08
Payer: MEDICARE

## 2024-01-08 PROCEDURE — 1090000002 HH PPS REVENUE DEBIT

## 2024-01-08 PROCEDURE — 1090000001 HH PPS REVENUE CREDIT

## 2024-01-08 RX ORDER — ATORVASTATIN CALCIUM 80 MG/1
80 TABLET, FILM COATED ORAL DAILY
Qty: 90 TABLET | Refills: 0 | Status: SHIPPED | OUTPATIENT
Start: 2024-01-08 | End: 2024-02-20 | Stop reason: WASHOUT

## 2024-01-08 RX ORDER — CHOLECALCIFEROL (VITAMIN D3) 125 MCG
125 CAPSULE ORAL DAILY
Qty: 90 CAPSULE | Refills: 0 | Status: SHIPPED | OUTPATIENT
Start: 2024-01-08 | End: 2024-04-04

## 2024-01-08 NOTE — CASE COMMUNICATION
Correction to last entry (would not allow for an edit)  Correcting to show correct date of 12/19 for last nursing visit.    PT remained in after, but did a discipline discharge at the end of the month, thinking that nursing was still in.    However, nursing had already discharged.    This nurse will document the agency discharge.

## 2024-01-09 ENCOUNTER — HOME CARE VISIT (OUTPATIENT)
Dept: HOME HEALTH SERVICES | Facility: HOME HEALTH | Age: 79
End: 2024-01-09
Payer: MEDICARE

## 2024-01-09 ENCOUNTER — HOSPITAL ENCOUNTER (OUTPATIENT)
Dept: CARDIOLOGY | Facility: CLINIC | Age: 79
Discharge: HOME | End: 2024-01-09
Payer: MEDICARE

## 2024-01-09 DIAGNOSIS — I49.5 SSS (SICK SINUS SYNDROME) (MULTI): ICD-10-CM

## 2024-01-09 LAB
ATRIAL RATE: 70 BPM
P AXIS: 53 DEGREES
P OFFSET: 198 MS
P ONSET: 136 MS
PR INTERVAL: 136 MS
Q ONSET: 204 MS
QRS COUNT: 12 BEATS
QRS DURATION: 134 MS
QT INTERVAL: 438 MS
QTC CALCULATION(BAZETT): 473 MS
QTC FREDERICIA: 461 MS
R AXIS: -77 DEGREES
T AXIS: 87 DEGREES
T OFFSET: 423 MS
VENTRICULAR RATE: 70 BPM

## 2024-01-09 SDOH — HEALTH STABILITY: MENTAL HEALTH: SMOKING IN HOME: 0

## 2024-01-09 SDOH — ECONOMIC STABILITY: HOUSING INSECURITY: EVIDENCE OF SMOKING MATERIAL: 0

## 2024-01-09 ASSESSMENT — ACTIVITIES OF DAILY LIVING (ADL)
HOME_HEALTH_OASIS: 00
OASIS_M1830: 01
PHYSICAL TRANSFERS ASSESSED: 1
CURRENT_FUNCTION: STAND BY ASSIST
AMBULATION ASSISTANCE: 1
AMBULATION ASSISTANCE: STAND BY ASSIST

## 2024-01-10 LAB
ATRIAL RATE: 104 BPM
ATRIAL RATE: 86 BPM
P AXIS: 36 DEGREES
P AXIS: 77 DEGREES
P OFFSET: 177 MS
P OFFSET: 189 MS
P ONSET: 126 MS
P ONSET: 128 MS
PR INTERVAL: 148 MS
PR INTERVAL: 152 MS
Q ONSET: 202 MS
Q ONSET: 202 MS
QRS COUNT: 14 BEATS
QRS COUNT: 17 BEATS
QRS DURATION: 142 MS
QRS DURATION: 146 MS
QT INTERVAL: 386 MS
QT INTERVAL: 432 MS
QTC CALCULATION(BAZETT): 507 MS
QTC CALCULATION(BAZETT): 516 MS
QTC FREDERICIA: 463 MS
QTC FREDERICIA: 487 MS
R AXIS: -83 DEGREES
R AXIS: -85 DEGREES
T AXIS: 85 DEGREES
T AXIS: 90 DEGREES
T OFFSET: 395 MS
T OFFSET: 418 MS
VENTRICULAR RATE: 104 BPM
VENTRICULAR RATE: 86 BPM

## 2024-01-11 ENCOUNTER — APPOINTMENT (OUTPATIENT)
Dept: HEMATOLOGY/ONCOLOGY | Facility: HOSPITAL | Age: 79
End: 2024-01-11
Payer: MEDICARE

## 2024-01-11 NOTE — DOCUMENTATION CLARIFICATION NOTE
PATIENT:               ALEJANDRA FRANCIS  ACCT #:                  [id number]  MRN:                       [id number]  :                       1945  ADMIT DATE:       2023 6:37 AM  DISCH DATE:        2024 2:17 PM  RESPONDING PROVIDER #:        [id number]          PROVIDER RESPONSE TEXT:    Acute Hypoxemic and Hypercapnic Respiratory Failure    CDI QUERY TEXT:    UH_Respiratory Failure Acute        Instruction:    Based on your assessment of the patient and the clinical information, please provide the requested documentation by clicking on the appropriate radio button and enter any additional information if prompted.    Question: Is there a diagnosis indicative of the clinical information    When answering this query, please exercise your independent professional judgment. The fact that a question is being asked, does not imply that any particular answer is desired or expected.    The patient's clinical indicators include:  Clinical Information: 78 year old male on chronic oxygen presents with SOB. Work-up reveals COPD exacerbation and CHF exacerbation. Admitted for evaluation and treatment.    Clinical Indicators:    ED - Acute and chr resp failure, unsp w hypoxia or hypercapnia  -presents to the emergency department with acute respiratory failure and hypoxemia.    H and P - COPD exacerbation    baseline oxygen needs of 2-3 liters nasal cannula at home.    solumedrol 40 TID    requiring bipap for 168 R14 FIO2 50 percent  ---Admitted for heart failure and exacerbation of COPD    DS - Heart Failure  -Respiratory acidosis with hypoxia and hypercapnia and placed on bipap. Given solumedrol 125 mg IV.    Treatment: supplemental oxygen, Solumedrol,  Bipap, IV Fluid bolus, IV Lasix    Risk Factors: Chronic respiratory failure/chronic oxygen at 3 Liters,  current tobacco use  Options provided:  -- Acute Hypoxemic Respiratory Failure  -- Acute Hypercapnic Respiratory Failure  -- Acute Hypoxemic and Hypercapnic  Delivered 11/11/21  Never came for PPA  Can purchase OTC pepcid or contact a PCP to order and manage Respiratory Failure  -- No acute respiratory failure  -- Other - I will add my own diagnosis  -- Refer to Clinical Documentation Reviewer    Query created by: Rosalina Cortés on 1/9/2024 4:54 PM      Electronically signed by:  SB MCCRARY DO 1/11/2024 10:33 AM

## 2024-01-13 PROBLEM — T83.9XXD: Status: RESOLVED | Noted: 2023-11-06 | Resolved: 2024-01-13

## 2024-01-13 PROBLEM — Z91.89: Status: RESOLVED | Noted: 2023-10-23 | Resolved: 2024-01-13

## 2024-01-13 PROBLEM — I48.91 ATRIAL FIBRILLATION WITH RVR (MULTI): Status: RESOLVED | Noted: 2023-09-22 | Resolved: 2024-01-13

## 2024-01-13 PROBLEM — Z46.6 ENCOUNTER FOR FOLEY CATHETER REPLACEMENT: Status: RESOLVED | Noted: 2023-07-22 | Resolved: 2024-01-13

## 2024-01-13 PROBLEM — A41.9 SEPSIS WITHOUT ACUTE ORGAN DYSFUNCTION (MULTI): Status: RESOLVED | Noted: 2023-10-23 | Resolved: 2024-01-13

## 2024-01-13 PROBLEM — A41.9 SEPSIS (MULTI): Status: RESOLVED | Noted: 2023-10-23 | Resolved: 2024-01-13

## 2024-01-15 ENCOUNTER — APPOINTMENT (OUTPATIENT)
Dept: HEMATOLOGY/ONCOLOGY | Facility: HOSPITAL | Age: 79
End: 2024-01-15
Payer: MEDICARE

## 2024-01-15 DIAGNOSIS — Z95.810 CARDIAC DEFIBRILLATOR IN PLACE: Primary | ICD-10-CM

## 2024-01-15 DIAGNOSIS — I42.9 CARDIOMYOPATHY, UNSPECIFIED TYPE (MULTI): ICD-10-CM

## 2024-01-16 ENCOUNTER — TELEPHONE (OUTPATIENT)
Dept: CARDIOLOGY | Facility: CLINIC | Age: 79
End: 2024-01-16
Payer: MEDICARE

## 2024-01-16 NOTE — TELEPHONE ENCOUNTER
Weight increased from 130 to 140 in 3 days   Progressive pitting edema since hospital discharge   Breathing is ok at rest, no conversations dyspnea.  He has dyspnea on exertion and having 3 pillow orthopnea and PND   Instructed to take an extra Bumex 2MG PO today   I will arrange for IV lasix infusion via July       01/16/24 at 4:14 PM - DEANNE Chinchilla-CNP

## 2024-01-17 ENCOUNTER — PATIENT OUTREACH (OUTPATIENT)
Dept: PRIMARY CARE | Facility: CLINIC | Age: 79
End: 2024-01-17
Payer: MEDICARE

## 2024-01-17 DIAGNOSIS — I50.9 ACUTE CONGESTIVE HEART FAILURE, UNSPECIFIED HEART FAILURE TYPE (MULTI): Primary | ICD-10-CM

## 2024-01-17 DIAGNOSIS — I50.43 ACUTE ON CHRONIC COMBINED SYSTOLIC AND DIASTOLIC HEART FAILURE (MULTI): ICD-10-CM

## 2024-01-17 RX ORDER — HEPARIN SODIUM,PORCINE/PF 10 UNIT/ML
50 SYRINGE (ML) INTRAVENOUS AS NEEDED
Status: CANCELLED | OUTPATIENT
Start: 2024-01-19

## 2024-01-17 RX ORDER — HEPARIN SODIUM 1000 [USP'U]/ML
2000 INJECTION, SOLUTION INTRAVENOUS; SUBCUTANEOUS ONCE
Status: CANCELLED | OUTPATIENT
Start: 2024-01-19 | End: 2024-01-19

## 2024-01-17 RX ORDER — DIPHENHYDRAMINE HYDROCHLORIDE 50 MG/ML
50 INJECTION INTRAMUSCULAR; INTRAVENOUS AS NEEDED
Status: CANCELLED | OUTPATIENT
Start: 2024-01-19

## 2024-01-17 RX ORDER — EPINEPHRINE 0.3 MG/.3ML
0.3 INJECTION SUBCUTANEOUS EVERY 5 MIN PRN
Status: CANCELLED | OUTPATIENT
Start: 2024-01-19

## 2024-01-17 RX ORDER — HEPARIN 100 UNIT/ML
500 SYRINGE INTRAVENOUS AS NEEDED
Status: CANCELLED | OUTPATIENT
Start: 2024-01-19

## 2024-01-17 RX ORDER — FAMOTIDINE 10 MG/ML
20 INJECTION INTRAVENOUS ONCE AS NEEDED
Status: CANCELLED | OUTPATIENT
Start: 2024-01-19

## 2024-01-17 RX ORDER — ALBUTEROL SULFATE 0.83 MG/ML
3 SOLUTION RESPIRATORY (INHALATION) AS NEEDED
Status: CANCELLED | OUTPATIENT
Start: 2024-01-19

## 2024-01-17 NOTE — PROGRESS NOTES
Recurrent exacerbation of HFrEF,  NYHA stage D  We will continue to address palliative measures vs Hospice   Diurese with Lasix infusion in [facility].  F/U HHVI clinic 1-2 weeks

## 2024-01-17 NOTE — PROGRESS NOTES
Call regarding appt. with PCP on  1-4-2024  after hospitalization.    At time of outreach call the patient feels as if their condition has improved.  -Reviewed note from cardiology yesterday.He reports he increased his bumex yesterday as ordered. Patient reports weight today is 136#. Continues with edema. Reports not wearing 02 all day. His pulse ox is 96-99% room air. He is waiting for information for follow up on IV lasix infusion.    -NP from insurance company is now coming to patients home for  one  time monthly home visits.     -Pt states that pulmonology said that he never qualified for a portable oxygen tank. He reports he has not been able to get in touch with pulmonary office in follow up to discuss obtaining the tank / refills for his albuterol. I informed the patient that I would message the provider in follow up.   -Reminded of heme onc appointment on 1-  -Reminded of cardiology fu appointment on 1-  Patient confirms he will attend. Aware I  will follow up with him at one month from discharge. Encouraged him to call me should he need any additional support.

## 2024-01-18 DIAGNOSIS — I50.43 ACUTE ON CHRONIC COMBINED SYSTOLIC AND DIASTOLIC HEART FAILURE (MULTI): Primary | ICD-10-CM

## 2024-01-18 RX ORDER — POTASSIUM CHLORIDE 1.5 G/1.58G
60 POWDER, FOR SOLUTION ORAL AS NEEDED
Status: CANCELLED
Start: 2024-01-20

## 2024-01-18 RX ORDER — POTASSIUM CHLORIDE 1.5 G/1.58G
20 POWDER, FOR SOLUTION ORAL AS NEEDED
Status: CANCELLED
Start: 2024-01-20

## 2024-01-18 RX ORDER — POTASSIUM CHLORIDE 1.5 G/1.58G
40 POWDER, FOR SOLUTION ORAL AS NEEDED
Status: CANCELLED
Start: 2024-01-20

## 2024-01-18 RX ORDER — LANOLIN ALCOHOL/MO/W.PET/CERES
800 CREAM (GRAM) TOPICAL AS NEEDED
Status: CANCELLED
Start: 2024-01-20

## 2024-01-19 ENCOUNTER — INFUSION (OUTPATIENT)
Dept: HEMATOLOGY/ONCOLOGY | Facility: HOSPITAL | Age: 79
End: 2024-01-19
Payer: MEDICARE

## 2024-01-19 ENCOUNTER — OFFICE VISIT (OUTPATIENT)
Dept: HEMATOLOGY/ONCOLOGY | Facility: HOSPITAL | Age: 79
End: 2024-01-19
Payer: MEDICARE

## 2024-01-19 VITALS
SYSTOLIC BLOOD PRESSURE: 99 MMHG | RESPIRATION RATE: 18 BRPM | OXYGEN SATURATION: 99 % | BODY MASS INDEX: 21.11 KG/M2 | HEIGHT: 69 IN | HEART RATE: 82 BPM | WEIGHT: 142.53 LBS | DIASTOLIC BLOOD PRESSURE: 46 MMHG | TEMPERATURE: 97.3 F

## 2024-01-19 VITALS
OXYGEN SATURATION: 98 % | SYSTOLIC BLOOD PRESSURE: 114 MMHG | BODY MASS INDEX: 21.03 KG/M2 | RESPIRATION RATE: 18 BRPM | WEIGHT: 142.42 LBS | TEMPERATURE: 98.1 F | DIASTOLIC BLOOD PRESSURE: 57 MMHG | HEART RATE: 70 BPM

## 2024-01-19 DIAGNOSIS — D50.8 IRON DEFICIENCY ANEMIA SECONDARY TO INADEQUATE DIETARY IRON INTAKE: ICD-10-CM

## 2024-01-19 DIAGNOSIS — E61.1 IRON DEFICIENCY: ICD-10-CM

## 2024-01-19 DIAGNOSIS — E53.8 B12 DEFICIENCY: ICD-10-CM

## 2024-01-19 DIAGNOSIS — D63.8 ANEMIA OF CHRONIC DISEASE: ICD-10-CM

## 2024-01-19 DIAGNOSIS — D50.8 IRON DEFICIENCY ANEMIA SECONDARY TO INADEQUATE DIETARY IRON INTAKE: Primary | ICD-10-CM

## 2024-01-19 DIAGNOSIS — I50.43 ACUTE ON CHRONIC COMBINED SYSTOLIC AND DIASTOLIC HEART FAILURE (MULTI): ICD-10-CM

## 2024-01-19 DIAGNOSIS — I50.9 ACUTE CONGESTIVE HEART FAILURE, UNSPECIFIED HEART FAILURE TYPE (MULTI): Primary | ICD-10-CM

## 2024-01-19 DIAGNOSIS — I50.43 ACUTE ON CHRONIC COMBINED SYSTOLIC AND DIASTOLIC HEART FAILURE (MULTI): Primary | ICD-10-CM

## 2024-01-19 LAB
ALBUMIN SERPL BCP-MCNC: 3.7 G/DL (ref 3.4–5)
ALP SERPL-CCNC: 76 U/L (ref 33–136)
ALT SERPL W P-5'-P-CCNC: 32 U/L (ref 10–52)
ANION GAP SERPL CALC-SCNC: 11 MMOL/L (ref 10–20)
ANION GAP SERPL CALC-SCNC: 11 MMOL/L (ref 10–20)
AST SERPL W P-5'-P-CCNC: 23 U/L (ref 9–39)
BASOPHILS # BLD AUTO: 0.03 X10*3/UL (ref 0–0.1)
BASOPHILS NFR BLD AUTO: 0.3 %
BILIRUB SERPL-MCNC: 0.4 MG/DL (ref 0–1.2)
BUN SERPL-MCNC: 36 MG/DL (ref 6–23)
BUN SERPL-MCNC: 39 MG/DL (ref 6–23)
CALCIUM SERPL-MCNC: 8.7 MG/DL (ref 8.6–10.3)
CALCIUM SERPL-MCNC: 9 MG/DL (ref 8.6–10.3)
CHLORIDE SERPL-SCNC: 100 MMOL/L (ref 98–107)
CHLORIDE SERPL-SCNC: 102 MMOL/L (ref 98–107)
CO2 SERPL-SCNC: 30 MMOL/L (ref 21–32)
CO2 SERPL-SCNC: 31 MMOL/L (ref 21–32)
CREAT SERPL-MCNC: 1.64 MG/DL (ref 0.5–1.3)
CREAT SERPL-MCNC: 1.7 MG/DL (ref 0.5–1.3)
EGFRCR SERPLBLD CKD-EPI 2021: 41 ML/MIN/1.73M*2
EGFRCR SERPLBLD CKD-EPI 2021: 42 ML/MIN/1.73M*2
EOSINOPHIL # BLD AUTO: 0.06 X10*3/UL (ref 0–0.4)
EOSINOPHIL NFR BLD AUTO: 0.6 %
ERYTHROCYTE [DISTWIDTH] IN BLOOD BY AUTOMATED COUNT: 16.8 % (ref 11.5–14.5)
FERRITIN SERPL-MCNC: 42 NG/ML (ref 20–300)
GLUCOSE SERPL-MCNC: 162 MG/DL (ref 74–99)
GLUCOSE SERPL-MCNC: 243 MG/DL (ref 74–99)
HCT VFR BLD AUTO: 36.2 % (ref 41–52)
HGB BLD-MCNC: 10.9 G/DL (ref 13.5–17.5)
HGB RETIC QN: 31 PG (ref 28–38)
IMM GRANULOCYTES # BLD AUTO: 0.05 X10*3/UL (ref 0–0.5)
IMM GRANULOCYTES NFR BLD AUTO: 0.5 % (ref 0–0.9)
IMMATURE RETIC FRACTION: 11 %
IRON SATN MFR SERPL: 10 % (ref 25–45)
IRON SERPL-MCNC: 37 UG/DL (ref 35–150)
LYMPHOCYTES # BLD AUTO: 0.52 X10*3/UL (ref 0.8–3)
LYMPHOCYTES NFR BLD AUTO: 5.5 %
MAGNESIUM SERPL-MCNC: 2.11 MG/DL (ref 1.6–2.4)
MAGNESIUM SERPL-MCNC: 2.19 MG/DL (ref 1.6–2.4)
MCH RBC QN AUTO: 29.8 PG (ref 26–34)
MCHC RBC AUTO-ENTMCNC: 30.1 G/DL (ref 32–36)
MCV RBC AUTO: 99 FL (ref 80–100)
MONOCYTES # BLD AUTO: 0.53 X10*3/UL (ref 0.05–0.8)
MONOCYTES NFR BLD AUTO: 5.7 %
NEUTROPHILS # BLD AUTO: 8.19 X10*3/UL (ref 1.6–5.5)
NEUTROPHILS NFR BLD AUTO: 87.4 %
NRBC BLD-RTO: 0 /100 WBCS (ref 0–0)
PLATELET # BLD AUTO: 167 X10*3/UL (ref 150–450)
POTASSIUM SERPL-SCNC: 3.6 MMOL/L (ref 3.5–5.3)
POTASSIUM SERPL-SCNC: 4.1 MMOL/L (ref 3.5–5.3)
PROT SERPL-MCNC: 5.7 G/DL (ref 6.4–8.2)
RBC # BLD AUTO: 3.66 X10*6/UL (ref 4.5–5.9)
RETICS #: 0.05 X10*6/UL (ref 0.02–0.11)
RETICS/RBC NFR AUTO: 1.3 % (ref 0.5–2)
SODIUM SERPL-SCNC: 137 MMOL/L (ref 136–145)
SODIUM SERPL-SCNC: 140 MMOL/L (ref 136–145)
TIBC SERPL-MCNC: 387 UG/DL (ref 240–445)
UIBC SERPL-MCNC: 350 UG/DL (ref 110–370)
WBC # BLD AUTO: 9.4 X10*3/UL (ref 4.4–11.3)

## 2024-01-19 PROCEDURE — 96376 TX/PRO/DX INJ SAME DRUG ADON: CPT

## 2024-01-19 PROCEDURE — 36415 COLL VENOUS BLD VENIPUNCTURE: CPT

## 2024-01-19 PROCEDURE — 3078F DIAST BP <80 MM HG: CPT

## 2024-01-19 PROCEDURE — 2500000004 HC RX 250 GENERAL PHARMACY W/ HCPCS (ALT 636 FOR OP/ED): Performed by: NURSE PRACTITIONER

## 2024-01-19 PROCEDURE — 1159F MED LIST DOCD IN RCRD: CPT

## 2024-01-19 PROCEDURE — 96366 THER/PROPH/DIAG IV INF ADDON: CPT

## 2024-01-19 PROCEDURE — 1125F AMNT PAIN NOTED PAIN PRSNT: CPT

## 2024-01-19 PROCEDURE — 83735 ASSAY OF MAGNESIUM: CPT

## 2024-01-19 PROCEDURE — 83540 ASSAY OF IRON: CPT

## 2024-01-19 PROCEDURE — 80053 COMPREHEN METABOLIC PANEL: CPT

## 2024-01-19 PROCEDURE — 83010 ASSAY OF HAPTOGLOBIN QUANT: CPT

## 2024-01-19 PROCEDURE — 80048 BASIC METABOLIC PNL TOTAL CA: CPT | Mod: CCI

## 2024-01-19 PROCEDURE — 84207 ASSAY OF VITAMIN B-6: CPT

## 2024-01-19 PROCEDURE — 82668 ASSAY OF ERYTHROPOIETIN: CPT

## 2024-01-19 PROCEDURE — 99214 OFFICE O/P EST MOD 30 MIN: CPT

## 2024-01-19 PROCEDURE — 85025 COMPLETE CBC W/AUTO DIFF WBC: CPT

## 2024-01-19 PROCEDURE — 1111F DSCHRG MED/CURRENT MED MERGE: CPT

## 2024-01-19 PROCEDURE — 1160F RVW MEDS BY RX/DR IN RCRD: CPT

## 2024-01-19 PROCEDURE — 85045 AUTOMATED RETICULOCYTE COUNT: CPT

## 2024-01-19 PROCEDURE — 3074F SYST BP LT 130 MM HG: CPT

## 2024-01-19 PROCEDURE — 82607 VITAMIN B-12: CPT | Mod: CONLAB

## 2024-01-19 PROCEDURE — 82746 ASSAY OF FOLIC ACID SERUM: CPT | Mod: CONLAB

## 2024-01-19 PROCEDURE — 96365 THER/PROPH/DIAG IV INF INIT: CPT | Mod: INF

## 2024-01-19 PROCEDURE — 82728 ASSAY OF FERRITIN: CPT

## 2024-01-19 RX ORDER — HEPARIN 100 UNIT/ML
500 SYRINGE INTRAVENOUS AS NEEDED
Status: CANCELLED | OUTPATIENT
Start: 2024-01-19

## 2024-01-19 RX ORDER — CYANOCOBALAMIN 1000 UG/ML
1000 INJECTION, SOLUTION INTRAMUSCULAR; SUBCUTANEOUS ONCE
Status: CANCELLED | OUTPATIENT
Start: 2024-01-24

## 2024-01-19 RX ORDER — FAMOTIDINE 10 MG/ML
20 INJECTION INTRAVENOUS ONCE AS NEEDED
OUTPATIENT
Start: 2024-01-19

## 2024-01-19 RX ORDER — FAMOTIDINE 10 MG/ML
20 INJECTION INTRAVENOUS ONCE AS NEEDED
Status: CANCELLED | OUTPATIENT
Start: 2024-01-23

## 2024-01-19 RX ORDER — POTASSIUM CHLORIDE 1.5 G/1.58G
20 POWDER, FOR SOLUTION ORAL AS NEEDED
Start: 2024-01-20

## 2024-01-19 RX ORDER — DIPHENHYDRAMINE HYDROCHLORIDE 50 MG/ML
50 INJECTION INTRAMUSCULAR; INTRAVENOUS AS NEEDED
Status: CANCELLED | OUTPATIENT
Start: 2024-01-23

## 2024-01-19 RX ORDER — POTASSIUM CHLORIDE 750 MG/1
40 TABLET, FILM COATED, EXTENDED RELEASE ORAL ONCE
Status: COMPLETED | OUTPATIENT
Start: 2024-01-19 | End: 2024-01-19

## 2024-01-19 RX ORDER — LANOLIN ALCOHOL/MO/W.PET/CERES
800 CREAM (GRAM) TOPICAL AS NEEDED
Start: 2024-01-20

## 2024-01-19 RX ORDER — ALBUTEROL SULFATE 0.83 MG/ML
3 SOLUTION RESPIRATORY (INHALATION) AS NEEDED
OUTPATIENT
Start: 2024-01-19

## 2024-01-19 RX ORDER — DIPHENHYDRAMINE HYDROCHLORIDE 50 MG/ML
50 INJECTION INTRAMUSCULAR; INTRAVENOUS AS NEEDED
Status: CANCELLED | OUTPATIENT
Start: 2024-01-24

## 2024-01-19 RX ORDER — HEPARIN SODIUM,PORCINE/PF 10 UNIT/ML
50 SYRINGE (ML) INTRAVENOUS AS NEEDED
Status: CANCELLED | OUTPATIENT
Start: 2024-01-19

## 2024-01-19 RX ORDER — EPINEPHRINE 0.3 MG/.3ML
0.3 INJECTION SUBCUTANEOUS EVERY 5 MIN PRN
Status: CANCELLED | OUTPATIENT
Start: 2024-01-24

## 2024-01-19 RX ORDER — ALBUTEROL SULFATE 0.83 MG/ML
3 SOLUTION RESPIRATORY (INHALATION) AS NEEDED
Status: CANCELLED | OUTPATIENT
Start: 2024-01-23

## 2024-01-19 RX ORDER — FAMOTIDINE 10 MG/ML
20 INJECTION INTRAVENOUS ONCE AS NEEDED
Status: CANCELLED | OUTPATIENT
Start: 2024-01-24

## 2024-01-19 RX ORDER — EPINEPHRINE 0.3 MG/.3ML
0.3 INJECTION SUBCUTANEOUS EVERY 5 MIN PRN
OUTPATIENT
Start: 2024-01-19

## 2024-01-19 RX ORDER — HEPARIN SODIUM 1000 [USP'U]/ML
2000 INJECTION, SOLUTION INTRAVENOUS; SUBCUTANEOUS ONCE
Status: CANCELLED | OUTPATIENT
Start: 2024-01-19 | End: 2024-01-19

## 2024-01-19 RX ORDER — POTASSIUM CHLORIDE 1.5 G/1.58G
40 POWDER, FOR SOLUTION ORAL AS NEEDED
Start: 2024-01-20

## 2024-01-19 RX ORDER — EPINEPHRINE 0.3 MG/.3ML
0.3 INJECTION SUBCUTANEOUS EVERY 5 MIN PRN
Status: CANCELLED | OUTPATIENT
Start: 2024-01-23

## 2024-01-19 RX ORDER — FUROSEMIDE 10 MG/ML
40 INJECTION INTRAMUSCULAR; INTRAVENOUS ONCE
Status: COMPLETED | OUTPATIENT
Start: 2024-01-19 | End: 2024-01-19

## 2024-01-19 RX ORDER — ALBUTEROL SULFATE 0.83 MG/ML
3 SOLUTION RESPIRATORY (INHALATION) AS NEEDED
Status: CANCELLED | OUTPATIENT
Start: 2024-01-24

## 2024-01-19 RX ORDER — POTASSIUM CHLORIDE 750 MG/1
TABLET, FILM COATED, EXTENDED RELEASE ORAL
Status: DISPENSED
Start: 2024-01-19 | End: 2024-01-20

## 2024-01-19 RX ORDER — POTASSIUM CHLORIDE 1.5 G/1.58G
60 POWDER, FOR SOLUTION ORAL AS NEEDED
Start: 2024-01-20

## 2024-01-19 RX ORDER — DIPHENHYDRAMINE HYDROCHLORIDE 50 MG/ML
50 INJECTION INTRAMUSCULAR; INTRAVENOUS AS NEEDED
OUTPATIENT
Start: 2024-01-19

## 2024-01-19 RX ADMIN — FUROSEMIDE 40 MG: 10 INJECTION, SOLUTION INTRAVENOUS at 10:38

## 2024-01-19 RX ADMIN — POTASSIUM CHLORIDE 40 MEQ: 750 TABLET, FILM COATED, EXTENDED RELEASE ORAL at 14:25

## 2024-01-19 RX ADMIN — FUROSEMIDE 40 MG/HR: 10 INJECTION, SOLUTION INTRAVENOUS at 10:41

## 2024-01-19 ASSESSMENT — PAIN SCALES - GENERAL
PAINLEVEL: 0-NO PAIN
PAINLEVEL: 7
PAINLEVEL_OUTOF10: 4

## 2024-01-19 NOTE — PROGRESS NOTES
[facility]    PATIENT VISIT INFORMATION    Visit Type: Follow up visit with new provider    Referring Provider: unknown  Reason for referral: B12 and LIGIA    CANCER/HEMATOLGOY HISTORY    History of anemia of chronic disease, LIGIA, and B12 deficiency. Renal dysfunction, also possible malabsorption given gastric resection for bleeding ulcers in 1965 , treated with IV iron (Venofer 300 mg x 3 doses 8/2017-9/2017, Feraheme 510 mg x 2 doses 5/2019, Venofer x5 doses at 200 mg each in December 2021 due to insurance preference, Venofer again 3/2022 &9/2022 & 11/2022, Venofer x3 doses June 2023, August 2023) as well as B12 injections subcutaneous monthly starting 8/2017. B12 injections monthly most recently 8/7/23. B6 deficiency treated with oral B6, previously seen by Dr. Mcelroy and Dr. Cabral, Dr. Meza.      HISTORY OF PRESENT ILLNESS     ID Statement: Andrez Damon is a 79 year old male  Chief Complaint: LIGIA and B12 deficiency   Interval History:   Patient presents to establish with new provider.  He presents in the infusion center receiving IV Lasix infusions outpatient for end-stage congestive heart failure.  He is winded as he exerts himself.  Patient has notably poor dentition.  He does note that he has had a history of iron deficiency, anemia and B12 deficiency.  He has been receiving infusions for iron as needed and B12 injections monthly.  These labs have been drawn today.  He does note that he was chilled and has often felt cold even in a house that is set at 74 degrees.  He does note night sweats but relates them to when he is having difficulty breathing.  He is on O2 as needed and at HS.  He has chronic back pain that is not changed.  Frequent bruising due to being on Eliquis.    He denies fevers, weight loss, in fact weight gain d/t fluid retention, no unusual headaches, sore throat, acute vision changes, chest pain, nausea or vomiting, abnormal bowel  movements,  blood in stool, hematuria, pain or neurologic symptoms except as above, rashes or lumps, no bleeding (prior mild self-limited epistaxis resolved) , thyroid disorder.   PAST/CURRENT HISTORY     MEDICAL/SURGICAL HISTORY  -Anemia and B12 def.  -UTIs  -CHF  -A-fib on Eliquis   -pneumonia  -RA  -CAD  -COPD on 02  -DMT2  -HTN  -CKD  -Dyslipidemia  -Depression  -Colitis   -BPH  -RLS  -Hyponatremia  -Hypomagnesemia  -Hypokalemia  -Musculoskeletal issues  -Pacemaker 2012  -TIA  -PVD  -Osteopetrosis  -Neuropathy  -Vitamin D def  -Aortic aneurysm  -Anal fissure   -sinus surgery  -hemorrhoidectomy  -gastric resection for bleeding  -Vasectomy  -Cataracts   -Tonsillectomy   -Hemant hip replacement   -Cardiac Stents   -Back surgeries   -PE 8/2021    SOCIAL HISTORY  -Lives with wife and pets  -Work place: retired   -Tobacco/smokeless use: current smoker 2-3 cigarettes per day  -Alcohol: Denies   -Illicit drug or marijuana use: Denies    -Confucianist or Spiritual beliefs: None reported   -Social Determinates of Health Concerns: none reported  -He confirmed  that he is DNR [employer] arrest 8/25/22.     FAMILY HISTORY  -Mom and 2 sisters breast cancer   -Sister with breast and bone cancer   -Sister thyroid cancer   -Brother prostate cancer   -No other known history of hematologic, bleeding, clotting, autoimmune, genetic, or malignant disorders in the family.     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    Active Problems, Allergy List, Medication List, and PMH/PSH/FH/Social Hx have been reviewed and reconciled in chart. Updates made when necessary.     REVIEW OF SYSTEMS   A review of systems has been completed and are negative for complaints except what is stated in the assessment, HPI, IH, ROS, and/or past medical history.    ALLERGIES AND MEDICATIONS     Allergies and Intolerances:   Allergies   Allergen  Reactions    Meloxicam Shortness of breath    Celecoxib Unknown    Keflex [Cephalexin] Hives      Medication Profile:   Current Outpatient Medications   Medication Instructions    amiodarone (PACERONE) 200 mg, oral, Daily    apixaban (Eliquis) 2.5 mg tablet 1 tablet, oral, 2 times daily    atorvastatin (LIPITOR) 80 mg, oral, Daily    azithromycin (ZITHROMAX) 250 mg, oral, 3 times weekly    Brilinta 90 mg, oral, 2 times daily, AS DIRECTED    bumetanide (BUMEX) 2 mg, oral, Daily    cholecalciferol (VITAMIN D-3) 125 mcg, oral, Daily    escitalopram (LEXAPRO) 10 mg, oral, Daily    finasteride (PROSCAR) 5 mg, oral, Daily    fluticasone-umeclidin-vilanter (Trelegy Ellipta) 200-62.5-25 mcg blister with device 1 puff, inhalation, Daily with breakfast, Inhale 1 puff daily, rinse mouth after use    levalbuterol (Xopenex) 45 mcg/actuation inhaler 1 puff, inhalation, Every 4 hours PRN, INHALE 1 TO 2 PUFFS EVERY 4 TO 6 HOURS AS NEEDED AND AS DIRECTED.    metoprolol succinate XL (TOPROL-XL) 12.5 mg, oral, Daily, Do not crush or chew.    oxygen (O2) 2 L/min, inhalation, Continuous    polyethylene glycol (GLYCOLAX, MIRALAX) 17 g, oral, Daily    potassium chloride CR (K-Tab) 20 mEq ER tablet 1 tablet, oral, Daily    pyridoxine (VITAMIN B-6) 50 mg, oral, Daily    rOPINIRole (REQUIP) 1 mg, oral, Nightly    tamsulosin (FLOMAX) 0.8 mg, oral, Nightly      Available Vaccination Record:   Immunization History   Administered Date(s) Administered    Flu vaccine (IIV4), preservative free *Check age/dose* 10/20/2016    Flu vaccine, quadrivalent, high-dose, preservative free, age 65y+ (FLUZONE) 10/18/2023    Influenza, High Dose Seasonal, Preservative Free 08/21/2018, 11/01/2022    Influenza, Unspecified 11/01/2022    Influenza, seasonal, injectable 10/19/2015    Influenza, trivalent, adjuvanted 09/01/2019    Pneumococcal conjugate vaccine, 13-valent (PREVNAR 13) 08/21/2018, 09/03/2018    Pneumococcal polysaccharide vaccine, 23-valent, age 2  years and older (PNEUMOVAX 23) 10/23/2013      PHYSICAL EXAM     Vital Signs/Measurements:   Vitals:  Visit Vitals  BP (!) 99/46 (BP Location: Right arm, Patient Position: Sitting)   Pulse 82   Temp 36.3 °C (97.3 °F) (Temporal)   Resp 18      Weight:  Vitals:    01/19/24 1125   Weight: 64.7 kg (142 lb 8.4 oz)      Pertinent previous Wt./Vitals:    Performance:   ECOG Performance Status: 2     Grade ECOG performance status   0 Fully active, able to carry on all pre-disease performance without restriction   1 Restricted in physically strenuous activity but ambulatory and able to carry out work of a light or sedentary nature, e.g., light housework, office work   2 Ambulatory and capable of all selfcare but unable to carry out any work activities; Up and about more than 50% of waking hours   3 Capable of only limited selfcare, confined to bed or chair more than 50% of waking hours   4 Completely disabled; Cannot carry out any selfcare; Totally confined to bed or chair   5 Dead     Physical Exam:  General: Patient is awake/alert/oriented x3, no distress, alert and cooperative, exertional with ambulation and effort  Skin: Expected color for ethnicity, fair turgor, dry, ecchymosis on arms   Hair:    Nails:    HEENT:   Head: Normocephalic, atraumatic   Eyes: Visual acuity intact, conjunctiva clear, sclera white, EOMI, no exudates or hemorrhages   Ears: nl appearance, hearing intact    Nose: no external lesions, mucosa non-inflamed  Mouth: Mucous membranes moist, very poor dentition    Head/Neck: No apparent injury, thyroid without mass or tenderness noted, trachea midline, no bruits appreciated.   Respiratory/Thorax: Course crackles and wheezes throughout lobes, normal inspiratory and expiratory effort, no distress at rest. SOB noted with minor exertion.    Cardiovascular: Regular rate and rhythm   Gastrointestinal: Bowel sounds normal, non-distended, soft, no tenderness   Genitourinary: deferred   Musculoskeletal: Weak  gait, normal range of motion, no pain on palpation of spine, normal strength for baseline   Extremities: No amputations or cyanosis. Pitting edema in lower legs. Peripheral pulses weak.   Neurological: Sensation present to touch, intact senses, motor response and reflexes normal   Breast: Deferred    Lymphatic: No significant lymphadenopathy   Psychological: A/O X3, intact recent and remote memory, judgement, and insight. Appropriate mood, affect, and behavior          RESULTS/DATA     Labs:   Lab Results   Component Value Date    WBC 9.4 01/19/2024    NEUTROABS 8.19 (H) 01/19/2024    IGABSOL 0.05 01/19/2024    LYMPHSABS 0.52 (L) 01/19/2024    MONOSABS 0.53 01/19/2024    EOSABS 0.06 01/19/2024    BASOSABS 0.03 01/19/2024    RBC 3.66 (L) 01/19/2024    MCV 99 01/19/2024    MCHC 30.1 (L) 01/19/2024    HGB 10.9 (L) 01/19/2024    HCT 36.2 (L) 01/19/2024     01/19/2024     Lab Results   Component Value Date    RETICCTPCT 1.3 01/19/2024      Lab Results   Component Value Date    CREATININE 1.70 (H) 01/19/2024    BUN 39 (H) 01/19/2024    EGFR 41 (L) 01/19/2024     01/19/2024    K 3.6 01/19/2024     01/19/2024    CO2 31 01/19/2024      Lab Results   Component Value Date    ALT 32 01/19/2024    AST 23 01/19/2024    ALKPHOS 76 01/19/2024    BILITOT 0.4 01/19/2024      Lab Results   Component Value Date    TSH 1.27 09/18/2023     Lab Results   Component Value Date    TSH 1.27 09/18/2023     Lab Results   Component Value Date    IRON 37 01/19/2024    TIBC 387 01/19/2024    FERRITIN 42 01/19/2024      Lab Results   Component Value Date    QNJZROZW40 398 10/19/2023      Lab Results   Component Value Date    FOLATE 10.8 12/02/2021     Lab Results   Component Value Date    SEDRATE 3 08/11/2021      Lab Results   Component Value Date    CRP 0.12 08/11/2021      Lab Results   Component Value Date     01/26/2018     Radiology/Studies:   CT abdomen pelvis w IV contrast  12/12/2023  IMPRESSION:  Bilateral  pleural effusions. No acute abnormality of the abdomen and  pelvis.  XR chest 1 view  12/31/2023  IMPRESSION:  Generalized interstitial pattern throughout both lungs is similar to  the most recent prior study. The finding may be that of chronic  interstitial fibrosis.  A superimposed infiltrate or mild edema would  be difficult to exclude given the widespread bilateral abnormalities.  ASSESSMENT/PLAN     Assessment and Plan:   #1. History of anemia of chronic disease, LIGIA, and B12 deficiency  Renal dysfunction, possible malabsorption given gastric resection for bleeding ulcers, treated with IV iron (Venofer 2017, Feraheme 5/2019, Venofer 12/2021, 8/2023), B12 injections subcutaneous monthly starting 8/2017, B6 deficiency treated with oral B6 starting 6/2021. In 2011 EGD and colonoscopy with no bleeding. December 2021, referred Dr. Rasmussen regarding iron deficiency, no endoscopy, a PPI as needed, unable to stop anticoagulation due to high risk of a cardiac event.  Patient in end-stage congestive heart failure.  Receiving IV Lasix infusions outpatient.      I have reviewed the patient's medical record including provider notes, laboratory and testing results, imaging, and procedures available within the system and outside the system.     Follow up:    RTC:  -In 2-3 months    Medications:  -IV Venofer ordered for next week  -B12 injections ordered monthly as needed    Imaging/Testing:  -Ni imaging ordered today    Referral:  -no referral today    Other Pertinent Appointments:  -1/22/2024 Cardiology  -1/25/2024 Heme infusion  -1/26/2024 urology  -2/20/2024 Pulmonary   -3/14/2024 PCP    Patient Discussion Summary:  Discussed with patient regarding lab assessment of LIGIA and B12 deficiency. IV iron and B12 injections to be ordered as needed. Patient in agreement and states understanding. He will call as needed.      Thank you for allowing me to participate in your care. It was a pleasure meeting you.    Sincerely,  Rosa  DEANNE Luong-CNP      This document may have been written by voice recognition software.   Time based billing:

## 2024-01-20 LAB
FOLATE SERPL-MCNC: 8.2 NG/ML
VIT B12 SERPL-MCNC: 507 PG/ML (ref 211–911)

## 2024-01-21 LAB — EPO SERPL-ACNC: 35 MU/ML (ref 4–27)

## 2024-01-22 ENCOUNTER — OFFICE VISIT (OUTPATIENT)
Dept: CARDIOLOGY | Facility: CLINIC | Age: 79
End: 2024-01-22
Payer: MEDICARE

## 2024-01-22 VITALS
SYSTOLIC BLOOD PRESSURE: 103 MMHG | DIASTOLIC BLOOD PRESSURE: 65 MMHG | HEART RATE: 61 BPM | OXYGEN SATURATION: 100 % | WEIGHT: 139.77 LBS | BODY MASS INDEX: 20.64 KG/M2

## 2024-01-22 DIAGNOSIS — I25.5 ISCHEMIC CARDIOMYOPATHY: ICD-10-CM

## 2024-01-22 DIAGNOSIS — I73.9 PVD (PERIPHERAL VASCULAR DISEASE) (CMS-HCC): ICD-10-CM

## 2024-01-22 DIAGNOSIS — I10 PRIMARY HYPERTENSION: ICD-10-CM

## 2024-01-22 DIAGNOSIS — Z98.61 S/P PTCA (PERCUTANEOUS TRANSLUMINAL CORONARY ANGIOPLASTY): Primary | ICD-10-CM

## 2024-01-22 DIAGNOSIS — I49.5 SICK SINUS SYNDROME (MULTI): ICD-10-CM

## 2024-01-22 DIAGNOSIS — I50.43 ACUTE ON CHRONIC COMBINED SYSTOLIC AND DIASTOLIC HEART FAILURE (MULTI): ICD-10-CM

## 2024-01-22 DIAGNOSIS — Z45.02 IMPLANTABLE CARDIOVERTER-DEFIBRILLATOR (ICD) GENERATOR END OF LIFE: ICD-10-CM

## 2024-01-22 LAB — HAPTOGLOB SERPL-MCNC: 60 MG/DL (ref 37–246)

## 2024-01-22 PROCEDURE — 99215 OFFICE O/P EST HI 40 MIN: CPT | Performed by: NURSE PRACTITIONER

## 2024-01-22 PROCEDURE — 1160F RVW MEDS BY RX/DR IN RCRD: CPT | Performed by: NURSE PRACTITIONER

## 2024-01-22 PROCEDURE — 1159F MED LIST DOCD IN RCRD: CPT | Performed by: NURSE PRACTITIONER

## 2024-01-22 PROCEDURE — 1111F DSCHRG MED/CURRENT MED MERGE: CPT | Performed by: NURSE PRACTITIONER

## 2024-01-22 PROCEDURE — 1125F AMNT PAIN NOTED PAIN PRSNT: CPT | Performed by: NURSE PRACTITIONER

## 2024-01-22 PROCEDURE — 3074F SYST BP LT 130 MM HG: CPT | Performed by: NURSE PRACTITIONER

## 2024-01-22 PROCEDURE — 3078F DIAST BP <80 MM HG: CPT | Performed by: NURSE PRACTITIONER

## 2024-01-22 NOTE — PROGRESS NOTES
Primary Care Physician: Karley Sullivan, APRN-CNP, DNP  Primary Cardiologist:       Date of Visit: 01/22/2024 11:00 AM EST  Location of visit:  [facility]   Type of Visit: Follow up             Chief Complaint   Patient presents with    Follow-up     After lasix infusion, pt reports still sob, swelling in legs, weak, and dizzy       HPI / Summary:   Andrez Damon is a 79 y.o. male  with   CAD. AWMI due to delayed stent thrombosis s/p redo PCI with GEORGIE (11/29/2014) S/P PCI to LAD with BMS 10/2010 (after delayed stent thrombosis from remote PCI) Moderate LV systolic dysfunction. S/p ICD implantation ([facility] in [place] in 2012) Generator replacement for HAILEY 12/15/2023 ([facility]) Active Smoking. COPD. HTN. DLP. CKD. DJD. Latest EF 35-40%. AF with inappropriate ICD chock, reverted to NSR on amiodarone, pulmonary embolism on long term OAC with apixaban,    who returns for routine follow up      He had gained 10 lbs in 3 days associated with progressive pitting edema after hospital discharge 1/01/2024 with ADHF   Diuresed in the infusion center with IV lasix on 1/19/2024. ~1.5 L  Orthopneic with home O2, PND and insomnia   BP is too low to consider repeat diuretic clinic today;  he is always thirsty. He continues to smoke but is cutting back due to dyspnea   Home weight today was 135       12 system review is negative except as noted above         Medical History:   Past Medical History:   Diagnosis Date    Arthritis     Atrial fibrillation with RVR (CMS/[facility]) 09/22/2023    Body mass index (BMI) 20.0-20.9, adult 09/21/2021    Body mass index (BMI) of 20.0 to 20.9 in adult    Body mass index (BMI) 21.0-21.9, adult 04/14/2021    Body mass index (BMI) of 21.0 to 21.9 in adult    CHF (congestive heart failure) (CMS/[facility])     COPD (chronic obstructive pulmonary disease) (CMS/[facility])     Coronary artery disease     Diabetes mellitus (CMS/[facility])     Hypertension     Major depressive disorder, recurrent, mild (CMS/[facility])  11/11/2020    Mild episode of recurrent major depressive disorder    Major depressive disorder, recurrent, unspecified (CMS/[facility]) 01/13/2021    Recurrent major depressive disorder, remission status unspecified    Other conditions influencing health status     Swelling Of Hands    Other specified anxiety disorders 05/19/2020    Depression with anxiety    Oxygen desaturation during sleep     wears 2L at HS    Personal history of nicotine dependence 07/12/2021    History of nicotine dependence    Personal history of other diseases of the circulatory system     History of cardiac disorder    Personal history of other diseases of the musculoskeletal system and connective tissue     History of arthritis    Personal history of other diseases of urinary system 09/21/2021    History of chronic kidney disease    Personal history of other endocrine, nutritional and metabolic disease 08/29/2019    History of hyperglycemia    Pneumonia, unspecified organism 12/09/2019    Atypical pneumonia    Thoracic aortic aneurysm, without rupture, unspecified (CMS/[facility]) 05/29/2019    Thoracic aortic aneurysm without rupture       Social History:   Tobacco Use: High Risk (1/22/2024)    Patient History     Smoking Tobacco Use: Every Day     Smokeless Tobacco Use: Never     Passive Exposure: Not on file         MEDICATIONS:   Current Outpatient Medications   Medication Instructions    amiodarone (PACERONE) 200 mg, oral, Daily    apixaban (Eliquis) 2.5 mg tablet 1 tablet, oral, 2 times daily    atorvastatin (LIPITOR) 80 mg, oral, Daily    azithromycin (ZITHROMAX) 250 mg, oral, 3 times weekly    Brilinta 90 mg, oral, 2 times daily, AS DIRECTED    bumetanide (BUMEX) 2 mg, oral, Daily    cholecalciferol (VITAMIN D-3) 125 mcg, oral, Daily    escitalopram (LEXAPRO) 10 mg, oral, Daily    finasteride (PROSCAR) 5 mg, oral, Daily    fluticasone-umeclidin-vilanter (Trelegy Ellipta) 200-62.5-25 mcg blister with device 1 puff, inhalation, Daily with  breakfast, Inhale 1 puff daily, rinse mouth after use    levalbuterol (Xopenex) 45 mcg/actuation inhaler 1 puff, inhalation, Every 4 hours PRN, INHALE 1 TO 2 PUFFS EVERY 4 TO 6 HOURS AS NEEDED AND AS DIRECTED.    metoprolol succinate XL (TOPROL-XL) 12.5 mg, oral, Daily, Do not crush or chew.    oxygen (O2) 2 L/min, inhalation, Continuous    polyethylene glycol (GLYCOLAX, MIRALAX) 17 g, oral, Daily    potassium chloride CR (K-Tab) 20 mEq ER tablet 1 tablet, oral, Daily    pyridoxine (VITAMIN B-6) 50 mg, oral, Daily    rOPINIRole (REQUIP) 1 mg, oral, Nightly    tamsulosin (FLOMAX) 0.8 mg, oral, Nightly         IMAGING REVIEWED:   Echocardiogram:   Echocardiogram     [facility], [address] [place] [address]  Tel [phone] and Fax [phone]    TRANSTHORACIC ECHOCARDIOGRAM REPORT      Patient Name:     ALEJANDRA Lima Physician:  [id number] Flaco Briggs MD  Study Date:       9/18/2023          Referring           CHAMP GAMEZ  Physician:  MRN/PID:          [id number]           PCP:                Karley Sullivan CNP  Accession/Order#: [id number]          Department          [facility]  Location:  YOB: 1945          Fellow:  Gender:           M                  Nurse:  Admit Date:       9/14/2023          Sonographer:        Karyn Morales RVT,  HOLLY, RD  Admission Status: Inpatient -        Additional Staff:  Routine  Height:           177.80 cm          CC Report to:       ICU [facility]  Weight:           62.14 kg           Study Type:         Echocardiogram  BSA:              1.78 m2  Blood Pressure: 102 /58 mmHg    Diagnosis/ICD: I48.91-Unspecified atrial fibrillation  Indication:    A Fib  Procedure/CPT: Echo Complete w Full [id number]    Patient History:  Smoker:            Current.  Diabetes:          Yes  Pertinent History: A-Fib, CHF, Cardiomyopathy, Chest Pain, COPD, Sepsis,  Hyperlipidemia, CAD and HTN. Ischemic Dilated  Cardiomyopathy,  Elevated Troponin, Abnormal EKG, Bacteremia, Arrhythmia,  Tachycardia, HFrEF, STEMI, Old MI, Angioplasty, Left Heart  Cath.    Study Detail: The following Echo studies were performed: 2D, M-Mode, Doppler and  color flow. Technically challenging study due to poor acoustic  windows. The patient was awake.      PHYSICIAN INTERPRETATION:  Left Ventricle: The left ventricular systolic function is moderately decreased, with an estimated ejection fraction of 35-40%. There is global hypokinesis of the left ventricle with minor regional variations. The left ventricular cavity size is moderately dilated. There is moderate to severe left ventricular hypertrophy. Spectral Doppler shows an impaired relaxation pattern of left ventricular diastolic filling.  Left Atrium: The left atrium is moderately dilated.  Right Ventricle: The right ventricle is normal in size. There is normal right ventricular global systolic function.  Right Atrium: The right atrium is normal in size.  Aortic Valve: The aortic valve was not well visualized. There is mild aortic valve cusp calcification. There is mild aortic valve regurgitation. The peak instantaneous gradient of the aortic valve is 11.9 mmHg.  Mitral Valve: The mitral valve is normal in structure. There is moderate mitral valve regurgitation.  Tricuspid Valve: The tricuspid valve is structurally normal. There is mild tricuspid regurgitation.  Pulmonic Valve: The pulmonic valve is not well visualized. There is physiologic pulmonic valve regurgitation.  Pericardium: There is no pericardial effusion noted.  Aorta: The aortic root was not well visualized.      CONCLUSIONS:  1. Left ventricular systolic function is moderately decreased with a 35-40% estimated ejection fraction.  2. Left ventricular cavity size is moderately dilated.  3. There is global hypokinesis of the left ventricle with minor regional variations.  4. Spectral Doppler shows an impaired relaxation pattern of  left ventricular diastolic filling.  5. There is moderate to severe left ventricular hypertrophy.  6. The left atrium is moderately dilated.  7. Moderate mitral valve regurgitation.  8. Mild aortic valve regurgitation.       [id number] Flaco Briggs MD  Electronically signed on 9/18/2023 at 4:44:00 PM      Stress Testing:   NM CARDIAC STRESS REST (MYOCARDIAL PERFUSION MIBI) 12/02/2019    Narrative  MRN: [id number]  Patient Name: ALEJANDRA FRANCIS    STUDY:  CARDIAC STRESS/REST INJECTION; CARDIAC STRESS/REST (MYOCARDIAL  PERFUSION/MIBI); 12/2/2019 12:56 pm    INDICATION:  chest pain.    COMPARISON:  MUGA study 02/15/2011, SPECT MPI 09/27/2010    ACCESSION NUMBER(S):  [id number]; [id number]    ORDERING CLINICIAN:  ALBERTINA CROSS    TECHNIQUE:  DIVISION OF NUCLEAR MEDICINE  PHARMACOLOGIC STRESS MYOCARDIAL PERFUSION SCAN, ONE DAY PROTOCOL    The patient received an intravenous dose of 10.0 mCi of Tc-99m  Myoview and resting emission tomographic (SPECT) images of the  myocardium were acquired. The patient then received an intravenous  infusion of 0.4mg regadenoson (Lexiscan)  followed by an additional  dose of 31.2 mCi of Tc-99m Myoview. Stress phase SPECT images of the  myocardium were then acquired. These included ECG-gated images to  assess and quantify ventricular function.    FINDINGS:  Both stress and rest studies demonstrate a large territory of severe  perfusion defect involving the distal anterior wall through the apex,  and extending to the inferior apex as well as to the adjoining  septum, without evidence of reversibility at rest. There is otherwise  grossly normal perfusion preserved throughout the remainder of the  left ventricle on rest and stress images.    There is severe LV dilation with an end-diastolic volume calculated  at 288 mL.    Gated images demonstrate distal anterior and apical dyskinesis with  diffuse LV hypokinesis and an LV EF estimated at 23%.    Impression  1. Large territory of  completed transmural myocardial infarction  involving the distal anterior wall and apex, extending to the  inferior apex and adjoining septum, without evidence of associated  ischemia. This finding is new when compared to the prior 2010 study.  2. Otherwise grossly normal perfusion throughout the remainder of the  left ventricle without evidence of additional territory of ischemia  or infarction.  3. Severe LV dilation which is new when compared to the prior 2010  study.  4. Distal anterior dyskinesis superimposed upon severe global LV  hypokinesis with an LV EF estimated at 24%. This is compared to a  mildly dilated left ventricle with an LVEF of 45% on the 2011 MUGA  and a normal left ventricle with an LVEF of 63% on the 2010 study.    LABS:  CBC with Differential:    Lab Results   Component Value Date    WBC 9.4 01/19/2024    RBC 3.66 (L) 01/19/2024    HGB 10.9 (L) 01/19/2024    HCT 36.2 (L) 01/19/2024     01/19/2024    MCV 99 01/19/2024    MCH 29.8 01/19/2024    MCHC 30.1 (L) 01/19/2024    RDW 16.8 (H) 01/19/2024    NRBC 0.0 01/19/2024    LYMPHOPCT 5.5 01/19/2024    MONOPCT 5.7 01/19/2024    EOSPCT 0.6 01/19/2024    BASOPCT 0.3 01/19/2024    MONOSABS 0.53 01/19/2024    LYMPHSABS 0.52 (L) 01/19/2024    EOSABS 0.06 01/19/2024    BASOSABS 0.03 01/19/2024     CMP:    Lab Results   Component Value Date     01/19/2024    K 3.6 01/19/2024     01/19/2024    CO2 31 01/19/2024    BUN 39 (H) 01/19/2024    CREATININE 1.70 (H) 01/19/2024    GLUCOSE 162 (H) 01/19/2024    PROT 5.7 (L) 01/19/2024    CALCIUM 9.0 01/19/2024    BILITOT 0.4 01/19/2024    ALKPHOS 76 01/19/2024    AST 23 01/19/2024    ALT 32 01/19/2024     BMP:    Lab Results   Component Value Date     01/19/2024    K 3.6 01/19/2024     01/19/2024    CO2 31 01/19/2024    BUN 39 (H) 01/19/2024    CREATININE 1.70 (H) 01/19/2024    CALCIUM 9.0 01/19/2024    GLUCOSE 162 (H) 01/19/2024     Magnesium:  Lab Results   Component Value Date    MG  2.11 01/19/2024     Troponin:    Lab Results   Component Value Date    TROPHS 63 (HH) 12/31/2023    TROPHS 60 (HH) 12/31/2023    TROPHS 40 (H) 12/12/2023     BNP:   Lab Results   Component Value Date    BNP 1,524 (H) 01/01/2024         Lipid Panel:  Lab Results   Component Value Date    HDL 79.1 07/13/2023    CHHDL 2.3 07/13/2023    VLDL 8 07/13/2023    TRIG 38 07/13/2023        Lab work and imaging results independently reviewed by me     Visit Vitals  /65   Pulse 61   Wt 63.4 kg (139 lb 12.4 oz)   SpO2 100%   BMI 20.64 kg/m²   Smoking Status Every Day   BSA 1.76 m²         ECG dated 11/25/2023 independently reviewed   NSR. LBBB       Constitutional:       Appearance: Cachectic and frail.   Eyes:      Conjunctiva/sclera: Conjunctivae normal.   Neck:      Vascular: JVD normal.   Pulmonary:      Effort: Pulmonary effort is normal.      Breath sounds: Normal breath sounds.   Cardiovascular:      PMI at left midclavicular line. Normal rate. Regular rhythm. Normal S1. Normal S2.       Murmurs: There is no murmur.      No rub.   Pulses:     Intact distal pulses.   Edema:     Peripheral edema absent.   Abdominal:      General: Bowel sounds are normal.   Musculoskeletal:      Cervical back: Neck supple. Skin:     General: Skin is warm and dry.   Neurological:      Mental Status: Alert and oriented to person, place and time.           Problem List Items Addressed This Visit             ICD-10-CM    Cardiomyopathy (CMS/HCC) I42.9    Acute on chronic combined systolic and diastolic heart failure (CMS/HCC) I50.43    Relevant Orders    Referral to Hospice    Hypertension I10    PVD (peripheral vascular disease) (CMS/HCC) I73.9    S/P PTCA (percutaneous transluminal coronary angioplasty) - Primary Z98.61    Sick sinus syndrome (CMS/HCC) I49.5    Implantable cardioverter-defibrillator (ICD) generator end of life Z45.02       We had a long discussion about the progression of his current symptoms as they relate to chronic  heart failure.  The symptoms are unlikely to improve even with aggressive diuresis, and his BP is too low today.  He is on high dose PO Bumex BID at home  He agrees to speak with [facility]    He will call on 1/24/24 if swelling and weight gain get worse and will arrange return to the infusion center [place]     01/22/24 at 12:54 PM - KENDRICK Chinchilla      Orders:  Orders Placed This Encounter   Procedures    Referral to Hospice         Followup Appts:  Future Appointments   Date Time Provider Department Center   1/23/2024 11:00 AM INF 04 [facility]   1/25/2024 11:00 AM INF 01 [facility]   1/26/2024 11:20 AM MD EMIL [id number] [facility]   1/30/2024 11:00 AM INF 04 [facility]   2/6/2024 11:00 AM INF 02 [facility]   2/20/2024  2:30 PM KENDRICK [place] [id number] [place]   2/22/2024 11:00 AM INF 01 [facility]   3/5/2024  2:00 PM KENDRICK Ashton [id number] [facility]   3/14/2024 10:00 AM KENDRICK Parmar, DNP [id number] [facility]   4/15/2024 10:20 AM KENDRICK Chinchilla [id number] [place]   4/19/2024 10:30 AM KENDRICK [place] [id number] [facility]   6/4/2024 10:40 AM KENDRICK Chinchilla [id number] [place]   1/20/2025 10:40 AM KENDRICK Chinchilla [id number] [facility]

## 2024-01-22 NOTE — LETTER
January 22, 2024     KENDRICK Parmar DNP  [address] [address]    Patient: Andrez Damon   YOB: 1945   Date of Visit: 1/22/2024       Dear KENDRICK Moore DNP:    Thank you for referring Andrez Damon to me for evaluation. Below are my notes for this consultation.  If you have questions, please do not hesitate to call me. I look forward to following your patient along with you.       Sincerely,     KENDRICK Chinchilla      CC: No Recipients  ______________________________________________________________________________________    Primary Care Physician: KENDRICK Parmar DNP  Primary Cardiologist:       Date of Visit: 01/22/2024 11:00 AM EST  Location of visit:  [facility]   Type of Visit: Follow up             Chief Complaint   Patient presents with   • Follow-up     After lasix infusion, pt reports still sob, swelling in legs, weak, and dizzy       HPI / Summary:   Andrez Damon is a 79 y.o. male  with   CAD. AWMI due to delayed stent thrombosis s/p redo PCI with GEORGIE (11/29/2014) S/P PCI to LAD with BMS 10/2010 (after delayed stent thrombosis from remote PCI) Moderate LV systolic dysfunction. S/p ICD implantation ([facility] [place] in 2012) Generator replacement for HAILEY 12/15/2023 ([facility]) Active Smoking. COPD. HTN. DLP. CKD. DJD. Latest EF 35-40%. AF with inappropriate ICD chock, reverted to NSR on amiodarone, pulmonary embolism on long term OAC with apixaban,    who returns for routine follow up      He had gained 10 lbs in 3 days associated with progressive pitting edema after hospital discharge 1/01/2024 with ADHF   Diuresed in the infusion center with IV lasix on 1/19/2024. ~1.5 L  Orthopneic with home O2, PND and insomnia   BP is too low to consider repeat diuretic clinic today;  he is always thirsty. He continues to smoke but is cutting back due to dyspnea   Home weight today was 135       12 system review is negative  except as noted above         Medical History:   Past Medical History:   Diagnosis Date   • Arthritis    • Atrial fibrillation with RVR (CMS/Formerly [facility]) 09/22/2023   • Body mass index (BMI) 20.0-20.9, adult 09/21/2021    Body mass index (BMI) of 20.0 to 20.9 in adult   • Body mass index (BMI) 21.0-21.9, adult 04/14/2021    Body mass index (BMI) of 21.0 to 21.9 in adult   • CHF (congestive heart failure) (CMS/Formerly [facility])    • COPD (chronic obstructive pulmonary disease) (CMS/Formerly [facility])    • Coronary artery disease    • Diabetes mellitus (CMS/[facility]ly [facility])    • Hypertension    • Major depressive disorder, recurrent, mild (CMS/[facility]ly [facility]) 11/11/2020    Mild episode of recurrent major depressive disorder   • Major depressive disorder, recurrent, unspecified (CMS/[facility]ly [facility]) 01/13/2021    Recurrent major depressive disorder, remission status unspecified   • Other conditions influencing health status     Swelling Of Hands   • Other specified anxiety disorders 05/19/2020    Depression with anxiety   • Oxygen desaturation during sleep     wears 2L at HS   • Personal history of nicotine dependence 07/12/2021    History of nicotine dependence   • Personal history of other diseases of the circulatory system     History of cardiac disorder   • Personal history of other diseases of the musculoskeletal system and connective tissue     History of arthritis   • Personal history of other diseases of urinary system 09/21/2021    History of chronic kidney disease   • Personal history of other endocrine, nutritional and metabolic disease 08/29/2019    History of hyperglycemia   • Pneumonia, unspecified organism 12/09/2019    Atypical pneumonia   • Thoracic aortic aneurysm, without rupture, unspecified (CMS/[facility]ly [facility]) 05/29/2019    Thoracic aortic aneurysm without rupture       Social History:   Tobacco Use: High Risk (1/22/2024)    Patient History    • Smoking Tobacco Use: Every Day    • Smokeless Tobacco Use: Never    • Passive Exposure: Not on file         MEDICATIONS:   Current  Outpatient Medications   Medication Instructions   • amiodarone (PACERONE) 200 mg, oral, Daily   • apixaban (Eliquis) 2.5 mg tablet 1 tablet, oral, 2 times daily   • atorvastatin (LIPITOR) 80 mg, oral, Daily   • azithromycin (ZITHROMAX) 250 mg, oral, 3 times weekly   • Brilinta 90 mg, oral, 2 times daily, AS DIRECTED   • bumetanide (BUMEX) 2 mg, oral, Daily   • cholecalciferol (VITAMIN D-3) 125 mcg, oral, Daily   • escitalopram (LEXAPRO) 10 mg, oral, Daily   • finasteride (PROSCAR) 5 mg, oral, Daily   • fluticasone-umeclidin-vilanter (Trelegy Ellipta) 200-62.5-25 mcg blister with device 1 puff, inhalation, Daily with breakfast, Inhale 1 puff daily, rinse mouth after use   • levalbuterol (Xopenex) 45 mcg/actuation inhaler 1 puff, inhalation, Every 4 hours PRN, INHALE 1 TO 2 PUFFS EVERY 4 TO 6 HOURS AS NEEDED AND AS DIRECTED.   • metoprolol succinate XL (TOPROL-XL) 12.5 mg, oral, Daily, Do not crush or chew.   • oxygen (O2) 2 L/min, inhalation, Continuous   • polyethylene glycol (GLYCOLAX, MIRALAX) 17 g, oral, Daily   • potassium chloride CR (K-Tab) 20 mEq ER tablet 1 tablet, oral, Daily   • pyridoxine (VITAMIN B-6) 50 mg, oral, Daily   • rOPINIRole (REQUIP) 1 mg, oral, Nightly   • tamsulosin (FLOMAX) 0.8 mg, oral, Nightly         IMAGING REVIEWED:   Echocardiogram:   Echocardiogram     [facility], [address] [place] [address]  Tel [phone] and Fax [phone]    TRANSTHORACIC ECHOCARDIOGRAM REPORT      Patient Name:     ALEJANDRA Lima Physician:  [id number] Flaco Briggs MD  Study Date:       9/18/2023          Referring           CHAMP GAMEZ  Physician:  MRN/PID:          [id number]           PCP:                Karley Sullivan CNP  Accession/Order#: [id number]          Department          [facility]  Location:  YOB: 1945          Fellow:  Gender:           M                  Nurse:  Admit Date:       9/14/2023          Sonographer:         Karyn Morales RVT,  RDMS, RDCS  Admission Status: Inpatient -        Additional Staff:  Routine  Height:           177.80 cm          CC Report to:       ICU [facility]  Weight:           62.14 kg           Study Type:         Echocardiogram  BSA:              1.78 m2  Blood Pressure: 102 /58 mmHg    Diagnosis/ICD: I48.91-Unspecified atrial fibrillation  Indication:    A Fib  Procedure/CPT: Echo Complete w Full [id number]    Patient History:  Smoker:            Current.  Diabetes:          Yes  Pertinent History: A-Fib, CHF, Cardiomyopathy, Chest Pain, COPD, Sepsis,  Hyperlipidemia, CAD and HTN. Ischemic Dilated Cardiomyopathy,  Elevated Troponin, Abnormal EKG, Bacteremia, Arrhythmia,  Tachycardia, HFrEF, STEMI, Old MI, Angioplasty, Left Heart  Cath.    Study Detail: The following Echo studies were performed: 2D, M-Mode, Doppler and  color flow. Technically challenging study due to poor acoustic  windows. The patient was awake.      PHYSICIAN INTERPRETATION:  Left Ventricle: The left ventricular systolic function is moderately decreased, with an estimated ejection fraction of 35-40%. There is global hypokinesis of the left ventricle with minor regional variations. The left ventricular cavity size is moderately dilated. There is moderate to severe left ventricular hypertrophy. Spectral Doppler shows an impaired relaxation pattern of left ventricular diastolic filling.  Left Atrium: The left atrium is moderately dilated.  Right Ventricle: The right ventricle is normal in size. There is normal right ventricular global systolic function.  Right Atrium: The right atrium is normal in size.  Aortic Valve: The aortic valve was not well visualized. There is mild aortic valve cusp calcification. There is mild aortic valve regurgitation. The peak instantaneous gradient of the aortic valve is 11.9 mmHg.  Mitral Valve: The mitral valve is normal in structure. There is moderate mitral valve regurgitation.  Tricuspid Valve:  The tricuspid valve is structurally normal. There is mild tricuspid regurgitation.  Pulmonic Valve: The pulmonic valve is not well visualized. There is physiologic pulmonic valve regurgitation.  Pericardium: There is no pericardial effusion noted.  Aorta: The aortic root was not well visualized.      CONCLUSIONS:  1. Left ventricular systolic function is moderately decreased with a 35-40% estimated ejection fraction.  2. Left ventricular cavity size is moderately dilated.  3. There is global hypokinesis of the left ventricle with minor regional variations.  4. Spectral Doppler shows an impaired relaxation pattern of left ventricular diastolic filling.  5. There is moderate to severe left ventricular hypertrophy.  6. The left atrium is moderately dilated.  7. Moderate mitral valve regurgitation.  8. Mild aortic valve regurgitation.       [id number] Flaco Briggs MD  Electronically signed on 9/18/2023 at 4:44:00 PM      Stress Testing:   NM CARDIAC STRESS REST (MYOCARDIAL PERFUSION MIBI) 12/02/2019    Narrative  MRN: [id number]  Patient Name: ALEJANDRA FRANCIS    STUDY:  CARDIAC STRESS/REST INJECTION; CARDIAC STRESS/REST (MYOCARDIAL  PERFUSION/MIBI); 12/2/2019 12:56 pm    INDICATION:  chest pain.    COMPARISON:  MUGA study 02/15/2011, SPECT MPI 09/27/2010    ACCESSION NUMBER(S):  [id number]; [id number]    ORDERING CLINICIAN:  ALBERTINA CROSS    TECHNIQUE:  DIVISION OF NUCLEAR MEDICINE  PHARMACOLOGIC STRESS MYOCARDIAL PERFUSION SCAN, ONE DAY PROTOCOL    The patient received an intravenous dose of 10.0 mCi of Tc-99m  Myoview and resting emission tomographic (SPECT) images of the  myocardium were acquired. The patient then received an intravenous  infusion of 0.4mg regadenoson (Lexiscan)  followed by an additional  dose of 31.2 mCi of Tc-99m Myoview. Stress phase SPECT images of the  myocardium were then acquired. These included ECG-gated images to  assess and quantify ventricular function.    FINDINGS:  Both  stress and rest studies demonstrate a large territory of severe  perfusion defect involving the distal anterior wall through the apex,  and extending to the inferior apex as well as to the adjoining  septum, without evidence of reversibility at rest. There is otherwise  grossly normal perfusion preserved throughout the remainder of the  left ventricle on rest and stress images.    There is severe LV dilation with an end-diastolic volume calculated  at 288 mL.    Gated images demonstrate distal anterior and apical dyskinesis with  diffuse LV hypokinesis and an LV EF estimated at 23%.    Impression  1. Large territory of completed transmural myocardial infarction  involving the distal anterior wall and apex, extending to the  inferior apex and adjoining septum, without evidence of associated  ischemia. This finding is new when compared to the prior 2010 study.  2. Otherwise grossly normal perfusion throughout the remainder of the  left ventricle without evidence of additional territory of ischemia  or infarction.  3. Severe LV dilation which is new when compared to the prior 2010  study.  4. Distal anterior dyskinesis superimposed upon severe global LV  hypokinesis with an LV EF estimated at 24%. This is compared to a  mildly dilated left ventricle with an LVEF of 45% on the 2011 MUGA  and a normal left ventricle with an LVEF of 63% on the 2010 study.    LABS:  CBC with Differential:    Lab Results   Component Value Date    WBC 9.4 01/19/2024    RBC 3.66 (L) 01/19/2024    HGB 10.9 (L) 01/19/2024    HCT 36.2 (L) 01/19/2024     01/19/2024    MCV 99 01/19/2024    MCH 29.8 01/19/2024    MCHC 30.1 (L) 01/19/2024    RDW 16.8 (H) 01/19/2024    NRBC 0.0 01/19/2024    LYMPHOPCT 5.5 01/19/2024    MONOPCT 5.7 01/19/2024    EOSPCT 0.6 01/19/2024    BASOPCT 0.3 01/19/2024    MONOSABS 0.53 01/19/2024    LYMPHSABS 0.52 (L) 01/19/2024    EOSABS 0.06 01/19/2024    BASOSABS 0.03 01/19/2024     CMP:    Lab Results   Component  Value Date     01/19/2024    K 3.6 01/19/2024     01/19/2024    CO2 31 01/19/2024    BUN 39 (H) 01/19/2024    CREATININE 1.70 (H) 01/19/2024    GLUCOSE 162 (H) 01/19/2024    PROT 5.7 (L) 01/19/2024    CALCIUM 9.0 01/19/2024    BILITOT 0.4 01/19/2024    ALKPHOS 76 01/19/2024    AST 23 01/19/2024    ALT 32 01/19/2024     BMP:    Lab Results   Component Value Date     01/19/2024    K 3.6 01/19/2024     01/19/2024    CO2 31 01/19/2024    BUN 39 (H) 01/19/2024    CREATININE 1.70 (H) 01/19/2024    CALCIUM 9.0 01/19/2024    GLUCOSE 162 (H) 01/19/2024     Magnesium:  Lab Results   Component Value Date    MG 2.11 01/19/2024     Troponin:    Lab Results   Component Value Date    TROPHS 63 (HH) 12/31/2023    TROPHS 60 (HH) 12/31/2023    TROPHS 40 (H) 12/12/2023     BNP:   Lab Results   Component Value Date    BNP 1,524 (H) 01/01/2024         Lipid Panel:  Lab Results   Component Value Date    HDL 79.1 07/13/2023    CHHDL 2.3 07/13/2023    VLDL 8 07/13/2023    TRIG 38 07/13/2023        Lab work and imaging results independently reviewed by me     Visit Vitals  /65   Pulse 61   Wt 63.4 kg (139 lb 12.4 oz)   SpO2 100%   BMI 20.64 kg/m²   Smoking Status Every Day   BSA 1.76 m²         ECG dated 11/25/2023 independently reviewed   NSR. LBBB       Constitutional:       Appearance: Cachectic and frail.   Eyes:      Conjunctiva/sclera: Conjunctivae normal.   Neck:      Vascular: JVD normal.   Pulmonary:      Effort: Pulmonary effort is normal.      Breath sounds: Normal breath sounds.   Cardiovascular:      PMI at left midclavicular line. Normal rate. Regular rhythm. Normal S1. Normal S2.       Murmurs: There is no murmur.      No rub.   Pulses:     Intact distal pulses.   Edema:     Peripheral edema absent.   Abdominal:      General: Bowel sounds are normal.   Musculoskeletal:      Cervical back: Neck supple. Skin:     General: Skin is warm and dry.   Neurological:      Mental Status: Alert and oriented  to person, place and time.           Problem List Items Addressed This Visit             ICD-10-CM    Cardiomyopathy (CMS/HCC) I42.9    Acute on chronic combined systolic and diastolic heart failure (CMS/HCC) I50.43    Relevant Orders    Referral to Hospice    Hypertension I10    PVD (peripheral vascular disease) (CMS/HCC) I73.9    S/P PTCA (percutaneous transluminal coronary angioplasty) - Primary Z98.61    Sick sinus syndrome (CMS/HCC) I49.5    Implantable cardioverter-defibrillator (ICD) generator end of life Z45.02       We had a long discussion about the progression of his current symptoms as they relate to chronic heart failure.  The symptoms are unlikely to improve even with aggressive diuresis, and his BP is too low today.  He is on high dose PO Bumex BID at home  He agrees to speak with [facility]    He will call on 1/24/24 if swelling and weight gain get worse and will arrange return to the infusion center [place]     01/22/24 at 12:54 PM - KENDRICK Chinchlila      Orders:  Orders Placed This Encounter   Procedures   • Referral to Hospice         Followup Appts:  Future Appointments   Date Time Provider Department Center   1/23/2024 11:00 AM INF 04 [facility]   1/25/2024 11:00 AM INF 01 [facility]   1/26/2024 11:20 AM Stone Piedra MD [id number] [facility]   1/30/2024 11:00 AM INF 04 [facility]   2/6/2024 11:00 AM INF 02 [facility]   2/20/2024  2:30 PM KENDRICK Ashton [id number] [facility]   2/22/2024 11:00 AM INF 01 [facility]   3/5/2024  2:00 PM KENDRICK Ashton [id number] [facility]   3/14/2024 10:00 AM KENDRICK Parmar, DNP [id number] [facility]   4/15/2024 10:20 AM KENDRICK Chinchilla [id number] [facility]   4/19/2024 10:30 AM KENDRICK Montague [id number]   6/4/2024 10:40 AM KENDRICK Chinchilla [id number] [facility]   1/20/2025 10:40 AM KENDRICK [id number] [facility]

## 2024-01-22 NOTE — PATIENT INSTRUCTIONS
Expect to be contacted by [facility] line: [phone]  if you need to call them

## 2024-01-22 NOTE — Clinical Note
"***HELP TEXT***  This letter is meant to display your personal SmartPhrase that you would like to send to other providers.     1. Create a new SmartPhrase with the name \"MyProviderLetter\".   2. Format the letter any way you want. Your organizational header will already be included so you can leave that out.  3. Save your SmartPhrase. If you get a warning about the name being used, ignore it.  4. Use the My Provider Letter template in the Communications section. This will send your letter!    " General Sunscreen Counseling: I recommended a broad spectrum sunscreen with a SPF of 30 or higher.  I explained that SPF 30 sunscreens block approximately 97 percent of the sun's harmful rays.  Sunscreens should be applied at least 15 minutes prior to expected sun exposure and then every 2 hours after that as long as sun exposure continues. If swimming or exercising sunscreen should be reapplied every 45 minutes to an hour after getting wet or sweating.  One ounce, or the equivalent of a shot glass full of sunscreen, is adequate to protect the skin not covered by a bathing suit. I also recommended a lip balm with a sunscreen as well. Sun protective clothing can be used in lieu of sunscreen but must be worn the entire time you are exposed to the sun's rays. Detail Level: Detailed

## 2024-01-23 ENCOUNTER — APPOINTMENT (OUTPATIENT)
Dept: HEMATOLOGY/ONCOLOGY | Facility: HOSPITAL | Age: 79
End: 2024-01-23
Payer: MEDICARE

## 2024-01-24 ENCOUNTER — HOSPITAL ENCOUNTER (OUTPATIENT)
Dept: CARDIOLOGY | Facility: HOSPITAL | Age: 79
Discharge: HOME | End: 2024-01-24
Payer: MEDICARE

## 2024-01-24 DIAGNOSIS — I42.9 CARDIOMYOPATHY, UNSPECIFIED TYPE (MULTI): ICD-10-CM

## 2024-01-24 DIAGNOSIS — Z95.810 CARDIAC DEFIBRILLATOR IN PLACE: ICD-10-CM

## 2024-01-24 LAB — PYRIDOXAL PHOS SERPL-SCNC: 16.7 NMOL/L (ref 20–125)

## 2024-01-24 PROCEDURE — 93282 PRGRMG EVAL IMPLANTABLE DFB: CPT | Performed by: NURSE PRACTITIONER

## 2024-01-24 PROCEDURE — 93290 INTERROG DEV EVAL ICPMS IP: CPT | Performed by: NURSE PRACTITIONER

## 2024-01-24 PROCEDURE — 93282 PRGRMG EVAL IMPLANTABLE DFB: CPT

## 2024-01-25 ENCOUNTER — APPOINTMENT (OUTPATIENT)
Dept: HEMATOLOGY/ONCOLOGY | Facility: HOSPITAL | Age: 79
End: 2024-01-25
Payer: MEDICARE

## 2024-01-25 ENCOUNTER — INFUSION (OUTPATIENT)
Dept: HEMATOLOGY/ONCOLOGY | Facility: HOSPITAL | Age: 79
End: 2024-01-25
Payer: MEDICARE

## 2024-01-25 VITALS
TEMPERATURE: 98.6 F | DIASTOLIC BLOOD PRESSURE: 49 MMHG | HEART RATE: 74 BPM | OXYGEN SATURATION: 98 % | RESPIRATION RATE: 18 BRPM | SYSTOLIC BLOOD PRESSURE: 103 MMHG

## 2024-01-25 DIAGNOSIS — E61.1 IRON DEFICIENCY: ICD-10-CM

## 2024-01-25 DIAGNOSIS — I50.43 ACUTE ON CHRONIC COMBINED SYSTOLIC AND DIASTOLIC HEART FAILURE (MULTI): ICD-10-CM

## 2024-01-25 DIAGNOSIS — E53.8 B12 DEFICIENCY: ICD-10-CM

## 2024-01-25 DIAGNOSIS — D63.8 ANEMIA OF CHRONIC DISEASE: ICD-10-CM

## 2024-01-25 PROCEDURE — 96372 THER/PROPH/DIAG INJ SC/IM: CPT

## 2024-01-25 PROCEDURE — 2500000004 HC RX 250 GENERAL PHARMACY W/ HCPCS (ALT 636 FOR OP/ED)

## 2024-01-25 PROCEDURE — 96365 THER/PROPH/DIAG IV INF INIT: CPT | Mod: INF

## 2024-01-25 RX ORDER — EPINEPHRINE 0.3 MG/.3ML
0.3 INJECTION SUBCUTANEOUS EVERY 5 MIN PRN
Status: CANCELLED | OUTPATIENT
Start: 2024-02-01

## 2024-01-25 RX ORDER — ALBUTEROL SULFATE 0.83 MG/ML
3 SOLUTION RESPIRATORY (INHALATION) AS NEEDED
Status: CANCELLED | OUTPATIENT
Start: 2024-02-22

## 2024-01-25 RX ORDER — DIPHENHYDRAMINE HYDROCHLORIDE 50 MG/ML
50 INJECTION INTRAMUSCULAR; INTRAVENOUS AS NEEDED
Status: CANCELLED | OUTPATIENT
Start: 2024-02-22

## 2024-01-25 RX ORDER — HEPARIN 100 UNIT/ML
500 SYRINGE INTRAVENOUS AS NEEDED
Status: CANCELLED | OUTPATIENT
Start: 2024-01-25

## 2024-01-25 RX ORDER — FAMOTIDINE 10 MG/ML
20 INJECTION INTRAVENOUS ONCE AS NEEDED
Status: CANCELLED | OUTPATIENT
Start: 2024-02-22

## 2024-01-25 RX ORDER — FAMOTIDINE 10 MG/ML
20 INJECTION INTRAVENOUS ONCE AS NEEDED
Status: CANCELLED | OUTPATIENT
Start: 2024-02-01

## 2024-01-25 RX ORDER — CYANOCOBALAMIN 1000 UG/ML
1000 INJECTION, SOLUTION INTRAMUSCULAR; SUBCUTANEOUS ONCE
Status: CANCELLED | OUTPATIENT
Start: 2024-02-22

## 2024-01-25 RX ORDER — HEPARIN SODIUM,PORCINE/PF 10 UNIT/ML
50 SYRINGE (ML) INTRAVENOUS AS NEEDED
Status: CANCELLED | OUTPATIENT
Start: 2024-01-25

## 2024-01-25 RX ORDER — ALBUTEROL SULFATE 0.83 MG/ML
3 SOLUTION RESPIRATORY (INHALATION) AS NEEDED
Status: CANCELLED | OUTPATIENT
Start: 2024-02-01

## 2024-01-25 RX ORDER — EPINEPHRINE 0.3 MG/.3ML
0.3 INJECTION SUBCUTANEOUS EVERY 5 MIN PRN
Status: CANCELLED | OUTPATIENT
Start: 2024-02-22

## 2024-01-25 RX ORDER — DIPHENHYDRAMINE HYDROCHLORIDE 50 MG/ML
50 INJECTION INTRAMUSCULAR; INTRAVENOUS AS NEEDED
Status: CANCELLED | OUTPATIENT
Start: 2024-02-01

## 2024-01-25 RX ORDER — HEPARIN SODIUM 1000 [USP'U]/ML
2000 INJECTION, SOLUTION INTRAVENOUS; SUBCUTANEOUS ONCE
Status: CANCELLED | OUTPATIENT
Start: 2024-01-25 | End: 2024-01-25

## 2024-01-25 RX ORDER — CYANOCOBALAMIN 1000 UG/ML
1000 INJECTION, SOLUTION INTRAMUSCULAR; SUBCUTANEOUS ONCE
Status: COMPLETED | OUTPATIENT
Start: 2024-01-25 | End: 2024-01-25

## 2024-01-25 RX ADMIN — IRON SUCROSE 300 MG: 20 INJECTION, SOLUTION INTRAVENOUS at 11:14

## 2024-01-25 RX ADMIN — CYANOCOBALAMIN 1000 MCG: 1000 INJECTION, SOLUTION INTRAMUSCULAR at 11:31

## 2024-01-25 ASSESSMENT — PAIN SCALES - GENERAL: PAINLEVEL: 8

## 2024-01-26 ENCOUNTER — APPOINTMENT (OUTPATIENT)
Dept: HEMATOLOGY/ONCOLOGY | Facility: HOSPITAL | Age: 79
End: 2024-01-26
Payer: MEDICARE

## 2024-01-26 ENCOUNTER — OFFICE VISIT (OUTPATIENT)
Dept: UROLOGY | Facility: CLINIC | Age: 79
End: 2024-01-26
Payer: MEDICARE

## 2024-01-26 DIAGNOSIS — N40.0 ENLARGED PROSTATE: ICD-10-CM

## 2024-01-26 DIAGNOSIS — N40.0 BENIGN PROSTATIC HYPERPLASIA, UNSPECIFIED WHETHER LOWER URINARY TRACT SYMPTOMS PRESENT: ICD-10-CM

## 2024-01-26 PROCEDURE — 1160F RVW MEDS BY RX/DR IN RCRD: CPT | Performed by: STUDENT IN AN ORGANIZED HEALTH CARE EDUCATION/TRAINING PROGRAM

## 2024-01-26 PROCEDURE — 1159F MED LIST DOCD IN RCRD: CPT | Performed by: STUDENT IN AN ORGANIZED HEALTH CARE EDUCATION/TRAINING PROGRAM

## 2024-01-26 PROCEDURE — 1111F DSCHRG MED/CURRENT MED MERGE: CPT | Performed by: STUDENT IN AN ORGANIZED HEALTH CARE EDUCATION/TRAINING PROGRAM

## 2024-01-26 PROCEDURE — 1157F ADVNC CARE PLAN IN RCRD: CPT | Performed by: STUDENT IN AN ORGANIZED HEALTH CARE EDUCATION/TRAINING PROGRAM

## 2024-01-26 PROCEDURE — 1125F AMNT PAIN NOTED PAIN PRSNT: CPT | Performed by: STUDENT IN AN ORGANIZED HEALTH CARE EDUCATION/TRAINING PROGRAM

## 2024-01-26 PROCEDURE — 99213 OFFICE O/P EST LOW 20 MIN: CPT | Performed by: STUDENT IN AN ORGANIZED HEALTH CARE EDUCATION/TRAINING PROGRAM

## 2024-01-26 RX ORDER — FINASTERIDE 5 MG/1
5 TABLET, FILM COATED ORAL DAILY
Qty: 90 TABLET | Refills: 3 | Status: SHIPPED | OUTPATIENT
Start: 2024-01-26 | End: 2024-04-26 | Stop reason: SDUPTHER

## 2024-01-26 RX ORDER — TAMSULOSIN HYDROCHLORIDE 0.4 MG/1
0.8 CAPSULE ORAL NIGHTLY
Qty: 180 CAPSULE | Refills: 3 | Status: SHIPPED | OUTPATIENT
Start: 2024-01-26 | End: 2024-04-26 | Stop reason: SDUPTHER

## 2024-01-26 NOTE — PROGRESS NOTES
Subjective   Patient ID: Andrez Damon is a 79 y.o. male.    HPI  Patient reports ongoing incontinence and states he does not have a catheter in place. He mentions he is able to urinate only when he gets the urge. However, he adds associated urgency.    He mentions he takes Tamsulosin 2 tabs daily.     He states since 07/2023, he has had 10 hospital admission for his co-morbidities including heart failure, Atrial fibrillation and pneumonia.     Review of Systems  No other complaints    Objective   Physical Exam    Assessment/Plan   Diagnoses and all orders for this visit:  Benign prostatic hyperplasia, unspecified whether lower urinary tract symptoms present  -     tamsulosin (Flomax) 0.4 mg 24 hr capsule; Take 2 capsules (0.8 mg) by mouth once daily at bedtime.  Enlarged prostate  -     finasteride (Proscar) 5 mg tablet; Take 1 tablet (5 mg) by mouth once daily.    Because of bad comorbidities, there is no room for intervention to prostate. My suggestion for urinary incontinence and urinary symptoms is an indwelling or an external catheter.  Patient will consider his options  FU in a year or as needed          Scribe Attestation  By signing my name below, Li GRIFFITHS, Scribe   attest that this documentation has been prepared under the direction and in the presence of Stone Piedra MD.

## 2024-01-30 ENCOUNTER — APPOINTMENT (OUTPATIENT)
Dept: HEMATOLOGY/ONCOLOGY | Facility: HOSPITAL | Age: 79
End: 2024-01-30
Payer: MEDICARE

## 2024-01-31 ENCOUNTER — HOSPITAL ENCOUNTER (OUTPATIENT)
Dept: CARDIOLOGY | Facility: CLINIC | Age: 79
Discharge: HOME | End: 2024-01-31
Payer: MEDICARE

## 2024-01-31 DIAGNOSIS — I49.5 SSS (SICK SINUS SYNDROME) (MULTI): ICD-10-CM

## 2024-02-01 ENCOUNTER — INFUSION (OUTPATIENT)
Dept: HEMATOLOGY/ONCOLOGY | Facility: HOSPITAL | Age: 79
End: 2024-02-01
Payer: MEDICARE

## 2024-02-01 VITALS
RESPIRATION RATE: 18 BRPM | TEMPERATURE: 97.7 F | DIASTOLIC BLOOD PRESSURE: 55 MMHG | HEART RATE: 72 BPM | OXYGEN SATURATION: 98 % | SYSTOLIC BLOOD PRESSURE: 105 MMHG

## 2024-02-01 DIAGNOSIS — I50.43 ACUTE ON CHRONIC COMBINED SYSTOLIC AND DIASTOLIC HEART FAILURE (MULTI): ICD-10-CM

## 2024-02-01 DIAGNOSIS — E53.8 B12 DEFICIENCY: ICD-10-CM

## 2024-02-01 DIAGNOSIS — D63.8 ANEMIA OF CHRONIC DISEASE: ICD-10-CM

## 2024-02-01 PROCEDURE — 2500000004 HC RX 250 GENERAL PHARMACY W/ HCPCS (ALT 636 FOR OP/ED)

## 2024-02-01 PROCEDURE — 96365 THER/PROPH/DIAG IV INF INIT: CPT | Mod: INF

## 2024-02-01 RX ORDER — DIPHENHYDRAMINE HYDROCHLORIDE 50 MG/ML
50 INJECTION INTRAMUSCULAR; INTRAVENOUS AS NEEDED
Status: CANCELLED | OUTPATIENT
Start: 2024-02-08

## 2024-02-01 RX ORDER — ALBUTEROL SULFATE 0.83 MG/ML
3 SOLUTION RESPIRATORY (INHALATION) AS NEEDED
Status: CANCELLED | OUTPATIENT
Start: 2024-02-08

## 2024-02-01 RX ORDER — EPINEPHRINE 0.3 MG/.3ML
0.3 INJECTION SUBCUTANEOUS EVERY 5 MIN PRN
Status: CANCELLED | OUTPATIENT
Start: 2024-02-08

## 2024-02-01 RX ORDER — HEPARIN SODIUM,PORCINE/PF 10 UNIT/ML
50 SYRINGE (ML) INTRAVENOUS AS NEEDED
Status: CANCELLED | OUTPATIENT
Start: 2024-02-01

## 2024-02-01 RX ORDER — FAMOTIDINE 10 MG/ML
20 INJECTION INTRAVENOUS ONCE AS NEEDED
Status: CANCELLED | OUTPATIENT
Start: 2024-02-08

## 2024-02-01 RX ORDER — HEPARIN SODIUM 1000 [USP'U]/ML
2000 INJECTION, SOLUTION INTRAVENOUS; SUBCUTANEOUS ONCE
Status: CANCELLED | OUTPATIENT
Start: 2024-02-01 | End: 2024-02-01

## 2024-02-01 RX ORDER — HEPARIN 100 UNIT/ML
500 SYRINGE INTRAVENOUS AS NEEDED
Status: CANCELLED | OUTPATIENT
Start: 2024-02-01

## 2024-02-01 RX ADMIN — IRON SUCROSE 300 MG: 20 INJECTION, SOLUTION INTRAVENOUS at 11:43

## 2024-02-01 ASSESSMENT — PATIENT HEALTH QUESTIONNAIRE - PHQ9
SUM OF ALL RESPONSES TO PHQ9 QUESTIONS 1 & 2: 0
10. IF YOU CHECKED OFF ANY PROBLEMS, HOW DIFFICULT HAVE THESE PROBLEMS MADE IT FOR YOU TO DO YOUR WORK, TAKE CARE OF THINGS AT HOME, OR GET ALONG WITH OTHER PEOPLE: NOT DIFFICULT AT ALL
1. LITTLE INTEREST OR PLEASURE IN DOING THINGS: NOT AT ALL
2. FEELING DOWN, DEPRESSED OR HOPELESS: NOT AT ALL

## 2024-02-01 ASSESSMENT — PAIN SCALES - GENERAL: PAINLEVEL: 3

## 2024-02-06 ENCOUNTER — APPOINTMENT (OUTPATIENT)
Dept: HEMATOLOGY/ONCOLOGY | Facility: HOSPITAL | Age: 79
End: 2024-02-06
Payer: MEDICARE

## 2024-02-08 ENCOUNTER — INFUSION (OUTPATIENT)
Dept: HEMATOLOGY/ONCOLOGY | Facility: HOSPITAL | Age: 79
End: 2024-02-08
Payer: MEDICARE

## 2024-02-08 VITALS
SYSTOLIC BLOOD PRESSURE: 114 MMHG | RESPIRATION RATE: 20 BRPM | WEIGHT: 138.67 LBS | TEMPERATURE: 97.2 F | DIASTOLIC BLOOD PRESSURE: 58 MMHG | HEART RATE: 69 BPM | OXYGEN SATURATION: 97 % | BODY MASS INDEX: 20.48 KG/M2

## 2024-02-08 DIAGNOSIS — I50.43 ACUTE ON CHRONIC COMBINED SYSTOLIC AND DIASTOLIC HEART FAILURE (MULTI): Primary | ICD-10-CM

## 2024-02-08 DIAGNOSIS — D63.8 ANEMIA OF CHRONIC DISEASE: ICD-10-CM

## 2024-02-08 DIAGNOSIS — E53.8 B12 DEFICIENCY: ICD-10-CM

## 2024-02-08 PROCEDURE — 96366 THER/PROPH/DIAG IV INF ADDON: CPT

## 2024-02-08 PROCEDURE — 2500000004 HC RX 250 GENERAL PHARMACY W/ HCPCS (ALT 636 FOR OP/ED)

## 2024-02-08 PROCEDURE — 96365 THER/PROPH/DIAG IV INF INIT: CPT | Mod: INF

## 2024-02-08 RX ORDER — FAMOTIDINE 10 MG/ML
20 INJECTION INTRAVENOUS ONCE AS NEEDED
OUTPATIENT
Start: 2024-02-08

## 2024-02-08 RX ORDER — HEPARIN SODIUM,PORCINE/PF 10 UNIT/ML
50 SYRINGE (ML) INTRAVENOUS AS NEEDED
OUTPATIENT
Start: 2024-02-08

## 2024-02-08 RX ORDER — EPINEPHRINE 0.3 MG/.3ML
0.3 INJECTION SUBCUTANEOUS EVERY 5 MIN PRN
OUTPATIENT
Start: 2024-02-08

## 2024-02-08 RX ORDER — METHADONE HYDROCHLORIDE 5 MG/1
2.5 TABLET ORAL NIGHTLY
COMMUNITY

## 2024-02-08 RX ORDER — HYDROMORPHONE HYDROCHLORIDE 2 MG/1
1 TABLET ORAL EVERY 2 HOUR PRN
COMMUNITY

## 2024-02-08 RX ORDER — HEPARIN SODIUM 1000 [USP'U]/ML
2000 INJECTION, SOLUTION INTRAVENOUS; SUBCUTANEOUS ONCE
OUTPATIENT
Start: 2024-02-08 | End: 2024-02-08

## 2024-02-08 RX ORDER — DIPHENHYDRAMINE HYDROCHLORIDE 50 MG/ML
50 INJECTION INTRAMUSCULAR; INTRAVENOUS AS NEEDED
OUTPATIENT
Start: 2024-02-08

## 2024-02-08 RX ORDER — HEPARIN 100 UNIT/ML
500 SYRINGE INTRAVENOUS AS NEEDED
OUTPATIENT
Start: 2024-02-08

## 2024-02-08 RX ORDER — ALBUTEROL SULFATE 0.83 MG/ML
3 SOLUTION RESPIRATORY (INHALATION) AS NEEDED
OUTPATIENT
Start: 2024-02-08

## 2024-02-08 RX ADMIN — IRON SUCROSE 300 MG: 20 INJECTION, SOLUTION INTRAVENOUS at 11:18

## 2024-02-08 ASSESSMENT — PAIN SCALES - GENERAL: PAINLEVEL: 1

## 2024-02-08 NOTE — SIGNIFICANT EVENT
02/08/24 1045   Over the past 2 weeks, how often have you been bothered by any of the following problems?   Little interest or pleasure in doing things Not at all   Feeling down, depressed, or hopeless Not at all   Patient Health Questionnaire-2 Score 0

## 2024-02-08 NOTE — SIGNIFICANT EVENT
02/08/24 1107   Peripheral IV 02/08/24 24 G Right;Ventral Forearm   Placement Date/Time: 02/08/24 1100   Hand Hygiene Completed: Yes  Size (Gauge): 24 G  Orientation: Right;Ventral  Location: Forearm  Site Prep: Chlorhexidine   Comfort Measures: Verbal  Local Anesthetic: None  Technique: Anatomical landmarks  Placed b...   Site Assessment Clean;Dry;Intact   Dressing Type Transparent   Line Status Blood return noted;Infusing   Dressing Status Clean;Dry;Occlusive

## 2024-02-08 NOTE — SIGNIFICANT EVENT
02/08/24 1044   Stress   Do you feel stress - tense, restless, nervous, or anxious, or unable to sleep at night because your mind is troubled all the time - these days? Not at all

## 2024-02-09 ENCOUNTER — PATIENT OUTREACH (OUTPATIENT)
Dept: PRIMARY CARE | Facility: CLINIC | Age: 79
End: 2024-02-09
Payer: MEDICARE

## 2024-02-20 ENCOUNTER — OFFICE VISIT (OUTPATIENT)
Dept: PULMONOLOGY | Facility: CLINIC | Age: 79
End: 2024-02-20
Payer: MEDICARE

## 2024-02-20 VITALS
HEART RATE: 71 BPM | OXYGEN SATURATION: 97 % | DIASTOLIC BLOOD PRESSURE: 60 MMHG | SYSTOLIC BLOOD PRESSURE: 101 MMHG | WEIGHT: 141 LBS | BODY MASS INDEX: 20.82 KG/M2

## 2024-02-20 DIAGNOSIS — Z45.02 IMPLANTABLE CARDIOVERTER-DEFIBRILLATOR (ICD) GENERATOR END OF LIFE: ICD-10-CM

## 2024-02-20 DIAGNOSIS — F17.210 CIGARETTE NICOTINE DEPENDENCE WITHOUT COMPLICATION: ICD-10-CM

## 2024-02-20 DIAGNOSIS — I49.5 SICK SINUS SYNDROME (MULTI): Primary | ICD-10-CM

## 2024-02-20 DIAGNOSIS — J43.2 CENTRILOBULAR EMPHYSEMA (MULTI): ICD-10-CM

## 2024-02-20 DIAGNOSIS — G47.36 SLEEP RELATED HYPOVENTILATION IN CONDITIONS CLASSIFIED ELSEWHERE: ICD-10-CM

## 2024-02-20 PROCEDURE — 1159F MED LIST DOCD IN RCRD: CPT | Performed by: NURSE PRACTITIONER

## 2024-02-20 PROCEDURE — 1125F AMNT PAIN NOTED PAIN PRSNT: CPT | Performed by: NURSE PRACTITIONER

## 2024-02-20 PROCEDURE — 1157F ADVNC CARE PLAN IN RCRD: CPT | Performed by: NURSE PRACTITIONER

## 2024-02-20 PROCEDURE — 3078F DIAST BP <80 MM HG: CPT | Performed by: NURSE PRACTITIONER

## 2024-02-20 PROCEDURE — 99215 OFFICE O/P EST HI 40 MIN: CPT | Performed by: NURSE PRACTITIONER

## 2024-02-20 PROCEDURE — 3074F SYST BP LT 130 MM HG: CPT | Performed by: NURSE PRACTITIONER

## 2024-02-20 PROCEDURE — 1160F RVW MEDS BY RX/DR IN RCRD: CPT | Performed by: NURSE PRACTITIONER

## 2024-02-20 ASSESSMENT — ENCOUNTER SYMPTOMS
FATIGUE: 1
COUGH: 1

## 2024-02-20 NOTE — PROGRESS NOTES
Subjective   Patient ID: Andrez Damon is a 79 y.o. male who presents for No chief complaint on file..  HPI  At baseline, he has dyspnea on exertion, but none at rest. His symptoms started many years ago, but has mostly been stable.  He currently sits for most of the day, works inside the house, but does not carry loads and do strenuous exercise.). He has to stop for breath when walking at her own pace on level ground at about 15 minutes (mMRC 2).. His CAT score is 19 He also denies orthopnea, pnd, or jasiel. His weight has been mostly stable. He also denies chronic cough, wheezing, and clear, green, blood streaks, but no sputum. No night cough. No hemoptysis. No fever or shivering chills. He has no runny nose, or a tingling sensation in the back of his throat. He denies chest pain or heartburn. [place] score (ESS) is 8.    01/13/2020: Since his last visit his breathing is stable. He is using Stiltto and has improved his breathing. His PFT shows an FEV1 of 81% predicted. He has some shortness of breath in the evening when he is getting ready for bed. He does complain of continual postnasal drip he had sinus surgery in 2000 and has tried various nasal sprays and medications but it never subsides.     07/13/2020: Patient is here today for follow-up. He has been doing well breathing wise. Is compliant with his Spiriva. He cannot use the albuterol for rescue it causes dizziness and shaking and tachycardia. Patient does have a significant cardiac history. Breathing is at baseline. No hospitalizations for respiratory. He did have a fall at home in the bathroom on couple months ago and hit his head and did not call the squad. He tells me he thinks his blood sugar was low. I encouraged him if this ever happens again to please call the squad therefore he will need assess during that time. He continues to complain of postnasal drip but this has been going on from is 20 years. He continues to smoke 5 cigarettes a  day.    01/12/2021: He is here today for follow-up. He has been doing well he has not had any ER visits he has remained healthy since his last office visit. Shortness of breath is similar to his last visit. He gets short of breath when he bends over or walks any distances. He has not been using his Spiriva he tells me that he prefers the nebulized medications. I will give him some samples today of Yupelri and call in a prescription he can use this once a day in his nebulizer and he can still use his albuterol. He continues to smoke 5 cigarettes a day. He has remained at home no exposure to Covid that he is aware of.    07/12/2021: He is here today for follow up. He has been in the hospital twice in the last month for UTI. He tells me today he feels better. He uses is Yupelri but not every day, he uses it every other day because of the expense. He uses his albuterol once or twice a week. During his hospitalization he was started on oxygen with sleep. He states he does feel better he no longer coughs during the night and is sleeping better. He also has cut back his smoking to just a couple cigarettes a day.    01/24/2022: He is being seen today for follow-up. He is doing well. No complaints of abnormal shortness of breath or wheezing. The only time he starts to feel short of breath is if he is bending over to do something. We talked about doing some deep breathing exercises. He does not need refills on anything at this time but will let me know when he does. He has remained relatively healthy. He continues follow-ups with heme-onc and cardiology. He has cut back on his smoking to 1 pack a week. He is compliant with 2 L of oxygen with sleep. He tells me he checks his oxygen throughout the day and it stays above 95.    07/25/2022 he is here today for follow-up. Breathing wise he has been doing well. Although he cannot afford the yellow powdery so he needs something else. I will give him samples today of Stiolto we will  try to find something that is covered. He has Xopenex for rescue. He has a history of heart failure and A. fib. He does use oxygen with sleep although sometimes he wakes up in an snot on. He continues to smoke 1 pack of cigarettes a week    01/30/23 he is here today for follow-up. He is doing okay. His biggest complaint today is knee pain. He tells me that he is seeing somebody and he is not a candidate for surgery because of his heart condition. His EF is only 20 to 25%. He does get short of breath. He likes the Stiolto he has been using it every day he uses his Xopenex at least once a day. He continues to use 2 L of oxygen with sleep. He does check his oxygen throughout the day and it stays around 94 to 98%. He smokes about 3 cigarettes a day.    07/31/23 he is here today for follow-up. He was recently hospitalized for CHF. He tells me they took off 4 L of fluid. He then was a little too dehydrated and ended up back in the ER the next day where he received a 500 L fluid bolus. He also states that his catheter was infected. He does have an appointment with urology in 2 weeks. He does not have a follow-up appointment with cardiology I encouraged him to make 1 today. He does use oxygen with sleep and as needed throughout the day. He seems very weak today he said that his heart rate is sporadic and goes from 40 all the way up into the 1 teens. He continues on Stiolto. I would like him to have a new breathing test. 10 minutes spent preparing patient's chart. 20 minutes spent with patient answering questions and determining care. 10 minutes spent charting and ordering tests and medications    08/29/23 he is here today for follow-up. He had a new PFT which is similar to his previous PFT. His FEV1 V1 declined slightly from 76% to 81%. Patient gets very short of breath doing any activity. He is complaining today of some difficulty with movement in his right leg he has an appointment with his PCP. He is following up with  cardiology regularly now. He does use his oxygen with sleep. He is down to 1 pack of cigarettes per week. 10 minutes spent preparing patient's chart. 20 minutes spent with patient answering questions and determining care. 10 minutes spent charting and ordering tests and medications     12/5/23 he is here today for follow-up.  I am very concerned about him.  He is very fatigued and short of breath today although he is oxygenating well.  I did go back and look at his recent blood work he is low for his H&H and platelets and RBCs.  He did have an iron transfusion while hospitalized it did bring his levels up but they have decreased since then.  I offered patient the ER he did not wish to go to the ER at this time.  He was agreeable to doing a CBC and then be calling him which most likely will result in me telling him to go to the ER.  He is on 2 blood thinners and his platelets are low.  He tells me he saw cardiology yesterday.  He has an appointment to have his pacemaker replaced on December 15.     02/20/24 he is here today for follow-up.  He tells me that he is now on hospice.  Cardiology placed him on hospice after his last visit.  He is very weak and gets short of breath quickly.  He is oxygenating well though.  I had him do a 6-minute walk and he never went below 95%.  He does have oxygen for sleep and he tells me he does not put it on during the day as needed.  He is using the Trelegy.  I think his shortness of breath is multifactorial between his heart failure and his anemia.    Previous pulmonary history:   He has no history of recurrent infections, or lung disease as a child. He was previously told he has COPD . He currently is on no supplemental oxygen. He has never been to pulmonary rehab. Does not recall having AECOPD requiring antibiotics or prednisone.     Inhalers/nebulized medications: Incruse, Albuterol      Hospitalization History:  He has not been hospitalized over the last year for breathing  related problem.     Sleep history:  Denies snoring, apneas, feeling tired during the day or taking naps during the day. Admits to feeling tired during the day.  /Complains of snoring, ? Apneas. Feels tired during the day. Does not take frequent naps. Does not feel tired during the day or take frequent naps.  STOP-BANG score of      Comorbidities:   CAD  HTN  PVD  CHF  DM  Depression  GERD    SH:  smoking: current smoker  drinking: none  illicit drug use: none     Occupation: (Full questionnaire on exposures obtained, discussed with the patient and scanned to EMR)  No known exposure to asbestos, silica or beryllium     Family History:  No family history of lung diseases or cancer     Imaging history: (I have personally reviewed the imaging below)   11/30/19: Chest CT - RUL ground glass opacity concern for adenocarcinoma    PFTs:   01/06/2020: // -> FEV1/FVC ratio .61 /FEV1 81% (no BD response)/FVC 95% /DLCO 42% /TLC/RV to TLC ratio   .34     6 MWTs: None on record     Lung biopsy: None on record     Echo:   2019 -> 20-25% EF    Labs:  : Hb 15 , Eos <250, Bicarb 30, Creat 1.32     Review of Systems   Constitutional:  Positive for fatigue.   HENT:  Positive for congestion.    Respiratory:  Positive for cough.    All other systems reviewed and are negative.      Objective   Physical Exam  Vitals and nursing note reviewed.   Constitutional:       Appearance: Normal appearance. He is normal weight.   HENT:      Head: Normocephalic.      Nose: Nose normal.   Eyes:      Pupils: Pupils are equal, round, and reactive to light.   Pulmonary:      Effort: Pulmonary effort is normal.      Breath sounds: Normal breath sounds.   Neurological:      General: No focal deficit present.      Mental Status: He is alert and oriented to person, place, and time. Mental status is at baseline.   Psychiatric:         Mood and Affect: Mood normal.         Behavior: Behavior normal.         Thought Content: Thought content normal.          Assessment/Plan     # COPD with emphysema:   - PFT shows an FEV1 81%, moderate COPD  -At baseline with no active sign of exacerbation (increased dyspnea, cough, sputum or change in sputum characteristic)  -Counseled on the role of diet and exercise  -Pulmonary rehab referral made.  -Vaccinations: Yearly influenza vaccines, Pneumoccocal (both PCV13 and PPSV23 for all patients 65 y.o or above)  -Does not need oxygen at rest. Oxygen need evaluation with walking and sleep (nighttime oximetry)   - Uses Spiriva regularly    - Change albuterol to Xopenex   -He has not been using his Spiriva, he prefers nebulized medications I will send in Yupelri i also gave him some samples in today he can use this in his nebulizer once a day to replace the Spiriva  01/30/23 he is now using Stiolto with good success  07/31/23 he continuos on Stiolto. He has been more short of breath but also was just recently hospitalized for CHF. Patient also has a history of anemia. I would like a new PFT  08/29/23 he is using Stiolto and albuterol as needed. New FEV1 is 76%  12/5/23 He is using stiolto but he may get better coverage with Trelegy, he is willing to try it. He continues to use xopenex.   2/20/24 His PFT from August is 76%, he feels the Trelelgy helps more than the Stiolto. Uses his rescue inhaler as needed. He tells me he gets very sob with walking or shopping.  I think his SOB is multifactorial with his heart and anemia. He can ask Hospice if they can help get him 02 during the day     #Sleep-related hypoxia 2/2 congestive heart failure and COPD   -During his most recent hospitalization he was started on 2 L of oxygen with sleep   - He now uses 2 L of oxygen with sleep and notes that he feels improvement since starting oxygen  12/5/23 he continues to use 02 home health wants him checked for sleep apnea, I did put in an order given his heart history and his fatigue   02/20/24 no REGGIE but still needs 2L of  oxygen with sleep and PRN  during the day. Walk test showed he did not need oxygen during low level exertion    #Pulmonary emboli   -Patient had a acute right lower lobe pulmonary emboli   -He is on Eliquis   -Needs a repeat chest CT for PE - resolved     # HF   - EF 35%   - Having an ICD replaced on 12/15/23   - His RBC, H&H and PLTS were all low, he is sob and very fatigued. I wanted him to go the ED, he was agreeable to repeat a CBC but may still need to go the ED. He is also on 2 blood thinners. I am very concerned   02/20/24 He is now on hospice       # Smoking cessation:  -Counseled for 3 minutes.  -Patient is willing to cut back    - he is still smoking but only a pack a week   -No stop date schedule  -will readdress with each visit   12/5/23 still smoking about 4-5 cigarettes day   02/20/24 he is smoking 2 to 3 cigarettes a day     Mr. Damon it was a pleasure seeing you in the office today. We discussed the following:      -Congratulations on cutting back on your smoking   - Please continue the Trelegy   - You can continue your Xopenex as needed   - Please continue to see cardiology    Follow up in 6 months or sooner if needed

## 2024-02-20 NOTE — PATIENT INSTRUCTIONS
dilma Damon it was a pleasure seeing you in the office today. We discussed the following:      -Congratulations on cutting back on your smoking   - Please continue the Trelegy   - You can continue your Xopenex as needed   - Please continue to see cardiology    Follow up in 6 months or sooner if needed

## 2024-02-22 ENCOUNTER — INFUSION (OUTPATIENT)
Dept: HEMATOLOGY/ONCOLOGY | Facility: HOSPITAL | Age: 79
End: 2024-02-22
Payer: MEDICARE

## 2024-02-22 VITALS
OXYGEN SATURATION: 95 % | DIASTOLIC BLOOD PRESSURE: 58 MMHG | SYSTOLIC BLOOD PRESSURE: 118 MMHG | RESPIRATION RATE: 18 BRPM | HEART RATE: 68 BPM | TEMPERATURE: 98.8 F

## 2024-02-22 DIAGNOSIS — E53.8 B12 DEFICIENCY: ICD-10-CM

## 2024-02-22 DIAGNOSIS — E61.1 IRON DEFICIENCY: ICD-10-CM

## 2024-02-22 PROCEDURE — 96372 THER/PROPH/DIAG INJ SC/IM: CPT

## 2024-02-22 PROCEDURE — 2500000004 HC RX 250 GENERAL PHARMACY W/ HCPCS (ALT 636 FOR OP/ED)

## 2024-02-22 RX ORDER — ALBUTEROL SULFATE 0.83 MG/ML
3 SOLUTION RESPIRATORY (INHALATION) AS NEEDED
Status: CANCELLED | OUTPATIENT
Start: 2024-03-21

## 2024-02-22 RX ORDER — EPINEPHRINE 0.3 MG/.3ML
0.3 INJECTION SUBCUTANEOUS EVERY 5 MIN PRN
Status: CANCELLED | OUTPATIENT
Start: 2024-03-21

## 2024-02-22 RX ORDER — DIPHENHYDRAMINE HYDROCHLORIDE 50 MG/ML
50 INJECTION INTRAMUSCULAR; INTRAVENOUS AS NEEDED
Status: CANCELLED | OUTPATIENT
Start: 2024-03-21

## 2024-02-22 RX ORDER — FAMOTIDINE 10 MG/ML
20 INJECTION INTRAVENOUS ONCE AS NEEDED
Status: CANCELLED | OUTPATIENT
Start: 2024-03-21

## 2024-02-22 RX ORDER — CYANOCOBALAMIN 1000 UG/ML
1000 INJECTION, SOLUTION INTRAMUSCULAR; SUBCUTANEOUS ONCE
Status: CANCELLED | OUTPATIENT
Start: 2024-03-21

## 2024-02-22 RX ORDER — CYANOCOBALAMIN 1000 UG/ML
1000 INJECTION, SOLUTION INTRAMUSCULAR; SUBCUTANEOUS ONCE
Status: COMPLETED | OUTPATIENT
Start: 2024-02-22 | End: 2024-02-22

## 2024-02-22 RX ADMIN — CYANOCOBALAMIN 1000 MCG: 1000 INJECTION, SOLUTION INTRAMUSCULAR at 10:58

## 2024-02-22 SDOH — ECONOMIC STABILITY: FOOD INSECURITY: WITHIN THE PAST 12 MONTHS, YOU WORRIED THAT YOUR FOOD WOULD RUN OUT BEFORE YOU GOT MONEY TO BUY MORE.: NEVER TRUE

## 2024-02-22 SDOH — HEALTH STABILITY: PHYSICAL HEALTH: ON AVERAGE, HOW MANY DAYS PER WEEK DO YOU ENGAGE IN MODERATE TO STRENUOUS EXERCISE (LIKE A BRISK WALK)?: 0 DAYS

## 2024-02-22 SDOH — ECONOMIC STABILITY: GENERAL
WHICH OF THE FOLLOWING WOULD YOU LIKE TO GET CONNECTED TO IN ORDER TO RECEIVE A DISCOUNT OR FOR FREE? (CHOOSE ALL THAT APPLY): NONE OF THESE

## 2024-02-22 SDOH — ECONOMIC STABILITY: FOOD INSECURITY: WITHIN THE PAST 12 MONTHS, THE FOOD YOU BOUGHT JUST DIDN'T LAST AND YOU DIDN'T HAVE MONEY TO GET MORE.: NEVER TRUE

## 2024-02-22 SDOH — HEALTH STABILITY: PHYSICAL HEALTH: ON AVERAGE, HOW MANY MINUTES DO YOU ENGAGE IN EXERCISE AT THIS LEVEL?: 0 MIN

## 2024-02-22 SDOH — ECONOMIC STABILITY: GENERAL
WHICH OF THE FOLLOWING DO YOU KNOW HOW TO USE AND HAVE ACCESS TO EVERY DAY? (CHOOSE ALL THAT APPLY): SMARTPHONE WITH CELLULAR DATA PLAN

## 2024-02-22 ASSESSMENT — PAIN SCALES - GENERAL: PAINLEVEL: 5

## 2024-02-24 DIAGNOSIS — I50.23 ACUTE ON CHRONIC SYSTOLIC CONGESTIVE HEART FAILURE (MULTI): ICD-10-CM

## 2024-02-24 RX ORDER — BUMETANIDE 1 MG/1
2 TABLET ORAL DAILY
Qty: 60 TABLET | Refills: 1 | Status: SHIPPED | OUTPATIENT
Start: 2024-02-24 | End: 2024-04-19

## 2024-03-01 DIAGNOSIS — G25.81 RESTLESS LEGS SYNDROME: ICD-10-CM

## 2024-03-01 RX ORDER — ROPINIROLE 1 MG/1
1 TABLET, FILM COATED ORAL NIGHTLY
Qty: 90 TABLET | Refills: 0 | Status: SHIPPED | OUTPATIENT
Start: 2024-03-01

## 2024-03-05 ENCOUNTER — APPOINTMENT (OUTPATIENT)
Dept: PULMONOLOGY | Facility: CLINIC | Age: 79
End: 2024-03-05
Payer: MEDICARE

## 2024-03-06 DIAGNOSIS — E53.1 VITAMIN B6 DEFICIENCY: Primary | ICD-10-CM

## 2024-03-06 RX ORDER — PYRIDOXINE HCL (VITAMIN B6) 25 MG
25 TABLET ORAL DAILY
Qty: 30 TABLET | Refills: 11 | Status: SHIPPED | OUTPATIENT
Start: 2024-03-06 | End: 2024-05-10 | Stop reason: HOSPADM

## 2024-03-14 ENCOUNTER — APPOINTMENT (OUTPATIENT)
Dept: PRIMARY CARE | Facility: CLINIC | Age: 79
End: 2024-03-14
Payer: MEDICARE

## 2024-03-20 DIAGNOSIS — J44.1 COPD EXACERBATION (MULTI): ICD-10-CM

## 2024-03-20 RX ORDER — AZITHROMYCIN 250 MG/1
TABLET, FILM COATED ORAL
Qty: 12 TABLET | Refills: 2 | Status: SHIPPED | OUTPATIENT
Start: 2024-03-20 | End: 2024-05-10 | Stop reason: HOSPADM

## 2024-03-21 ENCOUNTER — INFUSION (OUTPATIENT)
Dept: HEMATOLOGY/ONCOLOGY | Facility: HOSPITAL | Age: 79
End: 2024-03-21
Payer: MEDICARE

## 2024-03-21 VITALS
TEMPERATURE: 97.3 F | SYSTOLIC BLOOD PRESSURE: 117 MMHG | OXYGEN SATURATION: 97 % | HEART RATE: 64 BPM | RESPIRATION RATE: 20 BRPM | DIASTOLIC BLOOD PRESSURE: 74 MMHG | BODY MASS INDEX: 19.09 KG/M2 | WEIGHT: 129.3 LBS

## 2024-03-21 DIAGNOSIS — E53.8 B12 DEFICIENCY: ICD-10-CM

## 2024-03-21 DIAGNOSIS — E61.1 IRON DEFICIENCY: ICD-10-CM

## 2024-03-21 PROCEDURE — 96372 THER/PROPH/DIAG INJ SC/IM: CPT

## 2024-03-21 PROCEDURE — 2500000004 HC RX 250 GENERAL PHARMACY W/ HCPCS (ALT 636 FOR OP/ED)

## 2024-03-21 RX ORDER — ALBUTEROL SULFATE 0.83 MG/ML
3 SOLUTION RESPIRATORY (INHALATION) AS NEEDED
Status: CANCELLED | OUTPATIENT
Start: 2024-04-18

## 2024-03-21 RX ORDER — EPINEPHRINE 0.3 MG/.3ML
0.3 INJECTION SUBCUTANEOUS EVERY 5 MIN PRN
Status: CANCELLED | OUTPATIENT
Start: 2024-04-18

## 2024-03-21 RX ORDER — CYANOCOBALAMIN 1000 UG/ML
1000 INJECTION, SOLUTION INTRAMUSCULAR; SUBCUTANEOUS ONCE
Status: CANCELLED | OUTPATIENT
Start: 2024-04-18

## 2024-03-21 RX ORDER — FAMOTIDINE 10 MG/ML
20 INJECTION INTRAVENOUS ONCE AS NEEDED
Status: CANCELLED | OUTPATIENT
Start: 2024-04-18

## 2024-03-21 RX ORDER — DIPHENHYDRAMINE HYDROCHLORIDE 50 MG/ML
50 INJECTION INTRAMUSCULAR; INTRAVENOUS AS NEEDED
Status: CANCELLED | OUTPATIENT
Start: 2024-04-18

## 2024-03-21 RX ORDER — CYANOCOBALAMIN 1000 UG/ML
1000 INJECTION, SOLUTION INTRAMUSCULAR; SUBCUTANEOUS ONCE
Status: COMPLETED | OUTPATIENT
Start: 2024-03-21 | End: 2024-03-21

## 2024-03-21 RX ADMIN — CYANOCOBALAMIN 1000 MCG: 1000 INJECTION, SOLUTION INTRAMUSCULAR at 11:10

## 2024-03-21 ASSESSMENT — ENCOUNTER SYMPTOMS
DEPRESSION: 1
OCCASIONAL FEELINGS OF UNSTEADINESS: 1
LOSS OF SENSATION IN FEET: 1

## 2024-03-21 ASSESSMENT — PAIN SCALES - GENERAL: PAINLEVEL: 8

## 2024-03-29 ENCOUNTER — PATIENT OUTREACH (OUTPATIENT)
Dept: PRIMARY CARE | Facility: CLINIC | Age: 79
End: 2024-03-29
Payer: MEDICARE

## 2024-03-29 NOTE — PROGRESS NOTES
CM called and spoke with pt to address any final questions or concerns regarding hospitalization.  Pt reports doing well and has no concerns at this time.  Pt given my contact info  in the event questions should arise.

## 2024-04-01 DIAGNOSIS — I48.0 AF (PAROXYSMAL ATRIAL FIBRILLATION) (MULTI): ICD-10-CM

## 2024-04-01 RX ORDER — METOPROLOL SUCCINATE 25 MG/1
TABLET, EXTENDED RELEASE ORAL
Qty: 45 TABLET | Refills: 0 | Status: SHIPPED | OUTPATIENT
Start: 2024-04-01 | End: 2024-05-10 | Stop reason: HOSPADM

## 2024-04-04 DIAGNOSIS — E55.9 VITAMIN D DEFICIENCY: ICD-10-CM

## 2024-04-04 RX ORDER — CHOLECALCIFEROL (VITAMIN D3) 125 MCG
125 CAPSULE ORAL DAILY
Qty: 90 CAPSULE | Refills: 0 | Status: SHIPPED | OUTPATIENT
Start: 2024-04-04

## 2024-04-11 ENCOUNTER — PATIENT OUTREACH (OUTPATIENT)
Dept: CARE COORDINATION | Facility: CLINIC | Age: 79
End: 2024-04-11
Payer: MEDICARE

## 2024-04-15 ENCOUNTER — LAB (OUTPATIENT)
Dept: LAB | Facility: LAB | Age: 79
End: 2024-04-15
Payer: MEDICARE

## 2024-04-15 ENCOUNTER — OFFICE VISIT (OUTPATIENT)
Dept: CARDIOLOGY | Facility: CLINIC | Age: 79
End: 2024-04-15
Payer: MEDICARE

## 2024-04-15 VITALS
HEIGHT: 70 IN | BODY MASS INDEX: 18.15 KG/M2 | OXYGEN SATURATION: 97 % | SYSTOLIC BLOOD PRESSURE: 100 MMHG | DIASTOLIC BLOOD PRESSURE: 66 MMHG | WEIGHT: 126.76 LBS | HEART RATE: 75 BPM

## 2024-04-15 DIAGNOSIS — E53.8 B12 DEFICIENCY: ICD-10-CM

## 2024-04-15 DIAGNOSIS — Z98.61 S/P PTCA (PERCUTANEOUS TRANSLUMINAL CORONARY ANGIOPLASTY): ICD-10-CM

## 2024-04-15 DIAGNOSIS — E78.2 MIXED HYPERLIPIDEMIA: ICD-10-CM

## 2024-04-15 DIAGNOSIS — D63.8 ANEMIA OF CHRONIC DISEASE: ICD-10-CM

## 2024-04-15 DIAGNOSIS — F17.210 CIGARETTE NICOTINE DEPENDENCE WITHOUT COMPLICATION: ICD-10-CM

## 2024-04-15 DIAGNOSIS — I49.5 SICK SINUS SYNDROME (MULTI): Primary | ICD-10-CM

## 2024-04-15 DIAGNOSIS — I10 PRIMARY HYPERTENSION: ICD-10-CM

## 2024-04-15 DIAGNOSIS — Z45.02 IMPLANTABLE CARDIOVERTER-DEFIBRILLATOR (ICD) GENERATOR END OF LIFE: ICD-10-CM

## 2024-04-15 DIAGNOSIS — E61.1 IRON DEFICIENCY: ICD-10-CM

## 2024-04-15 LAB
ALBUMIN SERPL BCP-MCNC: 4.2 G/DL (ref 3.4–5)
ALP SERPL-CCNC: 61 U/L (ref 33–136)
ALT SERPL W P-5'-P-CCNC: 8 U/L (ref 10–52)
ANION GAP SERPL CALC-SCNC: 15 MMOL/L (ref 10–20)
AST SERPL W P-5'-P-CCNC: 13 U/L (ref 9–39)
BASOPHILS # BLD AUTO: 0.05 X10*3/UL (ref 0–0.1)
BASOPHILS NFR BLD AUTO: 0.8 %
BILIRUB SERPL-MCNC: 0.7 MG/DL (ref 0–1.2)
BUN SERPL-MCNC: 37 MG/DL (ref 6–23)
CALCIUM SERPL-MCNC: 9.8 MG/DL (ref 8.6–10.3)
CHLORIDE SERPL-SCNC: 98 MMOL/L (ref 98–107)
CO2 SERPL-SCNC: 32 MMOL/L (ref 21–32)
CREAT SERPL-MCNC: 2.4 MG/DL (ref 0.5–1.3)
EGFRCR SERPLBLD CKD-EPI 2021: 27 ML/MIN/1.73M*2
EOSINOPHIL # BLD AUTO: 0.1 X10*3/UL (ref 0–0.4)
EOSINOPHIL NFR BLD AUTO: 1.7 %
ERYTHROCYTE [DISTWIDTH] IN BLOOD BY AUTOMATED COUNT: 14.9 % (ref 11.5–14.5)
FERRITIN SERPL-MCNC: 214 NG/ML (ref 20–300)
GLUCOSE SERPL-MCNC: 146 MG/DL (ref 74–99)
HCT VFR BLD AUTO: 43.8 % (ref 41–52)
HGB BLD-MCNC: 13.9 G/DL (ref 13.5–17.5)
HGB RETIC QN: 34 PG (ref 28–38)
IMM GRANULOCYTES # BLD AUTO: 0.03 X10*3/UL (ref 0–0.5)
IMM GRANULOCYTES NFR BLD AUTO: 0.5 % (ref 0–0.9)
IMMATURE RETIC FRACTION: 9 %
IRON SATN MFR SERPL: 22 % (ref 25–45)
IRON SERPL-MCNC: 76 UG/DL (ref 35–150)
LYMPHOCYTES # BLD AUTO: 0.9 X10*3/UL (ref 0.8–3)
LYMPHOCYTES NFR BLD AUTO: 14.9 %
MCH RBC QN AUTO: 29.6 PG (ref 26–34)
MCHC RBC AUTO-ENTMCNC: 31.7 G/DL (ref 32–36)
MCV RBC AUTO: 93 FL (ref 80–100)
MONOCYTES # BLD AUTO: 0.47 X10*3/UL (ref 0.05–0.8)
MONOCYTES NFR BLD AUTO: 7.8 %
NEUTROPHILS # BLD AUTO: 4.48 X10*3/UL (ref 1.6–5.5)
NEUTROPHILS NFR BLD AUTO: 74.3 %
NRBC BLD-RTO: 0 /100 WBCS (ref 0–0)
PLATELET # BLD AUTO: 173 X10*3/UL (ref 150–450)
POTASSIUM SERPL-SCNC: 3.4 MMOL/L (ref 3.5–5.3)
PROT SERPL-MCNC: 6.1 G/DL (ref 6.4–8.2)
RBC # BLD AUTO: 4.69 X10*6/UL (ref 4.5–5.9)
RETICS #: 0.04 X10*6/UL (ref 0.02–0.11)
RETICS/RBC NFR AUTO: 0.8 % (ref 0.5–2)
SODIUM SERPL-SCNC: 142 MMOL/L (ref 136–145)
TIBC SERPL-MCNC: 339 UG/DL (ref 240–445)
UIBC SERPL-MCNC: 263 UG/DL (ref 110–370)
WBC # BLD AUTO: 6 X10*3/UL (ref 4.4–11.3)

## 2024-04-15 PROCEDURE — 83540 ASSAY OF IRON: CPT

## 2024-04-15 PROCEDURE — 84155 ASSAY OF PROTEIN SERUM: CPT

## 2024-04-15 PROCEDURE — 3078F DIAST BP <80 MM HG: CPT | Performed by: NURSE PRACTITIONER

## 2024-04-15 PROCEDURE — 82607 VITAMIN B-12: CPT

## 2024-04-15 PROCEDURE — 86334 IMMUNOFIX E-PHORESIS SERUM: CPT

## 2024-04-15 PROCEDURE — 1157F ADVNC CARE PLAN IN RCRD: CPT | Performed by: NURSE PRACTITIONER

## 2024-04-15 PROCEDURE — 84207 ASSAY OF VITAMIN B-6: CPT

## 2024-04-15 PROCEDURE — 3074F SYST BP LT 130 MM HG: CPT | Performed by: NURSE PRACTITIONER

## 2024-04-15 PROCEDURE — 85025 COMPLETE CBC W/AUTO DIFF WBC: CPT

## 2024-04-15 PROCEDURE — 80053 COMPREHEN METABOLIC PANEL: CPT

## 2024-04-15 PROCEDURE — 86320 SERUM IMMUNOELECTROPHORESIS: CPT

## 2024-04-15 PROCEDURE — 99213 OFFICE O/P EST LOW 20 MIN: CPT | Performed by: NURSE PRACTITIONER

## 2024-04-15 PROCEDURE — 1160F RVW MEDS BY RX/DR IN RCRD: CPT | Performed by: NURSE PRACTITIONER

## 2024-04-15 PROCEDURE — 1159F MED LIST DOCD IN RCRD: CPT | Performed by: NURSE PRACTITIONER

## 2024-04-15 PROCEDURE — 82728 ASSAY OF FERRITIN: CPT

## 2024-04-15 PROCEDURE — 85045 AUTOMATED RETICULOCYTE COUNT: CPT

## 2024-04-15 PROCEDURE — 84165 PROTEIN E-PHORESIS SERUM: CPT

## 2024-04-15 PROCEDURE — 82746 ASSAY OF FOLIC ACID SERUM: CPT

## 2024-04-15 PROCEDURE — 83550 IRON BINDING TEST: CPT

## 2024-04-15 PROCEDURE — 36415 COLL VENOUS BLD VENIPUNCTURE: CPT

## 2024-04-15 RX ORDER — HALOPERIDOL 0.5 MG/1
TABLET ORAL
COMMUNITY
Start: 2024-03-20 | End: 2024-05-10 | Stop reason: HOSPADM

## 2024-04-15 RX ORDER — MIDODRINE HYDROCHLORIDE 2.5 MG/1
TABLET ORAL
COMMUNITY
Start: 2024-04-03

## 2024-04-15 RX ORDER — ONDANSETRON HYDROCHLORIDE 8 MG/1
TABLET, FILM COATED ORAL
COMMUNITY
Start: 2024-04-10

## 2024-04-15 NOTE — PATIENT INSTRUCTIONS
Follow up cardiology in 6 months;  call sooner if problems arise   Continue routine follow up with your primary care provider

## 2024-04-15 NOTE — PROGRESS NOTES
Primary Care Physician: Karley Sullivan, APRN-CNP, DNP  Primary Cardiologist:       Date of Visit: 04/15/2024 10:20 AM EDT  Location of visit:  [facility]   Type of Visit: Follow up             Chief Complaint   Patient presents with    Follow-up     Here for 6 month follow up, C/O of heart rate has been dropping into the 30's        HPI / Summary:   Andrez Damon is a 79 y.o. male  with   CAD. AWMI due to delayed stent thrombosis s/p redo PCI with GEORGIE (11/29/2014) S/P PCI to LAD with BMS 10/2010 (after delayed stent thrombosis from remote PCI) Moderate LV systolic dysfunction. S/p ICD implantation ([facility] in [place] in 2012) Generator replacement for HAILEY 12/15/2023 ([facility]) Active Smoking. COPD. HTN. DLP. CKD. DJD. Latest EF 35-40%. AF with inappropriate ICD chock, reverted to NSR on amiodarone, pulmonary embolism on long term OAC with apixaban,    who returns for routine follow up     Weight is stable. Occasional dizziness   [facility] with [facility], who takes care of filling his Rx aside from Brilinta and Eliquis   He has concerns about some discomfort around  his device and the bedside monitoring technology.  His pulse oximeter reports bradycardia at times without any unusual symptoms       12 system review is negative except as noted above         Medical History:   Past Medical History:   Diagnosis Date    Arthritis     Atrial fibrillation with RVR (Multi) 09/22/2023    Body mass index (BMI) 20.0-20.9, adult 09/21/2021    Body mass index (BMI) of 20.0 to 20.9 in adult    Body mass index (BMI) 21.0-21.9, adult 04/14/2021    Body mass index (BMI) of 21.0 to 21.9 in adult    CHF (congestive heart failure) (Multi)     COPD (chronic obstructive pulmonary disease) (Multi)     Coronary artery disease     Diabetes mellitus (Multi)     Hypertension     Major depressive disorder, recurrent, mild (CMS-[facility]) 11/11/2020    Mild episode of recurrent major depressive disorder    Major  depressive disorder, recurrent, unspecified (CMS-HCC) 01/13/2021    Recurrent major depressive disorder, remission status unspecified    Other conditions influencing health status     Swelling Of Hands    Other specified anxiety disorders 05/19/2020    Depression with anxiety    Oxygen desaturation during sleep     wears 2L at HS    Personal history of nicotine dependence 07/12/2021    History of nicotine dependence    Personal history of other diseases of the circulatory system     History of cardiac disorder    Personal history of other diseases of the musculoskeletal system and connective tissue     History of arthritis    Personal history of other diseases of urinary system 09/21/2021    History of chronic kidney disease    Personal history of other endocrine, nutritional and metabolic disease 08/29/2019    History of hyperglycemia    Pneumonia, unspecified organism 12/09/2019    Atypical pneumonia    Thoracic aortic aneurysm, without rupture, unspecified (CMS-HCC) 05/29/2019    Thoracic aortic aneurysm without rupture       Social History:   Tobacco Use: High Risk (4/15/2024)    Patient History     Smoking Tobacco Use: Every Day     Smokeless Tobacco Use: Never     Passive Exposure: Not on file         MEDICATIONS:   Current Outpatient Medications   Medication Instructions    amiodarone (PACERONE) 200 mg, oral, Daily    apixaban (Eliquis) 2.5 mg tablet 1 tablet, oral, 2 times daily    azithromycin (Zithromax) 250 mg tablet TAKE 1 TABLET BY MOUTH 3 TIMES PER WEEK    Brilinta 90 mg, oral, 2 times daily, AS DIRECTED    bumetanide (BUMEX) 2 mg, oral, Daily    cholecalciferol (VITAMIN D-3) 125 mcg, oral, Daily    escitalopram (LEXAPRO) 10 mg, oral, Daily    finasteride (PROSCAR) 5 mg, oral, Daily    fluticasone-umeclidin-vilanter (Trelegy Ellipta) 200-62.5-25 mcg blister with device 1 puff, inhalation, Daily with breakfast, Inhale 1 puff daily, rinse mouth after use    haloperidol (Haldol) 0.5 mg tablet      HYDROmorphone (DILAUDID) 1 mg, oral, Every 2 hour PRN    levalbuterol (Xopenex) 45 mcg/actuation inhaler 1 puff, inhalation, Every 4 hours PRN, INHALE 1 TO 2 PUFFS EVERY 4 TO 6 HOURS AS NEEDED AND AS DIRECTED.    methadone (DOLOPHINE) 2.5 mg, oral, Nightly    metoprolol succinate XL (Toprol-XL) 25 mg 24 hr tablet TAKE 1/2 TABLET (12.5MG) BY MOUTH ONCE DAILY. DO NOT CRUSH OR CHEW    midodrine (Proamatine) 2.5 mg tablet     ondansetron (Zofran) 8 mg tablet     oxygen (O2) 2 L/min, inhalation, Continuous    potassium chloride CR (K-Tab) 20 mEq ER tablet 1 tablet, oral, Daily    pyridoxine (VITAMIN B-6) 25 mg, oral, Daily    rOPINIRole (REQUIP) 1 mg, oral, Nightly    tamsulosin (FLOMAX) 0.8 mg, oral, Nightly         IMAGING REVIEWED:     Echocardiogram 9/18/2023  CONCLUSIONS:  1. Left ventricular systolic function is moderately decreased with a 35-40% estimated ejection fraction.  2. Left ventricular cavity size is moderately dilated.  3. There is global hypokinesis of the left ventricle with minor regional variations.  4. Spectral Doppler shows an impaired relaxation pattern of left ventricular diastolic filling.  5. There is moderate to severe left ventricular hypertrophy.  6. The left atrium is moderately dilated.  7. Moderate mitral valve regurgitation.  8. Mild aortic valve regurgitation.              NM CARDIAC STRESS REST (MYOCARDIAL PERFUSION MIBI) 12/02/2019  Narrative   Impression  1. Large territory of completed transmural myocardial infarction  involving the distal anterior wall and apex, extending to the  inferior apex and adjoining septum, without evidence of associated  ischemia. This finding is new when compared to the prior 2010 study.  2. Otherwise grossly normal perfusion throughout the remainder of the  left ventricle without evidence of additional territory of ischemia  or infarction.  3. Severe LV dilation which is new when compared to the prior 2010  study.  4. Distal anterior dyskinesis  "superimposed upon severe global LV  hypokinesis with an LV EF estimated at 24%. This is compared to a  mildly dilated left ventricle with an LVEF of 45% on the 2011 MUGA  and a normal left ventricle with an LVEF of 63% on the 2010 study.    LABS:  CBC with Differential:    Lab Results   Component Value Date    WBC 9.4 01/19/2024    RBC 3.66 (L) 01/19/2024    HGB 10.9 (L) 01/19/2024    HCT 36.2 (L) 01/19/2024     01/19/2024    MCV 99 01/19/2024    MCH 29.8 01/19/2024    MCHC 30.1 (L) 01/19/2024    RDW 16.8 (H) 01/19/2024    NRBC 0.0 01/19/2024    LYMPHOPCT 5.5 01/19/2024    MONOPCT 5.7 01/19/2024    EOSPCT 0.6 01/19/2024    BASOPCT 0.3 01/19/2024    MONOSABS 0.53 01/19/2024    LYMPHSABS 0.52 (L) 01/19/2024    EOSABS 0.06 01/19/2024    BASOSABS 0.03 01/19/2024     CMP:    Lab Results   Component Value Date     01/19/2024    K 3.6 01/19/2024     01/19/2024    CO2 31 01/19/2024    BUN 39 (H) 01/19/2024    CREATININE 1.70 (H) 01/19/2024    GLUCOSE 162 (H) 01/19/2024    PROT 5.7 (L) 01/19/2024    CALCIUM 9.0 01/19/2024    BILITOT 0.4 01/19/2024    ALKPHOS 76 01/19/2024    AST 23 01/19/2024    ALT 32 01/19/2024     BMP:    Lab Results   Component Value Date     01/19/2024    K 3.6 01/19/2024     01/19/2024    CO2 31 01/19/2024    BUN 39 (H) 01/19/2024    CREATININE 1.70 (H) 01/19/2024    CALCIUM 9.0 01/19/2024    GLUCOSE 162 (H) 01/19/2024     Magnesium:  Lab Results   Component Value Date    MG 2.11 01/19/2024     Troponin:    Lab Results   Component Value Date    TROPHS 63 (HH) 12/31/2023    TROPHS 60 (HH) 12/31/2023    TROPHS 40 (H) 12/12/2023     BNP:   Lab Results   Component Value Date    BNP 1,524 (H) 01/01/2024         Lipid Panel:  Lab Results   Component Value Date    HDL 79.1 07/13/2023    CHHDL 2.3 07/13/2023    VLDL 8 07/13/2023    TRIG 38 07/13/2023        Lab work and imaging results independently reviewed by me     Visit Vitals  /66   Pulse 75   Ht 1.778 m (5' 10\")   Wt " 57.5 kg (126 lb 12.2 oz)   SpO2 97%   BMI 18.19 kg/m²   Smoking Status Every Day   BSA 1.69 m²         ECG dated 11/25/2023 independently reviewed   NSR. LBBB       Constitutional:       Appearance: Cachectic and frail.   Eyes:      Conjunctiva/sclera: Conjunctivae normal.   Neck:      Vascular: JVD normal.   Pulmonary:      Effort: Pulmonary effort is normal.      Breath sounds: Normal breath sounds.   Cardiovascular:      PMI at left midclavicular line. Normal rate. Regular rhythm. Normal S1. Normal S2.       Murmurs: There is no murmur.      No rub.   Pulses:     Intact distal pulses.   Edema:     Peripheral edema absent.   Abdominal:      General: Bowel sounds are normal.   Musculoskeletal:      Cervical back: Neck supple. Skin:     General: Skin is warm and dry.   Neurological:      Mental Status: Alert and oriented to person, place and time.           Problem List Items Addressed This Visit             ICD-10-CM    Cigarette nicotine dependence F17.210    Hyperlipemia E78.5    Hypertension I10    S/P PTCA (percutaneous transluminal coronary angioplasty) Z98.61    Sick sinus syndrome (Multi) - Primary I49.5    Relevant Medications    midodrine (Proamatine) 2.5 mg tablet    Implantable cardioverter-defibrillator (ICD) generator end of life Z45.02      Mr. Damon appears comfortable and is now following with [facility]   Continue current Rx   F/U in the device clinic.  He was instructed to bring the bedside device to the appointment   We discussed deactivation of the ICD part of his device, which he will consider after speaking to his family        04/15/24 at 11:47 AM - KENDRICK Chinchilla      Orders:  No orders of the defined types were placed in this encounter.        Followup Appts:  Future Appointments   Date Time Provider Department Center   4/19/2024 10:30 AM KENDRICK Montague [id number] [facility]   4/19/2024 11:00 AM INF 01 LUCIE CONSCCINF [facility]   4/24/2024  1:00 PM TIM  [facility] GENNIC1 [facility] [place]    4/26/2024 11:20 AM Stone Piedra MD [id number] [facility]   5/16/2024 11:00 AM INF 01 CONNEAUT CON[facility]   6/4/2024 10:40 AM DEANNE Chinchilla-CNP [id number] [place]   8/27/2024  2:00 PM KENDRICK Ashton [id number] [facility]   1/20/2025 10:40 AM DEANNE Chinchilla-CNP [id number] [facility]

## 2024-04-16 LAB
FOLATE SERPL-MCNC: 7.9 NG/ML
PROT SERPL-MCNC: 6.5 G/DL (ref 6.4–8.2)
VIT B12 SERPL-MCNC: 592 PG/ML (ref 211–911)

## 2024-04-19 ENCOUNTER — INFUSION (OUTPATIENT)
Dept: HEMATOLOGY/ONCOLOGY | Facility: HOSPITAL | Age: 79
End: 2024-04-19
Payer: MEDICARE

## 2024-04-19 ENCOUNTER — OFFICE VISIT (OUTPATIENT)
Dept: HEMATOLOGY/ONCOLOGY | Facility: HOSPITAL | Age: 79
End: 2024-04-19
Payer: MEDICARE

## 2024-04-19 VITALS
TEMPERATURE: 97.2 F | SYSTOLIC BLOOD PRESSURE: 109 MMHG | DIASTOLIC BLOOD PRESSURE: 67 MMHG | HEART RATE: 85 BPM | OXYGEN SATURATION: 95 % | RESPIRATION RATE: 18 BRPM

## 2024-04-19 VITALS
TEMPERATURE: 97.2 F | DIASTOLIC BLOOD PRESSURE: 67 MMHG | RESPIRATION RATE: 18 BRPM | SYSTOLIC BLOOD PRESSURE: 109 MMHG | HEART RATE: 85 BPM | OXYGEN SATURATION: 97 %

## 2024-04-19 DIAGNOSIS — E61.1 IRON DEFICIENCY: ICD-10-CM

## 2024-04-19 DIAGNOSIS — E53.8 B12 DEFICIENCY: ICD-10-CM

## 2024-04-19 DIAGNOSIS — E53.1 VITAMIN B6 DEFICIENCY: Primary | ICD-10-CM

## 2024-04-19 DIAGNOSIS — D63.8 ANEMIA OF CHRONIC DISEASE: ICD-10-CM

## 2024-04-19 DIAGNOSIS — E53.8 FOLIC ACID DEFICIENCY: ICD-10-CM

## 2024-04-19 DIAGNOSIS — I50.23 ACUTE ON CHRONIC SYSTOLIC CONGESTIVE HEART FAILURE (MULTI): ICD-10-CM

## 2024-04-19 DIAGNOSIS — Z95.810 CARDIAC DEFIBRILLATOR IN PLACE: Primary | ICD-10-CM

## 2024-04-19 DIAGNOSIS — E53.8 B12 DEFICIENCY: Primary | ICD-10-CM

## 2024-04-19 DIAGNOSIS — D50.8 IRON DEFICIENCY ANEMIA SECONDARY TO INADEQUATE DIETARY IRON INTAKE: ICD-10-CM

## 2024-04-19 DIAGNOSIS — I42.9 CARDIOMYOPATHY, UNSPECIFIED TYPE (MULTI): ICD-10-CM

## 2024-04-19 LAB
ALBUMIN: 4.1 G/DL (ref 3.4–5)
ALPHA 1 GLOBULIN: 0.3 G/DL (ref 0.2–0.6)
ALPHA 2 GLOBULIN: 0.8 G/DL (ref 0.4–1.1)
BETA GLOBULIN: 0.7 G/DL (ref 0.5–1.2)
GAMMA GLOBULIN: 0.6 G/DL (ref 0.5–1.4)
IMMUNOFIXATION COMMENT: NORMAL
PATH REVIEW - SERUM IMMUNOFIXATION: NORMAL
PATH REVIEW-SERUM PROTEIN ELECTROPHORESIS: NORMAL
PROTEIN ELECTROPHORESIS COMMENT: NORMAL
PYRIDOXAL PHOS SERPL-SCNC: 8.6 NMOL/L (ref 20–125)

## 2024-04-19 PROCEDURE — 2500000004 HC RX 250 GENERAL PHARMACY W/ HCPCS (ALT 636 FOR OP/ED)

## 2024-04-19 PROCEDURE — 3078F DIAST BP <80 MM HG: CPT

## 2024-04-19 PROCEDURE — 1160F RVW MEDS BY RX/DR IN RCRD: CPT

## 2024-04-19 PROCEDURE — 1159F MED LIST DOCD IN RCRD: CPT

## 2024-04-19 PROCEDURE — 99214 OFFICE O/P EST MOD 30 MIN: CPT

## 2024-04-19 PROCEDURE — 1125F AMNT PAIN NOTED PAIN PRSNT: CPT

## 2024-04-19 PROCEDURE — 3074F SYST BP LT 130 MM HG: CPT

## 2024-04-19 PROCEDURE — 96372 THER/PROPH/DIAG INJ SC/IM: CPT

## 2024-04-19 PROCEDURE — 1157F ADVNC CARE PLAN IN RCRD: CPT

## 2024-04-19 RX ORDER — ALBUTEROL SULFATE 0.83 MG/ML
3 SOLUTION RESPIRATORY (INHALATION) AS NEEDED
OUTPATIENT
Start: 2024-05-16

## 2024-04-19 RX ORDER — FAMOTIDINE 10 MG/ML
20 INJECTION INTRAVENOUS ONCE AS NEEDED
OUTPATIENT
Start: 2024-05-16

## 2024-04-19 RX ORDER — BUMETANIDE 1 MG/1
2 TABLET ORAL DAILY
Qty: 60 TABLET | Refills: 1 | Status: SHIPPED | OUTPATIENT
Start: 2024-04-19

## 2024-04-19 RX ORDER — DIPHENHYDRAMINE HYDROCHLORIDE 50 MG/ML
50 INJECTION INTRAMUSCULAR; INTRAVENOUS AS NEEDED
OUTPATIENT
Start: 2024-05-16

## 2024-04-19 RX ORDER — CYANOCOBALAMIN 1000 UG/ML
1000 INJECTION, SOLUTION INTRAMUSCULAR; SUBCUTANEOUS ONCE
OUTPATIENT
Start: 2024-05-16

## 2024-04-19 RX ORDER — CYANOCOBALAMIN 1000 UG/ML
1000 INJECTION, SOLUTION INTRAMUSCULAR; SUBCUTANEOUS ONCE
Status: COMPLETED | OUTPATIENT
Start: 2024-04-19 | End: 2024-04-19

## 2024-04-19 RX ORDER — EPINEPHRINE 0.3 MG/.3ML
0.3 INJECTION SUBCUTANEOUS EVERY 5 MIN PRN
OUTPATIENT
Start: 2024-05-16

## 2024-04-19 RX ADMIN — CYANOCOBALAMIN 1000 MCG: 1000 INJECTION, SOLUTION INTRAMUSCULAR at 11:04

## 2024-04-19 ASSESSMENT — ENCOUNTER SYMPTOMS
LOSS OF SENSATION IN FEET: 0
OCCASIONAL FEELINGS OF UNSTEADINESS: 1
DEPRESSION: 0

## 2024-04-19 ASSESSMENT — LIFESTYLE VARIABLES
HOW OFTEN DO YOU HAVE A DRINK CONTAINING ALCOHOL: NEVER
HOW MANY STANDARD DRINKS CONTAINING ALCOHOL DO YOU HAVE ON A TYPICAL DAY: PATIENT DOES NOT DRINK
HOW OFTEN DO YOU HAVE SIX OR MORE DRINKS ON ONE OCCASION: NEVER
SKIP TO QUESTIONS 9-10: 1
AUDIT-C TOTAL SCORE: 0

## 2024-04-19 ASSESSMENT — PAIN SCALES - GENERAL
PAINLEVEL: 5
PAINLEVEL: 5

## 2024-04-19 NOTE — PROGRESS NOTES
[facility]    PATIENT VISIT INFORMATION    Visit Type: Follow up visit    Referring Provider: unknown  Reason for referral: B12 and LIGIA    CANCER/HEMATOLGOY HISTORY    History of anemia of chronic disease, LIGIA, and B12 deficiency. Renal dysfunction, also possible malabsorption given gastric resection for bleeding ulcers in 1965 , treated with IV iron (Venofer 300 mg x 3 doses 8/2017-9/2017, Feraheme 510 mg x 2 doses 5/2019, Venofer x5 doses at 200 mg each in December 2021 due to insurance preference, Venofer again 3/2022 &9/2022 & 11/2022, Venofer x3 doses June 2023, August 2023) as well as B12 injections subcutaneous monthly starting 8/2017. B12 injections monthly most recently 8/7/23. B6 deficiency treated with oral B6, previously seen by Dr. Mcelroy and Dr. Cabral, Dr. Meza.     4/19/2024-patient is on hospice currently.  He presents for his B12 injection, notably B6 is low at 8.6 and folic low supplement sent to pharmacy.  Anemia is improved significantly.    HISTORY OF PRESENT ILLNESS     ID Statement: Andrez Damon is a 79 year old male  Chief Complaint: LIGIA and B12 deficiency   Interval History:   Patient presents for follow up.  He presents in the infusion center receiving B12 injection and states he is on Hospice for end-stage congestive heart failure. Edema much improved.   He is winded as he exerts himself, which is his baseline.  Patient has notably poor dentition.  He does note that he has had a history of iron deficiency, anemia and B12 deficiency.  He has been receiving infusions for iron as needed and B12 injections monthly.     He is on O2 as needed and at HS.  He has chronic back pain that is not changed.  Frequent bruising due to being on Eliquis. Appetite is stable. Weight is stable.    He denies fevers, weight loss, no unusual headaches, sore throat, acute vision changes, chest pain, nausea or vomiting, abnormal bowel movements,  blood in  stool, hematuria, pain or neurologic symptoms except as above, rashes or lumps, no bleeding (prior mild self-limited epistaxis resolved) , thyroid disorder.   PAST/CURRENT HISTORY     MEDICAL/SURGICAL HISTORY  -Anemia and B12 def.  -UTIs  -CHF  -A-fib on Eliquis   -pneumonia  -RA  -CAD  -COPD on 02  -DMT2  -HTN  -CKD  -Dyslipidemia  -Depression  -Colitis   -BPH  -RLS  -Hyponatremia  -Hypomagnesemia  -Hypokalemia  -Musculoskeletal issues  -Pacemaker 2012  -TIA  -PVD  -Osteopetrosis  -Neuropathy  -Vitamin D def  -Aortic aneurysm  -Anal fissure   -sinus surgery  -hemorrhoidectomy  -gastric resection for bleeding  -Vasectomy  -Cataracts   -Tonsillectomy   -Hemant hip replacement   -Cardiac Stents   -Back surgeries   -PE 8/2021    SOCIAL HISTORY  -Lives with wife and pets  -Work place: retired   -Tobacco/smokeless use: current smoker 2-3 cigarettes per day  -Alcohol: Denies   -Illicit drug or marijuana use: Denies    -Jehovah's witness or Spiritual beliefs: None reported   -Social Determinates of Health Concerns: none reported  -He confirmed  that he is DNR [employer] arrest 8/25/22.     FAMILY HISTORY  -Mom and 2 sisters breast cancer   -Sister with breast and bone cancer   -Sister thyroid cancer   -Brother prostate cancer   -No other known history of hematologic, bleeding, clotting, autoimmune, genetic, or malignant disorders in the family.     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    Active Problems, Allergy List, Medication List, and PMH/PSH/FH/Social Hx have been reviewed and reconciled in chart. Updates made when necessary.     REVIEW OF SYSTEMS   A review of systems has been completed and are negative for complaints except what is stated in the assessment, HPI, IH, ROS, and/or past medical history.    ALLERGIES AND MEDICATIONS     Allergies and Intolerances:   Allergies   Allergen Reactions    Meloxicam  Shortness of breath    Celecoxib Unknown    Keflex [Cephalexin] Hives      Medication Profile:   Current Outpatient Medications   Medication Instructions    amiodarone (PACERONE) 200 mg, oral, Daily    apixaban (Eliquis) 2.5 mg tablet 1 tablet, oral, 2 times daily    azithromycin (Zithromax) 250 mg tablet TAKE 1 TABLET BY MOUTH 3 TIMES PER WEEK    Brilinta 90 mg, oral, 2 times daily, AS DIRECTED    bumetanide (BUMEX) 2 mg, oral, Daily    cholecalciferol (VITAMIN D-3) 125 mcg, oral, Daily    escitalopram (LEXAPRO) 10 mg, oral, Daily    finasteride (PROSCAR) 5 mg, oral, Daily    fluticasone-umeclidin-vilanter (Trelegy Ellipta) 200-62.5-25 mcg blister with device 1 puff, inhalation, Daily with breakfast, Inhale 1 puff daily, rinse mouth after use    haloperidol (Haldol) 0.5 mg tablet     HYDROmorphone (DILAUDID) 1 mg, oral, Every 2 hour PRN    levalbuterol (Xopenex) 45 mcg/actuation inhaler 1 puff, inhalation, Every 4 hours PRN, INHALE 1 TO 2 PUFFS EVERY 4 TO 6 HOURS AS NEEDED AND AS DIRECTED.    methadone (DOLOPHINE) 2.5 mg, oral, Nightly    metoprolol succinate XL (Toprol-XL) 25 mg 24 hr tablet TAKE 1/2 TABLET (12.5MG) BY MOUTH ONCE DAILY. DO NOT CRUSH OR CHEW    midodrine (Proamatine) 2.5 mg tablet     ondansetron (Zofran) 8 mg tablet     oxygen (O2) 2 L/min, inhalation, Continuous    potassium chloride CR (K-Tab) 20 mEq ER tablet 1 tablet, oral, Daily    pyridoxine (VITAMIN B-6) 25 mg, oral, Daily    rOPINIRole (REQUIP) 1 mg, oral, Nightly    tamsulosin (FLOMAX) 0.8 mg, oral, Nightly      Available Vaccination Record:   Immunization History   Administered Date(s) Administered    Flu vaccine (IIV4), preservative free *Check age/dose* 10/20/2016    Flu vaccine, quadrivalent, high-dose, preservative free, age 65y+ (FLUZONE) 10/18/2023    Influenza, High Dose Seasonal, Preservative Free 08/21/2018, 11/01/2022    Influenza, Unspecified 11/01/2022    Influenza, seasonal, injectable 10/19/2015    Influenza, trivalent,  "adjuvanted 09/01/2019    Pneumococcal conjugate vaccine, 13-valent (PREVNAR 13) 08/21/2018, 09/03/2018    Pneumococcal polysaccharide vaccine, 23-valent, age 2 years and older (PNEUMOVAX 23) 10/23/2013      PHYSICAL EXAM     Vital Signs/Measurements:       2/8/2024     1:23 PM 2/20/2024     2:35 PM 2/22/2024    10:55 AM 3/21/2024    11:00 AM 4/15/2024    10:32 AM 4/19/2024    10:59 AM 4/19/2024    11:00 AM   Vitals   Systolic 114 101 118 117 100 109 109   Diastolic 58 60 58 74 66 67 67   Heart Rate 69 71 68 64 75 85 85   Temp 36.2 °C (97.2 °F)  37.1 °C (98.8 °F) 36.3 °C (97.3 °F)  36.2 °C (97.2 °F) 36.2 °C (97.2 °F)   Resp 20  18 20  18 18   Height (in)     1.778 m (5' 10\")     Weight (lb)  141  129.3 126.76     BMI  20.82 kg/m2  19.09 kg/m2 18.19 kg/m2     BSA (m2)  1.77 m2  1.69 m2 1.69 m2     Visit Report  Report   Report Report Report        Performance:   ECOG Performance Status: 2     Grade ECOG performance status   0 Fully active, able to carry on all pre-disease performance without restriction   1 Restricted in physically strenuous activity but ambulatory and able to carry out work of a light or sedentary nature, e.g., light housework, office work   2 Ambulatory and capable of all selfcare but unable to carry out any work activities; Up and about more than 50% of waking hours   3 Capable of only limited selfcare, confined to bed or chair more than 50% of waking hours   4 Completely disabled; Cannot carry out any selfcare; Totally confined to bed or chair   5 Dead     Physical Exam:  General: Patient is awake/alert/oriented x3, no distress, alert and cooperative, exertional with ambulation and effort  Skin: Expected color for ethnicity, fair turgor, dry, ecchymosis on arms   Hair:    Nails:    HEENT:   Head: Normocephalic, atraumatic   Eyes: Visual acuity intact, conjunctiva clear, sclera white, EOMI, no exudates or hemorrhages   Ears: nl appearance, hearing intact    Nose: no external lesions, mucosa " non-inflamed  Mouth: Mucous membranes moist, very poor dentition    Head/Neck: No apparent injury, thyroid without mass or tenderness noted, trachea midline, no bruits appreciated.   Respiratory/Thorax: Course crackles and wheezes throughout lobes, normal inspiratory and expiratory effort, no distress at rest. SOB noted with minor exertion.    Cardiovascular: Regular rate and rhythm   Gastrointestinal: Bowel sounds normal, non-distended, soft, no tenderness   Genitourinary: deferred   Musculoskeletal: Weak gait, normal range of motion, no pain on palpation of spine, normal strength for baseline   Extremities: No amputations or cyanosis. No edema/ Peripheral pulses weak.   Neurological: Sensation present to touch, intact senses, motor response and reflexes normal   Breast: Deferred    Lymphatic: No significant lymphadenopathy   Psychological: Intact recent and remote memory, judgement, and insight. Appropriate mood, affect, and behavior          RESULTS/DATA     Labs:   Lab Results   Component Value Date    WBC 6.0 04/15/2024    NEUTROABS 4.48 04/15/2024    IGABSOL 0.03 04/15/2024    LYMPHSABS 0.90 04/15/2024    MONOSABS 0.47 04/15/2024    EOSABS 0.10 04/15/2024    BASOSABS 0.05 04/15/2024    RBC 4.69 04/15/2024    MCV 93 04/15/2024    MCHC 31.7 (L) 04/15/2024    HGB 13.9 04/15/2024    HCT 43.8 04/15/2024     04/15/2024     Lab Results   Component Value Date    RETICCTPCT 0.8 04/15/2024      Lab Results   Component Value Date    CREATININE 2.40 (H) 04/15/2024    BUN 37 (H) 04/15/2024    EGFR 27 (L) 04/15/2024     04/15/2024    K 3.4 (L) 04/15/2024    CL 98 04/15/2024    CO2 32 04/15/2024      Lab Results   Component Value Date    ALT 8 (L) 04/15/2024    AST 13 04/15/2024    ALKPHOS 61 04/15/2024    BILITOT 0.7 04/15/2024      Lab Results   Component Value Date    TSH 1.27 09/18/2023     Lab Results   Component Value Date    TSH 1.27 09/18/2023     Lab Results   Component Value Date    IRON 76 04/15/2024     TIBC 339 04/15/2024    FERRITIN 214 04/15/2024      Lab Results   Component Value Date    XGOOAPRK43 592 04/15/2024      Lab Results   Component Value Date    FOLATE 7.9 04/15/2024     Lab Results   Component Value Date    SEDRATE 3 08/11/2021      Lab Results   Component Value Date    CRP 0.12 08/11/2021      Lab Results   Component Value Date     01/26/2018     Radiology/Studies:   CT abdomen pelvis w IV contrast  12/12/2023  IMPRESSION:  Bilateral pleural effusions. No acute abnormality of the abdomen and  pelvis.  XR chest 1 view  12/31/2023  IMPRESSION:  Generalized interstitial pattern throughout both lungs is similar to  the most recent prior study. The finding may be that of chronic  interstitial fibrosis.  A superimposed infiltrate or mild edema would  be difficult to exclude given the widespread bilateral abnormalities.  ASSESSMENT/PLAN     Assessment and Plan:   #1. History of anemia of chronic disease, LIGIA, and B12 deficiency  Renal dysfunction, possible malabsorption given gastric resection for bleeding ulcers, treated with IV iron (Venofer 2017, Feraheme 5/2019, Venofer 12/2021, 8/2023), B12 injections subcutaneous monthly starting 8/2017, B6 deficiency treated with oral B6 starting 6/2021. In 2011 EGD and colonoscopy with no bleeding. December 2021, referred Dr. Rasmussen regarding iron deficiency, no endoscopy, a PPI as needed, unable to stop anticoagulation due to high risk of a cardiac event.      Patient in end-stage congestive heart failure.  On [facility].  Anemia corrected since iron infusions.  B12 monthly.  B6 and folate acid low sent a B-complex to pharmacy.      I have reviewed the patient's medical record including provider notes, laboratory and testing results, imaging, and procedures available within the system and outside the system.     Follow up:    RTC:  -6 months    Medications:  -B12 injections ordered monthly as needed  -B6 complex with folic acid sent to  pharmacy    Imaging/Testing:  -NA    Referral:  -no referral today    Other Pertinent Appointments:  -1/22/2024 Cardiology  -1/25/2024 Heme infusion  -1/26/2024 urology  -2/20/2024 Pulmonary   -3/14/2024 PCP    Patient Discussion Summary:  Discussed with patient regarding lab assessment of LIGIA and B12 deficiency. IV iron and B12 injections to be ordered as needed. Patient in agreement and states understanding. He will call as needed.      Thank you for allowing me to participate in your care. It was a pleasure meeting you.    Sincerely,  Rosa Luong, APRN-CNP      This document may have been written by voice recognition software.   Time based billing:

## 2024-04-24 ENCOUNTER — HOSPITAL ENCOUNTER (OUTPATIENT)
Dept: CARDIOLOGY | Facility: HOSPITAL | Age: 79
Discharge: HOME | End: 2024-04-24
Payer: MEDICARE

## 2024-04-24 ENCOUNTER — APPOINTMENT (OUTPATIENT)
Dept: UROLOGY | Facility: CLINIC | Age: 79
End: 2024-04-24
Payer: MEDICARE

## 2024-04-24 DIAGNOSIS — Z95.810 CARDIAC DEFIBRILLATOR IN PLACE: ICD-10-CM

## 2024-04-24 DIAGNOSIS — I42.9 CARDIOMYOPATHY, UNSPECIFIED TYPE (MULTI): ICD-10-CM

## 2024-04-24 PROCEDURE — 93282 PRGRMG EVAL IMPLANTABLE DFB: CPT

## 2024-04-24 PROCEDURE — 93282 PRGRMG EVAL IMPLANTABLE DFB: CPT | Performed by: NURSE PRACTITIONER

## 2024-04-25 DIAGNOSIS — I48.0 AF (PAROXYSMAL ATRIAL FIBRILLATION) (MULTI): Primary | ICD-10-CM

## 2024-04-26 ENCOUNTER — OFFICE VISIT (OUTPATIENT)
Dept: UROLOGY | Facility: CLINIC | Age: 79
End: 2024-04-26
Payer: MEDICARE

## 2024-04-26 DIAGNOSIS — N40.1 BENIGN PROSTATIC HYPERPLASIA WITH INCOMPLETE BLADDER EMPTYING: Primary | ICD-10-CM

## 2024-04-26 DIAGNOSIS — N40.0 BENIGN PROSTATIC HYPERPLASIA, UNSPECIFIED WHETHER LOWER URINARY TRACT SYMPTOMS PRESENT: ICD-10-CM

## 2024-04-26 DIAGNOSIS — N39.41 URGE INCONTINENCE OF URINE: ICD-10-CM

## 2024-04-26 DIAGNOSIS — R39.14 BENIGN PROSTATIC HYPERPLASIA WITH INCOMPLETE BLADDER EMPTYING: Primary | ICD-10-CM

## 2024-04-26 DIAGNOSIS — N40.0 ENLARGED PROSTATE: ICD-10-CM

## 2024-04-26 PROCEDURE — 1160F RVW MEDS BY RX/DR IN RCRD: CPT | Performed by: STUDENT IN AN ORGANIZED HEALTH CARE EDUCATION/TRAINING PROGRAM

## 2024-04-26 PROCEDURE — 1157F ADVNC CARE PLAN IN RCRD: CPT | Performed by: STUDENT IN AN ORGANIZED HEALTH CARE EDUCATION/TRAINING PROGRAM

## 2024-04-26 PROCEDURE — 1159F MED LIST DOCD IN RCRD: CPT | Performed by: STUDENT IN AN ORGANIZED HEALTH CARE EDUCATION/TRAINING PROGRAM

## 2024-04-26 PROCEDURE — 99213 OFFICE O/P EST LOW 20 MIN: CPT | Performed by: STUDENT IN AN ORGANIZED HEALTH CARE EDUCATION/TRAINING PROGRAM

## 2024-04-26 RX ORDER — FINASTERIDE 5 MG/1
5 TABLET, FILM COATED ORAL DAILY
Qty: 90 TABLET | Refills: 3 | Status: SHIPPED | OUTPATIENT
Start: 2024-04-26 | End: 2025-04-26

## 2024-04-26 RX ORDER — APIXABAN 2.5 MG/1
2.5 TABLET, FILM COATED ORAL 2 TIMES DAILY
Qty: 60 TABLET | Refills: 4 | Status: SHIPPED | OUTPATIENT
Start: 2024-04-26 | End: 2024-05-10 | Stop reason: HOSPADM

## 2024-04-26 RX ORDER — TAMSULOSIN HYDROCHLORIDE 0.4 MG/1
0.8 CAPSULE ORAL NIGHTLY
Qty: 180 CAPSULE | Refills: 3 | Status: SHIPPED | OUTPATIENT
Start: 2024-04-26 | End: 2025-04-26

## 2024-04-26 NOTE — PROGRESS NOTES
Subjective   Patient ID: Andrez Damon is a 79 y.o. male.    HPI   79 y.o. male presenting for FUV BPH with LUTS.     Patient reports ongoing incontinence and states he does not have a catheter in place. He mentions he is able to urinate only when he gets the urge. However, he adds associated urgency.     He mentions he takes Tamsulosin 2 tabs daily and finasteride.      He states since 07/2023, he has had 10 hospital admission for his co-morbidities including heart failure, Atrial fibrillation and pneumonia.     Review of Systems    Objective   Physical Exam    Assessment/Plan   79 y.o. male presenting for FUV BPH with LUTS.     Patient reports ongoing incontinence and states he does not have a catheter in place. He mentions he is able to urinate only when he gets the urge. However, he adds associated urgency.     He mentions he takes Tamsulosin 2 tabs daily and finasteride.     We previously discussed that because of bad comorbidities, there is no room for intervention to prostate. My suggestion is adding a medication to slow down the bladder. He declines adding medication. Catheter not suggested at this time. Will FUV 1 year.     Plan:  Continue tamsulosin 0.4  mg 2 pills daily at bedtime.  Continue finasteride 5 mg daily.   FUV 1 year.     Diagnoses and all orders for this visit:  Benign prostatic hyperplasia with incomplete bladder emptying  Urge incontinence of urine  Benign prostatic hyperplasia, unspecified whether lower urinary tract symptoms present  -     tamsulosin (Flomax) 0.4 mg 24 hr capsule; Take 2 capsules (0.8 mg) by mouth once daily at bedtime.  Enlarged prostate  -     finasteride (Proscar) 5 mg tablet; Take 1 tablet (5 mg) by mouth once daily.    Scribe Attestation  By signing my name below, IEstella, Scribe attest that this documentation has been prepared under the direction and in the presence of Stone Piedra MD.

## 2024-04-30 ENCOUNTER — HOSPITAL ENCOUNTER (EMERGENCY)
Facility: HOSPITAL | Age: 79
Discharge: OTHER INSTITUTION | End: 2024-05-01
Attending: FAMILY MEDICINE
Payer: MEDICARE

## 2024-04-30 ENCOUNTER — APPOINTMENT (OUTPATIENT)
Dept: RADIOLOGY | Facility: HOSPITAL | Age: 79
End: 2024-04-30
Payer: MEDICARE
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
## 2024-04-30 DIAGNOSIS — S72.001A CLOSED FRACTURE OF RIGHT HIP, INITIAL ENCOUNTER: Primary | ICD-10-CM

## 2024-04-30 DIAGNOSIS — S22.41XA CLOSED FRACTURE OF MULTIPLE RIBS OF RIGHT SIDE, INITIAL ENCOUNTER: ICD-10-CM

## 2024-04-30 DIAGNOSIS — S22.009A CLOSED FRACTURE OF THORACIC VERTEBRA, UNSPECIFIED FRACTURE MORPHOLOGY, UNSPECIFIED THORACIC VERTEBRAL LEVEL, INITIAL ENCOUNTER (MULTI): ICD-10-CM

## 2024-04-30 DIAGNOSIS — N18.9 CHRONIC RENAL IMPAIRMENT, UNSPECIFIED CKD STAGE: ICD-10-CM

## 2024-04-30 PROCEDURE — 71101 X-RAY EXAM UNILAT RIBS/CHEST: CPT | Mod: RT

## 2024-04-30 PROCEDURE — 36415 COLL VENOUS BLD VENIPUNCTURE: CPT | Performed by: FAMILY MEDICINE

## 2024-04-30 PROCEDURE — 73502 X-RAY EXAM HIP UNI 2-3 VIEWS: CPT | Mod: RIGHT SIDE | Performed by: RADIOLOGY

## 2024-04-30 PROCEDURE — 71100 X-RAY EXAM RIBS UNI 2 VIEWS: CPT | Mod: RIGHT SIDE | Performed by: RADIOLOGY

## 2024-04-30 PROCEDURE — 73560 X-RAY EXAM OF KNEE 1 OR 2: CPT | Mod: RT

## 2024-04-30 PROCEDURE — 73552 X-RAY EXAM OF FEMUR 2/>: CPT | Mod: RT

## 2024-04-30 PROCEDURE — 73552 X-RAY EXAM OF FEMUR 2/>: CPT | Mod: RIGHT SIDE | Performed by: RADIOLOGY

## 2024-04-30 PROCEDURE — 73502 X-RAY EXAM HIP UNI 2-3 VIEWS: CPT | Mod: RT

## 2024-04-30 PROCEDURE — 2500000004 HC RX 250 GENERAL PHARMACY W/ HCPCS (ALT 636 FOR OP/ED)

## 2024-04-30 PROCEDURE — 96374 THER/PROPH/DIAG INJ IV PUSH: CPT

## 2024-04-30 PROCEDURE — 73560 X-RAY EXAM OF KNEE 1 OR 2: CPT | Mod: RIGHT SIDE | Performed by: RADIOLOGY

## 2024-04-30 PROCEDURE — 99285 EMERGENCY DEPT VISIT HI MDM: CPT | Mod: 25

## 2024-04-30 PROCEDURE — 2500000005 HC RX 250 GENERAL PHARMACY W/O HCPCS: Performed by: FAMILY MEDICINE

## 2024-04-30 RX ORDER — LIDOCAINE 560 MG/1
1 PATCH PERCUTANEOUS; TOPICAL; TRANSDERMAL ONCE
Status: DISCONTINUED | OUTPATIENT
Start: 2024-04-30 | End: 2024-05-01 | Stop reason: HOSPADM

## 2024-04-30 RX ADMIN — LIDOCAINE 1 PATCH: 4 PATCH TOPICAL at 22:14

## 2024-04-30 RX ADMIN — HYDROMORPHONE HYDROCHLORIDE 0.5 MG: 1 INJECTION, SOLUTION INTRAMUSCULAR; INTRAVENOUS; SUBCUTANEOUS at 23:12

## 2024-04-30 RX ADMIN — Medication 2 L/MIN: at 23:12

## 2024-04-30 ASSESSMENT — PAIN - FUNCTIONAL ASSESSMENT: PAIN_FUNCTIONAL_ASSESSMENT: 0-10

## 2024-04-30 ASSESSMENT — COLUMBIA-SUICIDE SEVERITY RATING SCALE - C-SSRS
2. HAVE YOU ACTUALLY HAD ANY THOUGHTS OF KILLING YOURSELF?: NO
6. HAVE YOU EVER DONE ANYTHING, STARTED TO DO ANYTHING, OR PREPARED TO DO ANYTHING TO END YOUR LIFE?: NO
1. IN THE PAST MONTH, HAVE YOU WISHED YOU WERE DEAD OR WISHED YOU COULD GO TO SLEEP AND NOT WAKE UP?: NO

## 2024-04-30 ASSESSMENT — PAIN DESCRIPTION - ORIENTATION: ORIENTATION: RIGHT

## 2024-04-30 ASSESSMENT — PAIN DESCRIPTION - PAIN TYPE: TYPE: ACUTE PAIN

## 2024-04-30 ASSESSMENT — PAIN SCALES - GENERAL: PAINLEVEL_OUTOF10: 7

## 2024-05-01 ENCOUNTER — HOSPITAL ENCOUNTER (INPATIENT)
Facility: HOSPITAL | Age: 79
LOS: 9 days | Discharge: HOME | DRG: 480 | End: 2024-05-10
Attending: STUDENT IN AN ORGANIZED HEALTH CARE EDUCATION/TRAINING PROGRAM | Admitting: SURGERY
Payer: MEDICARE

## 2024-05-01 ENCOUNTER — APPOINTMENT (OUTPATIENT)
Dept: RADIOLOGY | Facility: HOSPITAL | Age: 79
End: 2024-05-01
Payer: MEDICARE

## 2024-05-01 ENCOUNTER — APPOINTMENT (OUTPATIENT)
Dept: RADIOLOGY | Facility: HOSPITAL | Age: 79
DRG: 480 | End: 2024-05-01
Payer: MEDICARE

## 2024-05-01 ENCOUNTER — CLINICAL SUPPORT (OUTPATIENT)
Dept: EMERGENCY MEDICINE | Facility: HOSPITAL | Age: 79
DRG: 480 | End: 2024-05-01
Payer: MEDICARE

## 2024-05-01 ENCOUNTER — APPOINTMENT (OUTPATIENT)
Dept: CARDIOLOGY | Facility: HOSPITAL | Age: 79
DRG: 480 | End: 2024-05-01
Payer: MEDICARE

## 2024-05-01 VITALS
HEART RATE: 59 BPM | SYSTOLIC BLOOD PRESSURE: 112 MMHG | BODY MASS INDEX: 18.18 KG/M2 | TEMPERATURE: 98.6 F | WEIGHT: 127 LBS | OXYGEN SATURATION: 100 % | HEIGHT: 70 IN | DIASTOLIC BLOOD PRESSURE: 65 MMHG | RESPIRATION RATE: 18 BRPM

## 2024-05-01 DIAGNOSIS — S22.000A COMPRESSION FRACTURE OF THORACIC VERTEBRA, UNSPECIFIED THORACIC VERTEBRAL LEVEL, INITIAL ENCOUNTER (MULTI): Primary | ICD-10-CM

## 2024-05-01 DIAGNOSIS — M97.8XXA PERIPROSTHETIC HIP FRACTURE, INITIAL ENCOUNTER: ICD-10-CM

## 2024-05-01 DIAGNOSIS — M97.01XA PERIPROSTHETIC FRACTURE AROUND INTERNAL PROSTHETIC RIGHT HIP JOINT, INITIAL ENCOUNTER: ICD-10-CM

## 2024-05-01 DIAGNOSIS — S22.49XA CLOSED FRACTURE OF MULTIPLE RIBS, UNSPECIFIED LATERALITY, INITIAL ENCOUNTER: ICD-10-CM

## 2024-05-01 DIAGNOSIS — Z45.02 IMPLANTABLE CARDIOVERTER-DEFIBRILLATOR (ICD) GENERATOR END OF LIFE: ICD-10-CM

## 2024-05-01 DIAGNOSIS — R94.31 ABNORMAL ELECTROCARDIOGRAM (ECG) (EKG): ICD-10-CM

## 2024-05-01 DIAGNOSIS — I49.5 SICK SINUS SYNDROME (MULTI): ICD-10-CM

## 2024-05-01 DIAGNOSIS — Z96.649 PERIPROSTHETIC HIP FRACTURE, INITIAL ENCOUNTER: ICD-10-CM

## 2024-05-01 DIAGNOSIS — I50.20 SYSTOLIC HEART FAILURE, UNSPECIFIED HF CHRONICITY: ICD-10-CM

## 2024-05-01 LAB
ABO GROUP (TYPE) IN BLOOD: NORMAL
ALBUMIN SERPL BCP-MCNC: 3.8 G/DL (ref 3.4–5)
ALP SERPL-CCNC: 68 U/L (ref 33–136)
ALT SERPL W P-5'-P-CCNC: 9 U/L (ref 10–52)
ANION GAP SERPL CALC-SCNC: 12 MMOL/L (ref 10–20)
ANTIBODY SCREEN: NORMAL
APTT PPP: 31 SECONDS (ref 27–38)
AST SERPL W P-5'-P-CCNC: 17 U/L (ref 9–39)
BASOPHILS # BLD AUTO: 0.04 X10*3/UL (ref 0–0.1)
BASOPHILS NFR BLD AUTO: 0.7 %
BILIRUB SERPL-MCNC: 0.5 MG/DL (ref 0–1.2)
BUN SERPL-MCNC: 31 MG/DL (ref 6–23)
CALCIUM SERPL-MCNC: 9.4 MG/DL (ref 8.6–10.3)
CHLORIDE SERPL-SCNC: 103 MMOL/L (ref 98–107)
CO2 SERPL-SCNC: 32 MMOL/L (ref 21–32)
CREAT SERPL-MCNC: 2.45 MG/DL (ref 0.5–1.3)
EGFRCR SERPLBLD CKD-EPI 2021: 26 ML/MIN/1.73M*2
EJECTION FRACTION APICAL 4 CHAMBER: 19.4
EOSINOPHIL # BLD AUTO: 0.05 X10*3/UL (ref 0–0.4)
EOSINOPHIL NFR BLD AUTO: 0.9 %
ERYTHROCYTE [DISTWIDTH] IN BLOOD BY AUTOMATED COUNT: 15 % (ref 11.5–14.5)
GLUCOSE SERPL-MCNC: 185 MG/DL (ref 74–99)
HCT VFR BLD AUTO: 40.9 % (ref 41–52)
HGB BLD-MCNC: 12.7 G/DL (ref 13.5–17.5)
HOLD SPECIMEN: NORMAL
IMM GRANULOCYTES # BLD AUTO: 0.02 X10*3/UL (ref 0–0.5)
IMM GRANULOCYTES NFR BLD AUTO: 0.4 % (ref 0–0.9)
INR PPP: 1.2 (ref 0.9–1.1)
LV EJECTION FRACTION BIPLANE: 20 %
LYMPHOCYTES # BLD AUTO: 0.72 X10*3/UL (ref 0.8–3)
LYMPHOCYTES NFR BLD AUTO: 12.8 %
MCH RBC QN AUTO: 29.5 PG (ref 26–34)
MCHC RBC AUTO-ENTMCNC: 31.1 G/DL (ref 32–36)
MCV RBC AUTO: 95 FL (ref 80–100)
MONOCYTES # BLD AUTO: 0.46 X10*3/UL (ref 0.05–0.8)
MONOCYTES NFR BLD AUTO: 8.2 %
NEUTROPHILS # BLD AUTO: 4.34 X10*3/UL (ref 1.6–5.5)
NEUTROPHILS NFR BLD AUTO: 77 %
NRBC BLD-RTO: 0 /100 WBCS (ref 0–0)
PLATELET # BLD AUTO: 159 X10*3/UL (ref 150–450)
POTASSIUM SERPL-SCNC: 3.2 MMOL/L (ref 3.5–5.3)
PROT SERPL-MCNC: 5.8 G/DL (ref 6.4–8.2)
PROTHROMBIN TIME: 13 SECONDS (ref 9.8–12.8)
RBC # BLD AUTO: 4.31 X10*6/UL (ref 4.5–5.9)
RH FACTOR (ANTIGEN D): NORMAL
SODIUM SERPL-SCNC: 144 MMOL/L (ref 136–145)
WBC # BLD AUTO: 5.6 X10*3/UL (ref 4.4–11.3)

## 2024-05-01 PROCEDURE — 72128 CT CHEST SPINE W/O DYE: CPT

## 2024-05-01 PROCEDURE — 36415 COLL VENOUS BLD VENIPUNCTURE: CPT | Performed by: FAMILY MEDICINE

## 2024-05-01 PROCEDURE — 96376 TX/PRO/DX INJ SAME DRUG ADON: CPT

## 2024-05-01 PROCEDURE — 72125 CT NECK SPINE W/O DYE: CPT | Performed by: RADIOLOGY

## 2024-05-01 PROCEDURE — 73700 CT LOWER EXTREMITY W/O DYE: CPT | Performed by: RADIOLOGY

## 2024-05-01 PROCEDURE — G0390 TRAUMA RESPONS W/HOSP CRITI: HCPCS

## 2024-05-01 PROCEDURE — 72128 CT CHEST SPINE W/O DYE: CPT | Performed by: RADIOLOGY

## 2024-05-01 PROCEDURE — 2500000005 HC RX 250 GENERAL PHARMACY W/O HCPCS

## 2024-05-01 PROCEDURE — 74176 CT ABD & PELVIS W/O CONTRAST: CPT

## 2024-05-01 PROCEDURE — 70450 CT HEAD/BRAIN W/O DYE: CPT

## 2024-05-01 PROCEDURE — 36415 COLL VENOUS BLD VENIPUNCTURE: CPT

## 2024-05-01 PROCEDURE — 70450 CT HEAD/BRAIN W/O DYE: CPT | Performed by: RADIOLOGY

## 2024-05-01 PROCEDURE — 86901 BLOOD TYPING SEROLOGIC RH(D): CPT

## 2024-05-01 PROCEDURE — 99222 1ST HOSP IP/OBS MODERATE 55: CPT | Performed by: SURGERY

## 2024-05-01 PROCEDURE — 2500000001 HC RX 250 WO HCPCS SELF ADMINISTERED DRUGS (ALT 637 FOR MEDICARE OP)

## 2024-05-01 PROCEDURE — 71045 X-RAY EXAM CHEST 1 VIEW: CPT | Performed by: RADIOLOGY

## 2024-05-01 PROCEDURE — 2500000004 HC RX 250 GENERAL PHARMACY W/ HCPCS (ALT 636 FOR OP/ED)

## 2024-05-01 PROCEDURE — 99221 1ST HOSP IP/OBS SF/LOW 40: CPT | Performed by: ORTHOPAEDIC SURGERY

## 2024-05-01 PROCEDURE — 93321 DOPPLER ECHO F-UP/LMTD STD: CPT | Performed by: INTERNAL MEDICINE

## 2024-05-01 PROCEDURE — 72131 CT LUMBAR SPINE W/O DYE: CPT | Performed by: RADIOLOGY

## 2024-05-01 PROCEDURE — 99285 EMERGENCY DEPT VISIT HI MDM: CPT | Mod: 25

## 2024-05-01 PROCEDURE — 80053 COMPREHEN METABOLIC PANEL: CPT | Performed by: FAMILY MEDICINE

## 2024-05-01 PROCEDURE — 93005 ELECTROCARDIOGRAM TRACING: CPT

## 2024-05-01 PROCEDURE — 76377 3D RENDER W/INTRP POSTPROCES: CPT

## 2024-05-01 PROCEDURE — 85730 THROMBOPLASTIN TIME PARTIAL: CPT | Performed by: FAMILY MEDICINE

## 2024-05-01 PROCEDURE — 76377 3D RENDER W/INTRP POSTPROCES: CPT | Performed by: RADIOLOGY

## 2024-05-01 PROCEDURE — 73700 CT LOWER EXTREMITY W/O DYE: CPT | Mod: RT

## 2024-05-01 PROCEDURE — 86923 COMPATIBILITY TEST ELECTRIC: CPT

## 2024-05-01 PROCEDURE — 71250 CT THORAX DX C-: CPT | Performed by: RADIOLOGY

## 2024-05-01 PROCEDURE — 93308 TTE F-UP OR LMTD: CPT | Performed by: INTERNAL MEDICINE

## 2024-05-01 PROCEDURE — 1200000002 HC GENERAL ROOM WITH TELEMETRY DAILY

## 2024-05-01 PROCEDURE — 99285 EMERGENCY DEPT VISIT HI MDM: CPT | Performed by: STUDENT IN AN ORGANIZED HEALTH CARE EDUCATION/TRAINING PROGRAM

## 2024-05-01 PROCEDURE — 93325 DOPPLER ECHO COLOR FLOW MAPG: CPT | Performed by: INTERNAL MEDICINE

## 2024-05-01 PROCEDURE — S0109 METHADONE ORAL 5MG: HCPCS

## 2024-05-01 PROCEDURE — 74176 CT ABD & PELVIS W/O CONTRAST: CPT | Performed by: RADIOLOGY

## 2024-05-01 PROCEDURE — 85025 COMPLETE CBC W/AUTO DIFF WBC: CPT | Performed by: FAMILY MEDICINE

## 2024-05-01 PROCEDURE — 72125 CT NECK SPINE W/O DYE: CPT

## 2024-05-01 PROCEDURE — 2500000002 HC RX 250 W HCPCS SELF ADMINISTERED DRUGS (ALT 637 FOR MEDICARE OP, ALT 636 FOR OP/ED)

## 2024-05-01 PROCEDURE — 71045 X-RAY EXAM CHEST 1 VIEW: CPT

## 2024-05-01 PROCEDURE — 85610 PROTHROMBIN TIME: CPT | Performed by: FAMILY MEDICINE

## 2024-05-01 PROCEDURE — 93325 DOPPLER ECHO COLOR FLOW MAPG: CPT

## 2024-05-01 PROCEDURE — 96374 THER/PROPH/DIAG INJ IV PUSH: CPT

## 2024-05-01 PROCEDURE — 72131 CT LUMBAR SPINE W/O DYE: CPT

## 2024-05-01 PROCEDURE — 99223 1ST HOSP IP/OBS HIGH 75: CPT

## 2024-05-01 PROCEDURE — 2500000006 HC RX 250 W HCPCS SELF ADMINISTERED DRUGS (ALT 637 FOR ALL PAYERS)

## 2024-05-01 RX ORDER — ALBUTEROL SULFATE 0.83 MG/ML
1.25 SOLUTION RESPIRATORY (INHALATION) EVERY 6 HOURS PRN
Status: DISCONTINUED | OUTPATIENT
Start: 2024-05-01 | End: 2024-05-06

## 2024-05-01 RX ORDER — HYDROMORPHONE HYDROCHLORIDE 1 MG/ML
0.5 INJECTION, SOLUTION INTRAMUSCULAR; INTRAVENOUS; SUBCUTANEOUS EVERY 4 HOURS PRN
Status: DISCONTINUED | OUTPATIENT
Start: 2024-05-01 | End: 2024-05-06

## 2024-05-01 RX ORDER — BUMETANIDE 2 MG/1
2 TABLET ORAL DAILY
Status: DISCONTINUED | OUTPATIENT
Start: 2024-05-01 | End: 2024-05-10 | Stop reason: HOSPADM

## 2024-05-01 RX ORDER — HYDROMORPHONE HYDROCHLORIDE 1 MG/ML
0.5 INJECTION, SOLUTION INTRAMUSCULAR; INTRAVENOUS; SUBCUTANEOUS ONCE
Status: COMPLETED | OUTPATIENT
Start: 2024-05-01 | End: 2024-05-01

## 2024-05-01 RX ORDER — METHADONE HYDROCHLORIDE 5 MG/1
2.5 TABLET ORAL NIGHTLY
Status: DISCONTINUED | OUTPATIENT
Start: 2024-05-01 | End: 2024-05-07

## 2024-05-01 RX ORDER — FLUTICASONE FUROATE AND VILANTEROL 100; 25 UG/1; UG/1
1 POWDER RESPIRATORY (INHALATION)
Status: DISCONTINUED | OUTPATIENT
Start: 2024-05-01 | End: 2024-05-10 | Stop reason: HOSPADM

## 2024-05-01 RX ORDER — OXYCODONE HYDROCHLORIDE 5 MG/1
5 TABLET ORAL EVERY 4 HOURS PRN
Status: DISCONTINUED | OUTPATIENT
Start: 2024-05-01 | End: 2024-05-06

## 2024-05-01 RX ORDER — POLYETHYLENE GLYCOL 3350 17 G/17G
17 POWDER, FOR SOLUTION ORAL DAILY
Status: DISCONTINUED | OUTPATIENT
Start: 2024-05-01 | End: 2024-05-03

## 2024-05-01 RX ORDER — ESCITALOPRAM OXALATE 10 MG/1
10 TABLET ORAL DAILY
Status: DISCONTINUED | OUTPATIENT
Start: 2024-05-01 | End: 2024-05-10 | Stop reason: HOSPADM

## 2024-05-01 RX ORDER — HEPARIN SODIUM 5000 [USP'U]/ML
5000 INJECTION, SOLUTION INTRAVENOUS; SUBCUTANEOUS 3 TIMES DAILY
Status: DISCONTINUED | OUTPATIENT
Start: 2024-05-01 | End: 2024-05-10 | Stop reason: HOSPADM

## 2024-05-01 RX ORDER — ENOXAPARIN SODIUM 100 MG/ML
30 INJECTION SUBCUTANEOUS EVERY 12 HOURS
Status: DISCONTINUED | OUTPATIENT
Start: 2024-05-01 | End: 2024-05-01

## 2024-05-01 RX ORDER — FINASTERIDE 5 MG/1
5 TABLET, FILM COATED ORAL DAILY
Status: DISCONTINUED | OUTPATIENT
Start: 2024-05-01 | End: 2024-05-10 | Stop reason: HOSPADM

## 2024-05-01 RX ORDER — HYDROMORPHONE HYDROCHLORIDE 1 MG/ML
0.5 INJECTION, SOLUTION INTRAMUSCULAR; INTRAVENOUS; SUBCUTANEOUS EVERY 4 HOURS PRN
Status: DISCONTINUED | OUTPATIENT
Start: 2024-05-01 | End: 2024-05-01

## 2024-05-01 RX ORDER — ROPINIROLE 1 MG/1
1 TABLET, FILM COATED ORAL NIGHTLY
Status: DISCONTINUED | OUTPATIENT
Start: 2024-05-01 | End: 2024-05-10 | Stop reason: HOSPADM

## 2024-05-01 RX ORDER — ACETAMINOPHEN 325 MG/1
650 TABLET ORAL EVERY 4 HOURS
Status: DISCONTINUED | OUTPATIENT
Start: 2024-05-01 | End: 2024-05-08

## 2024-05-01 RX ORDER — AMIODARONE HYDROCHLORIDE 200 MG/1
200 TABLET ORAL DAILY
Status: DISCONTINUED | OUTPATIENT
Start: 2024-05-01 | End: 2024-05-10 | Stop reason: HOSPADM

## 2024-05-01 RX ORDER — SODIUM CHLORIDE, SODIUM LACTATE, POTASSIUM CHLORIDE, CALCIUM CHLORIDE 600; 310; 30; 20 MG/100ML; MG/100ML; MG/100ML; MG/100ML
50 INJECTION, SOLUTION INTRAVENOUS CONTINUOUS
Status: DISCONTINUED | OUTPATIENT
Start: 2024-05-01 | End: 2024-05-02

## 2024-05-01 RX ORDER — OXYCODONE HYDROCHLORIDE 5 MG/1
10 TABLET ORAL EVERY 4 HOURS PRN
Status: DISCONTINUED | OUTPATIENT
Start: 2024-05-01 | End: 2024-05-06

## 2024-05-01 RX ADMIN — AMIODARONE HYDROCHLORIDE 200 MG: 200 TABLET ORAL at 17:10

## 2024-05-01 RX ADMIN — TIOTROPIUM BROMIDE INHALATION SPRAY 2 PUFF: 3.12 SPRAY, METERED RESPIRATORY (INHALATION) at 22:10

## 2024-05-01 RX ADMIN — ASCORBIC ACID, THIAMINE MONONITRATE,RIBOFLAVIN, NIACINAMIDE, PYRIDOXINE HYDROCHLORIDE, FOLIC ACID, CYANOCOBALAMIN, BIOTIN, CALCIUM PANTOTHENATE, 1 CAPSULE: 100; 1.5; 1.7; 20; 10; 1; 6000; 150000; 5 CAPSULE, LIQUID FILLED ORAL at 17:09

## 2024-05-01 RX ADMIN — ENOXAPARIN SODIUM 30 MG: 100 INJECTION SUBCUTANEOUS at 17:07

## 2024-05-01 RX ADMIN — HYDROMORPHONE HYDROCHLORIDE 0.5 MG: 1 INJECTION, SOLUTION INTRAMUSCULAR; INTRAVENOUS; SUBCUTANEOUS at 01:05

## 2024-05-01 RX ADMIN — HYDROMORPHONE HYDROCHLORIDE 0.5 MG: 1 INJECTION, SOLUTION INTRAMUSCULAR; INTRAVENOUS; SUBCUTANEOUS at 04:31

## 2024-05-01 RX ADMIN — HYDROMORPHONE HYDROCHLORIDE 0.5 MG: 1 INJECTION, SOLUTION INTRAMUSCULAR; INTRAVENOUS; SUBCUTANEOUS at 21:47

## 2024-05-01 RX ADMIN — PERFLUTREN 3 ML OF DILUTION: 6.52 INJECTION, SUSPENSION INTRAVENOUS at 15:40

## 2024-05-01 RX ADMIN — Medication 2 L/MIN: at 14:10

## 2024-05-01 RX ADMIN — METHADONE HYDROCHLORIDE 2.5 MG: 5 TABLET ORAL at 21:46

## 2024-05-01 RX ADMIN — FLUTICASONE FUROATE AND VILANTEROL TRIFENATATE 1 PUFF: 100; 25 POWDER RESPIRATORY (INHALATION) at 21:46

## 2024-05-01 RX ADMIN — ROPINIROLE HYDROCHLORIDE 1 MG: 1 TABLET, FILM COATED ORAL at 21:46

## 2024-05-01 RX ADMIN — BUMETANIDE 2 MG: 2 TABLET ORAL at 17:09

## 2024-05-01 RX ADMIN — ACETAMINOPHEN 650 MG: 325 TABLET ORAL at 17:08

## 2024-05-01 RX ADMIN — FINASTERIDE 5 MG: 5 TABLET, FILM COATED ORAL at 17:09

## 2024-05-01 RX ADMIN — HYDROMORPHONE HYDROCHLORIDE 0.5 MG: 1 INJECTION, SOLUTION INTRAMUSCULAR; INTRAVENOUS; SUBCUTANEOUS at 11:03

## 2024-05-01 RX ADMIN — SODIUM CHLORIDE, POTASSIUM CHLORIDE, SODIUM LACTATE AND CALCIUM CHLORIDE 50 ML/HR: 600; 310; 30; 20 INJECTION, SOLUTION INTRAVENOUS at 17:12

## 2024-05-01 RX ADMIN — HYDROMORPHONE HYDROCHLORIDE 0.5 MG: 1 INJECTION, SOLUTION INTRAMUSCULAR; INTRAVENOUS; SUBCUTANEOUS at 07:57

## 2024-05-01 RX ADMIN — ESCITALOPRAM OXALATE 10 MG: 10 TABLET ORAL at 17:10

## 2024-05-01 ASSESSMENT — PAIN SCALES - GENERAL
PAINLEVEL_OUTOF10: 6
PAINLEVEL_OUTOF10: 7
PAINLEVEL_OUTOF10: 8
PAINLEVEL_OUTOF10: 9
PAINLEVEL_OUTOF10: 10 - WORST POSSIBLE PAIN
PAINLEVEL_OUTOF10: 8

## 2024-05-01 ASSESSMENT — ENCOUNTER SYMPTOMS
WHEEZING: 0
CHEST TIGHTNESS: 0
SHORTNESS OF BREATH: 1
ACTIVITY CHANGE: 1
COUGH: 0
FATIGUE: 1
BACK PAIN: 1

## 2024-05-01 ASSESSMENT — PAIN - FUNCTIONAL ASSESSMENT
PAIN_FUNCTIONAL_ASSESSMENT: 0-10

## 2024-05-01 ASSESSMENT — COLUMBIA-SUICIDE SEVERITY RATING SCALE - C-SSRS
1. IN THE PAST MONTH, HAVE YOU WISHED YOU WERE DEAD OR WISHED YOU COULD GO TO SLEEP AND NOT WAKE UP?: NO
6. HAVE YOU EVER DONE ANYTHING, STARTED TO DO ANYTHING, OR PREPARED TO DO ANYTHING TO END YOUR LIFE?: NO
2. HAVE YOU ACTUALLY HAD ANY THOUGHTS OF KILLING YOURSELF?: NO

## 2024-05-01 ASSESSMENT — PAIN DESCRIPTION - LOCATION: LOCATION: RIB CAGE

## 2024-05-01 NOTE — ED TRIAGE NOTES
Pt states he was taking the trash out and when he turned to set it down he tripped over the curve. Pt is a transfer for ortho consult

## 2024-05-01 NOTE — PROGRESS NOTES
Pharmacy Medication History Review    Andrez Damon is a 79 y.o. male admitted for Compression fracture of thoracic vertebra, unspecified thoracic vertebral level, initial encounter (Multi). Pharmacy reviewed the patient's [id number] medications and allergies for accuracy.    Medications ADDED:  N/A  Medications CHANGED:  Amiodarone  Ondansetron  midodrine  Medications REMOVED:   N/A     The list below reflects the updated PTA list. Comments regarding how patient may be taking medications differently can be found in the Admit Orders Activity  Prior to Admission Medications   Prescriptions Last Dose Informant   B complex-vitamin C-folic acid (Nephrocaps) 1 mg capsule  Care Giver   Sig: Take 1 capsule by mouth once daily.   Brilinta 90 mg tablet Past Week Care Giver   Sig: TAKE 1 TABLET BY MOUTH TWICE A DAY AS DIRECTED   Eliquis 2.5 mg tablet Past Week Care Giver   Sig: TAKE 1 TABLET BY MOUTH TWICE A DAY   HYDROmorphone (Dilaudid) 2 mg tablet  Care Giver   Sig: Take 0.5 tablets (1 mg) by mouth every 2 hours if needed for severe pain (7 - 10).   amiodarone (Pacerone) 200 mg tablet  Care Giver   Sig: Take 1 tablet (200 mg) by mouth once daily.   azithromycin (Zithromax) 250 mg tablet  Care Giver   Sig: TAKE 1 TABLET BY MOUTH 3 TIMES PER WEEK   bumetanide (Bumex) 1 mg tablet  Care Giver   Sig: TAKE 2 TABLETS BY MOUTH EVERY DAY   cholecalciferol (Vitamin D-3) 125 MCG (5000 UT) capsule  Care Giver   Sig: TAKE 1 CAPSULE BY MOUTH EVERY DAY   Patient not taking: Reported on 4/15/2024   escitalopram (Lexapro) 10 mg tablet  Care Giver   Sig: Take 1 tablet (10 mg) by mouth once daily.   finasteride (Proscar) 5 mg tablet  Care Giver   Sig: Take 1 tablet (5 mg) by mouth once daily.   fluticasone-umeclidin-vilanter (Trelegy Ellipta) 200-62.5-25 mcg blister with device Past Week Care Giver   Sig: Inhale 1 puff once daily with breakfast. Inhale 1 puff daily, rinse mouth after use   haloperidol (Haldol) 0.5 mg tablet  Care  Giver   levalbuterol (Xopenex) 45 mcg/actuation inhaler  Care Giver   Sig: Inhale 1 puff every 4 hours if needed for wheezing or shortness of breath. INHALE 1 TO 2 PUFFS EVERY 4 TO 6 HOURS AS NEEDED AND AS DIRECTED.   methadone (Dolophine) 5 mg tablet  Care Giver   Sig: Take 0.5 tablets (2.5 mg) by mouth once daily at bedtime.   metoprolol succinate XL (Toprol-XL) 25 mg 24 hr tablet  Care Giver   Sig: TAKE 1/2 TABLET (12.5MG) BY MOUTH ONCE DAILY. DO NOT CRUSH OR CHEW   Patient not taking: Reported on 4/15/2024   midodrine (Proamatine) 2.5 mg tablet  Care Giver   ondansetron (Zofran) 8 mg tablet  Care Giver   oxygen (O2) gas therapy  Care Giver   Sig: Inhale 2 L/min continuously.   potassium chloride CR (K-Tab) 20 mEq ER tablet  Care Giver   Sig: Take 1 tablet (20 mEq) by mouth once daily.   pyridoxine (Vitamin B-6) 25 mg tablet  Care Giver   Sig: Take 1 tablet (25 mg) by mouth once daily.   Patient not taking: Reported on 4/15/2024   rOPINIRole (Requip) 1 mg tablet  Care Giver   Sig: TAKE 1 TABLET BY MOUTH EVERYDAY AT BEDTIME   tamsulosin (Flomax) 0.4 mg 24 hr capsule Past Week Care Giver   Sig: Take 2 capsules (0.8 mg) by mouth once daily at bedtime.      Facility-Administered Medications: None        The list below reflects the updated allergy list. Please review each documented allergy for additional clarification and justification.  Allergies  Reviewed by Lyudmila Mullins on 5/1/2024        Severity Reactions Comments    Meloxicam High Shortness of breath     Celecoxib Not Specified Unknown     Keflex [cephalexin] Not Specified Hives             Patient accepts M2B at discharge. Pharmacy has been updated to [facility].    Sources used to complete the med history include out patient fill history, OARRS, and patient interview with patient and also contacted patient wife via telephone. Home care Nurse Janeen with [facility] was most valuable medical historian.    Below are additional concerns with the  patient's PTA list.  N/A    Lyudmila Mullins [facility]  Transitions of Care Pharmacist  [facility]  Please reach out via Secure Chat for questions, or if no response call [phone] or [facility]

## 2024-05-01 NOTE — H&P
[facility]  TRAUMA SERVICE - HISTORY AND PHYSICAL / CONSULT    Patient Name: Andrez Damon  MRN: [id number]  Admit Date: [id number]  : 1945  AGE: 79 y.o.   GENDER: male  ==============================================================================  MECHANISM OF INJURY / CHIEF COMPLAINT:   79 year old male, was taking his trash out. He suffered a mechanical fall after tripping over the bin falling on his right side. No LOC. See at OSH and brought here. He is complaining of some right chest wall and some right hip pain.   Prior on hospice, here for evaluation for any palliative fixation for the hip. Patient stating in room that he currently wishes to avoid surgery if possible.   LOC (yes/no?): no  Anticoagulant / Anti-platelet Rx? (for what dx?): Eliquis (PE)  Referring Facility Name (N/A for scene EMR run): [facility]     INJURIES:   Right 6-7 nondisplaced rib fx (On CXR)  T7 VB fx with height loss, T6 SP fx  Right periprosthetic hip fx    OTHER MEDICAL PROBLEMS:  CAD, HFrEF LVEF 24% with major infarcts on stress echo   COPD  HTN  CKD  ICD implantation      INCIDENTAL FINDINGS:  NA    ==============================================================================  ADMISSION PLAN OF CARE:  79 year old male here for above injuries  Admission to trauma service  Anne-operative medicine consult  Ortho following  Discussion about surgery vs not. Patient stating he doesn't want surgery if possible.  PT/OT  DVT chemoppx, Incentive spirometer  Multimodal pain control  Continue home medication    ==============================================================================  PAST MEDICAL HISTORY:   PMH:   Past Medical History:   Diagnosis Date    Arthritis     Atrial fibrillation with RVR (Multi) 2023    Body mass index (BMI) 20.0-20.9, adult 2021    Body mass index (BMI) of 20.0 to 20.9 in adult    Body mass index (BMI) 21.0-21.9, adult 2021    Body mass index (BMI)  of 21.0 to 21.9 in adult    CHF (congestive heart failure) (Multi)     COPD (chronic obstructive pulmonary disease) (Multi)     Coronary artery disease     Diabetes mellitus (Multi)     Hypertension     Major depressive disorder, recurrent, mild (CMS-HCC) 11/11/2020    Mild episode of recurrent major depressive disorder    Major depressive disorder, recurrent, unspecified (CMS-HCC) 01/13/2021    Recurrent major depressive disorder, remission status unspecified    Other conditions influencing health status     Swelling Of Hands    Other specified anxiety disorders 05/19/2020    Depression with anxiety    Oxygen desaturation during sleep     wears 2L at HS    Personal history of nicotine dependence 07/12/2021    History of nicotine dependence    Personal history of other diseases of the circulatory system     History of cardiac disorder    Personal history of other diseases of the musculoskeletal system and connective tissue     History of arthritis    Personal history of other diseases of urinary system 09/21/2021    History of chronic kidney disease    Personal history of other endocrine, nutritional and metabolic disease 08/29/2019    History of hyperglycemia    Pneumonia, unspecified organism 12/09/2019    Atypical pneumonia    Thoracic aortic aneurysm, without rupture, unspecified (CMS-HCC) 05/29/2019    Thoracic aortic aneurysm without rupture     PSH:   Past Surgical History:   Procedure Laterality Date    BACK SURGERY  01/24/2014    Back Surgery    CARDIAC ELECTROPHYSIOLOGY PROCEDURE N/A 12/15/2023    Procedure: ICD - Single Generator Change Out;  Surgeon: Flaco Briggs MD;  Location: [place] [address] Cardiac Cath Lab;  Service: Electrophysiology;  Laterality: N/A;  abbott    CORONARY ANGIOPLASTY WITH STENT PLACEMENT  01/24/2014    Cath Stent Placement    CT ABDOMEN PELVIS ANGIOGRAM W AND/OR WO IV CONTRAST  06/06/2019    CT ABDOMEN PELVIS ANGIOGRAM W AND/OR WO IV CONTRAST 6/6/2019 GEN ANCILLARY LEGACY    CT  HEAD ANGIO W AND WO IV CONTRAST  10/15/2020    CT HEAD ANGIO W AND WO IV CONTRAST 10/15/2020 [facility] ANCILLARY LEGAC[facility]    HERNIA REPAIR  04/24/2014    Hernia Repair    OTHER SURGICAL HISTORY  04/17/2019    Abdominal surgery    OTHER SURGICAL HISTORY  01/24/2014    Defibrillation    PACEMAKER PLACEMENT      TOTAL HIP ARTHROPLASTY  01/24/2014    Hip Replacement     FH:   Family History   Problem Relation Name Age of Onset    Other (cardiac disorder) Mother      Other (malignant neoplasm) Mother      Other (cardiac disorder) Father       SOCIAL HISTORY:    Smoking:    Social History     Tobacco Use   Smoking Status Every Day    Current packs/day: 0.25    Average packs/day: 0.3 packs/day for 68.0 years (17.0 ttl pk-yrs)    Types: Cigarettes   Smokeless Tobacco Never       Alcohol:    Social History     Substance and Sexual Activity   Alcohol Use Never       Drug use:     MEDICATIONS:   Prior to Admission medications    Medication Sig Start Date End Date Taking? Authorizing Provider   amiodarone (Pacerone) 200 mg tablet Take 1 tablet (200 mg) by mouth once daily. 9/19/23   Historical Provider, MD   azithromycin (Zithromax) 250 mg tablet TAKE 1 TABLET BY MOUTH 3 TIMES PER WEEK 3/20/24   Willian Hensley DO   B complex-vitamin C-folic acid (Nephrocaps) 1 mg capsule Take 1 capsule by mouth once daily. 4/19/24   KENDRICK Montague   Brilinta 90 mg tablet TAKE 1 TABLET BY MOUTH TWICE A DAY AS DIRECTED 10/20/23   KENDRICK Chinchilla   bumetanide (Bumex) 1 mg tablet TAKE 2 TABLETS BY MOUTH EVERY DAY 4/19/24   Willian Hensley DO   cholecalciferol (Vitamin D-3) 125 MCG (5000 UT) capsule TAKE 1 CAPSULE BY MOUTH EVERY DAY  Patient not taking: Reported on 4/15/2024 4/4/24   KENDRICK Parmar DNP   Eliquis 2.5 mg tablet TAKE 1 TABLET BY MOUTH TWICE A DAY 4/26/24   KENDRICK Chinchilla   escitalopram (Lexapro) 10 mg tablet Take 1 tablet (10 mg) by mouth once daily. 1/4/24   KENDRICK Parmar DNP    finasteride (Proscar) 5 mg tablet Take 1 tablet (5 mg) by mouth once daily. 4/26/24 4/26/25  Stone Piedra MD   fluticasone-umeclidin-vilanter (Trelegy Ellipta) 200-62.5-25 mcg blister with device Inhale 1 puff once daily with breakfast. Inhale 1 puff daily, rinse mouth after use 1/1/24   Willian Hensley DO   haloperidol (Haldol) 0.5 mg tablet  3/20/24   Historical Provider, MD   HYDROmorphone (Dilaudid) 2 mg tablet Take 0.5 tablets (1 mg) by mouth every 2 hours if needed for severe pain (7 - 10).    Historical Provider, MD   levalbuterol (Xopenex) 45 mcg/actuation inhaler Inhale 1 puff every 4 hours if needed for wheezing or shortness of breath. INHALE 1 TO 2 PUFFS EVERY 4 TO 6 HOURS AS NEEDED AND AS DIRECTED. 1/1/24   Willian Hensley DO   methadone (Dolophine) 5 mg tablet Take 0.5 tablets (2.5 mg) by mouth once daily at bedtime.    Historical Provider, MD   metoprolol succinate XL (Toprol-XL) 25 mg 24 hr tablet TAKE 1/2 TABLET (12.5MG) BY MOUTH ONCE DAILY. DO NOT CRUSH OR CHEW  Patient not taking: Reported on 4/15/2024 4/1/24   KENDRICK Parmar, DNP   midodrine (Proamatine) 2.5 mg tablet  4/3/24   Historical Provider, MD   ondansetron (Zofran) 8 mg tablet  4/10/24   Historical Provider, MD   oxygen (O2) gas therapy Inhale 2 L/min continuously. 11/15/23   KENDRICK Jean-Baptiste   potassium chloride CR (K-Tab) 20 mEq ER tablet Take 1 tablet (20 mEq) by mouth once daily. 7/26/21   Historical Provider, MD   pyridoxine (Vitamin B-6) 25 mg tablet Take 1 tablet (25 mg) by mouth once daily.  Patient not taking: Reported on 4/15/2024 3/6/24 3/6/25  RosaKENDRICK Marin   rOPINIRole (Requip) 1 mg tablet TAKE 1 TABLET BY MOUTH EVERYDAY AT BEDTIME 3/1/24   Karley Sullivan, APRN-CNP, DNP   tamsulosin (Flomax) 0.4 mg 24 hr capsule Take 2 capsules (0.8 mg) by mouth once daily at bedtime. 4/26/24 4/26/25  Stone Piedra MD   apixaban (Eliquis) 2.5 mg tablet Take 1 tablet (2.5 mg) by mouth 2 times a  day. 8/19/21 4/26/24  Historical Provider, MD   finasteride (Proscar) 5 mg tablet Take 1 tablet (5 mg) by mouth once daily. 1/26/24 4/26/24  Yokastaed ZENY Piedra MD   tamsulosin (Flomax) 0.4 mg 24 hr capsule Take 2 capsules (0.8 mg) by mouth once daily at bedtime. 1/26/24 4/26/24  Yokastaed ZENY Piedra MD     ALLERGIES:   Allergies   Allergen Reactions    Meloxicam Shortness of breath    Celecoxib Unknown    Keflex [Cephalexin] Hives       Primary Survey:  A: Airway intact  B: Breathing spontaneously, breath sounds are bilateral and equal  C: Pulses 2+throughout and equal.    D: Pupils equal and reactive, GCS 15 (E4, V5, M6). Moving all 4 extremities  E: Patient exposed and additional injuries noted; Warm blankets placed on patient    Secondary Survey:  NEURO: A&O x3, GCS 15, CN II-XII intact, PIEDRA equally, muscle strength 5/5, no sensory deficits  HEAD: NC/AT, No lacerations or abrasions, no bony step offs, midface stable.  EENT: PERRL, EOMI. Pupils 4-2mm b/l. Canals without blood or CSF drainage, TMs clear, external ear without laceration. Nasal septum midline, no crepitus or septal hematoma. Oral mucosa and tongue without lacerations, teeth in place.   NECK: No cervical spine tenderness or step offs, no lacerations or abrasions, tracheal midline. No JVD.  RESPIRATORY/CHEST: No abrasions, contusions, right chest wall tenderness,Non-labored, equal chest expansion, CTAB, no W/R/R.  CV: RRR, nml S1 and S2, no M/R/G. Pulses bilateral: 2+ radial, 2+DP, 2+PT,  2+femoral and 2+ carotid.   ABDOMEN: soft, nontender, nondistended. No scars, abrasions or lacerations.  PELVIS: Stable to compression.  : nml external genitalia, no blood at urethral meatus  BACK/SPINE: Some paraspinal thoracic tenderness, no step-offs or deformities. No lumbar midline tenderness, step-offs, or deformities.  No abrasions, hematomas or lacerations noted.  EXTREMITIES: No edema or cyanosis. Nml ROM w/o pain. No deformities, lacerations or  contusions.        IMAGING SUMMARY:  (summary of findings, not a copy of dictation)  CT Head/Face: negative  CT C-Spine: negative  CT Chest/Abd/Pelvis: compression of T7 VB, periprosthetic right hip fx  CXR/PXR: Rib 6,7th fx non displaced    LABS:  Results from last 7 days   Lab Units 05/01/24  0058   WBC AUTO x10*3/uL 5.6   HEMOGLOBIN g/dL 12.7*   HEMATOCRIT % 40.9*   PLATELETS AUTO x10*3/uL 159   NEUTROS PCT AUTO % 77.0   LYMPHS PCT AUTO % 12.8   MONOS PCT AUTO % 8.2   EOS PCT AUTO % 0.9     Results from last 7 days   Lab Units 05/01/24  0058   APTT seconds 31   INR  1.2*     Results from last 7 days   Lab Units 05/01/24  0058   SODIUM mmol/L 144   POTASSIUM mmol/L 3.2*   CHLORIDE mmol/L 103   CO2 mmol/L 32   BUN mg/dL 31*   CREATININE mg/dL 2.45*   CALCIUM mg/dL 9.4   PROTEIN TOTAL g/dL 5.8*   BILIRUBIN TOTAL mg/dL 0.5   ALK PHOS U/L 68   ALT U/L 9*   AST U/L 17   GLUCOSE mg/dL 185*     Results from last 7 days   Lab Units 05/01/24  0058   BILIRUBIN TOTAL mg/dL 0.5           I have reviewed all laboratory and imaging results ordered/pertinent for this encounter.     Justin Anaya DO

## 2024-05-01 NOTE — ED PROVIDER NOTES
HPI   Chief Complaint   Patient presents with    Fall       Patient is a 79-year-old male with a past medical history significant for ICD/pacemaker, A-fib on Eliquis, ischemic cardiomyopathy, CHF, COPD, CAD, hypertension, depression, on chronic oxygen 2 L nasal cannula, and on hospice/DNR comfort care presenting to the ED as a transfer from [place] following a fall.  Patient states he was taking out the trash yesterday evening when he lost his balance and fell onto his right side.  He was seen and evaluated in [place]'s ED following the incident in which CT scans of the head, entire spine, and right hip were obtained.  CT thoracic spine revealed height loss of T7 and T8 compatible with fractures.  Right hip revealed periprosthetic fracture involving the intertrochanteric portion and subtrochanteric portion of the right femur.  There were also mildly displaced fracture fragments of the lesser trochanter.  Patient was given medications for pain control and transferred to this hospital for further evaluation.  At this time, patient endorses 8/10 pain in the area of his right ribs (which worsens with breathing) and right hip.  He denies any other symptoms at this time.  He denies hitting his head or LOC when he fell.  He is, however, on Eliquis.                Huffman Coma Scale Score: 15                     Patient History   Past Medical History:   Diagnosis Date    Arthritis     Atrial fibrillation with RVR (Multi) 09/22/2023    Body mass index (BMI) 20.0-20.9, adult 09/21/2021    Body mass index (BMI) of 20.0 to 20.9 in adult    Body mass index (BMI) 21.0-21.9, adult 04/14/2021    Body mass index (BMI) of 21.0 to 21.9 in adult    CHF (congestive heart failure) (Multi)     COPD (chronic obstructive pulmonary disease) (Multi)     Coronary artery disease     Diabetes mellitus (Multi)     Hypertension     Major depressive disorder, recurrent, mild (CMS-[facility]) 11/11/2020    Mild episode of recurrent major depressive  disorder    Major depressive disorder, recurrent, unspecified (CMS-HCC) 01/13/2021    Recurrent major depressive disorder, remission status unspecified    Other conditions influencing health status     Swelling Of Hands    Other specified anxiety disorders 05/19/2020    Depression with anxiety    Oxygen desaturation during sleep     wears 2L at HS    Personal history of nicotine dependence 07/12/2021    History of nicotine dependence    Personal history of other diseases of the circulatory system     History of cardiac disorder    Personal history of other diseases of the musculoskeletal system and connective tissue     History of arthritis    Personal history of other diseases of urinary system 09/21/2021    History of chronic kidney disease    Personal history of other endocrine, nutritional and metabolic disease 08/29/2019    History of hyperglycemia    Pneumonia, unspecified organism 12/09/2019    Atypical pneumonia    Thoracic aortic aneurysm, without rupture, unspecified (CMS-HCC) 05/29/2019    Thoracic aortic aneurysm without rupture     Past Surgical History:   Procedure Laterality Date    BACK SURGERY  01/24/2014    Back Surgery    CARDIAC ELECTROPHYSIOLOGY PROCEDURE N/A 12/15/2023    Procedure: ICD - Single Generator Change Out;  Surgeon: Flaco Briggs MD;  Location: [place] [address] Cardiac Cath Lab;  Service: Electrophysiology;  Laterality: N/A;  [place]ott    CORONARY ANGIOPLASTY WITH STENT PLACEMENT  01/24/2014    Cath Stent Placement    CT ABDOMEN PELVIS ANGIOGRAM W AND/OR WO IV CONTRAST  06/06/2019    CT ABDOMEN PELVIS ANGIOGRAM W AND/OR WO IV CONTRAST 6/6/2019 GEN ANCILLARY LEGACY    CT HEAD ANGIO W AND WO IV CONTRAST  10/15/2020    CT HEAD ANGIO W AND WO IV CONTRAST 10/15/2020 [facility] ANCILLARY LEGACY    HERNIA REPAIR  04/24/2014    Hernia Repair    OTHER SURGICAL HISTORY  04/17/2019    Abdominal surgery    OTHER SURGICAL HISTORY  01/24/2014    Defibrillation    PACEMAKER PLACEMENT      TOTAL HIP  ARTHROPLASTY  01/24/2014    Hip Replacement     Family History   Problem Relation Name Age of Onset    Other (cardiac disorder) Mother      Other (malignant neoplasm) Mother      Other (cardiac disorder) Father       Social History     Tobacco Use    Smoking status: Every Day     Current packs/day: 0.25     Average packs/day: 0.3 packs/day for 68.0 years (17.0 ttl pk-yrs)     Types: Cigarettes    Smokeless tobacco: Never   Vaping Use    Vaping status: Never Used   Substance Use Topics    Alcohol use: Never    Drug use: Never       Physical Exam   ED Triage Vitals [05/01/24 0606]   Temperature Heart Rate Respirations BP   37.1 °C (98.8 °F) 67 17 117/70      Pulse Ox Temp Source Heart Rate Source Patient Position   97 % Oral -- --      BP Location FiO2 (%)     -- --       Physical Exam  Vitals reviewed.   Constitutional:       General: He is not in acute distress.     Appearance: He is not ill-appearing.   HENT:      Head: Normocephalic and atraumatic.      Nose: Nose normal.   Cardiovascular:      Rate and Rhythm: Normal rate and regular rhythm.   Pulmonary:      Effort: Pulmonary effort is normal.      Breath sounds: Normal breath sounds.      Comments: Breathing comfortably on 2L NC (baseline)  Abdominal:      General: Bowel sounds are normal.      Palpations: Abdomen is soft.      Tenderness: There is no abdominal tenderness.   Musculoskeletal:      Cervical back: Normal range of motion and neck supple.      Comments: TTP of the midline thoracic region of the spine as well as surrounding paraspinal muscles.  No step-offs or deformities.  TTP of the right hip and down the right femur.  No erythema or ecchymosis.  Very small, superficial abrasion on the right knee.  Sensation and vascular status intact bilaterally.   Skin:     General: Skin is warm and dry.   Neurological:      General: No focal deficit present.      Mental Status: He is alert and oriented to person, place, and time.         ED Course & MDM    Diagnoses as of 05/01/24 1455   Compression fracture of thoracic vertebra, unspecified thoracic vertebral level, initial encounter (Multi)   Periprosthetic fracture around internal prosthetic right hip joint, initial encounter (Multi)   Closed fracture of multiple ribs, unspecified laterality, initial encounter     Labs Reviewed   TYPE AND SCREEN       Result Value    ABO TYPE A      Rh TYPE NEG      ANTIBODY SCREEN NEG     PREPARE RBC    PRODUCT CODE I8685S57      Unit Number [id number]      Unit ABO A      Unit RH NEG      XM INTEP COMP      Dispense Status XM      Blood Expiration Date May 29, 2024 23:59 EDT      PRODUCT BLOOD TYPE 0600      UNIT VOLUME 350      PRODUCT CODE [id number]      Unit Number [id number]      Unit ABO A      Unit RH NEG      XM INTEP COMP      Dispense Status XM      Blood Expiration Date June 05, 2024 23:59 EDT      PRODUCT BLOOD TYPE 0600      UNIT VOLUME 350       XR chest 1 view   Final Result   1. No acute cardiopulmonary process.   2. Reticular interstitial opacities of the right upper lung which may   relate to underlying interstitial lung disease. Upper lobe   predominant emphysema.   3. Similar enlarged cardiomediastinal silhouette.        I personally reviewed the image(s)/study and resident interpretation   as stated by Dr. Clara Sauceda MD. I agree with the findings as   stated. This study was interpreted at [facility], [place], [place].        MACRO:   None        Signed by: Pascual Wesley 5/1/2024 9:30 AM   Dictation workstation:   [id number]      CT chest abdomen pelvis wo IV contrast   Final Result   1. Compression fracture deformity of the T7 vertebral body, new from   12/12/2023, please see dedicated CT of the thoracic spine.   2. Periprosthetic fracture of the right proximal femur, please see   dedicated CT of the right hip.   3. No additional acute abnormality related to trauma in the chest,   abdomen, or pelvis.    4. Chronic and incidental findings as described above.        I personally reviewed the images/study and I agree with the findings   as stated by resident physician Dr. Facundo Canchola.        MACRO:   None        Signed by: Jack Weeks 5/1/2024 8:49 AM   Dictation workstation:   [id number]      Transthoracic Echo (TTE) Limited    (Results Pending)       Medical Decision Making  Patient is a 79-year-old male with a past medical history significant for ICD/pacemaker, A-fib on Eliquis, ischemic cardiomyopathy, CHF, COPD, CAD, hypertension, depression, on chronic oxygen 2 L nasal cannula, and on hospice/DNR comfort care presenting to the ED as a transfer from [place] following a fall.  History was obtained from the patient as well as his chart.  Patient presented to the ED at [place] yesterday evening following a fall.  Patient lost his balance while taking his garbage out and fell onto his right side.  Workup at that ED revealed a right hip fracture, several rib fractures, and thoracic spine fractures.  At that time, patient discussed revoking his hospice and DNR comfort care order to assess the possibility of having surgery of the right hip.  Patient was transferred to this ED for further evaluation via the trauma and orthopedics teams.  Upon arrival to this ED, patient reports 8/10 pain in the area of the right ribs and right hip.  He denies any headache, dizziness, chest pain, or shortness of breath.  On physical exam, patient is lying comfortably and in no apparent distress.  Head is normocephalic and atraumatic.  Lungs CTA bilaterally and pulmonary effort is normal with patient on 2 L nasal cannula (baseline).  Abdomen is soft and nontender with normal bowel sounds.  Cervical spine ROM normal without TTP.  There is TTP of the midline thoracic region and surrounding paraspinal muscles.  No step-offs or deformities.  There is also TTP from the right hip down the right femur.  No obvious skin changes.  Small,  very superficial abrasion of the right knee also noted.  Sensation and vascular status intact bilaterally.  Neurologically intact without obvious deficits.  Remainder of exam as noted above.  Vital signs stable.  Patient given a dose of Dilaudid upon arrival to this ED.  This was then ordered every 4 hours for pain control.  CT of the c/a/p added to workup and consultations were placed to the trauma and orthopedics teams.  They both came to evaluate the patient.  Please see their consult note for more details.  In summary, patient was admitted to the trauma service.  Ortho will be following and there is continued discussion with the patient whether he wants surgery or not.  At this time, patient states he wishes to avoid surgery if possible.  Please refer to the admitting team for further workup/treatment.  Patient otherwise remains hemodynamically stable and comfortable here in the ED.  Signout given to oncoming team @ 2 PM pending patient's transfer to the trauma floor.        Procedure  Procedures     Jane Mujica PA-C  05/01/24 [id number]

## 2024-05-01 NOTE — CONSULTS
Orthopaedic Surgery Consult Note    Subjective:    Injury: T6 spinous process fracture with T7 T8 compression    HPI: 79M (DNRCC-DNI, on hospice, A-fib on eliquis, last dose 4/29, CHF, CAD w pacemaker, COPD on 2 L NC, prior L4-S1 PSF) transfer from [place] after mGLF.  Was found to have thoracic spine fracture also w R [place] B1 periprosthetic hip fx and R rib fxs. Denies b/b or saddle anesthesia. 5/5 motor and SILT throughout. No UMN signs.     Orthopaedic Problems/Injuries: T6 spinous process fracture T7-T8 compression fracture; R periprosthetic hip fracture  Other Injuries: R rib fx    PMH: per above/EMR  PSH: per above/EMR  SocHx:      - On hospice care  FamHx:  Non-contributory to this patient's acute orthopaedic problem other than as mentioned in HPI  All: Reviewed in EMR  Meds: Reviewed in EMR    Objective:  · Physical Exam:  - Constitutional: No acute distress, cooperative  - Eyes: EOM grossly intact  - Head/Neck: Trachea midline  - Respiratory/Thorax: Normal work of breathing  - Cardiovascular: RRR on peripheral palpation  - Gastrointestinal: Nondistended  - Psychological: Appropriate mood/behavior  - Skin: Warm and dry. Additional findings in musculoskeletal evaluation  - Musculoskeletal:  Spine Exam:  C5: SILT   Deltoid 5/5 Left; 5/5 Right  C6: SILT   Wrist Ext: 5/5 Left; 5/5 Right  C7: SILT   Triceps: 5/5 Left; 5/5 Right  C8: SILT   Finger flexion: 5/5 Left; 5/5 Right  T1: SILT    Interossei: 5/5 Left; 5/5 Right    Bicep Reflex 2+   Bilaterally  Fernandez: Negative    L1: SILT       L2: SILT      Hip flexors 5/5 Left; (deferred given injury) Right  L3: SILT      Knee extension 5/5 Left; (deferred given injury) Right  L4: SILT      Tib Ant. (Dorsiflexion) 5/5 Left; 5/5 Right  L5: SILT      EHL 5/5 Left; 5/5 Right  S1: SILT      Plantarflexion 5/5 Left; 5/5 Right    Patellar reflex: 2+   Bilaterally    Babinkski: Intact  No clonus      ROS      - 14 point ROS negative except as above    Results for  orders placed or performed during the hospital encounter of 05/01/24 (from the past 24 hour(s))   Type And Screen   Result Value Ref Range    ABO TYPE A     Rh TYPE NEG     ANTIBODY SCREEN NEG    Prepare RBC: 2 Units   Result Value Ref Range    PRODUCT CODE G7043J03     Unit Number [id number]     Unit ABO A     Unit RH NEG     XM INTEP COMP     Dispense Status XM     Blood Expiration Date May 29, 2024 23:59 EDT     PRODUCT BLOOD TYPE 0600     UNIT VOLUME 350     PRODUCT CODE U1724E93     Unit Number [id number]     Unit ABO A     Unit RH NEG     XM INTEP COMP     Dispense Status XM     Blood Expiration Date June 05, 2024 23:59 EDT     PRODUCT BLOOD TYPE 0600     UNIT VOLUME 350    Transthoracic Echo (TTE) Limited   Result Value Ref Range    LV Biplane EF 20 %    LV A4C EF 19.4        Transthoracic Echo (TTE) Limited   Final Result      XR chest 1 view   Final Result   1. No acute cardiopulmonary process.   2. Reticular interstitial opacities of the right upper lung which may   relate to underlying interstitial lung disease. Upper lobe   predominant emphysema.   3. Similar enlarged cardiomediastinal silhouette.        I personally reviewed the image(s)/study and resident interpretation   as stated by Dr. Clara Sauceda MD. I agree with the findings as   stated. This study was interpreted at [facility], [place], [place].        MACRO:   None        Signed by: Pascual Wesley 5/1/2024 9:30 AM   Dictation workstation:   [id number]      CT chest abdomen pelvis wo IV contrast   Final Result   1. Compression fracture deformity of the T7 vertebral body, new from   12/12/2023, please see dedicated CT of the thoracic spine.   2. Periprosthetic fracture of the right proximal femur, please see   dedicated CT of the right hip.   3. No additional acute abnormality related to trauma in the chest,   abdomen, or pelvis.   4. Chronic and incidental findings as described above.        I  personally reviewed the images/study and I agree with the findings   as stated by resident physician Dr. Facundo Canchola.        MACRO:   None        Signed by: Jack Weeks 5/1/2024 8:49 AM   Dictation workstation:   [id number]          Assessment/Plan:    Injury: T6 spinous process fracture T7-T8 compression fracture   HPI: 79M (DNRCC-DNI, on hospice, A-fib on eliquis, last dose 4/29, CHF, CAD w pacemaker, COPD on 2 L NC, prior L4-S1 PSF) transfer from [place] after mGLF.  Thoracic spine fracture also w R [place] B1 periprosthetic hip fx and R rib fxs. Denies b/b or saddle anesthesia. 5/5 motor and SILT throughout. No UMN signs.     Plan: WBAT in [place] Brace, OK to remove brace in bed. Orthotics to fit for [place] brace tomorrow AM. Ortho spine to follow peripherally.     Recommendations:  - No acute orthopaedic interventions  - Weight bearing status: WBAT in [place] Brace, OK to remove brace in bed.  Wear brace when out of bed  - Strict T&L spine precautions until in brace  - Orthotics to fit for [place] brace tomorrow AM.   - Please obtain XR T Spine with brace one   - Antibiotics: per primary  - Analgesia per primary  - F/U with Dr. Duarte in 1-2 weeks after discharged. Call [phone] to schedule appointment.  - Please don't hesitate to page with questions.    D/w Dr. Duarte    This consult was seen and evaluated within 30 minutes.     Moo Le MD  Orthopaedic Surgery PGY-1  Resident On-Call  Pager: [phone]  Available via Epic Chat    While admitted, this patient will be followed peripherally by the Ortho Spine Team. Please contact below residents with any questions (available via Epic Chat).     First call: Josue Bagley, PGY-2   Second call: Shukri Ruiz, PGY-4    I saw and evaluated the patient.  I personally obtained the key and critical portions of the history and physical exam or was physically present for key and critical portions performed by the Resident. I reviewed the documentation and  discussed the patient with the Resident.  I agree with the Resident’s medical decision making as documented in the note.    79-year-old male who is in hospice care who sustained a fall while taking out the garbage resulting in thoracic fracture with T6 spinous process fracture T7-T8 compression fracture as well as a periprosthetic hip fracture.  Given that patient is in hospice care would recommend close management of fracture.  Recommend [place] brace when out of bed but can remove brace when in bed.  Please obtain standing upright x-rays with the brace on.  Follow-up as an outpatient in 2 to 3 weeks.

## 2024-05-01 NOTE — CONSULTS
Reason For Consult  Preoperative evaluation for urgent - time sensitive shelley-prosthetic right hip fracture repair, thoracic spine stabilization.    History Of Present Illness  Andrez Damon is a 79 y.o. male presenting with right 6-7 nondisplaced rib fracture, T7 vertebral fracture with height loss, T8 vertebral fracture, T6 spinous process fracture, and right periprosthetic hip fracture after a mechanical fall while taking the garbage out. Initially presented to [place] ED 4/30, transferred to [facility] for further evaluation.    Per chart review, the patient was recently enrolled in hospice over the past month for end stage heart failure. He currently has an AICD in place which is activated. The patient reports that he wants to be shocked to save his life but would not want CPR. Regarding his current situation with his proposed surgeries, he states that he has suffered from chronic back pain since the 1980s following a prior back surgery and does not want to have back surgery at this time. His current main complaint is his right hip pain. He also has right sided rib pain.    Per chart review, patient was admitted 1/1/24 with CHF exacerbation and COPD exacerbation where he received IV diuresis, steroids, and antibiotics. He notes baseline shortness of breath with minimal exertion but currently states that his breathing is at baseline and still improved from his January admission.     Note, patient reports shock in 2010 from AICD. Per EMR, appears this was inappropriate shock secondary to afib based on interrogation.    He is now planned for urgent-time sensitive shelley-prosthetic right hip fracture repair tomorrow and Periop Medicine consulted for eval.     At baseline he has the following medical issues:  HFrEF/Ischemia cardiomyopathy s/p [place] AICD placement 6/5/2012 with recent generator change for primary prevention (LEVEF 35-40% 9/18/23)  H/o AWMI d/t delayed stent thrombosis s/p redo PCI with GEORGIE 11/29/14 s/p  PCI to LAD with BMS 10/29/14 (on Brilinta, last dose 4/29 PM)  COPD on 2L at [place] (follows with pulm, last seen by Tenisha Hurst, CNP 2/20/24)  CKD  HGN  Lumbar spinal stenosis s/p L4-S1 fusion  H/o PE 8/2021 (on Eliquis, last dose 4/29 PM)  MDD  DM diet controlled  Thoracic AAA (4.1cm)  LIGIA, B12 deficiency  Remote hx of PUD  Current 1/3 PPD smoker     No recent chest pain, orthopnea or cough or wheezing.     Pt mentions that he lives at home with wife and pets. He is retired  per EMR. States that he has been able to work since the 80s d/t prior back surgery.      Past Medical History  He has a past medical history of Arthritis, Atrial fibrillation with RVR (Multi) (09/22/2023), Body mass index (BMI) 20.0-20.9, adult (09/21/2021), Body mass index (BMI) 21.0-21.9, adult (04/14/2021), CHF (congestive heart failure) (Multi), COPD (chronic obstructive pulmonary disease) (Multi), Coronary artery disease, Diabetes mellitus (Multi), Hypertension, Major depressive disorder, recurrent, mild (CMS-HCC) (11/11/2020), Major depressive disorder, recurrent, unspecified (CMS-HCC) (01/13/2021), Other conditions influencing health status, Other specified anxiety disorders (05/19/2020), Oxygen desaturation during sleep, Personal history of nicotine dependence (07/12/2021), Personal history of other diseases of the circulatory system, Personal history of other diseases of the musculoskeletal system and connective tissue, Personal history of other diseases of urinary system (09/21/2021), Personal history of other endocrine, nutritional and metabolic disease (08/29/2019), Pneumonia, unspecified organism (12/09/2019), and Thoracic aortic aneurysm, without rupture, unspecified (CMS-HCC) (05/29/2019).    Surgical History  He has a past surgical history that includes CT angio abdomen pelvis w and or wo IV IV contrast (06/06/2019); CT angio head w and wo IV contrast (10/15/2020); Other surgical history (04/17/2019); Hernia  "repair (04/24/2014); Back surgery (01/24/2014); Total hip arthroplasty (01/24/2014); Coronary angioplasty with stent (01/24/2014); Other surgical history (01/24/2014); pacemaker placement; and Cardiac electrophysiology procedure (N/A, 12/15/2023).     Social History  He reports that he has been smoking cigarettes. He has a 17 pack-year smoking history. He has never used smokeless tobacco. He reports that he does not drink alcohol and does not use drugs.    Family History  Family History   Problem Relation Name Age of Onset    Other (cardiac disorder) Mother      Other (malignant neoplasm) Mother      Other (cardiac disorder) Father          Allergies  Meloxicam, Celecoxib, and Keflex [cephalexin]    Review of Systems  Review of Systems   Constitutional:  Positive for activity change and fatigue.   Respiratory:  Positive for shortness of breath (exertiona, baseline). Negative for cough, chest tightness and wheezing.    Cardiovascular:  Positive for leg swelling. Negative for chest pain.   Musculoskeletal:  Positive for back pain.        Right chest wall pain, \"rib pain.\" Right hip pain         Physical Exam  NAD, frail appearing  Atraumatic, normocephalic  Lidoderm patch right chest wall  Distant heart sounds, pulse regular  Diminished breath sounds, no wheezes  2+ LE edema  A&O x3, answers questions appropriately      Last Recorded Vitals  Blood pressure 109/63, pulse 77, temperature 37.1 °C (98.8 °F), temperature source Oral, resp. rate 16, height 1.778 m (5' 10\"), weight 57.6 kg (127 lb), SpO2 97%.    Relevant Results  Component      Latest Ref Rng 5/1/2024   Protime      9.8 - 12.8 seconds 13.0 (H)    INR      0.9 - 1.1  1.2 (H)       Component      Latest Ref Rng 5/1/2024   GLUCOSE      74 - 99 mg/dL 185 (H)    SODIUM      136 - 145 mmol/L 144    POTASSIUM      3.5 - 5.3 mmol/L 3.2 (L)    CHLORIDE      98 - 107 mmol/L 103    Bicarbonate      21 - 32 mmol/L 32    Anion Gap      10 - 20 mmol/L 12    Blood Urea " Nitrogen      6 - 23 mg/dL 31 (H)    Creatinine      0.50 - 1.30 mg/dL 2.45 (H)    Calcium      8.6 - 10.3 mg/dL 9.4    Albumin      3.4 - 5.0 g/dL 3.8    Alkaline Phosphatase      33 - 136 U/L 68    Total Protein      6.4 - 8.2 g/dL 5.8 (L)    AST      9 - 39 U/L 17    Bilirubin Total      0.0 - 1.2 mg/dL 0.5    ALT      10 - 52 U/L 9 (L)    EGFR      >60 mL/min/1.73m*2 26 (L)       Component      Latest Ref Rng 5/1/2024   LEUKOCYTES (10*3/UL) IN BLOOD BY AUTOMATED COUNT, [place]      4.4 - 11.3 x10*3/uL 5.6    ERYTHROCYTES (10*6/UL) IN BLOOD BY AUTOMATED COUNT, [place]      4.50 - 5.90 x10*6/uL 4.31 (L)    HEMOGLOBIN      13.5 - 17.5 g/dL 12.7 (L)    HEMATOCRIT      41.0 - 52.0 % 40.9 (L)    MCV      80 - 100 fL 95    MCHC      32.0 - 36.0 g/dL 31.1 (L)    PLATELETS (10*3/UL) IN BLOOD AUTOMATED COUNT, [place]      150 - 450 x10*3/uL 159    RED CELL DISTRIBUTION WIDTH      11.5 - 14.5 % 15.0 (H)    NEUTROPHILS/100 LEUKOCYTES IN BLOOD BY AUTOMATED COUNT, [place]      40.0 - 80.0 % 77.0    Immature Granulocytes %, Automated      0.0 - 0.9 % 0.4    Lymphocytes %      13.0 - 44.0 % 12.8    Monocytes %      2.0 - 10.0 % 8.2    Eosinophils %      0.0 - 6.0 % 0.9    Basophils %      0.0 - 2.0 % 0.7    NEUTROPHILS (10*3/UL) IN BLOOD BY AUTOMATED COUNT, [place]      1.60 - 5.50 x10*3/uL 4.34    Lymphocytes Absolute      0.80 - 3.00 x10*3/uL 0.72 (L)    Monocytes Absolute      0.05 - 0.80 x10*3/uL 0.46    Eosinophils Absolute      0.00 - 0.40 x10*3/uL 0.05    Basophils Absolute      0.00 - 0.10 x10*3/uL 0.04    nRBC      0.0 - 0.0 /100 WBCs 0.0    MCH      26.0 - 34.0 pg 29.5    MEAN PLATELET VOLUME      7.5 - 11.5 fL    Immature Granulocytes Absolute, Automated      0.00 - 0.50 x10*3/uL 0.02       CXR 5/1/24:  1. No acute cardiopulmonary process.  2. Reticular interstitial opacities of the right upper lung which may  relate to underlying interstitial lung disease. Upper lobe  predominant emphysema.  3. Similar enlarged  cardiomediastinal silhouette.    TTE 9/18/23:  1. Left ventricular systolic function is moderately decreased with a 35-40% estimated ejection fraction.  2. Left ventricular cavity size is moderately dilated.  3. There is global hypokinesis of the left ventricle with minor regional variations.  4. Spectral Doppler shows an impaired relaxation pattern of left ventricular diastolic filling.  5. There is moderate to severe left ventricular hypertrophy.  6. The left atrium is moderately dilated.  7. Moderate mitral valve regurgitation.  8. Mild aortic valve regurgitation.       Encounter Date: 12/31/23   ECG 12 lead   Result Value    Ventricular Rate 104    Atrial Rate 104    NE Interval 152    QRS Duration 146    QT Interval 386    QTC Calculation(Bazett) 507    P Axis 77    R Axis -83    T Axis 85    QRS Count 17    Q Onset 202    P Onset 126    P Offset 177    T Offset 395    QTC Fredericia 463    Narrative    Sinus tachycardia  Left axis deviation  Nonspecific intraventricular block  Abnormal ECG  When compared with ECG of 12-DEC-2023 12:15, (unconfirmed)  Vent. rate has increased BY  34 BPM  T wave inversion less evident in Lateral leads  See ED provider note for full interpretation and clinical correlation  Confirmed by Lucrecia Orta ([id number]) on 1/10/2024 4:48:14 PM       Assessment/Plan   80 yo male with ight 6-7 nondisplaced rib fracture, T7 vertebral fracture with height loss, T8 vertebral fracture, T6 spinous process fracture, and right periprosthetic hip fracture after a mechanical fall     Surgery- Right periprosthetic hip fracture. Possible T spine stabilization      Timing- Urgent to time sensitive    I reviewed his ECG this admission which shows NSR with wide complex QRS consistent with intraventricular conduction delay  I reviewed his prior ECHO which shows LVEF 35-40%, global hypokinesis of the left ventricle with diastolic dysfunction, moderate-severe LVH, mod MR, mild AV regurgitation  I reviewed  patient's prior stress test from 12/2019 which showed large transmural MI involving distal anterior wall extending to inferior apex and adjoining septum    His RCRI score is 3/6 (1 for CKD with Cr >2 + 1 for CHF + 1 for ischemic heart disease). Based on DASI his functional capacity is <4 METs. Additional cardiac testing is unlikely to change surgical course given severe right hip pain. Patient is at a high risk risk for surgery.    Given patient's last dose of Eliquis and Brilinta, and given advanced CKD, patient is high risk for bleeding, likely requiring multiple transfusions if extensive surgical intervention planned. Patient potentially could develop decompensated heart failure if requires multiple blood products     As patient was recently enrolled in hospice and still has AICD which is remained on per patient request, would obtain geriatrics consult to highlight goals of care.    I discussed the patient with Dr. Duarte who was unaware that patient is enrolled in hospice and would like to discuss goals of care. Pt felt to be acceptable to sit up in bed in [facility] brace.    I discussed the patient with Dr. Key who agrees that patient does not need to be a first start case.    I discussed the patient with Dr. Briggs, patient's outpatient cardiologist who feels patient is at a high risk but may not be prohibitive. Agrees with discussions regarding goals of care    Jose Luis Garcia,   Anesthesiology PGY-2, CA-1    Patient seen and discussed with Dr. Hodges

## 2024-05-01 NOTE — ED PROVIDER NOTES
HPI   Chief Complaint   Patient presents with    Fall     Tripped on the curb, pain to right ribs, hip and leg       79-year-old male with history of ICD/pacemaker, A-fib, ischemic cardiomyopathy, CHF, COPD, CAD, hypertension, depression, on chronic oxygen 2 L nasal cannula, and on hospice/DNR comfort care comes to the ED status post fall that occurred just prior to arrival.  According to report that was provided patient was walking outside taking his garbage when he lost his balance fell milligram onto his right side on the concrete driveway.  Patient could not get up and called for help.  Family came and EMS was contacted and patient was helped up and brought straight to the ED.  Patient was complaining of right-sided arm pain, rib pain, hip pain, and knee pain.  Patient upon arrival to the ED was alert, anxious, uncomfortable, but in no distress.  Patient reported that he did not hit his head and have any LOC or confusion but does have significant pain is reported status post fall.  Patient currently denies blurred vision, neck pain, chest pain, back pain, abdominal pain, shortness of breath, fevers, numbness/tingling, loss sensation, nausea/vomiting, dysuria, hematuria, dizziness, and weakness.      History provided by:  Patient, EMS personnel, medical records and significant other   used: No                        [place] Coma Scale Score: 15                     Patient History   Past Medical History:   Diagnosis Date    Arthritis     Atrial fibrillation with RVR (Multi) 09/22/2023    Body mass index (BMI) 20.0-20.9, adult 09/21/2021    Body mass index (BMI) of 20.0 to 20.9 in adult    Body mass index (BMI) 21.0-21.9, adult 04/14/2021    Body mass index (BMI) of 21.0 to 21.9 in adult    CHF (congestive heart failure) (Multi)     COPD (chronic obstructive pulmonary disease) (Multi)     Coronary artery disease     Diabetes mellitus (Multi)     Hypertension     Major depressive disorder,  recurrent, mild (CMS-HCC) 11/11/2020    Mild episode of recurrent major depressive disorder    Major depressive disorder, recurrent, unspecified (CMS-HCC) 01/13/2021    Recurrent major depressive disorder, remission status unspecified    Other conditions influencing health status     Swelling Of Hands    Other specified anxiety disorders 05/19/2020    Depression with anxiety    Oxygen desaturation during sleep     wears 2L at HS    Personal history of nicotine dependence 07/12/2021    History of nicotine dependence    Personal history of other diseases of the circulatory system     History of cardiac disorder    Personal history of other diseases of the musculoskeletal system and connective tissue     History of arthritis    Personal history of other diseases of urinary system 09/21/2021    History of chronic kidney disease    Personal history of other endocrine, nutritional and metabolic disease 08/29/2019    History of hyperglycemia    Pneumonia, unspecified organism 12/09/2019    Atypical pneumonia    Thoracic aortic aneurysm, without rupture, unspecified (CMS-HCC) 05/29/2019    Thoracic aortic aneurysm without rupture     Past Surgical History:   Procedure Laterality Date    BACK SURGERY  01/24/2014    Back Surgery    CARDIAC ELECTROPHYSIOLOGY PROCEDURE N/A 12/15/2023    Procedure: ICD - Single Generator Change Out;  Surgeon: Flaco Briggs MD;  Location: [place] [address] [facility];  Service: Electrophysiology;  Laterality: N/A;  [place]    CORONARY ANGIOPLASTY WITH STENT PLACEMENT  01/24/2014    Cath Stent Placement    CT ABDOMEN PELVIS ANGIOGRAM W AND/OR WO IV CONTRAST  06/06/2019    CT ABDOMEN PELVIS ANGIOGRAM W AND/OR WO IV CONTRAST 6/6/2019 [facility]    CT HEAD ANGIO W AND WO IV CONTRAST  10/15/2020    CT HEAD ANGIO W AND WO IV CONTRAST 10/15/2020 [facility]    HERNIA REPAIR  04/24/2014    Hernia Repair    OTHER SURGICAL HISTORY  04/17/2019    Abdominal surgery    OTHER SURGICAL  HISTORY  01/24/2014    Defibrillation    PACEMAKER PLACEMENT      TOTAL HIP ARTHROPLASTY  01/24/2014    Hip Replacement     Family History   Problem Relation Name Age of Onset    Other (cardiac disorder) Mother      Other (malignant neoplasm) Mother      Other (cardiac disorder) Father       Social History     Tobacco Use    Smoking status: Every Day     Current packs/day: 0.25     Average packs/day: 0.3 packs/day for 68.0 years (17.0 ttl pk-yrs)     Types: Cigarettes    Smokeless tobacco: Never   Vaping Use    Vaping status: Never Used   Substance Use Topics    Alcohol use: Never    Drug use: Never       Physical Exam   ED Triage Vitals [04/30/24 2200]   Temperature Heart Rate Respirations BP   37 °C (98.6 °F) 66 18 130/69      SpO2 Temp Source Heart Rate Source Patient Position   97 % Tympanic Monitor --      BP Location FiO2 (%)     -- --       Physical Exam  Vitals and nursing note reviewed.   Constitutional:       General: He is not in acute distress.     Appearance: He is well-developed.   HENT:      Head: Normocephalic and atraumatic.      Jaw: There is normal jaw occlusion.   Eyes:      Conjunctiva/sclera: Conjunctivae normal.   Neck:      Trachea: Trachea and phonation normal.   Cardiovascular:      Rate and Rhythm: Normal rate and regular rhythm.      Pulses: Normal pulses.      Heart sounds: Normal heart sounds, S1 normal and S2 normal. No murmur heard.  Pulmonary:      Effort: Pulmonary effort is normal. No respiratory distress.      Breath sounds: Normal breath sounds and air entry.   Chest:      Chest wall: Tenderness present.          Comments: Right-sided rib tenderness along ribs 6/7/8  No signs of deformity or bruising or swelling  Abdominal:      Palpations: Abdomen is soft.      Tenderness: There is no abdominal tenderness.   Musculoskeletal:         General: No swelling.      Cervical back: Normal, full passive range of motion without pain, normal range of motion and neck supple.      Thoracic  back: Spasms and tenderness present.      Lumbar back: Spasms and tenderness present.        Back:       Right hip: Tenderness present. No deformity, lacerations or crepitus. Decreased range of motion. Decreased strength.      Right knee: Bony tenderness present. Tenderness present.        Legs:       Comments: Pain along the lower portion of the thoracic spine and the lumbar spine paraspinal muscles with no bony tenderness  No signs of bruising and swelling/redness     Skin:     General: Skin is warm and dry.      Capillary Refill: Capillary refill takes less than 2 seconds.   Neurological:      General: No focal deficit present.      Mental Status: He is alert and oriented to person, place, and time.      GCS: GCS eye subscore is 4. GCS verbal subscore is 5. GCS motor subscore is 6.      Cranial Nerves: Cranial nerves 2-12 are intact.      Sensory: Sensation is intact.   Psychiatric:         Mood and Affect: Mood normal.         ED Course & MDM   Diagnoses as of 05/01/24 0719   Fall, initial encounter   Closed fracture of multiple ribs of right side, initial encounter   Closed fracture of right hip, initial encounter (Multi)   Chronic renal impairment, unspecified CKD stage     Labs Reviewed   CBC WITH AUTO DIFFERENTIAL - Abnormal       Result Value    WBC 5.6      nRBC 0.0      RBC 4.31 (*)     Hemoglobin 12.7 (*)     Hematocrit 40.9 (*)     MCV 95      MCH 29.5      MCHC 31.1 (*)     RDW 15.0 (*)     Platelets 159      Neutrophils % 77.0      Immature Granulocytes %, Automated 0.4      Lymphocytes % 12.8      Monocytes % 8.2      Eosinophils % 0.9      Basophils % 0.7      Neutrophils Absolute 4.34      Immature Granulocytes Absolute, Automated 0.02      Lymphocytes Absolute 0.72 (*)     Monocytes Absolute 0.46      Eosinophils Absolute 0.05      Basophils Absolute 0.04     COMPREHENSIVE METABOLIC PANEL - Abnormal    Glucose 185 (*)     Sodium 144      Potassium 3.2 (*)     Chloride 103      Bicarbonate 32       Anion Gap 12      Urea Nitrogen 31 (*)     Creatinine 2.45 (*)     eGFR 26 (*)     Calcium 9.4      Albumin 3.8      Alkaline Phosphatase 68      Total Protein 5.8 (*)     AST 17      Bilirubin, Total 0.5      ALT 9 (*)    PROTIME-INR - Abnormal    Protime 13.0 (*)     INR 1.2 (*)    APTT - Normal    aPTT 31      Narrative:     The APTT is no longer used for monitoring Unfractionated Heparin Therapy. For monitoring Heparin Therapy, use the Heparin Assay.   GRAY TOP    Extra Tube Hold for add-ons.       CT thoracic spine wo IV contrast   Final Result   No acute intracranial hemorrhage, mass effect or midline shift.        Nonspecific scattered white matter hypodensities favored to represent   sequela of small vessel ischemia.        Cervical spondylosis without acute loss of vertebral body height or   traumatic malalignment.        Severe emphysematous changes throughout the visualized lungs.        Filling defects in the dependent aspect of the trachea are favored to   represent secretions.        Cardiomegaly with thickened interlobular septal markings suggesting   pulmonary interstitial edema. Additionally, there are patchy airspace   opacities in the right upper lobe and lung bases which may represent   atelectasis, however, superimposed infection is not excluded in the   appropriate clinical setting.        Marked height loss of the T7 vertebral body with height loss to a   lesser degree of the T8 vertebral body compatible with fractures new   from prior imaging 12/12/2023. No bony retropulsion. There is also   fracture involving the T6 spinous process.        Posterior instrumented fusion L4 through S1. No acute fracture or   traumatic malalignment of the lumbar spine.        Right total hip arthroplasty hardware is present. Periprosthetic   fracture involving the intertrochanteric portion and subtrochanteric   portion of the right femur with mildly displaced fracture fragments   but no significant angulation.  A collection with a fat fluid level is   seen overlying the right greater trochanter.        MACRO:   None.        Signed by: Evan Finkelstein 5/1/2024 2:48 AM   Dictation workstation:   [id number]      CT lumbar spine wo IV contrast   Final Result   No acute intracranial hemorrhage, mass effect or midline shift.        Nonspecific scattered white matter hypodensities favored to represent   sequela of small vessel ischemia.        Cervical spondylosis without acute loss of vertebral body height or   traumatic malalignment.        Severe emphysematous changes throughout the visualized lungs.        Filling defects in the dependent aspect of the trachea are favored to   represent secretions.        Cardiomegaly with thickened interlobular septal markings suggesting   pulmonary interstitial edema. Additionally, there are patchy airspace   opacities in the right upper lobe and lung bases which may represent   atelectasis, however, superimposed infection is not excluded in the   appropriate clinical setting.        Marked height loss of the T7 vertebral body with height loss to a   lesser degree of the T8 vertebral body compatible with fractures new   from prior imaging 12/12/2023. No bony retropulsion. There is also   fracture involving the T6 spinous process.        Posterior instrumented fusion L4 through S1. No acute fracture or   traumatic malalignment of the lumbar spine.        Right total hip arthroplasty hardware is present. Periprosthetic   fracture involving the intertrochanteric portion and subtrochanteric   portion of the right femur with mildly displaced fracture fragments   but no significant angulation. A collection with a fat fluid level is   seen overlying the right greater trochanter.        MACRO:   None.        Signed by: Evan Finkelstein 5/1/2024 2:48 AM   Dictation workstation:   [id number]      CT cervical spine wo IV contrast   Final Result   No acute intracranial hemorrhage, mass effect or  midline shift.        Nonspecific scattered white matter hypodensities favored to represent   sequela of small vessel ischemia.        Cervical spondylosis without acute loss of vertebral body height or   traumatic malalignment.        Severe emphysematous changes throughout the visualized lungs.        Filling defects in the dependent aspect of the trachea are favored to   represent secretions.        Cardiomegaly with thickened interlobular septal markings suggesting   pulmonary interstitial edema. Additionally, there are patchy airspace   opacities in the right upper lobe and lung bases which may represent   atelectasis, however, superimposed infection is not excluded in the   appropriate clinical setting.        Marked height loss of the T7 vertebral body with height loss to a   lesser degree of the T8 vertebral body compatible with fractures new   from prior imaging 12/12/2023. No bony retropulsion. There is also   fracture involving the T6 spinous process.        Posterior instrumented fusion L4 through S1. No acute fracture or   traumatic malalignment of the lumbar spine.        Right total hip arthroplasty hardware is present. Periprosthetic   fracture involving the intertrochanteric portion and subtrochanteric   portion of the right femur with mildly displaced fracture fragments   but no significant angulation. A collection with a fat fluid level is   seen overlying the right greater trochanter.        MACRO:   None.        Signed by: Evan Finkelstein 5/1/2024 2:48 AM   Dictation workstation:   [id number]      CT head wo IV contrast   Final Result   No acute intracranial hemorrhage, mass effect or midline shift.        Nonspecific scattered white matter hypodensities favored to represent   sequela of small vessel ischemia.        Cervical spondylosis without acute loss of vertebral body height or   traumatic malalignment.        Severe emphysematous changes throughout the visualized lungs.        Filling  defects in the dependent aspect of the trachea are favored to   represent secretions.        Cardiomegaly with thickened interlobular septal markings suggesting   pulmonary interstitial edema. Additionally, there are patchy airspace   opacities in the right upper lobe and lung bases which may represent   atelectasis, however, superimposed infection is not excluded in the   appropriate clinical setting.        Marked height loss of the T7 vertebral body with height loss to a   lesser degree of the T8 vertebral body compatible with fractures new   from prior imaging 12/12/2023. No bony retropulsion. There is also   fracture involving the T6 spinous process.        Posterior instrumented fusion L4 through S1. No acute fracture or   traumatic malalignment of the lumbar spine.        Right total hip arthroplasty hardware is present. Periprosthetic   fracture involving the intertrochanteric portion and subtrochanteric   portion of the right femur with mildly displaced fracture fragments   but no significant angulation. A collection with a fat fluid level is   seen overlying the right greater trochanter.        MACRO:   None.        Signed by: Evan Finkelstein 5/1/2024 2:48 AM   Dictation workstation:   [id number]      CT hip right wo IV contrast   Final Result   No acute intracranial hemorrhage, mass effect or midline shift.        Nonspecific scattered white matter hypodensities favored to represent   sequela of small vessel ischemia.        Cervical spondylosis without acute loss of vertebral body height or   traumatic malalignment.        Severe emphysematous changes throughout the visualized lungs.        Filling defects in the dependent aspect of the trachea are favored to   represent secretions.        Cardiomegaly with thickened interlobular septal markings suggesting   pulmonary interstitial edema. Additionally, there are patchy airspace   opacities in the right upper lobe and lung bases which may represent    atelectasis, however, superimposed infection is not excluded in the   appropriate clinical setting.        Marked height loss of the T7 vertebral body with height loss to a   lesser degree of the T8 vertebral body compatible with fractures new   from prior imaging 12/12/2023. No bony retropulsion. There is also   fracture involving the T6 spinous process.        Posterior instrumented fusion L4 through S1. No acute fracture or   traumatic malalignment of the lumbar spine.        Right total hip arthroplasty hardware is present. Periprosthetic   fracture involving the intertrochanteric portion and subtrochanteric   portion of the right femur with mildly displaced fracture fragments   but no significant angulation. A collection with a fat fluid level is   seen overlying the right greater trochanter.        MACRO:   None.        Signed by: Evan Finkelstein 5/1/2024 2:48 AM   Dictation workstation:   [id number]      CT 3D reconstruction   Final Result   No acute intracranial hemorrhage, mass effect or midline shift.        Nonspecific scattered white matter hypodensities favored to represent   sequela of small vessel ischemia.        Cervical spondylosis without acute loss of vertebral body height or   traumatic malalignment.        Severe emphysematous changes throughout the visualized lungs.        Filling defects in the dependent aspect of the trachea are favored to   represent secretions.        Cardiomegaly with thickened interlobular septal markings suggesting   pulmonary interstitial edema. Additionally, there are patchy airspace   opacities in the right upper lobe and lung bases which may represent   atelectasis, however, superimposed infection is not excluded in the   appropriate clinical setting.        Marked height loss of the T7 vertebral body with height loss to a   lesser degree of the T8 vertebral body compatible with fractures new   from prior imaging 12/12/2023. No bony retropulsion. There is also    fracture involving the T6 spinous process.        Posterior instrumented fusion L4 through S1. No acute fracture or   traumatic malalignment of the lumbar spine.        Right total hip arthroplasty hardware is present. Periprosthetic   fracture involving the intertrochanteric portion and subtrochanteric   portion of the right femur with mildly displaced fracture fragments   but no significant angulation. A collection with a fat fluid level is   seen overlying the right greater trochanter.        MACRO:   None.        Signed by: Evan Finkelstein 5/1/2024 2:48 AM   Dictation workstation:   [id number]      XR ribs right 2 views w chest pa or ap   Final Result   Cardiomegaly with diffuse interstitial prominence suggestive of   developing interstitial edema. Superimposed infection not excluded.   Correlate clinically.        Generalized diffuse osteopenia. Acute nondisplaced fractures of the   right 6th and 7th rib laterally are suspected. Correlate with point   tenderness at this level.        Additional findings as described above.        MACRO:   None        Signed by: Christina Ayala 5/1/2024 12:18 AM   Dictation workstation:   [id number]      XR hip right with pelvis when performed 2 or 3 views   Final Result   Status post bilateral total hip arthroplasty. There is acute right   femoral intertrochanteric periprosthetic fracture.        Postsurgical and degenerative changes as noted above.        MACRO:   None        Signed by: Christina Ayala 5/1/2024 12:24 AM   Dictation workstation:   [id number]      XR femur right 2+ views   Final Result   Status post bilateral total hip arthroplasty. There is acute right   femoral intertrochanteric periprosthetic fracture.        Postsurgical and degenerative changes as noted above.        MACRO:   None        Signed by: Christina Ayala 5/1/2024 12:24 AM   Dictation workstation:   [id number]      XR knee right 1-2 views   Final Result   Status post bilateral total hip  arthroplasty. There is acute right   femoral intertrochanteric periprosthetic fracture.        Postsurgical and degenerative changes as noted above.        MACRO:   None        Signed by: Christina Ayala 5/1/2024 12:24 AM   Dictation workstation:   [id number]          Medical Decision Making  Patient upon arrival to the ED appeared to be anxious and uncomfortable but in no distress with stable vital signs.  Eldon with patient/family presenting complaints and clinical findings.  Reviewed them patient's epic chart and counseled them on injury status post falls and appropriate approach to management/treatments.  After assessment and evaluation IV line was started, labs sent, imaging ordered, given IV Dilaudid for pain, placed on cardiac monitor, and observed.  After treatment and a period of rest patient was reassessed and found to be feeling a little better, pain continue to be persistent, patient continued get interval IV pain medication for management, and patient continue be monitored closely.  Imaging results were reviewed/discussed and findings are consistent with right hip fracture as well as several rib fractures and thoracic spine fractures.  Further imaging was added for that any other additional fractures and patient's pain continue be managed.  At this time patient discussed revoking hospice and DNR comfort care in order to assess possibility of having surgery done for the right hip fracture.  Discussed with patient need for admission and is agreeable at which point transfer center was contacted and case discussed with on-call trauma surgery and ER attending at [facility].  After discussion was agreed patient required transfer to the ED for evaluation and further assessment.  Patient kept stable and transferred.    Amount and/or Complexity of Data Reviewed  Independent Historian: spouse and EMS  External Data Reviewed: labs, radiology and notes.  Labs: ordered. Decision-making details documented in ED  Course.  Radiology: ordered. Decision-making details documented in ED Course.    Risk  Decision regarding hospitalization.        Procedure  Procedures     Bear Horner MD  05/01/24 0727       Bear Horner MD  05/01/24 0729

## 2024-05-01 NOTE — NURSING NOTE
Pt is a hospice patient with [facility] Care team.   I met with pt to assess his current status and to discuss his wishes moving forward. As of now, pt is considering having surgery but has yet met with the spine team to discuss this. He also is considering hip surgery if able. Spoke with Dr. Jamal Hodges MD. He stated he will try and set up a goals of care meeting ASAP to discussed how to move forward.   Hospice revocation form signed by pt due to hospital admission. Once pt is discharged from the hospital, we can restart services.   Please let us know how we can help with this pts POC.     Thank you,   Lin Maldonado RN  Admission/Liaison  [phone]

## 2024-05-02 ENCOUNTER — ANESTHESIA (OUTPATIENT)
Dept: OPERATING ROOM | Facility: HOSPITAL | Age: 79
DRG: 480 | End: 2024-05-02
Payer: MEDICARE

## 2024-05-02 ENCOUNTER — ANESTHESIA EVENT (OUTPATIENT)
Dept: OPERATING ROOM | Facility: HOSPITAL | Age: 79
DRG: 480 | End: 2024-05-02
Payer: MEDICARE

## 2024-05-02 ENCOUNTER — APPOINTMENT (OUTPATIENT)
Dept: RADIOLOGY | Facility: HOSPITAL | Age: 79
DRG: 480 | End: 2024-05-02
Payer: MEDICARE

## 2024-05-02 PROBLEM — M97.01XA PERIPROSTHETIC FRACTURE AROUND INTERNAL PROSTHETIC RIGHT HIP JOINT (MULTI): Status: ACTIVE | Noted: 2024-05-01

## 2024-05-02 LAB
ALBUMIN SERPL BCP-MCNC: 3 G/DL (ref 3.4–5)
ANION GAP SERPL CALC-SCNC: 11 MMOL/L (ref 10–20)
ATRIAL RATE: 61 BPM
BUN SERPL-MCNC: 29 MG/DL (ref 6–23)
CALCIUM SERPL-MCNC: 8.4 MG/DL (ref 8.6–10.6)
CHLORIDE SERPL-SCNC: 104 MMOL/L (ref 98–107)
CO2 SERPL-SCNC: 32 MMOL/L (ref 21–32)
CREAT SERPL-MCNC: 1.71 MG/DL (ref 0.5–1.3)
EGFRCR SERPLBLD CKD-EPI 2021: 40 ML/MIN/1.73M*2
ERYTHROCYTE [DISTWIDTH] IN BLOOD BY AUTOMATED COUNT: 14.9 % (ref 11.5–14.5)
GLUCOSE SERPL-MCNC: 145 MG/DL (ref 74–99)
HCT VFR BLD AUTO: 32.3 % (ref 41–52)
HGB BLD-MCNC: 10.8 G/DL (ref 13.5–17.5)
HOLD SPECIMEN: NORMAL
MAGNESIUM SERPL-MCNC: 2 MG/DL (ref 1.6–2.4)
MCH RBC QN AUTO: 30 PG (ref 26–34)
MCHC RBC AUTO-ENTMCNC: 33.4 G/DL (ref 32–36)
MCV RBC AUTO: 90 FL (ref 80–100)
NRBC BLD-RTO: 0 /100 WBCS (ref 0–0)
P AXIS: -13 DEGREES
P OFFSET: 202 MS
P ONSET: 156 MS
PHOSPHATE SERPL-MCNC: 3.4 MG/DL (ref 2.5–4.9)
PLATELET # BLD AUTO: 130 X10*3/UL (ref 150–450)
POTASSIUM SERPL-SCNC: 3.4 MMOL/L (ref 3.5–5.3)
PR INTERVAL: 116 MS
Q ONSET: 214 MS
QRS COUNT: 10 BEATS
QRS DURATION: 134 MS
QT INTERVAL: 450 MS
QTC CALCULATION(BAZETT): 453 MS
QTC FREDERICIA: 452 MS
R AXIS: 213 DEGREES
RBC # BLD AUTO: 3.6 X10*6/UL (ref 4.5–5.9)
SODIUM SERPL-SCNC: 144 MMOL/L (ref 136–145)
T AXIS: 85 DEGREES
T OFFSET: 439 MS
VENTRICULAR RATE: 61 BPM
WBC # BLD AUTO: 5.5 X10*3/UL (ref 4.4–11.3)

## 2024-05-02 PROCEDURE — 2500000001 HC RX 250 WO HCPCS SELF ADMINISTERED DRUGS (ALT 637 FOR MEDICARE OP)

## 2024-05-02 PROCEDURE — 1100000001 HC PRIVATE ROOM DAILY

## 2024-05-02 PROCEDURE — 83735 ASSAY OF MAGNESIUM: CPT

## 2024-05-02 PROCEDURE — 2500000002 HC RX 250 W HCPCS SELF ADMINISTERED DRUGS (ALT 637 FOR MEDICARE OP, ALT 636 FOR OP/ED)

## 2024-05-02 PROCEDURE — 71045 X-RAY EXAM CHEST 1 VIEW: CPT

## 2024-05-02 PROCEDURE — 36415 COLL VENOUS BLD VENIPUNCTURE: CPT

## 2024-05-02 PROCEDURE — 2500000006 HC RX 250 W HCPCS SELF ADMINISTERED DRUGS (ALT 637 FOR ALL PAYERS)

## 2024-05-02 PROCEDURE — 84100 ASSAY OF PHOSPHORUS: CPT

## 2024-05-02 PROCEDURE — 99223 1ST HOSP IP/OBS HIGH 75: CPT | Performed by: NURSE PRACTITIONER

## 2024-05-02 PROCEDURE — 71045 X-RAY EXAM CHEST 1 VIEW: CPT | Performed by: RADIOLOGY

## 2024-05-02 PROCEDURE — 2500000004 HC RX 250 GENERAL PHARMACY W/ HCPCS (ALT 636 FOR OP/ED)

## 2024-05-02 PROCEDURE — S0109 METHADONE ORAL 5MG: HCPCS

## 2024-05-02 PROCEDURE — 85027 COMPLETE CBC AUTOMATED: CPT

## 2024-05-02 PROCEDURE — 99231 SBSQ HOSP IP/OBS SF/LOW 25: CPT | Performed by: SURGERY

## 2024-05-02 RX ORDER — ENOXAPARIN SODIUM 100 MG/ML
30 INJECTION SUBCUTANEOUS ONCE
Status: DISCONTINUED | OUTPATIENT
Start: 2024-05-02 | End: 2024-05-02

## 2024-05-02 RX ADMIN — POLYETHYLENE GLYCOL 3350 17 G: 17 POWDER, FOR SOLUTION ORAL at 08:39

## 2024-05-02 RX ADMIN — HEPARIN SODIUM 5000 UNITS: 5000 INJECTION INTRAVENOUS; SUBCUTANEOUS at 08:39

## 2024-05-02 RX ADMIN — OXYCODONE HYDROCHLORIDE 10 MG: 5 TABLET ORAL at 08:37

## 2024-05-02 RX ADMIN — ASCORBIC ACID, THIAMINE MONONITRATE,RIBOFLAVIN, NIACINAMIDE, PYRIDOXINE HYDROCHLORIDE, FOLIC ACID, CYANOCOBALAMIN, BIOTIN, CALCIUM PANTOTHENATE, 1 CAPSULE: 100; 1.5; 1.7; 20; 10; 1; 6000; 150000; 5 CAPSULE, LIQUID FILLED ORAL at 08:38

## 2024-05-02 RX ADMIN — OXYCODONE HYDROCHLORIDE 10 MG: 5 TABLET ORAL at 01:51

## 2024-05-02 RX ADMIN — TIOTROPIUM BROMIDE INHALATION SPRAY 2 PUFF: 3.12 SPRAY, METERED RESPIRATORY (INHALATION) at 06:39

## 2024-05-02 RX ADMIN — BUMETANIDE 2 MG: 2 TABLET ORAL at 08:38

## 2024-05-02 RX ADMIN — METHADONE HYDROCHLORIDE 2.5 MG: 5 TABLET ORAL at 20:35

## 2024-05-02 RX ADMIN — FLUTICASONE FUROATE AND VILANTEROL TRIFENATATE 1 PUFF: 100; 25 POWDER RESPIRATORY (INHALATION) at 06:39

## 2024-05-02 RX ADMIN — FINASTERIDE 5 MG: 5 TABLET, FILM COATED ORAL at 08:38

## 2024-05-02 RX ADMIN — ROPINIROLE HYDROCHLORIDE 1 MG: 1 TABLET, FILM COATED ORAL at 20:35

## 2024-05-02 RX ADMIN — HYDROMORPHONE HYDROCHLORIDE 0.5 MG: 1 INJECTION, SOLUTION INTRAMUSCULAR; INTRAVENOUS; SUBCUTANEOUS at 21:44

## 2024-05-02 RX ADMIN — ACETAMINOPHEN 650 MG: 325 TABLET ORAL at 09:41

## 2024-05-02 RX ADMIN — AMIODARONE HYDROCHLORIDE 200 MG: 200 TABLET ORAL at 08:38

## 2024-05-02 RX ADMIN — HYDROMORPHONE HYDROCHLORIDE 0.5 MG: 1 INJECTION, SOLUTION INTRAMUSCULAR; INTRAVENOUS; SUBCUTANEOUS at 13:06

## 2024-05-02 RX ADMIN — ESCITALOPRAM OXALATE 10 MG: 10 TABLET ORAL at 08:38

## 2024-05-02 RX ADMIN — HEPARIN SODIUM 5000 UNITS: 5000 INJECTION INTRAVENOUS; SUBCUTANEOUS at 18:42

## 2024-05-02 SDOH — SOCIAL STABILITY: SOCIAL INSECURITY: DOES ANYONE TRY TO KEEP YOU FROM HAVING/CONTACTING OTHER FRIENDS OR DOING THINGS OUTSIDE YOUR HOME?: NO

## 2024-05-02 SDOH — SOCIAL STABILITY: SOCIAL INSECURITY: HAS ANYONE EVER THREATENED TO HURT YOUR FAMILY OR YOUR PETS?: NO

## 2024-05-02 SDOH — SOCIAL STABILITY: SOCIAL INSECURITY: HAVE YOU HAD ANY THOUGHTS OF HARMING ANYONE ELSE?: NO

## 2024-05-02 SDOH — SOCIAL STABILITY: SOCIAL INSECURITY: ARE THERE ANY APPARENT SIGNS OF INJURIES/BEHAVIORS THAT COULD BE RELATED TO ABUSE/NEGLECT?: NO

## 2024-05-02 SDOH — SOCIAL STABILITY: SOCIAL INSECURITY: DO YOU FEEL UNSAFE GOING BACK TO THE PLACE WHERE YOU ARE LIVING?: NO

## 2024-05-02 SDOH — SOCIAL STABILITY: SOCIAL INSECURITY: ARE YOU OR HAVE YOU BEEN THREATENED OR ABUSED PHYSICALLY, EMOTIONALLY, OR SEXUALLY BY ANYONE?: NO

## 2024-05-02 SDOH — SOCIAL STABILITY: SOCIAL INSECURITY: HAVE YOU HAD THOUGHTS OF HARMING ANYONE ELSE?: NO

## 2024-05-02 SDOH — SOCIAL STABILITY: SOCIAL INSECURITY: ABUSE: ADULT

## 2024-05-02 SDOH — SOCIAL STABILITY: SOCIAL INSECURITY: WERE YOU ABLE TO COMPLETE ALL THE BEHAVIORAL HEALTH SCREENINGS?: YES

## 2024-05-02 SDOH — SOCIAL STABILITY: SOCIAL INSECURITY: DO YOU FEEL ANYONE HAS EXPLOITED OR TAKEN ADVANTAGE OF YOU FINANCIALLY OR OF YOUR PERSONAL PROPERTY?: NO

## 2024-05-02 ASSESSMENT — ACTIVITIES OF DAILY LIVING (ADL)
GROOMING: INDEPENDENT
LACK_OF_TRANSPORTATION: NO
JUDGMENT_ADEQUATE_SAFELY_COMPLETE_DAILY_ACTIVITIES: YES
TOILETING: NEEDS ASSISTANCE
WALKS IN HOME: NEEDS ASSISTANCE
FEEDING YOURSELF: INDEPENDENT
PATIENT'S MEMORY ADEQUATE TO SAFELY COMPLETE DAILY ACTIVITIES?: YES
BATHING: NEEDS ASSISTANCE
HEARING - LEFT EAR: FUNCTIONAL
DRESSING YOURSELF: NEEDS ASSISTANCE
HEARING - RIGHT EAR: FUNCTIONAL
ADEQUATE_TO_COMPLETE_ADL: YES

## 2024-05-02 ASSESSMENT — LIFESTYLE VARIABLES
SKIP TO QUESTIONS 9-10: 1
AUDIT-C TOTAL SCORE: 0
HOW MANY STANDARD DRINKS CONTAINING ALCOHOL DO YOU HAVE ON A TYPICAL DAY: PATIENT DOES NOT DRINK
HOW OFTEN DO YOU HAVE A DRINK CONTAINING ALCOHOL: NEVER
AUDIT-C TOTAL SCORE: 0
HOW OFTEN DO YOU HAVE 6 OR MORE DRINKS ON ONE OCCASION: NEVER

## 2024-05-02 ASSESSMENT — PAIN - FUNCTIONAL ASSESSMENT
PAIN_FUNCTIONAL_ASSESSMENT: 0-10
PAIN_FUNCTIONAL_ASSESSMENT: 0-10

## 2024-05-02 ASSESSMENT — COGNITIVE AND FUNCTIONAL STATUS - GENERAL
MOVING TO AND FROM BED TO CHAIR: A LOT
TOILETING: A LITTLE
TURNING FROM BACK TO SIDE WHILE IN FLAT BAD: A LITTLE
WALKING IN HOSPITAL ROOM: A LOT
MOBILITY SCORE: 13
CLIMB 3 TO 5 STEPS WITH RAILING: TOTAL
PATIENT BASELINE BEDBOUND: NO
HELP NEEDED FOR BATHING: A LOT
STANDING UP FROM CHAIR USING ARMS: A LOT
MOVING FROM LYING ON BACK TO SITTING ON SIDE OF FLAT BED WITH BEDRAILS: A LITTLE
DRESSING REGULAR LOWER BODY CLOTHING: A LOT
DAILY ACTIVITIY SCORE: 19

## 2024-05-02 ASSESSMENT — PAIN SCALES - GENERAL
PAINLEVEL_OUTOF10: 3
PAINLEVEL_OUTOF10: 7
PAINLEVEL_OUTOF10: 1

## 2024-05-02 ASSESSMENT — PATIENT HEALTH QUESTIONNAIRE - PHQ9
2. FEELING DOWN, DEPRESSED OR HOPELESS: NOT AT ALL
SUM OF ALL RESPONSES TO PHQ9 QUESTIONS 1 & 2: 0
1. LITTLE INTEREST OR PLEASURE IN DOING THINGS: NOT AT ALL

## 2024-05-02 NOTE — SIGNIFICANT EVENT
After discussion with Dr Hodges pt continues to be in excruciating pain and he may be healthy enough for OR. Pending periop medical clearance, plan to proceed with ORIF and possible revision arthroplasty today 5/2. Please keep NPO. Please hold DVT chemoppx at midnight for upcoming procedure. Please document medical clearance for OR. Appreciate care per primary team.    Rishabh Walker MD  Orthopaedic Surgery PGY-2 ([id number] Epic chat preferred)

## 2024-05-02 NOTE — CONSULTS
Orthopaedic Surgery Consult H&P    HPI:   Orthopaedic Problems/Injuries: R periprosthetic femur fx  Other Injuries: T7 3 column spine injury    HPI: 79M (DNRCC-DNI on hospice, A-fib, CHF-last echo 9/2023 w EF 35%, CKD, CAD w pacemaker, COPD on 2 L NC, R BRENDA) had mGLF sustaining above.     PMH: per above/EMR  PSH: per above/EMR  SocHx:      -  Denies tobacco use      -  Denies EtOH use      -  Denies other drug use  FamHx:  Non-contributory to this patient's acute orthopaedic problem.   Allergies: Reviewed in EMR  Meds: Reviewed in EMR    ROS      - 14 point ROS negative except as above    Physical Exam:  Gen: AOx3, NAD  HEENT: normocephalic atraumatic  Psych: appropriate mood and affect  Resp: nonlabored breathing  Cardiac: Extremities WWP, RRR to peripheral palpation  Neuro: CN 2-12 grossly intact  Skin: no rashes    Right lower extremity:  - Skin intact   - Tender to palpation over R hip  - Fires EHL/DF/PF.  - Sensation intact to light touch in sural, saphenous, superficial/deep peroneal, tibial nerve distributions.  - 2+ DP pulse, < 2 seconds capillary refill.    A full secondary exam was performed and all relevant findings discussed and noted above.    Imaging:  AP and lateral radiographs of the R hip display Hancock B periprosthetic femur fx. No subsidence since 2015 XRs    CT R hip displays the same, with greater clarity.    Assessment:  Orthopaedic Problems/Injuries: R Hancock B1 periprosthetic femur fx    79M (DNRCC-DNI on hospice, A-fib on eliquis, last dose Monday AM, CHF-last echo 9/2023 w EF 35%, CKD, CAD w pacemaker, COPD on 2 L NC, R BRENDA) had mGLF. Also w T7/T8 compression fx and R rib fxs. Closed, NVI. XR/CT w [place] B1, no subsidence since 2015 XRs. Patient on hospice/DNR-CC, agrees to lift for surgery.    Plan:  - Plan for ORIF R hip with Dr Key 5/2/24  - Primary team, periop medicine clearance pending (geriatrics involvement for GOC discussion)  - WB: NWB RLE   - Abx: no indication  until OR  - Diet: NPO after midnight  - DVT: hold until OR  - Preoplabs complete  - Caruso: Please place caruso    - Dispo: OR tomorrow pending clearance for R femur ORIF    Nitlon Trujillo MD  PGY-3 Orthopaedic Surgery  On-call Resident    This patient was seen within 30 minutes of initial consult.  _________________________________________________________    While admitted, this patient will be followed by the Ortho Trauma Team. Please contact below residents with any questions (available via Epic Chat).     First call: Moo Le PGY-1  Second call:  Rishabh Walker PGY-2  Third call: Nilton Trujillo, PGY-3

## 2024-05-02 NOTE — PROGRESS NOTES
"Orthopaedic Surgery Progress Note    Subjective:  No acute events overnight. Pain well controlled. Endorsing appropriate soreness. Endorses rib pain but denies cardiac chest pain, shortness of breath, or fevers.    Objective:  BP 97/55   Pulse 74   Temp 37.1 °C (98.7 °F)   Resp 16   Ht 1.778 m (5' 10\")   Wt 57.6 kg (127 lb)   SpO2 98%   BMI 18.22 kg/m²     Gen: arousable, NAD, appropriately conversational  Cardiac: RRR to peripheral palpation  Resp: nonlabored on RA  GI: soft, nondistended    MSK:  Right lower extremity:  - Skin intact   - Tender to palpation over R hip  - Fires EHL/DF/PF.  - Sensation intact to light touch in sural, saphenous, superficial/deep peroneal, tibial nerve distributions.  - Dopplerable DP pulse, < 2 seconds capillary refill.    Results for orders placed or performed during the hospital encounter of 05/01/24 (from the past 24 hour(s))   Type And Screen   Result Value Ref Range    ABO TYPE A     Rh TYPE NEG     ANTIBODY SCREEN NEG    Prepare RBC: 2 Units   Result Value Ref Range    PRODUCT CODE L9376R69     Unit Number [id number]     Unit ABO A     Unit RH NEG     XM INTEP COMP     Dispense Status XM     Blood Expiration Date May 29, 2024 23:59 EDT     PRODUCT BLOOD TYPE 0600     UNIT VOLUME 350     PRODUCT CODE S3809A71     Unit Number [id number]     Unit ABO A     Unit RH NEG     XM INTEP COMP     Dispense Status XM     Blood Expiration Date June 05, 2024 23:59 EDT     PRODUCT BLOOD TYPE 0600     UNIT VOLUME 350    Transthoracic Echo (TTE) Limited   Result Value Ref Range    LV Biplane EF 20 %    LV A4C EF 19.4    CBC   Result Value Ref Range    WBC 5.5 4.4 - 11.3 x10*3/uL    nRBC 0.0 0.0 - 0.0 /100 WBCs    RBC 3.60 (L) 4.50 - 5.90 x10*6/uL    Hemoglobin 10.8 (L) 13.5 - 17.5 g/dL    Hematocrit 32.3 (L) 41.0 - 52.0 %    MCV 90 80 - 100 fL    MCH 30.0 26.0 - 34.0 pg    MCHC 33.4 32.0 - 36.0 g/dL    RDW 14.9 (H) 11.5 - 14.5 %    Platelets 130 (L) 150 - 450 x10*3/uL "       Transthoracic Echo (TTE) Limited   Final Result      XR chest 1 view   Final Result   1. No acute cardiopulmonary process.   2. Reticular interstitial opacities of the right upper lung which may   relate to underlying interstitial lung disease. Upper lobe   predominant emphysema.   3. Similar enlarged cardiomediastinal silhouette.        I personally reviewed the image(s)/study and resident interpretation   as stated by Dr. Clara Sauceda MD. I agree with the findings as   stated. This study was interpreted at [facility], [place], [place].        MACRO:   None        Signed by: Pascual Wesley 5/1/2024 9:30 AM   Dictation workstation:   [id number]      CT chest abdomen pelvis wo IV contrast   Final Result   1. Compression fracture deformity of the T7 vertebral body, new from   12/12/2023, please see dedicated CT of the thoracic spine.   2. Periprosthetic fracture of the right proximal femur, please see   dedicated CT of the right hip.   3. No additional acute abnormality related to trauma in the chest,   abdomen, or pelvis.   4. Chronic and incidental findings as described above.        I personally reviewed the images/study and I agree with the findings   as stated by resident physician Dr. Facundo Canchola.        MACRO:   None        Signed by: Jack Weeks 5/1/2024 8:49 AM   Dictation workstation:   [id number]      FL less than 1 hour    (Results Pending)       Assessment/Plan: 79 y.o. male with R periprosthetic hip fracture also with 3 column T7 spine injury being managed by ortho spine.    Given patient clinical status and hospice status/prognosis, elect for non-operative management of right hip fracture.    - Weightbearing: NWB RLE  - Ok for DVT ppx from ortho perspective  - Ok for regular diet from ortho perspective  - Analgesia per primary team  - Orthopaedics to sign off  - Please Epic chat or page [phone] with any questions    Moo Le MD  Orthopedic  Surgery PGY-1  [facility]  Pager: [phone]  Available by Epic Chat

## 2024-05-02 NOTE — ANESTHESIA PREPROCEDURE EVALUATION
Patient: Andrez Damon    Procedure Information       Date: 05/02/24    Procedures:       Open Reduction Internal Fixation Hip (Right: Hip)      Revision Arthroplasty Total Hip (Right: Hip)    Location: [facility] OR    Surgeons: Al Key MD          ALLERGIES:  Allergies   Allergen Reactions    Meloxicam Shortness of breath    Celecoxib Unknown    Keflex [Cephalexin] Hives        MEDICAL HISTORY:  Past Medical History:   Diagnosis Date    Arthritis     Atrial fibrillation with RVR (Multi) 09/22/2023    Body mass index (BMI) 20.0-20.9, adult 09/21/2021    Body mass index (BMI) of 20.0 to 20.9 in adult    Body mass index (BMI) 21.0-21.9, adult 04/14/2021    Body mass index (BMI) of 21.0 to 21.9 in adult    CHF (congestive heart failure) (Multi)     COPD (chronic obstructive pulmonary disease) (Multi)     Coronary artery disease     Diabetes mellitus (Multi)     Hypertension     Major depressive disorder, recurrent, mild (CMS-HCC) 11/11/2020    Mild episode of recurrent major depressive disorder    Major depressive disorder, recurrent, unspecified (CMS-HCC) 01/13/2021    Recurrent major depressive disorder, remission status unspecified    Other conditions influencing health status     Swelling Of Hands    Other specified anxiety disorders 05/19/2020    Depression with anxiety    Oxygen desaturation during sleep     wears 2L at HS    Personal history of nicotine dependence 07/12/2021    History of nicotine dependence    Personal history of other diseases of the circulatory system     History of cardiac disorder    Personal history of other diseases of the musculoskeletal system and connective tissue     History of arthritis    Personal history of other diseases of urinary system 09/21/2021    History of chronic kidney disease    Personal history of other endocrine, nutritional and metabolic disease 08/29/2019    History of hyperglycemia    Pneumonia, unspecified organism 12/09/2019    Atypical  pneumonia    Thoracic aortic aneurysm, without rupture, unspecified (CMS-HCC) 05/29/2019    Thoracic aortic aneurysm without rupture        Relevant Problems   Anesthesia (within normal limits)      Cardiac   (+) AF (paroxysmal atrial fibrillation) (Multi)   (+) AICD (automatic cardioverter/defibrillator) present   (+) Abnormal electrocardiogram   (+) Acute congestive heart failure (Multi) (EF 20-25%)   (+) CAD (coronary artery disease)   (+) Hyperlipemia   (+) Hypertension   (+) Mitral regurgitation (Mild to moderate)   (+) PVD (peripheral vascular disease) (CMS-HCC)   (+) Pacemaker (VVI 40; underlying sinus; <1% dependence; Tachy therapy turned off)   (+) Sick sinus syndrome (Multi)      Pulmonary   (+) Dyspnea on exertion   (+) Pulmonary embolism (Multi)      Neuro   (+) Depression, major, in remission (CMS-HCC)   (+) Peripheral neuropathy   (+) Radiculopathy      GI   (+) Esophageal reflux      /Renal   (+) Chronic renal impairment, stage 3 (moderate) (Multi)   (+) Hyponatremia      Endocrine (within normal limits)      Hematology   (+) Anemia of chronic disease   (+) Anticoagulant long-term use (Eliquis and Brillinta - last dose 4/29/2024)   (+) Normochromic normocytic anemia      Musculoskeletal   (+) DJD (degenerative joint disease)   (+) Lumbar spondylosis        SURGICAL HISTORY:  Past Surgical History:   Procedure Laterality Date    BACK SURGERY  01/24/2014    Back Surgery    CARDIAC ELECTROPHYSIOLOGY PROCEDURE N/A 12/15/2023    Procedure: ICD - Single Generator Change Out;  Surgeon: Flaco Briggs MD;  Location: [place] [address] Cardiac Cath Lab;  Service: Electrophysiology;  Laterality: N/A;  abbott    CORONARY ANGIOPLASTY WITH STENT PLACEMENT  01/24/2014    Cath Stent Placement    CT ABDOMEN PELVIS ANGIOGRAM W AND/OR WO IV CONTRAST  06/06/2019    CT ABDOMEN PELVIS ANGIOGRAM W AND/OR WO IV CONTRAST 6/6/2019 [facility] ANCI[facility] LEGACY    CT HEAD ANGIO W AND WO IV CONTRAST  10/15/2020    CT HEAD ANGIO W AND WO  IV CONTRAST 10/15/2020 [facility] ANCILLARY LEGACY    HERNIA REPAIR  04/24/2014    Hernia Repair    OTHER SURGICAL HISTORY  04/17/2019    Abdominal surgery    OTHER SURGICAL HISTORY  01/24/2014    Defibrillation    PACEMAKER PLACEMENT      TOTAL HIP ARTHROPLASTY  01/24/2014    Hip Replacement        MEDICATIONS:  Current Outpatient Medications   Medication Instructions    amiodarone (PACERONE) 200 mg, oral, Daily    azithromycin (Zithromax) 250 mg tablet TAKE 1 TABLET BY MOUTH 3 TIMES PER WEEK    B complex-vitamin C-folic acid (Nephrocaps) 1 mg capsule 1 capsule, oral, Daily    Brilinta 90 mg, oral, 2 times daily, AS DIRECTED    bumetanide (BUMEX) 2 mg, oral, Daily    cholecalciferol (VITAMIN D-3) 125 mcg, oral, Daily    Eliquis 2.5 mg, oral, 2 times daily    escitalopram (LEXAPRO) 10 mg, oral, Daily    finasteride (PROSCAR) 5 mg, oral, Daily    fluticasone-umeclidin-vilanter (Trelegy Ellipta) 200-62.5-25 mcg blister with device 1 puff, inhalation, Daily with breakfast, Inhale 1 puff daily, rinse mouth after use    haloperidol (Haldol) 0.5 mg tablet     HYDROmorphone (DILAUDID) 1 mg, oral, Every 2 hour PRN    levalbuterol (Xopenex) 45 mcg/actuation inhaler 1 puff, inhalation, Every 4 hours PRN, INHALE 1 TO 2 PUFFS EVERY 4 TO 6 HOURS AS NEEDED AND AS DIRECTED.    methadone (DOLOPHINE) 2.5 mg, oral, Nightly    metoprolol succinate XL (Toprol-XL) 25 mg 24 hr tablet TAKE 1/2 TABLET (12.5MG) BY MOUTH ONCE DAILY. DO NOT CRUSH OR CHEW    midodrine (Proamatine) 2.5 mg tablet     ondansetron (Zofran) 8 mg tablet     oxygen (O2) 2 L/min, inhalation, Continuous    potassium chloride CR (K-Tab) 20 mEq ER tablet 1 tablet, oral, Daily    pyridoxine (VITAMIN B-6) 25 mg, oral, Daily    rOPINIRole (REQUIP) 1 mg, oral, Nightly    tamsulosin (FLOMAX) 0.8 mg, oral, Nightly        VITALS:  Vitals:    05/03/24 1004   BP: 131/64   Pulse: 58   Resp: 18   Temp: 36.4 °C (97.5 °F)   SpO2: 99%         LABS:    5/3/2024  143/3.9/102/38/27/1.65  5.3/10.1/31/3/123    IMAGES:  EKG          Encounter Date: 05/01/24   ECG 12 lead   Result Value    Ventricular Rate 61    Atrial Rate 61    NV Interval 116    QRS Duration 134    QT Interval 450    QTC Calculation(Bazett) 453    P Axis -13    R Axis 213    T Axis 85    QRS Count 10    Q Onset 214    P Onset 156    P Offset 202    T Offset 439    QTC Fredericia 452    Narrative    See ED provider note for full interpretation and clinical correlation  Confirmed by Jose Garcia ([id number]) on 5/2/2024 11:56:26 PM      ECHO         Transthoracic Echo (TTE) Limited With Doppler, Color And Contrast 05/01/2024    Narrative  [facility], [address] [place] [address]  Tel [phone] and Fax [phone]    TRANSTHORACIC ECHOCARDIOGRAM REPORT      Patient Name:      ALEJANDRA FRANCIS      Reading Physician:    [id number] Aleida Weiss MD  Study Date:        5/1/2024             Ordering Provider:    [id number] GIANNI GARZA  MRN/PID:           [id number]             Fellow:  Accession#:        [id number]         Nurse:                Елена Song RN  Date of Birth/Age: 1945 / 79 years Sonographer:          Genet Miner RDCS  Gender:            M                    Additional Staff:  Height:            177.80 cm            Admit Date:           5/1/2024  Weight:            57.61 kg             Admission Status:     Inpatient - STAT  BSA / BMI:         1.72 m2 / 18.22      Encounter#:           [id number]  kg/m2  Department Location:  [facility]  Blood Pressure: 109 /63 mmHg    Study Type:    TRANSTHORACIC ECHO (TTE) LIMITED  Diagnosis/ICD: Abnormal electrocardiogram [ECG] [EKG]-R94.31  Indication:    Pre Op Workup  CPT Code:      Echo Limited-34941; Doppler Limited-70409; Color Doppler-46963    Patient History:  Pertinent History: Afib, CAD, ICM, DM, DLD, HTN, CHF, COPD, Old MI, Moderate MR,  Mild AI.    Study Detail: The following Echo studies were performed: 2D,  Doppler, M-Mode and  color flow. Technically challenging study due to body habitus and  patient lying in supine position. Definity used as a contrast  agent for endocardial border definition. Total contrast used for  this procedure was 3.0 mL via IV push.      PHYSICIAN INTERPRETATION:  Left Ventricle: The left ventricular systolic function is severely decreased, with an estimated ejection fraction of 20-25%. There is global hypokinesis of the left ventricle with minor regional variations. The left ventricular cavity size is severely dilated. Left ventricular diastolic filling was not assessed. Severely dilated LV with severe global LV systolic dysfunction with regional variation in wall motion abnormalities. 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 Echocontrast was used and excluded LV apical thrombus.  Left Atrium: The left atrium is enlarged.  Right Ventricle: The right ventricle is normal in size. There is normal right ventricular global systolic function. A device is visualized in the right ventricle.  Right Atrium: The right atrium is upper limits of normal in size. There is a device visualized in the right atrium.  Aortic Valve: The aortic valve was not well visualized. There is moderate aortic valve regurgitation.  Mitral Valve: The mitral valve is mildly thickened. There is mild to moderate mitral valve regurgitation.  Tricuspid Valve: The tricuspid valve is structurally normal. There is trace tricuspid regurgitation. The right ventricular systolic pressure is unable to be estimated.  Pulmonic Valve: The pulmonic valve was not assessed. Pulmonic valve regurgitation was not assessed.  Pericardium: There is a trivial pericardial effusion.  Aorta: The aortic root was not well visualized.  Systemic Veins: The inferior vena cava appears dilated. There is less than 50% IVC collapse with inspiration.  In comparison to the previous echocardiogram(s): Compared with the prior exam from 9/18/2023 ( [facility])  the LV cavity appears to be larger with worse systolic function though the previously reported LVEF of 35-40% may have been an over estimate. In addition the AI appears to have increased from mild to now moderate AI.      CONCLUSIONS:  1. Left ventricular systolic function is severely decreased with a 20-25% estimated ejection fraction.  2. Severely dilated LV with severe global LV systolic dysfunction with regional variation in wall motion abnormalities. 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 Echocontrast was used and excluded LV apical thrombus.  3. Left ventricular cavity size is severely dilated.  4. There is global hypokinesis of the left ventricle with minor regional variations.  5. The left atrium is enlarged.  6. Mild to moderate mitral valve regurgitation.  7. Moderate aortic valve regurgitation.  8. ER staff notified of findings at the time of reporting.  9. Compared with the prior exam from 9/18/2023 ( [facility]) the LV cavity appears to be larger with worse systolic function though the previously reported LVEF of 35-40% may have been an over estimate. In addition the AI appears to have increased from mild to now moderate AI.    QUANTITATIVE DATA SUMMARY:  LV SYSTOLIC FUNCTION BY 2D PLANIMETRY (MOD):  Normal Ranges:  EF-A4C View: 19.4 % (>=55%)  EF-A2C View: 21.0 %  EF-Biplane:  20.4 %    TRICUSPID VALVE/RVSP:  Normal Ranges:  IVC Diam: 2.50 cm      [id number] Aleida Weiss MD  Electronically signed on 5/1/2024 at 4:22:52 PM        ** Final **    SOCIAL:  Social History     Tobacco Use   Smoking Status Every Day    Current packs/day: 0.25    Average packs/day: 0.3 packs/day for 68.0 years (17.0 ttl pk-yrs)    Types: Cigarettes   Smokeless Tobacco Never      Social History     Substance and Sexual Activity   Alcohol Use Never      Social History     Substance and Sexual Activity   Drug Use Never        NPO STATUS:  NPO/Void Status  Date of Last Liquid: 05/03/24  Time of Last Liquid: 0945  Date of Last  Solid: 05/02/24  Time of Last Solid: 2100  Last Intake Type: Clear fluids (sips with meds)        Clinical Areas Reviewed:   Tobacco  Allergies  Meds   Med Hx  Surg Hx   Fam Hx  Soc Hx        Anesthesia Assessment:    Physical Exam    Airway  Mallampati: II  TM distance: >3 FB  Neck ROM: full     Cardiovascular   Rhythm: regular     Dental     Comments: Multiple missing and broken teeth; (2 remaining teeth #26/#28) No teeth on top   Pulmonary   Breath sounds clear to auscultation     Abdominal            Anesthesia Plan    History of general anesthesia?: yes  History of complications of general anesthesia?: no    ASA 4     general   (GETA, standard monitors, PIV. Arterial line; possible central line.  Code status discussed)  The patient is a current smoker.    intravenous induction   Postoperative administration of opioids is intended.  Trial extubation is planned.  Anesthetic plan and risks discussed with patient.  Use of blood products discussed with patient who consented to blood products.    Plan discussed with CAA.

## 2024-05-02 NOTE — H&P
H&P reviewed. The patient was examined and there are no changes to the H&P. Patient electing to proceed with surgery. Patient marked and consented.      Marcelino Montalvo MD  Orthopaedic Surgery, PGY-2  EpicChat preferred

## 2024-05-02 NOTE — PROGRESS NOTES
[facility]  TRAUMA SERVICE - PROGRESS NOTE    Patient Name: Andrez Damon  MRN: [id number]  Admit Date: 501  : 1945  AGE: 79 y.o.   GENDER: male  ==============================================================================  MECHANISM OF INJURY / CHIEF COMPLAINT:   79 year old male, was taking his trash out. He suffered a mechanical fall after tripping over the bin falling on his right side. No LOC. See at OSH and brought here. He is complaining of some right chest wall and some right hip pain.   Prior on hospice, here for evaluation for any palliative fixation for the hip. Patient stating in room that he currently wishes to avoid surgery if possible.   LOC (yes/no?): no  Anticoagulant / Anti-platelet Rx? (for what dx?): Eliquis (PE)  Referring Facility Name (N/A for scene EMR run): [place]      INJURIES:   Right 6-7 nondisplaced rib fx (On CXR)  T7 VB fx with height loss, T6 SP fx  Right periprosthetic hip fx     OTHER MEDICAL PROBLEMS:  CAD, HFrEF LVEF 24% with major infarcts on stress echo   COPD  HTN  CKD  ICD implantation     INCIDENTAL FINDINGS:  NA    ==============================================================================  TODAY'S ASSESSMENT AND PLAN OF CARE:  - admitted to trauma service    #R periprosthetic hip fx  - ortho consulted  - perioperative medicine/Dr. Hodges consulted for risk start  - geriatrics consulted for goals of care for OR decision  - PT/OT  - pain control multimodal with scheduled tylenol, home methadone, oxy 5/10mg PRN  - plan for OR tomorrow with ortho for right hip sx  - npo at midnight tonight  - will need reversal of DNR/DNI for OR    #T7 VB fx with height loss, T6 SP fx  - spine consulted, plan is non op per ortho given complexity of fixation    #CAD #HFrEF with EF 20#  - periop medicine consulted for cardiac risk start  - home amiodarone  -  home bumetanide  - holding home eliquis    #Pulmonary  - O2 supplement to keep  sat >92%  - wean as tolerated  - tiotropium inhaler prn    #Depression  - escitalopram 10mg home dose  - ropinirole nightly 1mg    #  - cont home finasteride  - PT/OT  - DVT ppx with SQH  - daily labs    Dispo: not medically ready in anticipation of OR tmrw    D/w Dr. Lal trauma attending.    Dorys Calixto MD  PGY-1 VS Resident  Trauma Surgery Service  Floor: [phone]    ==============================================================================  CHIEF COMPLAINT / OVERNIGHT EVENTS:   Discussed options with patient including both operative and nonoperative management of his right hip fracture.  Patient is of the opinion that the risks of surgery are too high and he did would not want to undergo any sort of surgical intervention given his heart status.  I spoke with him regarding quality of life with and without intervention specifically addressing restrictions.  Patient highly values independence and mobility, therefore it would be difficult without surgery to achieve that.  He would be need to be in a TLSO brace with likely nonweightbearing status on the right foot as well as with high chance of chronic pain.  Daughter and wife at bedside understand the situation and are in favor of surgery to fix the right hip.  Patient wanted to think about his options. Pain is currently 4/10, but goes up to 10/10 with movement due to lower back and right hip.     Update: At 4:30 PM patient spoke with the geriatrics team regarding willing to undergo surgery to improve his quality of life for his right hip.  I discussed with Ortho team who is unavailable today but will be likely available tomorrow for intervention.    MEDICAL HISTORY / ROS:  Admission history and ROS reviewed. Pertinent changes as follows:      PHYSICAL EXAM:  Heart Rate:  [68-78]   Temperature:  [37.1 °C (98.7 °F)]   Respirations:  [15-18]   BP: ()/()   Pulse Ox:  [83 %-99 %]   Physical Exam    IMAGING SUMMARY:  (summary of new imaging findings,  not a copy of dictation)      LABS:  Results from last 7 days   Lab Units 05/02/24  0510 05/01/24  0058   WBC AUTO x10*3/uL 5.5 5.6   HEMOGLOBIN g/dL 10.8* 12.7*   HEMATOCRIT % 32.3* 40.9*   PLATELETS AUTO x10*3/uL 130* 159   NEUTROS PCT AUTO %  --  77.0   LYMPHS PCT AUTO %  --  12.8   MONOS PCT AUTO %  --  8.2   EOS PCT AUTO %  --  0.9     Results from last 7 days   Lab Units 05/01/24  0058   APTT seconds 31   INR  1.2*     Results from last 7 days   Lab Units 05/02/24  0510 05/01/24  0058   SODIUM mmol/L 144 144   POTASSIUM mmol/L 3.4* 3.2*   CHLORIDE mmol/L 104 103   CO2 mmol/L 32 32   BUN mg/dL 29* 31*   CREATININE mg/dL 1.71* 2.45*   CALCIUM mg/dL 8.4* 9.4   PROTEIN TOTAL g/dL  --  5.8*   BILIRUBIN TOTAL mg/dL  --  0.5   ALK PHOS U/L  --  68   ALT U/L  --  9*   AST U/L  --  17   GLUCOSE mg/dL 145* 185*     Results from last 7 days   Lab Units 05/01/24  0058   BILIRUBIN TOTAL mg/dL 0.5           I have reviewed all medications, laboratory results, and imaging pertinent for today's encounter.

## 2024-05-02 NOTE — CONSULTS
"Consults    Primary Team: Trauma    Admit Date: 5/1/2024    Emergency Contact:   Extended Emergency Contact Information  Primary Emergency Contact: Stacey Damon  Address: [address] [place] [id number] [place] [facility]  Home Phone: [phone]  Mobile Phone: [phone]  Relation: Spouse  Secondary Emergency Contact: STIVEN REINA  Mobile Phone: [phone]  Relation: Daughter  Preferred language: English   needed? No     Reason For Consult: goals of care discussion for hip/spine surgery.     History Of Present Illness:  Andrez Damon is a 79 y.o. male presenting with right 6-7 non-displaced rib fracture, T7 VB fracture with height loss and right periprosthetic hip fracture after a fall at home.     History per patient:  Patient was bringing the trash out to the street when he tripped on uneven pavement and fell on his side. His neighbors found him lying in the street and called 911. Patient sent to ED and diagnosed with right 6-7 non-displaced rib fracture, T7 VB fracture with height loss and right periprosthetic hip fracture. Patient is experiencing \"intense\" pain in his right hip, he has chronic low back pain and reports back pain is not \"too bad.\" Patient states that it has been recommended to use a brace for his vertebral fracture as the risk of surgery is too high, however, surgery on his right hip has been recommended for palliative measures. Patient states when he had his previous hip surgery he had a heart attack and he is fearful this will happen again and is reluctant to have surgery.     Patient has recently enrolled in Hospice, [facility], due to advanced cardiac disease on the recommendation of his cardiologist. \"My doctor said there is nothing more he can do for me.\" Patient has an ICD, this remains active as \"I have things to do first.\" Patient and his family are pleased with the care and support Hospice has provided.     Patient reports pain in right " "hip is 6/10, back pain is an \"ache\" and rib pain only hurts if he coughs or moves. Patient denies cough/sob/wheeze, also denies chest pain/pressure     History per family/caregiver: (obtained in addition due to patient’s lethargy after pain medications)  Met with wife and daughter at bedside. Wife reports patient likes to be as \"independent as possible.\" Wife and daughter have been caring for patient at home with support from [facility]    What matters most to the patient:  Being home with my family.     Prior to Admission Meds  Prior to Admission Medications   Prescriptions Last Dose Informant Patient Reported? Taking?   B complex-vitamin C-folic acid (Nephrocaps) 1 mg capsule  Care Giver No No   Sig: Take 1 capsule by mouth once daily.   Brilinta 90 mg tablet Past Week Care Giver No Yes   Sig: TAKE 1 TABLET BY MOUTH TWICE A DAY AS DIRECTED   Eliquis 2.5 mg tablet Past Week Care Giver No Yes   Sig: TAKE 1 TABLET BY MOUTH TWICE A DAY   HYDROmorphone (Dilaudid) 2 mg tablet  Care Giver Yes No   Sig: Take 0.5 tablets (1 mg) by mouth every 2 hours if needed for severe pain (7 - 10).   amiodarone (Pacerone) 200 mg tablet  Care Giver Yes No   Sig: Take 1 tablet (200 mg) by mouth once daily.   azithromycin (Zithromax) 250 mg tablet  Care Giver No No   Sig: TAKE 1 TABLET BY MOUTH 3 TIMES PER WEEK   bumetanide (Bumex) 1 mg tablet  Care Giver No No   Sig: TAKE 2 TABLETS BY MOUTH EVERY DAY   cholecalciferol (Vitamin D-3) 125 MCG (5000 UT) capsule  Care Giver No No   Sig: TAKE 1 CAPSULE BY MOUTH EVERY DAY   Patient not taking: Reported on 4/15/2024   escitalopram (Lexapro) 10 mg tablet  Care Giver No No   Sig: Take 1 tablet (10 mg) by mouth once daily.   finasteride (Proscar) 5 mg tablet  Care Giver No No   Sig: Take 1 tablet (5 mg) by mouth once daily.   fluticasone-umeclidin-vilanter (Trelegy Ellipta) 200-62.5-25 mcg blister with device Past Week Care Giver No Yes   Sig: Inhale 1 puff once daily with " breakfast. Inhale 1 puff daily, rinse mouth after use   haloperidol (Haldol) 0.5 mg tablet  Care Giver Yes No   levalbuterol (Xopenex) 45 mcg/actuation inhaler  Care Giver No No   Sig: Inhale 1 puff every 4 hours if needed for wheezing or shortness of breath. INHALE 1 TO 2 PUFFS EVERY 4 TO 6 HOURS AS NEEDED AND AS DIRECTED.   methadone (Dolophine) 5 mg tablet  Care Giver Yes No   Sig: Take 0.5 tablets (2.5 mg) by mouth once daily at bedtime.   metoprolol succinate XL (Toprol-XL) 25 mg 24 hr tablet  Care Giver No No   Sig: TAKE 1/2 TABLET (12.5MG) BY MOUTH ONCE DAILY. DO NOT CRUSH OR CHEW   Patient not taking: Reported on 4/15/2024   midodrine (Proamatine) 2.5 mg tablet  Care Giver Yes No   ondansetron (Zofran) 8 mg tablet  Care Giver Yes No   oxygen (O2) gas therapy  Care Giver No No   Sig: Inhale 2 L/min continuously.   potassium chloride CR (K-Tab) 20 mEq ER tablet  Care Giver Yes No   Sig: Take 1 tablet (20 mEq) by mouth once daily.   pyridoxine (Vitamin B-6) 25 mg tablet  Care Giver No No   Sig: Take 1 tablet (25 mg) by mouth once daily.   Patient not taking: Reported on 4/15/2024   rOPINIRole (Requip) 1 mg tablet  Care Giver No No   Sig: TAKE 1 TABLET BY MOUTH EVERYDAY AT BEDTIME   tamsulosin (Flomax) 0.4 mg 24 hr capsule Past Week Care Giver No Yes   Sig: Take 2 capsules (0.8 mg) by mouth once daily at bedtime.      Facility-Administered Medications: None        Current Meds in Hospital  Current Facility-Administered Medications   Medication Dose Route Frequency Provider Last Rate Last Admin    acetaminophen (Tylenol) tablet 650 mg  650 mg oral q4h Dorys Calixto MD   650 mg at 05/02/24 0941    albuterol 2.5 mg /3 mL (0.083 %) nebulizer solution 1.25 mg  1.25 mg nebulization q6h PRN Dorys Calixto MD        amiodarone (Pacerone) tablet 200 mg  200 mg oral Daily Dorys Calixto MD   200 mg at 05/02/24 0838    B complex-vitamin C-folic acid (Nephrocaps) capsule 1 capsule  1 capsule oral Daily Dorys Calixto MD   1  capsule at 05/02/24 0838    bumetanide (Bumex) tablet 2 mg  2 mg oral Daily Dorys Calixto MD   2 mg at 05/02/24 0838    escitalopram (Lexapro) tablet 10 mg  10 mg oral Daily Dorys Calixto MD   10 mg at 05/02/24 0838    finasteride (Proscar) tablet 5 mg  5 mg oral Daily Dorys Calixto MD   5 mg at 05/02/24 0838    tiotropium (Spiriva Respimat) 2.5 mcg/actuation inhaler 2 puff  2 puff inhalation Daily Dorys Calixto MD   2 puff at 05/02/24 0639    And    fluticasone furoate-vilanteroL (Breo Ellipta) 100-25 mcg/dose inhaler 1 puff  1 puff inhalation Daily Dorys Calixto MD   1 puff at 05/02/24 0639    heparin (porcine) injection 5,000 Units  5,000 Units subcutaneous TID Mirna Malagon PA-C   5,000 Units at 05/02/24 0839    HYDROmorphone (Dilaudid) injection 0.5 mg  0.5 mg intravenous q4h PRN Dorys Calixto MD   0.5 mg at 05/01/24 2147    methadone (Dolophine) tablet 2.5 mg  2.5 mg oral Nightly Dorys Calixto MD   2.5 mg at 05/01/24 2146    oxyCODONE (Roxicodone) immediate release tablet 10 mg  10 mg oral q4h PRN Dorys Calixto MD   10 mg at 05/02/24 0837    oxyCODONE (Roxicodone) immediate release tablet 5 mg  5 mg oral q4h PRN Dorys Calixto MD        oxygen (O2) therapy  2 L/min inhalation Continuous Dorys Calixto MD   2 L/min at 05/01/24 1410    polyethylene glycol (Glycolax, Miralax) packet 17 g  17 g oral Daily Dorys Calixto MD   17 g at 05/02/24 0839    rOPINIRole (Requip) tablet 1 mg  1 mg oral Nightly Dorys Calixto MD   1 mg at 05/01/24 2146     Current Outpatient Medications   Medication Sig Dispense Refill    Brilinta 90 mg tablet TAKE 1 TABLET BY MOUTH TWICE A DAY AS DIRECTED 60 tablet 5    Eliquis 2.5 mg tablet TAKE 1 TABLET BY MOUTH TWICE A DAY 60 tablet 4    fluticasone-umeclidin-vilanter (Trelegy Ellipta) 200-62.5-25 mcg blister with device Inhale 1 puff once daily with breakfast. Inhale 1 puff daily, rinse mouth after use 60 each 2    tamsulosin (Flomax) 0.4 mg 24 hr capsule Take 2 capsules (0.8 mg) by mouth once daily  at bedtime. 180 capsule 3    amiodarone (Pacerone) 200 mg tablet Take 1 tablet (200 mg) by mouth once daily.      azithromycin (Zithromax) 250 mg tablet TAKE 1 TABLET BY MOUTH 3 TIMES PER WEEK 12 tablet 2    B complex-vitamin C-folic acid (Nephrocaps) 1 mg capsule Take 1 capsule by mouth once daily. 30 capsule 2    bumetanide (Bumex) 1 mg tablet TAKE 2 TABLETS BY MOUTH EVERY DAY 60 tablet 1    cholecalciferol (Vitamin D-3) 125 MCG (5000 UT) capsule TAKE 1 CAPSULE BY MOUTH EVERY DAY (Patient not taking: Reported on 4/15/2024) 90 capsule 0    escitalopram (Lexapro) 10 mg tablet Take 1 tablet (10 mg) by mouth once daily. 90 tablet 0    finasteride (Proscar) 5 mg tablet Take 1 tablet (5 mg) by mouth once daily. 90 tablet 3    haloperidol (Haldol) 0.5 mg tablet       HYDROmorphone (Dilaudid) 2 mg tablet Take 0.5 tablets (1 mg) by mouth every 2 hours if needed for severe pain (7 - 10).      levalbuterol (Xopenex) 45 mcg/actuation inhaler Inhale 1 puff every 4 hours if needed for wheezing or shortness of breath. INHALE 1 TO 2 PUFFS EVERY 4 TO 6 HOURS AS NEEDED AND AS DIRECTED. 15 g 11    methadone (Dolophine) 5 mg tablet Take 0.5 tablets (2.5 mg) by mouth once daily at bedtime.      metoprolol succinate XL (Toprol-XL) 25 mg 24 hr tablet TAKE 1/2 TABLET (12.5MG) BY MOUTH ONCE DAILY. DO NOT CRUSH OR CHEW (Patient not taking: Reported on 4/15/2024) 45 tablet 0    midodrine (Proamatine) 2.5 mg tablet       ondansetron (Zofran) 8 mg tablet       oxygen (O2) gas therapy Inhale 2 L/min continuously.      potassium chloride CR (K-Tab) 20 mEq ER tablet Take 1 tablet (20 mEq) by mouth once daily.      pyridoxine (Vitamin B-6) 25 mg tablet Take 1 tablet (25 mg) by mouth once daily. (Patient not taking: Reported on 4/15/2024) 30 tablet 11    rOPINIRole (Requip) 1 mg tablet TAKE 1 TABLET BY MOUTH EVERYDAY AT BEDTIME 90 tablet 0        The patient's outpatient electronic medical record (EMR) is reviewed, notable information  includes:    Urology follow up from 4/26: BPH- continue tamsulosin and finasteride     Cardiology follow up: Had generator for ICD replaced in December 2023,  had recent device check Last admission in Jan 2024 with CHF and COPD exacerbation, received IV diuresis, steroids and antibiotics.     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:       Past Medical History  Past Medical History:   Diagnosis Date    Arthritis     Atrial fibrillation with RVR (Multi) 09/22/2023    Body mass index (BMI) 20.0-20.9, adult 09/21/2021    Body mass index (BMI) of 20.0 to 20.9 in adult    Body mass index (BMI) 21.0-21.9, adult 04/14/2021    Body mass index (BMI) of 21.0 to 21.9 in adult    CHF (congestive heart failure) (Multi)     COPD (chronic obstructive pulmonary disease) (Multi)     Coronary artery disease     Diabetes mellitus (Multi)     Hypertension     Major depressive disorder, recurrent, mild (CMS-HCC) 11/11/2020    Mild episode of recurrent major depressive disorder    Major depressive disorder, recurrent, unspecified (CMS-HCC) 01/13/2021    Recurrent major depressive disorder, remission status unspecified    Other conditions influencing health status     Swelling Of Hands    Other specified anxiety disorders 05/19/2020    Depression with anxiety    Oxygen desaturation during sleep     wears 2L at HS    Personal history of nicotine dependence 07/12/2021    History of nicotine dependence    Personal history of other diseases of the circulatory system     History of cardiac disorder    Personal history of other diseases of the musculoskeletal system and connective tissue     History of arthritis    Personal history of other diseases of urinary system 09/21/2021    History of chronic kidney disease    Personal history of other endocrine, nutritional and metabolic disease 08/29/2019    History of hyperglycemia    Pneumonia, unspecified organism 12/09/2019    Atypical pneumonia    Thoracic aortic aneurysm, without  rupture, unspecified (CMS-[facility]) 05/29/2019    Thoracic aortic aneurysm without rupture        Surgical History  Past Surgical History:   Procedure Laterality Date    BACK SURGERY  01/24/2014    Back Surgery    CARDIAC ELECTROPHYSIOLOGY PROCEDURE N/A 12/15/2023    Procedure: ICD - Single Generator Change Out;  Surgeon: Flaco Briggs MD;  Location: [place] [id number] Cardiac Cath Lab;  Service: Electrophysiology;  Laterality: N/A;  [place]ott    CORONARY ANGIOPLASTY WITH STENT PLACEMENT  01/24/2014    Cath Stent Placement    CT ABDOMEN PELVIS ANGIOGRAM W AND/OR WO IV CONTRAST  06/06/2019    CT ABDOMEN PELVIS ANGIOGRAM W AND/OR WO IV CONTRAST 6/6/2019 [facility]    CT HEAD ANGIO W AND WO IV CONTRAST  10/15/2020    CT HEAD ANGIO W AND WO IV CONTRAST 10/15/2020 [facility]    HERNIA REPAIR  04/24/2014    Hernia Repair    OTHER SURGICAL HISTORY  04/17/2019    Abdominal surgery    OTHER SURGICAL HISTORY  01/24/2014    Defibrillation    PACEMAKER PLACEMENT      TOTAL HIP ARTHROPLASTY  01/24/2014    Hip Replacement        Family History  His family history includes cardiac disorder in his father and mother; malignant neoplasm in his mother.     Social History  He reports that he has been smoking cigarettes. He has a 17 pack-year smoking history. He has never used smokeless tobacco. He reports that he does not drink alcohol and does not use drugs.  -Alcohol use: No  -Tobacco use: Yes - 1/3 PPD   -Illicit drug use: No  -Exercise: none   -Spiritual needs: believes in God, not active in any Protestant   -Marital Status:    Occupation: retired     Community Resources: [facility]   :   Current living environment: lives with wife in one level, single family home     Activities of Daily Living:  Basic ADLs: (I= independent, A= assistance, D= dependent)  Bathing: A , Dressing: I, Toileting: I, Transferring: I, Continence: I, Feeding: I    Briseno Index:  5  Instrumental ADLs: (I= independent, A=  assistance, D= dependent)  Ability to use phone: I, Shopping: A , Cooking: I, Housekeeping: A , Laundry: D , Transportation: D , Medications: I, Handle Finances: A    [place] Scale:  3    Allergies  Meloxicam, Celecoxib, and Keflex [cephalexin]    Review of Systems  Review of System:  Constitutional:   -Night sweats/fever: none     -Weight change: none     Integumentary: no concerns     Ears, Nose, Throat:  -Hearing loss: [place]        Eyes:  -Vision loss: wears glasses     Cardiovascular:  -Chest Pain: none   -Orthopnea: +       -Paroxysmal nocturnal dyspnea: denies   -Edema: none     Respiratory:   -Dyspnea: +   -Wheezing: at times     Gastrointestinal:           -Appetite: appetite fluctuates   -Difficulty chewing/ swallowing: denies   -Heartburn: none   -Bowels:no concerns     Genitourinary:   -Incontinence: occasionally due to BPH   -Nocturia: 1 per night    Musculoskeletal:  -Mobility: usually walks independently   -Pain: + pain in right hip and low back     Neurological:  -Confusion: none   -Falls: This is first fall in 6 years   -Gait: generally stable   -Memory: no concerns   -Tremor: none     Psychiatric:  -Mood: feeling discouraged, history of depression   -Sleep: usually sleeps well     Endocrine: DM - diet controlled     Hematologic/ Lymphatic: + anemia- receives iron transfusion     Allergic/ Immunologic: none      Documents on file and valid:  Advance Directive/Living Will: Yes   Health Care Power of : Yes  Other: Enrolled in [facility]  Code Status: DNR and No Intubation      Confusion Assessment Method (CAM)  Not on file.    Pablo Cognitive Assessment (MoCA)  Not on file.    Geriatric Depression Scale (GDS)  Not on file.    Mini-Cog (Early Dementia Detection)  Not on file.    Folstein Mini-Mental State Exam (MMSE)  Not on file.    Patient Health Questionnaire (PHQ-9)  Not on file.     Physical Exam  Constitutional:       General: He is not in acute distress.      Appearance: He is well-groomed.   HENT:      Head: Normocephalic and atraumatic.      Nose: Nose normal.      Mouth/Throat:      Mouth: Mucous membranes are moist.      Comments: Poor dentition   Eyes:      Conjunctiva/sclera: Conjunctivae normal.      Pupils: Pupils are equal, round, and reactive to light.   Cardiovascular:      Rate and Rhythm: Normal rate and regular rhythm.   Pulmonary:      Effort: Pulmonary effort is normal.      Breath sounds: Normal breath sounds.   Abdominal:      General: Abdomen is flat. Bowel sounds are normal.      Palpations: Abdomen is soft.   Musculoskeletal:      Cervical back: Normal range of motion.   Neurological:      Mental Status: He is alert and oriented to person, place, and time.   Psychiatric:         Mood and Affect: Mood normal.         Thought Content: Thought content normal.         Last Recorded Vitals      5/1/2024    11:00 PM 5/2/2024    12:00 AM 5/2/2024     4:00 AM 5/2/2024     5:00 AM 5/2/2024     7:00 AM 5/2/2024     8:02 AM 5/2/2024     9:00 AM   Vitals   Systolic 129 105 100 97 119 117 121   Diastolic 67 62 58 55 65 65 75   Heart Rate      78    Resp   15 16  18       Vitals:    05/01/24 0606   Weight: 57.6 kg (127 lb)        Confusion Assessment Method(CAM) for diagnosis of delirium:    1.  Acute onset or fluctuating course: absent/present: Absent  2.  Inattention: absent/present: Absent  3.  Disorganized thinking: absent/present: Absent  4.  Altered level of consciousness: absent/present: Absent  CAM: negative    Relevant Results  Lab Results   Component Value Date    TSH 1.27 09/18/2023    [id number] 592 04/15/2024    VITD25 26 (A) 06/23/2022    HGBA1C 5.7 (H) 11/14/2023     Results from last 7 days   Lab Units 05/02/24  0510 05/01/24  0058   WBC AUTO x10*3/uL 5.5 5.6   HEMOGLOBIN g/dL 10.8* 12.7*   HEMATOCRIT % 32.3* 40.9*   ALT U/L  --  9*   AST U/L  --  17   SODIUM mmol/L 144 144   POTASSIUM mmol/L 3.4* 3.2*   CHLORIDE mmol/L 104 103   CREATININE mg/dL  1.71* 2.45*   BUN mg/dL 29* 31*   CO2 mmol/L 32 32   INR   --  1.2*       Recent Imaging Results      XR chest 1 view  Narrative: Interpreted By:  Lucia Cunningham,   STUDY:  Chest, single AP view.      INDICATION:  Signs/Symptoms:transient desats yesterday after dilaudid dosing.      COMPARISON:  CT chest 05/01/2024. Chest radiograph 05/01/2024      ACCESSION NUMBER(S):  [id number]      ORDERING CLINICIAN:  ADONIS CLAROS      FINDINGS:  Left subclavian AICD/pacemaker device.  The cardiac silhouette size is within upper normal limits.  Left lower lobe pulmonary infiltrates are better appreciated on the  previous CT chest. There is mild interstitial marking prominence in  the left upper lobe, similar to prior. No pneumothorax. Left lung  calcified granuloma. Background chronic interstitial lung changes.  No acute osseous abnormality.      Impression: 1. Findings suggestive of left lower lobe pneumonia      MACRO:  None.      Signed by: Lucia Cunningham 5/2/2024 10:39 AM  Dictation workstation:   [id number]      Head/Brain Imaging  === Results for orders placed during the hospital encounter of 04/30/24 ===    CT head wo IV contrast [[id number]] 05/01/2024    Status: Normal  No acute intracranial hemorrhage, mass effect or midline shift.    Nonspecific scattered white matter hypodensities favored to represent  sequela of small vessel ischemia.    Cervical spondylosis without acute loss of vertebral body height or  traumatic malalignment.    Severe emphysematous changes throughout the visualized lungs.    Filling defects in the dependent aspect of the trachea are favored to  represent secretions.    Cardiomegaly with thickened interlobular septal markings suggesting  pulmonary interstitial edema. Additionally, there are patchy airspace  opacities in the right upper lobe and lung bases which may represent  atelectasis, however, superimposed infection is not excluded in the  appropriate clinical setting.    Marked height  loss of the T7 vertebral body with height loss to a  lesser degree of the T8 vertebral body compatible with fractures new  from prior imaging 12/12/2023. No bony retropulsion. There is also  fracture involving the T6 spinous process.    Posterior instrumented fusion L4 through S1. No acute fracture or  traumatic malalignment of the lumbar spine.    Right total hip arthroplasty hardware is present. Periprosthetic  fracture involving the intertrochanteric portion and subtrochanteric  portion of the right femur with mildly displaced fracture fragments  but no significant angulation. A collection with a fat fluid level is  seen overlying the right greater trochanter.    MACRO:  None.    Signed by: Evan Finkelstein 5/1/2024 2:48 AM  Dictation workstation:   [id number]  No results found for this or any previous visit.        DATA:  EKG: QTC  Encounter Date: 12/31/23   ECG 12 lead   Result Value    Ventricular Rate 104    Atrial Rate 104    OH Interval 152    QRS Duration 146    QT Interval 386    QTC Calculation(Bazett) 507    P Axis 77    R Axis -83    T Axis 85    QRS Count 17    Q Onset 202    P Onset 126    P Offset 177    T Offset 395    QTC Fredericia 463    Narrative    Sinus tachycardia  Left axis deviation  Nonspecific intraventricular block  Abnormal ECG  When compared with ECG of 12-DEC-2023 12:15, (unconfirmed)  Vent. rate has increased BY  34 BPM  T wave inversion less evident in Lateral leads  See ED provider note for full interpretation and clinical correlation  Confirmed by Lucrecia Orta ([id number]) on 1/10/2024 4:48:14 PM     Anti-psychotics in 48 hours: none   Opioids/Benzodiazepines in 48 hours: yes, hydromorphone   Anticholinergics on board:No  Restraints:No  Indwelling catheters:No  Last BM:  UO in 24 hours:  Activity in the past 24 hours: bedrest   Need for ambulatory devices:    Assessment/Plan   This is a/an 79 y.o. year old male, with past medical history relevant for ICD/pacemaker, A-fib on  Eliquis, ischemic cardiomyopathy, CHF, COPD, CAD, hypertension, depression, on chronic oxygen 2 L nasal cannula. He is followed by [facility] due to end stage cardiac disease, presenting for right hip periprosthetic fracture, vertebral fracture of T7/T8 and rib fracture. Patient being seen in geriatric consultation for goals of care discussion.     Principal Problem:    Compression fracture of thoracic vertebra, unspecified thoracic vertebral level, initial encounter (Multi)  Active Problems:    Periprosthetic hip fracture, initial encounter    Periprosthetic fracture around internal prosthetic right hip joint (Multi)    1. Right periprosthetic hip fracture after fall at home  - Patient with significant pain in right hip due to fracture. Hydromorphone is helping pain.   - Patient and his family state that goals of care are comfort and quality of life.   - Patient is reluctant to proceed with hip surgery as when he had his previous hip surgery, he had an MI after surgery.   - Long discussion with family regarding risks/benefits of surgery vs. No surgery. Patient can accept not being able to walk, but wants to be able to get up to a wheelchair and get around his home and go outside. He does not wish to be confined to bed. Patient and family plan to continue with Hospice care post surgery   - Trauma MD also in to discuss surgical options and risks/benefits. Family will discuss the options and will inform the Trauma Team of their decision.   - Patient and family wish to understand what the patient's recommended level of activity/activity restriction with and without surgery as this will assist their decision making process.   - For pain management, please continue methadone 2.5mg daily and hydromorphone as needed. Please add acetaminophen 975mg 3x/day and lidocaine patches to ribs, low back and hip     2.  Acute T7 spine injury  - no surgical intervention  - plan is patient will be fitted with brace  "  - Patient and family would like specific instructions for activity and any activity restrictions     3.  Acute Right rib fractures   - Pain management with acetaminophen, lidocaine patches     4. Acute on chronic pain. Chronic pain related to lumbar spinal stenosis  - history of chronic low back pain, now with acute pain due to fractures  - recommend acetaminophen 975mg 3x/day  - continue chronic Methadone 2.5mg and hydromorphone as needed for pain  - consider adding lidocaine patches to ribs, low back and hip for additional pain control     5. Acute fall with resulting rib, vertebral and hip fractures  - Patient tripped on uneven pavement, he did not become dizzy or lightheaded  - No recent history of falls (last fall 6 months ago)  - Mobility will be limited after surgery  - PT evaluation for safe transfers     6. HFrEF/ischemic cardiomyopathy, s/p [employer] AICD placement in 2012, with recent generator change for primary prevention (LEVEF 35-40%), generator replaced in 2023, how AWMI d/t delayed stent thrombosis s/p redo PCI with GEORGIE, PCI to LAD with BMS, on Brilinta  - Enrolled in [facility] due to end stage cardiac disease  - He is currently on amiodarone, bumetanide, metoprolol XL, Brilinta, Eliquis   - Patient feels that his symptoms are well controlled   - Patient's ICD remains active- patient states that he has \"things to do\" before it is deactivated. Patient's wife states that no one has discussed the ICD with them.   - Continue follow up with cardiology     7. COPD on 2L O2 home O2  - follows with pulmonary medicine   - continue azithromycin, nebulizers    - patient continues to smoke at home     8. Goals of Care  Patient's current clinical condition, including diagnosis, prognosis and management plan, and goals of care were discussed:  Life limiting disease: heart failure  Family: Supportive wife and daughter- other extended family members are nearby and helpful.  Performance status: " At baseline is able to walk around with rest periods. He has O2 at home. Palliative Performance Scale is 40%   Joys/meaning/strength: Family and being at home  Understanding of Health: Patient, wife and daughter verbalize a good understanding of Mr. Damon's overall health. They have some knowledge deficit regarding the ICD. Education provided regarding the risks/benefits of having ICD deactivated   Information: Patient and family express understanding of risk/benefits of surgery vs. No surgery. Patient and family wish to understand what the patient's recommended level of activity/activity restriction with and without surgery as this will assist their decision making process.   Goals: Return home with his wife, be able to move about his home, go outside to enjoy the spring weather  Worries and fears, now and future: Worried about having a heart attack during or after surgery.   Minimum acceptable outcome/QOL: want to be able to get out of bed- is willing to accept a wheelchair as mobility aid, wants pain controlled, want to be with his family   - Patient states quality of life is his primary goal. He wishes to be home with his wife and family. He is pleased with Hospice care.   - Patient would like to be able to get out of bed to a wheelchair when he is at home  - Patient plans to resume Hospice care at discharge     Care Transitions:  -Recommended level for discharge: home with Hospice   -Home going considerations: lives on 1 level, has supportive family, followed by [facility]   -Primary care physician: DEANNE Esqueda-CNP, [facility]     Goals of Care:  -Health care power of : wife, Stacey  -Living will: unsure   Code status: DNR/DNI     4M AGE-FRIENDLY INITIATIVE:  What matters most to patient: family, quality of life   Medications: medications reviewed, no concerns   Mentation: alert  Mobility: bedrest       Geriatric medicine will continue to follow the patient. Thank you  for allowing geriatric medicine to be involved in the care of your patient. Geriatric medicine consultation team is available during work hours Monday through Friday. For any emergency issues requiring immediate assistance over the weekend, please page Geriatrics pager [phone]      Yamila Webb APRN-CNP

## 2024-05-02 NOTE — HOSPITAL COURSE
Andrez Damon is a 78 yo male who presents after a mechanical fall after tripping over a trashcan falling on his right side. No LOC. Was seen at an OSH and transferred to [facility]. He is complaining of right chest wall and right hip pain.   Prior on hospice, here for evaluation for any palliative fixation for the hip.   The patients injuries are: Rib fractures 6-7, Right Periprosthetic fracture and T7 vertebral fx.     The pt was seen in the ED by the trauma team and evaluated. He currently takes eliquis due to prior PE. His medical hx is compresed of CAD, HFrEF recent in hospital CARRI LVEF of 20-25%, COPD, HTN, CKD, and s/p ICD implantation. Due to complex medical conditions including heart failure, pt is electively on hospice care.     Perioperative medicine was consulted along with orthopedics. Pt ultimately decided to not have any back surgery. He is electing to proceed to OR with ortho for hip fracture ORIF. Admitted briefly to TSICU for cardiac monitoring post-op without significant issue, transferred to floor 5/5/24 in stable condition. T then suffered acute onset respiratory distress and desat to 70s. Placed on high flow with eventual improvement to high 80s. CXR negative for pneumo. Transferred back to ICU for CPAP and diuresis. After diuresis 1L in ICU and improved respiratory status, patient returned to RNF 5/6 PM.   With constipation issues on the floor which also resolved with lactulose.    At the time of discharge, patient's pain was controlled with oral analgesia, patient was urinating, having BMs, sleeping, and tolerating a diet. Based on PT/OT's recommendation, patient was discharged *** with scripts and follow up appointments. Discharge plan was discussed with the patient and all of the patient's questions were answered. Patient and family agreeable to plan.

## 2024-05-02 NOTE — SIGNIFICANT EVENT
Plan for OR on 5/3 with Dr Juarez for ORIF R femur, possible revision arthroplasty. Will need updated T/S. Please make NPO at midnight. Please hold DVT chemoppx at midnight for upcoming procedure. Please document medical clearance for OR. Appreciate care per primary team.    Rishabh Walker MD  Orthopaedic Surgery PGY-2 ([phone] Epic chat preferred)

## 2024-05-03 ENCOUNTER — APPOINTMENT (OUTPATIENT)
Dept: CARDIOLOGY | Facility: HOSPITAL | Age: 79
DRG: 480 | End: 2024-05-03
Payer: MEDICARE

## 2024-05-03 ENCOUNTER — APPOINTMENT (OUTPATIENT)
Dept: RADIOLOGY | Facility: HOSPITAL | Age: 79
DRG: 480 | End: 2024-05-03
Payer: MEDICARE

## 2024-05-03 PROBLEM — Z79.01 ANTICOAGULANT LONG-TERM USE: Status: ACTIVE | Noted: 2024-05-03

## 2024-05-03 PROBLEM — I34.0 MITRAL REGURGITATION: Status: ACTIVE | Noted: 2024-05-03

## 2024-05-03 PROBLEM — N18.30 CHRONIC RENAL IMPAIRMENT, STAGE 3 (MODERATE) (MULTI): Status: ACTIVE | Noted: 2024-05-03

## 2024-05-03 PROBLEM — Z95.0 PACEMAKER: Status: ACTIVE | Noted: 2024-05-03

## 2024-05-03 LAB
ALBUMIN SERPL BCP-MCNC: 2.9 G/DL (ref 3.4–5)
ALBUMIN SERPL BCP-MCNC: 3.2 G/DL (ref 3.4–5)
ALP SERPL-CCNC: 61 U/L (ref 33–136)
ALT SERPL W P-5'-P-CCNC: 8 U/L (ref 10–52)
ANION GAP BLDA CALCULATED.4IONS-SCNC: 11 MMO/L (ref 10–25)
ANION GAP BLDA CALCULATED.4IONS-SCNC: 5 MMO/L (ref 10–25)
ANION GAP BLDA CALCULATED.4IONS-SCNC: 9 MMO/L (ref 10–25)
ANION GAP SERPL CALC-SCNC: 14 MMOL/L (ref 10–20)
ANION GAP SERPL CALC-SCNC: 7 MMOL/L (ref 10–20)
APTT PPP: 32 SECONDS (ref 27–38)
APTT PPP: 32 SECONDS (ref 27–38)
AST SERPL W P-5'-P-CCNC: 17 U/L (ref 9–39)
BASE EXCESS BLDA CALC-SCNC: 5.3 MMOL/L (ref -2–3)
BASE EXCESS BLDA CALC-SCNC: 5.8 MMOL/L (ref -2–3)
BASE EXCESS BLDA CALC-SCNC: 6.7 MMOL/L (ref -2–3)
BASOPHILS # BLD AUTO: 0.03 X10*3/UL (ref 0–0.1)
BASOPHILS NFR BLD AUTO: 0.3 %
BILIRUB DIRECT SERPL-MCNC: 0.2 MG/DL (ref 0–0.3)
BILIRUB SERPL-MCNC: 0.7 MG/DL (ref 0–1.2)
BODY TEMPERATURE: 37 DEGREES CELSIUS
BUN SERPL-MCNC: 25 MG/DL (ref 6–23)
BUN SERPL-MCNC: 27 MG/DL (ref 6–23)
CA-I BLD-SCNC: 1.17 MMOL/L (ref 1.1–1.33)
CA-I BLDA-SCNC: 1.18 MMOL/L (ref 1.1–1.33)
CA-I BLDA-SCNC: 1.2 MMOL/L (ref 1.1–1.33)
CA-I BLDA-SCNC: 1.23 MMOL/L (ref 1.1–1.33)
CALCIUM SERPL-MCNC: 8.7 MG/DL (ref 8.6–10.6)
CALCIUM SERPL-MCNC: 8.9 MG/DL (ref 8.6–10.6)
CHLORIDE BLDA-SCNC: 102 MMOL/L (ref 98–107)
CHLORIDE BLDA-SCNC: 103 MMOL/L (ref 98–107)
CHLORIDE BLDA-SCNC: 105 MMOL/L (ref 98–107)
CHLORIDE SERPL-SCNC: 102 MMOL/L (ref 98–107)
CHLORIDE SERPL-SCNC: 102 MMOL/L (ref 98–107)
CO2 SERPL-SCNC: 30 MMOL/L (ref 21–32)
CO2 SERPL-SCNC: 38 MMOL/L (ref 21–32)
CREAT SERPL-MCNC: 1.4 MG/DL (ref 0.5–1.3)
CREAT SERPL-MCNC: 1.65 MG/DL (ref 0.5–1.3)
EGFRCR SERPLBLD CKD-EPI 2021: 42 ML/MIN/1.73M*2
EGFRCR SERPLBLD CKD-EPI 2021: 51 ML/MIN/1.73M*2
EOSINOPHIL # BLD AUTO: 0.02 X10*3/UL (ref 0–0.4)
EOSINOPHIL NFR BLD AUTO: 0.2 %
ERYTHROCYTE [DISTWIDTH] IN BLOOD BY AUTOMATED COUNT: 14.6 % (ref 11.5–14.5)
ERYTHROCYTE [DISTWIDTH] IN BLOOD BY AUTOMATED COUNT: 14.7 % (ref 11.5–14.5)
FIBRINOGEN PPP-MCNC: 487 MG/DL (ref 200–400)
GLUCOSE BLD MANUAL STRIP-MCNC: 112 MG/DL (ref 74–99)
GLUCOSE BLD MANUAL STRIP-MCNC: 130 MG/DL (ref 74–99)
GLUCOSE BLDA-MCNC: 100 MG/DL (ref 74–99)
GLUCOSE BLDA-MCNC: 129 MG/DL (ref 74–99)
GLUCOSE BLDA-MCNC: 152 MG/DL (ref 74–99)
GLUCOSE SERPL-MCNC: 101 MG/DL (ref 74–99)
GLUCOSE SERPL-MCNC: 148 MG/DL (ref 74–99)
HCO3 BLDA-SCNC: 29.9 MMOL/L (ref 22–26)
HCO3 BLDA-SCNC: 31.6 MMOL/L (ref 22–26)
HCO3 BLDA-SCNC: 31.9 MMOL/L (ref 22–26)
HCT VFR BLD AUTO: 31.3 % (ref 41–52)
HCT VFR BLD AUTO: 33.8 % (ref 41–52)
HCT VFR BLD EST: 29 % (ref 41–52)
HCT VFR BLD EST: 33 % (ref 41–52)
HCT VFR BLD EST: 34 % (ref 41–52)
HGB BLD-MCNC: 10.1 G/DL (ref 13.5–17.5)
HGB BLD-MCNC: 10.6 G/DL (ref 13.5–17.5)
HGB BLDA-MCNC: 10.9 G/DL (ref 13.5–17.5)
HGB BLDA-MCNC: 11.2 G/DL (ref 13.5–17.5)
HGB BLDA-MCNC: 9.8 G/DL (ref 13.5–17.5)
IMM GRANULOCYTES # BLD AUTO: 0.03 X10*3/UL (ref 0–0.5)
IMM GRANULOCYTES NFR BLD AUTO: 0.3 % (ref 0–0.9)
INHALED O2 CONCENTRATION: 32 %
INHALED O2 CONCENTRATION: 32 %
INHALED O2 CONCENTRATION: 50 %
INR PPP: 0.9 (ref 0.9–1.1)
INR PPP: 0.9 (ref 0.9–1.1)
LACTATE BLDA-SCNC: 0.5 MMOL/L (ref 0.4–2)
LACTATE BLDA-SCNC: 1 MMOL/L (ref 0.4–2)
LACTATE BLDA-SCNC: 1.8 MMOL/L (ref 0.4–2)
LYMPHOCYTES # BLD AUTO: 0.32 X10*3/UL (ref 0.8–3)
LYMPHOCYTES NFR BLD AUTO: 3.6 %
MAGNESIUM SERPL-MCNC: 2.02 MG/DL (ref 1.6–2.4)
MAGNESIUM SERPL-MCNC: 2.13 MG/DL (ref 1.6–2.4)
MCH RBC QN AUTO: 29.2 PG (ref 26–34)
MCH RBC QN AUTO: 29.4 PG (ref 26–34)
MCHC RBC AUTO-ENTMCNC: 31.4 G/DL (ref 32–36)
MCHC RBC AUTO-ENTMCNC: 32.3 G/DL (ref 32–36)
MCV RBC AUTO: 91 FL (ref 80–100)
MCV RBC AUTO: 93 FL (ref 80–100)
MONOCYTES # BLD AUTO: 0.19 X10*3/UL (ref 0.05–0.8)
MONOCYTES NFR BLD AUTO: 2.2 %
NEUTROPHILS # BLD AUTO: 8.18 X10*3/UL (ref 1.6–5.5)
NEUTROPHILS NFR BLD AUTO: 93.4 %
NRBC BLD-RTO: 0 /100 WBCS (ref 0–0)
NRBC BLD-RTO: 0 /100 WBCS (ref 0–0)
OXYHGB MFR BLDA: 95.4 % (ref 94–98)
OXYHGB MFR BLDA: 96.3 % (ref 94–98)
OXYHGB MFR BLDA: 97.4 % (ref 94–98)
PCO2 BLDA: 43 MM HG (ref 38–42)
PCO2 BLDA: 48 MM HG (ref 38–42)
PCO2 BLDA: 51 MM HG (ref 38–42)
PH BLDA: 7.4 PH (ref 7.38–7.42)
PH BLDA: 7.43 PH (ref 7.38–7.42)
PH BLDA: 7.45 PH (ref 7.38–7.42)
PHOSPHATE SERPL-MCNC: 3 MG/DL (ref 2.5–4.9)
PHOSPHATE SERPL-MCNC: 3.1 MG/DL (ref 2.5–4.9)
PLATELET # BLD AUTO: 123 X10*3/UL (ref 150–450)
PLATELET # BLD AUTO: 131 X10*3/UL (ref 150–450)
PO2 BLDA: 206 MM HG (ref 85–95)
PO2 BLDA: 87 MM HG (ref 85–95)
PO2 BLDA: 96 MM HG (ref 85–95)
POTASSIUM BLDA-SCNC: 4.1 MMOL/L (ref 3.5–5.3)
POTASSIUM BLDA-SCNC: 4.2 MMOL/L (ref 3.5–5.3)
POTASSIUM BLDA-SCNC: 4.6 MMOL/L (ref 3.5–5.3)
POTASSIUM SERPL-SCNC: 3.9 MMOL/L (ref 3.5–5.3)
POTASSIUM SERPL-SCNC: 4.5 MMOL/L (ref 3.5–5.3)
PROT SERPL-MCNC: 5.1 G/DL (ref 6.4–8.2)
PROTHROMBIN TIME: 10.2 SECONDS (ref 9.8–12.8)
PROTHROMBIN TIME: 10.2 SECONDS (ref 9.8–12.8)
RBC # BLD AUTO: 3.43 X10*6/UL (ref 4.5–5.9)
RBC # BLD AUTO: 3.63 X10*6/UL (ref 4.5–5.9)
SAO2 % BLDA: 97 % (ref 94–100)
SAO2 % BLDA: 98 % (ref 94–100)
SAO2 % BLDA: 99 % (ref 94–100)
SODIUM BLDA-SCNC: 138 MMOL/L (ref 136–145)
SODIUM BLDA-SCNC: 138 MMOL/L (ref 136–145)
SODIUM BLDA-SCNC: 139 MMOL/L (ref 136–145)
SODIUM SERPL-SCNC: 141 MMOL/L (ref 136–145)
SODIUM SERPL-SCNC: 143 MMOL/L (ref 136–145)
WBC # BLD AUTO: 5.3 X10*3/UL (ref 4.4–11.3)
WBC # BLD AUTO: 8.8 X10*3/UL (ref 4.4–11.3)

## 2024-05-03 PROCEDURE — 73552 X-RAY EXAM OF FEMUR 2/>: CPT | Mod: RT

## 2024-05-03 PROCEDURE — A27244 PR TREAT INTER/SUBTROCH FX,W/PLATE/SCREW

## 2024-05-03 PROCEDURE — 93287 PERI-PX DEVICE EVAL & PRGR: CPT | Performed by: STUDENT IN AN ORGANIZED HEALTH CARE EDUCATION/TRAINING PROGRAM

## 2024-05-03 PROCEDURE — 85610 PROTHROMBIN TIME: CPT | Performed by: PHYSICIAN ASSISTANT

## 2024-05-03 PROCEDURE — 0QS604Z REPOSITION RIGHT UPPER FEMUR WITH INTERNAL FIXATION DEVICE, OPEN APPROACH: ICD-10-PCS | Performed by: ORTHOPAEDIC SURGERY

## 2024-05-03 PROCEDURE — A4649 SURGICAL SUPPLIES: HCPCS | Performed by: ORTHOPAEDIC SURGERY

## 2024-05-03 PROCEDURE — 2500000004 HC RX 250 GENERAL PHARMACY W/ HCPCS (ALT 636 FOR OP/ED): Mod: JW

## 2024-05-03 PROCEDURE — C1776 JOINT DEVICE (IMPLANTABLE): HCPCS | Performed by: ORTHOPAEDIC SURGERY

## 2024-05-03 PROCEDURE — 80053 COMPREHEN METABOLIC PANEL: CPT | Mod: 91

## 2024-05-03 PROCEDURE — 85025 COMPLETE CBC W/AUTO DIFF WBC: CPT | Performed by: SURGERY

## 2024-05-03 PROCEDURE — 82330 ASSAY OF CALCIUM: CPT | Performed by: SURGERY

## 2024-05-03 PROCEDURE — 36415 COLL VENOUS BLD VENIPUNCTURE: CPT

## 2024-05-03 PROCEDURE — 99100 ANES PT EXTEME AGE<1 YR&>70: CPT | Performed by: ANESTHESIOLOGY

## 2024-05-03 PROCEDURE — 27244 TREAT THIGH FRACTURE: CPT | Performed by: STUDENT IN AN ORGANIZED HEALTH CARE EDUCATION/TRAINING PROGRAM

## 2024-05-03 PROCEDURE — A4217 STERILE WATER/SALINE, 500 ML: HCPCS | Performed by: ORTHOPAEDIC SURGERY

## 2024-05-03 PROCEDURE — 85730 THROMBOPLASTIN TIME PARTIAL: CPT

## 2024-05-03 PROCEDURE — 2500000005 HC RX 250 GENERAL PHARMACY W/O HCPCS: Performed by: PHYSICIAN ASSISTANT

## 2024-05-03 PROCEDURE — A27244 PR TREAT INTER/SUBTROCH FX,W/PLATE/SCREW: Performed by: ANESTHESIOLOGY

## 2024-05-03 PROCEDURE — 2500000006 HC RX 250 W HCPCS SELF ADMINISTERED DRUGS (ALT 637 FOR ALL PAYERS): Performed by: ANESTHESIOLOGY

## 2024-05-03 PROCEDURE — 2780000003 HC OR 278 NO HCPCS: Performed by: ORTHOPAEDIC SURGERY

## 2024-05-03 PROCEDURE — 2020000001 HC ICU ROOM DAILY

## 2024-05-03 PROCEDURE — 93287 PERI-PX DEVICE EVAL & PRGR: CPT

## 2024-05-03 PROCEDURE — 3700000002 HC GENERAL ANESTHESIA TIME - EACH INCREMENTAL 1 MINUTE: Performed by: ORTHOPAEDIC SURGERY

## 2024-05-03 PROCEDURE — 2500000001 HC RX 250 WO HCPCS SELF ADMINISTERED DRUGS (ALT 637 FOR MEDICARE OP)

## 2024-05-03 PROCEDURE — 82248 BILIRUBIN DIRECT: CPT | Performed by: SURGERY

## 2024-05-03 PROCEDURE — 85027 COMPLETE CBC AUTOMATED: CPT

## 2024-05-03 PROCEDURE — 7100000001 HC RECOVERY ROOM TIME - INITIAL BASE CHARGE: Performed by: ORTHOPAEDIC SURGERY

## 2024-05-03 PROCEDURE — 83735 ASSAY OF MAGNESIUM: CPT | Mod: 91 | Performed by: SURGERY

## 2024-05-03 PROCEDURE — 2500000004 HC RX 250 GENERAL PHARMACY W/ HCPCS (ALT 636 FOR OP/ED): Performed by: PHYSICIAN ASSISTANT

## 2024-05-03 PROCEDURE — 82947 ASSAY GLUCOSE BLOOD QUANT: CPT

## 2024-05-03 PROCEDURE — 3600000004 HC OR TIME - INITIAL BASE CHARGE - PROCEDURE LEVEL FOUR: Performed by: ORTHOPAEDIC SURGERY

## 2024-05-03 PROCEDURE — 2500000004 HC RX 250 GENERAL PHARMACY W/ HCPCS (ALT 636 FOR OP/ED)

## 2024-05-03 PROCEDURE — 2500000002 HC RX 250 W HCPCS SELF ADMINISTERED DRUGS (ALT 637 FOR MEDICARE OP, ALT 636 FOR OP/ED): Performed by: PHYSICIAN ASSISTANT

## 2024-05-03 PROCEDURE — 84132 ASSAY OF SERUM POTASSIUM: CPT | Mod: 91 | Performed by: SURGERY

## 2024-05-03 PROCEDURE — 2500000005 HC RX 250 GENERAL PHARMACY W/O HCPCS: Performed by: ANESTHESIOLOGY

## 2024-05-03 PROCEDURE — 2500000005 HC RX 250 GENERAL PHARMACY W/O HCPCS

## 2024-05-03 PROCEDURE — 2500000001 HC RX 250 WO HCPCS SELF ADMINISTERED DRUGS (ALT 637 FOR MEDICARE OP): Performed by: PHYSICIAN ASSISTANT

## 2024-05-03 PROCEDURE — 99232 SBSQ HOSP IP/OBS MODERATE 35: CPT

## 2024-05-03 PROCEDURE — 7100000002 HC RECOVERY ROOM TIME - EACH INCREMENTAL 1 MINUTE: Performed by: ORTHOPAEDIC SURGERY

## 2024-05-03 PROCEDURE — 2500000006 HC RX 250 W HCPCS SELF ADMINISTERED DRUGS (ALT 637 FOR ALL PAYERS)

## 2024-05-03 PROCEDURE — 83735 ASSAY OF MAGNESIUM: CPT

## 2024-05-03 PROCEDURE — 82947 ASSAY GLUCOSE BLOOD QUANT: CPT | Mod: 91

## 2024-05-03 PROCEDURE — 3600000009 HC OR TIME - EACH INCREMENTAL 1 MINUTE - PROCEDURE LEVEL FOUR: Performed by: ORTHOPAEDIC SURGERY

## 2024-05-03 PROCEDURE — C1713 ANCHOR/SCREW BN/BN,TIS/BN: HCPCS | Performed by: ORTHOPAEDIC SURGERY

## 2024-05-03 PROCEDURE — 2500000004 HC RX 250 GENERAL PHARMACY W/ HCPCS (ALT 636 FOR OP/ED): Performed by: ANESTHESIOLOGY

## 2024-05-03 PROCEDURE — 27244 TREAT THIGH FRACTURE: CPT | Performed by: ORTHOPAEDIC SURGERY

## 2024-05-03 PROCEDURE — 37799 UNLISTED PX VASCULAR SURGERY: CPT | Performed by: SURGERY

## 2024-05-03 PROCEDURE — 36620 INSERTION CATHETER ARTERY: CPT | Performed by: NURSE ANESTHETIST, CERTIFIED REGISTERED

## 2024-05-03 PROCEDURE — S0109 METHADONE ORAL 5MG: HCPCS | Performed by: PHYSICIAN ASSISTANT

## 2024-05-03 PROCEDURE — 84100 ASSAY OF PHOSPHORUS: CPT | Performed by: SURGERY

## 2024-05-03 PROCEDURE — 84132 ASSAY OF SERUM POTASSIUM: CPT | Performed by: SURGERY

## 2024-05-03 PROCEDURE — 84132 ASSAY OF SERUM POTASSIUM: CPT | Mod: 91 | Performed by: ANESTHESIOLOGY

## 2024-05-03 PROCEDURE — 85610 PROTHROMBIN TIME: CPT | Performed by: SURGERY

## 2024-05-03 PROCEDURE — 73552 X-RAY EXAM OF FEMUR 2/>: CPT | Mod: RIGHT SIDE | Performed by: STUDENT IN AN ORGANIZED HEALTH CARE EDUCATION/TRAINING PROGRAM

## 2024-05-03 PROCEDURE — 85384 FIBRINOGEN ACTIVITY: CPT | Performed by: SURGERY

## 2024-05-03 PROCEDURE — 2500000004 HC RX 250 GENERAL PHARMACY W/ HCPCS (ALT 636 FOR OP/ED): Performed by: ORTHOPAEDIC SURGERY

## 2024-05-03 PROCEDURE — 84132 ASSAY OF SERUM POTASSIUM: CPT | Mod: 91

## 2024-05-03 PROCEDURE — 3700000001 HC GENERAL ANESTHESIA TIME - INITIAL BASE CHARGE: Performed by: ORTHOPAEDIC SURGERY

## 2024-05-03 PROCEDURE — 2720000007 HC OR 272 NO HCPCS: Performed by: ORTHOPAEDIC SURGERY

## 2024-05-03 DEVICE — IMPLANTABLE DEVICE: Type: IMPLANTABLE DEVICE | Site: HIP | Status: FUNCTIONAL

## 2024-05-03 DEVICE — 1.7MM CABLE WITH CRIMP 750MM-STERILE: Type: IMPLANTABLE DEVICE | Site: HIP | Status: FUNCTIONAL

## 2024-05-03 DEVICE — SCREW, EVOS CTX ST 3.5 X 34MM: Type: IMPLANTABLE DEVICE | Site: HIP | Status: FUNCTIONAL

## 2024-05-03 DEVICE — SCREW, EVOS CTX ST 3.5 X 32MM: Type: IMPLANTABLE DEVICE | Site: HIP | Status: FUNCTIONAL

## 2024-05-03 RX ORDER — HYDROMORPHONE HYDROCHLORIDE 1 MG/ML
0.2 INJECTION, SOLUTION INTRAMUSCULAR; INTRAVENOUS; SUBCUTANEOUS EVERY 5 MIN PRN
Status: DISCONTINUED | OUTPATIENT
Start: 2024-05-03 | End: 2024-05-03 | Stop reason: HOSPADM

## 2024-05-03 RX ORDER — ONDANSETRON HYDROCHLORIDE 2 MG/ML
INJECTION, SOLUTION INTRAVENOUS AS NEEDED
Status: DISCONTINUED | OUTPATIENT
Start: 2024-05-03 | End: 2024-05-03

## 2024-05-03 RX ORDER — SODIUM CHLORIDE, SODIUM LACTATE, POTASSIUM CHLORIDE, CALCIUM CHLORIDE 600; 310; 30; 20 MG/100ML; MG/100ML; MG/100ML; MG/100ML
50 INJECTION, SOLUTION INTRAVENOUS CONTINUOUS
Status: DISCONTINUED | OUTPATIENT
Start: 2024-05-03 | End: 2024-05-04

## 2024-05-03 RX ORDER — VANCOMYCIN HYDROCHLORIDE 750 MG/150ML
750 INJECTION, SOLUTION INTRAVENOUS ONCE
Status: COMPLETED | OUTPATIENT
Start: 2024-05-04 | End: 2024-05-04

## 2024-05-03 RX ORDER — OXYCODONE HYDROCHLORIDE 5 MG/1
2.5 TABLET ORAL EVERY 4 HOURS PRN
Status: DISCONTINUED | OUTPATIENT
Start: 2024-05-03 | End: 2024-05-03 | Stop reason: HOSPADM

## 2024-05-03 RX ORDER — FENTANYL CITRATE 50 UG/ML
INJECTION, SOLUTION INTRAMUSCULAR; INTRAVENOUS AS NEEDED
Status: DISCONTINUED | OUTPATIENT
Start: 2024-05-03 | End: 2024-05-03

## 2024-05-03 RX ORDER — PROPOFOL 10 MG/ML
INJECTION, EMULSION INTRAVENOUS AS NEEDED
Status: DISCONTINUED | OUTPATIENT
Start: 2024-05-03 | End: 2024-05-03

## 2024-05-03 RX ORDER — SODIUM CHLORIDE, SODIUM LACTATE, POTASSIUM CHLORIDE, CALCIUM CHLORIDE 600; 310; 30; 20 MG/100ML; MG/100ML; MG/100ML; MG/100ML
50 INJECTION, SOLUTION INTRAVENOUS CONTINUOUS
Status: DISCONTINUED | OUTPATIENT
Start: 2024-05-03 | End: 2024-05-03 | Stop reason: HOSPADM

## 2024-05-03 RX ORDER — SODIUM CHLORIDE, SODIUM GLUCONATE, SODIUM ACETATE, POTASSIUM CHLORIDE AND MAGNESIUM CHLORIDE 30; 37; 368; 526; 502 MG/100ML; MG/100ML; MG/100ML; MG/100ML; MG/100ML
INJECTION, SOLUTION INTRAVENOUS CONTINUOUS PRN
Status: DISCONTINUED | OUTPATIENT
Start: 2024-05-03 | End: 2024-05-03

## 2024-05-03 RX ORDER — ROCURONIUM BROMIDE 10 MG/ML
INJECTION, SOLUTION INTRAVENOUS AS NEEDED
Status: DISCONTINUED | OUTPATIENT
Start: 2024-05-03 | End: 2024-05-03

## 2024-05-03 RX ORDER — POTASSIUM CHLORIDE 20 MEQ/1
40 TABLET, EXTENDED RELEASE ORAL ONCE
Status: COMPLETED | OUTPATIENT
Start: 2024-05-03 | End: 2024-05-03

## 2024-05-03 RX ORDER — ACETAMINOPHEN 325 MG/1
325 TABLET ORAL EVERY 4 HOURS PRN
Status: DISCONTINUED | OUTPATIENT
Start: 2024-05-03 | End: 2024-05-03 | Stop reason: HOSPADM

## 2024-05-03 RX ORDER — ONDANSETRON HYDROCHLORIDE 2 MG/ML
4 INJECTION, SOLUTION INTRAVENOUS ONCE AS NEEDED
Status: DISCONTINUED | OUTPATIENT
Start: 2024-05-03 | End: 2024-05-03 | Stop reason: HOSPADM

## 2024-05-03 RX ORDER — PHENYLEPHRINE HYDROCHLORIDE 10 MG/ML
INJECTION INTRAVENOUS AS NEEDED
Status: DISCONTINUED | OUTPATIENT
Start: 2024-05-03 | End: 2024-05-03

## 2024-05-03 RX ORDER — VANCOMYCIN HYDROCHLORIDE 1 G/20ML
INJECTION, POWDER, LYOPHILIZED, FOR SOLUTION INTRAVENOUS AS NEEDED
Status: DISCONTINUED | OUTPATIENT
Start: 2024-05-03 | End: 2024-05-03 | Stop reason: HOSPADM

## 2024-05-03 RX ORDER — HYDROMORPHONE HYDROCHLORIDE 1 MG/ML
0.1 INJECTION, SOLUTION INTRAMUSCULAR; INTRAVENOUS; SUBCUTANEOUS EVERY 5 MIN PRN
Status: DISCONTINUED | OUTPATIENT
Start: 2024-05-03 | End: 2024-05-03 | Stop reason: HOSPADM

## 2024-05-03 RX ORDER — TRANEXAMIC ACID 10 MG/ML
INJECTION, SOLUTION INTRAVENOUS AS NEEDED
Status: DISCONTINUED | OUTPATIENT
Start: 2024-05-03 | End: 2024-05-03

## 2024-05-03 RX ORDER — POLYETHYLENE GLYCOL 3350 17 G/17G
17 POWDER, FOR SOLUTION ORAL DAILY
Status: DISCONTINUED | OUTPATIENT
Start: 2024-05-03 | End: 2024-05-10 | Stop reason: HOSPADM

## 2024-05-03 RX ORDER — LIDOCAINE HCL/PF 100 MG/5ML
SYRINGE (ML) INTRAVENOUS AS NEEDED
Status: DISCONTINUED | OUTPATIENT
Start: 2024-05-03 | End: 2024-05-03

## 2024-05-03 RX ORDER — NORETHINDRONE AND ETHINYL ESTRADIOL 0.5-0.035
KIT ORAL AS NEEDED
Status: DISCONTINUED | OUTPATIENT
Start: 2024-05-03 | End: 2024-05-03

## 2024-05-03 RX ORDER — VANCOMYCIN HYDROCHLORIDE 1 G/20ML
INJECTION, POWDER, LYOPHILIZED, FOR SOLUTION INTRAVENOUS ONCE
Status: DISCONTINUED | OUTPATIENT
Start: 2024-05-03 | End: 2024-05-05

## 2024-05-03 RX ORDER — CLINDAMYCIN PHOSPHATE 600 MG/50ML
INJECTION, SOLUTION INTRAVENOUS AS NEEDED
Status: DISCONTINUED | OUTPATIENT
Start: 2024-05-03 | End: 2024-05-03

## 2024-05-03 RX ORDER — SODIUM CHLORIDE 0.9 G/100ML
INJECTION, SOLUTION IRRIGATION AS NEEDED
Status: DISCONTINUED | OUTPATIENT
Start: 2024-05-03 | End: 2024-05-03 | Stop reason: HOSPADM

## 2024-05-03 RX ORDER — ALBUTEROL SULFATE 0.83 MG/ML
2.5 SOLUTION RESPIRATORY (INHALATION) ONCE AS NEEDED
Status: DISCONTINUED | OUTPATIENT
Start: 2024-05-03 | End: 2024-05-03 | Stop reason: HOSPADM

## 2024-05-03 RX ADMIN — PROPOFOL 70 MG: 10 INJECTION, EMULSION INTRAVENOUS at 12:04

## 2024-05-03 RX ADMIN — ONDANSETRON 4 MG: 2 INJECTION INTRAMUSCULAR; INTRAVENOUS at 14:31

## 2024-05-03 RX ADMIN — OXYCODONE HYDROCHLORIDE 10 MG: 5 TABLET ORAL at 18:13

## 2024-05-03 RX ADMIN — ACETAMINOPHEN 650 MG: 325 TABLET ORAL at 18:10

## 2024-05-03 RX ADMIN — POLYETHYLENE GLYCOL 3350 17 G: 17 POWDER, FOR SOLUTION ORAL at 20:15

## 2024-05-03 RX ADMIN — DEXAMETHASONE SODIUM PHOSPHATE 4 MG: 4 INJECTION INTRA-ARTICULAR; INTRALESIONAL; INTRAMUSCULAR; INTRAVENOUS; SOFT TISSUE at 12:38

## 2024-05-03 RX ADMIN — EPHEDRINE SULFATE 5 MG: 50 INJECTION, SOLUTION INTRAVENOUS at 14:00

## 2024-05-03 RX ADMIN — PHENYLEPHRINE HYDROCHLORIDE 80 MCG: 10 INJECTION INTRAVENOUS at 13:53

## 2024-05-03 RX ADMIN — HYDROMORPHONE HYDROCHLORIDE 0.2 MG: 1 INJECTION, SOLUTION INTRAMUSCULAR; INTRAVENOUS; SUBCUTANEOUS at 15:32

## 2024-05-03 RX ADMIN — PHENYLEPHRINE HYDROCHLORIDE 80 MCG: 10 INJECTION INTRAVENOUS at 14:13

## 2024-05-03 RX ADMIN — HYDROMORPHONE HYDROCHLORIDE 0.2 MG: 1 INJECTION, SOLUTION INTRAMUSCULAR; INTRAVENOUS; SUBCUTANEOUS at 16:15

## 2024-05-03 RX ADMIN — AMIODARONE HYDROCHLORIDE 200 MG: 200 TABLET ORAL at 09:46

## 2024-05-03 RX ADMIN — TRANEXAMIC ACID 600 MG: 10 INJECTION, SOLUTION INTRAVENOUS at 12:27

## 2024-05-03 RX ADMIN — EPHEDRINE SULFATE 5 MG: 50 INJECTION, SOLUTION INTRAVENOUS at 13:53

## 2024-05-03 RX ADMIN — POTASSIUM CHLORIDE 40 MEQ: 1500 TABLET, EXTENDED RELEASE ORAL at 09:46

## 2024-05-03 RX ADMIN — SODIUM CHLORIDE, POTASSIUM CHLORIDE, SODIUM LACTATE AND CALCIUM CHLORIDE 50 ML/HR: 600; 310; 30; 20 INJECTION, SOLUTION INTRAVENOUS at 05:05

## 2024-05-03 RX ADMIN — EPHEDRINE SULFATE 5 MG: 50 INJECTION, SOLUTION INTRAVENOUS at 13:35

## 2024-05-03 RX ADMIN — EPHEDRINE SULFATE 5 MG: 50 INJECTION, SOLUTION INTRAVENOUS at 13:46

## 2024-05-03 RX ADMIN — Medication 6 L/MIN: at 15:06

## 2024-05-03 RX ADMIN — HEPARIN SODIUM 5000 UNITS: 5000 INJECTION INTRAVENOUS; SUBCUTANEOUS at 05:03

## 2024-05-03 RX ADMIN — PHENYLEPHRINE HYDROCHLORIDE 80 MCG: 10 INJECTION INTRAVENOUS at 13:14

## 2024-05-03 RX ADMIN — OXYCODONE HYDROCHLORIDE 10 MG: 5 TABLET ORAL at 22:01

## 2024-05-03 RX ADMIN — ACETAMINOPHEN 650 MG: 325 TABLET ORAL at 22:01

## 2024-05-03 RX ADMIN — HEPARIN SODIUM 5000 UNITS: 5000 INJECTION INTRAVENOUS; SUBCUTANEOUS at 20:11

## 2024-05-03 RX ADMIN — HYDROMORPHONE HYDROCHLORIDE 0.2 MG: 1 INJECTION, SOLUTION INTRAMUSCULAR; INTRAVENOUS; SUBCUTANEOUS at 16:35

## 2024-05-03 RX ADMIN — FENTANYL CITRATE 25 MCG: 50 INJECTION, SOLUTION INTRAMUSCULAR; INTRAVENOUS at 14:45

## 2024-05-03 RX ADMIN — ROCURONIUM BROMIDE 50 MG: 10 INJECTION INTRAVENOUS at 12:04

## 2024-05-03 RX ADMIN — HYDROMORPHONE HYDROCHLORIDE 0.2 MG: 1 INJECTION, SOLUTION INTRAMUSCULAR; INTRAVENOUS; SUBCUTANEOUS at 15:21

## 2024-05-03 RX ADMIN — HYDROMORPHONE HYDROCHLORIDE 0.2 MG: 1 INJECTION, SOLUTION INTRAMUSCULAR; INTRAVENOUS; SUBCUTANEOUS at 16:20

## 2024-05-03 RX ADMIN — HYDROMORPHONE HYDROCHLORIDE 0.2 MG: 1 INJECTION, SOLUTION INTRAMUSCULAR; INTRAVENOUS; SUBCUTANEOUS at 16:25

## 2024-05-03 RX ADMIN — LIDOCAINE HYDROCHLORIDE 60 MG: 20 INJECTION INTRAVENOUS at 12:04

## 2024-05-03 RX ADMIN — ROPINIROLE HYDROCHLORIDE 1 MG: 1 TABLET, FILM COATED ORAL at 20:11

## 2024-05-03 RX ADMIN — OXYCODONE HYDROCHLORIDE 10 MG: 5 TABLET ORAL at 08:34

## 2024-05-03 RX ADMIN — FENTANYL CITRATE 25 MCG: 50 INJECTION, SOLUTION INTRAMUSCULAR; INTRAVENOUS at 12:04

## 2024-05-03 RX ADMIN — EPHEDRINE SULFATE 5 MG: 50 INJECTION, SOLUTION INTRAVENOUS at 14:13

## 2024-05-03 RX ADMIN — EPHEDRINE SULFATE 5 MG: 50 INJECTION, SOLUTION INTRAVENOUS at 12:04

## 2024-05-03 RX ADMIN — HYDROMORPHONE HYDROCHLORIDE 0.2 MG: 1 INJECTION, SOLUTION INTRAMUSCULAR; INTRAVENOUS; SUBCUTANEOUS at 16:00

## 2024-05-03 RX ADMIN — FENTANYL CITRATE 50 MCG: 50 INJECTION, SOLUTION INTRAMUSCULAR; INTRAVENOUS at 14:48

## 2024-05-03 RX ADMIN — HYDROMORPHONE HYDROCHLORIDE 0.2 MG: 1 INJECTION, SOLUTION INTRAMUSCULAR; INTRAVENOUS; SUBCUTANEOUS at 16:45

## 2024-05-03 RX ADMIN — PHENYLEPHRINE HYDROCHLORIDE 80 MCG: 10 INJECTION INTRAVENOUS at 12:04

## 2024-05-03 RX ADMIN — CLINDAMYCIN IN 5 PERCENT DEXTROSE 900 MG: 12 INJECTION, SOLUTION INTRAVENOUS at 12:27

## 2024-05-03 RX ADMIN — EPHEDRINE SULFATE 5 MG: 50 INJECTION, SOLUTION INTRAVENOUS at 13:05

## 2024-05-03 RX ADMIN — Medication 3 L/MIN: at 17:30

## 2024-05-03 RX ADMIN — ROCURONIUM BROMIDE 20 MG: 10 INJECTION INTRAVENOUS at 13:07

## 2024-05-03 RX ADMIN — HYDROMORPHONE HYDROCHLORIDE 0.2 MG: 1 INJECTION, SOLUTION INTRAMUSCULAR; INTRAVENOUS; SUBCUTANEOUS at 15:47

## 2024-05-03 RX ADMIN — SUGAMMADEX 200 MG: 100 INJECTION, SOLUTION INTRAVENOUS at 14:54

## 2024-05-03 RX ADMIN — EPHEDRINE SULFATE 5 MG: 50 INJECTION, SOLUTION INTRAVENOUS at 13:14

## 2024-05-03 RX ADMIN — METHADONE HYDROCHLORIDE 2.5 MG: 5 TABLET ORAL at 20:11

## 2024-05-03 RX ADMIN — SODIUM CHLORIDE, SODIUM GLUCONATE, SODIUM ACETATE, POTASSIUM CHLORIDE AND MAGNESIUM CHLORIDE: 526; 502; 368; 37; 30 INJECTION, SOLUTION INTRAVENOUS at 11:52

## 2024-05-03 RX ADMIN — PHENYLEPHRINE HYDROCHLORIDE 80 MCG: 10 INJECTION INTRAVENOUS at 14:00

## 2024-05-03 SDOH — HEALTH STABILITY: MENTAL HEALTH: CURRENT SMOKER: 1

## 2024-05-03 ASSESSMENT — PAIN SCALES - GENERAL
PAINLEVEL_OUTOF10: 9
PAINLEVEL_OUTOF10: 8
PAINLEVEL_OUTOF10: 8
PAINLEVEL_OUTOF10: 10 - WORST POSSIBLE PAIN
PAINLEVEL_OUTOF10: 10 - WORST POSSIBLE PAIN
PAINLEVEL_OUTOF10: 7
PAINLEVEL_OUTOF10: 8
PAINLEVEL_OUTOF10: 7
PAINLEVEL_OUTOF10: 8
PAINLEVEL_OUTOF10: 7
PAINLEVEL_OUTOF10: 5 - MODERATE PAIN
PAINLEVEL_OUTOF10: 8
PAINLEVEL_OUTOF10: 9

## 2024-05-03 ASSESSMENT — PAIN - FUNCTIONAL ASSESSMENT
PAIN_FUNCTIONAL_ASSESSMENT: 0-10

## 2024-05-03 NOTE — PROGRESS NOTES
Subjective   Patient seen post op in PACU. Sleeping but easily awaken. Said he had pain in right hip (just received pain med). Was able to answer questions and was hungry. Denied shortness of breath (on 3L) and chest pain       Objective     Current Facility-Administered Medications   Medication Dose Route Frequency Provider Last Rate Last Admin    [Transfer Hold] acetaminophen (Tylenol) tablet 650 mg  650 mg oral q4h Dorys Calixto MD   650 mg at 05/02/24 0941    [Transfer Hold] albuterol 2.5 mg /3 mL (0.083 %) nebulizer solution 1.25 mg  1.25 mg nebulization q6h PRN Dorys Calixto MD        [Transfer Hold] amiodarone (Pacerone) tablet 200 mg  200 mg oral Daily Dorys Calixto MD   200 mg at 05/03/24 0946    [Transfer Hold] B complex-vitamin C-folic acid (Nephrocaps) capsule 1 capsule  1 capsule oral Daily Dorys Calixto MD   1 capsule at 05/02/24 0838    [Transfer Hold] bumetanide (Bumex) tablet 2 mg  2 mg oral Daily Dorys Calixto MD   2 mg at 05/02/24 0838    [Transfer Hold] escitalopram (Lexapro) tablet 10 mg  10 mg oral Daily Dorys Calixto MD   10 mg at 05/02/24 0838    [Transfer Hold] finasteride (Proscar) tablet 5 mg  5 mg oral Daily Dorys Calixto MD   5 mg at 05/02/24 0838    [Transfer Hold] tiotropium (Spiriva Respimat) 2.5 mcg/actuation inhaler 2 puff  2 puff inhalation Daily Dorys Calixto MD   2 puff at 05/02/24 0639    And    [Transfer Hold] fluticasone furoate-vilanteroL (Breo Ellipta) 100-25 mcg/dose inhaler 1 puff  1 puff inhalation Daily Dorys Calixto MD   1 puff at 05/02/24 0639    [Held by provider] heparin (porcine) injection 5,000 Units  5,000 Units subcutaneous TID Mirna Malagon PA-C   5,000 Units at 05/03/24 0503    [Transfer Hold] HYDROmorphone (Dilaudid) injection 0.5 mg  0.5 mg intravenous q4h PRN Dorys Calixto MD   0.5 mg at 05/02/24 2144    lactated Ringer's infusion  50 mL/hr intravenous Continuous Lilia Wallace MD 50 mL/hr at 05/03/24 0800 50 mL/hr at 05/03/24 0800    [Transfer Hold] methadone  (Dolophine) tablet 2.5 mg  2.5 mg oral Nightly Dorys Calixto MD   2.5 mg at 05/02/24 2035    [Transfer Hold] oxyCODONE (Roxicodone) immediate release tablet 10 mg  10 mg oral q4h PRN Dorys Calixto MD   10 mg at 05/03/24 0834    [Transfer Hold] oxyCODONE (Roxicodone) immediate release tablet 5 mg  5 mg oral q4h PRN Dorys Calixto MD        oxygen (O2) therapy  2 L/min inhalation Continuous Dorys Calixto MD   2 L/min at 05/01/24 1410    [Transfer Hold] polyethylene glycol (Glycolax, Miralax) packet 17 g  17 g oral Daily Dorys Calixto MD   17 g at 05/02/24 0839    [Transfer Hold] rOPINIRole (Requip) tablet 1 mg  1 mg oral Nightly Dorys Calixto MD   1 mg at 05/02/24 2035    sodium chloride 0.9 % irrigation solution    PRN Glen Juarez MD   3,000 mL at 05/03/24 1253    vancomycin (Vancocin) vial for injection    PRN Glen Juarez MD   1 g at 05/03/24 1348     Facility-Administered Medications Ordered in Other Encounters   Medication Dose Route Frequency Provider Last Rate Last Admin    clindamycin (Cleocin) in dextrose 5% water   intravenous PRN Paras Barnard CAA   900 mg at 05/03/24 1227    electrolyte-A (Plasmalyte-A) solution   intravenous Continuous PRN TAO Oliveros   New Bag at 05/03/24 1152    ePHEDrine injection   intravenous PRN Paras Barnard CAA   5 mg at 05/03/24 1400    fentaNYL PF (Sublimaze) injection   intravenous PRN Paras Barnard CAA   25 mcg at 05/03/24 1204    lidocaine (cardiac) (Xylocaine) injection   intravenous PRN Paras Barnard CAA   60 mg at 05/03/24 1204    phenylephrine (Nader-Synephrine) injection   intravenous PRN Paras Barnard CAA   80 mcg at 05/03/24 1400    propofol (Diprivan) injection   intravenous PRN Paras Barnard CAA   70 mg at 05/03/24 1204    rocuronium (ZeMuron) injection   intravenous PRN TAO Oliveros   20 mg at 05/03/24 1307    tranexamic acid in NaCl,iso-os 1,000 mg/100 mL (10 mg/mL) IV   buccal PRN TAO Oliveros   600 mg at  05/03/24 1227       Physical Exam  Constitutional:       Comments: Sleeping, easily awakened; alert and oriented to person, place, situation, not day (incorrect day)   Eyes:      General: No scleral icterus.  Cardiovascular:      Rate and Rhythm: Normal rate and regular rhythm.   Pulmonary:      Effort: No respiratory distress.      Breath sounds: Normal breath sounds. No wheezing or rhonchi.      Comments: On 3L NC  Abdominal:      General: There is no distension.      Palpations: Abdomen is soft.      Tenderness: There is no abdominal tenderness. There is no guarding.   Musculoskeletal:         General: Tenderness (RLE tender; clean, in tact bandage) present.   Skin:     Findings: No bruising or rash.         Confusion Assessment Method(CAM) for diagnosis of delirium:    1.  Acute onset or fluctuating course: absent/present: Absent  2.  Inattention: absent/present: Absent  3.  Disorganized thinking: absent/present: Absent  4.  Altered level of consciousness: absent/present: Absent  CAM: negative    AT Score For Assessment of Delirium and Cognitive Impairment:    Alertness: 0  Normal(fully alert,but not agitated, throughout assessment)=0  Mild sleepiness for <10 seconds after walking, then normal=0  Clearly abnormal=4  2.  AMT4: 1  No mistakes=0  One mistake=1  Two or more mistakes/untestable=2  3.  Attention: 0  Achieves seven months or more correctly=0  Starts but scores <7 months/ refuses to start=1  Untestable(cannot start because unwell, drowsy, inattentive)=2  4.  Acute: 0  No=0  Yes=4    Total Score: 1  4 or above: Possible delirium +/- cognitive impairment  1-3: Possible cognitive impairment  0: Delirium or severe cognitive impairment unlikely(but delirium still possible if (4) information incomplete)      Last Recorded Vitals      5/2/2024     2:00 PM 5/2/2024     7:04 PM 5/2/2024    11:00 PM 5/3/2024     1:03 AM 5/3/2024     4:17 AM 5/3/2024     7:00 AM 5/3/2024    10:04 AM   Vitals   Systolic 122 120   "110 100 118 131   Diastolic 91 68  62 53 67 64   Heart Rate 68 75  56 54 61 58   Temp    35.6 °C (96.1 °F) 35.6 °C (96.1 °F)  36.4 °C (97.5 °F)   Resp 16 16  16 16 18 18   Height (in)   1.778 m (5' 10\")       Weight (lb)   135.36       BMI   19.42 kg/m2       BSA (m2)   1.74 m2          Vitals:    05/02/24 2300   Weight: 61.4 kg (135 lb 5.8 oz)        Relevant Results  Lab Results   Component Value Date    TSH 1.27 09/18/2023    [id number] 592 04/15/2024    VITD25 26 (A) 06/23/2022    HGBA1C 5.7 (H) 11/14/2023     Results from last 7 days   Lab Units 05/03/24  0757 05/02/24  0510 05/01/24  0058   WBC AUTO x10*3/uL 5.3 5.5 5.6   HEMOGLOBIN g/dL 10.1* 10.8* 12.7*   HEMATOCRIT % 31.3* 32.3* 40.9*   ALT U/L  --   --  9*   AST U/L  --   --  17   SODIUM mmol/L 143 144 144   POTASSIUM mmol/L 3.9 3.4* 3.2*   CHLORIDE mmol/L 102 104 103   CREATININE mg/dL 1.65* 1.71* 2.45*   BUN mg/dL 27* 29* 31*   CO2 mmol/L 38* 32 32   INR   --   --  1.2*          ECG 12 lead  See ED provider note for full interpretation and clinical correlation  Confirmed by Jose Garcia ([id number]) on 5/2/2024 11:56:26 PM  XR chest 1 view  Narrative: Interpreted By:  Lucia Cunningham,   STUDY:  Chest, single AP view.      INDICATION:  Signs/Symptoms:transient desats yesterday after dilaudid dosing.      COMPARISON:  CT chest 05/01/2024. Chest radiograph 05/01/2024      ACCESSION NUMBER(S):  [id number]      ORDERING CLINICIAN:  ADONIS CLAROS      FINDINGS:  Left subclavian AICD/pacemaker device.  The cardiac silhouette size is within upper normal limits.  Left lower lobe pulmonary infiltrates are better appreciated on the  previous CT chest. There is mild interstitial marking prominence in  the left upper lobe, similar to prior. No pneumothorax. Left lung  calcified granuloma. Background chronic interstitial lung changes.  No acute osseous abnormality.      Impression: 1. Findings suggestive of left lower lobe pneumonia      MACRO:  None.      Signed by: Lucia" Octavio 5/2/2024 10:39 AM  Dictation workstation:   [id number]        DATA:  EKG: QTC  Encounter Date: 05/01/24   ECG 12 lead   Result Value    Ventricular Rate 61    Atrial Rate 61    VA Interval 116    QRS Duration 134    QT Interval 450    QTC Calculation(Bazett) 453    P Axis -13    R Axis 213    T Axis 85    QRS Count 10    Q Onset 214    P Onset 156    P Offset 202    T Offset 439    QTC Fredericia 452    Narrative    See ED provider note for full interpretation and clinical correlation  Confirmed by Jose Garcia ([id number]) on 5/2/2024 11:56:26 PM      Anti-psychotics in 48 hours: none  Opioids/Benzodiazepines in 48 hours: methadone, oxycodone, hydromorphone  Anticholinergics on board:Yes - promethazine  Restraints:No  Indwelling catheters:Yes  Last BM: unsure  UO in 24 hours: 350  Activity in the past 24 hours: bedridden  Need for ambulatory devices:           Assessment/Plan   This is a/an 79 y.o. year old male, with past medical history relevant for ICD/pacemaker, A-fib on Eliquis, ischemic cardiomyopathy, CHF, COPD, CAD, hypertension, depression, on chronic oxygen 2 L nasal cannula. He is followed by [facility] due to end stage cardiac disease, presenting for right hip periprosthetic fracture, vertebral fracture of T7/T8 and rib fracture. Patient being seen in geriatric consultation for goals of care discussion and post op management.      Principal Problem:    Compression fracture of thoracic vertebra, unspecified thoracic vertebral level, initial encounter (Multi)  Active Problems:    Periprosthetic hip fracture, initial encounter    Periprosthetic fracture around internal prosthetic right hip joint (Multi)     1. Right periprosthetic hip fracture after fall at home  - Patient with significant pain in right hip due to fracture  - Patient and his family state that goals of care are comfort and quality of life.   - Pt undergoing rt hip surgery today after risk/benefits discussion  - Code  "status to be changed to Full Code during procedure and then back to DNR/DNI 4 hours post op   - Current pain management: methadone 2.5 mg nightly, oxy 5 mg q4 (4-6 pain), oxy 10 mg q4 (7-10 pain)  - Avoid dilaudid use  - recommend changing acetaminophen to 975mg 3x/day and adding lidocaine patches  - Encourage sitting up in chair and incentive spirometer use     2.  Acute T7 spine injury  - no surgical intervention        3.  Acute Right rib fractures   - Pain management as above     4. Acute on chronic pain. Chronic pain related to lumbar spinal stenosis  - history of chronic low back pain, now with acute pain due to fractures  - pain management as above     5. Acute fall with resulting rib, vertebral and hip fractures  - Patient tripped on uneven pavement, he did not become dizzy or lightheaded  - No recent history of falls (last fall 6 months ago)  - Mobility will be limited after surgery  - PT evaluation for safe transfers      6. HFrEF/ischemic cardiomyopathy, s/p [employer] AICD placement in 2012, with recent generator change for primary prevention (LEVEF 20-25%), generator replaced in 2023, how AWMI d/t delayed stent thrombosis s/p redo PCI with GEORGIE, PCI to LAD with BMS, on Brilinta  - Enrolled in [facility] due to end stage cardiac disease  - He is currently on amiodarone, bumetanide, metoprolol XL, Brilinta, Eliquis   - Patient feels that his symptoms are well controlled   - Patient's ICD remains active- patient states that he has \"things to do\" before it is deactivated. Patient's wife states that no one has discussed the ICD with them.   - Continue follow up with cardiology      7. COPD on 2L O2 home O2  - follows with pulmonary medicine   - continue azithromycin, nebulizers    - patient continues to smoke at home      8. Goals of Care  Patient's current clinical condition, including diagnosis, prognosis and management plan, and goals of care were discussed:  Life limiting disease: heart " failure  Family: Supportive wife and daughter- other extended family members are nearby and helpful.  Performance status: At baseline is able to walk around with rest periods. He has O2 at home. Palliative Performance Scale is 40%   Joys/meaning/strength: Family and being at home  Understanding of Health: Patient, wife and daughter verbalize a good understanding of Mr. Damon's overall health. They have some knowledge deficit regarding the ICD. Education provided regarding the risks/benefits of having ICD deactivated   Information: Patient and family express understanding of risk/benefits of surgery vs. No surgery. Patient and family wish to understand what the patient's recommended level of activity/activity restriction with and without surgery as this will assist their decision making process.   Goals: Return home with his wife, be able to move about his home, go outside to enjoy the spring weather  Worries and fears, now and future: Worried about having a heart attack during or after surgery.   Minimum acceptable outcome/QOL: want to be able to get out of bed- is willing to accept a wheelchair as mobility aid, wants pain controlled, want to be with his family   - Patient states quality of life is his primary goal. He wishes to be home with his wife and family. He is pleased with Hospice care.   - Patient would like to be able to get out of bed to a wheelchair when he is at home  - Patient plans to resume Hospice care at discharge      Care Transitions:  -Recommended level for discharge: home with Hospice   -Home going considerations: lives on 1 level, has supportive family, followed by [facility]   -Primary care physician: DEANNE Esqueda-CNP, [facility]      Goals of Care:  -Health care power of : wife, Stacey  -Living will: unsure   Code status: DNR/DNI     4M AGE-FRIENDLY INITIATIVE:  What matters most to patient:   Medications:   Mentation:   Mobility:     Geriatric  medicine will continue to follow the patient. Thank you for allowing geriatric medicine to be involved in the care of your patient. Geriatric medicine consultation team is available during work hours Monday through Friday. For any emergency issues requiring immediate assistance over the weekend, please page Geriatrics pager [phone]    Angel Villarreal MD

## 2024-05-03 NOTE — SIGNIFICANT EVENT
Family is at the bedside.  RN informed MD that no one from Thoracic Team has given family an update.  [facility]

## 2024-05-03 NOTE — ANESTHESIA POSTPROCEDURE EVALUATION
Patient: Andrez Damon    Procedure Summary       Date: 05/03/24 Room / Location: [facility] OR 01 / [facility] OR    Anesthesia Start: 1152 Anesthesia Stop: 1512    Procedure: Open Reduction Internal Fixation Hip (Right: Hip) Diagnosis:       Periprosthetic fracture around internal prosthetic right hip joint, initial encounter (Multi)      (Periprosthetic fracture around internal prosthetic right hip joint, initial encounter (Multi) [M97.01XA])    Surgeons: Glen Juarez MD Responsible Provider: Ryan Crespo DO    Anesthesia Type: general ASA Status: 4            Anesthesia Type: general    Vitals Value Taken Time   /59 05/03/24 1545   Temp 36 °C (96.8 °F) 05/03/24 1515   Pulse 71 05/03/24 1556   Resp 20 05/03/24 1556   SpO2 98 % 05/03/24 1556   Vitals shown include unfiled device data.    Anesthesia Post Evaluation    Patient location during evaluation: PACU  Patient participation: complete - patient participated  Level of consciousness: awake and alert  Pain management: adequate  Airway patency: patent  Cardiovascular status: acceptable and blood pressure returned to baseline  Respiratory status: acceptable  Hydration status: acceptable  Postoperative Nausea and Vomiting: none        No notable events documented.

## 2024-05-03 NOTE — PROGRESS NOTES
Patient discussed during morning rounds. He admitted from home after a fall. Prior to this admission, patient was receiving hospice services from [facility]. He is a DNR-DNI. Code status voided for OR procedure and will resume post op. Patient going to the OR with ortho to repair his R hip. He will be evaluated by PT/OT post op. SW send referral to [facility] to resume hospice services. He is not medically ready for discharge at this time. SW will continue to follow to facilitate discharge plans.     Tricia Phelps LCSW

## 2024-05-03 NOTE — ANESTHESIA PROCEDURE NOTES
Peripheral IV  Date/Time: 5/3/2024 12:10 PM  Inserted by: DEANNE Jordan-KATHE    Placement  Needle size: 16 G  Laterality: right  Location: forearm  Site prep: alcohol  Technique: anatomical landmarks  Attempts: 1

## 2024-05-03 NOTE — PROGRESS NOTES
"Orthopaedic Surgery Progress Note    Subjective:  No acute events overnight. Pain better controlled. Endorsing appropriate soreness. Endorses rib pain but denies cardiac chest pain, shortness of breath, or fevers.    Objective:  /53   Pulse 54   Temp 35.6 °C (96.1 °F)   Resp 16   Ht 1.778 m (5' 10\")   Wt 61.4 kg (135 lb 5.8 oz)   SpO2 96%   BMI 19.42 kg/m²     Gen: arousable, NAD, appropriately conversational  Cardiac: RRR to peripheral palpation  Resp: nonlabored on RA  GI: soft, nondistended    MSK:  Right lower extremity:  - Skin intact   - Tender to palpation over R hip  - Fires EHL/DF/PF.  - Sensation intact to light touch in sural, saphenous, superficial/deep peroneal, tibial nerve distributions.  - Dopplerable DP pulse, < 2 seconds capillary refill.    Results for orders placed or performed during the hospital encounter of 05/01/24 (from the past 24 hour(s))   PST Top   Result Value Ref Range    Extra Tube Hold for add-ons.        XR chest 1 view   Final Result   1. Findings suggestive of left lower lobe pneumonia        MACRO:   None.        Signed by: Lucia Cunningham 5/2/2024 10:39 AM   Dictation workstation:   [id number]      Transthoracic Echo (TTE) Limited   Final Result      XR chest 1 view   Final Result   1. No acute cardiopulmonary process.   2. Reticular interstitial opacities of the right upper lung which may   relate to underlying interstitial lung disease. Upper lobe   predominant emphysema.   3. Similar enlarged cardiomediastinal silhouette.        I personally reviewed the image(s)/study and resident interpretation   as stated by Dr. Clara Sauceda MD. I agree with the findings as   stated. This study was interpreted at [facility], [place], [place].        MACRO:   None        Signed by: Pascual Wesley 5/1/2024 9:30 AM   Dictation workstation:   [id number]      CT chest abdomen pelvis wo IV contrast   Final Result   1. Compression fracture " deformity of the T7 vertebral body, new from   12/12/2023, please see dedicated CT of the thoracic spine.   2. Periprosthetic fracture of the right proximal femur, please see   dedicated CT of the right hip.   3. No additional acute abnormality related to trauma in the chest,   abdomen, or pelvis.   4. Chronic and incidental findings as described above.        I personally reviewed the images/study and I agree with the findings   as stated by resident physician Dr. Facundo Canchola.        MACRO:   None        Signed by: Jack Weeks 5/1/2024 8:49 AM   Dictation workstation:   [id number]      FL less than 1 hour    (Results Pending)   FL less than 1 hour    (Results Pending)       Assessment/Plan: 79 y.o. male with R periprosthetic hip fracture also with 3 column T7 spine injury being managed by ortho spine.    After discussion with geriatrics and GOC, patient would now like to pursue surgery. Awaiting clearance from periop medicine.    Plan:   - NPO  - Admitted to trauma , clearance pending periop medicine  - Pre-operative labs/studies complete: T&S, PT/INR, CBC, BMP, CXR, EKG  - Please place caruso in setting of immobilizing fracture  - Consented and posted to OR schedule for ORIF R hip possible revision R BRENDA w/ orthopedic surgery (Dr. Juarez) on 5/3  - Strict Bedrest, NWB RLE extremity.   - Pre-operative ABx: None indicated  - No indication for transfusion pre-operatively, 2U PRBC on hold for OR   - DVT PPx: SCDs, please hold DVT ppx    This consult was staffed with attending physician, Dr. Juarez.    Moo Le MD  Orthopaedic Surgery PGY-1  Pager: [phone] (Epic Chat preferred)    This patient will be followed by the Orthopaedic Trauma service. Please page or Epic Chat the corresponding residents below with questions or concerns.     Ortho Trauma Service (Epic Chat Preferred)  First call: Moo Le, PGY1  Second call: Randy Walker, PGY2  Third call: Nilton Trujillo, PGY3    On weekends and after  6PM:  At [facility]: Please reach out to the orthopaedic on-call resident ([phone])  At [facility]: Please reach out to the orthopaedic on-call MIRANDA or resident (please refer to Connie)

## 2024-05-03 NOTE — DISCHARGE INSTRUCTIONS
[facility]  DISCHARGE INSTRUCTIONS    Orthopaedic Surgery Discharge Instructions  Weight bearing status: Weight bearing as tolerated for right leg    Leave operative dressing in place until 7 days after surgery (5/11/24). Then remove and leave incision open to air. Let water run freely over incision when showering, do not scrub. Do not soak in pool or tub. Do not swim in pools or ponds until 3 months after surgery.    Call if any drainage after 7 days, increased redness/warmth/swelling at incision site, pain/tenderness of calf, swelling of calf that does not respond to elevation, SOB/chest pain.    Call for any questions or concerns.     Follow up with Dr. Juarez in 3 weeks. Call [phone] to schedule/confirm appointment.     GENERAL INSTRUCTIONS  1) Diet: Resume regular diet that you were consuming prior to admission.     2) Activity:   - Move around as you are able  - Avoid strenuous activities including intensive movements as you are healing.   - Normal activity as tolerated until follow up visits.  - No heavy bending/twisting/lifting > 10 lbs until follow up visit with ortho/neuro spine.  Weight bearing status: Weight bearing as tolerated for right lower extremity until follow up visit with orthopaedic team.  Driving: No driving while taking narcotic medications and until follow up visits.    3) Medications:   - You are to resume all your home medications as previously prescribed. Additionally, you have been given a prescription for Tylenol (every 8 hours as needed for pain/fever/headache for 10 days), Oxycodone (every 4 hours as needed for moderate/severe pain for 5 days), and Anne-Colace (x7 days for constipation). You will also be required to take 30 days of Aspirin 81mg twice daily and 30 days of Vit D/Calcium for your recent orthopedic injuries. If refills are needed for your medications, please don't hesitate to reach out to your primary care provider.    4) Please  notify your physician if you have the following symptoms.     - Fever > 100.5 F (>38 C), chills  - Uncontrolled nausea and/or vomiting  - Chest pain  - New or worsening shortness of breathe  - Uncontrolled diarrhea   - You stop passing gas   - Have new bruising, rashes, blistering on your body  - New numbness/tingling, loss of feeling in any part of your body or a new decreased ability to move any part of your body  - New or worsening swelling  - Uncontrolled pain    If you display any of the following signs/symptoms: increased confusion, altered mental status, new numbness or tingling, decreased sensation or movement, pain that is uncontrolled with pain medications, increased shortness of breath, chest pain/palpitations, or any other concerning signs/symptoms, please proceed to your nearest Emergency Department for further evaluation and management.

## 2024-05-03 NOTE — PROGRESS NOTES
[facility]  TRAUMA SERVICE - PROGRESS NOTE    Patient Name: Andrez Damon  MRN: [id number]  Admit Date: 559250  : 1945  AGE: 79 y.o.   GENDER: male  ==============================================================================  MECHANISM OF INJURY / CHIEF COMPLAINT:   79 year old male, was taking his trash out. He suffered a mechanical fall after tripping over the bin falling on his right side. No LOC. See at OSH and brought here. He is complaining of some right chest wall and some right hip pain.   Prior on hospice, here for evaluation for any palliative fixation for the hip. Patient stating in room that he currently wishes to avoid surgery if possible.   LOC (yes/no?): no  Anticoagulant / Anti-platelet Rx? (for what dx?): Eliquis (PE)  Referring Facility Name (N/A for scene EMR run): [place]      INJURIES:   Right 6-7 nondisplaced rib fx (On CXR)  T7 VB fx with height loss, T6 SP fx  Right periprosthetic hip fx     OTHER MEDICAL PROBLEMS:  CAD, HFrEF LVEF 24% with major infarcts on stress echo   COPD  HTN  CKD  ICD implantation     INCIDENTAL FINDINGS:  NA    ==============================================================================  TODAY'S ASSESSMENT AND PLAN OF CARE:  - admitted to trauma service    #R periprosthetic hip fx  - ortho consulted  - perioperative medicine/Dr. Hodges consulted for risk start  - geriatrics consulted for goals of care for OR decision  - PT/OT  - pain control multimodal with scheduled tylenol, home methadone, oxy 5/10mg PRN  - plan for OR today with ortho for right hip sx  - DNR/DNI reversed for OR and PACU  - discussed with trauma attending and Dr. Hodges regarding post op TICU for 24-48h of close monitoring    #T7 VB fx with height loss, T6 SP fx  - spine consulted, plan is non op per ortho given complexity of fixation    #CAD #HFrEF with EF 20#  - periop medicine consulted for cardiac risk start  - home amiodarone  -  home  "bumetanide  - holding home eliquis    #Pulmonary  - O2 supplement to keep sat >92%  - wean as tolerated  - tiotropium inhaler prn    #Depression  - escitalopram 10mg home dose  - ropinirole nightly 1mg    #  - cont home finasteride  - PT/OT  - DVT ppx with SQH  - daily labs    Dispo: OR today, post op will transfer to [facility]    D/w Dr. Mcintyre trauma attending.    Dorys Calixto MD  PGY-1 VS Resident  Trauma Surgery Service  Floor: [phone]    ==============================================================================  CHIEF COMPLAINT / OVERNIGHT EVENTS:   Seen in pre op and discussed code status. See significant event note from same day for further details. Patient remains agreeable to surgery and is comfortable with that decision. Had not eaten since midnight. No further questions, no further symptoms.     MEDICAL HISTORY / ROS:  Admission history and ROS reviewed. Pertinent changes as follows:      PHYSICAL EXAM:  Heart Rate:  [54-75]   Temp:  [35.6 °C (96.1 °F)-36.4 °C (97.5 °F)]   Resp:  [16-21]   BP: (100-131)/(53-68)   Height:  [177.8 cm (5' 10\")]   Weight:  [61.4 kg (135 lb 5.8 oz)]   SpO2:  [96 %-100 %]   Physical Exam  Constitutional: no acute distress, in pre op, nontoxic, able to converse  Neuro: A/O x4, no gross deficits   Psych: normal affect  HEENT: No deformities, no scleral icterus   Cardiac: RRR  Pulmonary: unlabored respirations   Abdomen: soft, non distended, non tender  Skin: warm and dry overall    Extremities: Right hip with pain with flexion    IMAGING SUMMARY:  (summary of new imaging findings, not a copy of dictation)      LABS:  Results from last 7 days   Lab Units 05/03/24  0757 05/02/24  0510 05/01/24  0058   WBC AUTO x10*3/uL 5.3 5.5 5.6   HEMOGLOBIN g/dL 10.1* 10.8* 12.7*   HEMATOCRIT % 31.3* 32.3* 40.9*   PLATELETS AUTO x10*3/uL 123* 130* 159   NEUTROS PCT AUTO %  --   --  77.0   LYMPHS PCT AUTO %  --   --  12.8   MONOS PCT AUTO %  --   --  8.2   EOS PCT AUTO %  --   --  0.9     Results " from last 7 days   Lab Units 05/01/24  0058   APTT seconds 31   INR  1.2*     Results from last 7 days   Lab Units 05/03/24  0757 05/02/24  0510 05/01/24  0058   SODIUM mmol/L 143 144 144   POTASSIUM mmol/L 3.9 3.4* 3.2*   CHLORIDE mmol/L 102 104 103   CO2 mmol/L 38* 32 32   BUN mg/dL 27* 29* 31*   CREATININE mg/dL 1.65* 1.71* 2.45*   CALCIUM mg/dL 8.9 8.4* 9.4   PROTEIN TOTAL g/dL  --   --  5.8*   BILIRUBIN TOTAL mg/dL  --   --  0.5   ALK PHOS U/L  --   --  68   ALT U/L  --   --  9*   AST U/L  --   --  17   GLUCOSE mg/dL 101* 145* 185*     Results from last 7 days   Lab Units 05/01/24  0058   BILIRUBIN TOTAL mg/dL 0.5     Results from last 7 days   Lab Units 05/03/24  1530 05/03/24  1233   POCT PH, ARTERIAL pH 7.40 7.43*   POCT PCO2, ARTERIAL mm Hg 51* 48*   POCT PO2, ARTERIAL mm Hg 96* 206*   POCT HCO3 CALCULATED, ARTERIAL mmol/L 31.6* 31.9*   POCT BASE EXCESS, ARTERIAL mmol/L 5.8* 6.7*       I have reviewed all medications, laboratory results, and imaging pertinent for today's encounter.

## 2024-05-03 NOTE — SIGNIFICANT EVENT
Andrez Damon currently has a code status of DNR and No Intubation.     I completed a discussion about code status with Andrez Damon.    For the surgery, the patient's code status will be Full code.     I have specifically discussed the need for endotracheal intubation, arterial line placement, and central venous catheterization and they express understanding with the anesthesia plan.     I have also discussed /plan to discuss this with the Preop RN, AA, and Surgeon.

## 2024-05-03 NOTE — OP NOTE
ORTHOPAEDIC SURGERY OPERATIVE REPORT      Date of Surgery: 5/3/2024    Surgeon: Glen Juarez MD    Assistant Surgeon: MD Dr. Angel Boone participated in this case as the assistant surgeon, performing components of the positioning, approach, debridement, reduction, fixation, and closure. Due to the nature and complexity of the case, no qualified resident of an appropriate level was available to assist.    Assistants:  Rishabh Walker, PGY2    Preoperative Diagnosis:  Right Burdett B1 periprosthetic proximal femur fracture    Postoperative Diagnosis:  Right Burdett B1 periprosthetic proximal femur fracture    Procedures Performed:  Right Burdett B1 periprosthetic proximal femur fracture open reduction internal fixation    Anesthesia: General anesthesia    IV Fluids: Per anesthesia record    Estimated Blood Loss: 300 mL    Complications: None    Implants:   [employer] EVOS large 3.5/4.5 mm periprosthetic femoral plate with associated cortical and locking screws  Synthes 1.7 mm beaded cable    Specimens: None    Intraoperative Findings: Stable fracture fixation and safe extra-articular hardware placement noted at the conclusion of the case.      Indications For Procedure:  Andrez Damon is a 79 y.o. male who sustained a mechanical ground-level fall.  At that time, the patient sustained a right Burdett B1 periprosthetic proximal femur fracture as well as a T7 spinal injury. Due to the nature of the patient's injury, they were indicated for operative stabilization.  Informed consent was obtained after discussing the risks, benefits, and alternatives to the procedure.  Risks include pain, bleeding, infection, damage to nearby structures, malunion, nonunion, hardware complications, need for further procedures, blood clots, anesthesia risks, and death.  Decision was made to proceed.  The patient was then brought to the preoperative holding area on the day of surgery.    Procedure  Detail:  The patient was met in the preoperative holding area where their identity was confirmed and the operative extremity was marked. The patient was taken back to the operating theater where a preinduction timeout was performed which confirmed the patient's identity, procedure to be performed, correct operative site, availability of imaging and implants, allergies, and preoperative antibiotics. Everyone present was in agreement. They were placed under general anesthesia without complication. The patient was transferred to the operating table and placed in the lateral decubitus position with the right side upward. All bony prominences were well-padded. The patient's right lower extremity was then prepped and draped in the usual sterile fashion. A preprocedure timeout was performed which again confirmed the patient's identity, procedure to be performed, and correct operative site.  Everyone present was in agreement. Preoperative antibiotics were administered.    We began by first marking our planned lateral approach to the proximal femur.  We appreciated the patient's prior Kocher Langenbeck style incision for the total hip arthroplasty.  It was noted that this was quite posterior and we felt that a new incision exam appropriate.  We palpated the tip of the greater trochanter and marked a longitudinal incision along the femoral shaft distally.  We made incision through skin and subcutaneous tissue.  Deeper dissection was taken down Bovie electrocautery until the IT band was identified.  We created a small rent with this and then extended her IT band incision proximally and distally with use of curved scissors.  This was reflected.  We removed the underlying trochanteric bursa which was noted to be hemorrhagic.  Fracture hematoma was evacuated from underneath this.  We identified the vastus ridge and performed an L-shaped vastus tenotomy approximately 1 centimeter distal to this.  We then performed a vastus  elevating approach to the lateral femur.  We obtained appropriate hemostasis as we elevated the vastus distally.  At this point, we were readily able to identify the proximal femur fracture.  There is noted that there was some comminution and multiple fracture lines crossing the greater trochanter.  Additionally, there was a fracture  to the level of the lesser trochanter.  We placed a collinear clamp around this and tensioned down appropriately.  This reduced the lesser trochanteric fragment very well.  Following this, we used a cable passer around the proximal femur, take care to stay on the bone.  We passed a [employer] 1.7 mm beaded cable through this and tensioned down appropriately.  This was crimped and cut.  A collinear clamp was removed.  Fluoroscopy performed at this time demonstrated satisfactory fracture reduction and appropriate cable positioning.  We selected a [employer]hew 3.5/4.5 mm close large prosthetic proximal femoral plate and provisionally positioned this on bone.  Appropriate plate position was confirmed with fluoroscopy.  We first secured the plate to bone distally using bicortical 4.5 mm nonlocking screws.  Following this, we advanced up the femur placing 3.5 mm nonlocking screws around the total hip arthroplasty stem.  Once the plate was secured to the shaft, we turned our attention proximally and brought the plate down to bone using nonlocking screws in the proximal aspect of the plate while pushing the plate down with a ball spike pusher.  This significantly improved the plate position.  Following this, we placed multiple additional locking screws proximally in the proximal aspect of the plate, securing the greater and lesser trochanteric fractures.  We then placed several additional nonlocking screws back within the distal aspect of the plate.  We obtained our final fluoroscopic images which demonstrated maintained satisfactory fracture reduction as well as safe hardware  placement.  The wound was copiously irrigated with normal saline.  Vancomycin and tobramycin powder was placed within the wound.  The vastus tenotomy was repaired using 0 PDS suture.  The distal aspect of the vastus was allowed to fall back into place.  The IT band was closed using a running 0 PDS suture.  The deeper layer was closed using 2-0 Monocryl followed by staples for the skin.  All counts were correct x 2 at this time.  Sterile dressing was applied.    The drapes were taken down and the patient was awoken from anesthesia without complication. They were transferred back to their hospital bed and taken to PACU in stable condition.    Postoperative Plan:  The patient will be weightbearing as tolerated to the right lower extremity.  He will receive postoperative antibiotics.  Patient may resume DVT prophylaxis per the primary team, we would recommend at least aspirin 81 mg twice daily.  Patient will receive evaluations by PT and OT. The patient will follow up with Dr. Glen Juarez MD in approximately 3 weeks time for clinical check, suture removal, and 3 view xrays of the right hip (AP pelvis/AP hip/crosstable lateral) and 2 view x-rays of the right femur (AP/lateral) at that time.    22 modifier for increased complexity of greater than 30% due to periprosthetic fracture    Dr. Glen Juarez MD was present for the critical portions of the case and was otherwise immediately available to assist.      Julian Ortiz MD  Orthopaedic Trauma Fellow  5/3/2024

## 2024-05-03 NOTE — ANESTHESIA PROCEDURE NOTES
Peripheral IV  Date/Time: 5/3/2024 12:10 PM  Inserted by: DEANNE Jordan-KATHE    Placement  Needle size: 14 G  Laterality: right  Location: forearm  Site prep: alcohol  Technique: anatomical landmarks  Attempts: 1

## 2024-05-03 NOTE — SIGNIFICANT EVENT
CODE STATUS DISCUSSION     Met with patient in preoperative area and discussed his current DNR/DNI status in the setting of his upcoming operation with orthopedic surgery.    Patient would like to rescind his DNR/DNI status for the duration of surgery and during his PACU/4 hours postop stay.  Afterwards, he would like his DNR reinstated while in the ICU.  Once extubated postoperatively, patient would not like to be reintubated in the event that he would need it.  Patient expressed strong understanding of the implications of these decisions and confirmed CODE STATUS with me.    Dorys Calixto MD  PGY-1 VS Resident  Trauma Surgery Service  Floor: [phone]  TSICU: [id number]

## 2024-05-03 NOTE — CARE PLAN
The patient's goals for the shift include      The clinical goals for the shift include Pain control      Problem: Pain  Goal: My pain/discomfort is manageable  Outcome: Progressing     Problem: Safety  Goal: Patient will be injury free during hospitalization  Outcome: Progressing  Goal: I will remain free of falls  Outcome: Progressing     Problem: Daily Care  Goal: Daily care needs are met  Outcome: Progressing     Problem: Psychosocial Needs  Goal: Demonstrates ability to cope with hospitalization/illness  Outcome: Progressing  Goal: Collaborate with me, my family, and caregiver to identify my specific goals  Outcome: Progressing  Flowsheets (Taken 5/2/2024 2256)  Cultural Requests During Hospitalization: n/a  Spiritual Requests During Hospitalization: n/a     Problem: Discharge Barriers  Goal: My discharge needs are met  Outcome: Progressing     Problem: Skin  Goal: Decreased wound size/increased tissue granulation at next dressing change  Outcome: Progressing  Flowsheets (Taken 5/2/2024 2315)  Decreased wound size/increased tissue granulation at next dressing change:   Promote sleep for wound healing   Protective dressings over bony prominences  Goal: Participates in plan/prevention/treatment measures  Outcome: Progressing  Flowsheets (Taken 5/2/2024 2315)  Participates in plan/prevention/treatment measures:   Elevate heels   Discuss with provider PT/OT consult  Goal: Prevent/manage excess moisture  Outcome: Progressing  Flowsheets (Taken 5/2/2024 2315)  Prevent/manage excess moisture:   Cleanse incontinence/protect with barrier cream   Monitor for/manage infection if present  Goal: Prevent/minimize sheer/friction injuries  Outcome: Progressing  Flowsheets (Taken 5/2/2024 2315)  Prevent/minimize sheer/friction injuries:   Complete micro-shifts as needed if patient unable. Adjust patient position to relieve pressure points, not a full turn   Increase activity/out of bed for meals   Use pull sheet    Turn/reposition every 2 hours/use positioning/transfer devices  Goal: Promote/optimize nutrition  Outcome: Progressing  Flowsheets (Taken 5/2/2024 2315)  Promote/optimize nutrition:   Consume > 50% meals/supplements   Offer water/supplements/favorite foods   Monitor/record intake including meals  Goal: Promote skin healing  Outcome: Progressing  Flowsheets (Taken 5/2/2024 2315)  Promote skin healing:   Protective dressings over bony prominences   Turn/reposition every 2 hours/use positioning/transfer devices   Assess skin/pad under line(s)/device(s)

## 2024-05-03 NOTE — ANESTHESIA PROCEDURE NOTES
"  Arterial Line:    Date/Time: 5/3/2024 12:04 PM    Staffing  Performed: CRNA   Authorized by: Ryan Crespo DO    Performed by: CHRISTIANO Jordan    An arterial line was placed. Procedure performed using surface landmarks.in the OR for the following indication(s): continuous blood pressure monitoring.    A 20 gauge (size), Arterial line catheter length: 1.88\" (length), Angiocath (type) catheter was placed into the Left radial artery, secured by Tegaderm,   Seldinger technique not used.  Events:  patient tolerated procedure well with no complications.      Additional notes:  Performed by Regino Jasso CRNA            "

## 2024-05-03 NOTE — CONSULTS
"Vancomycin Dosing by Pharmacy- INITIAL    Andrez Damon is a 79 y.o. year old male who Pharmacy has been consulted for vancomycin dosing for other surgical ppx . Based on the patient's indication and renal status this patient will be dosed based on a goal AUC of 400-600.     Renal function is currently unstable    Visit Vitals  /58   Pulse 70   Temp 36.1 °C (97 °F)   Resp 19        Lab Results   Component Value Date    CREATININE 1.65 (H) 05/03/2024    CREATININE 1.71 (H) 05/02/2024    CREATININE 2.45 (H) 05/01/2024    CREATININE 2.40 (H) 04/15/2024        Patient weight is No results found for: \"PTWEIGHT\"    No results found for: \"CULTURE\"     I/O last 3 completed shifts:  In: 832.5 (13.6 mL/kg) [I.V.:832.5 (13.6 mL/kg)]  Out: 225 (3.7 mL/kg) [Urine:225 (0.1 mL/kg/hr)]  Weight: 61.4 kg   [unfilled]    Lab Results   Component Value Date    PATIENTTEMP 37.0 05/03/2024    PATIENTTEMP 37.0 05/03/2024    PATIENTTEMP  12/31/2023      Comment:      NOTE: Patient Results are Not Corrected for Temperature          Assessment/Plan     Patient will not be given a loading dose.  Will initiate vancomycin maintenance, dose given in OR. 750mg Dose after 24hr    This dosing regimen is predicted by [employer] to result in the following pharmacokinetic parameters    Follow-up level will be ordered if needed clinically indicated sooner.  Will continue to monitor renal function daily while on vancomycin and order serum creatinine at least every 48 hours if not already ordered.  Follow for continued vancomycin needs, clinical response, and signs/symptoms of toxicity.       Yoselin Lim RP       "

## 2024-05-03 NOTE — PROGRESS NOTES
Reason For Consult  Preoperative evaluation for urgent - time sensitive shelley-prosthetic right hip fracture repair     History Of Present Illness  Andrez Damon is a 79 y.o. male presenting with right 6-7 nondisplaced rib fracture, T7 vertebral fracture with height loss, T8 vertebral fracture, T6 spinous process fracture, and right periprosthetic hip fracture after a mechanical fall while taking the garbage out. Initially presented to [place] ED 4/30, transferred to [facility] for further evaluation.    Interval history  Patient met with geriatrics team, ultimately decided to undergo surgical stabilization of his right hip fracture repair given his significant pain from this.  Echo 5/1/2024 showed EF 20-25%, increased from prior 35-40% (although from report, felt to be may be overestimated).  Also with moderate AVR and noted to progress from prior, has global LV hypokinesis, no mural thrombus seen.    At baseline he has the following medical issues:  HFrEF/Ischemia cardiomyopathy s/p 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 Left ventricular systolic function is severely decreased with a 20-25% estimated ejection fraction.   2. Severely dilated LV with severe global LV systolic dysfunction with regional variation in wall motion abnormalities. 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 Echocontrast was used and  excluded LV apical thrombus.   3. Left ventricular cavity size is severely dilated.   4. There is global hypokinesis of the left ventricle with minor regional variations.   5. The left atrium is enlarged.   6. Mild to moderate mitral valve regurgitation.   7. Moderate aortic valve regurgitation.   8. ER staff notified of findings at the time of reporting.   9. Compared with the prior exam from 9/18/2023 ( [facility]) the LV cavity appears to be larger with worse systolic function though the previously reported LVEF of 35-40% may have been an over estimate. In addition the AI appears to have increased from mild to now moderate AI.     Latest Reference Range & Units 05/03/24 07:57   GLUCOSE 74 - 99 mg/dL 101 (H)   SODIUM 136 - 145 mmol/L 143   POTASSIUM 3.5 - 5.3 mmol/L 3.9   CHLORIDE 98 - 107 mmol/L 102   Bicarbonate 21 - 32 mmol/L 38 (H)   Anion Gap 10 - 20 mmol/L 7 (L)   Blood Urea Nitrogen 6 - 23 mg/dL 27 (H)   Creatinine 0.50 - 1.30 mg/dL 1.65 (H)   EGFR >60 mL/min/1.73m*2 42 (L)   Calcium 8.6 - 10.6 mg/dL 8.9   PHOSPHORUS 2.5 - 4.9 mg/dL 3.0   Albumin 3.4 - 5.0 g/dL 2.9 (L)      Latest Reference Range & Units 05/03/24 07:57   LEUKOCYTES (10*3/UL) IN BLOOD BY AUTOMATED COUNT, [place]ian 4.4 - 11.3 x10*3/uL 5.3   nRBC 0.0 - 0.0 /100 WBCs 0.0   ERYTHROCYTES (10*6/UL) IN BLOOD BY AUTOMATED COUNT, [place]ian 4.50 - 5.90 x10*6/uL 3.43 (L)   HEMOGLOBIN 13.5 - 17.5 g/dL 10.1 (L)   HEMATOCRIT 41.0 - 52.0 % 31.3 (L)   MCV 80 - 100 fL 91   MCH 26.0 - 34.0 pg 29.4   MCHC 32.0 - 36.0 g/dL 32.3   RED CELL DISTRIBUTION WIDTH 11.5 - 14.5 % 14.7 (H)   PLATELETS (10*3/UL) IN BLOOD AUTOMATED COUNT, [place]ian 150 - 450 x10*3/uL 123 (L)     Assessment/Plan   80 yo male with right 6-7 nondisplaced rib fracture, T7 vertebral fracture with height loss, T8 vertebral fracture, T6 spinous process fracture, and right periprosthetic hip fracture after a mechanical fall      Surgery- Right periprosthetic hip fracture repair      Timing- Time sensitive     I reviewed his ECG this admission which shows NSR with wide complex QRS consistent with intraventricular conduction delay  I reviewed his ECHO from this admission which shows LVEF 20-25%, severely dilated LV with global LV hypokinesis, moderate aortic valve regurgitation, progressed from prior.  Also with mild to moderate MV regurgitation  I reviewed patient's prior stress test from 12/2019 which showed large transmural MI involving distal anterior wall extending to inferior apex and adjoining septum     His RCRI score is 3/6 (1 for CKD with Cr >2 + 1 for CHF + 1 for ischemic heart disease). Based on DASI his functional capacity is <4 METs. Additional cardiac testing is unlikely to change surgical course given severe right hip pain. Patient is at a high risk risk for surgery.    -Ordered potassium repletion given hypokalemia on RFP,  -Will need to discuss with patient and family regarding hospice at discharge.  Patient currently with active ICD, previously requested that they do not discontinue this as he wanted to receive shocks.  Will need to revisit prior to discharge regarding ICD activation and hospice    -Discussed with Dr. Crespo, anesthesiologist, regarding patient and his echo findings as well as prior hospice/DNR status.    Jose Luis Garcia,   Anesthesiology PGY-2, CA-1     Patient seen and discussed with Dr. Hodges

## 2024-05-03 NOTE — PROGRESS NOTES
Orthopaedic Surgery Progress Note:    S: Evaluated in postoperative period, doing well. Pain controlled on current regimen.  Denies any new onset numbness, tingling or weakness. Denies nausea, vomiting, chest pain, dyspnea, or calf tenderness. Denies any fever or chills.    O:    Constitutional: NAD, resting comfortably in bed  Skin: Warm and dry, no rashes   Eyes: EOMI, clear sclera   ENMT: MMM   HEENT: Neck supple without apparent injury, EOMI, MMM  Respiratory: NWOB on RA   CV: RRR per peripheral pulses, limbs wwp  GI: soft, non-distended   Lymph: No apparent LAD  Neuro: PIEDRA spontaneously, CNs II - XII grossly intact   Psych: Appropriate mood and behavior   MSK:   RLE:   -Post-operative dressing in place without strikethrough bleeding.   -Motor intact in DF/PF/EHL/FHL  -SILT in saph/sural/SPN/DPN distributions  -Foot wwp, 2+ DP/PT pulse, brisk cap refill  -Compartments soft and compressible, no pain with passive dorsiflexion    A full secondary survey was conducted. Patient did not have any acute pain with ROM or palpation of other extremities other than that which is mentioned below.    A/P: 78yo M p/w R [place] B1 periprosthetic hip fx after GLF. Now s/p ORIF R periprosthetic hip fracture on 5/3 with Dr Juarez.  Recovering appropriately    Plan:  - Weight bearing: WBAT RLE  - DVT ppx: SCDs, okay for chemo PPx per primary; recommend at least ASA 81 mg BID for 4 weeks post-op  - Diet: Okay for diet from orthopedic perspective  - Perioperative Antibiotics: 24 hour perioperative vanc due to ancef allergy  - Please send with Calcium (as carbonate)-Vitamin D 600mg-400IU PO BID for 30 days upon discharge  - Drain: none  - Post-operative Imaging: XR R femur  - No plans to return to the OR with orthopedic surgery this admission.     This plan was discussed with the attending, Dr. Juarez.    This patient will be followed by ortho trauma team (All Epic chat preferred):  1st call: Moo Le PGY1  2nd call: Rishabh  Aaron PGY2  3rd call: Nilton Trujillo PGY3     6pm-6am M-F, holidays, weekends please contact on-call resident @ [phone] w/ urgent questions/concerns.    Rishabh Walker MD  Orthopedic Surgery, PGY-2

## 2024-05-03 NOTE — BRIEF OP NOTE
Date: 5/3/2024  OR Location: [facility] OR    Name: Andrez Damon, : 1945, Age: 79 y.o., MRN: [id number], Sex: male    Diagnosis  Pre-op Diagnosis     * Periprosthetic fracture around internal prosthetic right hip joint, initial encounter (Multi) [M97.01XA] Post-op Diagnosis     * Periprosthetic fracture around internal prosthetic right hip joint, initial encounter (Multi) [M97.01XA]     Procedures  Open Reduction Internal Fixation Hip  82384 - NE OPEN TX FEMORAL FRACTURE PROXIMAL END HEAD      Surgeons      * Glen Juarez - Primary    Resident/Fellow/Other Assistant:  Surgeons and Role:     * Julian Ortiz MD - Assisting     * Randy Walker MD - Resident - Assisting    Procedure Summary  Anesthesia: General  ASA: IV  Anesthesia Staff: Anesthesiologist: Ryan Crespo DO  CRNA: Paras Mercedes, APRN-CRNA  C-AA: TAO Martinez; TAO Oliveros  Estimated Blood Loss: 300mL  Intra-op Medications:   Administrations occurring from 1227 to 1652 on 24:   Medication Name Total Dose   vancomycin (Vancocin) vial for injection 1 g   sodium chloride 0.9 % irrigation solution 4,000 mL   acetaminophen (Tylenol) tablet 650 mg Cannot be calculated   heparin (porcine) injection 5,000 Units Cannot be calculated              Anesthesia Record               Intraprocedure I/O Totals          Intake    electrolyte-A 800.00 mL    Tranexamic Acid 0.00 mL    The total shown is the total volume documented since Anesthesia Start was filed.    Total Intake 800 mL       Output    Est. Blood Loss 300 mL    Total Output 300 mL       Net    Net Volume 500 mL          Specimen: No specimens collected     Staff:   Circulator: Haresh Davis RN; Boby Zimmer, RN  Relief Circulator: Ortega Narvaez RN  Relief Scrub: Boby Zimmer, KAREN  Scrub Person: Duane Jauregui          Findings: consistent with preoperative imaging and diagnosis; stable periprosthetic hip fracture    Complications:  None; patient tolerated  the procedure well.     Disposition: PACU - hemodynamically stable.  Condition: stable  Specimens Collected: No specimens collected    Rishabh Walker MD  Orthopaedic Surgery PGY-2    Glen Juarez  Phone Number: [phone]

## 2024-05-03 NOTE — ANESTHESIA PROCEDURE NOTES
Airway  Date/Time: 5/3/2024 12:12 PM  Urgency: elective    Airway not difficult    Staffing  Performed: TAO   Authorized by: Ryan Crespo DO    Performed by: TAO Oliveros  Patient location during procedure: OR    Indications and Patient Condition  Indications for airway management: anesthesia and airway protection  Spontaneous Ventilation: absent  Sedation level: deep  Preoxygenated: yes  Patient position: sniffing  Mask difficulty assessment: 2 - vent by mask + OA or adjuvant +/- NMBA    Final Airway Details  Final airway type: endotracheal airway      Successful airway: ETT     Successful intubation technique: direct laryngoscopy  Facilitating devices/methods: intubating stylet and cricoid pressure  Endotracheal tube insertion site: oral  Blade: Kallie  Blade size: #4  ETT size (mm): 7.5  Cormack-Lehane Classification: grade I - full view of glottis  Placement verified by: chest auscultation and capnometry   Measured from: lips  ETT to lips (cm): 3  Number of attempts at approach: 1

## 2024-05-04 LAB
ALBUMIN SERPL BCP-MCNC: 3 G/DL (ref 3.4–5)
ALP SERPL-CCNC: 58 U/L (ref 33–136)
ALT SERPL W P-5'-P-CCNC: 8 U/L (ref 10–52)
ANION GAP SERPL CALC-SCNC: 11 MMOL/L (ref 10–20)
AST SERPL W P-5'-P-CCNC: 18 U/L (ref 9–39)
BILIRUB DIRECT SERPL-MCNC: 0.1 MG/DL (ref 0–0.3)
BILIRUB SERPL-MCNC: 0.5 MG/DL (ref 0–1.2)
BUN SERPL-MCNC: 33 MG/DL (ref 6–23)
CA-I BLD-SCNC: 1.23 MMOL/L (ref 1.1–1.33)
CALCIUM SERPL-MCNC: 8.7 MG/DL (ref 8.6–10.6)
CHLORIDE SERPL-SCNC: 104 MMOL/L (ref 98–107)
CO2 SERPL-SCNC: 33 MMOL/L (ref 21–32)
CREAT SERPL-MCNC: 1.53 MG/DL (ref 0.5–1.3)
EGFRCR SERPLBLD CKD-EPI 2021: 46 ML/MIN/1.73M*2
ERYTHROCYTE [DISTWIDTH] IN BLOOD BY AUTOMATED COUNT: 14.6 % (ref 11.5–14.5)
GLUCOSE BLD MANUAL STRIP-MCNC: 122 MG/DL (ref 74–99)
GLUCOSE BLD MANUAL STRIP-MCNC: 131 MG/DL (ref 74–99)
GLUCOSE BLD MANUAL STRIP-MCNC: 134 MG/DL (ref 74–99)
GLUCOSE SERPL-MCNC: 173 MG/DL (ref 74–99)
HCT VFR BLD AUTO: 31.6 % (ref 41–52)
HGB BLD-MCNC: 10 G/DL (ref 13.5–17.5)
MAGNESIUM SERPL-MCNC: 2.25 MG/DL (ref 1.6–2.4)
MCH RBC QN AUTO: 29.7 PG (ref 26–34)
MCHC RBC AUTO-ENTMCNC: 31.6 G/DL (ref 32–36)
MCV RBC AUTO: 94 FL (ref 80–100)
NRBC BLD-RTO: 0 /100 WBCS (ref 0–0)
PHOSPHATE SERPL-MCNC: 3.3 MG/DL (ref 2.5–4.9)
PLATELET # BLD AUTO: 143 X10*3/UL (ref 150–450)
POTASSIUM SERPL-SCNC: 5 MMOL/L (ref 3.5–5.3)
PROT SERPL-MCNC: 4.7 G/DL (ref 6.4–8.2)
RBC # BLD AUTO: 3.37 X10*6/UL (ref 4.5–5.9)
SODIUM SERPL-SCNC: 143 MMOL/L (ref 136–145)
WBC # BLD AUTO: 7 X10*3/UL (ref 4.4–11.3)

## 2024-05-04 PROCEDURE — 2500000001 HC RX 250 WO HCPCS SELF ADMINISTERED DRUGS (ALT 637 FOR MEDICARE OP): Performed by: PHYSICIAN ASSISTANT

## 2024-05-04 PROCEDURE — 82947 ASSAY GLUCOSE BLOOD QUANT: CPT | Mod: 91

## 2024-05-04 PROCEDURE — 2500000004 HC RX 250 GENERAL PHARMACY W/ HCPCS (ALT 636 FOR OP/ED): Performed by: PHYSICIAN ASSISTANT

## 2024-05-04 PROCEDURE — 2500000006 HC RX 250 W HCPCS SELF ADMINISTERED DRUGS (ALT 637 FOR ALL PAYERS): Performed by: PHYSICIAN ASSISTANT

## 2024-05-04 PROCEDURE — 99233 SBSQ HOSP IP/OBS HIGH 50: CPT | Performed by: PHYSICIAN ASSISTANT

## 2024-05-04 PROCEDURE — 84100 ASSAY OF PHOSPHORUS: CPT

## 2024-05-04 PROCEDURE — 2500000001 HC RX 250 WO HCPCS SELF ADMINISTERED DRUGS (ALT 637 FOR MEDICARE OP)

## 2024-05-04 PROCEDURE — 97530 THERAPEUTIC ACTIVITIES: CPT | Mod: GP | Performed by: PHYSICAL THERAPIST

## 2024-05-04 PROCEDURE — 83735 ASSAY OF MAGNESIUM: CPT

## 2024-05-04 PROCEDURE — 37799 UNLISTED PX VASCULAR SURGERY: CPT | Performed by: PHYSICIAN ASSISTANT

## 2024-05-04 PROCEDURE — 2500000005 HC RX 250 GENERAL PHARMACY W/O HCPCS: Performed by: PHYSICIAN ASSISTANT

## 2024-05-04 PROCEDURE — 97110 THERAPEUTIC EXERCISES: CPT | Mod: GP | Performed by: PHYSICAL THERAPIST

## 2024-05-04 PROCEDURE — 2500000002 HC RX 250 W HCPCS SELF ADMINISTERED DRUGS (ALT 637 FOR MEDICARE OP, ALT 636 FOR OP/ED)

## 2024-05-04 PROCEDURE — S0109 METHADONE ORAL 5MG: HCPCS

## 2024-05-04 PROCEDURE — 99223 1ST HOSP IP/OBS HIGH 75: CPT | Performed by: STUDENT IN AN ORGANIZED HEALTH CARE EDUCATION/TRAINING PROGRAM

## 2024-05-04 PROCEDURE — 97162 PT EVAL MOD COMPLEX 30 MIN: CPT | Mod: GP | Performed by: PHYSICAL THERAPIST

## 2024-05-04 PROCEDURE — 80048 BASIC METABOLIC PNL TOTAL CA: CPT

## 2024-05-04 PROCEDURE — 1200000002 HC GENERAL ROOM WITH TELEMETRY DAILY

## 2024-05-04 PROCEDURE — 82248 BILIRUBIN DIRECT: CPT | Performed by: PHYSICIAN ASSISTANT

## 2024-05-04 PROCEDURE — 85027 COMPLETE CBC AUTOMATED: CPT

## 2024-05-04 PROCEDURE — 82330 ASSAY OF CALCIUM: CPT | Performed by: PHYSICIAN ASSISTANT

## 2024-05-04 PROCEDURE — 2500000004 HC RX 250 GENERAL PHARMACY W/ HCPCS (ALT 636 FOR OP/ED)

## 2024-05-04 RX ORDER — SODIUM CHLORIDE, SODIUM LACTATE, POTASSIUM CHLORIDE, CALCIUM CHLORIDE 600; 310; 30; 20 MG/100ML; MG/100ML; MG/100ML; MG/100ML
50 INJECTION, SOLUTION INTRAVENOUS CONTINUOUS
Status: DISCONTINUED | OUTPATIENT
Start: 2024-05-04 | End: 2024-05-05

## 2024-05-04 RX ORDER — DEXTROSE 50 % IN WATER (D50W) INTRAVENOUS SYRINGE
25
Status: DISCONTINUED | OUTPATIENT
Start: 2024-05-04 | End: 2024-05-10 | Stop reason: HOSPADM

## 2024-05-04 RX ORDER — PSYLLIUM HUSK 0.4 G
1 CAPSULE ORAL DAILY
Qty: 90 TABLET | Refills: 0 | Status: SHIPPED | OUTPATIENT
Start: 2024-05-04 | End: 2024-05-10

## 2024-05-04 RX ORDER — INSULIN LISPRO 100 [IU]/ML
0-10 INJECTION, SOLUTION INTRAVENOUS; SUBCUTANEOUS
Status: DISCONTINUED | OUTPATIENT
Start: 2024-05-04 | End: 2024-05-10 | Stop reason: HOSPADM

## 2024-05-04 RX ORDER — DEXTROSE 50 % IN WATER (D50W) INTRAVENOUS SYRINGE
12.5
Status: DISCONTINUED | OUTPATIENT
Start: 2024-05-04 | End: 2024-05-10 | Stop reason: HOSPADM

## 2024-05-04 RX ADMIN — Medication 2 L/MIN: at 16:00

## 2024-05-04 RX ADMIN — ASCORBIC ACID, THIAMINE MONONITRATE,RIBOFLAVIN, NIACINAMIDE, PYRIDOXINE HYDROCHLORIDE, FOLIC ACID, CYANOCOBALAMIN, BIOTIN, CALCIUM PANTOTHENATE, 1 CAPSULE: 100; 1.5; 1.7; 20; 10; 1; 6000; 150000; 5 CAPSULE, LIQUID FILLED ORAL at 08:09

## 2024-05-04 RX ADMIN — Medication 2 L/MIN: at 16:29

## 2024-05-04 RX ADMIN — OXYCODONE HYDROCHLORIDE 10 MG: 5 TABLET ORAL at 02:01

## 2024-05-04 RX ADMIN — METHADONE HYDROCHLORIDE 2.5 MG: 5 TABLET ORAL at 20:53

## 2024-05-04 RX ADMIN — ACETAMINOPHEN 650 MG: 325 TABLET ORAL at 20:52

## 2024-05-04 RX ADMIN — OXYCODONE HYDROCHLORIDE 10 MG: 5 TABLET ORAL at 11:04

## 2024-05-04 RX ADMIN — OXYCODONE HYDROCHLORIDE 10 MG: 5 TABLET ORAL at 06:13

## 2024-05-04 RX ADMIN — Medication 2 L/MIN: at 08:00

## 2024-05-04 RX ADMIN — HYDROMORPHONE HYDROCHLORIDE 0.5 MG: 1 INJECTION, SOLUTION INTRAMUSCULAR; INTRAVENOUS; SUBCUTANEOUS at 00:55

## 2024-05-04 RX ADMIN — OXYCODONE HYDROCHLORIDE 10 MG: 5 TABLET ORAL at 20:52

## 2024-05-04 RX ADMIN — AMIODARONE HYDROCHLORIDE 200 MG: 200 TABLET ORAL at 08:09

## 2024-05-04 RX ADMIN — ROPINIROLE HYDROCHLORIDE 1 MG: 1 TABLET, FILM COATED ORAL at 21:25

## 2024-05-04 RX ADMIN — HEPARIN SODIUM 5000 UNITS: 5000 INJECTION INTRAVENOUS; SUBCUTANEOUS at 15:01

## 2024-05-04 RX ADMIN — HYDROMORPHONE HYDROCHLORIDE 0.5 MG: 1 INJECTION, SOLUTION INTRAMUSCULAR; INTRAVENOUS; SUBCUTANEOUS at 09:30

## 2024-05-04 RX ADMIN — ACETAMINOPHEN 650 MG: 325 TABLET ORAL at 02:01

## 2024-05-04 RX ADMIN — ACETAMINOPHEN 650 MG: 325 TABLET ORAL at 15:01

## 2024-05-04 RX ADMIN — POLYETHYLENE GLYCOL 3350 17 G: 17 POWDER, FOR SOLUTION ORAL at 08:09

## 2024-05-04 RX ADMIN — ACETAMINOPHEN 650 MG: 325 TABLET ORAL at 11:04

## 2024-05-04 RX ADMIN — ACETAMINOPHEN 650 MG: 325 TABLET ORAL at 06:13

## 2024-05-04 RX ADMIN — HEPARIN SODIUM 5000 UNITS: 5000 INJECTION INTRAVENOUS; SUBCUTANEOUS at 20:52

## 2024-05-04 RX ADMIN — VANCOMYCIN HYDROCHLORIDE 750 MG: 750 INJECTION, SOLUTION INTRAVENOUS at 14:02

## 2024-05-04 RX ADMIN — OXYCODONE HYDROCHLORIDE 10 MG: 5 TABLET ORAL at 15:01

## 2024-05-04 RX ADMIN — ESCITALOPRAM OXALATE 10 MG: 10 TABLET ORAL at 08:09

## 2024-05-04 RX ADMIN — SODIUM CHLORIDE, POTASSIUM CHLORIDE, SODIUM LACTATE AND CALCIUM CHLORIDE 50 ML/HR: 600; 310; 30; 20 INJECTION, SOLUTION INTRAVENOUS at 20:53

## 2024-05-04 RX ADMIN — FINASTERIDE 5 MG: 5 TABLET, FILM COATED ORAL at 08:09

## 2024-05-04 RX ADMIN — HEPARIN SODIUM 5000 UNITS: 5000 INJECTION INTRAVENOUS; SUBCUTANEOUS at 06:13

## 2024-05-04 ASSESSMENT — PAIN - FUNCTIONAL ASSESSMENT
PAIN_FUNCTIONAL_ASSESSMENT: 0-10

## 2024-05-04 ASSESSMENT — COGNITIVE AND FUNCTIONAL STATUS - GENERAL
MOVING TO AND FROM BED TO CHAIR: TOTAL
MOBILITY SCORE: 10
STANDING UP FROM CHAIR USING ARMS: TOTAL
TURNING FROM BACK TO SIDE WHILE IN FLAT BAD: A LITTLE
MOVING FROM LYING ON BACK TO SITTING ON SIDE OF FLAT BED WITH BEDRAILS: A LITTLE
WALKING IN HOSPITAL ROOM: TOTAL
CLIMB 3 TO 5 STEPS WITH RAILING: TOTAL

## 2024-05-04 ASSESSMENT — PAIN SCALES - GENERAL
PAINLEVEL_OUTOF10: 8
PAINLEVEL_OUTOF10: 8
PAINLEVEL_OUTOF10: 9
PAINLEVEL_OUTOF10: 1
PAINLEVEL_OUTOF10: 8
PAINLEVEL_OUTOF10: 5 - MODERATE PAIN
PAINLEVEL_OUTOF10: 3
PAINLEVEL_OUTOF10: 5 - MODERATE PAIN
PAINLEVEL_OUTOF10: 5 - MODERATE PAIN
PAINLEVEL_OUTOF10: 9
PAINLEVEL_OUTOF10: 8
PAINLEVEL_OUTOF10: 9

## 2024-05-04 ASSESSMENT — PAIN DESCRIPTION - LOCATION: LOCATION: HIP

## 2024-05-04 NOTE — PROGRESS NOTES
Patient seen and examined this morning.  Doing well, pulling 1500 on IS    Likely transfer to floor today.    Discussed Code status after surgery with him this morning. The patient stated he does not want any chest compressions nor intubation for respiratory failure.    Patient re-instated DNR-DNI    Justin Anaya DO PGY IV  Trauma Surgery

## 2024-05-04 NOTE — NURSING NOTE
1156: Time out for nerve block     1157: local anaesthetic administered    1158: start of procedure    1159: end of procedure

## 2024-05-04 NOTE — PROCEDURES
Peripheral Block    Patient location during procedure: ICU  Start time: 5/4/2024 11:30 AM  End time: 5/4/2024 11:45 AM  Reason for block: at surgeon's request and post-op pain management  Staffing  Performed: resident   Authorized by: Clair Rizvi MD    Performed by: Clair iRzvi MD  Preanesthetic Checklist  Completed: patient identified, IV checked, site marked, risks and benefits discussed, surgical consent, monitors and equipment checked, pre-op evaluation and timeout performed   Timeout performed at: 5/4/2024 11:30 AM  Peripheral Block  Patient position: laying flat  Prep: ChloraPrep  Patient monitoring: heart rate and continuous pulse ox  Block type: fascia iliaca  Laterality: right  Injection technique: single-shot  Local infiltration: ropivacaine  Needle  Needle type: Tuohy   Needle gauge: 26 G  Needle length: 8 cm  Needle localization: anatomical landmarks  Assessment  Injection assessment: negative aspiration for heme, no paresthesia on injection, incremental injection and local visualized surrounding nerve on ultrasound  Paresthesia pain: none  Heart rate change: no  Slow fractionated injection: no  Additional Notes  Fascia Iliaca block: Informed consent obtained.  Risks, benefits, and alternatives discussed.  ASA monitors placed and timeout performed.  Patient positioned, prepped with chlorhexidine, and draped with sterile towels.  Balm guidance used, patient marked from ASIS to pubic symphysis. Injection site marked at one third from ASIS and 2.5cm caudad on perpendicular line. Needle was inserted and two pops through fascia felt.  Aspiration negative.  20 cc of 0.5% ropivacaine injected and 8mcg of precedex injected.  Patient tolerated procedure well.    Timeout by [facility] RN

## 2024-05-04 NOTE — CONSULTS
Consults  Acute Pain Service    Andrez Damon is a 79 y.o. year old male patient with PMHx CAD, HFrEF (20%) with AICD, COPD, HTN, CKD who presents after mechanical fall sustaining the following injuries: R 6-7 nondisplaced rib fx, T7 VB fx, T6 SP fx, Right periprosthetic hip fx now s/p Proximal femur ORIF on 5/3. Last doses of Eliquis and Brillanta on 4/29. Acute pain consulted for block for postoperative pain control.    Patient reporting 7/10 pain at right hip. Reports relief with as needed mediation but concerned with taking too much. Denies worsening back/rib pain despite injuries and reports majority of pain at site of right hip. Currently receiving prn Oxy 5/10, breakthrough dilaudid, and home nightly methadone for his pain. Last dose SQH 5000u at 0600.    Past Medical History:   Diagnosis Date    Arthritis     Atrial fibrillation with RVR (Multi) 09/22/2023    Body mass index (BMI) 20.0-20.9, adult 09/21/2021    Body mass index (BMI) of 20.0 to 20.9 in adult    Body mass index (BMI) 21.0-21.9, adult 04/14/2021    Body mass index (BMI) of 21.0 to 21.9 in adult    CHF (congestive heart failure) (Multi)     COPD (chronic obstructive pulmonary disease) (Multi)     Coronary artery disease     Diabetes mellitus (Multi)     Hypertension     Major depressive disorder, recurrent, mild (CMS-[facility]) 11/11/2020    Mild episode of recurrent major depressive disorder    Major depressive disorder, recurrent, unspecified (CMS-[facility]) 01/13/2021    Recurrent major depressive disorder, remission status unspecified    Other conditions influencing health status     Swelling Of Hands    Other specified anxiety disorders 05/19/2020    Depression with anxiety    Oxygen desaturation during sleep     wears 2L at HS    Personal history of nicotine dependence 07/12/2021    History of nicotine dependence    Personal history of other diseases of the circulatory system     History of cardiac disorder    Personal history of other diseases of  the musculoskeletal system and connective tissue     History of arthritis    Personal history of other diseases of urinary system 09/21/2021    History of chronic kidney disease    Personal history of other endocrine, nutritional and metabolic disease 08/29/2019    History of hyperglycemia    Pneumonia, unspecified organism 12/09/2019    Atypical pneumonia    Thoracic aortic aneurysm, without rupture, unspecified (CMS-HCC) 05/29/2019    Thoracic aortic aneurysm without rupture        Past Surgical History:   Procedure Laterality Date    BACK SURGERY  01/24/2014    Back Surgery    CARDIAC ELECTROPHYSIOLOGY PROCEDURE N/A 12/15/2023    Procedure: ICD - Single Generator Change Out;  Surgeon: Flaco Briggs MD;  Location: [place] [address] Cardiac Cath Lab;  Service: Electrophysiology;  Laterality: N/A;  [place]    CORONARY ANGIOPLASTY WITH STENT PLACEMENT  01/24/2014    Cath Stent Placement    CT ABDOMEN PELVIS ANGIOGRAM W AND/OR WO IV CONTRAST  06/06/2019    CT ABDOMEN PELVIS ANGIOGRAM W AND/OR WO IV CONTRAST 6/6/2019 [facility]    CT HEAD ANGIO W AND WO IV CONTRAST  10/15/2020    CT HEAD ANGIO W AND WO IV CONTRAST 10/15/2020 [facility] ANCI[facility]GAC[facility]    HERNIA REPAIR  04/24/2014    Hernia Repair    OTHER SURGICAL HISTORY  04/17/2019    Abdominal surgery    OTHER SURGICAL HISTORY  01/24/2014    Defibrillation    PACEMAKER PLACEMENT      TOTAL HIP ARTHROPLASTY  01/24/2014    Hip Replacement        Family History   Problem Relation Name Age of Onset    Other (cardiac disorder) Mother      Other (malignant neoplasm) Mother      Other (cardiac disorder) Father          Social History     Socioeconomic History    Marital status:      Spouse name: Not on file    Number of children: Not on file    Years of education: Not on file    Highest education level: Not on file   Occupational History    Not on file   Tobacco Use    Smoking status: Every Day     Current packs/day: 0.25     Average packs/day: 0.3 packs/day for  68.0 years (17.0 ttl pk-yrs)     Types: Cigarettes    Smokeless tobacco: Never   Vaping Use    Vaping status: Never Used   Substance and Sexual Activity    Alcohol use: Never    Drug use: Never    Sexual activity: Defer   Other Topics Concern    Not on file   Social History Narrative    Not on file     Social Determinants of Health     Financial Resource Strain: High Risk (5/2/2024)    Overall Financial Resource Strain (CARDIA)     Difficulty of Paying Living Expenses: Very hard   Food Insecurity: No Food Insecurity (2/22/2024)    Hunger Vital Sign     Worried About Running Out of Food in the Last Year: Never true     Ran Out of Food in the Last Year: Never true   Transportation Needs: No Transportation Needs (5/2/2024)    PRAPARE - Transportation     Lack of Transportation (Medical): No     Lack of Transportation (Non-Medical): No   Physical Activity: Inactive (2/22/2024)    Exercise Vital Sign     Days of Exercise per Week: 0 days     Minutes of Exercise per Session: 0 min   Stress: No Stress Concern Present (4/19/2024)    [employer] - Occupational Stress Questionnaire     Feeling of Stress : Not at all   Recent Concern: Stress - Stress Concern Present (3/21/2024)    [employer] - Occupational Stress Questionnaire     Feeling of Stress : To some extent   Social Connections: Feeling Socially Integrated (1/9/2024)    OASIS : Social Isolation     Frequency of experiencing loneliness or isolation: Never   Intimate Partner Violence: Not At Risk (2/22/2024)    Humiliation, Afraid, Rape, and Kick questionnaire     Fear of Current or Ex-Partner: No     Emotionally Abused: No     Physically Abused: No     Sexually Abused: No   Housing Stability: Low Risk  (5/2/2024)    Housing Stability Vital Sign     Unable to Pay for Housing in the Last Year: No     Number of Places Lived in the Last Year: 1     Unstable Housing in the Last Year: No        Allergies    Allergen Reactions    Meloxicam Shortness of breath    Celecoxib Unknown    Keflex [Cephalexin] Hives        Review of Systems  Gen: No fatigue, anorexia, insomnia, fever.   Eyes: No vision loss, double vision, drainage, eye pain.   ENT: No pharyngitis, dry mouth, no hearing changes or ear discharge  Cardiac: No chest pain, palpitations, syncope, near syncope.   Pulmonary: No shortness of breath, cough, hemoptysis.   Heme/lymph: No swollen glands, fever, bleeding.   GI: No abdominal pain, change in bowel habits, melena, hematemesis, hematochezia, nausea, vomiting, diarrhea.   : No discharge, dysuria, frequency, urgency, hematuria.  Endo: No polyuria or weight loss.   Musculoskeletal: Negative for any pain or loss of ROM/weakness  Skin: No rashes or lesions  Neuro: Normal speech, no numbness or weakness. No gait difficulties  Review of systems is otherwise negative unless stated above or in history of present illness.    Physical Exam:  Constitutional:  no distress, alert and cooperative  Eyes: clear sclera  Head/Neck: No apparent injury, trachea midline  Respiratory/Thorax: Patent airways, thorax symmetric, breathing comfortably  Cardiovascular: no pitting edema  Gastrointestinal: Nondistended  Musculoskeletal: ROM intact  Extremities: no clubbing  Neurological: alert, mancera x4  Psychological: Appropriate affect    Results for orders placed or performed during the hospital encounter of 05/01/24 (from the past 24 hour(s))   POCT GLUCOSE   Result Value Ref Range    POCT Glucose 130 (H) 74 - 99 mg/dL   Blood Gas Arterial Full Panel   Result Value Ref Range    POCT pH, Arterial 7.40 7.38 - 7.42 pH    POCT pCO2, Arterial 51 (H) 38 - 42 mm Hg    POCT pO2, Arterial 96 (H) 85 - 95 mm Hg    POCT SO2, Arterial 98 94 - 100 %    POCT Oxy Hemoglobin, Arterial 96.3 94.0 - 98.0 %    POCT Hematocrit Calculated, Arterial 33.0 (L) 41.0 - 52.0 %    POCT Sodium, Arterial 139 136 - 145 mmol/L    POCT Potassium, Arterial 4.2 3.5 - 5.3  mmol/L    POCT Chloride, Arterial 103 98 - 107 mmol/L    POCT Ionized Calcium, Arterial 1.23 1.10 - 1.33 mmol/L    POCT Glucose, Arterial 129 (H) 74 - 99 mg/dL    POCT Lactate, Arterial 1.0 0.4 - 2.0 mmol/L    POCT Base Excess, Arterial 5.8 (H) -2.0 - 3.0 mmol/L    POCT HCO3 Calculated, Arterial 31.6 (H) 22.0 - 26.0 mmol/L    POCT Hemoglobin, Arterial 10.9 (L) 13.5 - 17.5 g/dL    POCT Anion Gap, Arterial 9 (L) 10 - 25 mmo/L    Patient Temperature 37.0 degrees Celsius    FiO2 32 %   CALCIUM, IONIZED   Result Value Ref Range    POCT Calcium, Ionized 1.17 1.1 - 1.33 mmol/L   Hepatic function panel   Result Value Ref Range    Albumin 3.2 (L) 3.4 - 5.0 g/dL    Bilirubin, Total 0.7 0.0 - 1.2 mg/dL    Bilirubin, Direct 0.2 0.0 - 0.3 mg/dL    Alkaline Phosphatase 61 33 - 136 U/L    ALT 8 (L) 10 - 52 U/L    AST 17 9 - 39 U/L    Total Protein 5.1 (L) 6.4 - 8.2 g/dL   Magnesium   Result Value Ref Range    Magnesium 2.13 1.60 - 2.40 mg/dL   Coagulation Screen   Result Value Ref Range    Protime 10.2 9.8 - 12.8 seconds    INR 0.9 0.9 - 1.1    aPTT 32 27 - 38 seconds   Fibrinogen   Result Value Ref Range    Fibrinogen 487 (H) 200 - 400 mg/dL   CBC and Auto Differential   Result Value Ref Range    WBC 8.8 4.4 - 11.3 x10*3/uL    nRBC 0.0 0.0 - 0.0 /100 WBCs    RBC 3.63 (L) 4.50 - 5.90 x10*6/uL    Hemoglobin 10.6 (L) 13.5 - 17.5 g/dL    Hematocrit 33.8 (L) 41.0 - 52.0 %    MCV 93 80 - 100 fL    MCH 29.2 26.0 - 34.0 pg    MCHC 31.4 (L) 32.0 - 36.0 g/dL    RDW 14.6 (H) 11.5 - 14.5 %    Platelets 131 (L) 150 - 450 x10*3/uL    Neutrophils % 93.4 40.0 - 80.0 %    Immature Granulocytes %, Automated 0.3 0.0 - 0.9 %    Lymphocytes % 3.6 13.0 - 44.0 %    Monocytes % 2.2 2.0 - 10.0 %    Eosinophils % 0.2 0.0 - 6.0 %    Basophils % 0.3 0.0 - 2.0 %    Neutrophils Absolute 8.18 (H) 1.60 - 5.50 x10*3/uL    Immature Granulocytes Absolute, Automated 0.03 0.00 - 0.50 x10*3/uL    Lymphocytes Absolute 0.32 (L) 0.80 - 3.00 x10*3/uL    Monocytes  Absolute 0.19 0.05 - 0.80 x10*3/uL    Eosinophils Absolute 0.02 0.00 - 0.40 x10*3/uL    Basophils Absolute 0.03 0.00 - 0.10 x10*3/uL   Phosphorus   Result Value Ref Range    Phosphorus 3.1 2.5 - 4.9 mg/dL   Basic Metabolic Panel   Result Value Ref Range    Glucose 148 (H) 74 - 99 mg/dL    Sodium 141 136 - 145 mmol/L    Potassium 4.5 3.5 - 5.3 mmol/L    Chloride 102 98 - 107 mmol/L    Bicarbonate 30 21 - 32 mmol/L    Anion Gap 14 10 - 20 mmol/L    Urea Nitrogen 25 (H) 6 - 23 mg/dL    Creatinine 1.40 (H) 0.50 - 1.30 mg/dL    eGFR 51 (L) >60 mL/min/1.73m*2    Calcium 8.7 8.6 - 10.6 mg/dL   BLOOD GAS ARTERIAL FULL PANEL   Result Value Ref Range    POCT pH, Arterial 7.45 (H) 7.38 - 7.42 pH    POCT pCO2, Arterial 43 (H) 38 - 42 mm Hg    POCT pO2, Arterial 87 85 - 95 mm Hg    POCT SO2, Arterial 97 94 - 100 %    POCT Oxy Hemoglobin, Arterial 95.4 94.0 - 98.0 %    POCT Hematocrit Calculated, Arterial 34.0 (L) 41.0 - 52.0 %    POCT Sodium, Arterial 138 136 - 145 mmol/L    POCT Potassium, Arterial 4.6 3.5 - 5.3 mmol/L    POCT Chloride, Arterial 102 98 - 107 mmol/L    POCT Ionized Calcium, Arterial 1.20 1.10 - 1.33 mmol/L    POCT Glucose, Arterial 152 (H) 74 - 99 mg/dL    POCT Lactate, Arterial 1.8 0.4 - 2.0 mmol/L    POCT Base Excess, Arterial 5.3 (H) -2.0 - 3.0 mmol/L    POCT HCO3 Calculated, Arterial 29.9 (H) 22.0 - 26.0 mmol/L    POCT Hemoglobin, Arterial 11.2 (L) 13.5 - 17.5 g/dL    POCT Anion Gap, Arterial 11 10 - 25 mmo/L    Patient Temperature 37.0 degrees Celsius    FiO2 32 %   Protime-INR   Result Value Ref Range    Protime 10.2 9.8 - 12.8 seconds    INR 0.9 0.9 - 1.1   aPTT   Result Value Ref Range    aPTT 32 27 - 38 seconds   CBC   Result Value Ref Range    WBC 7.0 4.4 - 11.3 x10*3/uL    nRBC 0.0 0.0 - 0.0 /100 WBCs    RBC 3.37 (L) 4.50 - 5.90 x10*6/uL    Hemoglobin 10.0 (L) 13.5 - 17.5 g/dL    Hematocrit 31.6 (L) 41.0 - 52.0 %    MCV 94 80 - 100 fL    MCH 29.7 26.0 - 34.0 pg    MCHC 31.6 (L) 32.0 - 36.0 g/dL    RDW  14.6 (H) 11.5 - 14.5 %    Platelets 143 (L) 150 - 450 x10*3/uL   Magnesium   Result Value Ref Range    Magnesium 2.25 1.60 - 2.40 mg/dL   Calcium, Ionized   Result Value Ref Range    POCT Calcium, Ionized 1.23 1.1 - 1.33 mmol/L   Hepatic Function Panel   Result Value Ref Range    Albumin 3.0 (L) 3.4 - 5.0 g/dL    Bilirubin, Total 0.5 0.0 - 1.2 mg/dL    Bilirubin, Direct 0.1 0.0 - 0.3 mg/dL    Alkaline Phosphatase 58 33 - 136 U/L    ALT 8 (L) 10 - 52 U/L    AST 18 9 - 39 U/L    Total Protein 4.7 (L) 6.4 - 8.2 g/dL   Basic Metabolic Panel   Result Value Ref Range    Glucose 173 (H) 74 - 99 mg/dL    Sodium 143 136 - 145 mmol/L    Potassium 5.0 3.5 - 5.3 mmol/L    Chloride 104 98 - 107 mmol/L    Bicarbonate 33 (H) 21 - 32 mmol/L    Anion Gap 11 10 - 20 mmol/L    Urea Nitrogen 33 (H) 6 - 23 mg/dL    Creatinine 1.53 (H) 0.50 - 1.30 mg/dL    eGFR 46 (L) >60 mL/min/1.73m*2    Calcium 8.7 8.6 - 10.6 mg/dL   Phosphorus   Result Value Ref Range    Phosphorus 3.3 2.5 - 4.9 mg/dL   POCT GLUCOSE   Result Value Ref Range    POCT Glucose 131 (H) 74 - 99 mg/dL   POCT GLUCOSE   Result Value Ref Range    POCT Glucose 122 (H) 74 - 99 mg/dL        Andrez Damon is a 79 y.o. year old male patient with PMHx CAD, HFrEF (20%) with AICD, COPD, HTN, CKD who presents after mechanical fall sustaining the following injuries: R 6-7 nondisplaced rib fx, T7 VB fx, T6 SP fx, Right periprosthetic hip fx now s/p Proximal femur ORIF on 5/3. Acute pain consulted for block for postoperative pain control.    - Right Fascia iliaca single shot nerve block performed 5/4/24 in [facility]  - Additional pain recommendations:  - IV Mg 1g Q8H x 3 doses  - IV Toradol 30mg Q6H for 3 doses per surgical service  - Tylenol 975mg Q8H, time 3 hours between tylenol and toradol  - Lidocaine patches around affected site as needed  - Oxycodone 5/10mg Q4H for mod/severe pain  - Dilaudid 0.2mg Q4H for breakthrough pain  - Continuous pulse ox  - Acute pain service will  follow    Acute Pain Team  pg [phone]  [id number]

## 2024-05-04 NOTE — PROGRESS NOTES
"Physical Therapy    Physical Therapy Evaluation and Treatment    Patient Name: Andrez Damon  MRN: [id number]  Today's Date: 5/4/2024   Time Calculation  Start Time: 0848  Stop Time: 0930  Time Calculation (min): 42 min    Assessment/Plan   PT Assessment  PT Assessment Results: Decreased strength, Decreased endurance, Impaired balance, Decreased mobility, Decreased safety awareness, Pain  Rehab Prognosis: Good  Barriers to Discharge: Patient adamit about returning home and not going to a \"SNF\"  Evaluation/Treatment Tolerance: Patient limited by pain  Medical Staff Made Aware: Yes  Strengths: Ability to acquire knowledge, Coping skills, Rehab experience, Support of Caregivers  End of Session Communication: Bedside nurse  Assessment Comment: 80 yo male s/p fall with polytrauma with hip fracture s/p repair and spine fracture no nop in jewet brace. Presents with impaired mobilty, strength ,transfers and gait. Will benefit from continued PT inpatient. Recommending Mod intensity PT at [place] - dicsussed with patient who said he would refuse a nursing facility and return home with increased services.  End of Session Patient Position: Bed, 4 rail up  IP OR SWING BED PT PLAN  Inpatient or Swing Bed: Inpatient  PT Plan  Treatment/Interventions: Bed mobility, Transfer training, Gait training, Balance training, Strengthening, Endurance training, Range of motion, Therapeutic exercise, Therapeutic activity, Postural re-education  PT Plan: Skilled PT  PT Frequency: Daily  PT Discharge Recommendations: Moderate intensity level of continued care  PT Recommended Transfer Status: Assist x2  PT - OK to Discharge: Yes      Subjective   General Visit Information:  General  Reason for Referral: fall while taking trash out - Injuries 1. Right 6-7 nondisplaced rib fx (On CXR)  2. T7 VB fx with height loss, T6 SP fx non op managment with jewet brace 3. Right periprosthetic hip fx now s/p ORIF  Past Medical History Relevant to Rehab: " "ICD/pacemaker, A-fib on Eliquis, ischemic cardiomyopathy, CHF, COPD, CAD, hypertension, depression, on chronic oxygen 2 L nasal cannula. He is followed by [facility] due to end stage cardiac disease, presenting for right hip periprosthetic fracture, vertebral fracture of T7/T8 and rib fracture  Family/Caregiver Present: No  Prior to Session Communication: Bedside nurse  Patient Position Received: Bed, 4 rail up  General Comment: tolerated sitting EOB and attempted standing - unable to stand 2/2 RLE pain/weakness  Home Living:  Home Living  Type of Home: House  Lives With: Spouse  Home Adaptive Equipment: Walker rolling or standard, Sock aid, Reacher  Home Layout: One level  Home Access: Stairs to enter with rails  Entrance Stairs-Rails: Both  Entrance Stairs-Number of Steps: 2  Home Living Comments: Wife helps PRN, has HHA 2 week for showering and dressing assist. Hospice RN 1x week for med management  Prior Level of Function:  Prior Function Per Pt/Caregiver Report  Level of [place]: Independent with ADLs and functional transfers  Receives Help From: Family  Ambulatory Assistance: Independent  Prior Function Comments: \"tries to stay as active as possible\"  Precautions:  Precautions  LE Weight Bearing Status: Weight Bearing as Tolerated  Medical Precautions: Fall precautions, Spinal precautions  Braces Applied: Jewet Brace in supine  Vital Signs:  Vital Signs  Heart Rate: 73  Heart Rate Source: Monitor  SpO2: 100 %  BP: 115/53  BP Location: Right arm  BP Method: Automatic  Patient Position: Sitting    Objective   Pain:  Pain Assessment  Pain Assessment: 0-10  Pain Score: 5 - Moderate pain (9.5/10 post mobility session)  Pain Type: Surgical pain  Pain Location: Hip  Pain Orientation: Right  Pain Interventions: Medication (See MAR)  Cognition:  Cognition  Overall Cognitive Status: Within Functional Limits  Orientation Level: Oriented X4    General Assessments:     Activity " Tolerance  Endurance: Tolerates 10 - 20 min exercise with multiple rests  Early Mobility/Exercise Safety Screen: Proceed with mobilization - No exclusion criteria met    Sensation  Light Touch: No apparent deficits       Postural Control  Posture Comment: Intact    Static Sitting Balance  Static Sitting-Balance Support: Bilateral upper extremity supported  Static Sitting-Level of Assistance: Contact guard  Static Sitting-Comment/Number of Minutes: 25       Functional Assessments:  Bed Mobility  Bed Mobility: Yes  Bed Mobility 1  Bed Mobility 1: Supine to sitting, Sitting to supine  Level of Assistance 1: Moderate assistance, Moderate verbal cues, Minimal tactile cues  Bed Mobility Comments 1: cues in hand placement, RLE support for transfers    Transfers  Transfer: Yes  Transfer 1  Trials/Comments 1: attempted sit -stand unable to clear bed 2/2 weakness and RLE pain    Ambulation/Gait Training  Ambulation/Gait Training Performed: No    Stairs  Stairs: No  Extremity/Trunk Assessments:  RLE   RLE : Exceptions to WFL  Strength RLE  RLE Overall Strength:  (3/5 knee ext and DF, hip flex NT)  LLE   LLE : Exceptions to WFL  Strength LLE  LLE Overall Strength: Greater than or equal to 3/5 as evidenced by functional mobility  Outcome Measures:  [facility] Basic Mobility  Turning from your back to your side while in a flat bed without using bedrails: A little  Moving from lying on your back to sitting on the side of a flat bed without using bedrails: A little  Moving to and from bed to chair (including a wheelchair): Total  Standing up from a chair using your arms (e.g. wheelchair or bedside chair): Total  To walk in hospital room: Total  Climbing 3-5 steps with railing: Total  Basic Mobility - Total Score: 10    FSS-ICU  Ambulation: Unable to attempt due to weakness  Rolling: Minimal assistance (performs 75% or more of task)  Sitting: Minimal assistance (performs 75% or more of task)  Transfer Sit-to-Stand: Minimal assistance  (performs 75% or more of task)  Transfer Supine-to-Sit: Minimal assistance (performs 75% or more of task)  Total Score: 16      ICU Mobility Screen  Early Mobility/Exercise Safety Screen: Proceed with mobilization - No exclusion criteria met    Treatment: Therapeutic ex - AP, QS, GS supine 2 x 10 w cues in LE control. EOB AAROM for knee flex/ext 2 x 10.       Encounter Problems       Encounter Problems (Active)       Balance       STG - Maintains static standing balance with upper extremity support and LRD/ Min A       Start:  05/04/24    Expected End:  05/18/24       INTERVENTIONS:  1. Practice standing with minimal support.  2. Educate patient about standing tolerance.  3. Educate patient about independence with gait, transfers, and ADL's.  4. Educate patient about use of assistive device.  5. Educate patient about self-directed care.         STG - Maintains dynamic sitting balance with upper extremity support and SBA       Start:  05/04/24    Expected End:  05/18/24       INTERVENTIONS:  1. Practice sitting on the edge of a bed/mat with minimal support.  2. Educate patient about maintining total hip precautions while maintaining balance.  3. Educate patient about pressure relief.  4. Educate patient about use of assistive device.            Mobility       STG - Patient will ambulate 25ft with WhW and Min A       Start:  05/04/24    Expected End:  05/18/24               PT Transfers       STG - Transfer from bed to chair with WHW and Min A       Start:  05/04/24    Expected End:  05/18/24            STG - Patient to transfer to and from sit to supine with CGA       Start:  05/04/24    Expected End:  05/18/24            STG - Patient will perform bed mobility with CGA       Start:  05/04/24    Expected End:  05/18/24            STG - Patient will transfer sit to and from stand with WHW and Min A       Start:  05/04/24    Expected End:  05/18/24                   Education Documentation  Precautions, taught by  Jeremy Jeffers, PT at 5/4/2024 10:29 AM.  Learner: Patient  Readiness: Acceptance  Method: Explanation  Response: Verbalizes Understanding  Comment: WBing status and use of jeanette brace including wearing schedule for OOB    Mobility Training, taught by Jeremy Jeffers, PT at 5/4/2024 10:29 AM.  Learner: Patient  Readiness: Acceptance  Method: Explanation  Response: Verbalizes Understanding  Comment: WBing status and use of jeanette brace including wearing schedule for OOB    Education Comments  No comments found.      Jeremy Jeffers PT  Rehab Services   Secure Chat

## 2024-05-04 NOTE — PROGRESS NOTES
[facility]  TRAUMA ICU - PROGRESS NOTE    Patient Name: Andrez Damon  MRN: [id number]  Admit Date: 501  : 1945  AGE: 79 y.o.   GENDER: male  ==============================================================================   [###USE THIS SECTION FOR TRAUMA PATIENTS ONLY###]  MECHANISM OF INJURY:   Mech fall  LOC (yes/no?): no  Anticoagulant / Anti-platelet Rx? (for what dx?): eliquis for PE  Referring Facility Name (N/A for scene EMR run): [facility]    INJURIES:   Right 6-7 nondisplaced rib fx (On CXR)  T7 VB fx with height loss, T6 SP fx  Right periprosthetic hip fx     OTHER MEDICAL PROBLEMS:  CAD, HFrEF LVEF 24% with major infarcts on stress echo   COPD  HTN  CKD  ICD implantation        INCIDENTAL FINDINGS:  NA    PROCEDURES:   Ortho: ORIF R proximal femur    ==============================================================================  TODAY'S ASSESSMENT AND PLAN OF CARE:  Andrez Damon is a 79 y.o. male in the ICU due to: post op course    NEURO/PAIN/SEDATION: tylenol, methadone, oxy 5/10 prn, dilaudid BT. Continue home lexapro  RESPIRATORY: continue home nebs  CARDIOVASC: continue cardiac monitoring, continue home amio  GI: dm diet  : continue home proscar   FEN: Lr at 50 until oral intake is adequate   HEMATOLOGIC: chronic anemia baseline in 10s  ENDOCRINE: SSI  MUSCULOSKELETAL/SKIN: cont home ropinirole. WBAT RLE  INFECTIOUS DISEASE: periop vanc  GI PROPHYLAXIS: not indicated  DVT PROPHYLAXIS: hep 5k tid    CODE: DNR/DNI    DISPOSITION: observe in TICU  ==============================================================================  CHIEF COMPLAINT / OVERNIGHT EVENTS / HPI:   Admitted to ICU postoperatively from R hip ORIF    MEDICAL HISTORY / ROS:  Admission history and ROS reviewed. Pertinent changes as follows:  -    PHYSICAL EXAM:  Heart Rate:  [54-84]   Temp:  [35.6 °C (96.1 °F)-36.4 °C (97.5 °F)]   Resp:  [16-22]   BP: (100-131)/(53-74)   SpO2:   [94 %-100 %]   Physical Exam  Vitals reviewed.   Constitutional:       General: He is not in acute distress.  HENT:      Head: Normocephalic and atraumatic.      Nose: Nose normal.      Mouth/Throat:      Mouth: Mucous membranes are dry.      Pharynx: Oropharynx is clear.   Eyes:      Extraocular Movements: Extraocular movements intact.      Pupils: Pupils are equal, round, and reactive to light.   Cardiovascular:      Rate and Rhythm: Normal rate and regular rhythm.      Pulses: Normal pulses.      Heart sounds: Normal heart sounds. No murmur heard.     No friction rub. No gallop.   Pulmonary:      Effort: Pulmonary effort is normal. No respiratory distress.      Breath sounds: Normal breath sounds. No wheezing or rhonchi.   Chest:      Chest wall: No tenderness.   Abdominal:      General: There is no distension.      Palpations: Abdomen is soft.      Tenderness: There is no abdominal tenderness.   Genitourinary:     Penis: Normal.       Testes: Normal.   Musculoskeletal:         General: Normal range of motion.      Cervical back: Normal range of motion. No tenderness.      Comments: R hip surgical dressing c/d/i   Skin:     General: Skin is warm and dry.      Capillary Refill: Capillary refill takes less than 2 seconds.   Neurological:      General: No focal deficit present.      Mental Status: He is alert and oriented to person, place, and time.      Sensory: No sensory deficit.      Motor: No weakness.   Psychiatric:         Mood and Affect: Mood normal.         Behavior: Behavior normal.         IMAGING SUMMARY:  (summary of new imaging findings, not a copy of dictation)  -    LABS:  Results from last 7 days   Lab Units 05/03/24  1741 05/03/24  0757 05/02/24  0510 05/01/24  0058   WBC AUTO x10*3/uL 8.8 5.3 5.5 5.6   HEMOGLOBIN g/dL 10.6* 10.1* 10.8* 12.7*   HEMATOCRIT % 33.8* 31.3* 32.3* 40.9*   PLATELETS AUTO x10*3/uL 131* 123* 130* 159   NEUTROS PCT AUTO % 93.4  --   --  77.0   LYMPHS PCT AUTO % 3.6  --   --   12.8   MONOS PCT AUTO % 2.2  --   --  8.2   EOS PCT AUTO % 0.2  --   --  0.9     Results from last 7 days   Lab Units 05/03/24 1741 05/01/24  0058   APTT seconds 32  32 31   INR  0.9  0.9 1.2*     Results from last 7 days   Lab Units 05/03/24  1741 05/03/24  0757 05/02/24  0510 05/01/24  0058   SODIUM mmol/L 141 143 144 144   POTASSIUM mmol/L 4.5 3.9 3.4* 3.2*   CHLORIDE mmol/L 102 102 104 103   CO2 mmol/L 30 38* 32 32   BUN mg/dL 25* 27* 29* 31*   CREATININE mg/dL 1.40* 1.65* 1.71* 2.45*   CALCIUM mg/dL 8.7 8.9 8.4* 9.4   PROTEIN TOTAL g/dL 5.1*  --   --  5.8*   BILIRUBIN TOTAL mg/dL 0.7  --   --  0.5   ALK PHOS U/L 61  --   --  68   ALT U/L 8*  --   --  9*   AST U/L 17  --   --  17   GLUCOSE mg/dL 148* 101* 145* 185*     Results from last 7 days   Lab Units 05/03/24 1741 05/01/24  0058   BILIRUBIN TOTAL mg/dL 0.7 0.5   BILIRUBIN DIRECT mg/dL 0.2  --      Results from last 7 days   Lab Units 05/03/24 1741 05/03/24  1530 05/03/24  1233   POCT PH, ARTERIAL pH 7.45* 7.40 7.43*   POCT PCO2, ARTERIAL mm Hg 43* 51* 48*   POCT PO2, ARTERIAL mm Hg 87 96* 206*   POCT HCO3 CALCULATED, ARTERIAL mmol/L 29.9* 31.6* 31.9*   POCT BASE EXCESS, ARTERIAL mmol/L 5.3* 5.8* 6.7*     I have reviewed all medications, laboratory results, and imaging pertinent for today's encounter.

## 2024-05-04 NOTE — PROGRESS NOTES
Orthopaedic Surgery Progress Note:    S: Doing very well this AM. Pain controlled. Tolerated surgery well.    O:    Constitutional: NAD, resting comfortably in bed  Skin: Warm and dry, no rashes   Eyes: EOMI, clear sclera   ENMT: MMM   HEENT: Neck supple without apparent injury, EOMI, MMM  Respiratory: NWOB on RA   CV: RRR per peripheral pulses, limbs wwp  GI: soft, non-distended   Lymph: No apparent LAD  Neuro: PIEDRA spontaneously, CNs II - XII grossly intact   Psych: Appropriate mood and behavior   MSK:     RLE:   -Post-operative dressing in place without strikethrough bleeding.   -Motor intact in DF/PF/EHL/FHL  -SILT in saph/sural/SPN/DPN distributions  -Foot wwp, 2+ DP/PT pulse, brisk cap refill  -Compartments soft and compressible, no pain with passive dorsiflexion    A full secondary survey was conducted. Patient did not have any acute pain with ROM or palpation of other extremities other than that which is mentioned below.    A/P: 80yo M p/w R Arnett B1 periprosthetic hip fx after GLF. Now s/p ORIF R periprosthetic hip fracture on 5/3 with Dr Juarez.  Recovering appropriately    Plan:  - Weight bearing: WBAT RLE  - DVT ppx: SCDs, okay for chemo PPx per primary; recommend at least ASA 81 mg BID for 4 weeks post-op  - Diet: Okay for diet from orthopedic perspective  - Perioperative Antibiotics: 24 hour perioperative vanc due to ancef allergy To complete this AM   - Please send with Calcium (as carbonate)-Vitamin D 600mg-400IU PO BID for 30 days upon discharge  - Drain: none  - Post-operative Imaging: XR R femur complete  - No plans to return to the OR with orthopedic surgery this admission.       Orthopedics to follow peripherally while inpatient. Tertiary exam negative for other orthopaedically relevant injuries or pathology. Please do not hesitate to Epic message/call/page with any questions.      This patient will be followed by ortho trauma team (All Epic chat preferred):  1st call: Moo Le  PGY1  2nd call: Rishabh Walker PGY2  3rd call: Nilton Trujillo PGY3     6pm-6am M-F, holidays, weekends please contact on-call resident @ [phone] w/ urgent questions/concerns.    Nilton Trujillo MD  PGY-3 Orthopedic Surgery  [facility]  Available by Epic Message

## 2024-05-05 ENCOUNTER — APPOINTMENT (OUTPATIENT)
Dept: CARDIOLOGY | Facility: HOSPITAL | Age: 79
DRG: 480 | End: 2024-05-05
Payer: MEDICARE

## 2024-05-05 ENCOUNTER — APPOINTMENT (OUTPATIENT)
Dept: RADIOLOGY | Facility: HOSPITAL | Age: 79
DRG: 480 | End: 2024-05-05
Payer: MEDICARE

## 2024-05-05 LAB
ALBUMIN SERPL BCP-MCNC: 3.1 G/DL (ref 3.4–5)
ANION GAP SERPL CALC-SCNC: 13 MMOL/L (ref 10–20)
BASE EXCESS BLDA CALC-SCNC: 3.4 MMOL/L (ref -2–3)
BLOOD EXPIRATION DATE: NORMAL
BLOOD EXPIRATION DATE: NORMAL
BODY TEMPERATURE: ABNORMAL
BUN SERPL-MCNC: 31 MG/DL (ref 6–23)
CALCIUM SERPL-MCNC: 8.8 MG/DL (ref 8.6–10.6)
CARDIAC TROPONIN I PNL SERPL HS: 55 NG/L (ref 0–53)
CARDIAC TROPONIN I PNL SERPL HS: 85 NG/L (ref 0–53)
CHLORIDE SERPL-SCNC: 103 MMOL/L (ref 98–107)
CO2 SERPL-SCNC: 27 MMOL/L (ref 21–32)
CREAT SERPL-MCNC: 1.5 MG/DL (ref 0.5–1.3)
DISPENSE STATUS: NORMAL
DISPENSE STATUS: NORMAL
EGFRCR SERPLBLD CKD-EPI 2021: 47 ML/MIN/1.73M*2
ERYTHROCYTE [DISTWIDTH] IN BLOOD BY AUTOMATED COUNT: 15 % (ref 11.5–14.5)
GLUCOSE BLD MANUAL STRIP-MCNC: 124 MG/DL (ref 74–99)
GLUCOSE BLD MANUAL STRIP-MCNC: 85 MG/DL (ref 74–99)
GLUCOSE BLD MANUAL STRIP-MCNC: 93 MG/DL (ref 74–99)
GLUCOSE SERPL-MCNC: 136 MG/DL (ref 74–99)
HCO3 BLDA-SCNC: 28.5 MMOL/L (ref 22–26)
HCT VFR BLD AUTO: 34.3 % (ref 41–52)
HGB BLD-MCNC: 10.1 G/DL (ref 13.5–17.5)
MAGNESIUM SERPL-MCNC: 2.31 MG/DL (ref 1.6–2.4)
MCH RBC QN AUTO: 28.9 PG (ref 26–34)
MCHC RBC AUTO-ENTMCNC: 29.4 G/DL (ref 32–36)
MCV RBC AUTO: 98 FL (ref 80–100)
NRBC BLD-RTO: 0 /100 WBCS (ref 0–0)
OXYHGB MFR BLDA: 87 % (ref 94–98)
PCO2 BLDA: 44 MM HG (ref 38–42)
PH BLDA: 7.42 PH (ref 7.38–7.42)
PHOSPHATE SERPL-MCNC: 2.6 MG/DL (ref 2.5–4.9)
PLATELET # BLD AUTO: 159 X10*3/UL (ref 150–450)
PO2 BLDA: 57 MM HG (ref 85–95)
POTASSIUM SERPL-SCNC: 5.1 MMOL/L (ref 3.5–5.3)
PRODUCT BLOOD TYPE: 600
PRODUCT BLOOD TYPE: 600
PRODUCT CODE: NORMAL
PRODUCT CODE: NORMAL
RBC # BLD AUTO: 3.5 X10*6/UL (ref 4.5–5.9)
SAO2 % BLDA: 88 % (ref 94–100)
SODIUM SERPL-SCNC: 138 MMOL/L (ref 136–145)
UNIT ABO: NORMAL
UNIT ABO: NORMAL
UNIT NUMBER: NORMAL
UNIT NUMBER: NORMAL
UNIT RH: NORMAL
UNIT RH: NORMAL
UNIT VOLUME: 350
UNIT VOLUME: 350
WBC # BLD AUTO: 7.9 X10*3/UL (ref 4.4–11.3)
XM INTEP: NORMAL
XM INTEP: NORMAL

## 2024-05-05 PROCEDURE — 2500000005 HC RX 250 GENERAL PHARMACY W/O HCPCS: Performed by: PHYSICIAN ASSISTANT

## 2024-05-05 PROCEDURE — 93005 ELECTROCARDIOGRAM TRACING: CPT

## 2024-05-05 PROCEDURE — 2500000004 HC RX 250 GENERAL PHARMACY W/ HCPCS (ALT 636 FOR OP/ED)

## 2024-05-05 PROCEDURE — 36415 COLL VENOUS BLD VENIPUNCTURE: CPT

## 2024-05-05 PROCEDURE — 82805 BLOOD GASES W/O2 SATURATION: CPT

## 2024-05-05 PROCEDURE — 85027 COMPLETE CBC AUTOMATED: CPT

## 2024-05-05 PROCEDURE — 71045 X-RAY EXAM CHEST 1 VIEW: CPT | Performed by: RADIOLOGY

## 2024-05-05 PROCEDURE — 94660 CPAP INITIATION&MGMT: CPT

## 2024-05-05 PROCEDURE — 71045 X-RAY EXAM CHEST 1 VIEW: CPT

## 2024-05-05 PROCEDURE — 2500000001 HC RX 250 WO HCPCS SELF ADMINISTERED DRUGS (ALT 637 FOR MEDICARE OP)

## 2024-05-05 PROCEDURE — 82947 ASSAY GLUCOSE BLOOD QUANT: CPT | Mod: 91

## 2024-05-05 PROCEDURE — 5A09357 ASSISTANCE WITH RESPIRATORY VENTILATION, LESS THAN 24 CONSECUTIVE HOURS, CONTINUOUS POSITIVE AIRWAY PRESSURE: ICD-10-PCS | Performed by: STUDENT IN AN ORGANIZED HEALTH CARE EDUCATION/TRAINING PROGRAM

## 2024-05-05 PROCEDURE — 2500000002 HC RX 250 W HCPCS SELF ADMINISTERED DRUGS (ALT 637 FOR MEDICARE OP, ALT 636 FOR OP/ED)

## 2024-05-05 PROCEDURE — 84484 ASSAY OF TROPONIN QUANT: CPT

## 2024-05-05 PROCEDURE — 99232 SBSQ HOSP IP/OBS MODERATE 35: CPT | Performed by: SURGERY

## 2024-05-05 PROCEDURE — 84484 ASSAY OF TROPONIN QUANT: CPT | Mod: 91

## 2024-05-05 PROCEDURE — 83735 ASSAY OF MAGNESIUM: CPT

## 2024-05-05 PROCEDURE — 2500000006 HC RX 250 W HCPCS SELF ADMINISTERED DRUGS (ALT 637 FOR ALL PAYERS)

## 2024-05-05 PROCEDURE — 80069 RENAL FUNCTION PANEL: CPT

## 2024-05-05 PROCEDURE — 99291 CRITICAL CARE FIRST HOUR: CPT | Performed by: SURGERY

## 2024-05-05 PROCEDURE — 2020000001 HC ICU ROOM DAILY

## 2024-05-05 PROCEDURE — 99231 SBSQ HOSP IP/OBS SF/LOW 25: CPT | Performed by: STUDENT IN AN ORGANIZED HEALTH CARE EDUCATION/TRAINING PROGRAM

## 2024-05-05 PROCEDURE — 94799 UNLISTED PULMONARY SVC/PX: CPT

## 2024-05-05 PROCEDURE — 36600 WITHDRAWAL OF ARTERIAL BLOOD: CPT

## 2024-05-05 PROCEDURE — S0109 METHADONE ORAL 5MG: HCPCS

## 2024-05-05 PROCEDURE — 94640 AIRWAY INHALATION TREATMENT: CPT

## 2024-05-05 PROCEDURE — 94762 N-INVAS EAR/PLS OXIMTRY CONT: CPT

## 2024-05-05 PROCEDURE — 93010 ELECTROCARDIOGRAM REPORT: CPT | Performed by: INTERNAL MEDICINE

## 2024-05-05 RX ORDER — FUROSEMIDE 10 MG/ML
60 INJECTION INTRAMUSCULAR; INTRAVENOUS ONCE
Status: COMPLETED | OUTPATIENT
Start: 2024-05-05 | End: 2024-05-05

## 2024-05-05 RX ORDER — IPRATROPIUM BROMIDE AND ALBUTEROL SULFATE 2.5; .5 MG/3ML; MG/3ML
3 SOLUTION RESPIRATORY (INHALATION)
Status: COMPLETED | OUTPATIENT
Start: 2024-05-05 | End: 2024-05-05

## 2024-05-05 RX ORDER — GABAPENTIN 100 MG/1
100 CAPSULE ORAL NIGHTLY
Status: DISCONTINUED | OUTPATIENT
Start: 2024-05-05 | End: 2024-05-05

## 2024-05-05 RX ORDER — MAGNESIUM SULFATE HEPTAHYDRATE 40 MG/ML
2 INJECTION, SOLUTION INTRAVENOUS ONCE
Status: COMPLETED | OUTPATIENT
Start: 2024-05-05 | End: 2024-05-05

## 2024-05-05 RX ADMIN — ACETAMINOPHEN 650 MG: 325 TABLET ORAL at 10:05

## 2024-05-05 RX ADMIN — METHYLPREDNISOLONE SODIUM SUCCINATE 125 MG: 125 INJECTION, POWDER, FOR SOLUTION INTRAMUSCULAR; INTRAVENOUS at 16:55

## 2024-05-05 RX ADMIN — OXYCODONE HYDROCHLORIDE 10 MG: 5 TABLET ORAL at 13:32

## 2024-05-05 RX ADMIN — HEPARIN SODIUM 5000 UNITS: 5000 INJECTION INTRAVENOUS; SUBCUTANEOUS at 20:11

## 2024-05-05 RX ADMIN — OXYCODONE HYDROCHLORIDE 10 MG: 5 TABLET ORAL at 01:42

## 2024-05-05 RX ADMIN — ASCORBIC ACID, THIAMINE MONONITRATE,RIBOFLAVIN, NIACINAMIDE, PYRIDOXINE HYDROCHLORIDE, FOLIC ACID, CYANOCOBALAMIN, BIOTIN, CALCIUM PANTOTHENATE, 1 CAPSULE: 100; 1.5; 1.7; 20; 10; 1; 6000; 150000; 5 CAPSULE, LIQUID FILLED ORAL at 08:24

## 2024-05-05 RX ADMIN — HYDROMORPHONE HYDROCHLORIDE 0.5 MG: 1 INJECTION, SOLUTION INTRAMUSCULAR; INTRAVENOUS; SUBCUTANEOUS at 23:31

## 2024-05-05 RX ADMIN — POLYETHYLENE GLYCOL 3350 17 G: 17 POWDER, FOR SOLUTION ORAL at 08:24

## 2024-05-05 RX ADMIN — IPRATROPIUM BROMIDE AND ALBUTEROL SULFATE 3 ML: .5; 3 SOLUTION RESPIRATORY (INHALATION) at 16:27

## 2024-05-05 RX ADMIN — IPRATROPIUM BROMIDE AND ALBUTEROL SULFATE 3 ML: .5; 3 SOLUTION RESPIRATORY (INHALATION) at 16:25

## 2024-05-05 RX ADMIN — MAGNESIUM SULFATE HEPTAHYDRATE 2 G: 40 INJECTION, SOLUTION INTRAVENOUS at 16:57

## 2024-05-05 RX ADMIN — Medication 80 PERCENT: at 14:27

## 2024-05-05 RX ADMIN — OXYCODONE HYDROCHLORIDE 10 MG: 5 TABLET ORAL at 06:26

## 2024-05-05 RX ADMIN — IPRATROPIUM BROMIDE AND ALBUTEROL SULFATE 3 ML: .5; 3 SOLUTION RESPIRATORY (INHALATION) at 16:26

## 2024-05-05 RX ADMIN — METHADONE HYDROCHLORIDE 2.5 MG: 5 TABLET ORAL at 20:11

## 2024-05-05 RX ADMIN — OXYCODONE HYDROCHLORIDE 5 MG: 5 TABLET ORAL at 20:11

## 2024-05-05 RX ADMIN — ALBUTEROL SULFATE 1.25 MG: 2.5 SOLUTION RESPIRATORY (INHALATION) at 13:53

## 2024-05-05 RX ADMIN — ACETAMINOPHEN 650 MG: 325 TABLET ORAL at 06:26

## 2024-05-05 RX ADMIN — FINASTERIDE 5 MG: 5 TABLET, FILM COATED ORAL at 08:24

## 2024-05-05 RX ADMIN — ACETAMINOPHEN 650 MG: 325 TABLET ORAL at 20:11

## 2024-05-05 RX ADMIN — HYDROMORPHONE HYDROCHLORIDE 0.5 MG: 1 INJECTION, SOLUTION INTRAMUSCULAR; INTRAVENOUS; SUBCUTANEOUS at 15:08

## 2024-05-05 RX ADMIN — ROPINIROLE HYDROCHLORIDE 1 MG: 1 TABLET, FILM COATED ORAL at 20:30

## 2024-05-05 RX ADMIN — HEPARIN SODIUM 5000 UNITS: 5000 INJECTION INTRAVENOUS; SUBCUTANEOUS at 06:27

## 2024-05-05 RX ADMIN — ESCITALOPRAM OXALATE 10 MG: 10 TABLET ORAL at 08:24

## 2024-05-05 RX ADMIN — HYDROMORPHONE HYDROCHLORIDE 0.5 MG: 1 INJECTION, SOLUTION INTRAMUSCULAR; INTRAVENOUS; SUBCUTANEOUS at 02:33

## 2024-05-05 RX ADMIN — ACETAMINOPHEN 650 MG: 325 TABLET ORAL at 01:42

## 2024-05-05 RX ADMIN — HEPARIN SODIUM 5000 UNITS: 5000 INJECTION INTRAVENOUS; SUBCUTANEOUS at 14:49

## 2024-05-05 RX ADMIN — FUROSEMIDE 60 MG: 10 INJECTION, SOLUTION INTRAMUSCULAR; INTRAVENOUS at 14:11

## 2024-05-05 RX ADMIN — AMIODARONE HYDROCHLORIDE 200 MG: 200 TABLET ORAL at 09:00

## 2024-05-05 ASSESSMENT — COGNITIVE AND FUNCTIONAL STATUS - GENERAL
DRESSING REGULAR UPPER BODY CLOTHING: A LITTLE
DRESSING REGULAR LOWER BODY CLOTHING: A LITTLE
HELP NEEDED FOR BATHING: A LITTLE
CLIMB 3 TO 5 STEPS WITH RAILING: A LOT
WALKING IN HOSPITAL ROOM: A LITTLE
DAILY ACTIVITIY SCORE: 19
TOILETING: A LITTLE
PERSONAL GROOMING: A LITTLE
MOVING TO AND FROM BED TO CHAIR: A LITTLE
MOBILITY SCORE: 17
TURNING FROM BACK TO SIDE WHILE IN FLAT BAD: A LITTLE
MOVING FROM LYING ON BACK TO SITTING ON SIDE OF FLAT BED WITH BEDRAILS: A LITTLE
STANDING UP FROM CHAIR USING ARMS: A LITTLE

## 2024-05-05 ASSESSMENT — PAIN DESCRIPTION - LOCATION
LOCATION: LEG
LOCATION: HIP
LOCATION: CHEST

## 2024-05-05 ASSESSMENT — PAIN - FUNCTIONAL ASSESSMENT
PAIN_FUNCTIONAL_ASSESSMENT: 0-10
PAIN_FUNCTIONAL_ASSESSMENT: PAINAD (PAIN ASSESSMENT IN ADVANCED DEMENTIA SCALE)
PAIN_FUNCTIONAL_ASSESSMENT: 0-10

## 2024-05-05 ASSESSMENT — PAIN SCALES - GENERAL
PAINLEVEL_OUTOF10: 8
PAINLEVEL_OUTOF10: 3
PAINLEVEL_OUTOF10: 8
PAINLEVEL_OUTOF10: 7
PAINLEVEL_OUTOF10: 8
PAINLEVEL_OUTOF10: 6
PAINLEVEL_OUTOF10: 7
PAINLEVEL_OUTOF10: 2

## 2024-05-05 NOTE — PROGRESS NOTES
[facility]  TRAUMA SERVICE - PROGRESS NOTE    Patient Name: Andrez Damon  MRN: [id number]  Admit Date: 871635  : 1945  AGE: 79 y.o.   GENDER: male  ==============================================================================  MECHANISM OF INJURY / CHIEF COMPLAINT:   79 year old male, was taking his trash out. He suffered a mechanical fall after tripping over the bin falling on his right side. No LOC. See at OSH and brought here. He is complaining of some right chest wall and some right hip pain.   Prior on hospice, here for evaluation for any palliative fixation for the hip. Patient stating in room that he currently wishes to avoid surgery if possible.   LOC (yes/no?): no  Anticoagulant / Anti-platelet Rx? (for what dx?): Eliquis (PE)  Referring Facility Name (N/A for scene EMR run): [facility]      INJURIES:   Right 6-7 nondisplaced rib fx (On CXR)  T7 VB fx with height loss, T6 SP fx  Right periprosthetic hip fx     OTHER MEDICAL PROBLEMS:  CAD, HFrEF LVEF 24% with major infarcts on stress echo   COPD  HTN  CKD  ICD implantation     INCIDENTAL FINDINGS:  NA    PROCEDURES  OR with ortho 5/3 ORIF R hip    ==============================================================================  TODAY'S ASSESSMENT AND PLAN OF CARE:  79-year-old male with PMH of CAD, HFrEF with EF 24%, COPD, HTN, CKD, ICD implantation, on hospice prior to hospitalization for HFrEF who presented with right periprosthetic hip fracture, T7 vertebral body fracture, T6 spinous process fracture, right 6-7 nondisplaced rib fractures now status post ORIF of right hip with Ortho on 5/3.  Per recommendations of perioperative medicine/Dr. Hodges and trauma attending,, patient was transferred to TICU postoperatively for close monitoring in the setting of his cardiac status.  Progressed appropriately in the ICU and was transferred to the OR RNF on . DNR/DNI reinstated.     Doing well this morning, pain is  well-controlled, HDS, eating and voiding appropriately, on 1 to 2 L nasal cannula.  Will continue to monitor if patient requires diuresis. Plan for PT/OT today. Will continue to hold eliquis/brillinta.     #R periprosthetic hip fx s/p R hip ORIF  - perioperative medicine/Dr. Hodges consulted for risk start  - geriatrics consulted for goals of care for OR decision  - pain control multimodal with scheduled tylenol, home methadone, oxy 5/10mg PRN  - OR with ortho 5/3 ORIF R hip  - OK for ADLs without Wisner brace in bed only. When out of bed, must wear Wisner brace  - no restrictions with ambulation, ADLs in jeanette brace  - no heavy lifting, no pushing or pulling more than 10 pounds  - pending standing uprights thoracic spine in jeanette brace, ordered  - Calcium (as carbonate)-Vitamin D 600mg-400IU PO BID for 30 days upon discharge     #T7 VB fx with height loss, T6 SP fx  - spine consulted, plan is non op per ortho given complexity of fixation  - pain control: tri tylenol q4h, gabapentin tri 100mg, methadone 2.5 (home), PRN oxy 5/10, dilaudid for breakthrough  - acute pain service consulted, Right Fascia iliaca [facility]     #CAD #HFrEF with EF 20#  - periop medicine consulted for cardiac risk start  - home amiodarone  - home bumetanide  - holding home eliquis    #Pulmonary  - O2 supplement to keep sat >92%  - wean as tolerated  - tiotropium inhaler prn    #Mood disorders/Psych  - escitalopram 10mg home dose  - ropinirole nightly 1mg    #  - cont home finasteride    - q4h vitals  - daily CBC  - strict Is and Os  - fall precautions  - IS 10x per hour    Diet: regular  BR: miralax  DVT ppx: SCDs, SQH  Stress ppx: not indicated  PT/OT eval: Rec'd moderate intensity/SNF.     Dispo: Recommended SNF. Patient would like to go home.   Hospice consult placed today, will anticipate return to home with hospice services. Patient has good family support with appropriate modifications made  to home.     Follow up:     Discussed with Dr. Mcintyre.    Dorys Calixto MD  PGY-1 VS Resident  Trauma Surgery Service  Floor: [phone]    ==============================================================================  CHIEF COMPLAINT / OVERNIGHT EVENTS:   NAEON, comfortable with current pain regimen. Does have pain when weight is placed on right leg. Otherwise voiding appropriately, eating well. Wants to get up and walk but is tired.     MEDICAL HISTORY / ROS:  Admission history and ROS reviewed. Pertinent changes as follows:      PHYSICAL EXAM:  Heart Rate:  [63-73]   Temp:  [36.4 °C (97.5 °F)-37.2 °C (99 °F)]   Resp:  [15-19]   BP: (101-116)/(51-83)   Weight:  [68.8 kg (151 lb 10.8 oz)]   SpO2:  [93 %-98 %]   Physical Exam  Constitutional: no acute distress, conversational, older, thin, some mild temporal wasting  Neuro: A/O x4, no gross deficits   Psych: normal affect  HEENT: No deformities, no scleral icterus   Cardiac: RRR  Pulmonary: unlabored respirations on 2L NC  Abdomen: soft, non distended, non tender  Skin: warm and dry overall    Extremities: RLE in ace wrap, soft compartments, warm foot.     IMAGING SUMMARY:  (summary of new imaging findings, not a copy of dictation)  Pending thoracic spine uprights    LABS:  Results from last 7 days   Lab Units 05/05/24  1106 05/04/24  0159 05/03/24  1741 05/02/24  0510 05/01/24  0058   WBC AUTO x10*3/uL 7.9 7.0 8.8   < > 5.6   HEMOGLOBIN g/dL 10.1* 10.0* 10.6*   < > 12.7*   HEMATOCRIT % 34.3* 31.6* 33.8*   < > 40.9*   PLATELETS AUTO x10*3/uL 159 143* 131*   < > 159   NEUTROS PCT AUTO %  --   --  93.4  --  77.0   LYMPHS PCT AUTO %  --   --  3.6  --  12.8   MONOS PCT AUTO %  --   --  2.2  --  8.2   EOS PCT AUTO %  --   --  0.2  --  0.9    < > = values in this interval not displayed.     Results from last 7 days   Lab Units 05/03/24  1741 05/01/24  0058   APTT seconds 32  32 31   INR  0.9  0.9 1.2*     Results from last 7 days   Lab Units 05/05/24  1106 05/04/24  0159  05/03/24  1741 05/02/24  0510 05/01/24  0058   SODIUM mmol/L 138 143 141   < > 144   POTASSIUM mmol/L 5.1 5.0 4.5   < > 3.2*   CHLORIDE mmol/L 103 104 102   < > 103   CO2 mmol/L 27 33* 30   < > 32   BUN mg/dL 31* 33* 25*   < > 31*   CREATININE mg/dL 1.50* 1.53* 1.40*   < > 2.45*   CALCIUM mg/dL 8.8 8.7 8.7   < > 9.4   PROTEIN TOTAL g/dL  --  4.7* 5.1*  --  5.8*   BILIRUBIN TOTAL mg/dL  --  0.5 0.7  --  0.5   ALK PHOS U/L  --  58 61  --  68   ALT U/L  --  8* 8*  --  9*   AST U/L  --  18 17  --  17   GLUCOSE mg/dL 136* 173* 148*   < > 185*    < > = values in this interval not displayed.     Results from last 7 days   Lab Units 05/04/24  0159 05/03/24 1741 05/01/24  0058   BILIRUBIN TOTAL mg/dL 0.5 0.7 0.5   BILIRUBIN DIRECT mg/dL 0.1 0.2  --      Results from last 7 days   Lab Units 05/03/24 1741 05/03/24  1530 05/03/24  1233   POCT PH, ARTERIAL pH 7.45* 7.40 7.43*   POCT PCO2, ARTERIAL mm Hg 43* 51* 48*   POCT PO2, ARTERIAL mm Hg 87 96* 206*   POCT HCO3 CALCULATED, ARTERIAL mmol/L 29.9* 31.6* 31.9*   POCT BASE EXCESS, ARTERIAL mmol/L 5.3* 5.8* 6.7*       I have reviewed all medications, laboratory results, and imaging pertinent for today's encounter.

## 2024-05-05 NOTE — PROGRESS NOTES
Patient is a 79 y.o M transfer from [facility] s/p mechanical fall. Patient tripped over his trash bin and fell onto his right side, -LOC. Injuries include R 6-7 nondisplaced rib fx, T7 VB fx, T6 SP fx, and R periprosthetic hip fx. Patient was taken to the OR for R hip ORIF. Patient was recommended SNF. Patient previously was enrolled with [facility] for home hospice. Patient is declining SNF and would like to resume hospice services. Referral sent to [facility]. SW will continue to follow to assist with a safe discharge plan.     Mesha Jj LCSW

## 2024-05-05 NOTE — CARE PLAN
The patient's goals for the shift include      The clinical goals for the shift include Patient will have adequate pain control    Over the shift, the patient will continue to progress towards his care plan goes

## 2024-05-05 NOTE — PROGRESS NOTES
Acute Pain Service    Postop Pain HPI -   Palliative: relieved with IV analgesics and regional local anesthetics  Provocative: movement  Quality:  burning and aching  Radiation:  none  Severity:  8/10  Timing: constant  Location: Pt having pain in right sciatic distribution    24-HOUR OPIOID CONSUMPTION:  Methadone 2.5mg at bedtime  Dilaudid 0.5mg x2  Oxycodone 10mg x5    Scheduled medications  acetaminophen, 650 mg, oral, q4h  amiodarone, 200 mg, oral, Daily  B complex-vitamin C-folic acid, 1 capsule, oral, Daily  [Held by provider] bumetanide, 2 mg, oral, Daily  escitalopram, 10 mg, oral, Daily  finasteride, 5 mg, oral, Daily  tiotropium, 2 puff, inhalation, Daily   And  fluticasone furoate-vilanteroL, 1 puff, inhalation, Daily  gabapentin, 100 mg, oral, Nightly  heparin, 5,000 Units, subcutaneous, TID  insulin lispro, 0-10 Units, subcutaneous, TID with meals  methadone, 2.5 mg, oral, Nightly  polyethylene glycol, 17 g, oral, Daily  rOPINIRole, 1 mg, oral, Nightly  vancomycin, , miscellaneous, Once      Continuous medications  lactated Ringer's, 50 mL/hr, Last Rate: 50 mL/hr (05/04/24 2053)  oxygen, 2 L/min      PRN medications  PRN medications: albuterol, dextrose, dextrose, glucagon, glucagon, HYDROmorphone, oxyCODONE, oxyCODONE     Physical Exam:  Constitutional:  no distress, alert and cooperative  Eyes: clear sclera  Head/Neck: No apparent injury, trachea midline  Respiratory/Thorax: Patent airways, thorax symmetric, breathing comfortably  Cardiovascular: no pitting edema  Gastrointestinal: Nondistended  Musculoskeletal: ROM intact  Extremities: no clubbing  Neurological: alert, mancera x4  Psychological: Appropriate affect    Results for orders placed or performed during the hospital encounter of 05/01/24 (from the past 24 hour(s))   POCT GLUCOSE   Result Value Ref Range    POCT Glucose 134 (H) 74 - 99 mg/dL      Andrez Damon is a 79 y.o. year old male patient with PMHx CAD, HFrEF (20%) with AICD, COPD, HTN,  CKD who presents after mechanical fall sustaining the following injuries: R 6-7 nondisplaced rib fx, T7 VB fx, T6 SP fx, Right periprosthetic hip fx now s/p Proximal femur ORIF on 5/3. Acute pain consulted for block for postoperative pain control.     - Right Fascia iliaca single shot nerve block performed 5/4/24 in TSICU  - Additional pain recommendations:  - IV Mg 1g Q8H x 3 doses  - Gabapentin 300mg qHS  - IV Toradol 30mg Q6H for 3 doses per surgical service  - Tylenol 975mg Q8H, time 3 hours between tylenol and toradol  - Lidocaine patches around affected site as needed  - Oxycodone 5/10mg Q4H for mod/severe pain  - Dilaudid 0.2mg Q4H for breakthrough pain  - Continuous pulse ox  - Acute pain service will sign off     Acute Pain Team  pg [phone] ph [phone]

## 2024-05-05 NOTE — NURSING NOTE
Nurse placed TLSO brace on nurse noticed pt retracting he stated he felt like he was going to pass out I immediately went into the room to assess I noticed his POX was 76% on 2 liters a Non rebreather was placed   pt continued to stat low  847%  10L  and I called a [facility]

## 2024-05-05 NOTE — SIGNIFICANT EVENT
Rapid Response Note    I responded to Rapid Response for acute onset respiratory distress with desaturation to 75% following the application of  brace.      Upon examination at the bedside he is on 100% nonrebreather, tachypneic, will deep abdominal breathing.   Breath sounds diminished with some inspiratory wheezes.      Dr. DEBORAH Calixto at the bedside.  Orders received for ABG and CXR, EKG.    AB.42, 44, 57, SaO2 88%, HCO3 28.5    CXR concerning for pulmonary edema.    Administered 60 mg IV lasix and placed on CPAP +10, 100% which improved dyspnea.     Discussed with TSICU about escalation of care.  Accepted by TSICU attending.  Transferred to TSICU Bed 2 on CPAP.  Updates provided to TICU team.

## 2024-05-05 NOTE — PROGRESS NOTES
[facility]  TRAUMA ICU - PROGRESS NOTE    Patient Name: Andrez Damon  MRN: [id number]  Admit Date: 501  : 1945  AGE: 79 y.o.   GENDER: male  ==============================================================================   [###USE THIS SECTION FOR TRAUMA PATIENTS ONLY###]  MECHANISM OF INJURY:   Mech fall  LOC (yes/no?): no  Anticoagulant / Anti-platelet Rx? (for what dx?): eliquis for PE  Referring Facility Name (N/A for scene EMR run): [facility]    INJURIES:   Right 6-7 nondisplaced rib fx (On CXR)  T7 VB fx with height loss, T6 SP fx  Right periprosthetic hip fx     OTHER MEDICAL PROBLEMS:  CAD, HFrEF LVEF 24% with major infarcts on stress echo   COPD  HTN  CKD  ICD implantation        INCIDENTAL FINDINGS:  NA    PROCEDURES:   Ortho: ORIF R proximal femur    ==============================================================================  TODAY'S ASSESSMENT AND PLAN OF CARE:  Andrez Damon is a 79 y.o. male in the ICU due to: post op course    NEURO/PAIN/SEDATION: tylenol oxy 2.5/5  BT. Continue home lexapro  RESPIRATORY: stress dose steroids, duonebx3, lasix 60, rpt cxr in AM  CARDIOVASC: continue cardiac monitoring, continue home amio, restart PO home diuresis, rpt lasix as needed for goal net negative-1L  GI: NPO while on CPAP  : continue home proscar   FEN: hold fluids given hypervolemia   HEMATOLOGIC: chronic anemia baseline in 10s  ENDOCRINE: SSI  MUSCULOSKELETAL/SKIN: cont home ropinirole. WBAT RLE  INFECTIOUS DISEASE: none at this time  GI PROPHYLAXIS: not indicated  DVT PROPHYLAXIS: hep 5k tid    CODE: DNR/DNI    DISPOSITION: observe in TICU  ==============================================================================  CHIEF COMPLAINT / OVERNIGHT EVENTS / HPI:   Code white on the floor for increasing O2 requirement,s received lasix, started on CPAP 8, feels overall well, improved from earlier today    MEDICAL HISTORY / ROS:  Admission history and  ROS reviewed. Pertinent changes as follows:  - none    PHYSICAL EXAM:  Heart Rate:  []   Temp:  [36.7 °C (98.1 °F)-37 °C (98.6 °F)]   Resp:  [17-25]   BP: (101-158)/(56-88)   Weight:  [68.8 kg (151 lb 10.8 oz)]   SpO2:  [84 %-96 %]   Physical Exam  Vitals reviewed.   Constitutional:       General: He is not in acute distress.  HENT:      Head: Normocephalic and atraumatic.      Nose: Nose normal.      Mouth/Throat:      Mouth: Mucous membranes are dry.      Pharynx: Oropharynx is clear.   Eyes:      Extraocular Movements: Extraocular movements intact.      Pupils: Pupils are equal, round, and reactive to light.   Cardiovascular:      Rate and Rhythm: Normal rate and regular rhythm.      Pulses: Normal pulses.      Heart sounds: Normal heart sounds. No murmur heard.     No friction rub. No gallop.   Pulmonary:      Effort: Pulmonary effort is normal. No respiratory distress.      Breath sounds: Normal breath sounds. No wheezing or rhonchi.   Chest:      Chest wall: No tenderness.   Abdominal:      General: There is no distension.      Palpations: Abdomen is soft.      Tenderness: There is no abdominal tenderness.   Genitourinary:     Penis: Normal.       Testes: Normal.   Musculoskeletal:         General: Normal range of motion.      Cervical back: Normal range of motion. No tenderness.      Comments: R hip surgical dressing c/d/i   Skin:     General: Skin is warm and dry.      Capillary Refill: Capillary refill takes less than 2 seconds.   Neurological:      General: No focal deficit present.      Mental Status: He is alert and oriented to person, place, and time.      Sensory: No sensory deficit.      Motor: No weakness.   Psychiatric:         Mood and Affect: Mood normal.         Behavior: Behavior normal.         IMAGING SUMMARY:  (summary of new imaging findings, not a copy of dictation)  -fluid overload on CXR 5/5/24 improved    LABS:  Results from last 7 days   Lab Units 05/05/24  1106 05/04/24  3558  05/03/24 1741 05/02/24 0510 05/01/24 0058   WBC AUTO x10*3/uL 7.9 7.0 8.8   < > 5.6   HEMOGLOBIN g/dL 10.1* 10.0* 10.6*   < > 12.7*   HEMATOCRIT % 34.3* 31.6* 33.8*   < > 40.9*   PLATELETS AUTO x10*3/uL 159 143* 131*   < > 159   NEUTROS PCT AUTO %  --   --  93.4  --  77.0   LYMPHS PCT AUTO %  --   --  3.6  --  12.8   MONOS PCT AUTO %  --   --  2.2  --  8.2   EOS PCT AUTO %  --   --  0.2  --  0.9    < > = values in this interval not displayed.     Results from last 7 days   Lab Units 05/03/24 1741 05/01/24 0058   APTT seconds 32  32 31   INR  0.9  0.9 1.2*     Results from last 7 days   Lab Units 05/05/24  1106 05/04/24 0159 05/03/24 1741 05/02/24 0510 05/01/24 0058   SODIUM mmol/L 138 143 141   < > 144   POTASSIUM mmol/L 5.1 5.0 4.5   < > 3.2*   CHLORIDE mmol/L 103 104 102   < > 103   CO2 mmol/L 27 33* 30   < > 32   BUN mg/dL 31* 33* 25*   < > 31*   CREATININE mg/dL 1.50* 1.53* 1.40*   < > 2.45*   CALCIUM mg/dL 8.8 8.7 8.7   < > 9.4   PROTEIN TOTAL g/dL  --  4.7* 5.1*  --  5.8*   BILIRUBIN TOTAL mg/dL  --  0.5 0.7  --  0.5   ALK PHOS U/L  --  58 61  --  68   ALT U/L  --  8* 8*  --  9*   AST U/L  --  18 17  --  17   GLUCOSE mg/dL 136* 173* 148*   < > 185*    < > = values in this interval not displayed.     Results from last 7 days   Lab Units 05/04/24 0159 05/03/24 1741 05/01/24 0058   BILIRUBIN TOTAL mg/dL 0.5 0.7 0.5   BILIRUBIN DIRECT mg/dL 0.1 0.2  --      Results from last 7 days   Lab Units 05/05/24  1354 05/03/24  1741 05/03/24  1530   POCT PH, ARTERIAL pH 7.42 7.45* 7.40   POCT PCO2, ARTERIAL mm Hg 44* 43* 51*   POCT PO2, ARTERIAL mm Hg 57* 87 96*   POCT HCO3 CALCULATED, ARTERIAL mmol/L 28.5* 29.9* 31.6*   POCT BASE EXCESS, ARTERIAL mmol/L 3.4* 5.3* 5.8*     I have reviewed all medications, laboratory results, and imaging pertinent for today's encounter.     I saw and evaluated the patient. I personally obtained the key and critical portions of the history and physical exam. I reviewed the  resident’s documentation and discussed the patient with the resident. I agree with the resident’s medical decision making as documented in the resident’s note.    79M with h/o CAD, HFrEF with EF 24%, COPD, HTN, and CKD who was admitted after fall with right periprosthetic hip fracture, T7 vertebral body fracture, T6 spinous process fracture, right 6-7 nondisplaced rib fractures now status post ORIF of right hip with Ortho on 5/3. Observed in the ICU post-op and initially did well with pain controlled and IS 1500. Acute onset hypoxia this afternoon prompting return to ICU. Pt received 60mg iv lasix prior to transfer.    On exam, frail appearing. Comfortable on CPAP 8, 80%. Able to talk in full sentences through mask. SpO2 95%. Responding well to lasix with 200ml out shortly after arriving in ICU. CXR with pulmonary edema on top of the significant existing emphysematous changes. HR 90s, SBP 130s. He does not have chest pain. Trop 55, EKG reportedly without ST changes (not yet available in system so will repeat). I suspect that this acute on chronic hypoxic respiratory failure was flash pulmonary edema in a patient who already has limited pulmonary reserve. However, may also be an element of COPD exacerbation. He is also at high risk for ACS given cardiac history. Will continue diuresis and wean NIV as able. Treat empirically for COPD exacerbation, repeat EKG and trend troponins.    This critically ill patient continues to be at-risk for clinically significant deterioration / failure due to the above mentioned dysfunctional, unstable organ systems. I have personally identified and managed all complex critical care issues to prevent aforementioned clinical deterioration. Critical care time is spent at bedside and/or the immediate area and has included, but is not limited to, the review of diagnostic tests, labs, radiographs, serial assessments of hemodynamics, respiratory status, ventilatory management, and family  updates. Time spent in procedures and teaching are reported separately.     Critical Care Time: 40 minutes    Leno Spencer MD  Trauma, Critical Care, and Acute Care Surgery  Pager: [phone]

## 2024-05-05 NOTE — SIGNIFICANT EVENT
Discussed spine activity restrictions with Dr Duarte. Patient is OK for any activities of daily living while in bed without the Jeanette brace.     When out of bed, he must wear the jeanette brace. He can ambulate and do any activities of daily living.     Regardless of whether he is in bed or not: No heavy lifting, and no pushing/pulling more than 10 lbs.     Please obtain standing upright thoracic spine xrays in the Jeanette brace (Sitting upright xrays OK if he cannot stand yet) once able, and let the spine team (listed below) that those are completed.     While admitted, this patient will be followed by the Ortho Spine Team. Please contact below residents with any questions (available via Epic Chat).     First call: Yuan Bagley PGY-2   Second call: Shukri Ruiz, PGY-4      I saw and evaluated the patient.  I personally obtained the key and critical portions of the history and physical exam or was physically present for key and critical portions performed by the Resident. I reviewed the documentation and discussed the patient with the Resident.  I agree with the Resident’s medical decision making as documented in the note.

## 2024-05-05 NOTE — SIGNIFICANT EVENT
Significant event note/escalation of care    Mr. Damon is a 79-year-old status post ORIF of right hip postop day 2, transferred to the floor yesterday after brief ICU stay for close monitoring in the setting of HFrEF and COPD.  This morning patient was seen on rounds and was comfortable on 2 L nasal cannula, in good spirits, unremarkable labs this a.m.    Later in afternoon approximately between 1 to 2 PM, notified that patient was satting in mid 70s following the application of a  brace.  He was placed on 100% nonrebreather and was tachypneic with some inspiratory wheezing.  Patient noted to be briefly tachycardic to high 90s with systolics in the 150s.  Ordered an ABG, chest x-ray stat, and EKG.    AB.42, 44, 57, SaO2 88%, HCO3 28.5    Patient with improved sats in the high 80s to eventually low 90s on nonrebreather.  Chest x-ray showing bilateral right greater than left pulmonary edema.  Patient mentating well entirety of hypoxic event.  By end, patient reported new pressure on his chest.  Troponin ordered.  60 mg of IV Lasix ordered and patient was placed on CPAP.  Dyspnea continue to improve however given patient's reliance on CPAP decision was made to transfer to  ICU.    Discussed with trauma attending and ICU attending.  Patient accepted by  ICU attending and was transferred on CPAP.      D/w Dr. Mcintyre and Dr. Spencer.     Dorys Calixto MD  PGY-1 VS Resident  Trauma Surgery Service  Floor: [phone]

## 2024-05-05 NOTE — PROGRESS NOTES
Physical Therapy                 Therapy Communication Note    Patient Name: Andrez Damon  MRN: [id number]  Today's Date: 5/5/2024     Discipline: Physical Therapy    Missed Visit Reason: Missed Visit Reason:  (Per RN, pt in process of being transferred to [facility] at this time.)    Missed Time: Attempt    Comment:

## 2024-05-06 ENCOUNTER — APPOINTMENT (OUTPATIENT)
Dept: RADIOLOGY | Facility: HOSPITAL | Age: 79
DRG: 480 | End: 2024-05-06
Payer: MEDICARE

## 2024-05-06 LAB
ALBUMIN SERPL BCP-MCNC: 3.2 G/DL (ref 3.4–5)
ANION GAP SERPL CALC-SCNC: 14 MMOL/L (ref 10–20)
ATRIAL RATE: 92 BPM
BUN SERPL-MCNC: 34 MG/DL (ref 6–23)
CALCIUM SERPL-MCNC: 8.9 MG/DL (ref 8.6–10.6)
CARDIAC TROPONIN I PNL SERPL HS: 83 NG/L (ref 0–53)
CHLORIDE SERPL-SCNC: 100 MMOL/L (ref 98–107)
CO2 SERPL-SCNC: 31 MMOL/L (ref 21–32)
CREAT SERPL-MCNC: 1.56 MG/DL (ref 0.5–1.3)
EGFRCR SERPLBLD CKD-EPI 2021: 45 ML/MIN/1.73M*2
ERYTHROCYTE [DISTWIDTH] IN BLOOD BY AUTOMATED COUNT: 15.3 % (ref 11.5–14.5)
GLUCOSE BLD MANUAL STRIP-MCNC: 134 MG/DL (ref 74–99)
GLUCOSE BLD MANUAL STRIP-MCNC: 150 MG/DL (ref 74–99)
GLUCOSE BLD MANUAL STRIP-MCNC: 170 MG/DL (ref 74–99)
GLUCOSE BLD MANUAL STRIP-MCNC: 173 MG/DL (ref 74–99)
GLUCOSE SERPL-MCNC: 180 MG/DL (ref 74–99)
HCT VFR BLD AUTO: 32.7 % (ref 41–52)
HGB BLD-MCNC: 10.4 G/DL (ref 13.5–17.5)
MAGNESIUM SERPL-MCNC: 2.6 MG/DL (ref 1.6–2.4)
MCH RBC QN AUTO: 29.5 PG (ref 26–34)
MCHC RBC AUTO-ENTMCNC: 31.8 G/DL (ref 32–36)
MCV RBC AUTO: 93 FL (ref 80–100)
NRBC BLD-RTO: 0 /100 WBCS (ref 0–0)
P AXIS: 48 DEGREES
P OFFSET: 203 MS
P ONSET: 148 MS
PHOSPHATE SERPL-MCNC: 3.8 MG/DL (ref 2.5–4.9)
PLATELET # BLD AUTO: 187 X10*3/UL (ref 150–450)
POTASSIUM SERPL-SCNC: 4.9 MMOL/L (ref 3.5–5.3)
PR INTERVAL: 136 MS
Q ONSET: 216 MS
QRS COUNT: 15 BEATS
QRS DURATION: 144 MS
QT INTERVAL: 394 MS
QTC CALCULATION(BAZETT): 487 MS
QTC FREDERICIA: 454 MS
R AXIS: 263 DEGREES
RBC # BLD AUTO: 3.52 X10*6/UL (ref 4.5–5.9)
SODIUM SERPL-SCNC: 140 MMOL/L (ref 136–145)
T AXIS: 70 DEGREES
T OFFSET: 413 MS
VENTRICULAR RATE: 92 BPM
WBC # BLD AUTO: 7.5 X10*3/UL (ref 4.4–11.3)

## 2024-05-06 PROCEDURE — 72072 X-RAY EXAM THORAC SPINE 3VWS: CPT | Performed by: RADIOLOGY

## 2024-05-06 PROCEDURE — 97166 OT EVAL MOD COMPLEX 45 MIN: CPT | Mod: GO

## 2024-05-06 PROCEDURE — 99291 CRITICAL CARE FIRST HOUR: CPT | Performed by: SURGERY

## 2024-05-06 PROCEDURE — 2500000002 HC RX 250 W HCPCS SELF ADMINISTERED DRUGS (ALT 637 FOR MEDICARE OP, ALT 636 FOR OP/ED)

## 2024-05-06 PROCEDURE — 80069 RENAL FUNCTION PANEL: CPT

## 2024-05-06 PROCEDURE — 71045 X-RAY EXAM CHEST 1 VIEW: CPT

## 2024-05-06 PROCEDURE — 2500000005 HC RX 250 GENERAL PHARMACY W/O HCPCS: Performed by: SURGERY

## 2024-05-06 PROCEDURE — 2500000004 HC RX 250 GENERAL PHARMACY W/ HCPCS (ALT 636 FOR OP/ED)

## 2024-05-06 PROCEDURE — 97530 THERAPEUTIC ACTIVITIES: CPT | Mod: GO

## 2024-05-06 PROCEDURE — 82947 ASSAY GLUCOSE BLOOD QUANT: CPT | Mod: 91

## 2024-05-06 PROCEDURE — 2500000005 HC RX 250 GENERAL PHARMACY W/O HCPCS

## 2024-05-06 PROCEDURE — S0109 METHADONE ORAL 5MG: HCPCS

## 2024-05-06 PROCEDURE — 2500000001 HC RX 250 WO HCPCS SELF ADMINISTERED DRUGS (ALT 637 FOR MEDICARE OP)

## 2024-05-06 PROCEDURE — 72072 X-RAY EXAM THORAC SPINE 3VWS: CPT

## 2024-05-06 PROCEDURE — 99233 SBSQ HOSP IP/OBS HIGH 50: CPT | Performed by: NURSE PRACTITIONER

## 2024-05-06 PROCEDURE — 85027 COMPLETE CBC AUTOMATED: CPT

## 2024-05-06 PROCEDURE — 94762 N-INVAS EAR/PLS OXIMTRY CONT: CPT

## 2024-05-06 PROCEDURE — 36415 COLL VENOUS BLD VENIPUNCTURE: CPT

## 2024-05-06 PROCEDURE — 2500000006 HC RX 250 W HCPCS SELF ADMINISTERED DRUGS (ALT 637 FOR ALL PAYERS)

## 2024-05-06 PROCEDURE — 71045 X-RAY EXAM CHEST 1 VIEW: CPT | Performed by: RADIOLOGY

## 2024-05-06 PROCEDURE — 84484 ASSAY OF TROPONIN QUANT: CPT

## 2024-05-06 PROCEDURE — 83735 ASSAY OF MAGNESIUM: CPT

## 2024-05-06 PROCEDURE — 1200000002 HC GENERAL ROOM WITH TELEMETRY DAILY

## 2024-05-06 PROCEDURE — 94640 AIRWAY INHALATION TREATMENT: CPT

## 2024-05-06 RX ORDER — ALBUTEROL SULFATE 0.83 MG/ML
2.5 SOLUTION RESPIRATORY (INHALATION) EVERY 6 HOURS PRN
Status: DISCONTINUED | OUTPATIENT
Start: 2024-05-06 | End: 2024-05-10 | Stop reason: HOSPADM

## 2024-05-06 RX ORDER — LIDOCAINE 560 MG/1
1 PATCH PERCUTANEOUS; TOPICAL; TRANSDERMAL DAILY
Status: DISCONTINUED | OUTPATIENT
Start: 2024-05-06 | End: 2024-05-08

## 2024-05-06 RX ORDER — OXYCODONE HYDROCHLORIDE 5 MG/1
2.5 TABLET ORAL EVERY 4 HOURS PRN
Status: DISCONTINUED | OUTPATIENT
Start: 2024-05-06 | End: 2024-05-10 | Stop reason: HOSPADM

## 2024-05-06 RX ORDER — HYDROMORPHONE HYDROCHLORIDE 1 MG/ML
0.2 INJECTION, SOLUTION INTRAMUSCULAR; INTRAVENOUS; SUBCUTANEOUS EVERY 4 HOURS PRN
Status: DISCONTINUED | OUTPATIENT
Start: 2024-05-06 | End: 2024-05-06

## 2024-05-06 RX ORDER — FUROSEMIDE 10 MG/ML
40 INJECTION INTRAMUSCULAR; INTRAVENOUS ONCE
Status: COMPLETED | OUTPATIENT
Start: 2024-05-06 | End: 2024-05-06

## 2024-05-06 RX ORDER — OXYCODONE HYDROCHLORIDE 5 MG/1
5 TABLET ORAL EVERY 4 HOURS PRN
Status: DISCONTINUED | OUTPATIENT
Start: 2024-05-06 | End: 2024-05-10 | Stop reason: HOSPADM

## 2024-05-06 RX ORDER — HYDROMORPHONE HYDROCHLORIDE 1 MG/ML
0.2 INJECTION, SOLUTION INTRAMUSCULAR; INTRAVENOUS; SUBCUTANEOUS ONCE
Status: COMPLETED | OUTPATIENT
Start: 2024-05-06 | End: 2024-05-06

## 2024-05-06 RX ORDER — PREDNISONE 10 MG/1
40 TABLET ORAL DAILY
Status: COMPLETED | OUTPATIENT
Start: 2024-05-06 | End: 2024-05-09

## 2024-05-06 RX ADMIN — OXYCODONE HYDROCHLORIDE 5 MG: 5 TABLET ORAL at 07:46

## 2024-05-06 RX ADMIN — OXYCODONE HYDROCHLORIDE 5 MG: 5 TABLET ORAL at 15:51

## 2024-05-06 RX ADMIN — HYDROMORPHONE HYDROCHLORIDE 0.2 MG: 1 INJECTION, SOLUTION INTRAMUSCULAR; INTRAVENOUS; SUBCUTANEOUS at 13:00

## 2024-05-06 RX ADMIN — LIDOCAINE 1 PATCH: 4 PATCH TOPICAL at 13:13

## 2024-05-06 RX ADMIN — ACETAMINOPHEN 650 MG: 325 TABLET ORAL at 16:06

## 2024-05-06 RX ADMIN — Medication 2 L/MIN: at 16:00

## 2024-05-06 RX ADMIN — METHADONE HYDROCHLORIDE 2.5 MG: 5 TABLET ORAL at 20:09

## 2024-05-06 RX ADMIN — AMIODARONE HYDROCHLORIDE 200 MG: 200 TABLET ORAL at 09:52

## 2024-05-06 RX ADMIN — ACETAMINOPHEN 650 MG: 325 TABLET ORAL at 09:52

## 2024-05-06 RX ADMIN — OXYCODONE HYDROCHLORIDE 5 MG: 5 TABLET ORAL at 12:17

## 2024-05-06 RX ADMIN — ESCITALOPRAM OXALATE 10 MG: 10 TABLET ORAL at 09:52

## 2024-05-06 RX ADMIN — OXYCODONE HYDROCHLORIDE 10 MG: 5 TABLET ORAL at 03:38

## 2024-05-06 RX ADMIN — TIOTROPIUM BROMIDE INHALATION SPRAY 2 PUFF: 3.12 SPRAY, METERED RESPIRATORY (INHALATION) at 08:46

## 2024-05-06 RX ADMIN — Medication 2 L/MIN: at 12:00

## 2024-05-06 RX ADMIN — ACETAMINOPHEN 650 MG: 325 TABLET ORAL at 12:17

## 2024-05-06 RX ADMIN — BUMETANIDE 2 MG: 2 TABLET ORAL at 09:51

## 2024-05-06 RX ADMIN — ROPINIROLE HYDROCHLORIDE 1 MG: 1 TABLET, FILM COATED ORAL at 20:10

## 2024-05-06 RX ADMIN — POLYETHYLENE GLYCOL 3350 17 G: 17 POWDER, FOR SOLUTION ORAL at 09:52

## 2024-05-06 RX ADMIN — Medication 2 L/MIN: at 10:00

## 2024-05-06 RX ADMIN — HEPARIN SODIUM 5000 UNITS: 5000 INJECTION INTRAVENOUS; SUBCUTANEOUS at 14:46

## 2024-05-06 RX ADMIN — HEPARIN SODIUM 5000 UNITS: 5000 INJECTION INTRAVENOUS; SUBCUTANEOUS at 20:09

## 2024-05-06 RX ADMIN — ASCORBIC ACID, THIAMINE MONONITRATE,RIBOFLAVIN, NIACINAMIDE, PYRIDOXINE HYDROCHLORIDE, FOLIC ACID, CYANOCOBALAMIN, BIOTIN, CALCIUM PANTOTHENATE, 1 CAPSULE: 100; 1.5; 1.7; 20; 10; 1; 6000; 150000; 5 CAPSULE, LIQUID FILLED ORAL at 09:51

## 2024-05-06 RX ADMIN — HEPARIN SODIUM 5000 UNITS: 5000 INJECTION INTRAVENOUS; SUBCUTANEOUS at 06:00

## 2024-05-06 RX ADMIN — FLUTICASONE FUROATE AND VILANTEROL TRIFENATATE 1 PUFF: 100; 25 POWDER RESPIRATORY (INHALATION) at 08:47

## 2024-05-06 RX ADMIN — FUROSEMIDE 40 MG: 10 INJECTION, SOLUTION INTRAMUSCULAR; INTRAVENOUS at 07:39

## 2024-05-06 RX ADMIN — INSULIN LISPRO 2 UNITS: 100 INJECTION, SOLUTION INTRAVENOUS; SUBCUTANEOUS at 12:26

## 2024-05-06 RX ADMIN — ACETAMINOPHEN 650 MG: 325 TABLET ORAL at 20:10

## 2024-05-06 RX ADMIN — FINASTERIDE 5 MG: 5 TABLET, FILM COATED ORAL at 09:52

## 2024-05-06 RX ADMIN — PREDNISONE 40 MG: 20 TABLET ORAL at 09:50

## 2024-05-06 RX ADMIN — OXYCODONE HYDROCHLORIDE 2.5 MG: 5 TABLET ORAL at 20:09

## 2024-05-06 ASSESSMENT — PAIN SCALES - GENERAL
PAINLEVEL_OUTOF10: 6
PAINLEVEL_OUTOF10: 9
PAINLEVEL_OUTOF10: 8
PAINLEVEL_OUTOF10: 9
PAINLEVEL_OUTOF10: 8
PAINLEVEL_OUTOF10: 5 - MODERATE PAIN
PAINLEVEL_OUTOF10: 6
PAINLEVEL_OUTOF10: 5 - MODERATE PAIN
PAINLEVEL_OUTOF10: 7
PAINLEVEL_OUTOF10: 9
PAINLEVEL_OUTOF10: 6
PAINLEVEL_OUTOF10: 9
PAINLEVEL_OUTOF10: 9

## 2024-05-06 ASSESSMENT — COGNITIVE AND FUNCTIONAL STATUS - GENERAL
TOILETING: TOTAL
PERSONAL GROOMING: A LITTLE
MOVING FROM LYING ON BACK TO SITTING ON SIDE OF FLAT BED WITH BEDRAILS: A LITTLE
DRESSING REGULAR UPPER BODY CLOTHING: A LITTLE
DRESSING REGULAR LOWER BODY CLOTHING: TOTAL
MOBILITY SCORE: 12
WALKING IN HOSPITAL ROOM: A LOT
DAILY ACTIVITIY SCORE: 14
STANDING UP FROM CHAIR USING ARMS: A LOT
TURNING FROM BACK TO SIDE WHILE IN FLAT BAD: A LOT
MOVING TO AND FROM BED TO CHAIR: A LOT
HELP NEEDED FOR BATHING: A LOT
CLIMB 3 TO 5 STEPS WITH RAILING: TOTAL

## 2024-05-06 ASSESSMENT — ACTIVITIES OF DAILY LIVING (ADL)
BATHING_ASSISTANCE: MODERATE
ADL_ASSISTANCE: INDEPENDENT

## 2024-05-06 NOTE — PROGRESS NOTES
[facility] [place]  TRAUMA ICU - PROGRESS NOTE    Patient Name: Andrez Damon  MRN: [id number]  Admit Date: 501  : 1945  AGE: 79 y.o.   GENDER: male  ==============================================================================   [###USE THIS SECTION FOR TRAUMA PATIENTS ONLY###]  MECHANISM OF INJURY:   Mech fall  LOC (yes/no?): no  Anticoagulant / Anti-platelet Rx? (for what dx?): eliquis for PE  Referring Facility Name (N/A for scene EMR run): [facility]    INJURIES:   Right 6-7 nondisplaced rib fx (On CXR)  T7 VB fx with height loss, T6 SP fx  Right periprosthetic hip fx     OTHER MEDICAL PROBLEMS:  CAD, HFrEF LVEF 24% with major infarcts on stress echo   COPD  HTN  CKD  ICD implantation        INCIDENTAL FINDINGS:  NA    PROCEDURES:   Ortho: ORIF R proximal femur    ==============================================================================  TODAY'S ASSESSMENT AND PLAN OF CARE:  Andrez Damon is a 79 y.o. male in the ICU due to: post op course    NEURO/PAIN/SEDATION: tylenol oxy 2.5/5, dc dilaudid for breakthrough, will give one-time dose for xray of T spine  BT. Continue home lexapro  RESPIRATORY: stress dose steroids, duonebx3, xray looks mildly improved, prn furosemide, daily bumetanide, on 4L nc, home baseline 3L  CARDIOVASC: continue cardiac monitoring, continue home amio, restart PO home diuresis, rpt lasix as needed for goal net negative 500  GI: regular diet with fluid restriction  : continue home proscar   FEN: hold fluids given hypervolemia   HEMATOLOGIC: chronic anemia baseline in 10s  ENDOCRINE: SSI  MUSCULOSKELETAL/SKIN: cont home ropinirole. WBAT RLE  INFECTIOUS DISEASE: none at this time  GI PROPHYLAXIS: not indicated  DVT PROPHYLAXIS: hep 5k tid    CODE: DNR/DNI    DISPOSITION: continue TICU care given clinical frailty  ==============================================================================  CHIEF COMPLAINT / OVERNIGHT EVENTS / HPI:    Code white on the floor for increasing O2 requirement,s received lasix, started on CPAP 8, feels overall well, improved from earlier today    MEDICAL HISTORY / ROS:  Admission history and ROS reviewed. Pertinent changes as follows:  - none    PHYSICAL EXAM:  Heart Rate:  []   Temp:  [36.3 °C (97.3 °F)-36.8 °C (98.2 °F)]   Resp:  [17-31]   BP: ()/()   Weight:  [68.8 kg (151 lb 10.8 oz)]   SpO2:  [84 %-99 %]   Physical Exam  Vitals reviewed.   Constitutional:       General: He is not in acute distress.  HENT:      Head: Normocephalic and atraumatic.      Nose: Nose normal.      Mouth/Throat:      Mouth: Mucous membranes are dry.      Pharynx: Oropharynx is clear.   Eyes:      Extraocular Movements: Extraocular movements intact.      Pupils: Pupils are equal, round, and reactive to light.   Cardiovascular:      Rate and Rhythm: Normal rate and regular rhythm.      Pulses: Normal pulses.      Heart sounds: Normal heart sounds. No murmur heard.     No friction rub. No gallop.   Pulmonary:      Effort: Pulmonary effort is normal. No respiratory distress.      Breath sounds: Normal breath sounds. No wheezing or rhonchi.   Chest:      Chest wall: No tenderness.   Abdominal:      General: There is no distension.      Palpations: Abdomen is soft.      Tenderness: There is no abdominal tenderness.   Genitourinary:     Penis: Normal.       Testes: Normal.   Musculoskeletal:         General: Normal range of motion.      Cervical back: Normal range of motion. No tenderness.      Comments: R hip surgical dressing c/d/i   Skin:     General: Skin is warm and dry.      Capillary Refill: Capillary refill takes less than 2 seconds.   Neurological:      General: No focal deficit present.      Mental Status: He is alert and oriented to person, place, and time.      Sensory: No sensory deficit.      Motor: No weakness.   Psychiatric:         Mood and Affect: Mood normal.         Behavior: Behavior normal.          IMAGING SUMMARY:  (summary of new imaging findings, not a copy of dictation)  -fluid overload on CXR 5/5/24 improved    LABS:  Results from last 7 days   Lab Units 05/06/24  0025 05/05/24  1106 05/04/24  0159 05/03/24  1741 05/02/24  0510 05/01/24  0058   WBC AUTO x10*3/uL 7.5 7.9 7.0 8.8   < > 5.6   HEMOGLOBIN g/dL 10.4* 10.1* 10.0* 10.6*   < > 12.7*   HEMATOCRIT % 32.7* 34.3* 31.6* 33.8*   < > 40.9*   PLATELETS AUTO x10*3/uL 187 159 143* 131*   < > 159   NEUTROS PCT AUTO %  --   --   --  93.4  --  77.0   LYMPHS PCT AUTO %  --   --   --  3.6  --  12.8   MONOS PCT AUTO %  --   --   --  2.2  --  8.2   EOS PCT AUTO %  --   --   --  0.2  --  0.9    < > = values in this interval not displayed.     Results from last 7 days   Lab Units 05/03/24 1741 05/01/24  0058   APTT seconds 32  32 31   INR  0.9  0.9 1.2*     Results from last 7 days   Lab Units 05/06/24  0024 05/05/24  1106 05/04/24 0159 05/03/24 1741 05/02/24  0510 05/01/24  0058   SODIUM mmol/L 140 138 143 141   < > 144   POTASSIUM mmol/L 4.9 5.1 5.0 4.5   < > 3.2*   CHLORIDE mmol/L 100 103 104 102   < > 103   CO2 mmol/L 31 27 33* 30   < > 32   BUN mg/dL 34* 31* 33* 25*   < > 31*   CREATININE mg/dL 1.56* 1.50* 1.53* 1.40*   < > 2.45*   CALCIUM mg/dL 8.9 8.8 8.7 8.7   < > 9.4   PROTEIN TOTAL g/dL  --   --  4.7* 5.1*  --  5.8*   BILIRUBIN TOTAL mg/dL  --   --  0.5 0.7  --  0.5   ALK PHOS U/L  --   --  58 61  --  68   ALT U/L  --   --  8* 8*  --  9*   AST U/L  --   --  18 17  --  17   GLUCOSE mg/dL 180* 136* 173* 148*   < > 185*    < > = values in this interval not displayed.     Results from last 7 days   Lab Units 05/04/24  0159 05/03/24  1741 05/01/24  0058   BILIRUBIN TOTAL mg/dL 0.5 0.7 0.5   BILIRUBIN DIRECT mg/dL 0.1 0.2  --      Results from last 7 days   Lab Units 05/05/24  1354 05/03/24  1741 05/03/24  1530   POCT PH, ARTERIAL pH 7.42 7.45* 7.40   POCT PCO2, ARTERIAL mm Hg 44* 43* 51*   POCT PO2, ARTERIAL mm Hg 57* 87 96*   POCT HCO3 CALCULATED,  ARTERIAL mmol/L 28.5* 29.9* 31.6*   POCT BASE EXCESS, ARTERIAL mmol/L 3.4* 5.3* 5.8*     I have reviewed all medications, laboratory results, and imaging pertinent for today's encounter.     I saw and evaluated the patient. I personally obtained the key and critical portions of the history and physical exam. I reviewed the resident’s documentation and discussed the patient with the resident. I agree with the resident’s medical decision making as documented in the resident’s note.     # Acute on chronic - continue home methadone (2.5mg every evening), tylenol atc, oxycodone. Still having a lot of pain at the right hip. Will add lidocaine patches.    # Afib - rate controlled. Home amiodarone 200mg daily.     # Elevated troponin - likely some element of demand ischemia. Trop peaked. EKG without ST changes. Will stop trending troponins.     # Acute on chronic hypoxic respiratory failure - Improved and now on 2L NC. Likely pulmonary edema with possible COPD exacerbation on top of his existing chronic emphysema and HFrEF. CXR stable to slightly improved this morning. Interstitial edema with possible small effusions. Home Bumex 2mg daily plus and additional lasix x1 this morning.     Discussed care with Trauma Surgery attending (Dr. Zuniga). This critically ill patient continues to be at-risk for clinically significant deterioration / failure due to the above mentioned dysfunctional, unstable organ systems. I have personally identified and managed all complex critical care issues to prevent aforementioned clinical deterioration. Critical care time is spent at bedside and/or the immediate area and has included, but is not limited to, the review of diagnostic tests, labs, radiographs, serial assessments of hemodynamics, respiratory status, ventilatory management, and family updates. Time spent in procedures and teaching are reported separately.      Critical Care Time: 40 minutes     Leno Spencer MD  Trauma, Critical Care, and  Acute Care Surgery  Pager: [phone]

## 2024-05-06 NOTE — PROGRESS NOTES
Andrez Damon is a 79 y.o. male on day 5 of admission presenting with Compression fracture of thoracic vertebra, unspecified thoracic vertebral level, initial encounter (Multi).    Patient transferred from LT 8 after episode of tachycardia. Chest xray and EKG pending. SW will continue to follow for dc planning needs.     [facility] accepted to resume services at time of dc.

## 2024-05-06 NOTE — CARE PLAN
The patient's goals for the shift include    Problem: Pain  Goal: My pain/discomfort is manageable  Outcome: Progressing     Problem: Safety  Goal: Patient will be injury free during hospitalization  Outcome: Progressing  Goal: I will remain free of falls  Outcome: Progressing     Problem: Daily Care  Goal: Daily care needs are met  Outcome: Progressing     Problem: Psychosocial Needs  Goal: Demonstrates ability to cope with hospitalization/illness  Outcome: Progressing  Goal: Collaborate with me, my family, and caregiver to identify my specific goals  Outcome: Progressing     Problem: Discharge Barriers  Goal: My discharge needs are met  Outcome: Progressing     Problem: Skin  Goal: Decreased wound size/increased tissue granulation at next dressing change  Outcome: Progressing  Goal: Participates in plan/prevention/treatment measures  Outcome: Progressing  Goal: Prevent/manage excess moisture  Outcome: Progressing  Goal: Prevent/minimize sheer/friction injuries  Outcome: Progressing  Goal: Promote/optimize nutrition  Outcome: Progressing  Goal: Promote skin healing  Outcome: Progressing       The clinical goals for the shift include pain contol and safety    Over the shift, the patient did not make progress toward the following goals. Barriers to progression include lack of pain control options prn due to long term liver effects. Recommendations to address these barriers include keep up on prn pain meds, reposition pt.

## 2024-05-06 NOTE — PROGRESS NOTES
5/6/2024  1:02 PM  Trauma attending note    Pain control currently an issue. Added lidocaine patch. Another attempt this afternoon for imaging/brace.     Glen Zuniga, DO

## 2024-05-06 NOTE — PROGRESS NOTES
Occupational Therapy    Evaluation and Treatment    Patient Name: Andrez Damon  MRN: [id number]  Today's Date: 5/6/2024  Room: 02/02  Time Calculation  Start Time: 1311  Stop Time: 1400  Time Calculation (min): 49 min    Assessment  IP OT Assessment  OT Assessment: Pt is a 79 year old male who demonstrates decreased strength, balance, actvity tolerance, and coordination, which impedes occupational performance.  Prognosis: Good  Evaluation/Treatment Tolerance: Patient tolerated treatment well  Medical Staff Made Aware: Yes  End of Session Communication: Bedside nurse  End of Session Patient Position:  (transport with RN for x-ray)    Plan:  Treatment Interventions: ADL retraining, Functional transfer training, UE strengthening/ROM, Endurance training, Cognitive reorientation, Patient/family training, Neuromuscular reeducation, Equipment evaluation/education, Fine motor coordination activities, Compensatory technique education  OT Frequency: 4 times per week  OT Discharge Recommendations: Moderate intensity level of continued care (Low intensity with 24/7 assist if pt refuses MOD intensity)  Equipment Recommended upon Discharge:  (TBD)  OT Recommended Transfer Status: Assist of 2  OT - OK to Discharge: Yes    Subjective   Current Problem:  1. Compression fracture of thoracic vertebra, unspecified thoracic vertebral level, initial encounter (Multi)  Transthoracic Echo (TTE) Limited    Transthoracic Echo (TTE) Limited    CANCELED: Case Request Operating Room: Fusion Spine Posterior Thoracic (T5-T9)    CANCELED: Case Request Operating Room: Fusion Spine Posterior Thoracic (T5-T9)      2. Periprosthetic fracture around internal prosthetic right hip joint, initial encounter (Multi)  Transthoracic Echo (TTE) Limited    Transthoracic Echo (TTE) Limited    Case Request Operating Room: Open Reduction Internal Fixation Hip, Revision Arthroplasty Total Hip    Case Request Operating Room: Open Reduction Internal Fixation Hip,  Revision Arthroplasty Total Hip    calcium carbonate-vitamin D3 500 mg-5 mcg (200 unit) tablet      3. Closed fracture of multiple ribs, unspecified laterality, initial encounter        4. Periprosthetic hip fracture, initial encounter  CANCELED: Case Request Operating Room: Open Reduction Internal Fixation Hip, Revision Arthroplasty Total Hip    CANCELED: Case Request Operating Room: Open Reduction Internal Fixation Hip, Revision Arthroplasty Total Hip      5. Abnormal electrocardiogram (ECG) (EKG)  Transthoracic Echo (TTE) Limited    Transthoracic Echo (TTE) Limited      6. Implantable cardioverter-defibrillator (ICD) generator end of life  Cardiac device check - Surgery    Cardiac device check - Surgery    Cardiac Device Check - Surgery    Cardiac Device Check - Surgery      7. Sick sinus syndrome (Multi)  Cardiac device check - Surgery    Cardiac device check - Surgery      8. Systolic heart failure, unspecified HF chronicity (Multi)  Cardiac device check - Surgery    Cardiac device check - Surgery    Cardiac Device Check - Surgery    Cardiac Device Check - Surgery        General:  Reason for Referral: fall while taking trash out - Injuries 1. Right 6-7 nondisplaced rib fx (On CXR)  2. T7 VB fx with height loss, T6 SP fx non op managment with jewet brace 3. Right periprosthetic hip fx now s/p ORIF. RTICU for increased O2 needs.  Past Medical History Relevant to Rehab: ICD/pacemaker, A-fib on Eliquis, ischemic cardiomyopathy, CHF, COPD, CAD, hypertension, depression, on chronic oxygen 2 L nasal cannula. He is followed by [facility] due to end stage cardiac disease, presenting for right hip periprosthetic fracture, vertebral fracture of T7/T8 and rib fracture  Co-Treatment:  (rehab tech assisted with mobility)  Prior to Session Communication: Bedside nurse, Physician  Patient Position Received: Bed, 3 rail up, Alarm off, not on at start of session  Family/Caregiver Present: No  General Comment:  "Pt supine in bed on arrival, pleasant and agreeable. On 2 L NC.     Precautions:  LE Weight Bearing Status: Weight Bearing as Tolerated (RLE)  Medical Precautions: Fall precautions, Spinal precautions  Braces Applied: [place] brace OOB    Vital Signs:  Heart Rate: 71 (post: 69)  Resp: 18  SpO2: 96 % (post: 96)  BP: 118/70  MAP (mmHg): 85    Pain:  Pain Assessment  Pain Assessment:  (no pain at rest, RLE pain with activity)  Lines/Tubes/Drains:  External Urinary Catheter Male (Active)   Number of days: 0         Objective   Cognition:  Overall Cognitive Status: Within Functional Limits  Orientation Level: Oriented X4  Cognition Comments: Mild confusion noted though did not have a significant impact on participation/performance.     Confusion Assessment Method (CAM)  Acute Onset and Fluctuating Course (1A): Yes  Acute Onset and Fluctuating Course (1B): Yes  Inattention (2): No  Disorganized Thinking (3): No  Rate Patient's Level of Consciousness (4): Alert (Normal), No  Delirium Present: No     Home Living:  Type of Home: House  Lives With: Spouse  Home Adaptive Equipment: Walker rolling or standard, Sock aid, Reacher  Home Layout: One level  Home Access: Stairs to enter with rails  Entrance Stairs-Rails: Both  Entrance Stairs-Number of Steps: 2  Bathroom Shower/Tub: Walk-in shower  Bathroom Equipment: Grab bars in shower, Shower chair with back, Bedside commode     Prior Function:  Level of [place]: Independent with ADLs and functional transfers  Receives Help From: Family  ADL Assistance: Independent  Homemaking Assistance: Needs assistance  Ambulatory Assistance: Independent  Vocational: Retired  Leisure: likes to collect model trucks  Hand Dominance: Right  Prior Function Comments: has a HHA that assists with bathing and dressing 2 days a week. Reports he dresses himself \"very slowly and with a lot of pain\" when HHA not present.     ADL:  Eating Assistance: Independent  Grooming Assistance: Stand by  Grooming " Deficit: Setup  Bathing Assistance: Moderate  UE Dressing Assistance: Minimal  LE Dressing Assistance: Total  Toileting Assistance with Device: Total    Activity Tolerance:  Endurance: Tolerates 10 - 20 min exercise with multiple rests  Early Mobility/Exercise Safety Screen: Proceed with mobilization - No exclusion criteria met  Activity Tolerance Comments: rest breaks with cues for PLB as needed     Bed Mobility/Transfers: Bed Mobility  Bed Mobility: Yes  Bed Mobility 1  Bed Mobility 1: Rolling right, Rolling left  Level of Assistance 1: Minimum assistance  Bed Mobility 2  Bed Mobility  2: Long sit  Level of Assistance 2: Minimum assistance  Bed Mobility 3  Bed Mobility 3: Supine sitting, Sitting to supine  Level of Assistance 3: Moderate assistance  Bed Mobility Comments 3: increased time, cues for technique, HOB elevated   and Transfers  Transfer: Yes  Transfer 1  Transfer From 1: Sit to  Transfer to 1: Stand  Transfer Level of Assistance 1: Moderate assistance (x2)  Trials/Comments 1: Bilat knees blocked     Vision: Vision - Basic Assessment  Current Vision: Wears glasses all the time       Sensation:  Light Touch: No apparent deficits    Strength:  Strength Comments: BUE >/=3/5, resistance not applied 2/2 precautions    Treatment Completed on Evaluation    Therapy/Activity:     Therapeutic Activity  Therapeutic Activity Performed: Yes  Therapeutic Activity 1: Pt tolerated ~10 minutes seated EOB with CGA-SBA for safety and ~1 minute of static standing with mod A x2. Pt retropulsive and required bilat knee block in standing. Unable to safely stand-pivot to transport bed. Pt returned to supine and laterally transferred from bed to transport bed dep x2 with draw sheet.     Outcome Measures: [facility] Daily Activity  Putting on and taking off regular lower body clothing: Total  Bathing (including washing, rinsing, drying): A lot  Putting on and taking off regular upper body clothing: A little  Toileting, which includes  using toilet, bedpan or urinal: Total  Taking care of personal grooming such as brushing teeth: A little  Eating Meals: None  Daily Activity - Total Score: 14        ICU Mobility Screen  Early Mobility/Exercise Safety Screen: Proceed with mobilization - No exclusion criteria met,          Education Documentation  Body Mechanics, taught by Mary Mcnair OT at 5/6/2024  3:42 PM.  Learner: Patient  Readiness: Acceptance  Method: Explanation, Demonstration  Response: Verbalizes Understanding    Precautions, taught by Mary Mcnair OT at 5/6/2024  3:42 PM.  Learner: Patient  Readiness: Acceptance  Method: Explanation, Demonstration  Response: Verbalizes Understanding    ADL Training, taught by Mary Mcnair OT at 5/6/2024  3:42 PM.  Learner: Patient  Readiness: Acceptance  Method: Explanation, Demonstration  Response: Verbalizes Understanding    Education Comments  No comments found.        Goals:   Encounter Problems       Encounter Problems (Active)       ADLs       Patient with complete upper body dressing with modified independent level of assistance donning and doffing all UE clothes (excluding [place] brace) with PRN adaptive equipment       Start:  05/06/24    Expected End:  05/20/24            Patient with complete lower body dressing with moderate assist level of assistance donning and doffing all LE clothes  with PRN adaptive equipment       Start:  05/06/24    Expected End:  05/20/24            Patient will complete daily grooming tasks with modified independent level of assistance and PRN adaptive equipment.       Start:  05/06/24    Expected End:  05/20/24               TRANSFERS       Patient will perform bed mobility contact guard assist level of assistance in order to improve safety and independence with mobility       Start:  05/06/24    Expected End:  05/20/24            Patient will complete functional transfer to chair with least restrictive device with minimal  assist  level of assistance.       Start:  05/06/24    Expected End:  05/20/24            Patient will complete sit to stand transfer with minimal assist  level of assistance and least restrictive device in order to improve safety and prepare for out of bed mobility.       Start:  05/06/24    Expected End:  05/20/24 05/06/24 at 3:43 PM   Mary Mcnair, OT   Rehab Office: [phone]

## 2024-05-06 NOTE — PROGRESS NOTES
"Subjective   Mr. Damon transferred to [facility] yesterday afternoon after a code white due to increasing O2 requirements. CXR with pulmonary edema, in addition to chronic emphysematous changes. No chest pain, troponin 55, EKG without ST changes. Given IV Lasix with good response.     This morning, patient's primary concerns are his right sided rib pain and concerns with getting his days and nights mixed up. He reports pain in his back is \"mild\" and pain in his right hip is 2/10. He denies feeling short of breath, although, due to right sided rib pain, he cannot take deep breaths. He denies chest pain/pressure.   Objective     Current Facility-Administered Medications   Medication Dose Route Frequency Provider Last Rate Last Admin    acetaminophen (Tylenol) tablet 650 mg  650 mg oral q4h Mirna Malagon, PA-C   650 mg at 05/06/24 0952    albuterol 2.5 mg /3 mL (0.083 %) nebulizer solution 2.5 mg  2.5 mg nebulization q6h PRN Grabiel Bentley MD        amiodarone (Pacerone) tablet 200 mg  200 mg oral Daily Mirna Malagon, PA-C   200 mg at 05/06/24 0952    B complex-vitamin C-folic acid (Nephrocaps) capsule 1 capsule  1 capsule oral Daily MABEL Pollack-C   1 capsule at 05/06/24 0951    bumetanide (Bumex) tablet 2 mg  2 mg oral Daily Dorys Calixto MD   2 mg at 05/06/24 0951    dextrose 50 % injection 12.5 g  12.5 g intravenous q15 min PRN Mirna Malagon PA-C        dextrose 50 % injection 25 g  25 g intravenous q15 min PRN Mirna Malagon PA-C        escitalopram (Lexapro) tablet 10 mg  10 mg oral Daily Mirna Malagon, PA-C   10 mg at 05/06/24 0952    finasteride (Proscar) tablet 5 mg  5 mg oral Daily Mirna S Manas, PA-C   5 mg at 05/06/24 0952    tiotropium (Spiriva Respimat) 2.5 mcg/actuation inhaler 2 puff  2 puff inhalation Daily MABEL Pollack-C   2 puff at 05/06/24 0846    And    fluticasone furoate-vilanteroL (Breo Ellipta) 100-25 mcg/dose inhaler 1 puff  1 puff inhalation Daily Mirna Malagon PA-C  "  1 puff at 05/06/24 0847    glucagon (Glucagen) injection 1 mg  1 mg intramuscular q15 min PRN Mirna S Manas PA-C        glucagon (Glucagen) injection 1 mg  1 mg intramuscular q15 min PRN Mirna S Manas PA-C        heparin (porcine) injection 5,000 Units  5,000 Units subcutaneous TID Mirna S Jamaalr PA-C   5,000 Units at 05/06/24 0600    HYDROmorphone (Dilaudid) injection 0.2 mg  0.2 mg intravenous q4h PRN Grabiel Bentley MD        insulin lispro (HumaLOG) injection 0-10 Units  0-10 Units subcutaneous TID with meals Mirna Malagon PA-C        methadone (Dolophine) tablet 2.5 mg  2.5 mg oral Nightly Mirna S Jamaalr PA-C   2.5 mg at 05/05/24 2011    oxyCODONE (Roxicodone) immediate release tablet 2.5 mg  2.5 mg oral q4h PRN Grabiel Bentley MD        oxyCODONE (Roxicodone) immediate release tablet 5 mg  5 mg oral q4h PRN Grabiel Bentley MD   5 mg at 05/06/24 0746    oxygen (O2) therapy  2 L/min inhalation Continuous Grabiel Bentley ,000 mL/hr at 05/06/24 1000 2 L/min at 05/06/24 1000    polyethylene glycol (Glycolax, Miralax) packet 17 g  17 g oral Daily Mirna Malagon PA-C   17 g at 05/06/24 0952    predniSONE (Deltasone) tablet 40 mg  40 mg oral Daily Grabiel Bentley MD   40 mg at 05/06/24 0950    rOPINIRole (Requip) tablet 1 mg  1 mg oral Nightly Mirna S Jamaalr, PA-C   1 mg at 05/05/24 2030       Physical Exam  Constitutional:       Appearance: He is cachectic.   HENT:      Head: Normocephalic and atraumatic.      Ears:      Comments: + [place] bilaterally   Eyes:      Extraocular Movements: Extraocular movements intact.      Conjunctiva/sclera: Conjunctivae normal.      Pupils: Pupils are equal, round, and reactive to light.   Cardiovascular:      Rate and Rhythm: Normal rate and regular rhythm.   Pulmonary:      Effort: Pulmonary effort is normal.      Comments: Clear in upper lobes, decreased air exchange in lower lobes, however, patient cannot take deep breath due to right rib pain   Abdominal:       General: Abdomen is flat. Bowel sounds are normal.      Palpations: Abdomen is soft.   Musculoskeletal:      Left lower leg: Edema present.      Comments: Trace LLE edema, non-pitting  RLE wrapped in ACE wrap    Skin:     General: Skin is warm.      Comments: Surgical dressing on right hip: clean and dry    Neurological:      General: No focal deficit present.      Mental Status: He is alert.      Comments: Oriented to person, place, month, year, not sure of time of day, day or date    Psychiatric:         Mood and Affect: Mood normal.         Confusion Assessment Method(CAM) for diagnosis of delirium:    1.  Acute onset or fluctuating course: absent/present: Absent  2.  Inattention: absent/present: Absent  3.  Disorganized thinking: absent/present: Absent  4.  Altered level of consciousness: absent/present: Absent  CAM: negative    AT Score For Assessment of Delirium and Cognitive Impairment:    Alertness: 0  Normal(fully alert,but not agitated, throughout assessment)=0  Mild sleepiness for <10 seconds after walking, then normal=0  Clearly abnormal=4  2.  AMT4: 1  No mistakes=0  One mistake=1  Two or more mistakes/untestable=2  3.  Attention: 1  Achieves seven months or more correctly=0  Starts but scores <7 months/ refuses to start=1  Untestable(cannot start because unwell, drowsy, inattentive)=2  4.  Acute: 0  No=0  Yes=4    Total Score: 2  4 or above: Possible delirium +/- cognitive impairment  1-3: Possible cognitive impairment  0: Delirium or severe cognitive impairment unlikely(but delirium still possible if (4) information incomplete)      Last Recorded Vitals      5/6/2024    12:00 AM 5/6/2024     1:00 AM 5/6/2024     2:00 AM 5/6/2024     3:00 AM 5/6/2024     4:00 AM 5/6/2024     5:00 AM 5/6/2024     8:00 AM   Vitals   Systolic 116 125 114 96 120 110    Diastolic 103 75 74 79 72 70    Heart Rate 79 72 71 69 66 65    Temp 36.7 °C (98.1 °F)      36.4 °C (97.5 °F)   Resp 25 24  20 23 23       Vitals:     05/05/24 1000   Weight: 68.8 kg (151 lb 10.8 oz)        Relevant Results  Lab Results   Component Value Date    TSH 1.27 09/18/2023    AOOOISDP10 592 04/15/2024    VITD25 26 (A) 06/23/2022    HGBA1C 5.7 (H) 11/14/2023     Results from last 7 days   Lab Units 05/06/24  0025 05/06/24  0024 05/05/24  1106 05/04/24  0159 05/03/24  1741 05/02/24  0510 05/01/24  0058   WBC AUTO x10*3/uL 7.5  --  7.9 7.0 8.8   < > 5.6   HEMOGLOBIN g/dL 10.4*  --  10.1* 10.0* 10.6*   < > 12.7*   HEMATOCRIT % 32.7*  --  34.3* 31.6* 33.8*   < > 40.9*   ALT U/L  --   --   --  8* 8*  --  9*   AST U/L  --   --   --  18 17  --  17   SODIUM mmol/L  --  140 138 143 141   < > 144   POTASSIUM mmol/L  --  4.9 5.1 5.0 4.5   < > 3.2*   CHLORIDE mmol/L  --  100 103 104 102   < > 103   CREATININE mg/dL  --  1.56* 1.50* 1.53* 1.40*   < > 2.45*   BUN mg/dL  --  34* 31* 33* 25*   < > 31*   CO2 mmol/L  --  31 27 33* 30   < > 32   INR   --   --   --   --  0.9  0.9  --  1.2*    < > = values in this interval not displayed.          XR chest 1 view  Narrative: Interpreted By:  Andrez Christy and Liller Gregory   STUDY:  XR CHEST 1 VIEW;  5/5/2024 2:00 pm      INDICATION:  Signs/Symptoms:Acute hypoxia.      COMPARISON:  05/02/2024      ACCESSION NUMBER(S):  [id number]      ORDERING CLINICIAN:  KILLIAN WEBSTER      FINDINGS:  AP radiograph of the chest was provided.      Left chest wall implantable pacemaker/defibrillator with leads  projecting over the expected location of the right atrial appendage  and right ventricle.      CARDIOMEDIASTINAL SILHOUETTE:  Cardiomediastinal silhouette is enlarged but stable in size and  configuration. Aortic arch calcifications are seen.      LUNGS:  Interval development of patchy right perihilar and right upper lung  zone airspace opacities superimposed on chronic lung changes. Hazy  opacity within the bilateral lung bases may represent small layering  effusions. There is mild interstitial prominence and  peribronchial  cuffing, worsened when compared to prior exam. No pneumothorax.      ABDOMEN:  No remarkable upper abdominal findings.      BONES:  No osseous injury.      Impression: 1. Increased interstitial opacity of the right mid and upper lung  zone which may represent developing infectious/inflammatory process  versus worsening focal edema.  2. Mildly worsened interstitial edema superimposed on chronic lung  changes.  3. Trace bilateral pleural effusions with associated adjacent  atelectasis/consolidation.      I personally reviewed the images/study and I agree with the findings  as stated above by resident physician, Dr. Ryan Nevarez. The  study was interpreted at [facility] in [facility].      MACRO:  none.      Signed by: Andrez Christy 5/5/2024 4:24 PM  Dictation workstation:   [id number]        DATA:  EKG: QTC  Encounter Date: 05/01/24   ECG 12 lead   Result Value    Ventricular Rate 61    Atrial Rate 61    HI Interval 116    QRS Duration 134    QT Interval 450    QTC Calculation(Bazett) 453    P Axis -13    R Axis 213    T Axis 85    QRS Count 10    Q Onset 214    P Onset 156    P Offset 202    T Offset 439    QTC Fredericia 452    Narrative    See ED provider note for full interpretation and clinical correlation  Confirmed by Jose Garcia ([id number]) on 5/2/2024 11:56:26 PM      Anti-psychotics in 48 hours: none   Opioids/Benzodiazepines in 48 hours: methadone   Anticholinergics on board:No  Restraints:No  Indwelling catheters:No  Last BM: none recorded  UO in 24 hours: 1125  Activity in the past 24 hours: bedrest   Need for ambulatory devices: walker        This patient currently has cardiac telemetry ordered; if you would like to modify or discontinue the telemetry order, click here to go to the orders activity to modify/discontinue the order.        Assessment/Plan     This is a/an 79 y.o. year old male, with past medical history relevant for ICD/pacemaker,  A-fib on Eliquis, ischemic cardiomyopathy, CHF, COPD, CAD, hypertension, depression, on chronic oxygen 2 L nasal cannula. He is followed by [facility] due to end stage cardiac disease, presenting for right hip periprosthetic fracture, vertebral fracture of T7/T8 and rib fracture. Patient being seen in geriatric consultation for goals of care discussion and post op management.     Principal Problem:    Compression fracture of thoracic vertebra, unspecified thoracic vertebral level, initial encounter (Multi)  Active Problems:    Periprosthetic hip fracture, initial encounter    Periprosthetic fracture around internal prosthetic right hip joint (Multi)    1. Right periprosthetic hip fracture after a fall at home  - s/p right ORIF on 5/3  - Patient reports pain is much better after surgery  - He is currently on acetaminophen 650mg 4 hours, 3900mg/24 hours- recommend changing to 975mg 3x/day to remain under 3gms/day  - [place] brace when out of bed, okay to ambulate with brace in place   - once medically stable, plan is for patient to return home with [facility]  - CAM negative, 4-AT: 2  - continue delirium precautions     2. T7, VB fx with height loss, T6 SP fx  - Patient had right fascia iliaca single shot nerve block performed on 5/4  - Patient reports pain has greatly improved since block  - Continue scheduled acetaminophen, gabapentin, methadone 2.5mg and as needed oxycodone  - Consider adding lidocaine patch to low back     3. Acute right rib fractures  - As above, acetaminophen 975mg 3x/day  - Please begin lidocaine patches to right ribs  - continue methadone and as needed oxycodone     4. Acute on chronic pain. Chronic pain related to lumbar spinal stenosis  - history of chronic back pain, now with acute pain due to fractures and surgery  - Pain in hip and back improved after surgery and nerve blocks  - Acute pain in right ribs, as above please add lidocaine patches  - Once  "medically stable, consider increasing Methadone to 5mg daily due to recent injuries, this may decrease need for as needed pain mediations     5. Likely osteoporosis  - to begin calcium + D   - vitamin D level is 26  - please begin cholecalciferol 2000 units, orally, daily     6. Acute on chronic respiratory failure due to flash pulmonary edema and likely COPD exacerbation  - Responding well to diuretics and steroids  - He is having difficulty taking deep breaths due to rib pain, recommend lidocaine patches to ribs, acetaminophen 975mg 3x/day, oxycodone 5/10 every 4 hours as needed for breakthrough pain     7.  HFrEF/ischemic cardiomyopathy, s/p [facility] AICD placement in 2012, with recent generator change for primary prevention (LEVEF 20-25%), generator replaced in 2023, how AWMI d/t delayed stent thrombosis s/p redo PCI with GEORGIE, PCI to LAD with BMS, on Brilinta  - Enrolled in [facility] due to end stage cardiac disease  - He is currently on amiodarone, bumetanide, metoprolol XL, Brilinta, Eliquis   - Patient feels that his symptoms are well controlled   - Patient's ICD remains active- patient states that he has \"things to do\" before it is deactivated. Patient's wife states that no one has discussed the ICD with them.   - Continue follow up with cardiology     8. Acute constipation  - No recent BM, on Miralax daily  - Consider adding Senna 1 tablet 2x/day     9. Goals of care  - Comfort and being at home are primary goals. Once patient is medially stable, plan is for discharge home with continued care from [facility]  - DNR/DNI      4M AGE-FRIENDLY INITIATIVE:  What matters most to patient: family, being at home   Medications: none concerning   Mentation: alert, oriented to person, place, situation   Mobility: okay to get out of bed with brace and walker     Geriatric medicine will continue to follow the patient. Thank you for allowing geriatric medicine to be involved in the " care of your patient. Geriatric medicine consultation team is available during work hours Monday through Friday. For any emergency issues requiring immediate assistance over the weekend, please page Geriatrics pager [phone]    Yamila Webb, APRN-CNP

## 2024-05-06 NOTE — PROGRESS NOTES
Nutrition consulted per nursing admission risk screen for MST = 2. Pt on hospice and with plans to return home on hospice. Dicussed on morning IDT rounds-- recommended diet be liberalized to regular. RDN is available for consult as needed; however, given that pt plans to resume hospice services aggressive nutrition intervention is not appropriate at this time.

## 2024-05-07 ENCOUNTER — APPOINTMENT (OUTPATIENT)
Dept: RADIOLOGY | Facility: HOSPITAL | Age: 79
DRG: 480 | End: 2024-05-07
Payer: MEDICARE

## 2024-05-07 LAB
ALBUMIN SERPL BCP-MCNC: 3 G/DL (ref 3.4–5)
ANION GAP SERPL CALC-SCNC: 12 MMOL/L (ref 10–20)
BUN SERPL-MCNC: 48 MG/DL (ref 6–23)
CALCIUM SERPL-MCNC: 8.9 MG/DL (ref 8.6–10.6)
CHLORIDE SERPL-SCNC: 98 MMOL/L (ref 98–107)
CO2 SERPL-SCNC: 33 MMOL/L (ref 21–32)
CREAT SERPL-MCNC: 1.71 MG/DL (ref 0.5–1.3)
EGFRCR SERPLBLD CKD-EPI 2021: 40 ML/MIN/1.73M*2
ERYTHROCYTE [DISTWIDTH] IN BLOOD BY AUTOMATED COUNT: 15.5 % (ref 11.5–14.5)
GLUCOSE BLD MANUAL STRIP-MCNC: 100 MG/DL (ref 74–99)
GLUCOSE BLD MANUAL STRIP-MCNC: 102 MG/DL (ref 74–99)
GLUCOSE BLD MANUAL STRIP-MCNC: 125 MG/DL (ref 74–99)
GLUCOSE BLD MANUAL STRIP-MCNC: 154 MG/DL (ref 74–99)
GLUCOSE SERPL-MCNC: 99 MG/DL (ref 74–99)
HCT VFR BLD AUTO: 28.7 % (ref 41–52)
HGB BLD-MCNC: 9.2 G/DL (ref 13.5–17.5)
MAGNESIUM SERPL-MCNC: 2.66 MG/DL (ref 1.6–2.4)
MCH RBC QN AUTO: 29.6 PG (ref 26–34)
MCHC RBC AUTO-ENTMCNC: 32.1 G/DL (ref 32–36)
MCV RBC AUTO: 92 FL (ref 80–100)
NRBC BLD-RTO: 0 /100 WBCS (ref 0–0)
PHOSPHATE SERPL-MCNC: 4 MG/DL (ref 2.5–4.9)
PHOSPHATE SERPL-MCNC: 4.6 MG/DL (ref 2.5–4.9)
PLATELET # BLD AUTO: 207 X10*3/UL (ref 150–450)
POTASSIUM SERPL-SCNC: 3.8 MMOL/L (ref 3.5–5.3)
RBC # BLD AUTO: 3.11 X10*6/UL (ref 4.5–5.9)
SODIUM SERPL-SCNC: 139 MMOL/L (ref 136–145)
WBC # BLD AUTO: 8.9 X10*3/UL (ref 4.4–11.3)

## 2024-05-07 PROCEDURE — 2500000004 HC RX 250 GENERAL PHARMACY W/ HCPCS (ALT 636 FOR OP/ED): Performed by: PHYSICIAN ASSISTANT

## 2024-05-07 PROCEDURE — 71045 X-RAY EXAM CHEST 1 VIEW: CPT

## 2024-05-07 PROCEDURE — 2500000002 HC RX 250 W HCPCS SELF ADMINISTERED DRUGS (ALT 637 FOR MEDICARE OP, ALT 636 FOR OP/ED): Performed by: PHYSICIAN ASSISTANT

## 2024-05-07 PROCEDURE — 2500000005 HC RX 250 GENERAL PHARMACY W/O HCPCS: Performed by: PHYSICIAN ASSISTANT

## 2024-05-07 PROCEDURE — 2500000001 HC RX 250 WO HCPCS SELF ADMINISTERED DRUGS (ALT 637 FOR MEDICARE OP): Performed by: PHYSICIAN ASSISTANT

## 2024-05-07 PROCEDURE — S0109 METHADONE ORAL 5MG: HCPCS

## 2024-05-07 PROCEDURE — 82947 ASSAY GLUCOSE BLOOD QUANT: CPT | Mod: 91

## 2024-05-07 PROCEDURE — 85027 COMPLETE CBC AUTOMATED: CPT | Performed by: PHYSICIAN ASSISTANT

## 2024-05-07 PROCEDURE — 2500000002 HC RX 250 W HCPCS SELF ADMINISTERED DRUGS (ALT 637 FOR MEDICARE OP, ALT 636 FOR OP/ED)

## 2024-05-07 PROCEDURE — 83735 ASSAY OF MAGNESIUM: CPT | Performed by: PHYSICIAN ASSISTANT

## 2024-05-07 PROCEDURE — 2500000001 HC RX 250 WO HCPCS SELF ADMINISTERED DRUGS (ALT 637 FOR MEDICARE OP)

## 2024-05-07 PROCEDURE — 99233 SBSQ HOSP IP/OBS HIGH 50: CPT

## 2024-05-07 PROCEDURE — 80069 RENAL FUNCTION PANEL: CPT | Performed by: PHYSICIAN ASSISTANT

## 2024-05-07 PROCEDURE — 71045 X-RAY EXAM CHEST 1 VIEW: CPT | Performed by: RADIOLOGY

## 2024-05-07 PROCEDURE — 1200000002 HC GENERAL ROOM WITH TELEMETRY DAILY

## 2024-05-07 PROCEDURE — 2500000006 HC RX 250 W HCPCS SELF ADMINISTERED DRUGS (ALT 637 FOR ALL PAYERS): Performed by: PHYSICIAN ASSISTANT

## 2024-05-07 PROCEDURE — 97530 THERAPEUTIC ACTIVITIES: CPT | Mod: GP

## 2024-05-07 PROCEDURE — 84100 ASSAY OF PHOSPHORUS: CPT

## 2024-05-07 PROCEDURE — 36415 COLL VENOUS BLD VENIPUNCTURE: CPT | Performed by: PHYSICIAN ASSISTANT

## 2024-05-07 PROCEDURE — 36415 COLL VENOUS BLD VENIPUNCTURE: CPT

## 2024-05-07 RX ORDER — AMOXICILLIN 250 MG
1 CAPSULE ORAL NIGHTLY
Status: DISCONTINUED | OUTPATIENT
Start: 2024-05-07 | End: 2024-05-10 | Stop reason: HOSPADM

## 2024-05-07 RX ORDER — METHADONE HYDROCHLORIDE 5 MG/1
5 TABLET ORAL NIGHTLY
Status: DISCONTINUED | OUTPATIENT
Start: 2024-05-07 | End: 2024-05-10 | Stop reason: HOSPADM

## 2024-05-07 RX ORDER — BISACODYL 10 MG/1
10 SUPPOSITORY RECTAL DAILY PRN
Status: DISCONTINUED | OUTPATIENT
Start: 2024-05-07 | End: 2024-05-10 | Stop reason: HOSPADM

## 2024-05-07 RX ADMIN — OXYCODONE HYDROCHLORIDE 5 MG: 5 TABLET ORAL at 21:17

## 2024-05-07 RX ADMIN — AMIODARONE HYDROCHLORIDE 200 MG: 200 TABLET ORAL at 09:06

## 2024-05-07 RX ADMIN — ESCITALOPRAM OXALATE 10 MG: 10 TABLET ORAL at 09:07

## 2024-05-07 RX ADMIN — ACETAMINOPHEN 650 MG: 325 TABLET ORAL at 06:08

## 2024-05-07 RX ADMIN — POLYETHYLENE GLYCOL 3350 17 G: 17 POWDER, FOR SOLUTION ORAL at 09:06

## 2024-05-07 RX ADMIN — INSULIN LISPRO 2 UNITS: 100 INJECTION, SOLUTION INTRAVENOUS; SUBCUTANEOUS at 18:32

## 2024-05-07 RX ADMIN — OXYCODONE HYDROCHLORIDE 5 MG: 5 TABLET ORAL at 06:08

## 2024-05-07 RX ADMIN — HEPARIN SODIUM 5000 UNITS: 5000 INJECTION INTRAVENOUS; SUBCUTANEOUS at 21:18

## 2024-05-07 RX ADMIN — FINASTERIDE 5 MG: 5 TABLET, FILM COATED ORAL at 09:07

## 2024-05-07 RX ADMIN — HEPARIN SODIUM 5000 UNITS: 5000 INJECTION INTRAVENOUS; SUBCUTANEOUS at 14:06

## 2024-05-07 RX ADMIN — ACETAMINOPHEN 650 MG: 325 TABLET ORAL at 00:09

## 2024-05-07 RX ADMIN — ACETAMINOPHEN 650 MG: 325 TABLET ORAL at 09:06

## 2024-05-07 RX ADMIN — OXYCODONE HYDROCHLORIDE 5 MG: 5 TABLET ORAL at 11:26

## 2024-05-07 RX ADMIN — ACETAMINOPHEN 650 MG: 325 TABLET ORAL at 14:06

## 2024-05-07 RX ADMIN — BUMETANIDE 2 MG: 2 TABLET ORAL at 09:08

## 2024-05-07 RX ADMIN — ASCORBIC ACID, THIAMINE MONONITRATE,RIBOFLAVIN, NIACINAMIDE, PYRIDOXINE HYDROCHLORIDE, FOLIC ACID, CYANOCOBALAMIN, BIOTIN, CALCIUM PANTOTHENATE, 1 CAPSULE: 100; 1.5; 1.7; 20; 10; 1; 6000; 150000; 5 CAPSULE, LIQUID FILLED ORAL at 09:07

## 2024-05-07 RX ADMIN — LIDOCAINE 1 PATCH: 4 PATCH TOPICAL at 09:07

## 2024-05-07 RX ADMIN — ACETAMINOPHEN 650 MG: 325 TABLET ORAL at 21:17

## 2024-05-07 RX ADMIN — Medication 2 L/MIN: at 09:00

## 2024-05-07 RX ADMIN — PREDNISONE 40 MG: 20 TABLET ORAL at 09:06

## 2024-05-07 RX ADMIN — OXYCODONE HYDROCHLORIDE 5 MG: 5 TABLET ORAL at 00:07

## 2024-05-07 RX ADMIN — ACETAMINOPHEN 650 MG: 325 TABLET ORAL at 18:32

## 2024-05-07 RX ADMIN — ROPINIROLE HYDROCHLORIDE 1 MG: 1 TABLET, FILM COATED ORAL at 21:17

## 2024-05-07 RX ADMIN — METHADONE HYDROCHLORIDE 5 MG: 5 TABLET ORAL at 21:17

## 2024-05-07 RX ADMIN — SENNOSIDES AND DOCUSATE SODIUM 1 TABLET: 50; 8.6 TABLET ORAL at 21:18

## 2024-05-07 ASSESSMENT — COGNITIVE AND FUNCTIONAL STATUS - GENERAL
MOVING FROM LYING ON BACK TO SITTING ON SIDE OF FLAT BED WITH BEDRAILS: A LOT
MOVING TO AND FROM BED TO CHAIR: A LOT
WALKING IN HOSPITAL ROOM: A LOT
MOBILITY SCORE: 11
STANDING UP FROM CHAIR USING ARMS: A LOT
TURNING FROM BACK TO SIDE WHILE IN FLAT BAD: A LOT
CLIMB 3 TO 5 STEPS WITH RAILING: TOTAL

## 2024-05-07 ASSESSMENT — PAIN SCALES - GENERAL
PAINLEVEL_OUTOF10: 7
PAINLEVEL_OUTOF10: 5 - MODERATE PAIN
PAINLEVEL_OUTOF10: 7
PAINLEVEL_OUTOF10: 7
PAINLEVEL_OUTOF10: 8
PAINLEVEL_OUTOF10: 7

## 2024-05-07 ASSESSMENT — PAIN - FUNCTIONAL ASSESSMENT
PAIN_FUNCTIONAL_ASSESSMENT: 0-10
PAIN_FUNCTIONAL_ASSESSMENT: 0-10

## 2024-05-07 NOTE — PROGRESS NOTES
Subjective   Mr. Damon is comfortable today. His pain is controlled and he has been sleeping at night. He has not had a bowel movement in 2-3 weeks    Objective     Current Facility-Administered Medications   Medication Dose Route Frequency Provider Last Rate Last Admin    acetaminophen (Tylenol) tablet 650 mg  650 mg oral q4h Kendell Medina PA-C   650 mg at 05/07/24 1406    albuterol 2.5 mg /3 mL (0.083 %) nebulizer solution 2.5 mg  2.5 mg nebulization q6h PRN Kendell Medina PA-C        amiodarone (Pacerone) tablet 200 mg  200 mg oral Daily Kendell Medina PA-C   200 mg at 05/07/24 0906    B complex-vitamin C-folic acid (Nephrocaps) capsule 1 capsule  1 capsule oral Daily Kendell Medina PA-C   1 capsule at 05/07/24 0907    bisacodyl (Dulcolax) suppository 10 mg  10 mg rectal Daily PRN Dorys Calixto MD        [Held by provider] bumetanide (Bumex) tablet 2 mg  2 mg oral Daily Kendell Medina PA-C   2 mg at 05/07/24 0908    dextrose 50 % injection 12.5 g  12.5 g intravenous q15 min PRN Kendell Medina PA-C        dextrose 50 % injection 25 g  25 g intravenous q15 min PRN Kendell Medina PA-C        escitalopram (Lexapro) tablet 10 mg  10 mg oral Daily Kendell Medina PA-C   10 mg at 05/07/24 0907    finasteride (Proscar) tablet 5 mg  5 mg oral Daily Kendell Medina PA-C   5 mg at 05/07/24 0907    tiotropium (Spiriva Respimat) 2.5 mcg/actuation inhaler 2 puff  2 puff inhalation Daily Kendell Medina PA-C   2 puff at 05/06/24 0846    And    fluticasone furoate-vilanteroL (Breo Ellipta) 100-25 mcg/dose inhaler 1 puff  1 puff inhalation Daily Kendell Medina PA-C   1 puff at 05/06/24 0847    glucagon (Glucagen) injection 1 mg  1 mg intramuscular q15 min PRN Kendell Medina PA-C        glucagon (Glucagen) injection 1 mg  1 mg intramuscular q15 min PRN Kendell Medina PA-C        heparin (porcine) injection 5,000 Units  5,000 Units subcutaneous TID Kendell Medina PA-C   5,000 Units at 05/07/24 1406    insulin lispro (HumaLOG)  injection 0-10 Units  0-10 Units subcutaneous TID with meals Kendell Medina PA-C   2 Units at 05/06/24 1226    lidocaine 4 % patch 1 patch  1 patch transdermal Daily Kendell Medina PA-C   1 patch at 05/07/24 0907    methadone (Dolophine) tablet 5 mg  5 mg oral Nightly Dorys Calixto MD        oxyCODONE (Roxicodone) immediate release tablet 2.5 mg  2.5 mg oral q4h PRN Kendell Medina PA-C   2.5 mg at 05/06/24 2009    oxyCODONE (Roxicodone) immediate release tablet 5 mg  5 mg oral q4h PRN Kendell Medina PA-C   5 mg at 05/07/24 1126    oxygen (O2) therapy  2 L/min inhalation Continuous Kendell Medina PA-C 120,000 mL/hr at 05/06/24 1000 2 L/min at 05/07/24 0900    polyethylene glycol (Glycolax, Miralax) packet 17 g  17 g oral Daily Kendell Medina PA-C   17 g at 05/07/24 0906    predniSONE (Deltasone) tablet 40 mg  40 mg oral Daily Kendell Medina PA-C   40 mg at 05/07/24 0906    rOPINIRole (Requip) tablet 1 mg  1 mg oral Nightly Kendell Medina PA-C   1 mg at 05/06/24 2010    sennosides-docusate sodium (Anne-Colace) 8.6-50 mg per tablet 1 tablet  1 tablet oral Nightly Dorys Calixto MD           Physical Exam  Constitutional:       Appearance: He is cachectic.   HENT:      Head: Normocephalic and atraumatic.      Ears:      Comments: + [place] bilaterally   Eyes:      Extraocular Movements: Extraocular movements intact.      Conjunctiva/sclera: Conjunctivae normal.      Pupils: Pupils are equal, round, and reactive to light.   Cardiovascular:      Rate and Rhythm: Normal rate and regular rhythm.   Pulmonary:      Effort: Pulmonary effort is normal.      Comments: CTAB  Abdominal:      General: Abdomen is flat. Bowel sounds are normal.      Palpations: Abdomen is soft.   Musculoskeletal:      Left lower leg: Edema present.      Comments: Trace LLE edema, non-pitting  RLE wrapped in ACE wrap    Skin:     General: Skin is warm.      Comments: Surgical dressing on right hip: clean and dry    Neurological:      General: No focal  "deficit present.      Mental Status: He is alert.      Comments: Oriented to person, place, month, year, not sure of time of day, day or date    Psychiatric:         Mood and Affect: Mood normal.         Confusion Assessment Method(CAM) for diagnosis of delirium:    1.  Acute onset or fluctuating course: absent/present: Absent  2.  Inattention: absent/present: Absent  3.  Disorganized thinking: absent/present: Absent  4.  Altered level of consciousness: absent/present: Absent  CAM: negative    AT Score For Assessment of Delirium and Cognitive Impairment:    Alertness: 0  Normal(fully alert,but not agitated, throughout assessment)=0  Mild sleepiness for <10 seconds after walking, then normal=0  Clearly abnormal=4  2.  AMT4: 1  No mistakes=0  One mistake=1  Two or more mistakes/untestable=2  3.  Attention: 0  Achieves seven months or more correctly=0  Starts but scores <7 months/ refuses to start=1  Untestable(cannot start because unwell, drowsy, inattentive)=2  4.  Acute: 0  No=0  Yes=4    Total Score: 1  4 or above: Possible delirium +/- cognitive impairment  1-3: Possible cognitive impairment  0: Delirium or severe cognitive impairment unlikely(but delirium still possible if (4) information incomplete)      Last Recorded Vitals      5/6/2024     7:00 PM 5/6/2024     8:00 PM 5/6/2024     9:00 PM 5/6/2024    10:16 PM 5/7/2024     6:00 AM 5/7/2024     8:32 AM 5/7/2024    11:51 AM   Vitals   Systolic 104 111 127 106 119 120 136   Diastolic 67 72 77 50 62 63 65   Heart Rate 68 69 66 67 69 63 82   Temp    36.4 °C (97.5 °F)  36.3 °C (97.3 °F) 36.8 °C (98.2 °F)   Resp 22 22 18 18 17 18 16   Height (in)    1.778 m (5' 10\")      Weight (lb)    138.45      BMI    19.87 kg/m2      BSA (m2)    1.76 m2         Vitals:    05/06/24 2216   Weight: 62.8 kg (138 lb 7.2 oz)        Relevant Results  Lab Results   Component Value Date    TSH 1.27 09/18/2023    HPZXHRER83 592 04/15/2024    VITD25 26 (A) 06/23/2022    HGBA1C 5.7 (H) " 11/14/2023     Results from last 7 days   Lab Units 05/07/24  0542 05/06/24  0025 05/06/24  0024 05/05/24  1106 05/04/24  0159 05/03/24  1741 05/02/24  0510 05/01/24  0058   WBC AUTO x10*3/uL 8.9 7.5  --  7.9 7.0 8.8   < > 5.6   HEMOGLOBIN g/dL 9.2* 10.4*  --  10.1* 10.0* 10.6*   < > 12.7*   HEMATOCRIT % 28.7* 32.7*  --  34.3* 31.6* 33.8*   < > 40.9*   ALT U/L  --   --   --   --  8* 8*  --  9*   AST U/L  --   --   --   --  18 17  --  17   SODIUM mmol/L 139  --  140 138 143 141   < > 144   POTASSIUM mmol/L 3.8  --  4.9 5.1 5.0 4.5   < > 3.2*   CHLORIDE mmol/L 98  --  100 103 104 102   < > 103   CREATININE mg/dL 1.71*  --  1.56* 1.50* 1.53* 1.40*   < > 2.45*   BUN mg/dL 48*  --  34* 31* 33* 25*   < > 31*   CO2 mmol/L 33*  --  31 27 33* 30   < > 32   INR   --   --   --   --   --  0.9  0.9  --  1.2*    < > = values in this interval not displayed.          XR thoracic spine 3 views  Narrative: Interpreted By:  Lucia Cunningham and Kamau Nyokabi   STUDY:  XR THORACIC SPINE 3 VIEWS; ;  5/6/2024 2:36 pm      INDICATION:  Signs/Symptoms:per ortho team, T7 vertebral fx.      COMPARISON:  CT chest abdomen pelvis 05/01/2024, CT thoracic spine 05/01/2024      ACCESSION NUMBER(S):  [id number]      ORDERING CLINICIAN:  MICHELL GARCIA      FINDINGS:  Two views of the thoracic spine.      There is a known acute anterior wedge compression fracture of T7  vertebral body with approximately 50% height loss anteriorly,  previously seen on CT 05/01/2024. There is mild superior endplate  deformity noted at T8 vertebral body seen on CT 05/01/2024. The known  fracture at T6 spinous process is better evaluated on CT 05/01/2024.      No additional fractures visualized in the cervical and thoracic spine.      Moderate multilevel degenerative change throughout the cervical spine  with endplate osteophytosis and intervertebral disc height loss.      Prevertebral soft tissues are within normal limits.      Impression: 1. Stable anterior wedge  compression fracture involving T7 vertebral  body with 50% height loss anteriorly. There is a mild superior  endplate deformity noted at T8 vertebral body. Findings stable since  prior CT 05/01/2024.  2. T6 spinous process fractures better evaluated on CT 05/01/2024.  3. No new fractures or malalignment of the thoracic spine.          I personally reviewed the images/study and I agree with Dr. Jean-Paul Fernando findings as stated. This study was interpreted at [place], [place]      MACRO:  None      Signed by: Lucia Cunningham 5/6/2024 6:37 PM  Dictation workstation:   [id number]  ECG 12 Lead  Sinus rhythm with Fusion complexes  Right superior axis deviation  Left bundle branch block  Abnormal ECG  When compared with ECG of 01-MAY-2024 09:59,  Previous ECG has undetermined rhythm, needs review  Confirmed by Fly Fu ([id number]) on 5/6/2024 2:26:02 PM  XR chest 1 view  Narrative: Interpreted By:  Wolf Novoa,   STUDY:  XR CHEST 1 VIEW;  5/6/2024 6:34 am      INDICATION:  Signs/Symptoms:increased O2 requirements, requiring CPAP.      COMPARISON:  Exam dated 05/05/2024      ACCESSION NUMBER(S):  [id number]      ORDERING CLINICIAN:  SAMI HOPSON      FINDINGS:  AP radiograph of the chest was provided.      Left chest wall ICD device and lead in similar position. Clips  project over the upper abdomen.      CARDIOMEDIASTINAL SILHOUETTE:  Cardiomediastinal silhouette is stable in size and configuration.      LUNGS:  Stable diffuse interstitial opacities with more focal areas of  airspace disease in the right upper and left lower lungs. Similar  blunting of the costophrenic angles. No definite pneumothorax.      ABDOMEN:  No remarkable upper abdominal findings.      BONES:  No acute osseous changes.      Impression: 1.  Stable exam with diffusely increased interstitial markings and  more focal right upper and left lower lung airspace opacities.  2. Stable small bilateral  pleural effusions.              MACRO:  None      Signed by: Wolf Novoa 5/6/2024 12:52 PM  Dictation workstation:   [id number]        DATA:  EKG: QTC  Encounter Date: 05/01/24   ECG 12 Lead   Result Value    Ventricular Rate 92    Atrial Rate 92    WV Interval 136    QRS Duration 144    QT Interval 394    QTC Calculation(Bazett) 487    P Axis 48    R Axis 263    T Axis 70    QRS Count 15    Q Onset 216    P Onset 148    P Offset 203    T Offset 413    QTC Fredericia 454    Narrative    Sinus rhythm with Fusion complexes  Right superior axis deviation  Left bundle branch block  Abnormal ECG  When compared with ECG of 01-MAY-2024 09:59,  Previous ECG has undetermined rhythm, needs review  Confirmed by Fly Fu ([id number]) on 5/6/2024 2:26:02 PM      Anti-psychotics in 48 hours: none   Opioids/Benzodiazepines in 48 hours: methadone, dilaudid, oxycodone  Anticholinergics on board:No  Restraints:No  Indwelling catheters:No  Last BM: none recorded  UO in 24 hours: 910  Activity in the past 24 hours: bedrest   Need for ambulatory devices: walker                 Assessment/Plan     This is a/an 79 y.o. year old male, with past medical history relevant for ICD/pacemaker, A-fib on Eliquis, ischemic cardiomyopathy, CHF, COPD, CAD, hypertension, depression, on chronic oxygen 2 L nasal cannula. He is followed by [facility] due to end stage cardiac disease, presenting for right hip periprosthetic fracture, vertebral fracture of T7/T8 and rib fracture. Patient being seen in geriatric consultation for goals of care discussion and post op management.     Principal Problem:    Compression fracture of thoracic vertebra, unspecified thoracic vertebral level, initial encounter (Multi)  Active Problems:    Periprosthetic hip fracture, initial encounter    Periprosthetic fracture around internal prosthetic right hip joint (Multi)    1. Right periprosthetic hip fracture after a fall at home  - s/p right ORIF on  5/3  - Patient reports pain is much better after surgery  - He is currently on acetaminophen 650mg 4 hours, 3900mg/24 hours- recommend changing to 975mg 3x/day to remain under 3gms/day  - [place] brace when out of bed, okay to ambulate with brace in place   - once medically stable, plan is for patient to return home with [facility]  - CAM negative, 4-AT: 1  - continue delirium precautions     2. T7, VB fx with height loss, T6 SP fx  - Patient had right fascia iliaca single shot nerve block performed on 5/4  - Patient reports pain has greatly improved since block  - Continue scheduled acetaminophen, gabapentin, methadone 5mg and as needed oxycodone  - continue lidocaine patch to low back     3. Acute right rib fractures  - As above, acetaminophen 975mg 3x/day  - continue lidocaine patches to right ribs  - continue methadone and as needed oxycodone     4. Acute on chronic pain. Chronic pain related to lumbar spinal stenosis  - history of chronic back pain, now with acute pain due to fractures and surgery  - Pain in hip and back improved after surgery and nerve blocks  - Acute pain in right ribs, as above please add lidocaine patches  -  continue Methadone 5mg daily    5. Likely osteoporosis  - to begin calcium + D   - vitamin D level is 26  - start cholecalciferol 2000 units, orally, daily     6. Acute on chronic respiratory failure due to flash pulmonary edema and likely COPD exacerbation  - Responding well to diuretics and steroids  - He is having difficulty taking deep breaths due to rib pain, recommend lidocaine patches to ribs, acetaminophen 975mg 3x/day, oxycodone 5/10 every 4 hours as needed for breakthrough pain     7.  HFrEF/ischemic cardiomyopathy, s/p [employer] AICD placement in 2012, with recent generator change for primary prevention (LEVEF 20-25%), generator replaced in 2023, how AWMI d/t delayed stent thrombosis s/p redo PCI with GEORGIE, PCI to LAD with BMS, on Brilinta  - Enrolled in  [facility] due to end stage cardiac disease  - He is currently on amiodarone, bumetanide, metoprolol XL, Brilinta, Eliquis   - Patient feels that his symptoms are well controlled   - Patient's ICD remains active- patient states that he has \"things to do\" before it is deactivated. Patient's wife states that no one has discussed the ICD with them.   - Continue follow up with cardiology     8. Acute constipation  - No BM in 2-3 weeks  - increase Senna to BID, continue Miralax, and please schedule biscodyl tablet/suppository    9. Goals of care  - Comfort and being at home are primary goals. Once patient is medially stable, plan is for discharge home with continued care from [facility]  - DNR/DNI      4M AGE-FRIENDLY INITIATIVE:  What matters most to patient: family, being at home   Medications: none concerning   Mentation: alert, oriented to person, place, situation   Mobility: okay to get out of bed with brace and walker     Geriatric medicine will continue to follow the patient. Thank you for allowing geriatric medicine to be involved in the care of your patient. Geriatric medicine consultation team is available during work hours Monday through Friday. For any emergency issues requiring immediate assistance over the weekend, please page Geriatrics pager [phone]    Angel Villarreal MD   "

## 2024-05-07 NOTE — PROGRESS NOTES
Physical Therapy    Physical Therapy Treatment    Patient Name: Andrez Damon  MRN: [id number]  Today's Date: 5/7/2024  Time Calculation  Start Time: 1019  Stop Time: 1043  Time Calculation (min): 24 min       Assessment/Plan   PT Assessment  PT Assessment Results: Decreased strength, Decreased endurance, Impaired balance, Decreased mobility, Decreased safety awareness, Pain  Rehab Prognosis: Good  Barriers to Discharge: None  Evaluation/Treatment Tolerance: Patient limited by fatigue, Patient limited by pain  Assessment Comment: Pt continues to demonstrate impaired strength, balance, and function. Remains appropriate for continued PT in house and after discharge.  End of Session Patient Position: Bed, 3 rail up, Alarm on  PT Plan  Inpatient/Swing Bed or Outpatient: Inpatient  PT Plan  Treatment/Interventions: Bed mobility, Transfer training, Gait training, Balance training, Strengthening, Endurance training, Range of motion, Therapeutic exercise, Therapeutic activity, Postural re-education  PT Plan: Skilled PT  PT Frequency: Daily  PT Discharge Recommendations: Moderate intensity level of continued care  PT Recommended Transfer Status: Assist x2  PT - OK to Discharge: Yes      General Visit Information:   PT  Visit  PT Received On: 05/07/24  General  Family/Caregiver Present: No  Patient Position Received: Bed, 3 rail up, Alarm on  Preferred Learning Style: verbal  General Comment:  (Pt resting in bed upon entry. Pleasant and cooperative. Willing to participate with PT and attempt mobility.)    Subjective   Precautions:  Precautions  LE Weight Bearing Status: Weight Bearing as Tolerated  Medical Precautions: Fall precautions, Spinal precautions  Vital Signs:       Objective   Pain:  Pain Assessment  Pain Assessment: 0-10  Pain Score:  (Minimal at rest, though increased in R hip/thigh with mobility/attempts to stand.)  Cognition:  Cognition  Overall Cognitive Status: Within Functional Limits  Orientation Level:  Oriented X4  Insight: Within function limits  Impulsive: Within functional limits  Postural Control:  Postural Control  Postural Control: Impaired  Posture Comment: Pt with tendency to lean to L to take weight off of RLE/hp.  Static Sitting Balance  Static Sitting-Balance Support: Bilateral upper extremity supported  Static Sitting-Level of Assistance: Close supervision  Static Sitting-Comment/Number of Minutes: EOB approximately 15 minutes total prior to return to supine due to increased discomfort/fatigue.  Static Standing Balance  Static Standing-Comment/Number of Minutes: Pt unable to complete sit-stand transfer on this date due to pain/discomort in R thigh.    Activity Tolerance:  Activity Tolerance  Endurance: Tolerates 10 - 20 min exercise with multiple rests  Treatments:  Therapeutic Activity  Therapeutic Activity Performed: Yes  Therapeutic Activity 1: supine-sit transfers, static sit, attempts to stand.    Bed Mobility  Bed Mobility: Yes  Bed Mobility 1  Bed Mobility 1: Supine to sitting, Sitting to supine  Level of Assistance 1: Minimum assistance    Ambulation/Gait Training  Ambulation/Gait Training Performed: No  Transfers  Transfer: Yes  Transfer 1  Trials/Comments 1: Attempted sit-stand with mod assist though patient with acute onset of R hip/thigh pain and unable to complete transfer. Anticipate if not for pain, patient would require mod/max assist to perform.    Outcome Measures:  [facility] Basic Mobility  Turning from your back to your side while in a flat bed without using bedrails: A lot  Moving from lying on your back to sitting on the side of a flat bed without using bedrails: A lot  Moving to and from bed to chair (including a wheelchair): A lot  Standing up from a chair using your arms (e.g. wheelchair or bedside chair): A lot  To walk in hospital room: A lot  Climbing 3-5 steps with railing: Total  Basic Mobility - Total Score: 11    Education Documentation  Precautions, taught by Julien WILSON  Kenan PT at 5/7/2024 11:16 AM.  Learner: Patient  Readiness: Acceptance  Method: Explanation  Response: Verbalizes Understanding    Mobility Training, taught by Julien Grayson PT at 5/7/2024 11:16 AM.  Learner: Patient  Readiness: Acceptance  Method: Explanation  Response: Verbalizes Understanding    Education Comments  No comments found.      Encounter Problems       Encounter Problems (Active)       Balance       STG - Maintains static standing balance with upper extremity support and LRD/ Min A (Progressing)       Start:  05/04/24    Expected End:  05/13/24       INTERVENTIONS:  1. Practice standing with minimal support.  2. Educate patient about standing tolerance.  3. Educate patient about independence with gait, transfers, and ADL's.  4. Educate patient about use of assistive device.  5. Educate patient about self-directed care.         STG - Maintains dynamic sitting balance with upper extremity support and SBA (Progressing)       Start:  05/04/24    Expected End:  05/13/24       INTERVENTIONS:  1. Practice sitting on the edge of a bed/mat with minimal support.  2. Educate patient about maintining total hip precautions while maintaining balance.  3. Educate patient about pressure relief.  4. Educate patient about use of assistive device.            Mobility       STG - Patient will ambulate 25ft with WhW and Min A (Progressing)       Start:  05/04/24    Expected End:  05/13/24               PT Transfers       STG - Transfer from bed to chair with WHW and Min A (Progressing)       Start:  05/04/24    Expected End:  05/13/24            STG - Patient to transfer to and from sit to supine with CGA (Progressing)       Start:  05/04/24    Expected End:  05/13/24            STG - Patient will perform bed mobility with CGA (Progressing)       Start:  05/04/24    Expected End:  05/13/24            STG - Patient will transfer sit to and from stand with WHW and Min A (Progressing)       Start:  05/04/24    Expected  End:  05/13/24

## 2024-05-07 NOTE — PROGRESS NOTES
[facility]  TRAUMA SERVICE - PROGRESS NOTE    Patient Name: Andrez Damon  MRN: [id number]  Admit Date: [id number]  : 1945  AGE: 79 y.o.   GENDER: male  ==============================================================================  MECHANISM OF INJURY / CHIEF COMPLAINT:   79 year old male, was taking his trash out. He suffered a mechanical fall after tripping over the bin falling on his right side. No LOC. See at OSH and brought here. He is complaining of some right chest wall and some right hip pain.   Prior on hospice, here for evaluation for any palliative fixation for the hip. Patient stating in room that he currently wishes to avoid surgery if possible.   LOC (yes/no?): no  Anticoagulant / Anti-platelet Rx? (for what dx?): Eliquis (PE)  Referring Facility Name (N/A for scene EMR run): [facility]      INJURIES:   Right 6-7 nondisplaced rib fx (On CXR)  T7 VB fx with height loss, T6 SP fx  Right periprosthetic hip fx     OTHER MEDICAL PROBLEMS:  CAD, HFrEF LVEF 24% with major infarcts on stress echo   COPD  HTN  CKD  ICD implantation     INCIDENTAL FINDINGS:  NA    PROCEDURES  OR with ortho 5/3 ORIF R hip    ==============================================================================  TODAY'S ASSESSMENT AND PLAN OF CARE:  79-year-old male with PMH of CAD, HFrEF with EF 24%, COPD, HTN, CKD, ICD implantation, on hospice prior to hospitalization for HFrEF who presented with right periprosthetic hip fracture, T7 vertebral body fracture, T6 spinous process fracture, right 6-7 nondisplaced rib fractures now status post ORIF of right hip with Ortho on 5/3.  Per recommendations of perioperative medicine/Dr. Hodges and trauma attending,, patient was transferred to TICU postoperatively for close monitoring in the setting of his cardiac status.  Progressed appropriately in the ICU and was transferred to the OR RNF on . DNR/DNI reinstated.     Transferred to floor, however  within 24h acute onset respiratory distress and desat to 70s. Placed on high flow with eventual improvement to high 80s. CXR negative for pneumo. Transferred back to ICU for CPAP and diuresis. After diuresis 1L in ICU and improved respiratory status, patient returned to RNF 5/6 PM.     Doing well this morning, pain is well-controlled, HDS, eating and voiding appropriately, on RA to 2 L nasal cannula.  Will continue to monitor if patient requires diuresis. RT to see patient today for breathing treatments. Has not had BM yet.     #R periprosthetic hip fx s/p R hip ORIF  - perioperative medicine/Dr. Hodges consulted for risk start  - geriatrics consulted for goals of care for OR decision  - pain control multimodal with scheduled tylenol, home methadone, oxy 5/10mg PRN  - OR with ortho 5/3 ORIF R hip  - OK for ADLs without [place] brace in bed only. When out of bed, must wear [place] brace  - no restrictions with ambulation, ADLs in jeanette brace  - no heavy lifting, no pushing or pulling more than 10 pounds  - pending standing uprights thoracic spine in jeanette brace, ordered  - Calcium (as carbonate)-Vitamin D 600mg-400IU PO BID for 30 days upon discharge     #T7 VB fx with height loss, T6 SP fx  - spine consulted, plan is non op per ortho given complexity of fixation  - pain control: tri tylenol q4h, gabapentin tri 100mg,  PRN oxy 5/10, dilaudid for breakthrough  - methadone 2.5 (home) increased to 5mg daily per jael  - acute pain service consulted, Right Fascia iliaca single shot nerve block performed 5/4/24 in [facility]     #CAD #HFrEF with EF 20#  - periop medicine consulted for cardiac risk start  - home amiodarone  - home bumetanide held today for elevated creatinine  - holding home eliquis, holding home brilinta  - discussed with Dr. Hodges, likely will dc on eliquis and aspirin given current cardiac stent vs hemorrhage risk    #Pulmonary  - O2 supplement to keep sat >92%  - tiotropium inhaler prn  - RT consulted for  "breathing treatment today    #Mood disorders/Psych  - escitalopram 10mg home dose  - ropinirole nightly 1mg    #  - cont home finasteride    - q4h vitals  - daily CBC  - strict Is and Os  - fall precautions  - IS 10x per hour    Diet: regular  BR: miralax, loan docusate, bisacodyl  DVT ppx: SCDs, SQH  Stress ppx: not indicated  PT/OT eval: Rec'd moderate intensity/SNF.     Dispo: Recommended SNF. Patient would like to go home.   Geriatrics/hospice consulted, will anticipate return to home with hospice services. Patient has good family support with appropriate modifications made to home.     Follow up: TBD, ortho    Discussed with Dr. Person.    Dorys Calixto MD  PGY-1 VS Resident  Trauma Surgery Service  Floor: [phone]    ==============================================================================  CHIEF COMPLAINT / OVERNIGHT EVENTS:   NAEON, comfortable with current pain regimen. Doing well this morning, HDS, eating and voiding appropriately, on RA to 2 L nasal cannula. Has not had BM yet.      plus 6 counts  NET +1.7L    MEDICAL HISTORY / ROS:  Admission history and ROS reviewed. Pertinent changes as follows:      PHYSICAL EXAM:  Heart Rate:  [63-83]   Temp:  [36.3 °C (97.3 °F)-36.8 °C (98.2 °F)]   Resp:  [16-22]   BP: (104-136)/(50-77)   Height:  [177.8 cm (5' 10\")]   Weight:  [62.8 kg (138 lb 7.2 oz)]   SpO2:  [93 %-99 %]   Physical Exam  Constitutional: no acute distress, conversational, older, thin, some mild temporal wasting  Neuro: A/O x4, no gross deficits   Psych: normal affect  HEENT: No deformities, no scleral icterus   Cardiac: RRR  Pulmonary: bilateral decreased breath sounds, increased labor of breathing, shallow. IS poor inspiratory effort. On RA.   Abdomen: soft, non distended, non tender  Skin: warm and dry overall    Extremities: RLE in ace wrap, soft compartments, warm foot.     IMAGING SUMMARY:  (summary of new imaging findings, not a copy of dictation)  Pending thoracic spine " uprights    LABS:  Results from last 7 days   Lab Units 05/07/24  0542 05/06/24  0025 05/05/24  1106 05/04/24  0159 05/03/24 1741 05/02/24  0510 05/01/24  0058   WBC AUTO x10*3/uL 8.9 7.5 7.9   < > 8.8   < > 5.6   HEMOGLOBIN g/dL 9.2* 10.4* 10.1*   < > 10.6*   < > 12.7*   HEMATOCRIT % 28.7* 32.7* 34.3*   < > 33.8*   < > 40.9*   PLATELETS AUTO x10*3/uL 207 187 159   < > 131*   < > 159   NEUTROS PCT AUTO %  --   --   --   --  93.4  --  77.0   LYMPHS PCT AUTO %  --   --   --   --  3.6  --  12.8   MONOS PCT AUTO %  --   --   --   --  2.2  --  8.2   EOS PCT AUTO %  --   --   --   --  0.2  --  0.9    < > = values in this interval not displayed.     Results from last 7 days   Lab Units 05/03/24 1741 05/01/24  0058   APTT seconds 32  32 31   INR  0.9  0.9 1.2*     Results from last 7 days   Lab Units 05/07/24  0542 05/06/24  0024 05/05/24  1106 05/04/24  0159 05/03/24 1741 05/02/24  0510 05/01/24  0058   SODIUM mmol/L 139 140 138 143 141   < > 144   POTASSIUM mmol/L 3.8 4.9 5.1 5.0 4.5   < > 3.2*   CHLORIDE mmol/L 98 100 103 104 102   < > 103   CO2 mmol/L 33* 31 27 33* 30   < > 32   BUN mg/dL 48* 34* 31* 33* 25*   < > 31*   CREATININE mg/dL 1.71* 1.56* 1.50* 1.53* 1.40*   < > 2.45*   CALCIUM mg/dL 8.9 8.9 8.8 8.7 8.7   < > 9.4   PROTEIN TOTAL g/dL  --   --   --  4.7* 5.1*  --  5.8*   BILIRUBIN TOTAL mg/dL  --   --   --  0.5 0.7  --  0.5   ALK PHOS U/L  --   --   --  58 61  --  68   ALT U/L  --   --   --  8* 8*  --  9*   AST U/L  --   --   --  18 17  --  17   GLUCOSE mg/dL 99 180* 136* 173* 148*   < > 185*    < > = values in this interval not displayed.     Results from last 7 days   Lab Units 05/04/24  0159 05/03/24  1741 05/01/24  0058   BILIRUBIN TOTAL mg/dL 0.5 0.7 0.5   BILIRUBIN DIRECT mg/dL 0.1 0.2  --      Results from last 7 days   Lab Units 05/05/24  1354 05/03/24  1741 05/03/24  1530   POCT PH, ARTERIAL pH 7.42 7.45* 7.40   POCT PCO2, ARTERIAL mm Hg 44* 43* 51*   POCT PO2, ARTERIAL mm Hg 57* 87 96*   POCT  HCO3 CALCULATED, ARTERIAL mmol/L 28.5* 29.9* 31.6*   POCT BASE EXCESS, ARTERIAL mmol/L 3.4* 5.3* 5.8*       I have reviewed all medications, laboratory results, and imaging pertinent for today's encounter.

## 2024-05-07 NOTE — CARE PLAN
The patient's goals for the shift include      The clinical goals for the shift include pain mgmt and pt safety      Problem: Pain  Goal: My pain/discomfort is manageable  Outcome: Progressing     Problem: Safety  Goal: Patient will be injury free during hospitalization  Outcome: Progressing  Goal: I will remain free of falls  Outcome: Progressing     Problem: Daily Care  Goal: Daily care needs are met  Outcome: Progressing     Problem: Psychosocial Needs  Goal: Demonstrates ability to cope with hospitalization/illness  Outcome: Progressing  Goal: Collaborate with me, my family, and caregiver to identify my specific goals  Outcome: Progressing     Problem: Discharge Barriers  Goal: My discharge needs are met  Outcome: Progressing     Problem: Skin  Goal: Decreased wound size/increased tissue granulation at next dressing change  Outcome: Progressing  Goal: Participates in plan/prevention/treatment measures  Outcome: Progressing  Goal: Prevent/manage excess moisture  Outcome: Progressing  Goal: Prevent/minimize sheer/friction injuries  Outcome: Progressing  Goal: Promote/optimize nutrition  Outcome: Progressing  Goal: Promote skin healing  Outcome: Progressing

## 2024-05-08 ENCOUNTER — APPOINTMENT (OUTPATIENT)
Dept: RADIOLOGY | Facility: HOSPITAL | Age: 79
DRG: 480 | End: 2024-05-08
Payer: MEDICARE

## 2024-05-08 LAB
ANION GAP SERPL CALC-SCNC: 14 MMOL/L (ref 10–20)
BUN SERPL-MCNC: 47 MG/DL (ref 6–23)
CALCIUM SERPL-MCNC: 8.8 MG/DL (ref 8.6–10.6)
CHLORIDE SERPL-SCNC: 101 MMOL/L (ref 98–107)
CO2 SERPL-SCNC: 31 MMOL/L (ref 21–32)
CREAT SERPL-MCNC: 1.6 MG/DL (ref 0.5–1.3)
EGFRCR SERPLBLD CKD-EPI 2021: 44 ML/MIN/1.73M*2
GLUCOSE BLD MANUAL STRIP-MCNC: 136 MG/DL (ref 74–99)
GLUCOSE BLD MANUAL STRIP-MCNC: 144 MG/DL (ref 74–99)
GLUCOSE BLD MANUAL STRIP-MCNC: 208 MG/DL (ref 74–99)
GLUCOSE BLD MANUAL STRIP-MCNC: 97 MG/DL (ref 74–99)
GLUCOSE SERPL-MCNC: 88 MG/DL (ref 74–99)
MAGNESIUM SERPL-MCNC: 2.52 MG/DL (ref 1.6–2.4)
POTASSIUM SERPL-SCNC: 3.6 MMOL/L (ref 3.5–5.3)
SODIUM SERPL-SCNC: 142 MMOL/L (ref 136–145)

## 2024-05-08 PROCEDURE — 83735 ASSAY OF MAGNESIUM: CPT

## 2024-05-08 PROCEDURE — 82947 ASSAY GLUCOSE BLOOD QUANT: CPT

## 2024-05-08 PROCEDURE — 99233 SBSQ HOSP IP/OBS HIGH 50: CPT | Performed by: INTERNAL MEDICINE

## 2024-05-08 PROCEDURE — 2500000004 HC RX 250 GENERAL PHARMACY W/ HCPCS (ALT 636 FOR OP/ED): Performed by: PHYSICIAN ASSISTANT

## 2024-05-08 PROCEDURE — 36415 COLL VENOUS BLD VENIPUNCTURE: CPT

## 2024-05-08 PROCEDURE — S0109 METHADONE ORAL 5MG: HCPCS

## 2024-05-08 PROCEDURE — 71045 X-RAY EXAM CHEST 1 VIEW: CPT

## 2024-05-08 PROCEDURE — 80048 BASIC METABOLIC PNL TOTAL CA: CPT

## 2024-05-08 PROCEDURE — 2500000002 HC RX 250 W HCPCS SELF ADMINISTERED DRUGS (ALT 637 FOR MEDICARE OP, ALT 636 FOR OP/ED)

## 2024-05-08 PROCEDURE — 2500000005 HC RX 250 GENERAL PHARMACY W/O HCPCS: Performed by: PHYSICIAN ASSISTANT

## 2024-05-08 PROCEDURE — 2500000002 HC RX 250 W HCPCS SELF ADMINISTERED DRUGS (ALT 637 FOR MEDICARE OP, ALT 636 FOR OP/ED): Performed by: PHYSICIAN ASSISTANT

## 2024-05-08 PROCEDURE — 2500000001 HC RX 250 WO HCPCS SELF ADMINISTERED DRUGS (ALT 637 FOR MEDICARE OP)

## 2024-05-08 PROCEDURE — 94640 AIRWAY INHALATION TREATMENT: CPT

## 2024-05-08 PROCEDURE — 2500000006 HC RX 250 W HCPCS SELF ADMINISTERED DRUGS (ALT 637 FOR ALL PAYERS): Performed by: PHYSICIAN ASSISTANT

## 2024-05-08 PROCEDURE — 2500000001 HC RX 250 WO HCPCS SELF ADMINISTERED DRUGS (ALT 637 FOR MEDICARE OP): Performed by: PHYSICIAN ASSISTANT

## 2024-05-08 PROCEDURE — 1200000002 HC GENERAL ROOM WITH TELEMETRY DAILY

## 2024-05-08 PROCEDURE — 71045 X-RAY EXAM CHEST 1 VIEW: CPT | Performed by: RADIOLOGY

## 2024-05-08 RX ORDER — ACETAMINOPHEN 325 MG/1
975 TABLET ORAL 3 TIMES DAILY
Status: DISCONTINUED | OUTPATIENT
Start: 2024-05-08 | End: 2024-05-10 | Stop reason: HOSPADM

## 2024-05-08 RX ORDER — ASPIRIN 81 MG/1
81 TABLET ORAL DAILY
Status: DISCONTINUED | OUTPATIENT
Start: 2024-05-08 | End: 2024-05-10 | Stop reason: HOSPADM

## 2024-05-08 RX ORDER — LIDOCAINE 560 MG/1
1 PATCH PERCUTANEOUS; TOPICAL; TRANSDERMAL DAILY
Status: DISCONTINUED | OUTPATIENT
Start: 2024-05-09 | End: 2024-05-10 | Stop reason: HOSPADM

## 2024-05-08 RX ADMIN — ACETAMINOPHEN 975 MG: 325 TABLET ORAL at 14:03

## 2024-05-08 RX ADMIN — HEPARIN SODIUM 5000 UNITS: 5000 INJECTION INTRAVENOUS; SUBCUTANEOUS at 14:02

## 2024-05-08 RX ADMIN — PREDNISONE 40 MG: 20 TABLET ORAL at 08:32

## 2024-05-08 RX ADMIN — HEPARIN SODIUM 5000 UNITS: 5000 INJECTION INTRAVENOUS; SUBCUTANEOUS at 20:26

## 2024-05-08 RX ADMIN — ACETAMINOPHEN 650 MG: 325 TABLET ORAL at 08:33

## 2024-05-08 RX ADMIN — TIOTROPIUM BROMIDE INHALATION SPRAY 2 PUFF: 3.12 SPRAY, METERED RESPIRATORY (INHALATION) at 15:01

## 2024-05-08 RX ADMIN — INSULIN LISPRO 4 UNITS: 100 INJECTION, SOLUTION INTRAVENOUS; SUBCUTANEOUS at 16:43

## 2024-05-08 RX ADMIN — OXYCODONE HYDROCHLORIDE 5 MG: 5 TABLET ORAL at 04:02

## 2024-05-08 RX ADMIN — ROPINIROLE HYDROCHLORIDE 1 MG: 1 TABLET, FILM COATED ORAL at 20:26

## 2024-05-08 RX ADMIN — METHADONE HYDROCHLORIDE 5 MG: 5 TABLET ORAL at 20:25

## 2024-05-08 RX ADMIN — SENNOSIDES AND DOCUSATE SODIUM 1 TABLET: 50; 8.6 TABLET ORAL at 20:26

## 2024-05-08 RX ADMIN — BISACODYL 10 MG: 10 SUPPOSITORY RECTAL at 11:59

## 2024-05-08 RX ADMIN — ACETAMINOPHEN 650 MG: 325 TABLET ORAL at 04:02

## 2024-05-08 RX ADMIN — AMIODARONE HYDROCHLORIDE 200 MG: 200 TABLET ORAL at 08:32

## 2024-05-08 RX ADMIN — OXYCODONE HYDROCHLORIDE 5 MG: 5 TABLET ORAL at 08:34

## 2024-05-08 RX ADMIN — ASCORBIC ACID, THIAMINE MONONITRATE,RIBOFLAVIN, NIACINAMIDE, PYRIDOXINE HYDROCHLORIDE, FOLIC ACID, CYANOCOBALAMIN, BIOTIN, CALCIUM PANTOTHENATE, 1 CAPSULE: 100; 1.5; 1.7; 20; 10; 1; 6000; 150000; 5 CAPSULE, LIQUID FILLED ORAL at 08:32

## 2024-05-08 RX ADMIN — ESCITALOPRAM OXALATE 10 MG: 10 TABLET ORAL at 08:33

## 2024-05-08 RX ADMIN — HEPARIN SODIUM 5000 UNITS: 5000 INJECTION INTRAVENOUS; SUBCUTANEOUS at 05:28

## 2024-05-08 RX ADMIN — ASPIRIN 81 MG: 81 TABLET, COATED ORAL at 14:03

## 2024-05-08 RX ADMIN — FINASTERIDE 5 MG: 5 TABLET, FILM COATED ORAL at 08:33

## 2024-05-08 RX ADMIN — FLUTICASONE FUROATE AND VILANTEROL TRIFENATATE 1 PUFF: 100; 25 POWDER RESPIRATORY (INHALATION) at 15:01

## 2024-05-08 RX ADMIN — ACETAMINOPHEN 975 MG: 325 TABLET ORAL at 20:25

## 2024-05-08 RX ADMIN — LIDOCAINE 1 PATCH: 4 PATCH TOPICAL at 08:33

## 2024-05-08 RX ADMIN — POLYETHYLENE GLYCOL 3350 17 G: 17 POWDER, FOR SOLUTION ORAL at 08:33

## 2024-05-08 ASSESSMENT — COGNITIVE AND FUNCTIONAL STATUS - GENERAL
MOVING FROM LYING ON BACK TO SITTING ON SIDE OF FLAT BED WITH BEDRAILS: A LOT
STANDING UP FROM CHAIR USING ARMS: A LOT
MOBILITY SCORE: 11
TURNING FROM BACK TO SIDE WHILE IN FLAT BAD: A LOT
TOILETING: A LOT
CLIMB 3 TO 5 STEPS WITH RAILING: TOTAL
HELP NEEDED FOR BATHING: A LOT
DRESSING REGULAR UPPER BODY CLOTHING: A LOT
STANDING UP FROM CHAIR USING ARMS: A LOT
MOVING TO AND FROM BED TO CHAIR: A LOT
DRESSING REGULAR LOWER BODY CLOTHING: TOTAL
WALKING IN HOSPITAL ROOM: A LOT
MOBILITY SCORE: 11
WALKING IN HOSPITAL ROOM: A LOT
CLIMB 3 TO 5 STEPS WITH RAILING: TOTAL
MOVING FROM LYING ON BACK TO SITTING ON SIDE OF FLAT BED WITH BEDRAILS: A LOT
EATING MEALS: A LITTLE
DAILY ACTIVITIY SCORE: 13
PERSONAL GROOMING: A LITTLE
TURNING FROM BACK TO SIDE WHILE IN FLAT BAD: A LOT
MOVING TO AND FROM BED TO CHAIR: A LOT

## 2024-05-08 ASSESSMENT — PAIN SCALES - GENERAL
PAINLEVEL_OUTOF10: 7
PAINLEVEL_OUTOF10: 6
PAINLEVEL_OUTOF10: 5 - MODERATE PAIN

## 2024-05-08 ASSESSMENT — PAIN - FUNCTIONAL ASSESSMENT
PAIN_FUNCTIONAL_ASSESSMENT: 0-10
PAIN_FUNCTIONAL_ASSESSMENT: 0-10

## 2024-05-08 ASSESSMENT — PAIN DESCRIPTION - ORIENTATION: ORIENTATION: RIGHT

## 2024-05-08 ASSESSMENT — PAIN DESCRIPTION - LOCATION: LOCATION: RIB CAGE

## 2024-05-08 NOTE — PROGRESS NOTES
[facility]  TRAUMA SERVICE - PROGRESS NOTE    Patient Name: Andrez Damon  MRN: [id number]  Admit Date: [id number]  : 1945  AGE: 79 y.o.   GENDER: male  ==============================================================================  MECHANISM OF INJURY / CHIEF COMPLAINT:   79 year old male, was taking his trash out. He suffered a mechanical fall after tripping over the bin falling on his right side. No LOC. See at OSH and brought here. He is complaining of some right chest wall and some right hip pain.   Prior on hospice, here for evaluation for any palliative fixation for the hip. Patient stating in room that he currently wishes to avoid surgery if possible.   LOC (yes/no?): no  Anticoagulant / Anti-platelet Rx? (for what dx?): Eliquis (PE)  Referring Facility Name (N/A for scene EMR run): [place]      INJURIES:   Right 6-7 nondisplaced rib fx (On CXR)  T7 VB fx with height loss, T6 SP fx  Right periprosthetic hip fx     OTHER MEDICAL PROBLEMS:  CAD, HFrEF LVEF 24% with major infarcts on stress echo   COPD  HTN  CKD  ICD implantation     INCIDENTAL FINDINGS:  NA    PROCEDURES  OR with ortho 5/3 ORIF R hip    ==============================================================================  TODAY'S ASSESSMENT AND PLAN OF CARE:  79-year-old male with PMH of CAD, HFrEF with EF 24%, COPD, HTN, CKD, ICD implantation, on hospice prior to hospitalization for HFrEF who presented with right periprosthetic hip fracture, T7 vertebral body fracture, T6 spinous process fracture, right 6-7 nondisplaced rib fractures now status post ORIF of right hip with Ortho on 5/3.  Per recommendations of perioperative medicine/Dr. Hodges and trauma attending,, patient was transferred to TICU postoperatively for close monitoring in the setting of his cardiac status.  Progressed appropriately in the ICU and was transferred to the OR RNF on . DNR/DNI reinstated.     Transferred to floor, however  within 24h acute onset respiratory distress and desat to 70s. Placed on high flow with eventual improvement to high 80s. CXR negative for pneumo. Transferred back to ICU for CPAP and diuresis. After diuresis 1L in ICU and improved respiratory status, patient returned to RNF 5/6 PM.     Doing well this morning, pain is well-controlled, HDS, eating and voiding appropriately, on RA to 2 L nasal cannula.  Will continue to monitor if patient requires diuresis. RT to see patient today for breathing treatments. Has not had BM yet.     #R periprosthetic hip fx s/p R hip ORIF  - perioperative medicine/Dr. Hodges consulted for risk start  - geriatrics consulted for goals of care for OR decision  - pain control multimodal with scheduled tylenol, home methadone, oxy 5/10mg PRN  - OR with ortho 5/3 ORIF R hip  - OK for ADLs without [employer] brace in bed only. When out of bed, must wear [place] brace  - no restrictions with ambulation, ADLs in jeanette brace  - no heavy lifting, no pushing or pulling more than 10 pounds  - standing uprights thoracic spine in jeanette brace, complete  - Calcium (as carbonate)-Vitamin D 600mg-400IU PO BID for 30 days upon discharge     #T7 VB fx with height loss, T6 SP fx  - spine consulted, plan is non op per ortho given complexity of fixation  - pain control: tri tylenol q4h, gabapentin tri 100mg,  PRN oxy 5/10, dilaudid for breakthrough  - methadone 2.5 (home) increased to 5mg daily per jael  - acute pain service consulted, [facility]ia iliaca [facility]     #CAD #HFrEF with EF 20#  - periop medicine consulted for cardiac risk start  - home amiodarone  - home bumetanide held today again. Plan to restart 5/9 in setting of improving creatinine  - holding home eliquis, holding home brilinta  - restarted on aspirin 5/8  - discussed with Dr. Hodges and jael, likely will dc on eliquis and aspirin given current cardiac stent and cardiac risk vs fall risk. Reached out  to patient cardiologist regarding recs    #Pulmonary  - O2 supplement to keep sat >92%  - tiotropium inhaler prn  - RT consulted for breathing treatment yesterday    #Mood disorders/Psych  - escitalopram 10mg home dose  - ropinirole nightly 1mg    #  - cont home finasteride    - q4h vitals  - daily CBC  - strict Is and Os  - fall precautions  - IS 10x per hour    Diet: regular  BR: miralax, loan docusate, bisacodyl, suppository. Plan to increase/consider enema tomorrow if no BM  DVT ppx: SCDs, SQH.   Stress ppx: not indicated  PT/OT eval: Rec'd moderate intensity/SNF.     Dispo: Recommended SNF. Patient would like to go home.   Geriatrics/hospice consulted, will anticipate return to home with hospice services. Patient has good family support with appropriate modifications made to home. Will likely need at home nursing support.     Follow up: TBD, ortho    Discussed with Dr. Person.    Dorys Calixto MD  PGY-1 VS Resident  Trauma Surgery Service  Floor: [phone]    ==============================================================================  CHIEF COMPLAINT / OVERNIGHT EVENTS:   NAEON, comfortable with current pain regimen. Doing well this morning, had breakfast and lunch. Voiding appropriately. Did not have BM yet, tried suppository this afternoon. Will consider enema tomorrow.      +1x. On 2L NC    MEDICAL HISTORY / ROS:  Admission history and ROS reviewed. Pertinent changes as follows:      PHYSICAL EXAM:  Heart Rate:  [56-67]   Temp:  [35.6 °C (96.1 °F)-37.1 °C (98.8 °F)]   Resp:  [16-18]   BP: (105-123)/(56-67)   SpO2:  [93 %-97 %]   Physical Exam  Constitutional: no acute distress, conversational, older, thin, some mild temporal wasting  Neuro: A/O x4, no gross deficits   Psych: normal affect  HEENT: No deformities, no scleral icterus   Cardiac: RRR  Pulmonary: bilateral decreased breath sounds, increased labor of breathing, shallow. IS poor inspiratory effort. On 2L NC.   Abdomen: soft, non distended,  non tender  Skin: warm and dry overall    Extremities: RLE in ace wrap, soft compartments, warm foot.     IMAGING SUMMARY:  (summary of new imaging findings, not a copy of dictation)  Pending thoracic spine uprights    LABS:  Results from last 7 days   Lab Units 05/07/24  0542 05/06/24  0025 05/05/24  1106 05/04/24  0159 05/03/24  1741   WBC AUTO x10*3/uL 8.9 7.5 7.9   < > 8.8   HEMOGLOBIN g/dL 9.2* 10.4* 10.1*   < > 10.6*   HEMATOCRIT % 28.7* 32.7* 34.3*   < > 33.8*   PLATELETS AUTO x10*3/uL 207 187 159   < > 131*   NEUTROS PCT AUTO %  --   --   --   --  93.4   LYMPHS PCT AUTO %  --   --   --   --  3.6   MONOS PCT AUTO %  --   --   --   --  2.2   EOS PCT AUTO %  --   --   --   --  0.2    < > = values in this interval not displayed.     Results from last 7 days   Lab Units 05/03/24  1741   APTT seconds 32  32   INR  0.9  0.9     Results from last 7 days   Lab Units 05/08/24  0607 05/07/24  0542 05/06/24  0024 05/05/24  1106 05/04/24  0159 05/03/24  1741   SODIUM mmol/L 142 139 140   < > 143 141   POTASSIUM mmol/L 3.6 3.8 4.9   < > 5.0 4.5   CHLORIDE mmol/L 101 98 100   < > 104 102   CO2 mmol/L 31 33* 31   < > 33* 30   BUN mg/dL 47* 48* 34*   < > 33* 25*   CREATININE mg/dL 1.60* 1.71* 1.56*   < > 1.53* 1.40*   CALCIUM mg/dL 8.8 8.9 8.9   < > 8.7 8.7   PROTEIN TOTAL g/dL  --   --   --   --  4.7* 5.1*   BILIRUBIN TOTAL mg/dL  --   --   --   --  0.5 0.7   ALK PHOS U/L  --   --   --   --  58 61   ALT U/L  --   --   --   --  8* 8*   AST U/L  --   --   --   --  18 17   GLUCOSE mg/dL 88 99 180*   < > 173* 148*    < > = values in this interval not displayed.     Results from last 7 days   Lab Units 05/04/24  0159 05/03/24  1741   BILIRUBIN TOTAL mg/dL 0.5 0.7   BILIRUBIN DIRECT mg/dL 0.1 0.2     Results from last 7 days   Lab Units 05/05/24  1354 05/03/24  1741 05/03/24  1530   POCT PH, ARTERIAL pH 7.42 7.45* 7.40   POCT PCO2, ARTERIAL mm Hg 44* 43* 51*   POCT PO2, ARTERIAL mm Hg 57* 87 96*   POCT HCO3 CALCULATED, ARTERIAL  mmol/L 28.5* 29.9* 31.6*   POCT BASE EXCESS, ARTERIAL mmol/L 3.4* 5.3* 5.8*       I have reviewed all medications, laboratory results, and imaging pertinent for today's encounter.

## 2024-05-08 NOTE — PROGRESS NOTES
Subjective   Follow-up on acute fall/right periprosthetic hip fracture s/p repair/T6 and T7 vertebra fracture:    Patient was seen and examined.  He stated that he was trying to escape last night.  No reports of confusion seen.  No acute events overnight. He complains of pain in his right surgical site. He still has not had a bowel movement.     Objective     Current Facility-Administered Medications   Medication Dose Route Frequency Provider Last Rate Last Admin    acetaminophen (Tylenol) tablet 650 mg  650 mg oral q4h Kendell Medina PA-C   650 mg at 05/08/24 0833    albuterol 2.5 mg /3 mL (0.083 %) nebulizer solution 2.5 mg  2.5 mg nebulization q6h PRN Kendell Medina PA-C        amiodarone (Pacerone) tablet 200 mg  200 mg oral Daily Kendell Medina PA-C   200 mg at 05/08/24 0832    B complex-vitamin C-folic acid (Nephrocaps) capsule 1 capsule  1 capsule oral Daily Kendell Medina PA-C   1 capsule at 05/08/24 0832    bisacodyl (Dulcolax) suppository 10 mg  10 mg rectal Daily PRN Dorys Calixto MD        [Held by provider] bumetanide (Bumex) tablet 2 mg  2 mg oral Daily Kendell Medina PA-C   2 mg at 05/07/24 0908    dextrose 50 % injection 12.5 g  12.5 g intravenous q15 min PRN Kendell Medina PA-C        dextrose 50 % injection 25 g  25 g intravenous q15 min PRN Kendell Medina PA-C        escitalopram (Lexapro) tablet 10 mg  10 mg oral Daily Kendell Medina PA-C   10 mg at 05/08/24 0833    finasteride (Proscar) tablet 5 mg  5 mg oral Daily Kendell Medina PA-C   5 mg at 05/08/24 0833    tiotropium (Spiriva Respimat) 2.5 mcg/actuation inhaler 2 puff  2 puff inhalation Daily Kendell Medina PA-C   2 puff at 05/06/24 0846    And    fluticasone furoate-vilanteroL (Breo Ellipta) 100-25 mcg/dose inhaler 1 puff  1 puff inhalation Daily Kendell Medina PA-C   1 puff at 05/06/24 0847    glucagon (Glucagen) injection 1 mg  1 mg intramuscular q15 min PRN Kendell Medina PA-C        glucagon (Glucagen) injection 1 mg  1 mg intramuscular  q15 min PRN Kendell Medina PA-C        heparin (porcine) injection 5,000 Units  5,000 Units subcutaneous TID Kendell Medina PA-C   5,000 Units at 05/08/24 0528    insulin lispro (HumaLOG) injection 0-10 Units  0-10 Units subcutaneous TID with meals Kendell Medina PA-C   2 Units at 05/07/24 1832    lidocaine 4 % patch 1 patch  1 patch transdermal Daily Kendell Medina PA-C   1 patch at 05/08/24 0833    methadone (Dolophine) tablet 5 mg  5 mg oral Nightly Dorys Calixto MD   5 mg at 05/07/24 2117    oxyCODONE (Roxicodone) immediate release tablet 2.5 mg  2.5 mg oral q4h PRN Kendell Medina PA-C   2.5 mg at 05/06/24 2009    oxyCODONE (Roxicodone) immediate release tablet 5 mg  5 mg oral q4h PRN Kendell Medina PA-C   5 mg at 05/08/24 0834    oxygen (O2) therapy  2 L/min inhalation Continuous Kendell Medina PA-C 120,000 mL/hr at 05/06/24 1000 2 L/min at 05/07/24 0900    polyethylene glycol (Glycolax, Miralax) packet 17 g  17 g oral Daily Kendell Medina PA-C   17 g at 05/08/24 0833    predniSONE (Deltasone) tablet 40 mg  40 mg oral Daily Kendell Medina PA-C   40 mg at 05/08/24 0832    rOPINIRole (Requip) tablet 1 mg  1 mg oral Nightly Kendell Medina PA-C   1 mg at 05/07/24 2117    sennosides-docusate sodium (Anne-Colace) 8.6-50 mg per tablet 1 tablet  1 tablet oral Nightly Dorys Calixto MD   1 tablet at 05/07/24 2118       Physical Exam  GEN: Alert, oriented to person, place and time, not pale, not jaundiced, cachectic, on 2 L oxygen  CVS: HS I +II, regular, no murmurs  RESP: Diminished air entry  ABD: Full, soft, BS present, nontender, no palpable organs,   EXT: No bilateral leg edema, right lateral leg Silver dressing intact, tenderness on palpation    Confusion Assessment Method(CAM) for diagnosis of delirium:    1.  Acute onset or fluctuating course: absent/present: Absent  2.  Inattention: absent/present: Absent  3.  Disorganized thinking: absent/present: Absent  4.  Altered level of consciousness: absent/present:  Absent  CAM: negative    AT Score For Assessment of Delirium and Cognitive Impairment:    Alertness: 0  Normal(fully alert,but not agitated, throughout assessment)=0  Mild sleepiness for <10 seconds after walking, then normal=0  Clearly abnormal=4  2.  AMT4: 0  No mistakes=0  One mistake=1  Two or more mistakes/untestable=2  3.  Attention: 0  Achieves seven months or more correctly=0  Starts but scores <7 months/ refuses to start=1  Untestable(cannot start because unwell, drowsy, inattentive)=2  4.  Acute: 0  No=0  Yes=4    Total Score: 0  4 or above: Possible delirium +/- cognitive impairment  1-3: Possible cognitive impairment  0: Delirium or severe cognitive impairment unlikely(but delirium still possible if (4) information incomplete)      Last Recorded Vitals      5/7/2024     6:00 AM 5/7/2024     8:32 AM 5/7/2024    11:51 AM 5/7/2024     3:48 PM 5/7/2024     8:32 PM 5/8/2024     1:01 AM 5/8/2024     8:03 AM   Vitals   Systolic 119 120 136 123 105 106 116   Diastolic 62 63 65 62 62 67 62   Heart Rate 69 63 82 67 63 60 56   Temp  36.3 °C (97.3 °F) 36.8 °C (98.2 °F) 36.6 °C (97.9 °F) 35.6 °C (96.1 °F) 36.4 °C (97.5 °F) 36.8 °C (98.2 °F)   Resp 17 18 16 18 16 16 16      Vitals:    05/06/24 2216   Weight: 62.8 kg (138 lb 7.2 oz)        Relevant Results  Lab Results   Component Value Date    TSH 1.27 09/18/2023    YFYXTCGW16 592 04/15/2024    VITD25 26 (A) 06/23/2022    HGBA1C 5.7 (H) 11/14/2023     Results from last 7 days   Lab Units 05/08/24  0607 05/07/24  0542 05/06/24  0025 05/06/24  0024 05/05/24  1106 05/04/24  0159 05/03/24  1741   WBC AUTO x10*3/uL  --  8.9 7.5  --  7.9 7.0 8.8   HEMOGLOBIN g/dL  --  9.2* 10.4*  --  10.1* 10.0* 10.6*   HEMATOCRIT %  --  28.7* 32.7*  --  34.3* 31.6* 33.8*   ALT U/L  --   --   --   --   --  8* 8*   AST U/L  --   --   --   --   --  18 17   SODIUM mmol/L 142 139  --  140 138 143 141   POTASSIUM mmol/L 3.6 3.8  --  4.9 5.1 5.0 4.5   CHLORIDE mmol/L 101 98  --  100 103 104 102    CREATININE mg/dL 1.60* 1.71*  --  1.56* 1.50* 1.53* 1.40*   BUN mg/dL 47* 48*  --  34* 31* 33* 25*   CO2 mmol/L 31 33*  --  31 27 33* 30   INR   --   --   --   --   --   --  0.9  0.9          XR chest 1 view  Narrative: Interpreted By:  Adrien Moffett and Ohs Zachary   STUDY:  XR CHEST 1 VIEW;  5/7/2024 4:49 am      INDICATION:  Signs/Symptoms:increased O2 requirements, requiring CPAP.      COMPARISON:  Chest radiographs since 04/30/2024, CT chest/abdomen/pelvis  05/01/2024.      ACCESSION NUMBER(S):  [id number]      ORDERING CLINICIAN:  JENNIFER RODRIGUEZ      FINDINGS:  AP radiograph of the chest was provided.      MEDICAL DEVICES:  Left subclavian approach [place] Omaze ICD. The electrode is  nicely positioned.      CARDIOMEDIASTINAL SILHOUETTE:  Moderate calcified atherosclerosis of the aortic knob.  Cardiomediastinal silhouette is normal in size and configuration.      LUNGS:  Diffuse coarse interstitial lung markings throughout the lungs.  Interval decrease superimposed ground-glass opacities, now  predominantly affecting the right upper lung. Trace right pleural  effusion. No pneumothorax.      ABDOMEN:  No remarkable upper abdominal findings.      BONES:  No acute osseous changes.      Impression: 1. Interval decrease in ground-glass opacities, now predominantly  affecting the right upper lung which may represent improving  pulmonary edema versus improving infectious etiology.  2. Similar diffuse chronic airspace disease.  3. Small bilateral pleural effusions subsegmental atelectasis greater  within left lower lobe than the right.      I personally reviewed the images/study and I agree with the findings  as stated by Dr. James Avelar. This study was interpreted at  [place], [place].      MACRO:  None      Signed by: Adrien Moffett 5/7/2024 4:14 PM  Dictation workstation:   [id number]      DATA:  EKG: QTC  Encounter Date: 05/01/24   ECG 12 Lead   Result Value     Ventricular Rate 92    Atrial Rate 92    DE Interval 136    QRS Duration 144    QT Interval 394    QTC Calculation(Bazett) 487    P Axis 48    R Axis 263    T Axis 70    QRS Count 15    Q Onset 216    P Onset 148    P Offset 203    T Offset 413    QTC Fredericia 454    Narrative    Sinus rhythm with Fusion complexes  Right superior axis deviation  Left bundle branch block  Abnormal ECG  When compared with ECG of 01-MAY-2024 09:59,  Previous ECG has undetermined rhythm, needs review  Confirmed by Fly Fu ([id number]) on 5/6/2024 2:26:02 PM      Anti-psychotics in 48 hours: No  Opioids/Benzodiazepines in 48 hours: Methadone  Anticholinergics on board:No  Restraints:No  Indwelling catheters:No  Last BM: None since admission  UO in 24 hours: 800 mls  Activity in the past 24 hours: Assist x2, Out of bed working with P/OT  Need for ambulatory devices:Walker          Assessment/Plan   Andrez Damon is a 79 y.o. male on day 7 of admission presenting with Compression fracture of thoracic vertebra, unspecified thoracic vertebral level, initial encounter (Multi).    Principal Problem:    Compression fracture of thoracic vertebra, unspecified thoracic vertebral level, initial encounter (Multi)  Active Problems:    Periprosthetic hip fracture, initial encounter    Periprosthetic fracture around internal prosthetic right hip joint (Multi)    Acute fall, in a patient with baseline frailty  Right periprosthetic hip fx  Resultant acute right 6-7 nondisplaced rib fx  Acute T7 vertebral fracure with height loss,   Acute T6 spinous process fracture  CAD/HFrEF LVEF 24% with major infarcts on stress echo 2019  7.  Recent acute on chronci respiratory failure, h/o COPD with recent COPD exacerbation/chronic hypoxic respiratory failure, baseline 2L oxygen a home  8.   Hypertension  9.   CKD stage 3a  10. Acute constipation  11. Probable osteoporosis  12. Severe protein-calorie malnutrition    Plan:  Recommend switching to Tylenol 975mg  TID  Continue with current dose of Methadone and lidoderm paches  Continue with current bowel regimen - Miralax, prn bisacodyl, increase Senna to 2 tabs BID if there is still no BM by tomorrow and consider enema  Continue with vitamin D  Continue with current discharge plan for home with Hospice.   Patient and family to follow-up with cardiology to discuss ICD and its future management     4M AGE-FRIENDLY INITIATIVE:  What matters most to patient: Family, being at home  Medications: Methadone, oxycodone  Mentation: CAM negative, 4AT 0  Mobility: Assist x2, working with PT/OT    Geriatric medicine will continue to follow the patient. Thank you for allowing geriatric medicine to be involved in the care of your patient. Geriatric medicine consultation team is available during work hours Monday through Friday. For any emergency issues requiring immediate assistance over the weekend, please page Geriatrics pager [phone]    iLz Yoon MD

## 2024-05-08 NOTE — PROGRESS NOTES
Andrez Damon is a 79 y.o. male on day 7 of admission presenting with Compression fracture of thoracic vertebra, unspecified thoracic vertebral level, initial encounter (Multi).    SW sent [facility] updates for dc planning needs.

## 2024-05-09 LAB
ALBUMIN SERPL BCP-MCNC: 2.9 G/DL (ref 3.4–5)
ANION GAP SERPL CALC-SCNC: 12 MMOL/L (ref 10–20)
BUN SERPL-MCNC: 48 MG/DL (ref 6–23)
CALCIUM SERPL-MCNC: 8.8 MG/DL (ref 8.6–10.6)
CHLORIDE SERPL-SCNC: 101 MMOL/L (ref 98–107)
CO2 SERPL-SCNC: 33 MMOL/L (ref 21–32)
CREAT SERPL-MCNC: 1.82 MG/DL (ref 0.5–1.3)
EGFRCR SERPLBLD CKD-EPI 2021: 37 ML/MIN/1.73M*2
GLUCOSE BLD MANUAL STRIP-MCNC: 198 MG/DL (ref 74–99)
GLUCOSE BLD MANUAL STRIP-MCNC: 204 MG/DL (ref 74–99)
GLUCOSE BLD MANUAL STRIP-MCNC: 92 MG/DL (ref 74–99)
GLUCOSE BLD MANUAL STRIP-MCNC: 95 MG/DL (ref 74–99)
GLUCOSE SERPL-MCNC: 87 MG/DL (ref 74–99)
PHOSPHATE SERPL-MCNC: 4.9 MG/DL (ref 2.5–4.9)
POTASSIUM SERPL-SCNC: 4 MMOL/L (ref 3.5–5.3)
SODIUM SERPL-SCNC: 142 MMOL/L (ref 136–145)

## 2024-05-09 PROCEDURE — 2500000002 HC RX 250 W HCPCS SELF ADMINISTERED DRUGS (ALT 637 FOR MEDICARE OP, ALT 636 FOR OP/ED)

## 2024-05-09 PROCEDURE — 97530 THERAPEUTIC ACTIVITIES: CPT | Mod: GP

## 2024-05-09 PROCEDURE — 2500000004 HC RX 250 GENERAL PHARMACY W/ HCPCS (ALT 636 FOR OP/ED): Performed by: PHYSICIAN ASSISTANT

## 2024-05-09 PROCEDURE — S0109 METHADONE ORAL 5MG: HCPCS

## 2024-05-09 PROCEDURE — 99232 SBSQ HOSP IP/OBS MODERATE 35: CPT

## 2024-05-09 PROCEDURE — 2500000002 HC RX 250 W HCPCS SELF ADMINISTERED DRUGS (ALT 637 FOR MEDICARE OP, ALT 636 FOR OP/ED): Performed by: PHYSICIAN ASSISTANT

## 2024-05-09 PROCEDURE — 2500000001 HC RX 250 WO HCPCS SELF ADMINISTERED DRUGS (ALT 637 FOR MEDICARE OP)

## 2024-05-09 PROCEDURE — 84100 ASSAY OF PHOSPHORUS: CPT

## 2024-05-09 PROCEDURE — 2500000005 HC RX 250 GENERAL PHARMACY W/O HCPCS

## 2024-05-09 PROCEDURE — 94640 AIRWAY INHALATION TREATMENT: CPT

## 2024-05-09 PROCEDURE — 2500000001 HC RX 250 WO HCPCS SELF ADMINISTERED DRUGS (ALT 637 FOR MEDICARE OP): Performed by: PHYSICIAN ASSISTANT

## 2024-05-09 PROCEDURE — 36415 COLL VENOUS BLD VENIPUNCTURE: CPT

## 2024-05-09 PROCEDURE — 82947 ASSAY GLUCOSE BLOOD QUANT: CPT | Mod: 91

## 2024-05-09 PROCEDURE — 2500000006 HC RX 250 W HCPCS SELF ADMINISTERED DRUGS (ALT 637 FOR ALL PAYERS): Performed by: PHYSICIAN ASSISTANT

## 2024-05-09 PROCEDURE — 1100000001 HC PRIVATE ROOM DAILY

## 2024-05-09 RX ORDER — LACTULOSE 10 G/15ML
20 SOLUTION ORAL ONCE
Status: COMPLETED | OUTPATIENT
Start: 2024-05-09 | End: 2024-05-09

## 2024-05-09 RX ADMIN — METHADONE HYDROCHLORIDE 5 MG: 5 TABLET ORAL at 20:41

## 2024-05-09 RX ADMIN — ACETAMINOPHEN 975 MG: 325 TABLET ORAL at 20:41

## 2024-05-09 RX ADMIN — FINASTERIDE 5 MG: 5 TABLET, FILM COATED ORAL at 15:03

## 2024-05-09 RX ADMIN — PREDNISONE 40 MG: 20 TABLET ORAL at 11:17

## 2024-05-09 RX ADMIN — LIDOCAINE 1 PATCH: 4 PATCH TOPICAL at 11:17

## 2024-05-09 RX ADMIN — HEPARIN SODIUM 5000 UNITS: 5000 INJECTION INTRAVENOUS; SUBCUTANEOUS at 15:03

## 2024-05-09 RX ADMIN — POLYETHYLENE GLYCOL 3350 17 G: 17 POWDER, FOR SOLUTION ORAL at 11:17

## 2024-05-09 RX ADMIN — OXYCODONE HYDROCHLORIDE 5 MG: 5 TABLET ORAL at 07:35

## 2024-05-09 RX ADMIN — ROPINIROLE HYDROCHLORIDE 1 MG: 1 TABLET, FILM COATED ORAL at 20:41

## 2024-05-09 RX ADMIN — FLUTICASONE FUROATE AND VILANTEROL TRIFENATATE 1 PUFF: 100; 25 POWDER RESPIRATORY (INHALATION) at 08:32

## 2024-05-09 RX ADMIN — ESCITALOPRAM OXALATE 10 MG: 10 TABLET ORAL at 15:03

## 2024-05-09 RX ADMIN — LACTULOSE 20 G: 20 SOLUTION ORAL at 15:02

## 2024-05-09 RX ADMIN — OXYCODONE HYDROCHLORIDE 2.5 MG: 5 TABLET ORAL at 20:41

## 2024-05-09 RX ADMIN — TIOTROPIUM BROMIDE INHALATION SPRAY 2 PUFF: 3.12 SPRAY, METERED RESPIRATORY (INHALATION) at 08:32

## 2024-05-09 RX ADMIN — INSULIN LISPRO 4 UNITS: 100 INJECTION, SOLUTION INTRAVENOUS; SUBCUTANEOUS at 18:16

## 2024-05-09 RX ADMIN — ASPIRIN 81 MG: 81 TABLET, COATED ORAL at 11:17

## 2024-05-09 RX ADMIN — HEPARIN SODIUM 5000 UNITS: 5000 INJECTION INTRAVENOUS; SUBCUTANEOUS at 05:59

## 2024-05-09 RX ADMIN — ASCORBIC ACID, THIAMINE MONONITRATE,RIBOFLAVIN, NIACINAMIDE, PYRIDOXINE HYDROCHLORIDE, FOLIC ACID, CYANOCOBALAMIN, BIOTIN, CALCIUM PANTOTHENATE, 1 CAPSULE: 100; 1.5; 1.7; 20; 10; 1; 6000; 150000; 5 CAPSULE, LIQUID FILLED ORAL at 11:17

## 2024-05-09 RX ADMIN — ACETAMINOPHEN 975 MG: 325 TABLET ORAL at 11:17

## 2024-05-09 RX ADMIN — AMIODARONE HYDROCHLORIDE 200 MG: 200 TABLET ORAL at 11:17

## 2024-05-09 RX ADMIN — HEPARIN SODIUM 5000 UNITS: 5000 INJECTION INTRAVENOUS; SUBCUTANEOUS at 20:41

## 2024-05-09 RX ADMIN — ACETAMINOPHEN 975 MG: 325 TABLET ORAL at 15:02

## 2024-05-09 ASSESSMENT — PAIN DESCRIPTION - LOCATION
LOCATION: RIB CAGE

## 2024-05-09 ASSESSMENT — PAIN SCALES - GENERAL
PAINLEVEL_OUTOF10: 5 - MODERATE PAIN
PAINLEVEL_OUTOF10: 0 - NO PAIN
PAINLEVEL_OUTOF10: 5 - MODERATE PAIN
PAINLEVEL_OUTOF10: 8
PAINLEVEL_OUTOF10: 0 - NO PAIN
PAINLEVEL_OUTOF10: 3
PAINLEVEL_OUTOF10: 0 - NO PAIN
PAINLEVEL_OUTOF10: 6
PAINLEVEL_OUTOF10: 6
PAINLEVEL_OUTOF10: 10 - WORST POSSIBLE PAIN
PAINLEVEL_OUTOF10: 0 - NO PAIN
PAINLEVEL_OUTOF10: 5 - MODERATE PAIN
PAINLEVEL_OUTOF10: 0 - NO PAIN
PAINLEVEL_OUTOF10: 10 - WORST POSSIBLE PAIN
PAINLEVEL_OUTOF10: 6

## 2024-05-09 ASSESSMENT — PAIN SCALES - WONG BAKER
WONGBAKER_NUMERICALRESPONSE: NO HURT

## 2024-05-09 ASSESSMENT — PAIN SCALES - PAIN ASSESSMENT IN ADVANCED DEMENTIA (PAINAD)
TOTALSCORE: 0
FACIALEXPRESSION: SMILING OR INEXPRESSIVE
BREATHING: NORMAL
BODYLANGUAGE: RELAXED
CONSOLABILITY: NO NEED TO CONSOLE

## 2024-05-09 ASSESSMENT — COGNITIVE AND FUNCTIONAL STATUS - GENERAL
MOVING TO AND FROM BED TO CHAIR: A LOT
TOILETING: A LOT
WALKING IN HOSPITAL ROOM: A LOT
TURNING FROM BACK TO SIDE WHILE IN FLAT BAD: A LOT
PERSONAL GROOMING: A LITTLE
MOVING FROM LYING ON BACK TO SITTING ON SIDE OF FLAT BED WITH BEDRAILS: A LOT
MOVING FROM LYING ON BACK TO SITTING ON SIDE OF FLAT BED WITH BEDRAILS: A LOT
STANDING UP FROM CHAIR USING ARMS: A LOT
EATING MEALS: A LITTLE
HELP NEEDED FOR BATHING: A LOT
CLIMB 3 TO 5 STEPS WITH RAILING: TOTAL
MOVING TO AND FROM BED TO CHAIR: A LOT
DRESSING REGULAR UPPER BODY CLOTHING: A LOT
WALKING IN HOSPITAL ROOM: A LOT
MOBILITY SCORE: 11
MOBILITY SCORE: 11
TURNING FROM BACK TO SIDE WHILE IN FLAT BAD: A LOT
STANDING UP FROM CHAIR USING ARMS: A LOT
DAILY ACTIVITIY SCORE: 13
CLIMB 3 TO 5 STEPS WITH RAILING: TOTAL
DRESSING REGULAR LOWER BODY CLOTHING: TOTAL

## 2024-05-09 ASSESSMENT — PAIN - FUNCTIONAL ASSESSMENT
PAIN_FUNCTIONAL_ASSESSMENT: 0-10

## 2024-05-09 ASSESSMENT — PAIN DESCRIPTION - ORIENTATION
ORIENTATION: RIGHT
ORIENTATION: RIGHT

## 2024-05-09 NOTE — PROGRESS NOTES
Occupational Therapy    Communication Note    Missed Visit: Yes  Missed Visit Reason: Patient refused (pt declined to participate d/t pain,)      05/09/24 at 3:24 PM   Dori Pickett, OT   Rehab Office: [phone]

## 2024-05-09 NOTE — CARE PLAN
Problem: Pain  Goal: My pain/discomfort is manageable  Outcome: Progressing     Problem: Skin  Goal: Decreased wound size/increased tissue granulation at next dressing change  Outcome: Progressing  Flowsheets (Taken 5/8/2024 2124)  Decreased wound size/increased tissue granulation at next dressing change: Promote sleep for wound healing  Goal: Participates in plan/prevention/treatment measures  Outcome: Progressing  Flowsheets (Taken 5/8/2024 2124)  Participates in plan/prevention/treatment measures: Elevate heels  Goal: Prevent/manage excess moisture  Outcome: Progressing  Flowsheets (Taken 5/8/2024 2124)  Prevent/manage excess moisture: Follow provider orders for dressing changes  Goal: Prevent/minimize sheer/friction injuries  Outcome: Progressing  Flowsheets (Taken 5/8/2024 2124)  Prevent/minimize sheer/friction injuries: Turn/reposition every 2 hours/use positioning/transfer devices  Goal: Promote/optimize nutrition  Outcome: Progressing  Flowsheets (Taken 5/8/2024 2124)  Promote/optimize nutrition: Offer water/supplements/favorite foods  Goal: Promote skin healing  Outcome: Progressing   The patient's goals for the shift include      The clinical goals for the shift include Patient will remain safe and stable during this shift    Over the shift, the patient did not make progress toward the following goals. Barriers to progression include age and complexity of health problems. Recommendations to address these barriers include supportive care.

## 2024-05-09 NOTE — PROGRESS NOTES
Physical Therapy    Physical Therapy Treatment    Patient Name: Andrez Damon  MRN: [id number]  Today's Date: 5/9/2024  Time Calculation  Start Time: 1025  Stop Time: 1050  Time Calculation (min): 25 min    Assessment/Plan   PT Assessment  End of Session Communication: Bedside nurse  Assessment Comment: Pt continues to demonstrate impaired strength, balance, and function. Remains appropriate for continued PT in house and after discharge.  End of Session Patient Position: Bed, 3 rail up, Alarm on  PT Plan  Inpatient/Swing Bed or Outpatient: Inpatient  PT Plan  Treatment/Interventions: Bed mobility, Transfer training, Gait training, Balance training, Strengthening, Endurance training, Range of motion, Therapeutic exercise, Therapeutic activity, Postural re-education  PT Plan: Skilled PT  PT Frequency: Daily  PT Discharge Recommendations: Moderate intensity level of continued care  PT Recommended Transfer Status: Assist x2  PT - OK to Discharge: Yes      General Visit Information:   PT  Visit  PT Received On: 05/09/24  General  Family/Caregiver Present: No  Patient Position Received: Bed, 3 rail up, Alarm on  General Comment: Pt supine in bed. In good spirits and motivated to initiate treatment. Pt noted extreme rib pain as well as abdominal cramping secondary to inability to have a bowel movement.    Subjective   Precautions:  Precautions  LE Weight Bearing Status: Weight Bearing as Tolerated  Medical Precautions: Spinal precautions  Braces Applied: jeanette brace applied as needed    Objective   Pain:  Pain Assessment  Pain Assessment: 0-10  Pain Score: 10 - Worst possible pain  Pain Type: Acute pain  Pain Location: Rib cage  Pain Orientation: Right  Pain Frequency: Constant/continuous  Clinical Progression: Not changed  Pain Interventions: Compression (Pt instructed to hold pillow against chest to ease pain as it worsens. RN aware and to provide pain medications.)  Multiple Pain Sites: Two  Pain 2  Pain Score 2: 5 -  Moderate pain  Pain Type 2: Visceral pain  Pain Location 2: Abdomen  Pain Frequency 2: Constant/continuous  Cognition:  Cognition  Overall Cognitive Status: Within Functional Limits  Orientation Level: Oriented X4  Attention: Within Functional Limits  Memory: Within Funtional Limits  Safety/Judgement: Within Functional Limits  Insight: Within function limits  Impulsive: Within functional limits  Processing Speed: Delayed  Postural Control:  Postural Control  Postural Control: Impaired  Posture Comment: Pt required B arm support during sitting posture  Static Sitting Balance  Static Sitting-Balance Support: Bilateral upper extremity supported  Static Sitting-Level of Assistance: Close supervision  Static Sitting-Comment/Number of Minutes: EOB for approximately 10 minutes total prior to return to supine due to pain.  Static Standing Balance  Static Standing-Comment/Number of Minutes: Pt unable to tolerate attempts to stand on this date due to pain/fatigue.    Activity Tolerance:  Activity Tolerance  Endurance: Tolerates 10 - 20 min exercise with multiple rests, Decreased tolerance for upright activites  Treatments:  Therapeutic Exercise  Therapeutic Exercise Performed: Yes  Therapeutic Exercise Activity 1: LAQ's x 5 each to patient's pain tolerance.    Therapeutic Activity  Therapeutic Activity Performed: Yes  Therapeutic Activity 1: bed mobility/supine to sit, static sit.    Bed Mobility  Bed Mobility: Yes  Bed Mobility 1  Bed Mobility 1: Supine to sitting, Sitting to supine  Level of Assistance 1: Moderate assistance, Minimal verbal cues, Minimal tactile cues    Ambulation/Gait Training  Ambulation/Gait Training Performed: No  Transfers  Transfer: No  Transfer 1  Trials/Comments 1: Pt unable to tolerate attempts due to increased pain/fatigue with static sitting.    Outcome Measures:  [facility] Basic Mobility  Turning from your back to your side while in a flat bed without using bedrails: A lot  Moving from lying on your  back to sitting on the side of a flat bed without using bedrails: A lot  Moving to and from bed to chair (including a wheelchair): A lot  Standing up from a chair using your arms (e.g. wheelchair or bedside chair): A lot  To walk in hospital room: A lot  Climbing 3-5 steps with railing: Total  Basic Mobility - Total Score: 11    Education Documentation  Precautions, taught by Julien Grayson PT at 5/9/2024 11:31 AM.  Learner: Patient  Readiness: Acceptance  Method: Explanation  Response: Verbalizes Understanding    Mobility Training, taught by Julien Grayson PT at 5/9/2024 11:31 AM.  Learner: Patient  Readiness: Acceptance  Method: Explanation  Response: Verbalizes Understanding    Education Comments  No comments found.      Encounter Problems       Encounter Problems (Active)       Balance       STG - Maintains static standing balance with upper extremity support and LRD/ Min A (Progressing)       Start:  05/04/24    Expected End:  05/13/24       INTERVENTIONS:  1. Practice standing with minimal support.  2. Educate patient about standing tolerance.  3. Educate patient about independence with gait, transfers, and ADL's.  4. Educate patient about use of assistive device.  5. Educate patient about self-directed care.         STG - Maintains dynamic sitting balance with upper extremity support and SBA (Progressing)       Start:  05/04/24    Expected End:  05/13/24       INTERVENTIONS:  1. Practice sitting on the edge of a bed/mat with minimal support.  2. Educate patient about maintining total hip precautions while maintaining balance.  3. Educate patient about pressure relief.  4. Educate patient about use of assistive device.            Mobility       STG - Patient will ambulate 25ft with WhW and Min A (Progressing)       Start:  05/04/24    Expected End:  05/13/24               PT Transfers       STG - Transfer from bed to chair with WHW and Min A (Progressing)       Start:  05/04/24    Expected End:  05/13/24             STG - Patient to transfer to and from sit to supine with CGA (Progressing)       Start:  05/04/24    Expected End:  05/13/24            STG - Patient will perform bed mobility with CGA (Progressing)       Start:  05/04/24    Expected End:  05/13/24            STG - Patient will transfer sit to and from stand with WHW and Min A (Progressing)       Start:  05/04/24    Expected End:  05/13/24

## 2024-05-09 NOTE — PROGRESS NOTES
[facility]  TRAUMA SERVICE - PROGRESS NOTE    Patient Name: Andrez Damon  MRN: [id number]  Admit Date: 443115  : 1945  AGE: 79 y.o.   GENDER: male  ==============================================================================  MECHANISM OF INJURY / CHIEF COMPLAINT:   79 year old male, was taking his trash out. He suffered a mechanical fall after tripping over the bin falling on his right side. No LOC. See at OSH and brought here. He is complaining of some right chest wall and some right hip pain.   Prior on hospice, here for evaluation for any palliative fixation for the hip. Patient stating in room that he currently wishes to avoid surgery if possible.   LOC (yes/no?): no  Anticoagulant / Anti-platelet Rx? (for what dx?): Eliquis (PE)  Referring Facility Name (N/A for scene EMR run): [facility]      INJURIES:   Right 6-7 nondisplaced rib fx (On CXR)  T7 VB fx with height loss, T6 SP fx  Right periprosthetic hip fx     OTHER MEDICAL PROBLEMS:  CAD, HFrEF LVEF 24% with major infarcts on stress echo   COPD  HTN  CKD  ICD implantation     INCIDENTAL FINDINGS:  NA    PROCEDURES  OR with ortho 5/3 ORIF R hip    ==============================================================================  TODAY'S ASSESSMENT AND PLAN OF CARE:  79-year-old male with PMH of CAD, HFrEF with EF 24%, COPD, HTN, CKD, ICD implantation, on hospice prior to hospitalization for HFrEF who presented with right periprosthetic hip fracture, T7 vertebral body fracture, T6 spinous process fracture, right 6-7 nondisplaced rib fractures now status post ORIF of right hip with Ortho on 5/3.  Per recommendations of perioperative medicine/Dr. Hodges and trauma attending,, patient was transferred to TICU postoperatively for close monitoring in the setting of his cardiac status.  Progressed appropriately in the ICU and was transferred to the OR RNF on . DNR/DNI reinstated.     Transferred to floor, however  within 24h acute onset respiratory distress and desat to 70s. Placed on high flow with eventual improvement to high 80s. CXR negative for pneumo. Transferred back to ICU for CPAP and diuresis. After diuresis 1L in ICU and improved respiratory status, patient returned to RNF 5/6 PM.     Doing well this morning, pain is well-controlled, HDS, eating and voiding appropriately, on 2 L nasal cannula.  Has not had BM yet. Plan for lactulose today x1 dose    #R periprosthetic hip fx s/p R hip ORIF  - perioperative medicine/Dr. Hodges consulted for risk start  - geriatrics consulted for goals of care for OR decision  - pain control multimodal with scheduled tylenol, home methadone, oxy 5/10mg PRN  - OR with ortho 5/3 ORIF R hip  - OK for ADLs without [place] brace in bed only. When out of bed, must wear Jeanetet brace  - no restrictions with ambulation, ADLs in jeanette brace  - no heavy lifting, no pushing or pulling more than 10 pounds  - standing uprights thoracic spine in jeanette brace, complete  - Calcium (as carbonate)-Vitamin D 600mg-400IU PO BID for 30 days upon discharge     #T7 VB fx with height loss, T6 SP fx  - spine consulted, plan is non op per ortho given complexity of fixation  - pain control: tri tylenol q4h, gabapentin tri 100mg,  PRN oxy 5/10, dilaudid for breakthrough  - methadone 2.5 (home) increased to 5mg daily per jael  - acute pain service consulted, Right [facility]aca [facility]     #CAD #HFrEF with EF 20#  - periop medicine consulted for cardiac risk start  - home amiodarone  - continue to hold home bumetanide today d/t creatinine uptick  - holding home eliquis, holding home brilinta  - home aspirin  - discussed with Dr. Hodges and jael, likely will dc on eliquis and aspirin given current cardiac stent and cardiac risk vs fall risk. Reached out to patient cardiologist regarding recs, pending    #Pulmonary  - O2 supplement to keep sat >92%  - tiotropium inhaler  prn  - RT consulted for breathing treatment yesterday    #Mood disorders/Psych  - escitalopram 10mg home dose  - ropinirole nightly 1mg    #  - cont home finasteride    - q4h vitals  - CBC, RFP, mag prn  - strict Is and Os  - fall precautions  - IS 10x per hour    Diet: regular  BR: miralax, loan docusate, bisacodyl, suppository. 1x lactulose  DVT ppx: SCDs, SQH.   Stress ppx: not indicated  PT/OT eval: Rec'd moderate intensity/SNF.     Dispo: Recommended SNF. Patient would like to go home.   Geriatrics/hospice consulted, will anticipate return to home with hospice services. Patient has good family support with appropriate modifications made to home. Will need at home nursing support.     Follow up: TBD, ortho    Discussed with Dr. Person.    Dorys Calixto MD  PGY-1 VS Resident  Trauma Surgery Service  Floor: [id number]    ==============================================================================  CHIEF COMPLAINT / OVERNIGHT EVENTS:   NAEON, some discomfort in ribs. Voiding appropriately. Did not have BM yet, tried suppository this yday afternoon without success. Will try lactulose today, else enema this evening.      +2x. On 2L NC    MEDICAL HISTORY / ROS:  Admission history and ROS reviewed. Pertinent changes as follows:      PHYSICAL EXAM:  Heart Rate:  [50-66]   Temp:  [35.7 °C (96.3 °F)-36.9 °C (98.4 °F)]   Resp:  [16-18]   BP: (105-129)/(50-66)   SpO2:  [91 %-97 %]   Physical Exam  Constitutional: no acute distress, conversational, older, thin, some mild temporal wasting  Neuro: A/O x4, no gross deficits   Psych: normal affect  HEENT: No deformities, no scleral icterus   Cardiac: RRR  Pulmonary: bilateral decreased breath sounds, increased labor of breathing. IS 250cc On 2L NC.   Abdomen: soft, non distended, non tender  Skin: warm and dry overall    Extremities: RLE in ace wrap, soft compartments, warm foot.     IMAGING SUMMARY:  (summary of new imaging findings, not a copy of dictation)  Pending  thoracic spine uprights    LABS:  Results from last 7 days   Lab Units 05/07/24  0542 05/06/24  0025 05/05/24  1106 05/04/24  0159 05/03/24  1741   WBC AUTO x10*3/uL 8.9 7.5 7.9   < > 8.8   HEMOGLOBIN g/dL 9.2* 10.4* 10.1*   < > 10.6*   HEMATOCRIT % 28.7* 32.7* 34.3*   < > 33.8*   PLATELETS AUTO x10*3/uL 207 187 159   < > 131*   NEUTROS PCT AUTO %  --   --   --   --  93.4   LYMPHS PCT AUTO %  --   --   --   --  3.6   MONOS PCT AUTO %  --   --   --   --  2.2   EOS PCT AUTO %  --   --   --   --  0.2    < > = values in this interval not displayed.     Results from last 7 days   Lab Units 05/03/24  1741   APTT seconds 32  32   INR  0.9  0.9     Results from last 7 days   Lab Units 05/09/24  0600 05/08/24  0607 05/07/24  0542 05/05/24  1106 05/04/24  0159 05/03/24  1741   SODIUM mmol/L 142 142 139   < > 143 141   POTASSIUM mmol/L 4.0 3.6 3.8   < > 5.0 4.5   CHLORIDE mmol/L 101 101 98   < > 104 102   CO2 mmol/L 33* 31 33*   < > 33* 30   BUN mg/dL 48* 47* 48*   < > 33* 25*   CREATININE mg/dL 1.82* 1.60* 1.71*   < > 1.53* 1.40*   CALCIUM mg/dL 8.8 8.8 8.9   < > 8.7 8.7   PROTEIN TOTAL g/dL  --   --   --   --  4.7* 5.1*   BILIRUBIN TOTAL mg/dL  --   --   --   --  0.5 0.7   ALK PHOS U/L  --   --   --   --  58 61   ALT U/L  --   --   --   --  8* 8*   AST U/L  --   --   --   --  18 17   GLUCOSE mg/dL 87 88 99   < > 173* 148*    < > = values in this interval not displayed.     Results from last 7 days   Lab Units 05/04/24  0159 05/03/24  1741   BILIRUBIN TOTAL mg/dL 0.5 0.7   BILIRUBIN DIRECT mg/dL 0.1 0.2     Results from last 7 days   Lab Units 05/05/24  1354 05/03/24  1741 05/03/24  1530   POCT PH, ARTERIAL pH 7.42 7.45* 7.40   POCT PCO2, ARTERIAL mm Hg 44* 43* 51*   POCT PO2, ARTERIAL mm Hg 57* 87 96*   POCT HCO3 CALCULATED, ARTERIAL mmol/L 28.5* 29.9* 31.6*   POCT BASE EXCESS, ARTERIAL mmol/L 3.4* 5.3* 5.8*       I have reviewed all medications, laboratory results, and imaging pertinent for today's encounter.

## 2024-05-09 NOTE — PROGRESS NOTES
Subjective   Follow-up on acute fall/right periprosthetic hip fracture s/p repair/T6 and T7 vertebra fracture:    No BM since arriving  Pain not well controlled; had oxy dose this morning but scheduled tylenol and lidocaine catches given late in the morning  Pt would like to go home but there are concerns wife will not be able to provide full time support         Objective     Current Facility-Administered Medications   Medication Dose Route Frequency Provider Last Rate Last Admin    acetaminophen (Tylenol) tablet 975 mg  975 mg oral TID Dorys Calixto MD   975 mg at 05/08/24 2025    albuterol 2.5 mg /3 mL (0.083 %) nebulizer solution 2.5 mg  2.5 mg nebulization q6h PRN Kendell Medina PA-C        amiodarone (Pacerone) tablet 200 mg  200 mg oral Daily Kendell Medina PA-C   200 mg at 05/08/24 0832    aspirin EC tablet 81 mg  81 mg oral Daily Dorys Calixto MD   81 mg at 05/08/24 1403    B complex-vitamin C-folic acid (Nephrocaps) capsule 1 capsule  1 capsule oral Daily Kendell Medina PA-C   1 capsule at 05/08/24 0832    bisacodyl (Dulcolax) suppository 10 mg  10 mg rectal Daily PRN Dorys Calixto MD   10 mg at 05/08/24 1159    [Held by provider] bumetanide (Bumex) tablet 2 mg  2 mg oral Daily Kendell Medina PA-C   2 mg at 05/07/24 0908    dextrose 50 % injection 12.5 g  12.5 g intravenous q15 min PRN Kendell Medina PA-C        dextrose 50 % injection 25 g  25 g intravenous q15 min PRN Kendell Medina PA-C        escitalopram (Lexapro) tablet 10 mg  10 mg oral Daily Kendell Medina PA-C   10 mg at 05/08/24 0833    finasteride (Proscar) tablet 5 mg  5 mg oral Daily Kendell Medina PA-C   5 mg at 05/08/24 0833    tiotropium (Spiriva Respimat) 2.5 mcg/actuation inhaler 2 puff  2 puff inhalation Daily Kendell Medina PA-C   2 puff at 05/08/24 1501    And    fluticasone furoate-vilanteroL (Breo Ellipta) 100-25 mcg/dose inhaler 1 puff  1 puff inhalation Daily Kendell Medina PA-C   1 puff at 05/08/24 1501    glucagon (Glucagen)  injection 1 mg  1 mg intramuscular q15 min PRN Kendell Medina PA-C        glucagon (Glucagen) injection 1 mg  1 mg intramuscular q15 min PRN Kendell Medina PA-C        heparin (porcine) injection 5,000 Units  5,000 Units subcutaneous TID Kendell Medina PA-C   5,000 Units at 05/09/24 0559    insulin lispro (HumaLOG) injection 0-10 Units  0-10 Units subcutaneous TID with meals Kendell Medina PA-C   4 Units at 05/08/24 1643    lidocaine 4 % patch 1 patch  1 patch transdermal Daily Dorys Calixto MD        methadone (Dolophine) tablet 5 mg  5 mg oral Nightly Dorys Calixto MD   5 mg at 05/08/24 2025    oxyCODONE (Roxicodone) immediate release tablet 2.5 mg  2.5 mg oral q4h PRN Kendell Medina PA-C   2.5 mg at 05/06/24 2009    oxyCODONE (Roxicodone) immediate release tablet 5 mg  5 mg oral q4h PRN Kendell Medina PA-C   5 mg at 05/09/24 0735    oxygen (O2) therapy  2 L/min inhalation Continuous Kendell Medina PA-C 120,000 mL/hr at 05/06/24 1000 2 L/min at 05/07/24 0900    polyethylene glycol (Glycolax, Miralax) packet 17 g  17 g oral Daily Kendell Medina PA-C   17 g at 05/08/24 0833    predniSONE (Deltasone) tablet 40 mg  40 mg oral Daily Kendell Medina PA-C   40 mg at 05/08/24 0832    rOPINIRole (Requip) tablet 1 mg  1 mg oral Nightly Kendell Medina PA-C   1 mg at 05/08/24 2026    sennosides-docusate sodium (Anne-Colace) 8.6-50 mg per tablet 1 tablet  1 tablet oral Nightly Dorys Calixto MD   1 tablet at 05/08/24 2026       Physical Exam  GEN: Alert, oriented to person, place and time, on 2 L oxygen  CVS: HS I +II, regular, no murmurs  RESP: Diminished air entry  ABD: Full, soft, BS present, nontender, no palpable organs, sever tenderness of RUQ where fractured ribs are  EXT: No bilateral leg edema, right lateral leg Silver dressing intact, tenderness on palpation    Confusion Assessment Method(CAM) for diagnosis of delirium:    1.  Acute onset or fluctuating course: absent/present: Absent  2.  Inattention: absent/present:  Absent  3.  Disorganized thinking: absent/present: Absent  4.  Altered level of consciousness: absent/present: Absent  CAM: negative    AT Score For Assessment of Delirium and Cognitive Impairment:    Alertness: 0  Normal(fully alert,but not agitated, throughout assessment)=0  Mild sleepiness for <10 seconds after walking, then normal=0  Clearly abnormal=4  2.  AMT4: 0  No mistakes=0  One mistake=1  Two or more mistakes/untestable=2  3.  Attention: 0  Achieves seven months or more correctly=0  Starts but scores <7 months/ refuses to start=1  Untestable(cannot start because unwell, drowsy, inattentive)=2  4.  Acute: 0  No=0  Yes=4    Total Score: 0  4 or above: Possible delirium +/- cognitive impairment  1-3: Possible cognitive impairment  0: Delirium or severe cognitive impairment unlikely(but delirium still possible if (4) information incomplete)      Last Recorded Vitals      5/8/2024     8:03 AM 5/8/2024    11:24 AM 5/8/2024     2:14 PM 5/8/2024     6:57 PM 5/8/2024    11:24 PM 5/9/2024     2:58 AM 5/9/2024     7:45 AM   Vitals   Systolic 116 116 105 110 110 107 117   Diastolic 62 63 56 54 54 62 50   Heart Rate 56 62 62 63 50 55 55   Temp 36.8 °C (98.2 °F) 37.1 °C (98.8 °F) 36.7 °C (98.1 °F) 36.9 °C (98.4 °F) 35.7 °C (96.3 °F) 35.9 °C (96.6 °F) 36.6 °C (97.9 °F)   Resp 16 16 16 16 18 18 16      Vitals:    05/06/24 2216   Weight: 62.8 kg (138 lb 7.2 oz)        Relevant Results  Lab Results   Component Value Date    TSH 1.27 09/18/2023    EKLIEOWM62 592 04/15/2024    VITD25 26 (A) 06/23/2022    HGBA1C 5.7 (H) 11/14/2023     Results from last 7 days   Lab Units 05/09/24  0600 05/08/24  0607 05/07/24  0542 05/06/24  0025 05/06/24  0024 05/05/24  1106 05/04/24  0159 05/03/24  1741   WBC AUTO x10*3/uL  --   --  8.9 7.5  --  7.9 7.0 8.8   HEMOGLOBIN g/dL  --   --  9.2* 10.4*  --  10.1* 10.0* 10.6*   HEMATOCRIT %  --   --  28.7* 32.7*  --  34.3* 31.6* 33.8*   ALT U/L  --   --   --   --   --   --  8* 8*   AST U/L  --   --    --   --   --   --  18 17   SODIUM mmol/L 142 142 139  --    < > 138 143 141   POTASSIUM mmol/L 4.0 3.6 3.8  --    < > 5.1 5.0 4.5   CHLORIDE mmol/L 101 101 98  --    < > 103 104 102   CREATININE mg/dL 1.82* 1.60* 1.71*  --    < > 1.50* 1.53* 1.40*   BUN mg/dL 48* 47* 48*  --    < > 31* 33* 25*   CO2 mmol/L 33* 31 33*  --    < > 27 33* 30   INR   --   --   --   --   --   --   --  0.9  0.9    < > = values in this interval not displayed.          XR chest 1 view  Narrative: Interpreted By:  Bear Horn,   STUDY:  XR CHEST 1 VIEW      INDICATION:  Signs/Symptoms:increased O2 requirements, requiring CPAP.      COMPARISON:  May 7          ACCESSION NUMBER(S):  [id number]      ORDERING CLINICIAN:  JENNIFER RODRIGUEZ      FINDINGS:  Right upper lobe airspace disease grossly similar to the previous  examination.      Cardiomegaly with pacemaker unchanged.      No additional new findings.      Impression: Right upper lobe airspace disease grossly similar to the previous  examination.      Cardiomegaly with pacemaker unchanged.      No additional new findings.      Signed by: Bear Horn 5/8/2024 5:18 PM  Dictation workstation:   [id number]      DATA:  EKG: QTC  Encounter Date: 05/01/24   ECG 12 Lead   Result Value    Ventricular Rate 92    Atrial Rate 92    SD Interval 136    QRS Duration 144    QT Interval 394    QTC Calculation(Bazett) 487    P Axis 48    R Axis 263    T Axis 70    QRS Count 15    Q Onset 216    P Onset 148    P Offset 203    T Offset 413    QTC Fredericia 454    Narrative    Sinus rhythm with Fusion complexes  Right superior axis deviation  Left bundle branch block  Abnormal ECG  When compared with ECG of 01-MAY-2024 09:59,  Previous ECG has undetermined rhythm, needs review  Confirmed by Fly Fu ([id number]) on 5/6/2024 2:26:02 PM      Anti-psychotics in 48 hours: No  Opioids/Benzodiazepines in 48 hours: Methadone, oxycodone 5 mg  Anticholinergics on board:No  Restraints:No  Indwelling catheters:No  Last  BM: None since admission  UO in 24 hours: 300 mls  Activity in the past 24 hours: Assist x2, Out of bed working with P/OT  Need for ambulatory devices:Walker          Assessment/Plan   Andrez Damon is a 79 y.o. male on day 8 of admission presenting with R hip fracture s/p ORIF 5/3    Principal Problem:    Compression fracture of thoracic vertebra, unspecified thoracic vertebral level, initial encounter (Multi)  Active Problems:    Periprosthetic hip fracture, initial encounter    Periprosthetic fracture around internal prosthetic right hip joint (Multi)    Acute fall, in a patient with baseline frailty  Right periprosthetic hip fx  Resultant acute right 6-7 nondisplaced rib fx  Acute T7 vertebral fracure with height loss,   Acute T6 spinous process fracture  CAD/HFrEF LVEF 24% with major infarcts on stress echo 2019  7.  Recent acute on chronci respiratory failure, h/o COPD with recent COPD exacerbation/chronic hypoxic respiratory failure, baseline 2L oxygen a home  8.   Hypertension  9.   CKD stage 3a  10. Acute constipation  11. Probable osteoporosis  12. Severe protein-calorie malnutrition    Plan:  -Ensure patient receives scheduled Tylenol and lidocaine patches on time to improve pain control  -If pain control not improved, consider changing timing of oxycodone 2.5 mg so it can be used as breakthrough  -Continue with current dose of Methadone  -Please consider repeating suppository and/or water enema to relieve constipation (no BM in 2 weeks)  -Continue with scheduled Miralax and Senna  -Continue with vitamin D  -Primary team reaching out to cardiology to eval need for asa/Eliquis  -TCC working with [facility] for placement; concerns about going home as wife unable to provide 24/7 care and pt is total assist     4M AGE-FRIENDLY INITIATIVE:  What matters most to patient: Family, being at home  Medications: Methadone, oxycodone  Mentation: CAM negative, 4AT 0  Mobility: Assist x2, working with  PT/OT    Geriatric medicine will continue to follow the patient. Thank you for allowing geriatric medicine to be involved in the care of your patient. Geriatric medicine consultation team is available during work hours Monday through Friday. For any emergency issues requiring immediate assistance over the weekend, please page Geriatrics pager [phone]    Angel Villarreal MD

## 2024-05-09 NOTE — PROGRESS NOTES
Patient is medically ready for discharge and pending discharge home with hospice. Patient to resume services with [facility].         Tricia Phelps LCSW

## 2024-05-10 ENCOUNTER — PHARMACY VISIT (OUTPATIENT)
Dept: PHARMACY | Facility: CLINIC | Age: 79
End: 2024-05-10
Payer: COMMERCIAL

## 2024-05-10 VITALS
HEART RATE: 58 BPM | DIASTOLIC BLOOD PRESSURE: 63 MMHG | OXYGEN SATURATION: 97 % | SYSTOLIC BLOOD PRESSURE: 109 MMHG | BODY MASS INDEX: 19.82 KG/M2 | TEMPERATURE: 97.2 F | WEIGHT: 138.45 LBS | HEIGHT: 70 IN | RESPIRATION RATE: 17 BRPM

## 2024-05-10 LAB
ALBUMIN SERPL BCP-MCNC: 2.8 G/DL (ref 3.4–5)
ANION GAP SERPL CALC-SCNC: 12 MMOL/L (ref 10–20)
BUN SERPL-MCNC: 45 MG/DL (ref 6–23)
CALCIUM SERPL-MCNC: 8.7 MG/DL (ref 8.6–10.6)
CHLORIDE SERPL-SCNC: 104 MMOL/L (ref 98–107)
CO2 SERPL-SCNC: 31 MMOL/L (ref 21–32)
CREAT SERPL-MCNC: 2.02 MG/DL (ref 0.5–1.3)
EGFRCR SERPLBLD CKD-EPI 2021: 33 ML/MIN/1.73M*2
ERYTHROCYTE [DISTWIDTH] IN BLOOD BY AUTOMATED COUNT: 15.6 % (ref 11.5–14.5)
GLUCOSE BLD MANUAL STRIP-MCNC: 125 MG/DL (ref 74–99)
GLUCOSE BLD MANUAL STRIP-MCNC: 126 MG/DL (ref 74–99)
GLUCOSE BLD MANUAL STRIP-MCNC: 137 MG/DL (ref 74–99)
GLUCOSE SERPL-MCNC: 117 MG/DL (ref 74–99)
HCT VFR BLD AUTO: 30.1 % (ref 41–52)
HGB BLD-MCNC: 8.9 G/DL (ref 13.5–17.5)
MAGNESIUM SERPL-MCNC: 2.71 MG/DL (ref 1.6–2.4)
MCH RBC QN AUTO: 28.6 PG (ref 26–34)
MCHC RBC AUTO-ENTMCNC: 29.6 G/DL (ref 32–36)
MCV RBC AUTO: 97 FL (ref 80–100)
NRBC BLD-RTO: 0 /100 WBCS (ref 0–0)
PHOSPHATE SERPL-MCNC: 3.8 MG/DL (ref 2.5–4.9)
PLATELET # BLD AUTO: 253 X10*3/UL (ref 150–450)
POTASSIUM SERPL-SCNC: 3.9 MMOL/L (ref 3.5–5.3)
RBC # BLD AUTO: 3.11 X10*6/UL (ref 4.5–5.9)
SODIUM SERPL-SCNC: 143 MMOL/L (ref 136–145)
WBC # BLD AUTO: 7 X10*3/UL (ref 4.4–11.3)

## 2024-05-10 PROCEDURE — 82947 ASSAY GLUCOSE BLOOD QUANT: CPT | Mod: 91

## 2024-05-10 PROCEDURE — 85027 COMPLETE CBC AUTOMATED: CPT

## 2024-05-10 PROCEDURE — 99232 SBSQ HOSP IP/OBS MODERATE 35: CPT | Performed by: NURSE PRACTITIONER

## 2024-05-10 PROCEDURE — 2500000005 HC RX 250 GENERAL PHARMACY W/O HCPCS

## 2024-05-10 PROCEDURE — 97530 THERAPEUTIC ACTIVITIES: CPT | Mod: GP

## 2024-05-10 PROCEDURE — 2500000004 HC RX 250 GENERAL PHARMACY W/ HCPCS (ALT 636 FOR OP/ED): Performed by: PHYSICIAN ASSISTANT

## 2024-05-10 PROCEDURE — 2500000001 HC RX 250 WO HCPCS SELF ADMINISTERED DRUGS (ALT 637 FOR MEDICARE OP): Performed by: PHYSICIAN ASSISTANT

## 2024-05-10 PROCEDURE — 99238 HOSP IP/OBS DSCHRG MGMT 30/<: CPT | Performed by: SURGERY

## 2024-05-10 PROCEDURE — 2500000001 HC RX 250 WO HCPCS SELF ADMINISTERED DRUGS (ALT 637 FOR MEDICARE OP)

## 2024-05-10 PROCEDURE — 2500000006 HC RX 250 W HCPCS SELF ADMINISTERED DRUGS (ALT 637 FOR ALL PAYERS): Performed by: PHYSICIAN ASSISTANT

## 2024-05-10 PROCEDURE — 83735 ASSAY OF MAGNESIUM: CPT

## 2024-05-10 PROCEDURE — RXMED WILLOW AMBULATORY MEDICATION CHARGE

## 2024-05-10 PROCEDURE — 80069 RENAL FUNCTION PANEL: CPT

## 2024-05-10 PROCEDURE — 36415 COLL VENOUS BLD VENIPUNCTURE: CPT

## 2024-05-10 PROCEDURE — 2500000004 HC RX 250 GENERAL PHARMACY W/ HCPCS (ALT 636 FOR OP/ED)

## 2024-05-10 PROCEDURE — 97110 THERAPEUTIC EXERCISES: CPT | Mod: GP

## 2024-05-10 RX ORDER — OXYCODONE HYDROCHLORIDE 5 MG/1
5 CAPSULE ORAL EVERY 6 HOURS PRN
Qty: 24 CAPSULE | Refills: 0 | Status: SHIPPED | OUTPATIENT
Start: 2024-05-10 | End: 2024-05-20 | Stop reason: SDUPTHER

## 2024-05-10 RX ORDER — ACETAMINOPHEN 325 MG/1
650 TABLET ORAL EVERY 6 HOURS PRN
Start: 2024-05-10 | End: 2024-07-17

## 2024-05-10 RX ORDER — PSYLLIUM HUSK 0.4 G
1 CAPSULE ORAL 2 TIMES DAILY
Qty: 60 TABLET | Refills: 0 | Status: SHIPPED | OUTPATIENT
Start: 2024-05-10 | End: 2024-06-09

## 2024-05-10 RX ORDER — ASPIRIN 81 MG/1
TABLET ORAL
Qty: 402 TABLET | Refills: 0 | Status: SHIPPED | OUTPATIENT
Start: 2024-05-10 | End: 2025-05-31

## 2024-05-10 RX ORDER — AMOXICILLIN 250 MG
1 CAPSULE ORAL NIGHTLY
Qty: 30 TABLET | Refills: 1 | Status: SHIPPED | OUTPATIENT
Start: 2024-05-10 | End: 2024-07-09

## 2024-05-10 RX ORDER — SODIUM CHLORIDE 9 MG/ML
42 INJECTION, SOLUTION INTRAVENOUS CONTINUOUS
Status: DISCONTINUED | OUTPATIENT
Start: 2024-05-10 | End: 2024-05-10 | Stop reason: HOSPADM

## 2024-05-10 RX ADMIN — TIOTROPIUM BROMIDE INHALATION SPRAY 2 PUFF: 3.12 SPRAY, METERED RESPIRATORY (INHALATION) at 06:01

## 2024-05-10 RX ADMIN — AMIODARONE HYDROCHLORIDE 200 MG: 200 TABLET ORAL at 08:41

## 2024-05-10 RX ADMIN — LIDOCAINE 1 PATCH: 4 PATCH TOPICAL at 08:41

## 2024-05-10 RX ADMIN — FINASTERIDE 5 MG: 5 TABLET, FILM COATED ORAL at 08:41

## 2024-05-10 RX ADMIN — ACETAMINOPHEN 975 MG: 325 TABLET ORAL at 08:41

## 2024-05-10 RX ADMIN — ASPIRIN 81 MG: 81 TABLET, COATED ORAL at 09:00

## 2024-05-10 RX ADMIN — ASCORBIC ACID, THIAMINE MONONITRATE,RIBOFLAVIN, NIACINAMIDE, PYRIDOXINE HYDROCHLORIDE, FOLIC ACID, CYANOCOBALAMIN, BIOTIN, CALCIUM PANTOTHENATE, 1 CAPSULE: 100; 1.5; 1.7; 20; 10; 1; 6000; 150000; 5 CAPSULE, LIQUID FILLED ORAL at 08:41

## 2024-05-10 RX ADMIN — SODIUM CHLORIDE 42 ML/HR: 9 INJECTION, SOLUTION INTRAVENOUS at 11:18

## 2024-05-10 RX ADMIN — HEPARIN SODIUM 5000 UNITS: 5000 INJECTION INTRAVENOUS; SUBCUTANEOUS at 16:12

## 2024-05-10 RX ADMIN — HEPARIN SODIUM 5000 UNITS: 5000 INJECTION INTRAVENOUS; SUBCUTANEOUS at 06:02

## 2024-05-10 RX ADMIN — ACETAMINOPHEN 975 MG: 325 TABLET ORAL at 16:12

## 2024-05-10 RX ADMIN — POLYETHYLENE GLYCOL 3350 17 G: 17 POWDER, FOR SOLUTION ORAL at 08:41

## 2024-05-10 RX ADMIN — FLUTICASONE FUROATE AND VILANTEROL TRIFENATATE 1 PUFF: 100; 25 POWDER RESPIRATORY (INHALATION) at 07:00

## 2024-05-10 RX ADMIN — ESCITALOPRAM OXALATE 10 MG: 10 TABLET ORAL at 12:17

## 2024-05-10 ASSESSMENT — COGNITIVE AND FUNCTIONAL STATUS - GENERAL
CLIMB 3 TO 5 STEPS WITH RAILING: TOTAL
TURNING FROM BACK TO SIDE WHILE IN FLAT BAD: A LOT
MOVING TO AND FROM BED TO CHAIR: A LOT
CLIMB 3 TO 5 STEPS WITH RAILING: TOTAL
STANDING UP FROM CHAIR USING ARMS: A LOT
WALKING IN HOSPITAL ROOM: TOTAL
WALKING IN HOSPITAL ROOM: A LOT
MOBILITY SCORE: 11
STANDING UP FROM CHAIR USING ARMS: A LOT
MOVING TO AND FROM BED TO CHAIR: A LOT
MOVING FROM LYING ON BACK TO SITTING ON SIDE OF FLAT BED WITH BEDRAILS: A LOT
TURNING FROM BACK TO SIDE WHILE IN FLAT BAD: A LOT
MOVING FROM LYING ON BACK TO SITTING ON SIDE OF FLAT BED WITH BEDRAILS: A LOT
MOBILITY SCORE: 10

## 2024-05-10 ASSESSMENT — PAIN SCALES - GENERAL
PAINLEVEL_OUTOF10: 5 - MODERATE PAIN
PAINLEVEL_OUTOF10: 4
PAINLEVEL_OUTOF10: 4

## 2024-05-10 ASSESSMENT — PAIN DESCRIPTION - ORIENTATION: ORIENTATION: RIGHT

## 2024-05-10 ASSESSMENT — PAIN DESCRIPTION - LOCATION
LOCATION: RIB CAGE
LOCATION: RIB CAGE

## 2024-05-10 ASSESSMENT — PAIN - FUNCTIONAL ASSESSMENT: PAIN_FUNCTIONAL_ASSESSMENT: 0-10

## 2024-05-10 NOTE — PROGRESS NOTES
Physical Therapy    Physical Therapy Treatment    Patient Name: Andrez Damon  MRN: [id number]  Today's Date: 5/10/2024  Time Calculation  Start Time: 1010  Stop Time: 1035  Time Calculation (min): 25 min    Assessment/Plan   PT Assessment  Assessment Comment: Pt continues to demonstrate impaired strength, balance, and functional ability. Pt still remains appropriate for in-house PT and after discharge.  PT Plan  Inpatient/Swing Bed or Outpatient: Inpatient  PT Plan  Treatment/Interventions: Bed mobility, Transfer training, Gait training, Balance training, Strengthening, Endurance training, Range of motion, Therapeutic exercise, Therapeutic activity, Postural re-education  PT Plan: Skilled PT  PT Frequency: Daily  PT Discharge Recommendations: Moderate intensity level of continued care  PT Recommended Transfer Status: Assist x2  PT - OK to Discharge: Yes      General Visit Information:   PT  Visit  PT Received On: 05/10/24  General  Patient Position Received: Bed, 3 rail up, Alarm on  General Comment: Pt supine in bed. Pleasant disposition and willing to participate in therapy today. Pt notes ongoing pain in R ribs.    Subjective   Precautions:  Precautions  Medical Precautions: Fall precautions  Post-Surgical Precautions: Spinal precautions  Braces Applied: Natasha brace (to be used as necessary)    Objective   Pain:  Pain Assessment  Pain Score:  (Not objectively measured though resting baseline pain is moderate. Upon mobility, pain becomes more severe.)  Pain Type: Acute pain  Pain Location: Rib cage  Pain Orientation: Right  Pain Interventions: Compression (Pt instructed to hold pillow against chest to manage rib pain.)  Cognition:  Cognition  Overall Cognitive Status: Within Functional Limits  Orientation Level: Oriented X4  Attention: Within Functional Limits  Memory: Within Funtional Limits  Problem Solving: Within Functional Limits  Processing Speed: Delayed  Postural Control:  Postural Control  Postural  Control: Impaired  Posture Comment: Pt requires BUE support to maintain upright sitting posture.  Static Sitting Balance  Static Sitting-Balance Support: Bilateral upper extremity supported  Static Sitting-Level of Assistance: Contact guard  Static Sitting-Comment/Number of Minutes: 6 minutes  Extremity/Trunk Assessments:    Strength RLE  RLE Overall Strength: Due to pain, Deficits (Pt continues to have R hip pain due to ORIF upon mobility.)     Activity Tolerance:  Activity Tolerance  Endurance: Tolerates less than 10 min exercise, no significant change in vital signs (Pt limited to extended time in sitting due to severe R rib pain.)  Treatments:  Therapeutic Exercise  Therapeutic Exercise Activity 1: AROM/PROM RLE (heel slides, IR/ER hip approximately 10 each)    Therapeutic Activity  Therapeutic Activity Performed: Yes  Therapeutic Activity 1: static sitting    Bed Mobility  Bed Mobility: Yes  Bed Mobility 1  Bed Mobility 1: Supine to sitting, Rolling left, Log roll  Level of Assistance 1: Moderate assistance  Bed Mobility Comments 1:  (Pt able to manage trunk and UE during bed mobility. Due to pain, unable to actively move RLE due to pain.)    Ambulation/Gait Training  Ambulation/Gait Training Performed: No (see below)  Transfers  Transfer: No (Pt unable to tolerate any attempts to perform transfers.)    Outcome Measures:  [facility] Basic Mobility  Turning from your back to your side while in a flat bed without using bedrails: A lot  Moving from lying on your back to sitting on the side of a flat bed without using bedrails: A lot  Moving to and from bed to chair (including a wheelchair): A lot  Standing up from a chair using your arms (e.g. wheelchair or bedside chair): A lot  To walk in hospital room: Total  Climbing 3-5 steps with railing: Total  Basic Mobility - Total Score: 10    Education Documentation  Body Mechanics, taught by Julien Grayson, PT at 5/10/2024 10:54 AM.  Learner: Patient  Readiness:  Acceptance  Method: Explanation  Response: Verbalizes Understanding    Precautions, taught by Julien Grayson PT at 5/10/2024 10:54 AM.  Learner: Patient  Readiness: Acceptance  Method: Explanation  Response: Verbalizes Understanding    ADL Training, taught by Julien Grayson PT at 5/10/2024 10:54 AM.  Learner: Patient  Readiness: Acceptance  Method: Explanation  Response: Verbalizes Understanding    Precautions, taught by Julien Grayson PT at 5/10/2024 10:54 AM.  Learner: Patient  Readiness: Acceptance  Method: Explanation  Response: Verbalizes Understanding    Mobility Training, taught by Julien Grayson PT at 5/10/2024 10:54 AM.  Learner: Patient  Readiness: Acceptance  Method: Explanation  Response: Verbalizes Understanding    Education Comments  No comments found.      Encounter Problems       Encounter Problems (Active)       Balance       STG - Maintains static standing balance with upper extremity support and LRD/ Min A (Progressing)       Start:  05/04/24    Expected End:  05/13/24       INTERVENTIONS:  1. Practice standing with minimal support.  2. Educate patient about standing tolerance.  3. Educate patient about independence with gait, transfers, and ADL's.  4. Educate patient about use of assistive device.  5. Educate patient about self-directed care.         STG - Maintains dynamic sitting balance with upper extremity support and SBA (Progressing)       Start:  05/04/24    Expected End:  05/13/24       INTERVENTIONS:  1. Practice sitting on the edge of a bed/mat with minimal support.  2. Educate patient about maintining total hip precautions while maintaining balance.  3. Educate patient about pressure relief.  4. Educate patient about use of assistive device.            Mobility       STG - Patient will ambulate 25ft with WhW and Min A (Progressing)       Start:  05/04/24    Expected End:  05/13/24               PT Transfers       STG - Transfer from bed to chair with WHW and Min A (Progressing)        Start:  05/04/24    Expected End:  05/13/24            STG - Patient to transfer to and from sit to supine with CGA (Progressing)       Start:  05/04/24    Expected End:  05/13/24            STG - Patient will perform bed mobility with CGA (Progressing)       Start:  05/04/24    Expected End:  05/13/24            STG - Patient will transfer sit to and from stand with WHW and Min A (Progressing)       Start:  05/04/24    Expected End:  05/13/24

## 2024-05-10 NOTE — DISCHARGE SUMMARY
Discharge Diagnosis  Compression fracture of thoracic vertebra, unspecified thoracic vertebral level, initial encounter (Multi)  Right periprosthetic fracture  T7 vertebral fracture    Issues Requiring Follow-Up  Orthopaedic follow up for post op for right femur fracture and T7 vertebral fracture    MECHANISM OF INJURY / CHIEF COMPLAINT:   79 year old male, was taking his trash out. He suffered a mechanical fall after tripping over the bin falling on his right side. No LOC. See at OSH and brought here. He is complaining of some right chest wall and some right hip pain.   Prior on hospice, here for evaluation for any palliative fixation for the hip. Patient stating in room that he currently wishes to avoid surgery if possible.   LOC (yes/no?): no  Anticoagulant / Anti-platelet Rx? (for what dx?): Eliquis (PE)  Referring Facility Name (N/A for scene EMR run): [facility]      INJURIES:   Right 6-7 nondisplaced rib fx (On CXR)  T7 VB fx with height loss, T6 SP fx  Right periprosthetic hip fx     OTHER MEDICAL PROBLEMS:  CAD, HFrEF LVEF 24% with major infarcts on stress echo 2019  COPD  HTN  CKD  ICD implantation     INCIDENTAL FINDINGS:  NA     PROCEDURES  OR with ortho 5/3 ORIF R hip    Hospital Course  Andrez Damon is a 78 yo male who presents after a mechanical fall after tripping over a trashcan falling on his right side. No LOC. Was seen at an OSH and [place] [facility] He is complaining of right chest wall and right hip pain.   Prior on hospice, here for evaluation for any palliative fixation for the hip.   The patients injuries are: Rib fractures 6-7, Right Periprosthetic fracture and T7 vertebral fx.     The pt was seen in the ED by the trauma team and evaluated. He currently takes eliquis due to prior PE. His medical hx is compresed of CAD, HFrEF recent in hospital CARRI LVEF of 20-25%, COPD, HTN, CKD, and s/p ICD implantation. Due to complex medical conditions including heart failure, pt is electively on hospice  care.     Perioperative medicine was consulted along with orthopedics. Pt ultimately decided to not have any back surgery. He is electing to proceed to OR with ortho for hip fracture ORIF. Admitted briefly to TSICU for cardiac monitoring post-op without significant issue, transferred to floor 5/5/24 in stable condition. T then suffered acute onset respiratory distress and desat to 70s. Placed on high flow with eventual improvement to high 80s. CXR negative for pneumo. Transferred back to ICU for CPAP and diuresis. After diuresis 1L in ICU and improved respiratory status, patient returned to RNF 5/6 PM.   With constipation issues on the floor which also resolved with lactulose. Had multiple bowel movements on 5/10.     At the time of discharge, patient's pain was controlled with oral analgesia, patient was urinating, having BMs, sleeping, and tolerating a diet. Based on PT/OT's recommendation, patient was discharged to home with [facility] services with scripts and follow up appointments. Discharge plan was discussed with the patient and all of the patient's questions were answered. Patient and family agreeable to plan.     Pertinent Physical Exam At Time of Discharge  Physical Exam  Constitutional: no acute distress, conversational, older, thin, some mild temporal wasting  Neuro: A/O x4, no gross deficits   Psych: normal affect  HEENT: No deformities, no scleral icterus   Cardiac: RRR  Pulmonary: bilateral decreased breath sounds, equal chest rise, comfortable on 1-2L NC. -300 cc  Abdomen: soft, non distended, non tender  Skin: warm and dry overall    Extremities: RLE in ace wrap, soft compartments, warm foot.     Home Medications     Medication List      START taking these medications     acetaminophen 325 mg tablet; Commonly known as: Tylenol; Take 2 tablets   (650 mg) by mouth every 6 hours if needed for mild pain (1 - 3) or   moderate pain (4 - 6).   aspirin 81 mg EC tablet; Take 1 tablet (81  mg) by mouth 2 times a day   for 21 days, THEN 1 tablet (81 mg) once daily.; Start taking on: May 10,   2024   calcium carbonate-vitamin D3 500 mg-5 mcg (200 unit) tablet; Take 1   tablet by mouth 2 times a day.   oxyCODONE 5 mg immediate release capsule; Commonly known as: Oxy-IR;   Take 1 capsule (5 mg) by mouth every 6 hours if needed for severe pain (7   - 10) (Take half tablet for moderate pain, take whole tablet for severe   pain.) for up to 7 days.   sennosides-docusate sodium 8.6-50 mg tablet; Commonly known as:   Anne-Colace; Take 1 tablet by mouth once daily at bedtime.     CONTINUE taking these medications     amiodarone 200 mg tablet; Commonly known as: Pacerone   B complex-vitamin C-folic acid 1 mg capsule; Commonly known as:   Nephrocaps; Take 1 capsule by mouth once daily.   bumetanide 1 mg tablet; Commonly known as: Bumex; TAKE 2 TABLETS BY   MOUTH EVERY DAY   cholecalciferol 125 MCG (5000 UT) capsule; Commonly known as: Vitamin   D-3; TAKE 1 CAPSULE BY MOUTH EVERY DAY   escitalopram 10 mg tablet; Commonly known as: Lexapro; Take 1 tablet (10   mg) by mouth once daily.   finasteride 5 mg tablet; Commonly known as: Proscar; Take 1 tablet (5   mg) by mouth once daily.   HYDROmorphone 2 mg tablet; Commonly known as: Dilaudid   levalbuterol 45 mcg/actuation inhaler; Commonly known as: Xopenex;   Inhale 1 puff every 4 hours if needed for wheezing or shortness of breath.   INHALE 1 TO 2 PUFFS EVERY 4 TO 6 HOURS AS NEEDED AND AS DIRECTED.   methadone 5 mg tablet; Commonly known as: Dolophine   midodrine 2.5 mg tablet; Commonly known as: Proamatine   ondansetron 8 mg tablet; Commonly known as: Zofran   oxygen gas therapy; Commonly known as: O2; Inhale 2 L/min continuously.   potassium chloride CR 20 mEq ER tablet; Commonly known as: Klor-Con M20   rOPINIRole 1 mg tablet; Commonly known as: Requip; TAKE 1 TABLET BY   MOUTH EVERYDAY AT BEDTIME   tamsulosin 0.4 mg 24 hr capsule; Commonly known as: Flomax; Take  2   capsules (0.8 mg) by mouth once daily at bedtime.   Trelegy Ellipta 200-62.5-25 mcg blister with device; Generic drug:   fluticasone-umeclidin-vilanter; Inhale 1 puff once daily with breakfast.   Inhale 1 puff daily, rinse mouth after use     STOP taking these medications     azithromycin 250 mg tablet; Commonly known as: Zithromax   Brilinta 90 mg tablet; Generic drug: ticagrelor   Eliquis 2.5 mg tablet; Generic drug: apixaban   haloperidol 0.5 mg tablet; Commonly known as: Haldol   metoprolol succinate XL 25 mg 24 hr tablet; Commonly known as: Toprol-XL   pyridoxine 25 mg tablet; Commonly known as: Vitamin B-6       Outpatient Follow-Up  Future Appointments   Date Time Provider Department [place]   5/16/2024 11:15 AM Shirley Duarte MD [id number] [facility]   5/17/2024 11:00 AM INF 01 CONNEAUT CONSCCINF [facility]   5/23/2024  1:30 PM Glen Juarez MD [id number] [facility]   6/4/2024 10:40 AM Cindy Meraz APRN-CNP [id number] [facility]   6/14/2024 11:00 AM INF 01 [facility] CON[facility]CC[facility]   6/28/2024 10:00 AM DEANNE Montague-CNP [id number] [facility]   8/27/2024  2:00 PM Tenisha Hurst APRN-CNP [id number] [facility]   1/20/2025 10:40 AM DEANNE Chinchilla-CNP [id number] [facility]   4/23/2025  1:20 PM MD Frieda Coelho[address] [place] [address]       Dorys Calixto MD

## 2024-05-10 NOTE — CARE PLAN
The patient's goals for the shift include      The clinical goals for the shift include Pt will remain safe and free of falls

## 2024-05-10 NOTE — PROGRESS NOTES
"Subjective   Patient reports pain has improved- he states it is \"tolerable\" and is satisfied with his pain control. Pain is primarily in his ribs, no hip or low back pain at present.     He was able to have a bowel movement. He is eating fairly well. No chest pain/pressure, cough/SOB, no abdominal pain.     Patient is looking forward to going home.        Objective     Current Facility-Administered Medications   Medication Dose Route Frequency Provider Last Rate Last Admin    acetaminophen (Tylenol) tablet 975 mg  975 mg oral TID Dorys Calixto MD   975 mg at 05/10/24 0841    albuterol 2.5 mg /3 mL (0.083 %) nebulizer solution 2.5 mg  2.5 mg nebulization q6h PRN Kendell Medina PA-C        amiodarone (Pacerone) tablet 200 mg  200 mg oral Daily Kendell Medina PA-C   200 mg at 05/10/24 0841    aspirin EC tablet 81 mg  81 mg oral Daily Dorys Calixto MD   81 mg at 05/10/24 0900    B complex-vitamin C-folic acid (Nephrocaps) capsule 1 capsule  1 capsule oral Daily Kendell Medina PA-C   1 capsule at 05/10/24 0841    bisacodyl (Dulcolax) suppository 10 mg  10 mg rectal Daily PRN Dorys Calixto MD   10 mg at 05/08/24 1159    [Held by provider] bumetanide (Bumex) tablet 2 mg  2 mg oral Daily Kendell Medina PA-C   2 mg at 05/07/24 0908    dextrose 50 % injection 12.5 g  12.5 g intravenous q15 min PRN Kendell Medina PA-C        dextrose 50 % injection 25 g  25 g intravenous q15 min PRN Kendell Medina PA-C        escitalopram (Lexapro) tablet 10 mg  10 mg oral Daily Kendell Medina PA-C   10 mg at 05/09/24 1503    finasteride (Proscar) tablet 5 mg  5 mg oral Daily Kendell Medina PA-C   5 mg at 05/10/24 0841    tiotropium (Spiriva Respimat) 2.5 mcg/actuation inhaler 2 puff  2 puff inhalation Daily Kendell Medina PA-C   2 puff at 05/10/24 0601    And    fluticasone furoate-vilanteroL (Breo Ellipta) 100-25 mcg/dose inhaler 1 puff  1 puff inhalation Daily Kendell Medina PA-C   1 puff at 05/10/24 0700    glucagon (Glucagen) injection 1 mg  " 1 mg intramuscular q15 min PRN Kendell Medina PA-C        glucagon (Glucagen) injection 1 mg  1 mg intramuscular q15 min PRN Kendell Medina PA-C        heparin (porcine) injection 5,000 Units  5,000 Units subcutaneous TID Kendell Medina PA-C   5,000 Units at 05/10/24 0602    insulin lispro (HumaLOG) injection 0-10 Units  0-10 Units subcutaneous TID with meals Kendell Medina PA-C   4 Units at 05/09/24 1816    lidocaine 4 % patch 1 patch  1 patch transdermal Daily Dorys Calixto MD   1 patch at 05/10/24 0841    methadone (Dolophine) tablet 5 mg  5 mg oral Nightly Dorys Calixto MD   5 mg at 05/09/24 2041    oxyCODONE (Roxicodone) immediate release tablet 2.5 mg  2.5 mg oral q4h PRN Kendell Medina PA-C   2.5 mg at 05/09/24 2041    oxyCODONE (Roxicodone) immediate release tablet 5 mg  5 mg oral q4h PRN Kendell Medina PA-C   5 mg at 05/09/24 0735    oxygen (O2) therapy  2 L/min inhalation Continuous Kendell Medina PA-C 120,000 mL/hr at 05/06/24 1000 2 L/min at 05/07/24 0900    polyethylene glycol (Glycolax, Miralax) packet 17 g  17 g oral Daily Kendell Medina PA-C   17 g at 05/10/24 0841    rOPINIRole (Requip) tablet 1 mg  1 mg oral Nightly Kendell Medina PA-C   1 mg at 05/09/24 2041    sennosides-docusate sodium (Anne-Colace) 8.6-50 mg per tablet 1 tablet  1 tablet oral Nightly Dorys Calixto MD   1 tablet at 05/08/24 2026    sodium chloride 0.9% infusion  42 mL/hr intravenous Continuous Dorys Calixto MD           Physical Exam  Constitutional:       Appearance: He is underweight.   HENT:      Head: Normocephalic.      Ears:      Comments: [place]      Mouth/Throat:      Mouth: Mucous membranes are moist.   Eyes:      Conjunctiva/sclera: Conjunctivae normal.      Pupils: Pupils are equal, round, and reactive to light.   Cardiovascular:      Rate and Rhythm: Normal rate and regular rhythm.   Pulmonary:      Effort: Pulmonary effort is normal.      Breath sounds: Normal breath sounds.   Abdominal:      General: Abdomen is flat.  Bowel sounds are normal.      Palpations: Abdomen is soft.   Neurological:      General: No focal deficit present.      Mental Status: He is alert and oriented to person, place, and time.   Psychiatric:         Mood and Affect: Mood normal.         Thought Content: Thought content normal.         Confusion Assessment Method(CAM) for diagnosis of delirium:    1.  Acute onset or fluctuating course: absent/present: Absent  2.  Inattention: absent/present: Absent  3.  Disorganized thinking: absent/present: Absent  4.  Altered level of consciousness: absent/present: Absent  CAM: negative    AT Score For Assessment of Delirium and Cognitive Impairment:    Alertness: 0  Normal(fully alert,but not agitated, throughout assessment)=0  Mild sleepiness for <10 seconds after walking, then normal=0  Clearly abnormal=4  2.  AMT4: 0  No mistakes=0  One mistake=1  Two or more mistakes/untestable=2  3.  Attention: 0  Achieves seven months or more correctly=0  Starts but scores <7 months/ refuses to start=1  Untestable(cannot start because unwell, drowsy, inattentive)=2  4.  Acute: 0  No=0  Yes=4    Total Score: 0  4 or above: Possible delirium +/- cognitive impairment  1-3: Possible cognitive impairment  0: Delirium or severe cognitive impairment unlikely(but delirium still possible if (4) information incomplete)      Last Recorded Vitals      5/9/2024     7:45 AM 5/9/2024    12:19 PM 5/9/2024     2:49 PM 5/9/2024     7:30 PM 5/10/2024     1:00 AM 5/10/2024     4:00 AM 5/10/2024     8:23 AM   Vitals   Systolic 117 129 95 119 111 111 118   Diastolic 50 66 59 62 63 59 58   Heart Rate 55 66 67 64 54 58 57   Temp 36.6 °C (97.9 °F) 36.2 °C (97.2 °F) 36.3 °C (97.3 °F) 36.4 °C (97.5 °F) 36.3 °C (97.3 °F) 36.6 °C (97.9 °F) 36.6 °C (97.9 °F)   Resp 16 18 18 18 18 18 17      Vitals:    05/06/24 2216   Weight: 62.8 kg (138 lb 7.2 oz)        Relevant Results  Lab Results   Component Value Date    TSH 1.27 09/18/2023    CFPXETOJ84 592 04/15/2024     VITD25 26 (A) 06/23/2022    HGBA1C 5.7 (H) 11/14/2023     Results from last 7 days   Lab Units 05/10/24  0531 05/09/24  0600 05/08/24  0607 05/07/24  0542 05/06/24  0025 05/05/24  1106 05/04/24  0159 05/03/24  1741   WBC AUTO x10*3/uL 7.0  --   --  8.9 7.5   < > 7.0 8.8   HEMOGLOBIN g/dL 8.9*  --   --  9.2* 10.4*   < > 10.0* 10.6*   HEMATOCRIT % 30.1*  --   --  28.7* 32.7*   < > 31.6* 33.8*   ALT U/L  --   --   --   --   --   --  8* 8*   AST U/L  --   --   --   --   --   --  18 17   SODIUM mmol/L 143 142 142 139  --    < > 143 141   POTASSIUM mmol/L 3.9 4.0 3.6 3.8  --    < > 5.0 4.5   CHLORIDE mmol/L 104 101 101 98  --    < > 104 102   CREATININE mg/dL 2.02* 1.82* 1.60* 1.71*  --    < > 1.53* 1.40*   BUN mg/dL 45* 48* 47* 48*  --    < > 33* 25*   CO2 mmol/L 31 33* 31 33*  --    < > 33* 30   INR   --   --   --   --   --   --   --  0.9  0.9    < > = values in this interval not displayed.          XR chest 1 view  Narrative: Interpreted By:  Bear Horn,   STUDY:  XR CHEST 1 VIEW      INDICATION:  Signs/Symptoms:increased O2 requirements, requiring CPAP.      COMPARISON:  May 7          ACCESSION NUMBER(S):  [id number]      ORDERING CLINICIAN:  JENNIFER RODRIGUEZ      FINDINGS:  Right upper lobe airspace disease grossly similar to the previous  examination.      Cardiomegaly with pacemaker unchanged.      No additional new findings.      Impression: Right upper lobe airspace disease grossly similar to the previous  examination.      Cardiomegaly with pacemaker unchanged.      No additional new findings.      Signed by: Bear Horn 5/8/2024 5:18 PM  Dictation workstation:   [id number]        DATA:  EKG: QTC  Encounter Date: 05/01/24   ECG 12 Lead   Result Value    Ventricular Rate 92    Atrial Rate 92    GA Interval 136    QRS Duration 144    QT Interval 394    QTC Calculation(Bazett) 487    P Axis 48    R Axis 263    T Axis 70    QRS Count 15    Q Onset 216    P Onset 148    P Offset 203    T Offset 413    QTC  Sidneyicililian 454    Narrative    Sinus rhythm with Fusion complexes  Right superior axis deviation  Left bundle branch block  Abnormal ECG  When compared with ECG of 01-MAY-2024 09:59,  Previous ECG has undetermined rhythm, needs review  Confirmed by Fly Fu ([id number]) on 5/6/2024 2:26:02 PM      Anti-psychotics in 48 hours: none   Opioids/Benzodiazepines in 48 hours: yes, oxycodone, methadone   Anticholinergics on board:No  Restraints:No  Indwelling catheters:No  Last BM: 5/9  UO in 24 hours: 600   Activity in the past 24 hours: bedrest   Need for ambulatory devices: walker          Assessment/Plan     This is a/an 79 y.o. year old male, with past medical history relevant for ICD/pacemaker, A-fib on Eliquis, ischemic cardiomyopathy, CHF, COPD, CAD, hypertension, depression, on chronic oxygen 2 L nasal cannula. He is followed by [facility] due to end stage cardiac disease, presenting for right hip periprosthetic fracture, vertebral fracture of T7/T8 and rib fracture. Patient being seen in geriatric consultation for goals of care discussion and post op management.     Principal Problem:    Compression fracture of thoracic vertebra, unspecified thoracic vertebral level, initial encounter (Multi)  Active Problems:    Periprosthetic hip fracture, initial encounter    Periprosthetic fracture around internal prosthetic right hip joint (Multi)    1. Right periprosthetic hip fracture after a fall at home  - s/p right ORIF on 5/3  - Patient reports pain is much better after surgery  - CAM negative, 4-AT: 0  - pain is well controlled with acetaminophen, lidocaine patches, methadone and oxycodone  - patient is a 2 person transfer  - plan is for patient to return home with [facility]     2. T7, VB fx with height loss, T6 s/p fracture  - Patient had right fascia iliaca single shot nerve block performed on 5/4  - Patient reports pain improved since block  - continue current pain medications      3. Acute right rib fractures  - Pain is improved with methadone 5mg  - continue acetaminophen, methadone, lidocaine patches, as needed oxycodone    3. Constipation, resolved  - Patient had 4 bowel movements in the past 24 hours  - please continue miralax, Per-Colace  - lactulose as needed     4. CAD, HFrEF  - no signs of fluid overload  - bumetanide on hold due to rise in creatinine   - continue amiodarone  - continue ASA  - Reached out to primary cardiology for recommendations regarding post-op AC, was taking eliquis and brilinta prior to admission   - Follow up with outpatient cardiology    5. 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  Thank you for allowing geriatric medicine to be involved in the care of your patient. Geriatric medicine consultation team is available during work hours Monday through Friday. For any emergency issues requiring immediate assistance over the weekend, please page Geriatrics pager [phone]    Yamila Webb, APRN-CNP

## 2024-05-10 NOTE — PROGRESS NOTES
[facility]  TRAUMA SERVICE - PROGRESS NOTE    Patient Name: Andrez Damon  MRN: [id number]  Admit Date: 709635  : 1945  AGE: 79 y.o.   GENDER: male  ==============================================================================  MECHANISM OF INJURY / CHIEF COMPLAINT:   79 year old male, was taking his trash out. He suffered a mechanical fall after tripping over the bin falling on his right side. No LOC. See at OSH and brought here. He is complaining of some right chest wall and some right hip pain.   Prior on hospice, here for evaluation for any palliative fixation for the hip. Patient stating in room that he currently wishes to avoid surgery if possible.   LOC (yes/no?): no  Anticoagulant / Anti-platelet Rx? (for what dx?): Eliquis (PE)  Referring Facility Name (N/A for scene EMR run): [facility]      INJURIES:   Right 6-7 nondisplaced rib fx (On CXR)  T7 VB fx with height loss, T6 SP fx  Right periprosthetic hip fx     OTHER MEDICAL PROBLEMS:  CAD, HFrEF LVEF 24% with major infarcts on stress echo   COPD  HTN  CKD  ICD implantation     INCIDENTAL FINDINGS:  NA    PROCEDURES  OR with ortho 5/3 ORIF R hip    ==============================================================================  TODAY'S ASSESSMENT AND PLAN OF CARE:  79-year-old male with PMH of CAD, HFrEF with EF 24%, COPD, HTN, CKD, ICD implantation, on hospice prior to hospitalization for HFrEF who presented with right periprosthetic hip fracture, T7 vertebral body fracture, T6 spinous process fracture, right 6-7 nondisplaced rib fractures now status post ORIF of right hip with Ortho on 5/3.  Per recommendations of perioperative medicine/Dr. Hodges and trauma attending,, patient was transferred to TICU postoperatively for close monitoring in the setting of his cardiac status.  Progressed appropriately in the ICU and was transferred to the OR RNF on . DNR/DNI reinstated.     Transferred to floor, however  within 24h acute onset respiratory distress and desat to 70s. Placed on high flow with eventual improvement to high 80s. CXR negative for pneumo. Transferred back to ICU for CPAP and diuresis. After diuresis 1L in ICU and improved respiratory status, patient returned to RNF 5/6 PM.     Doing well this morning, pain is well-controlled, HDS, eating and voiding appropriately, on 2 L nasal cannula.  Had Bmx4 yesterday and small BM this morning after dose of lactulose. Patient medically ready for discharge. Home hospice services available and set up to start within 24h of discharge.     #R periprosthetic hip fx s/p R hip ORIF  - perioperative medicine/Dr. Hodges consulted for risk start  - geriatrics consulted for goals of care for OR decision  - pain control multimodal with scheduled tylenol, home methadone, oxy 5/10mg PRN  - OR with ortho 5/3 ORIF R hip  - OK for ADLs without [place] brace in bed only. When out of bed, must wear Jean[place] brace  - no restrictions with ambulation, ADLs in jeanette brace  - no heavy lifting, no pushing or pulling more than 10 pounds  - standing uprights thoracic spine in jeanette brace, complete  - Calcium (as carbonate)-Vitamin D 600mg-400IU PO BID for 30 days upon discharge     #T7 VB fx with height loss, T6 SP fx  - spine consulted, plan is non op per ortho given complexity of fixation  - pain control: tri tylenol q4h, gabapentin tri 100mg,  PRN oxy 5/10, dilaudid for breakthrough  - methadone 2.5 (home) increased to 5mg daily per jael  - acute pain service consulted, Right Fascia iliaca [facility]     #CAD #HFrEF with EF 20#  - periop medicine consulted for cardiac risk start  - home amiodarone  - continue to hold home bumetanide today  - fluids NS 42ml/hr for Cr  - fluid restricted diet liberalized  - okay to restart bumex at home  - holding home eliquis, holding home brilinta  - home aspirin  - discussed with Dr. Hodges and jael, will dc on aspirin  81 bid for 4 weeks per ortho followed by aspirin 81 daily until cardiac follow up. Discussed with his cardiologist team in patient.     #Pulmonary  - O2 supplement to keep sat >92%  - tiotropium inhaler prn  - RT consulted for breathing treatment yesterday    #Mood disorders/Psych  - escitalopram 10mg home dose  - ropinirole nightly 1mg    #  - cont home finasteride    - q4h vitals  - CBC, RFP, mag prn  - strict Is and Os  - fall precautions  - IS 10x per hour    Diet: regular  BR: miralax, loan docusate, bisacodyl, suppository.  DVT ppx: SCDs, SQH.   Stress ppx: not indicated  PT/OT eval: Rec'd moderate intensity/SNF.     Dispo: Recommended SNF. Patient would like to go home. Plan for dc today/tomorrow. Discussed with family regarding , daughter will be likely available later today. Home with [facility] services.     Follow up: Ortho    Discussed with Dr. Person.    Dorys Calixto MD  PGY-1 VS Resident  Trauma Surgery Service  Floor: [id number]    ==============================================================================  CHIEF COMPLAINT / OVERNIGHT EVENTS:   NAEON. Feeling well this morning after multiple bowel movements yesterday. Had small one this morning. Abdominal discomfort improved afterwards. Rib pain ongoing but helped by pain meds. Eating without issues. Voiding appropriately.   Improved IS effort  On 2L NC    MEDICAL HISTORY / ROS:  Admission history and ROS reviewed. Pertinent changes as follows:      PHYSICAL EXAM:  Heart Rate:  [54-67]   Temp:  [36.2 °C (97.2 °F)-36.6 °C (97.9 °F)]   Resp:  [17-18]   BP: ()/(58-63)   SpO2:  [94 %-98 %]   Physical Exam  Constitutional: no acute distress, conversational, older, thin, some mild temporal wasting  Neuro: A/O x4, no gross deficits   Psych: normal affect  HEENT: No deformities, no scleral icterus   Cardiac: RRR  Pulmonary: bilateral decreased breath sounds, increased labor of breathing. -300 cc On 2L NC.   Abdomen: soft, non  distended, non tender  Skin: warm and dry overall    Extremities: RLE in ace wrap, soft compartments, warm foot.     IMAGING SUMMARY:  (summary of new imaging findings, not a copy of dictation)  None    LABS:  Results from last 7 days   Lab Units 05/10/24  0531 05/07/24  0542 05/06/24  0025 05/04/24  0159 05/03/24  1741   WBC AUTO x10*3/uL 7.0 8.9 7.5   < > 8.8   HEMOGLOBIN g/dL 8.9* 9.2* 10.4*   < > 10.6*   HEMATOCRIT % 30.1* 28.7* 32.7*   < > 33.8*   PLATELETS AUTO x10*3/uL 253 207 187   < > 131*   NEUTROS PCT AUTO %  --   --   --   --  93.4   LYMPHS PCT AUTO %  --   --   --   --  3.6   MONOS PCT AUTO %  --   --   --   --  2.2   EOS PCT AUTO %  --   --   --   --  0.2    < > = values in this interval not displayed.     Results from last 7 days   Lab Units 05/03/24  1741   APTT seconds 32  32   INR  0.9  0.9     Results from last 7 days   Lab Units 05/10/24  0531 05/09/24  0600 05/08/24  0607 05/05/24  1106 05/04/24  0159 05/03/24  1741   SODIUM mmol/L 143 142 142   < > 143 141   POTASSIUM mmol/L 3.9 4.0 3.6   < > 5.0 4.5   CHLORIDE mmol/L 104 101 101   < > 104 102   CO2 mmol/L 31 33* 31   < > 33* 30   BUN mg/dL 45* 48* 47*   < > 33* 25*   CREATININE mg/dL 2.02* 1.82* 1.60*   < > 1.53* 1.40*   CALCIUM mg/dL 8.7 8.8 8.8   < > 8.7 8.7   PROTEIN TOTAL g/dL  --   --   --   --  4.7* 5.1*   BILIRUBIN TOTAL mg/dL  --   --   --   --  0.5 0.7   ALK PHOS U/L  --   --   --   --  58 61   ALT U/L  --   --   --   --  8* 8*   AST U/L  --   --   --   --  18 17   GLUCOSE mg/dL 117* 87 88   < > 173* 148*    < > = values in this interval not displayed.     Results from last 7 days   Lab Units 05/04/24  0159 05/03/24  1741   BILIRUBIN TOTAL mg/dL 0.5 0.7   BILIRUBIN DIRECT mg/dL 0.1 0.2     Results from last 7 days   Lab Units 05/05/24  1354 05/03/24  1741 05/03/24  1530   POCT PH, ARTERIAL pH 7.42 7.45* 7.40   POCT PCO2, ARTERIAL mm Hg 44* 43* 51*   POCT PO2, ARTERIAL mm Hg 57* 87 96*   POCT HCO3 CALCULATED, ARTERIAL mmol/L 28.5*  29.9* 31.6*   POCT BASE EXCESS, ARTERIAL mmol/L 3.4* 5.3* 5.8*       I have reviewed all medications, laboratory results, and imaging pertinent for today's encounter.

## 2024-05-10 NOTE — PROGRESS NOTES
Patient is cleared to discharge from a social work standpoint. [facility] will make arrangements to resume services once he's home. If there is any coordination needed, they will call the trauma phone. This info was given to the team.      Tricia Phelps LCSW

## 2024-05-14 ENCOUNTER — TELEPHONE (OUTPATIENT)
Dept: PRIMARY CARE | Facility: CLINIC | Age: 79
End: 2024-05-14
Payer: MEDICARE

## 2024-05-14 NOTE — TELEPHONE ENCOUNTER
Transition of Care    Inpatient facility:   Discharge diagnosis: Hip FX  Discharged to: Home  Discharge date: 5/11/24  Initial Call date: 5/13/24  Spoke with patient/caregiver: Patient                                                                     Do you need assistance  visits prior to your PCP visit: No  Home health care ordered: No  Have you been contacted by home care and have a start of care date: No  Are you taking medications as prescribed at discharge: Yes    Referral to [facility] Pharmacist: No  Patient advised to bring all medications to PCP follow-up appointment.  Patient advised to follow discharge instructions until provider follow-up.  TCM visit date: 5/20/24  TCM provider visit with: DEANNE Esqueda, CNP

## 2024-05-16 ENCOUNTER — APPOINTMENT (OUTPATIENT)
Dept: HEMATOLOGY/ONCOLOGY | Facility: HOSPITAL | Age: 79
End: 2024-05-16
Payer: MEDICARE

## 2024-05-16 ENCOUNTER — APPOINTMENT (OUTPATIENT)
Dept: ORTHOPEDIC SURGERY | Facility: HOSPITAL | Age: 79
End: 2024-05-16
Payer: MEDICARE

## 2024-05-16 DIAGNOSIS — S22.000A COMPRESSION FRACTURE OF THORACIC VERTEBRA, UNSPECIFIED THORACIC VERTEBRAL LEVEL, INITIAL ENCOUNTER (MULTI): ICD-10-CM

## 2024-05-17 ENCOUNTER — APPOINTMENT (OUTPATIENT)
Dept: HEMATOLOGY/ONCOLOGY | Facility: HOSPITAL | Age: 79
End: 2024-05-17
Payer: MEDICARE

## 2024-05-20 ENCOUNTER — OFFICE VISIT (OUTPATIENT)
Dept: PRIMARY CARE | Facility: CLINIC | Age: 79
End: 2024-05-20
Payer: MEDICARE

## 2024-05-20 ENCOUNTER — TELEPHONE (OUTPATIENT)
Dept: HOME HEALTH SERVICES | Facility: HOME HEALTH | Age: 79
End: 2024-05-20

## 2024-05-20 VITALS
TEMPERATURE: 97 F | BODY MASS INDEX: 19.87 KG/M2 | OXYGEN SATURATION: 97 % | SYSTOLIC BLOOD PRESSURE: 104 MMHG | DIASTOLIC BLOOD PRESSURE: 62 MMHG | HEIGHT: 70 IN | HEART RATE: 77 BPM

## 2024-05-20 DIAGNOSIS — S22.000A COMPRESSION FRACTURE OF THORACIC VERTEBRA, UNSPECIFIED THORACIC VERTEBRAL LEVEL, INITIAL ENCOUNTER (MULTI): ICD-10-CM

## 2024-05-20 DIAGNOSIS — I26.99 PULMONARY EMBOLISM, UNSPECIFIED CHRONICITY, UNSPECIFIED PULMONARY EMBOLISM TYPE, UNSPECIFIED WHETHER ACUTE COR PULMONALE PRESENT (MULTI): ICD-10-CM

## 2024-05-20 DIAGNOSIS — I50.9 CHRONIC CONGESTIVE HEART FAILURE, UNSPECIFIED HEART FAILURE TYPE (MULTI): ICD-10-CM

## 2024-05-20 DIAGNOSIS — I25.5 ISCHEMIC CARDIOMYOPATHY: ICD-10-CM

## 2024-05-20 DIAGNOSIS — E11.22 DIABETES MELLITUS WITH STAGE 3 CHRONIC KIDNEY DISEASE (MULTI): ICD-10-CM

## 2024-05-20 DIAGNOSIS — R53.81 PHYSICAL DECONDITIONING: ICD-10-CM

## 2024-05-20 DIAGNOSIS — Z09 HOSPITAL DISCHARGE FOLLOW-UP: ICD-10-CM

## 2024-05-20 DIAGNOSIS — N18.30 DIABETES MELLITUS WITH STAGE 3 CHRONIC KIDNEY DISEASE (MULTI): ICD-10-CM

## 2024-05-20 DIAGNOSIS — E46 PROTEIN-CALORIE MALNUTRITION, UNSPECIFIED SEVERITY (MULTI): ICD-10-CM

## 2024-05-20 DIAGNOSIS — M97.01XA PERIPROSTHETIC FRACTURE AROUND INTERNAL PROSTHETIC RIGHT HIP JOINT, INITIAL ENCOUNTER (MULTI): Primary | ICD-10-CM

## 2024-05-20 PROCEDURE — 99495 TRANSJ CARE MGMT MOD F2F 14D: CPT | Performed by: NURSE PRACTITIONER

## 2024-05-20 PROCEDURE — 1157F ADVNC CARE PLAN IN RCRD: CPT | Performed by: NURSE PRACTITIONER

## 2024-05-20 PROCEDURE — 1160F RVW MEDS BY RX/DR IN RCRD: CPT | Performed by: NURSE PRACTITIONER

## 2024-05-20 PROCEDURE — 1159F MED LIST DOCD IN RCRD: CPT | Performed by: NURSE PRACTITIONER

## 2024-05-20 PROCEDURE — 3074F SYST BP LT 130 MM HG: CPT | Performed by: NURSE PRACTITIONER

## 2024-05-20 PROCEDURE — 3078F DIAST BP <80 MM HG: CPT | Performed by: NURSE PRACTITIONER

## 2024-05-20 PROCEDURE — 1111F DSCHRG MED/CURRENT MED MERGE: CPT | Performed by: NURSE PRACTITIONER

## 2024-05-20 RX ORDER — OXYCODONE HYDROCHLORIDE 5 MG/1
5 CAPSULE ORAL EVERY 6 HOURS PRN
Qty: 24 CAPSULE | Refills: 0 | Status: SHIPPED | OUTPATIENT
Start: 2024-05-20 | End: 2024-05-27

## 2024-05-20 ASSESSMENT — ENCOUNTER SYMPTOMS
HEMATOLOGIC/LYMPHATIC NEGATIVE: 1
PALPITATIONS: 0
NUMBNESS: 0
BACK PAIN: 1
FATIGUE: 1
ALLERGIC/IMMUNOLOGIC NEGATIVE: 1
DEPRESSION: 0
CHILLS: 0
CONSTIPATION: 0
ARTHRALGIAS: 1
SHORTNESS OF BREATH: 1
COUGH: 0
ACTIVITY CHANGE: 0
ENDOCRINE NEGATIVE: 1
SPEECH DIFFICULTY: 0
GASTROINTESTINAL NEGATIVE: 1
LOSS OF SENSATION IN FEET: 0
ABDOMINAL PAIN: 0
WHEEZING: 0
HEADACHES: 0
UNEXPECTED WEIGHT CHANGE: 0
VOMITING: 0
DIARRHEA: 0
OCCASIONAL FEELINGS OF UNSTEADINESS: 1
DIZZINESS: 0
NAUSEA: 0
EYES NEGATIVE: 1
SORE THROAT: 0

## 2024-05-20 ASSESSMENT — PATIENT HEALTH QUESTIONNAIRE - PHQ9
1. LITTLE INTEREST OR PLEASURE IN DOING THINGS: NOT AT ALL
2. FEELING DOWN, DEPRESSED OR HOPELESS: NOT AT ALL
SUM OF ALL RESPONSES TO PHQ9 QUESTIONS 1 AND 2: 0

## 2024-05-20 NOTE — PROGRESS NOTES
"Patient: Andrez Damon  : 1945  PCP: Karley Sullivan, APRN-CNP, [facility], [place]  MRN: [id number]  Program: No programs to display       Andrze Damon is a 79 y.o. male presenting today for follow-up after being discharged from the hospital 10 days ago. The main problem requiring admission was fall, compression fracture of thoracic vertebrae, right periprosthetic fracture, T7 vertebral fracture. The discharge summary and/or Transitional Care Management documentation was reviewed. Medication reconciliation was performed as indicated via the \"Onur as Reviewed\" timestamp.     Andrez Damon was contacted by Transitional Care Management services two days after his discharge. This encounter and supporting documentation was reviewed.    Patient was taken [facility]  following a mechanical fall after tripping over a trash can in his driveway.  Patient sustained right rib fractures, right periprosthetic hip fracture, T7 vertebral fracture.  He was admitted to the trauma team.  Patient was on hospice prior to his fall.  He elected to change his DNR status while he was in the hospital and underwent ORIF of right hip fracture.  He elected not to have surgery for back fracture.  Patient was transferred to ICU during his hospitalization for desaturation.  He was placed on CPAP and diuresed 4 L and improved was able to be returned to regular nursing floor.  Patient was discharged with order sent to [facility] hospice, he has elected not to return to hospice at this time.  Patient is homebound.  Patient is wheelchair dependent, able to bear weight on his right, not able to drive.  He is dependent upon his wife for transportation and assistance leaving the home.  Patient would benefit from home health care with PT/OT.    Review of Systems   Constitutional:  Positive for fatigue. Negative for activity change, chills and unexpected weight change.   HENT:  Positive for hearing loss and tinnitus. Negative for congestion, ear " "pain and sore throat.    Eyes: Negative.    Respiratory:  Positive for shortness of breath. Negative for cough and wheezing.    Cardiovascular:  Negative for chest pain, palpitations and leg swelling.        Cool toes   Gastrointestinal: Negative.  Negative for abdominal pain, constipation, diarrhea, nausea and vomiting.   Endocrine: Negative.    Musculoskeletal:  Positive for arthralgias, back pain and gait problem.        Using wheelchair   Skin: Negative.    Allergic/Immunologic: Negative.    Neurological:  Negative for dizziness, speech difficulty, numbness and headaches.   Hematological: Negative.    Psychiatric/Behavioral:          Anxiety, depression   All other systems reviewed and are negative.      /62   Pulse 77   Temp 36.1 °C (97 °F)   Ht 1.778 m (5' 10\")   SpO2 97%   BMI 19.87 kg/m²     Physical Exam  Vitals and nursing note reviewed.   Constitutional:       General: He is not in acute distress.     Appearance: Normal appearance. He is underweight. He is ill-appearing.   HENT:      Head: Normocephalic and atraumatic.      Right Ear: Tympanic membrane, ear canal and external ear normal.      Left Ear: Tympanic membrane, ear canal and external ear normal.      Nose: Nose normal.      Mouth/Throat:      Mouth: Mucous membranes are moist.      Dentition: Abnormal dentition.      Pharynx: Oropharynx is clear.   Eyes:      Extraocular Movements: Extraocular movements intact.      Conjunctiva/sclera: Conjunctivae normal.      Pupils: Pupils are equal, round, and reactive to light.   Cardiovascular:      Rate and Rhythm: Normal rate and regular rhythm.      Pulses: Normal pulses.      Heart sounds: Normal heart sounds. No murmur heard.  Pulmonary:      Effort: Pulmonary effort is normal. No respiratory distress.      Breath sounds: Normal breath sounds. Decreased air movement present. No wheezing.   Abdominal:      General: Bowel sounds are normal. There is no distension.      Palpations: Abdomen is " soft.      Tenderness: There is no abdominal tenderness.   Musculoskeletal:         General: No tenderness. Normal range of motion.      Cervical back: Normal range of motion and neck supple.      Right lower leg: No edema.      Left lower leg: No edema.      Comments: Using wheelchair   Skin:     General: Skin is warm and dry.      Capillary Refill: Capillary refill takes less than 2 seconds.   Neurological:      General: No focal deficit present.      Mental Status: He is alert and oriented to person, place, and time.   Psychiatric:         Mood and Affect: Mood normal.         Behavior: Behavior normal.         Thought Content: Thought content normal.         Judgment: Judgment normal.         The complexity of medical decision making for this patient's transitional care is moderate.    Assessment/Plan   Andrez was seen today for tcm.  Diagnoses and all orders for this visit:  Periprosthetic fracture around internal prosthetic right hip joint, initial encounter (Multi) (Primary)  -     oxyCODONE (Oxy-IR) 5 mg immediate release capsule; Take 1 capsule (5 mg) by mouth every 6 hours if needed for severe pain (7 - 10) (Take half tablet for moderate pain, take whole tablet for severe pain.) for up to 7 days.  Compression fracture of thoracic vertebra, unspecified thoracic vertebral level, initial encounter (Multi)  -     oxyCODONE (Oxy-IR) 5 mg immediate release capsule; Take 1 capsule (5 mg) by mouth every 6 hours if needed for severe pain (7 - 10) (Take half tablet for moderate pain, take whole tablet for severe pain.) for up to 7 days.  Ischemic cardiomyopathy  Hospital discharge follow-up  Other orders  -     Follow Up In Primary Care - Medicare Annual; Future     # CHF, cardiomyopathy, euvolemic  -Follow-up with cardiology  -Continue Bumex 1 mg daily  -2 GM sodium diet  -Weigh yourself every morning before breakfast  -Call the office if you have a weight gain of 3 or more pounds in one day or 5 or more pounds in  one week  # Femur fracture  -Follow-up with orthopedic  -Home health care for PT/OT  # T7 fracture  -Follow-up with orthopedic  -Home health care for PT/OT    Follow-up 3 months for Medicare physical and as needed

## 2024-05-20 NOTE — TELEPHONE ENCOUNTER
Thank you for the referral to  home care. In order for us to move forward with this referral, we will need the following addressed:    Unfortunately we do not have an aide in the patient's area-is it ok to process without the aide?  I see there are labs ordered-when do you want the labs drawn? At start of care or a later date?  There is a requested start of care date for today. We are not able to provide start of care same day. Once we process and insurance is verified, start of care is done within 24-48 hours. Please remove the requested start of care date.     Thank you,   [facility] Intake

## 2024-05-21 ENCOUNTER — TELEPHONE (OUTPATIENT)
Dept: HOME HEALTH SERVICES | Facility: HOME HEALTH | Age: 79
End: 2024-05-21
Payer: MEDICARE

## 2024-05-21 ENCOUNTER — DOCUMENTATION (OUTPATIENT)
Dept: HOME HEALTH SERVICES | Facility: HOME HEALTH | Age: 79
End: 2024-05-21
Payer: MEDICARE

## 2024-05-21 ENCOUNTER — HOME HEALTH ADMISSION (OUTPATIENT)
Dept: HOME HEALTH SERVICES | Facility: HOME HEALTH | Age: 79
End: 2024-05-21
Payer: MEDICARE

## 2024-05-21 NOTE — HH CARE COORDINATION
Home Care received a Referral for Nursing, Physical Therapy, and Occupational Therapy. We have processed the referral for a Start of Care on 5.22 OR 5.23.     If you have any questions or concerns, please feel free to contact us at [phone]. Follow the prompts, enter your five digit zip code, and you will be directed to your care team on EAST 2.

## 2024-05-22 ENCOUNTER — HOSPITAL ENCOUNTER (OUTPATIENT)
Dept: RADIOLOGY | Facility: CLINIC | Age: 79
Discharge: HOME | End: 2024-05-22
Payer: MEDICARE

## 2024-05-22 ENCOUNTER — OFFICE VISIT (OUTPATIENT)
Dept: ORTHOPEDIC SURGERY | Facility: CLINIC | Age: 79
End: 2024-05-22
Payer: MEDICARE

## 2024-05-22 ENCOUNTER — APPOINTMENT (OUTPATIENT)
Dept: ORTHOPEDIC SURGERY | Facility: CLINIC | Age: 79
End: 2024-05-22
Payer: MEDICARE

## 2024-05-22 VITALS — HEIGHT: 70 IN | BODY MASS INDEX: 19.87 KG/M2

## 2024-05-22 DIAGNOSIS — S22.058A: ICD-10-CM

## 2024-05-22 DIAGNOSIS — S22.058A: Primary | ICD-10-CM

## 2024-05-22 DIAGNOSIS — M97.8XXA PERIPROSTHETIC HIP FRACTURE, INITIAL ENCOUNTER: ICD-10-CM

## 2024-05-22 DIAGNOSIS — D63.8 ANEMIA OF CHRONIC DISEASE: Primary | ICD-10-CM

## 2024-05-22 DIAGNOSIS — Z96.649 PERIPROSTHETIC HIP FRACTURE, INITIAL ENCOUNTER: ICD-10-CM

## 2024-05-22 PROCEDURE — 72070 X-RAY EXAM THORAC SPINE 2VWS: CPT

## 2024-05-22 PROCEDURE — 72070 X-RAY EXAM THORAC SPINE 2VWS: CPT | Performed by: RADIOLOGY

## 2024-05-22 PROCEDURE — 1159F MED LIST DOCD IN RCRD: CPT | Performed by: ORTHOPAEDIC SURGERY

## 2024-05-22 PROCEDURE — 1125F AMNT PAIN NOTED PAIN PRSNT: CPT | Performed by: ORTHOPAEDIC SURGERY

## 2024-05-22 PROCEDURE — 1160F RVW MEDS BY RX/DR IN RCRD: CPT | Performed by: ORTHOPAEDIC SURGERY

## 2024-05-22 PROCEDURE — 1157F ADVNC CARE PLAN IN RCRD: CPT | Performed by: ORTHOPAEDIC SURGERY

## 2024-05-22 PROCEDURE — 99213 OFFICE O/P EST LOW 20 MIN: CPT | Performed by: ORTHOPAEDIC SURGERY

## 2024-05-22 PROCEDURE — 1111F DSCHRG MED/CURRENT MED MERGE: CPT | Performed by: ORTHOPAEDIC SURGERY

## 2024-05-22 ASSESSMENT — PAIN SCALES - GENERAL: PAINLEVEL_OUTOF10: 7

## 2024-05-22 ASSESSMENT — PAIN - FUNCTIONAL ASSESSMENT: PAIN_FUNCTIONAL_ASSESSMENT: 0-10

## 2024-05-22 NOTE — PROGRESS NOTES
Order of repeat labs to assess iron stores due to denial of insurance for Venofer.  Speculate anemia of chronic kidney disease.  Following lab results, potentially Procrit may be ordered.___________________________________________R Ag, CNP

## 2024-05-22 NOTE — PROGRESS NOTES
S: Andrez Damon is a 79 y.o. year old male patient who is here to follow up for T7-T8 fracture after a fall.  Patient has significant past medical history for A-fib on Eliquis with ICD and pacemaker, ischemic cardiomyopathy, CHF, COPD, coronary artery disease, hypertension, depression on home oxygen 2 L.  He was in the process of getting into hospice care when he sustained a fall while taking out the trash on May 1, 2024.  In the emergency room he was found to have T7-T8 fracture as well as a right periprosthetic hip fracture that was treated.  Because he was on his way to hospice and given his multiple comorbidities we have elected to manage his fracture closed in a brace.  He is here to follow-up.  During his hospital stay he changed his mind and decided not to go to hospice but now as of yesterday he is now back in hospice.  He is here with his wife today.  He is now 3 weeks out from his injury.    He does not have much back pain.  He mainly complains of his right hip pain.  He has been ambulating with some weight on it.  He has been compliant with his [place] brace.      O:     General: Patient appears well-nourished and well-developed in no acute distress, Alert and Oriented x3 thin cachectic male  Psych: Pleasant mood and affect  HEENT: Extraocular muscles intact, pupils equal and round. Sclerae anicteric   Cardio: extremities warm and well perfused  Resp: unlabored symmetric breathing  Musculoskeletal/Neuro Exam: Sitting on a wheeled walker.  No tenderness to palpation over the thoracic spine midline.  He does have kyphosis of the thoracic spine      Lower extremity with pitting edema bilateral lower extremities    Motor: Right leg with 3 out of 5 motor strength with hip flexion secondary to pain, 4 out of 5 knee extension, 5 out of 5 ankle dorsiflexion plantarflexion EHL against resistance  Left leg with 5 out of 5 motor strength with hip flexion, knee extension, ankle dorsiflexion plantarflexion EHL  against resistance    Sensation to light touch intact along L2-S1 bilateraly              Imaging:    I reviewed x-rays of the thoracic spine obtained in clinic today.  AP lateral views.  Compression and mild loss of vertebral height at T7 the kyphosis compared to previous x-ray        A/P: Andrez Damon is a 79 y.o. year old male patient with multiple comorbidities and a lot of cardiac history with a fib, ischemic cardiomyopathy, CHF, COPD on home oxygen, coronary artery disease who is DNR and now in hospice who sustained a T7-T8 fracture which we are managing closed with jeanette brace.  His injury was on May 1.  He is 3 weeks out from his injury.  Neurologically intact with some weakness related to pain on the right leg from his hip surgery.  He is not having much back pain is mainly complaining about his right hip.  He is scheduled to see Dr. Juarez tomorrow.  Continue close management of his spine fracture.  He can remove the brace when he is sleeping or for hygiene care.  Continue wearing the brace when he is up and moving.  I will see him back in 4 weeks.    At the next visit he will need standing or sitting upright x-rays of the thoracic spine with the brace off    After our discussion, the patient articulated understanding of the plan and felt that all questions had been answered satisfactorily. The patient was pleased with the visit and very appreciative for the care rendered.    **Please excuse any errors in grammar or translation related to this dictation. Voice recognition software was utilized to prepare this document. **                  Shirley Duarte MD    Department of Orthopaedic Surgery  [facility]  [email or web address]

## 2024-05-23 ENCOUNTER — HOSPITAL ENCOUNTER (OUTPATIENT)
Dept: RADIOLOGY | Facility: HOSPITAL | Age: 79
Discharge: HOME | End: 2024-05-23
Payer: MEDICARE

## 2024-05-23 ENCOUNTER — OFFICE VISIT (OUTPATIENT)
Dept: ORTHOPEDIC SURGERY | Facility: HOSPITAL | Age: 79
End: 2024-05-23
Payer: MEDICARE

## 2024-05-23 ENCOUNTER — TELEPHONE (OUTPATIENT)
Dept: HEMATOLOGY/ONCOLOGY | Facility: HOSPITAL | Age: 79
End: 2024-05-23

## 2024-05-23 DIAGNOSIS — Z96.649 PERIPROSTHETIC HIP FRACTURE, INITIAL ENCOUNTER: ICD-10-CM

## 2024-05-23 DIAGNOSIS — M97.8XXA PERIPROSTHETIC HIP FRACTURE, INITIAL ENCOUNTER: ICD-10-CM

## 2024-05-23 PROCEDURE — 73502 X-RAY EXAM HIP UNI 2-3 VIEWS: CPT | Mod: RIGHT SIDE | Performed by: STUDENT IN AN ORGANIZED HEALTH CARE EDUCATION/TRAINING PROGRAM

## 2024-05-23 PROCEDURE — 73552 X-RAY EXAM OF FEMUR 2/>: CPT | Mod: RT

## 2024-05-23 PROCEDURE — 99024 POSTOP FOLLOW-UP VISIT: CPT | Performed by: ORTHOPAEDIC SURGERY

## 2024-05-23 PROCEDURE — 73502 X-RAY EXAM HIP UNI 2-3 VIEWS: CPT | Mod: RT

## 2024-05-23 PROCEDURE — 1157F ADVNC CARE PLAN IN RCRD: CPT | Performed by: ORTHOPAEDIC SURGERY

## 2024-05-23 PROCEDURE — 1160F RVW MEDS BY RX/DR IN RCRD: CPT | Performed by: ORTHOPAEDIC SURGERY

## 2024-05-23 PROCEDURE — 73552 X-RAY EXAM OF FEMUR 2/>: CPT | Mod: RIGHT SIDE | Performed by: STUDENT IN AN ORGANIZED HEALTH CARE EDUCATION/TRAINING PROGRAM

## 2024-05-23 PROCEDURE — 1111F DSCHRG MED/CURRENT MED MERGE: CPT | Performed by: ORTHOPAEDIC SURGERY

## 2024-05-23 PROCEDURE — 1159F MED LIST DOCD IN RCRD: CPT | Performed by: ORTHOPAEDIC SURGERY

## 2024-05-23 ASSESSMENT — PAIN SCALES - GENERAL: PAINLEVEL_OUTOF10: 10 - WORST POSSIBLE PAIN

## 2024-05-23 NOTE — PROGRESS NOTES
Andrez Damon is  post-op from ORIF right periprosthetic proximal femur fracture on 5/3/2024. he is doing well at this point.  Pain is well controlled  Denies fevers or chills.  Denies drainage from the wound.  he reports no additional symptoms or concerns. No shortness of breath or chest pain. No calf swelling or pain.    The patients full medical history, surgical history, medications, allergies, family, medical history, social history, and a complete 14 point review of systems is documented in the medical record on the signed, scanned medical intake sheet or reviewed in the history of present illness. Review of systems otherwise negative    Past Medical History:   Diagnosis Date    Arthritis     Atrial fibrillation with RVR (Multi) 09/22/2023    Body mass index (BMI) 20.0-20.9, adult 09/21/2021    Body mass index (BMI) of 20.0 to 20.9 in adult    Body mass index (BMI) 21.0-21.9, adult 04/14/2021    Body mass index (BMI) of 21.0 to 21.9 in adult    CHF (congestive heart failure) (Multi)     COPD (chronic obstructive pulmonary disease) (Multi)     Coronary artery disease     Diabetes mellitus (Multi)     Hypertension     Major depressive disorder, recurrent, mild (CMS-[facility]) 11/11/2020    Mild episode of recurrent major depressive disorder    Major depressive disorder, recurrent, unspecified (CMS-[facility]) 01/13/2021    Recurrent major depressive disorder, remission status unspecified    Other conditions influencing health status     Swelling Of Hands    Other specified anxiety disorders 05/19/2020    Depression with anxiety    Oxygen desaturation during sleep     wears 2L at HS    Personal history of nicotine dependence 07/12/2021    History of nicotine dependence    Personal history of other diseases of the circulatory system     History of cardiac disorder    Personal history of other diseases of the musculoskeletal system and connective tissue     History of arthritis    Personal history of other diseases of urinary  system 09/21/2021    History of chronic kidney disease    Personal history of other endocrine, nutritional and metabolic disease 08/29/2019    History of hyperglycemia    Pneumonia, unspecified organism 12/09/2019    Atypical pneumonia    Thoracic aortic aneurysm, without rupture, unspecified (CMS-HCC) 05/29/2019    Thoracic aortic aneurysm without rupture       Medication Documentation Review Audit       Reviewed by Raeann Walker LPN (Licensed Nurse) on 05/22/24 at 1410      Medication Order Taking? Sig Documenting Provider Last Dose Status   acetaminophen (Tylenol) 325 mg tablet [id number] No Take 2 tablets (650 mg) by mouth every 6 hours if needed for mild pain (1 - 3) or moderate pain (4 - 6). Dorys Calixto MD Taking Active   amiodarone (Pacerone) 200 mg tablet [id number] No Take 1 tablet (200 mg) by mouth once daily. Historical Provider, MD Taking Active   aspirin 81 mg EC tablet [id number] No Take 1 tablet (81 mg) by mouth 2 times a day for 21 days, THEN 1 tablet (81 mg) once daily. Dorys Calixto MD Taking Active   B complex-vitamin C-folic acid (Nephrocaps) 1 mg capsule [id number] No Take 1 capsule by mouth once daily. KENDRICK Montague Taking Active   bumetanide (Bumex) 1 mg tablet [id number] No TAKE 2 TABLETS BY MOUTH EVERY DAY Willian Hensley DO Taking Active   calcium carbonate-vitamin D3 500 mg-5 mcg (200 unit) tablet [id number] No Take 1 tablet by mouth 2 times a day. Dorys Calixto MD Taking Active   cholecalciferol (Vitamin D-3) 125 MCG (5000 UT) capsule [id number] No TAKE 1 CAPSULE BY MOUTH EVERY DAY KENDRICK Parmar, ALESSANDRO Taking Active   escitalopram (Lexapro) 10 mg tablet [id number] No Take 1 tablet (10 mg) by mouth once daily. KENDRICK Parmar, ALESSANDRO Taking Active   finasteride (Proscar) 5 mg tablet [id number] No Take 1 tablet (5 mg) by mouth once daily. Stone Piedra MD Taking Active   fluticasone-umeclidin-vilanter (Trelegy Ellipta) 200-62.5-25 mcg blister with device  [id number] No Inhale 1 puff once daily with breakfast. Inhale 1 puff daily, rinse mouth after use Willian Henslye DO Taking Active   HYDROmorphone (Dilaudid) 2 mg tablet [id number] No Take 0.5 tablets (1 mg) by mouth every 2 hours if needed for severe pain (7 - 10). Historical Provider, MD Taking Active   levalbuterol (Xopenex) 45 mcg/actuation inhaler [id number] No Inhale 1 puff every 4 hours if needed for wheezing or shortness of breath. INHALE 1 TO 2 PUFFS EVERY 4 TO 6 HOURS AS NEEDED AND AS DIRECTED. Willian Hensley DO Taking Active   methadone (Dolophine) 5 mg tablet [id number] No Take 0.5 tablets (2.5 mg) by mouth once daily at bedtime. Historical Provider, MD Taking Active   midodrine (Proamatine) 2.5 mg tablet [id number] No  Historical Provider, MD Taking Active   ondansetron (Zofran) 8 mg tablet [id number] No  Historical Provider, MD Taking Active     Discontinued 05/20/24 1423   oxyCODONE (Oxy-IR) 5 mg immediate release capsule [id number]  Take 1 capsule (5 mg) by mouth every 6 hours if needed for severe pain (7 - 10) (Take half tablet for moderate pain, take whole tablet for severe pain.) for up to 7 days. KENDRICK Parmar, ALESSANDRO  Active   oxygen (O2) gas therapy [id number] No Inhale 2 L/min continuously. KENDRICK Jean-Baptiste Taking Active   potassium chloride CR (K-Tab) 20 mEq ER tablet [id number] No Take 1 tablet (20 mEq) by mouth once daily. Historical Provider, MD Taking Active   rOPINIRole (Requip) 1 mg tablet [id number] No TAKE 1 TABLET BY MOUTH EVERYDAY AT BEDTIME KENDRICK Parmar DNP Taking Active   sennosides-docusate sodium (Anne-Colace) 8.6-50 mg tablet [id number] No Take 1 tablet by mouth once daily at bedtime. Dorys Calixto MD Taking Active   tamsulosin (Flomax) 0.4 mg 24 hr capsule [id number] No Take 2 capsules (0.8 mg) by mouth once daily at bedtime. Stone Piedra MD Taking Active                    Allergies   Allergen Reactions    Meloxicam Shortness of breath    Celecoxib  Unknown    Keflex [Cephalexin] Hives       Social History     Socioeconomic History    Marital status:      Spouse name: Not on file    Number of children: Not on file    Years of education: Not on file    Highest education level: Not on file   Occupational History    Not on file   Tobacco Use    Smoking status: Every Day     Current packs/day: 0.25     Average packs/day: 0.3 packs/day for 68.0 years (17.0 ttl pk-yrs)     Types: Cigarettes    Smokeless tobacco: Never   Vaping Use    Vaping status: Never Used   Substance and Sexual Activity    Alcohol use: Never    Drug use: Never    Sexual activity: Defer   Other Topics Concern    Not on file   Social History Narrative    Not on file     Social Determinants of Health     Financial Resource Strain: High Risk (5/2/2024)    Overall Financial Resource Strain (CARDIA)     Difficulty of Paying Living Expenses: Very hard   Food Insecurity: No Food Insecurity (2/22/2024)    Hunger Vital Sign     Worried About Running Out of Food in the Last Year: Never true     Ran Out of Food in the Last Year: Never true   Transportation Needs: No Transportation Needs (5/2/2024)    PRAPARE - Transportation     Lack of Transportation (Medical): No     Lack of Transportation (Non-Medical): No   Physical Activity: Inactive (2/22/2024)    Exercise Vital Sign     Days of Exercise per Week: 0 days     Minutes of Exercise per Session: 0 min   Stress: No Stress Concern Present (4/19/2024)    [employer] [place] [employer] - Occupational Stress Questionnaire     Feeling of Stress : Not at all   Recent Concern: Stress - Stress Concern Present (3/21/2024)    [employer] [place] [employer] - Occupational Stress Questionnaire     Feeling of Stress : To some extent   Social Connections: Feeling Socially Integrated (1/9/2024)    OASIS : Social Isolation     Frequency of experiencing loneliness or isolation: Never   Intimate Partner Violence: Not At Risk (2/22/2024)     Humiliation, Afraid, Rape, and Kick questionnaire     Fear of Current or Ex-Partner: No     Emotionally Abused: No     Physically Abused: No     Sexually Abused: No   Housing Stability: Low Risk  (5/2/2024)    Housing Stability Vital Sign     Unable to Pay for Housing in the Last Year: No     Number of Places Lived in the Last Year: 1     Unstable Housing in the Last Year: No       Past Surgical History:   Procedure Laterality Date    BACK SURGERY  01/24/2014    Back Surgery    CARDIAC ELECTROPHYSIOLOGY PROCEDURE N/A 12/15/2023    Procedure: ICD - Single Generator Change Out;  Surgeon: Flaco Briggs MD;  Location: [place] [address] Cardiac Cath Lab;  Service: Electrophysiology;  Laterality: N/A;  [place]    CORONARY ANGIOPLASTY WITH STENT PLACEMENT  01/24/2014    Cath Stent Placement    CT ABDOMEN PELVIS ANGIOGRAM W AND/OR WO IV CONTRAST  06/06/2019    CT ABDOMEN PELVIS ANGIOGRAM W AND/OR WO IV CONTRAST 6/6/2019 [facility]    CT HEAD ANGIO W AND WO IV CONTRAST  10/15/2020    CT HEAD ANGIO W AND WO IV CONTRAST 10/15/2020 [facility]    HERNIA REPAIR  04/24/2014    Hernia Repair    OTHER SURGICAL HISTORY  04/17/2019    Abdominal surgery    OTHER SURGICAL HISTORY  01/24/2014    Defibrillation    PACEMAKER PLACEMENT      TOTAL HIP ARTHROPLASTY  01/24/2014    Hip Replacement       Gen: The patient is alert and oriented ×3, is in no acute distress, and appear their stated age and weight.    Psychiatric: Mood and affect are appropriate.    Eyes: Sclera are white, and pupils are round and symmetric.    ENT: Mucous membranes are moist.     Neck: Supple. Thyroid is midline.    Respiratory: Respirations are nonlabored, chest rise is symmetric.    Cardiac: Rate is regular by palpation of distal pulses.     Abdomen: Nondistended.    Integument: No obvious cutaneous lesions are noted. No signs of lymphangitis. No signs of systemic edema.  side: right lower extremity :  his  surgical incisions are healing well,  without evidence of erythema, fluctuance, drainage, or infection.  The skin around the incision is intact.  Distally neurovascular exam is stable.  There is appropriate tenderness to palpation in the shelley-incisional area. No calf swelling or tenderness to palpation.      I personally reviewed multiple views of right hip and femur were obtained in the office today demonstrate maintenance of reduction, interval healing, and a stable position of the hardware.  The greater trochanter split and his displaced little bit through the proximal screws this will be just make it a little bit harder to abduct but the remainder of his femurs is stable and healing together.      Andrez Damon is a 79 y.o. male patient status post ORIF right periprosthetic proximal femur fracture on 5/3/2024.    I went over his x-rays in detail today.  His greater trochanter split and displaced elopement proximally.  This will still heal okay but have some difficulty with abduction but the remainder of his femur is intact to the hip able to weight-bear  Alberto removed the office today.  he is WBAT of the side: right lower extremity. ~He/she~ is range of motion as tolerated of the side: right lower extremity.  I stressed the importance of physical therapy on overall functional outcome. I answered all patient's questions he agrees with treatment plan.  I will see him back in Follow-up 6 week(s)with repeat 2 views of the right hip and 2 views of the right femur.        Glen Juarez    Department of Orthopaedic Trauma Surgery

## 2024-05-23 NOTE — TELEPHONE ENCOUNTER
I am reassessing his iron and ferritin.  He may have anemia of chronic kidney disease and if that is the case he can have Procrit injections in place of iron infusions.  His Venofer has been denied by insurance.  If you could call him I have ordered labs.  Thank you per Rosa Luong staff message.

## 2024-05-24 NOTE — TELEPHONE ENCOUNTER
Contacted patient, let him know his insurance had denied the iron infusions and that Rosa was considering a different medication. Notified him that additional lab work had been entered in the computer for him. Requested he come to have them drawn at his earliest convenience. Patient states he is still not walking very well, not sure when he can make it.

## 2024-06-04 ENCOUNTER — APPOINTMENT (OUTPATIENT)
Dept: CARDIOLOGY | Facility: CLINIC | Age: 79
End: 2024-06-04
Payer: MEDICARE

## 2024-06-12 ENCOUNTER — TELEPHONE (OUTPATIENT)
Dept: HEMATOLOGY/ONCOLOGY | Facility: HOSPITAL | Age: 79
End: 2024-06-12
Payer: MEDICARE

## 2024-06-12 NOTE — TELEPHONE ENCOUNTER
Reached out to patient regarding his canceled appointment for this Friday 6/14 for his B12 injection. Patient states he is not able to make it to these appointments that he is not moving around still. Patient will call back when he is able to come back for his injections.

## 2024-06-13 ENCOUNTER — APPOINTMENT (OUTPATIENT)
Dept: ORTHOPEDIC SURGERY | Facility: HOSPITAL | Age: 79
End: 2024-06-13
Payer: MEDICARE

## 2024-06-14 ENCOUNTER — APPOINTMENT (OUTPATIENT)
Dept: HEMATOLOGY/ONCOLOGY | Facility: HOSPITAL | Age: 79
End: 2024-06-14
Payer: MEDICARE

## 2024-06-19 ENCOUNTER — APPOINTMENT (OUTPATIENT)
Dept: ORTHOPEDIC SURGERY | Facility: CLINIC | Age: 79
End: 2024-06-19
Payer: MEDICARE

## 2024-06-28 ENCOUNTER — INFUSION (OUTPATIENT)
Dept: HEMATOLOGY/ONCOLOGY | Facility: HOSPITAL | Age: 79
End: 2024-06-28
Payer: MEDICARE

## 2024-06-28 ENCOUNTER — TELEPHONE (OUTPATIENT)
Dept: HEMATOLOGY/ONCOLOGY | Facility: HOSPITAL | Age: 79
End: 2024-06-28

## 2024-06-28 ENCOUNTER — OFFICE VISIT (OUTPATIENT)
Dept: HEMATOLOGY/ONCOLOGY | Facility: HOSPITAL | Age: 79
End: 2024-06-28
Payer: MEDICARE

## 2024-06-28 VITALS
BODY MASS INDEX: 15.91 KG/M2 | RESPIRATION RATE: 16 BRPM | DIASTOLIC BLOOD PRESSURE: 57 MMHG | TEMPERATURE: 98.4 F | OXYGEN SATURATION: 94 % | SYSTOLIC BLOOD PRESSURE: 90 MMHG | HEART RATE: 65 BPM | WEIGHT: 110.89 LBS

## 2024-06-28 VITALS
SYSTOLIC BLOOD PRESSURE: 88 MMHG | DIASTOLIC BLOOD PRESSURE: 52 MMHG | OXYGEN SATURATION: 95 % | TEMPERATURE: 99 F | RESPIRATION RATE: 18 BRPM | HEART RATE: 80 BPM | BODY MASS INDEX: 15.9 KG/M2 | WEIGHT: 110.78 LBS

## 2024-06-28 DIAGNOSIS — E53.8 B12 DEFICIENCY: ICD-10-CM

## 2024-06-28 DIAGNOSIS — N18.31 STAGE 3A CHRONIC KIDNEY DISEASE (MULTI): ICD-10-CM

## 2024-06-28 DIAGNOSIS — E61.1 IRON DEFICIENCY: ICD-10-CM

## 2024-06-28 DIAGNOSIS — E61.1 IRON DEFICIENCY: Primary | ICD-10-CM

## 2024-06-28 DIAGNOSIS — D63.8 ANEMIA OF CHRONIC DISEASE: ICD-10-CM

## 2024-06-28 PROCEDURE — 3078F DIAST BP <80 MM HG: CPT

## 2024-06-28 PROCEDURE — 99214 OFFICE O/P EST MOD 30 MIN: CPT

## 2024-06-28 PROCEDURE — 1157F ADVNC CARE PLAN IN RCRD: CPT

## 2024-06-28 PROCEDURE — 2500000004 HC RX 250 GENERAL PHARMACY W/ HCPCS (ALT 636 FOR OP/ED)

## 2024-06-28 PROCEDURE — 1125F AMNT PAIN NOTED PAIN PRSNT: CPT

## 2024-06-28 PROCEDURE — 96372 THER/PROPH/DIAG INJ SC/IM: CPT

## 2024-06-28 PROCEDURE — 1159F MED LIST DOCD IN RCRD: CPT

## 2024-06-28 PROCEDURE — 3074F SYST BP LT 130 MM HG: CPT

## 2024-06-28 RX ORDER — EPINEPHRINE 0.3 MG/.3ML
0.3 INJECTION SUBCUTANEOUS EVERY 5 MIN PRN
OUTPATIENT
Start: 2024-07-05

## 2024-06-28 RX ORDER — CYANOCOBALAMIN 1000 UG/ML
1000 INJECTION, SOLUTION INTRAMUSCULAR; SUBCUTANEOUS ONCE
Status: CANCELLED | OUTPATIENT
Start: 2024-06-28

## 2024-06-28 RX ORDER — ALBUTEROL SULFATE 0.83 MG/ML
3 SOLUTION RESPIRATORY (INHALATION) AS NEEDED
OUTPATIENT
Start: 2024-07-26

## 2024-06-28 RX ORDER — EPINEPHRINE 0.3 MG/.3ML
0.3 INJECTION SUBCUTANEOUS EVERY 5 MIN PRN
OUTPATIENT
Start: 2024-07-26

## 2024-06-28 RX ORDER — DIPHENHYDRAMINE HYDROCHLORIDE 50 MG/ML
50 INJECTION INTRAMUSCULAR; INTRAVENOUS AS NEEDED
OUTPATIENT
Start: 2024-07-26

## 2024-06-28 RX ORDER — CYANOCOBALAMIN 1000 UG/ML
1000 INJECTION, SOLUTION INTRAMUSCULAR; SUBCUTANEOUS ONCE
Status: COMPLETED | OUTPATIENT
Start: 2024-06-28 | End: 2024-06-28

## 2024-06-28 RX ORDER — CYANOCOBALAMIN 1000 UG/ML
1000 INJECTION, SOLUTION INTRAMUSCULAR; SUBCUTANEOUS ONCE
OUTPATIENT
Start: 2024-07-26

## 2024-06-28 RX ORDER — FAMOTIDINE 10 MG/ML
20 INJECTION INTRAVENOUS ONCE AS NEEDED
OUTPATIENT
Start: 2024-07-05

## 2024-06-28 RX ORDER — ALBUTEROL SULFATE 0.83 MG/ML
3 SOLUTION RESPIRATORY (INHALATION) AS NEEDED
OUTPATIENT
Start: 2024-07-05

## 2024-06-28 RX ORDER — DIPHENHYDRAMINE HYDROCHLORIDE 50 MG/ML
50 INJECTION INTRAMUSCULAR; INTRAVENOUS AS NEEDED
OUTPATIENT
Start: 2024-07-05

## 2024-06-28 RX ORDER — FAMOTIDINE 10 MG/ML
20 INJECTION INTRAVENOUS ONCE AS NEEDED
OUTPATIENT
Start: 2024-07-26

## 2024-06-28 ASSESSMENT — PAIN SCALES - GENERAL
PAINLEVEL: 7
PAINLEVEL: 6

## 2024-06-28 ASSESSMENT — COLUMBIA-SUICIDE SEVERITY RATING SCALE - C-SSRS
6. HAVE YOU EVER DONE ANYTHING, STARTED TO DO ANYTHING, OR PREPARED TO DO ANYTHING TO END YOUR LIFE?: NO
2. HAVE YOU ACTUALLY HAD ANY THOUGHTS OF KILLING YOURSELF?: NO
1. IN THE PAST MONTH, HAVE YOU WISHED YOU WERE DEAD OR WISHED YOU COULD GO TO SLEEP AND NOT WAKE UP?: NO

## 2024-06-28 NOTE — TELEPHONE ENCOUNTER
Called patient left voicemail requesting he come in to have blood work drawn. I had called hospice [facility] earlier who stated patient would be able to receive procrit while on hospice. Per KELLY Luong CNP, we are unable to order procrit until patient has blood work. Left voicemail requesting that patient go to most convenient  lab service center to have blood drawn when available. Left call back number and requested patient call back with questions.

## 2024-06-28 NOTE — LETTER
June 28, 2024     Cindy Meraz, APRN-CNP  [address]  [facility] [address]    Patient: Andrez Damon   YOB: 1945   Date of Visit: 6/28/2024       Dear Dr. Cindy Meraz, APRN-CNP:    Hi, looking to put this patient on Procrit. From a cardiology standpoint I want to make sure you have no contraindications.  Procrit will be ordered pending iron labs stability.   If you have questions, please do not hesitate to call me. I look forward to following your patient along with you.       Sincerely,     DEANNE Montague-CNP      CC: No Recipients  ______________________________________________________________________________________    [facility]    PATIENT VISIT INFORMATION    Visit Type: Follow up visit    Referring Provider: unknown  Reason for referral: B12 and LIGIA    CANCER/HEMATOLGOY HISTORY    History of anemia of chronic disease, LIGIA, and B12 deficiency. Renal dysfunction, also possible malabsorption given gastric resection for bleeding ulcers in 1965 , treated with IV iron (Venofer 300 mg x 3 doses 8/2017-9/2017, Feraheme 510 mg x 2 doses 5/2019, Venofer x5 doses at 200 mg each in December 2021 due to insurance preference, Venofer again 3/2022 &9/2022 & 11/2022, Venofer x3 doses June 2023, August 2023) as well as B12 injections subcutaneous monthly starting 8/2017. B12 injections monthly most recently 8/7/23. B6 deficiency treated with oral B6, previously seen by Dr. Mcelroy and Dr. Cabral, Dr. Meza.     4/19/2024-patient is on hospice currently.  He presents for his B12 injection, notably B6 is low at 8.6 and folic low supplement sent to pharmacy.  Anemia is improved significantly.    6/28/2024-patient on hospice for CHF/COPD.  Discussed with [facility] Procrit and B12 injection and received their approval.  Had a fall in April 2024 was unable to for his previous B12 had surgical repair on  May 3, 2024.     HISTORY OF PRESENT ILLNESS     ID Statement: Andrez Damon is a 79 year old male  Chief Complaint: Anemia of chronic disease and B12 deficiency   Interval History:   Patient presents for follow up with wife and Grandson Maik.  He presents in the infusion center receiving B12 injection and states he is on Hospice for end-stage congestive heart failure/COPD. Edema much improved. SOB improving. Fall April 31, 2024 fracture two right ribs, right hip, disc ruptured. Surgical repair 5/3/2024.  He is winded as he exerts himself, which is his baseline.  Patient has notably poor dentition.  He does note that he has had a history of iron deficiency, anemia and B12 deficiency.  He has been receiving infusions for iron as needed and B12 injections monthly.       He is on O2 as needed and at HS.  He has chronic back pain that is not changed.  Frequent bruising due to being on Eliquis. Appetite improving. Unsure if weight loss. Clothes fit the same.      He denies fevers, weight loss, no unusual headaches, sore throat, acute vision changes, chest pain, nausea or vomiting, abnormal bowel movements,  blood in stool, hematuria, pain or neurologic symptoms except as above, rashes or lumps, no bleeding (prior mild self-limited epistaxis resolved) , thyroid disorder.   PAST/CURRENT HISTORY     MEDICAL/SURGICAL HISTORY  -Anemia and B12 def.  -UTIs  -CHF  -A-fib on Eliquis   -pneumonia  -RA  -CAD  -COPD on 02 -DMT2  -HTN  -CKD  -Dyslipidemia  -Depression  -Colitis   -BPH  -RLS  -Hyponatremia  -Hypomagnesemia  -Hypokalemia  -Musculoskeletal issues  -Pacemaker 2012  -TIA  -PVD  -Osteopetrosis  -Neuropathy  -Vitamin D def  -Aortic aneurysm  -Anal fissure   -sinus surgery  -hemorrhoidectomy  -gastric resection for bleeding  -Vasectomy  -Cataracts   -Tonsillectomy   -Hemant hip replacement   -Cardiac Stents   -Back surgeries   -PE 8/2021    SOCIAL HISTORY  -Lives with wife Marissa and pets  -Work place: retired factory  worker  -Tobacco/smokeless use: current smoker 2-3 cigarettes per day  -Alcohol: Denies   -Illicit drug or marijuana use: Denies    -Episcopal or Spiritual beliefs: None reported   -Social Determinates of Health Concerns: none reported  -He confirmed  that he is DNR [employer] arrest 8/25/22.     FAMILY HISTORY  -Mom and 2 sisters breast cancer   -Sister with breast and bone cancer   -Sister thyroid cancer   -Brother prostate cancer   -No other known history of hematologic, bleeding, clotting, autoimmune, genetic, or malignant disorders in the family.     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    Active Problems, Allergy List, Medication List, and PMH/PSH/FH/Social Hx have been reviewed and reconciled in chart. Updates made when necessary.     REVIEW OF SYSTEMS   A review of systems has been completed and are negative for complaints except what is stated in the assessment, HPI, IH, ROS, and/or past medical history.    ALLERGIES AND MEDICATIONS     Allergies and Intolerances:   Allergies   Allergen Reactions   • Meloxicam Shortness of breath   • Celecoxib Unknown   • Keflex [Cephalexin] Hives      Medication Profile:   Current Outpatient Medications   Medication Instructions   • acetaminophen (TYLENOL) 650 mg, oral, Every 6 hours PRN   • amiodarone (PACERONE) 200 mg, oral, Daily   • aspirin 81 mg EC tablet Take 1 tablet (81 mg) by mouth 2 times a day for 21 days, THEN 1 tablet (81 mg) once daily.   • B complex-vitamin C-folic acid (Nephrocaps) 1 mg capsule 1 capsule, oral, Daily   • bumetanide (BUMEX) 2 mg, oral, Daily   • cholecalciferol (VITAMIN D-3) 125 mcg, oral, Daily   • escitalopram (LEXAPRO) 10 mg, oral, Daily   • finasteride (PROSCAR) 5 mg, oral, Daily   • fluticasone-umeclidin-vilanter (Trelegy Ellipta) 200-62.5-25 mcg blister with device 1 puff, inhalation, Daily with breakfast, Inhale 1 puff daily, rinse  "mouth after use   • HYDROmorphone (DILAUDID) 1 mg, oral, Every 2 hour PRN   • levalbuterol (Xopenex) 45 mcg/actuation inhaler 1 puff, inhalation, Every 4 hours PRN, INHALE 1 TO 2 PUFFS EVERY 4 TO 6 HOURS AS NEEDED AND AS DIRECTED.   • methadone (DOLOPHINE) 2.5 mg, oral, Nightly   • midodrine (Proamatine) 2.5 mg tablet    • ondansetron (Zofran) 8 mg tablet    • oxygen (O2) 2 L/min, inhalation, Continuous   • potassium chloride CR (K-Tab) 20 mEq ER tablet 1 tablet, oral, Daily   • rOPINIRole (REQUIP) 1 mg, oral, Nightly   • sennosides-docusate sodium (Anne-Colace) 8.6-50 mg tablet 1 tablet, oral, Nightly   • tamsulosin (FLOMAX) 0.8 mg, oral, Nightly      Available Vaccination Record:   Immunization History   Administered Date(s) Administered   • Flu vaccine (IIV4), preservative free *Check age/dose* 10/20/2016   • Flu vaccine, quadrivalent, high-dose, preservative free, age 65y+ (FLUZONE) 10/18/2023   • Influenza, High Dose Seasonal, Preservative Free 08/21/2018, 11/01/2022   • Influenza, Unspecified 11/01/2022   • Influenza, seasonal, injectable 10/19/2015   • Influenza, trivalent, adjuvanted 09/01/2019   • Pneumococcal conjugate vaccine, 13-valent (PREVNAR 13) 08/21/2018, 09/03/2018   • Pneumococcal polysaccharide vaccine, 23-valent, age 2 years and older (PNEUMOVAX 23) 10/23/2013      PHYSICAL EXAM     Vital Signs/Measurements:       5/10/2024     4:00 AM 5/10/2024     8:23 AM 5/10/2024    12:19 PM 5/10/2024     2:48 PM 5/20/2024     1:57 PM 5/22/2024     2:10 PM 6/28/2024    10:11 AM   Vitals   Systolic 111 118 105 109 104  84   Diastolic 59 58 62 63 62  52   Heart Rate 58 57 64 58 77  65   Temp 36.6 °C (97.9 °F) 36.6 °C (97.9 °F) 36.2 °C (97.2 °F) 36.2 °C (97.2 °F) 36.1 °C (97 °F)  36.9 °C (98.4 °F)   Resp 18 17 17 17   16   Height (in)     1.778 m (5' 10\") 1.778 m (5' 10\")    BMI     19.87 kg/m2 19.87 kg/m2    BSA (m2)     1.76 m2 1.76 m2    Visit Report     Report Report Report        Performance:   ECOG " Performance Status: 2     Grade ECOG performance status   0 Fully active, able to carry on all pre-disease performance without restriction   1 Restricted in physically strenuous activity but ambulatory and able to carry out work of a light or sedentary nature, e.g., light housework, office work   2 Ambulatory and capable of all selfcare but unable to carry out any work activities; Up and about more than 50% of waking hours   3 Capable of only limited selfcare, confined to bed or chair more than 50% of waking hours   4 Completely disabled; Cannot carry out any selfcare; Totally confined to bed or chair   5 Dead     Physical Exam:  General: Patient is awake/alert/oriented x3, no distress, alert and cooperative, presents in wheel chair, frail appearing.   Skin: Expected color for ethnicity, fair turgor, dry, ecchymosis on arms   Hair:    Nails:    HEENT:   Head: Normocephalic, atraumatic   Eyes: Visual acuity intact, conjunctiva clear, sclera white, EOMI, no exudates or hemorrhages   Ears: nl appearance, hearing intact    Nose: no external lesions, mucosa non-inflamed  Mouth: Mucous membranes moist, very poor dentition    Head/Neck: No apparent injury, thyroid without mass or tenderness noted, trachea midline, no bruits appreciated.   Respiratory/Thorax:Lung clear, diminished in bases.    Cardiovascular: Regular rate and rhythm   Gastrointestinal: Bowel sounds normal, non-distended, soft, no tenderness   Genitourinary: deferred   Musculoskeletal:  normal range of motion, no pain on palpation of spine, not ambulating at visit.   Extremities: No amputations or cyanosis. No edema/ Peripheral pulses weak.   Neurological: Sensation present to touch, intact senses, motor response and reflexes normal   Breast: Deferred    Lymphatic: No significant lymphadenopathy   Psychological: Intact recent and remote memory, judgement, and insight. Appropriate mood, affect, and behavior          RESULTS/DATA     Labs:   Lab Results    Component Value Date    WBC 7.0 05/10/2024    NEUTROABS 8.18 (H) 05/03/2024    IGABSOL 0.03 05/03/2024    LYMPHSABS 0.32 (L) 05/03/2024    MONOSABS 0.19 05/03/2024    EOSABS 0.02 05/03/2024    BASOSABS 0.03 05/03/2024    RBC 3.11 (L) 05/10/2024    MCV 97 05/10/2024    MCHC 29.6 (L) 05/10/2024    HGB 8.9 (L) 05/10/2024    HCT 30.1 (L) 05/10/2024     05/10/2024     Lab Results   Component Value Date    RETICCTPCT 0.8 04/15/2024      Lab Results   Component Value Date    CREATININE 2.02 (H) 05/10/2024    BUN 45 (H) 05/10/2024    EGFR 33 (L) 05/10/2024     05/10/2024    K 3.9 05/10/2024     05/10/2024    CO2 31 05/10/2024      Lab Results   Component Value Date    ALT 8 (L) 05/04/2024    AST 18 05/04/2024    ALKPHOS 58 05/04/2024    BILITOT 0.5 05/04/2024      Lab Results   Component Value Date    TSH 1.27 09/18/2023     Lab Results   Component Value Date    TSH 1.27 09/18/2023     Lab Results   Component Value Date    IRON 76 04/15/2024    TIBC 339 04/15/2024    FERRITIN 214 04/15/2024      Lab Results   Component Value Date    CGGEBXRE48 592 04/15/2024      Lab Results   Component Value Date    FOLATE 7.9 04/15/2024     Lab Results   Component Value Date    SEDRATE 3 08/11/2021      Lab Results   Component Value Date    CRP 0.12 08/11/2021      Lab Results   Component Value Date     01/26/2018     Radiology/Studies:   CT abdomen pelvis w IV contrast  12/12/2023  IMPRESSION:  Bilateral pleural effusions. No acute abnormality of the abdomen and  pelvis.  XR chest 1 view  12/31/2023  IMPRESSION:  Generalized interstitial pattern throughout both lungs is similar to  the most recent prior study. The finding may be that of chronic  interstitial fibrosis.  A superimposed infiltrate or mild edema would  be difficult to exclude given the widespread bilateral abnormalities.  ASSESSMENT/PLAN     Assessment and Plan:   #1. History of anemia of chronic disease, LIGIA, and B12 deficiency  Renal  dysfunction, possible malabsorption given gastric resection for bleeding ulcers, treated with IV iron (Venofer 2017, Feraheme 5/2019, Venofer 12/2021, 8/2023), B12 injections subcutaneous monthly starting 8/2017, B6 deficiency treated with oral B6 starting 6/2021. In 2011 EGD and colonoscopy with no bleeding. December 2021, referred Dr. Rasmussen regarding iron deficiency, no endoscopy, a PPI as needed, unable to stop anticoagulation due to high risk of a cardiac event.      Patient in end-stage congestive heart failure.  [facility] for [facility] and CHF, spoke with Hospice and they ave no conflicts with procrit or B12 injections.  Anemia corrected since iron infusions previously.  Now hgb trending down. B12 monthly.  B6 and folate acid low sent a B-complex to pharmacy at last visit. Waiting labs. If Iron is stable and cardiology approves, procrit injections will be ordered every 21 days. Blood pressure stable. Anemia of CKD.       I have reviewed the patient's medical record including provider notes, laboratory and testing results, imaging, and procedures available within the system and outside the system.     Follow up:    RTC:  -3 months with labs  -Monthly B12    Medications:  -B12 injections ordered monthly as needed  -B6 complex with folic acid sent to pharmacy  -Procrit monthly injections pending labs today    Imaging/Testing:  -NA    Referral:  -NA    Other Pertinent Appointments:  -ortho 7/3/2024  -PCP 8/20/2024  -cardiology 1/20/2024  -Urology 4/23/2025    Patient Discussion Summary:  Discussed plan with patient and his wife. In agreement and state understanding. He will call as needed.      Thank you for allowing me to participate in your care. It was a pleasure meeting you.    Sincerely,  Rosa Luong, APRN-CNP       This document may have been written by voice recognition software Please reach out for clarity as needed.   Time based billing: See within this encounter

## 2024-07-03 ENCOUNTER — APPOINTMENT (OUTPATIENT)
Dept: ORTHOPEDIC SURGERY | Facility: HOSPITAL | Age: 79
End: 2024-07-03
Payer: MEDICARE

## 2024-07-05 ENCOUNTER — APPOINTMENT (OUTPATIENT)
Dept: HEMATOLOGY/ONCOLOGY | Facility: HOSPITAL | Age: 79
End: 2024-07-05
Payer: MEDICARE

## 2024-07-08 ENCOUNTER — TELEPHONE (OUTPATIENT)
Dept: HEMATOLOGY/ONCOLOGY | Facility: HOSPITAL | Age: 79
End: 2024-07-08
Payer: MEDICARE

## 2024-07-08 NOTE — TELEPHONE ENCOUNTER
Can we call Andrez and ask him if he is interested in Procrit injections.  If so we would need to get him an earlier appointment with me and reassess his labs. Per Rosa Luong CNP staff message      Spoke with patient, agreeable to Procrit injections. Message forwarded to .

## 2024-07-11 DIAGNOSIS — E55.9 VITAMIN D DEFICIENCY: ICD-10-CM

## 2024-07-11 RX ORDER — CHOLECALCIFEROL (VITAMIN D3) 125 MCG
125 CAPSULE ORAL DAILY
Qty: 90 CAPSULE | Refills: 1 | Status: SHIPPED | OUTPATIENT
Start: 2024-07-11

## 2024-07-12 ENCOUNTER — APPOINTMENT (OUTPATIENT)
Dept: HEMATOLOGY/ONCOLOGY | Facility: HOSPITAL | Age: 79
End: 2024-07-12
Payer: MEDICARE

## 2024-07-26 ENCOUNTER — APPOINTMENT (OUTPATIENT)
Dept: HEMATOLOGY/ONCOLOGY | Facility: HOSPITAL | Age: 79
End: 2024-07-26
Payer: MEDICARE

## 2024-08-09 ENCOUNTER — APPOINTMENT (OUTPATIENT)
Dept: HEMATOLOGY/ONCOLOGY | Facility: HOSPITAL | Age: 79
End: 2024-08-09
Payer: MEDICARE

## 2024-08-16 ENCOUNTER — APPOINTMENT (OUTPATIENT)
Dept: HEMATOLOGY/ONCOLOGY | Facility: HOSPITAL | Age: 79
End: 2024-08-16
Payer: MEDICARE

## 2024-08-20 ENCOUNTER — APPOINTMENT (OUTPATIENT)
Dept: PRIMARY CARE | Facility: CLINIC | Age: 79
End: 2024-08-20
Payer: MEDICARE

## 2024-08-23 ENCOUNTER — APPOINTMENT (OUTPATIENT)
Dept: HEMATOLOGY/ONCOLOGY | Facility: HOSPITAL | Age: 79
End: 2024-08-23
Payer: MEDICARE

## 2024-08-27 ENCOUNTER — APPOINTMENT (OUTPATIENT)
Dept: PULMONOLOGY | Facility: CLINIC | Age: 79
End: 2024-08-27
Payer: MEDICARE

## 2024-10-04 ENCOUNTER — APPOINTMENT (OUTPATIENT)
Dept: HEMATOLOGY/ONCOLOGY | Facility: HOSPITAL | Age: 79
End: 2024-10-04
Payer: MEDICARE

## 2025-01-20 ENCOUNTER — APPOINTMENT (OUTPATIENT)
Dept: CARDIOLOGY | Facility: CLINIC | Age: 80
End: 2025-01-20
Payer: MEDICARE

## 2025-04-23 ENCOUNTER — APPOINTMENT (OUTPATIENT)
Dept: UROLOGY | Facility: CLINIC | Age: 80
End: 2025-04-23
Payer: MEDICARE

## (undated) DEVICE — Device

## (undated) DEVICE — DRAPE COVER, C ARM, FLOUROSCAN IMAGING SYS

## (undated) DEVICE — BANDAGE, COFLEX, 6 X 5 YDS, FOAM TAN, STERILE, LF

## (undated) DEVICE — DRAPE, INCISE, ANTIMICROBIAL, IOBAN 2, LARGE, 17 X 23 IN, DISPOSABLE, STERILE

## (undated) DEVICE — ENVELOPE, ANTIBACTERIAL, AIGIS RX TYRX, ABSORBABLE, LRG

## (undated) DEVICE — ELECTRODE, QUICK-COMBO, EDGE SYSTEM, REDI PACK

## (undated) DEVICE — TOWEL, SURGICAL, NEURO, O/R, 16 X 26, BLUE, STERILE

## (undated) DEVICE — ELECTRODE, ELECTROSURGICAL, BLADE, INSULATED, ENT/IMA, STERILE

## (undated) DEVICE — COVER, C-ARM W/CLIPS, OEC GE

## (undated) DEVICE — STAPLER, SKIN PROXIMATE, 35 WIDE

## (undated) DEVICE — COVER, CART, 45 X 27 X 48 IN, CLEAR

## (undated) DEVICE — MANIFOLD, 4 PORT NEPTUNE STANDARD

## (undated) DEVICE — BANDAGE, ELASTIC, ACE, ACE, DOUBLE LENGTH, 6 X 550 IN, LF

## (undated) DEVICE — BANDAGE, GAUZE, CONFORMING, KERLIX, 6 PLY, 4.5 IN X 4.1 YD

## (undated) DEVICE — SUTURE, PDS II, 0, 27 IN, CT-2, VIOLET

## (undated) DEVICE — PREP, SKIN, DURAPREP, 6 CC

## (undated) DEVICE — PHOTONBLADE, WITH ADAPTIVE SMOKE EVAC

## (undated) DEVICE — SUTURE, MONOCRYL, 2-0, 27 IN, SH/V-20 , UNDYED

## (undated) DEVICE — IRRIGATION SET, Y, LARGE BORE

## (undated) DEVICE — DRAPE, SHEET, THREE QUARTER, FAN FOLD, 57 X 77 IN

## (undated) DEVICE — PREP, IODOPHOR, W/ALCOHOL, DURAPREP, W/APPLICATOR, 26 CC

## (undated) DEVICE — PAD, GROUNDING, ELECTROSURGICAL, W/9 FT CABLE, POLYHESIVE II, ADULT, LF

## (undated) DEVICE — COVER, BACK TABLE, 65 X 90, HVY REINFORCED

## (undated) DEVICE — DRAPE, SHEET, U, W/ADHESIVE STRIP, IMPERVIOUS, 60 X 70 IN, DISPOSABLE, LF, STERILE